# Patient Record
Sex: FEMALE | Race: WHITE | NOT HISPANIC OR LATINO | Employment: FULL TIME | URBAN - METROPOLITAN AREA
[De-identification: names, ages, dates, MRNs, and addresses within clinical notes are randomized per-mention and may not be internally consistent; named-entity substitution may affect disease eponyms.]

---

## 2017-02-09 ENCOUNTER — TELEPHONE (OUTPATIENT)
Dept: INTERNAL MEDICINE | Facility: CLINIC | Age: 62
End: 2017-02-09

## 2017-02-09 DIAGNOSIS — E11.9 TYPE 2 DIABETES MELLITUS WITHOUT COMPLICATION, WITHOUT LONG-TERM CURRENT USE OF INSULIN (HCC): ICD-10-CM

## 2017-02-09 DIAGNOSIS — E03.8 SUBCLINICAL HYPOTHYROIDISM: ICD-10-CM

## 2017-02-09 DIAGNOSIS — Z00.00 HEALTH CARE MAINTENANCE: Primary | ICD-10-CM

## 2017-02-09 PROBLEM — I10 BENIGN ESSENTIAL HYPERTENSION: Status: ACTIVE | Noted: 2017-02-09

## 2017-02-09 PROBLEM — R79.89 ABNORMAL LIVER FUNCTION TESTS: Status: ACTIVE | Noted: 2017-02-09

## 2017-02-09 PROBLEM — E66.9 ADIPOSITY: Status: ACTIVE | Noted: 2017-02-09

## 2017-02-09 PROBLEM — E78.5 HYPERLIPIDEMIA: Status: ACTIVE | Noted: 2017-02-09

## 2017-02-09 NOTE — TELEPHONE ENCOUNTER
----- Message from Niesha Patel sent at 2/9/2017 10:56 AM EST -----  Contact: Patient  Patient is scheduled for CPE on 03/21/2017.  Can we please put orders in chart for her labs on 03/072017?      Thanks.

## 2017-03-07 PROBLEM — E11.3299 MILD NONPROLIFERATIVE DIABETIC RETINOPATHY WITHOUT MACULAR EDEMA ASSOCIATED WITH TYPE 2 DIABETES MELLITUS (HCC): Status: ACTIVE | Noted: 2017-03-07

## 2017-03-08 RX ORDER — SAXAGLIPTIN 5 MG/1
TABLET, FILM COATED ORAL
Qty: 30 TABLET | Refills: 5 | Status: SHIPPED | OUTPATIENT
Start: 2017-03-08 | End: 2017-03-21

## 2017-03-16 ENCOUNTER — LAB (OUTPATIENT)
Dept: INTERNAL MEDICINE | Facility: CLINIC | Age: 62
End: 2017-03-16

## 2017-03-16 DIAGNOSIS — E11.9 TYPE 2 DIABETES MELLITUS WITHOUT COMPLICATION, WITHOUT LONG-TERM CURRENT USE OF INSULIN (HCC): ICD-10-CM

## 2017-03-16 DIAGNOSIS — Z00.00 HEALTH CARE MAINTENANCE: ICD-10-CM

## 2017-03-16 DIAGNOSIS — E03.8 SUBCLINICAL HYPOTHYROIDISM: ICD-10-CM

## 2017-03-16 LAB
ALBUMIN SERPL-MCNC: 4.09 G/DL (ref 3.4–4.6)
ALBUMIN UR-MCNC: 7.3 MG/L (ref 1.3–20)
ALBUMIN/GLOB SERPL: 1.1 G/DL
ALP SERPL-CCNC: 57 U/L (ref 46–116)
ALT SERPL W P-5'-P-CCNC: 41 U/L (ref 14–59)
ANION GAP SERPL CALCULATED.3IONS-SCNC: 8 MMOL/L
AST SERPL-CCNC: 28 U/L (ref 7–37)
BACTERIA UR QL AUTO: ABNORMAL /HPF
BASOPHILS # BLD AUTO: 0.03 10*3/MM3 (ref 0–0.2)
BASOPHILS NFR BLD AUTO: 0.5 % (ref 0–2)
BILIRUB SERPL-MCNC: 0.2 MG/DL (ref 0.2–1)
BILIRUB UR QL STRIP: NEGATIVE
BUN BLD-MCNC: 11 MG/DL (ref 6–22)
BUN/CREAT SERPL: 19.6 (ref 7–25)
CALCIUM SPEC-SCNC: 9.4 MG/DL (ref 8.6–10.5)
CHLORIDE SERPL-SCNC: 102 MMOL/L (ref 95–107)
CHOLEST SERPL-MCNC: 175 MG/DL (ref 0–200)
CLARITY UR: CLEAR
CO2 SERPL-SCNC: 33 MMOL/L (ref 23–32)
COLOR UR: YELLOW
CREAT BLD-MCNC: 0.56 MG/DL (ref 0.55–1.02)
CREAT UR-MCNC: 29.9 MG/DL (ref 20–320)
DEPRECATED RDW RBC AUTO: 40.7 FL (ref 37–54)
EOSINOPHIL # BLD AUTO: 0.43 10*3/MM3 (ref 0–0.7)
EOSINOPHIL NFR BLD AUTO: 6.8 % (ref 0–5)
ERYTHROCYTE [DISTWIDTH] IN BLOOD BY AUTOMATED COUNT: 12.5 % (ref 11.5–15)
GFR SERPL CREATININE-BSD FRML MDRD: 110 ML/MIN/1.73
GLOBULIN UR ELPH-MCNC: 3.6 GM/DL
GLUCOSE BLD-MCNC: 116 MG/DL (ref 70–100)
GLUCOSE UR STRIP-MCNC: NEGATIVE MG/DL
HBA1C MFR BLD: 7 % (ref 4–6)
HCT VFR BLD AUTO: 40.6 % (ref 34.1–44.9)
HDLC SERPL-MCNC: 50 MG/DL (ref 40–81)
HGB BLD-MCNC: 13 G/DL (ref 11.2–15.7)
HGB UR QL STRIP.AUTO: ABNORMAL
HYALINE CASTS UR QL AUTO: ABNORMAL /LPF
KETONES UR QL STRIP: NEGATIVE
LDLC SERPL CALC-MCNC: 87 MG/DL (ref 0–100)
LDLC/HDLC SERPL: 1.74 {RATIO}
LEUKOCYTE ESTERASE UR QL STRIP.AUTO: ABNORMAL
LYMPHOCYTES # BLD AUTO: 1.95 10*3/MM3 (ref 0.8–7)
LYMPHOCYTES NFR BLD AUTO: 31.1 % (ref 10–60)
MCH RBC QN AUTO: 28.6 PG (ref 26–34)
MCHC RBC AUTO-ENTMCNC: 32 G/DL (ref 31–37)
MCV RBC AUTO: 89.2 FL (ref 80–100)
MICROALBUMIN/CREAT UR: 24.4 MG/L (ref 1.3–30)
MONOCYTES # BLD AUTO: 0.35 10*3/MM3 (ref 0–1)
MONOCYTES NFR BLD AUTO: 5.6 % (ref 0–13)
NEUTROPHILS # BLD AUTO: 3.52 10*3/MM3 (ref 1–11)
NEUTROPHILS NFR BLD AUTO: 56 % (ref 30–85)
NITRITE UR QL STRIP: NEGATIVE
PH UR STRIP.AUTO: 7 [PH] (ref 5–8)
PLATELET # BLD AUTO: 245 10*3/MM3 (ref 150–450)
PMV BLD AUTO: 11.3 FL (ref 6–12)
POTASSIUM BLD-SCNC: 4.5 MMOL/L (ref 3.3–5.3)
PROT SERPL-MCNC: 7.7 G/DL (ref 6.3–8.4)
PROT UR QL STRIP: NEGATIVE
RBC # BLD AUTO: 4.55 10*6/MM3 (ref 3.93–5.22)
RBC # UR: ABNORMAL /HPF
REF LAB TEST METHOD: ABNORMAL
SODIUM BLD-SCNC: 143 MMOL/L (ref 136–145)
SP GR UR STRIP: 1.01 (ref 1–1.03)
SQUAMOUS #/AREA URNS HPF: ABNORMAL /HPF
T4 FREE SERPL-MCNC: 0.96 NG/DL (ref 0.93–1.7)
TRIGL SERPL-MCNC: 189 MG/DL (ref 0–150)
TSH SERPL DL<=0.05 MIU/L-ACNC: 4.25 MIU/ML (ref 0.4–4.2)
UROBILINOGEN UR QL STRIP: ABNORMAL
VLDLC SERPL-MCNC: 37.8 MG/DL
WBC NRBC COR # BLD: 6.28 10*3/MM3 (ref 5–10)
WBC UR QL AUTO: ABNORMAL /HPF

## 2017-03-16 PROCEDURE — 80053 COMPREHEN METABOLIC PANEL: CPT | Performed by: INTERNAL MEDICINE

## 2017-03-16 PROCEDURE — 82043 UR ALBUMIN QUANTITATIVE: CPT | Performed by: INTERNAL MEDICINE

## 2017-03-16 PROCEDURE — 85025 COMPLETE CBC W/AUTO DIFF WBC: CPT | Performed by: INTERNAL MEDICINE

## 2017-03-16 PROCEDURE — 82570 ASSAY OF URINE CREATININE: CPT | Performed by: INTERNAL MEDICINE

## 2017-03-16 PROCEDURE — 83036 HEMOGLOBIN GLYCOSYLATED A1C: CPT | Performed by: INTERNAL MEDICINE

## 2017-03-16 PROCEDURE — 80061 LIPID PANEL: CPT | Performed by: INTERNAL MEDICINE

## 2017-03-16 PROCEDURE — 81001 URINALYSIS AUTO W/SCOPE: CPT | Performed by: INTERNAL MEDICINE

## 2017-03-16 PROCEDURE — 84443 ASSAY THYROID STIM HORMONE: CPT | Performed by: INTERNAL MEDICINE

## 2017-03-17 LAB — HCV AB S/CO SERPL IA: <0.1 S/CO RATIO (ref 0–0.9)

## 2017-03-21 ENCOUNTER — OFFICE VISIT (OUTPATIENT)
Dept: INTERNAL MEDICINE | Facility: CLINIC | Age: 62
End: 2017-03-21

## 2017-03-21 VITALS
BODY MASS INDEX: 32.28 KG/M2 | HEIGHT: 61 IN | WEIGHT: 171 LBS | DIASTOLIC BLOOD PRESSURE: 62 MMHG | SYSTOLIC BLOOD PRESSURE: 134 MMHG

## 2017-03-21 DIAGNOSIS — E11.3293 TYPE 2 DIABETES MELLITUS WITH BOTH EYES AFFECTED BY MILD NONPROLIFERATIVE RETINOPATHY WITHOUT MACULAR EDEMA, WITHOUT LONG-TERM CURRENT USE OF INSULIN (HCC): ICD-10-CM

## 2017-03-21 DIAGNOSIS — Z23 NEED FOR PNEUMOCOCCAL VACCINATION: ICD-10-CM

## 2017-03-21 DIAGNOSIS — Z00.00 HEALTH CARE MAINTENANCE: Primary | ICD-10-CM

## 2017-03-21 DIAGNOSIS — Z78.0 POST-MENOPAUSE: ICD-10-CM

## 2017-03-21 DIAGNOSIS — E11.3299 MILD NONPROLIFERATIVE DIABETIC RETINOPATHY WITHOUT MACULAR EDEMA ASSOCIATED WITH TYPE 2 DIABETES MELLITUS (HCC): ICD-10-CM

## 2017-03-21 PROCEDURE — 90732 PPSV23 VACC 2 YRS+ SUBQ/IM: CPT | Performed by: INTERNAL MEDICINE

## 2017-03-21 PROCEDURE — 90471 IMMUNIZATION ADMIN: CPT | Performed by: INTERNAL MEDICINE

## 2017-03-21 PROCEDURE — 99396 PREV VISIT EST AGE 40-64: CPT | Performed by: INTERNAL MEDICINE

## 2017-03-21 NOTE — PROGRESS NOTES
"Checo Tovar is a 61 y.o. female. Here for CPE    History of Present Illness   Visit Vitals   • /62   • Ht 61\" (154.9 cm)   • Wt 171 lb (77.6 kg)   • BMI 32.31 kg/m2     Patient is here for yearly CPE. Last CPE was  2 year ago.  PMH, SH and FH reviewed. Changes in the FH: none .  Patient rates her own health as: good.  Tobacco use: none.  Alcohol use: none.  Recreational drugs use: none  Medication list rewieved.  Diet: healthy heart.  Exercise: used to exercise regularly, but lately stopped exercise due to pain in her knees.  Marital status: .   Employment: full time.  Patient rates her stress level as: moderate.  Dental health: good. Brushes teeth 2 times a day, flosses couple of times a week. . Dental visits: 2 times a year .  Vision correction: reading glasses  Hearing: normal.    Recent vaccinations:   Flu  is up to date and recommended yearly  Tdap  is up to date  Zostavax discussed and recommended to get at [facility].    Patient is postmenopausal. Past pregnancies: . Currently patient is on no HRT .      Current Outpatient Prescriptions:   •  CRESTOR 20 MG tablet, TAKE 1/2 TABLET BY MOUTH DAILY, Disp: 15 tablet, Rfl: 3  •  glimepiride (AMARYL) 2 MG tablet, TAKE ONE TABLET BY MOUTH IN THE MORNING, Disp: 90 tablet, Rfl: 3  •  lisinopril (PRINIVIL,ZESTRIL) 20 MG tablet, TAKE ONE TABLET BY MOUTH ONCE DAILY AS DIRECTED, Disp: 90 tablet, Rfl: 3  •  lisinopril-hydrochlorothiazide (PRINZIDE,ZESTORETIC) 20-25 MG per tablet, TAKE ONE TABLET BY MOUTH ONCE DAILY, Disp: 90 tablet, Rfl: 3  •  metFORMIN (GLUCOPHAGE) 500 MG tablet, TAKE FIVE TABLETS BY MOUTH ONCE DAILY AS DIRECTED, Disp: 150 tablet, Rfl: 11  •  ONGLYZA 5 MG tablet, TAKE ONE TABLET BY MOUTH ONCE DAILY, Disp: 30 tablet, Rfl: 5                           The following portions of the patient's history were reviewed and updated as appropriate: allergies, current medications, past family history, past medical history, " past social history, past surgical history and problem list.    Review of Systems   Constitutional: Negative for chills and fever.   HENT: Negative for postnasal drip, sinus pressure and sore throat.    Eyes: Negative for pain and itching.   Respiratory: Negative for cough and chest tightness.    Cardiovascular: Negative for chest pain and leg swelling.   Gastrointestinal: Negative for abdominal pain and blood in stool.   Endocrine: Negative for cold intolerance and heat intolerance.   Genitourinary: Positive for flank pain. Negative for difficulty urinating.   Musculoskeletal: Negative for back pain and neck pain.   Skin: Negative for color change and rash.   Neurological: Negative for dizziness, weakness and headaches.   Hematological: Negative for adenopathy. Does not bruise/bleed easily.   Psychiatric/Behavioral: Negative for sleep disturbance. The patient is not nervous/anxious.        Objective   Physical Exam   Constitutional: She is oriented to person, place, and time. She appears well-developed. No distress.   obese   HENT:   Head: Normocephalic and atraumatic.   Right Ear: Tympanic membrane, external ear and ear canal normal. No drainage or swelling. No mastoid tenderness. Tympanic membrane is not injected. No middle ear effusion.   Left Ear: Tympanic membrane, external ear and ear canal normal. No drainage or swelling. No mastoid tenderness. Tympanic membrane is not injected.  No middle ear effusion.   Nose: Nose normal. No mucosal edema. Right sinus exhibits no maxillary sinus tenderness and no frontal sinus tenderness. Left sinus exhibits no maxillary sinus tenderness and no frontal sinus tenderness.   Mouth/Throat: Uvula is midline, oropharynx is clear and moist and mucous membranes are normal. No oropharyngeal exudate.   Eyes: Conjunctivae, EOM and lids are normal. Pupils are equal, round, and reactive to light. Right eye exhibits no discharge. Left eye exhibits no discharge. Right conjunctiva is not  injected. Right conjunctiva has no hemorrhage. Left conjunctiva is not injected. Left conjunctiva has no hemorrhage. No scleral icterus. Right eye exhibits normal extraocular motion and no nystagmus. Left eye exhibits normal extraocular motion and no nystagmus.   Neck: Normal range of motion and full passive range of motion without pain. Neck supple. No JVD present. Carotid bruit is not present. No thyromegaly present.   Cardiovascular: Normal rate, regular rhythm, S1 normal, S2 normal, normal heart sounds and intact distal pulses.  Exam reveals no gallop and no friction rub.    No murmur heard.  Pulmonary/Chest: Effort normal and breath sounds normal. No respiratory distress. She has no decreased breath sounds. She has no wheezes. She has no rhonchi. She has no rales. Right breast exhibits no inverted nipple, no mass, no nipple discharge, no skin change and no tenderness. Left breast exhibits no inverted nipple, no mass, no nipple discharge, no skin change and no tenderness. Breasts are symmetrical.   Abdominal: Soft. Bowel sounds are normal. She exhibits no distension. There is no tenderness. There is no rebound and no guarding.   Musculoskeletal: She exhibits no edema.    Pretty had a diabetic foot exam performed (nl) today.   During the foot exam she had a monofilament test performed (no light touch deficit on exam with microfilament).    Neurological Sensory Findings - Unaltered hot/cold right ankle/foot discrimination and unaltered hot/cold left ankle/foot discrimination. Unaltered sharp/dull right ankle/foot discrimination and unaltered sharp/dull left ankle/foot discrimination. No right ankle/foot altered proprioception and no left ankle/foot altered proprioception    Vascular Status -  Her exam exhibits right foot vasculature abnormal and no right foot edema. Her exam exhibits left foot vasculature abnormal and no left foot edema.   Skin Integrity  -  Her right foot skin is not intact.   Pretty's left  foot skin is not intact. .  Lymphadenopathy:        Head (right side): No submental, no submandibular, no preauricular, no posterior auricular and no occipital adenopathy present.        Head (left side): No submental, no submandibular, no preauricular, no posterior auricular and no occipital adenopathy present.     She has no cervical adenopathy.        Right cervical: No superficial cervical and no deep cervical adenopathy present.       Left cervical: No superficial cervical, no deep cervical and no posterior cervical adenopathy present.     She has no axillary adenopathy.        Right: No supraclavicular adenopathy present.        Left: No supraclavicular adenopathy present.   Neurological: She is alert and oriented to person, place, and time. She has normal reflexes. She displays normal reflexes. No cranial nerve deficit. She exhibits normal muscle tone. Coordination normal.   Reflex Scores:       Bicep reflexes are 2+ on the right side and 2+ on the left side.       Patellar reflexes are 2+ on the right side and 2+ on the left side.  Skin: Skin is warm. No rash noted. She is not diaphoretic. No erythema.   Psychiatric: She has a normal mood and affect. Her speech is normal and behavior is normal.   Vitals reviewed.      Assessment/Plan   Pretty was seen today for annual exam.    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  -     metFORMIN (GLUCOPHAGE) 500 MG tablet; Take 2 tablets by mouth Take As Directed. 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 Cardiovascular risk factors: hypertension, hyperlipidemia, diabetes mellitus, obesity, family history of CAD, sedentary life style   2. Diabetes risk factors: patient has diabetes and being treated.  3. Cancer risk factors: diabetes.  4. Risky behavior: none. Use of seat belts: regular. Use of sunscreens: none.   Tattoos: none. H/o blood transfusions/organ transplants before 1992 or clotting factor transfusion before 1987: none.     Prevention:  Cholesterol test  up to date.  Blood sugar test up to date.   Hep C testing (for patients born [birth year]): completed..  Mammogram up to date. Recommended frequency and time of the next screening per GYN.. Breast self exams recommended once a month.  Colonoscopy: is due. Will schedule. Benefits and risks discussed..  PAP smear : up to date. Recommendations per GYN..  DEXA : is due, will schedule. Benefits explained..     HTN - well controlled with current medication regimen. Normal kidney tests and electrolytes. Recommended low salt diet. Continue same medical treatment.  Hyperlipidemia - well controlled with Rosuavastatin. Normal liver function tests. LDL target is below < 70 mg/dl (\"very high\" risk for CHD). Patient's 87, but had improved from 122, that she had 2 years ago.. Continue same treatment. Regular exercise recommended.  DM II - not well contrlled with sulfonurea, metformin and DPP4 inhibitor. Hb A1C is 7.0.  No hypoglycemic episodes. Low carb diet and regular exercise recommended. Weight loss will be very beneficial. Regular feet self examinations and comfortable footwear recommended. Will discontinue Onglyza and start Victoza once a day. Start 0.6 mg a day for 1-2 weeks, then increase to 1.2 mg a day and continue. Some nausea is expected initially, but usually goes away in 2 weeks. If patient notices blood sugars below 80, change Glimepiride to " 1/2 pill a day.

## 2017-03-21 NOTE — PATIENT INSTRUCTIONS
"Risk evaluation:  1. Cardiovascular risk factors: hypertension, hyperlipidemia, diabetes mellitus, obesity, family history of CAD, sedentary life style   2. Diabetes risk factors: patient has diabetes and being treated.  3. Cancer risk factors: diabetes.  4. Risky behavior: none. Use of seat belts: regular. Use of sunscreens: none.   Tattoos: none. H/o blood transfusions/organ transplants before 1992 or clotting factor transfusion before 1987: none.     Prevention:  Cholesterol test  up to date.  Blood sugar test up to date.   Hep C testing (for patients born [birth year]): completed..  Mammogram up to date. Recommended frequency and time of the next screening per GYN.. Breast self exams recommended once a month.  Colonoscopy: is due. Will schedule. Benefits and risks discussed..  PAP smear : up to date. Recommendations per GYN..  DEXA : is due, will schedule. Benefits explained..     HTN - well controlled with current medication regimen. Normal kidney tests and electrolytes. Recommended low salt diet. Continue same medical treatment.  Hyperlipidemia - well controlled with Rosuavastatin. Normal liver function tests. LDL target is below < 70 mg/dl (\"very high\" risk for CHD). Patient's 87, but had improved from 122, that she had 2 years ago.. Continue same treatment. Regular exercise recommended.  DM II - not well contrlled with sulfonurea, metformin and DPP4 inhibitor. Hb A1C is 7.0.  No hypoglycemic episodes. Low carb diet and regular exercise recommended. Weight loss will be very beneficial. Regular feet self examinations and comfortable footwear recommended. Will discontinue Onglyza and start Victoza once a day. Start 0.6 mg a day for 1-2 weeks, then increase to 1.2 mg a day and continue. Some nausea is expected initially, but usually goes away in 2 weeks. If patient notices blood sugars below 80, change Glimepiride to 1/2 pill a day.       "

## 2017-03-30 DIAGNOSIS — Z12.11 ENCOUNTER FOR SCREENING COLONOSCOPY: Primary | ICD-10-CM

## 2017-03-30 RX ORDER — PEG-3350, SODIUM SULFATE, SODIUM CHLORIDE, POTASSIUM CHLORIDE, SODIUM ASCORBATE AND ASCORBIC ACID 7.5-2.691G
KIT ORAL
Qty: 1 EACH | Refills: 0 | Status: SHIPPED | OUTPATIENT
Start: 2017-03-30 | End: 2018-04-12

## 2017-04-17 RX ORDER — ROSUVASTATIN CALCIUM 20 MG/1
TABLET, FILM COATED ORAL
Qty: 15 TABLET | Refills: 3 | Status: SHIPPED | OUTPATIENT
Start: 2017-04-17 | End: 2017-09-18 | Stop reason: SDUPTHER

## 2017-05-15 ENCOUNTER — TELEPHONE (OUTPATIENT)
Dept: INTERNAL MEDICINE | Facility: CLINIC | Age: 62
End: 2017-05-15

## 2017-05-16 RX ORDER — LISINOPRIL 20 MG/1
20 TABLET ORAL DAILY
Qty: 90 TABLET | Refills: 3 | Status: SHIPPED | OUTPATIENT
Start: 2017-05-16 | End: 2018-06-18 | Stop reason: SDUPTHER

## 2017-05-16 RX ORDER — LISINOPRIL AND HYDROCHLOROTHIAZIDE 25; 20 MG/1; MG/1
1 TABLET ORAL DAILY
Qty: 90 TABLET | Refills: 3 | Status: SHIPPED | OUTPATIENT
Start: 2017-05-16 | End: 2018-06-18 | Stop reason: SDUPTHER

## 2017-05-17 DIAGNOSIS — E11.3299 MILD NONPROLIFERATIVE DIABETIC RETINOPATHY WITHOUT MACULAR EDEMA ASSOCIATED WITH TYPE 2 DIABETES MELLITUS (HCC): ICD-10-CM

## 2017-05-17 RX ORDER — LIRAGLUTIDE 6 MG/ML
INJECTION SUBCUTANEOUS
Qty: 6 ML | Refills: 1 | Status: SHIPPED | OUTPATIENT
Start: 2017-05-17 | End: 2017-07-19 | Stop reason: SDUPTHER

## 2017-06-21 ENCOUNTER — TELEPHONE (OUTPATIENT)
Dept: INTERNAL MEDICINE | Facility: CLINIC | Age: 62
End: 2017-06-21

## 2017-06-21 ENCOUNTER — LAB (OUTPATIENT)
Dept: INTERNAL MEDICINE | Facility: CLINIC | Age: 62
End: 2017-06-21

## 2017-06-21 DIAGNOSIS — E11.3293 TYPE 2 DIABETES MELLITUS WITH BOTH EYES AFFECTED BY MILD NONPROLIFERATIVE RETINOPATHY WITHOUT MACULAR EDEMA, WITHOUT LONG-TERM CURRENT USE OF INSULIN (HCC): ICD-10-CM

## 2017-06-21 LAB
ANION GAP SERPL CALCULATED.3IONS-SCNC: 9 MMOL/L
BUN BLD-MCNC: 10 MG/DL (ref 6–22)
BUN/CREAT SERPL: 18.2 (ref 7–25)
CALCIUM SPEC-SCNC: 9.4 MG/DL (ref 8.6–10.5)
CHLORIDE SERPL-SCNC: 100 MMOL/L (ref 95–107)
CO2 SERPL-SCNC: 31 MMOL/L (ref 23–32)
CREAT BLD-MCNC: 0.55 MG/DL (ref 0.55–1.02)
GFR SERPL CREATININE-BSD FRML MDRD: 112 ML/MIN/1.73
GLUCOSE BLD-MCNC: 101 MG/DL (ref 70–100)
HBA1C MFR BLD: 5.9 % (ref 4.8–5.6)
POTASSIUM BLD-SCNC: 5.1 MMOL/L (ref 3.3–5.3)
SODIUM BLD-SCNC: 140 MMOL/L (ref 136–145)

## 2017-06-21 PROCEDURE — 80048 BASIC METABOLIC PNL TOTAL CA: CPT | Performed by: INTERNAL MEDICINE

## 2017-06-21 NOTE — TELEPHONE ENCOUNTER
Refilled patients test strips per patient request. Due to patient proper compliance with lab and office visit

## 2017-06-21 NOTE — TELEPHONE ENCOUNTER
----- Message from Cristy Armas sent at 6/21/2017  8:37 AM EDT -----  Pt is requesting a refill on the Barbara Plus test strips.     [facility] [id number] [address] [employer] - [facility] [address] - [phone]  - [phone] (Phone)  [phone] (Fax)    Next OV 6/28/17    [phone] (H)  [phone] (M)  [phone] (W)

## 2017-07-07 ENCOUNTER — OFFICE VISIT (OUTPATIENT)
Dept: INTERNAL MEDICINE | Facility: CLINIC | Age: 62
End: 2017-07-07

## 2017-07-07 VITALS
HEIGHT: 61 IN | SYSTOLIC BLOOD PRESSURE: 118 MMHG | DIASTOLIC BLOOD PRESSURE: 72 MMHG | RESPIRATION RATE: 14 BRPM | WEIGHT: 168 LBS | BODY MASS INDEX: 31.72 KG/M2

## 2017-07-07 DIAGNOSIS — E11.3293 TYPE 2 DIABETES MELLITUS WITH BOTH EYES AFFECTED BY MILD NONPROLIFERATIVE RETINOPATHY WITHOUT MACULAR EDEMA, WITHOUT LONG-TERM CURRENT USE OF INSULIN (HCC): Primary | ICD-10-CM

## 2017-07-07 DIAGNOSIS — I10 BENIGN ESSENTIAL HYPERTENSION: ICD-10-CM

## 2017-07-07 PROCEDURE — 99213 OFFICE O/P EST LOW 20 MIN: CPT | Performed by: INTERNAL MEDICINE

## 2017-07-07 RX ORDER — GLIMEPIRIDE 1 MG/1
1 TABLET ORAL DAILY
Qty: 90 TABLET | Refills: 3 | Status: SHIPPED | OUTPATIENT
Start: 2017-07-07 | End: 2018-04-12

## 2017-07-07 NOTE — PROGRESS NOTES
"Subjective   Pretty Tovar is a 61 y.o. female. Here for DM    History of Present Illness   Pretty Tovar 61 y.o. female presents today for diabetes mellitus f/u. Last was seen on 03/21/2017 and on that visit medication was changed due to inadequate control.We had discontinued Onglyza and started Victoaza 1.2 mg.  Patient is compliant with treatment and denies  side effects. Patient brought in home accuchecks and blood sugars seem to be in the better range. No hypoglycemia. Had lost 3 lbs as her appetite had decreased.       /72 (BP Location: Right arm, Patient Position: Sitting, Cuff Size: Adult)  Resp 14  Ht 61\" (154.9 cm)  Wt 168 lb (76.2 kg)  BMI 31.74 kg/m2    Current Outpatient Prescriptions:   •  CRESTOR 20 MG tablet, TAKE 1/2 TABLET BY MOUTH DAILY, Disp: 15 tablet, Rfl: 3  •  glimepiride (AMARYL) 2 MG tablet, TAKE ONE TABLET BY MOUTH IN THE MORNING, Disp: 90 tablet, Rfl: 3  •  glucose blood test strip, Use to check blood sugars once daily, Disp: 50 each, Rfl: 11  •  lisinopril (PRINIVIL,ZESTRIL) 20 MG tablet, Take 1 tablet by mouth Daily., Disp: 90 tablet, Rfl: 3  •  lisinopril-hydrochlorothiazide (PRINZIDE,ZESTORETIC) 20-25 MG per tablet, Take 1 tablet by mouth Daily., Disp: 90 tablet, Rfl: 3  •  metFORMIN (GLUCOPHAGE) 500 MG tablet, Take 2 tablets by mouth Take As Directed. Take 3 pills in am and 2 in pm, Disp: 150 tablet, Rfl: 11  •  PEG-KCl-NaCl-NaSulf-Na Asc-C (MOVIPREP) 100 G reconstituted solution, Take as directed., Disp: 1 each, Rfl: 0  •  VICTOZA 18 MG/3ML solution pen-injector, INJECT 1.2 MG SUBCUTANEOUSLY ONCE DAILY, Disp: 6 mL, Rfl: 1    Patient has long-standing benign essential HTN.  BP is usually well controlled with daily use of ACEI and thiazide diuretic. On low salt diet with good compliance. Takes medication regularly. Denies chest pain, dyspnea,lightheadedness,  lower extremity edema. Patient does not check blood pressure    The following portions of the patient's history " were reviewed and updated as appropriate: allergies, current medications, past family history, past medical history, past social history, past surgical history and problem list.    Review of Systems   Constitutional: Negative for chills and fever.   Eyes: Negative for pain and redness.   Respiratory: Negative for cough and shortness of breath.    Cardiovascular: Negative for chest pain and leg swelling.   Neurological: Negative for dizziness and headaches.       Objective   Physical Exam   Constitutional: She is oriented to person, place, and time. She appears well-developed.   obese   HENT:   Head: Normocephalic and atraumatic.   Right Ear: Tympanic membrane, external ear and ear canal normal.   Left Ear: Tympanic membrane, external ear and ear canal normal.   Nose: Nose normal. Right sinus exhibits no maxillary sinus tenderness and no frontal sinus tenderness. Left sinus exhibits no maxillary sinus tenderness and no frontal sinus tenderness.   Mouth/Throat: Uvula is midline, oropharynx is clear and moist and mucous membranes are normal.   Eyes: Conjunctivae and EOM are normal. Pupils are equal, round, and reactive to light. Right eye exhibits no discharge. Left eye exhibits no discharge. No scleral icterus.   Neck: Neck supple. No JVD present.   Cardiovascular: Normal rate, regular rhythm and normal heart sounds.  Exam reveals no gallop and no friction rub.    No murmur heard.  Pulmonary/Chest: Effort normal and breath sounds normal. She has no wheezes. She has no rales.   Musculoskeletal: She exhibits no edema.   Lymphadenopathy:     She has no cervical adenopathy.   Neurological: She is alert and oriented to person, place, and time. No cranial nerve deficit.   Skin: Skin is warm and dry. No rash noted.   erythematous flat patches   Psychiatric: She has a normal mood and affect. Her behavior is normal.   Vitals reviewed.      Assessment/Plan   Pretty was seen today for diabetes.    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 Hb A1C from 7.0 is down to 5.9. Will continue same Victoza and Metformin, but will decrease the dose of Glimepiride to 1 mg a day.  HTN - well controlled  with current medication. Reminded of the importance of low salt diet. Normal kidney tests and electrolytes. Continue same treatment.

## 2017-07-19 DIAGNOSIS — E11.3299 MILD NONPROLIFERATIVE DIABETIC RETINOPATHY WITHOUT MACULAR EDEMA ASSOCIATED WITH TYPE 2 DIABETES MELLITUS (HCC): ICD-10-CM

## 2017-07-19 RX ORDER — LIRAGLUTIDE 6 MG/ML
INJECTION SUBCUTANEOUS
Qty: 9 ML | Refills: 1 | Status: SHIPPED | OUTPATIENT
Start: 2017-07-19 | End: 2017-09-13 | Stop reason: SDUPTHER

## 2017-07-20 ENCOUNTER — TELEPHONE (OUTPATIENT)
Dept: INTERNAL MEDICINE | Facility: CLINIC | Age: 62
End: 2017-07-20

## 2017-07-20 DIAGNOSIS — E11.9 TYPE 2 DIABETES MELLITUS WITHOUT COMPLICATION, UNSPECIFIED LONG TERM INSULIN USE STATUS: Primary | ICD-10-CM

## 2017-07-20 NOTE — TELEPHONE ENCOUNTER
----- Message from Pretty Tovar sent at 7/19/2017  5:27 PM EDT -----  Regarding: Prescription Question  Contact: [phone]  Dr. Schwartz,       Could you please send a prescription for the OneTouch Verio Test Strips to my pharmacy          [facility]      [address]      My insurance would not pay for the previous prescription you sent.    Thanks so much.    Pretty

## 2017-09-05 ENCOUNTER — TELEPHONE (OUTPATIENT)
Dept: INTERNAL MEDICINE | Facility: CLINIC | Age: 62
End: 2017-09-05

## 2017-09-05 DIAGNOSIS — E11.3299 MILD NONPROLIFERATIVE DIABETIC RETINOPATHY WITHOUT MACULAR EDEMA ASSOCIATED WITH TYPE 2 DIABETES MELLITUS (HCC): ICD-10-CM

## 2017-09-05 NOTE — TELEPHONE ENCOUNTER
Regarding: Prescription Question  Contact: [phone]  ----- Message from Victoria Boles sent at 9/5/2017  2:24 PM EDT -----       ----- Message from Pretty Tovar to Lety Schwartz MD sent at 9/5/2017 12:19 PM -----   I need a refill on my prescription for Metformin 500 MG Tab  (5 Tablets Daily)    You may receive a request from [facility], [facility].      Thank you,    Pretty

## 2017-09-05 NOTE — TELEPHONE ENCOUNTER
Metformin 500 mg refill Sent to patients [facility] pharmacy per patient request. Patient notified through [employer]

## 2017-09-13 DIAGNOSIS — E11.3299 MILD NONPROLIFERATIVE DIABETIC RETINOPATHY WITHOUT MACULAR EDEMA ASSOCIATED WITH TYPE 2 DIABETES MELLITUS (HCC): ICD-10-CM

## 2017-09-13 RX ORDER — LIRAGLUTIDE 6 MG/ML
INJECTION SUBCUTANEOUS
Qty: 9 ML | Refills: 1 | Status: SHIPPED | OUTPATIENT
Start: 2017-09-13 | End: 2017-12-11 | Stop reason: SDUPTHER

## 2017-09-18 RX ORDER — ROSUVASTATIN CALCIUM 20 MG/1
10 TABLET, FILM COATED ORAL DAILY
Qty: 15 TABLET | Refills: 6 | Status: SHIPPED | OUTPATIENT
Start: 2017-09-18 | End: 2018-04-10 | Stop reason: SDUPTHER

## 2017-10-11 ENCOUNTER — LAB (OUTPATIENT)
Dept: INTERNAL MEDICINE | Facility: CLINIC | Age: 62
End: 2017-10-11

## 2017-10-11 DIAGNOSIS — E11.3293 TYPE 2 DIABETES MELLITUS WITH BOTH EYES AFFECTED BY MILD NONPROLIFERATIVE RETINOPATHY WITHOUT MACULAR EDEMA, WITHOUT LONG-TERM CURRENT USE OF INSULIN (HCC): ICD-10-CM

## 2017-10-11 LAB
ALBUMIN SERPL-MCNC: 4.7 G/DL (ref 3.5–5.2)
ALBUMIN/GLOB SERPL: 1.3 G/DL
ALP SERPL-CCNC: 46 U/L (ref 39–117)
ALT SERPL W P-5'-P-CCNC: 19 U/L (ref 1–33)
ANION GAP SERPL CALCULATED.3IONS-SCNC: 14.1 MMOL/L
AST SERPL-CCNC: 24 U/L (ref 1–32)
BILIRUB SERPL-MCNC: 0.3 MG/DL (ref 0.1–1.2)
BUN BLD-MCNC: 10 MG/DL (ref 8–23)
BUN/CREAT SERPL: 16.9 (ref 7–25)
CALCIUM SPEC-SCNC: 10.2 MG/DL (ref 8.6–10.5)
CHLORIDE SERPL-SCNC: 97 MMOL/L (ref 98–107)
CHOLEST SERPL-MCNC: 182 MG/DL (ref 0–200)
CO2 SERPL-SCNC: 29.9 MMOL/L (ref 22–29)
CREAT BLD-MCNC: 0.59 MG/DL (ref 0.57–1)
GFR SERPL CREATININE-BSD FRML MDRD: 104 ML/MIN/1.73
GLOBULIN UR ELPH-MCNC: 3.6 GM/DL
GLUCOSE BLD-MCNC: 76 MG/DL (ref 65–99)
HBA1C MFR BLD: 5.9 % (ref 4.8–5.6)
HDLC SERPL-MCNC: 53 MG/DL (ref 40–60)
LDLC SERPL CALC-MCNC: 85 MG/DL (ref 0–100)
LDLC/HDLC SERPL: 1.61 {RATIO}
POTASSIUM BLD-SCNC: 3.8 MMOL/L (ref 3.5–5.2)
PROT SERPL-MCNC: 8.3 G/DL (ref 6–8.5)
SODIUM BLD-SCNC: 141 MMOL/L (ref 136–145)
TRIGL SERPL-MCNC: 218 MG/DL (ref 0–150)
VLDLC SERPL-MCNC: 43.6 MG/DL (ref 5–40)

## 2017-10-11 PROCEDURE — 80053 COMPREHEN METABOLIC PANEL: CPT | Performed by: INTERNAL MEDICINE

## 2017-10-11 PROCEDURE — 36415 COLL VENOUS BLD VENIPUNCTURE: CPT | Performed by: INTERNAL MEDICINE

## 2017-10-11 PROCEDURE — 83036 HEMOGLOBIN GLYCOSYLATED A1C: CPT | Performed by: INTERNAL MEDICINE

## 2017-10-11 PROCEDURE — 80061 LIPID PANEL: CPT | Performed by: INTERNAL MEDICINE

## 2017-10-18 ENCOUNTER — OFFICE VISIT (OUTPATIENT)
Dept: INTERNAL MEDICINE | Facility: CLINIC | Age: 62
End: 2017-10-18

## 2017-10-18 VITALS
RESPIRATION RATE: 14 BRPM | BODY MASS INDEX: 30.02 KG/M2 | HEIGHT: 61 IN | WEIGHT: 159 LBS | SYSTOLIC BLOOD PRESSURE: 120 MMHG | DIASTOLIC BLOOD PRESSURE: 62 MMHG

## 2017-10-18 DIAGNOSIS — Z00.00 HEALTH CARE MAINTENANCE: ICD-10-CM

## 2017-10-18 DIAGNOSIS — E11.3293 TYPE 2 DIABETES MELLITUS WITH BOTH EYES AFFECTED BY MILD NONPROLIFERATIVE RETINOPATHY WITHOUT MACULAR EDEMA, WITHOUT LONG-TERM CURRENT USE OF INSULIN (HCC): ICD-10-CM

## 2017-10-18 DIAGNOSIS — E78.2 MIXED HYPERLIPIDEMIA: Primary | ICD-10-CM

## 2017-10-18 DIAGNOSIS — Z23 NEED FOR VACCINATION: ICD-10-CM

## 2017-10-18 DIAGNOSIS — R79.89 ABNORMAL LIVER FUNCTION TESTS: ICD-10-CM

## 2017-10-18 PROCEDURE — 90686 IIV4 VACC NO PRSV 0.5 ML IM: CPT | Performed by: INTERNAL MEDICINE

## 2017-10-18 PROCEDURE — 90471 IMMUNIZATION ADMIN: CPT | Performed by: INTERNAL MEDICINE

## 2017-10-18 PROCEDURE — 99214 OFFICE O/P EST MOD 30 MIN: CPT | Performed by: INTERNAL MEDICINE

## 2017-10-18 RX ORDER — CALCIPOTRIENE AND BETAMETHASONE DIPROPIONATE 50; .5 UG/G; MG/G
1 SUSPENSION TOPICAL AS NEEDED
Refills: 5 | COMMUNITY
Start: 2017-08-18

## 2017-10-18 NOTE — PATIENT INSTRUCTIONS
"Hyperlipidemia - well controlled with statin. Normal liver function tests. LDL target is below < 70 mg/dl (\"very high\" risk for CHD). Patient's 84 while on high dose of Crestor. Still evelated triglycerides - recommended to eat more fish, less starches. Continue same treatment. Regular exercise recommended.  DM II - well controlled with sulfonurea, metformin and GLP1 agonist injections. Hb A1C is   Lab Results   Component Value Date    HGBA1C 5.90 (H) 10/11/2017    Low carb diet and regular exercise recommended. Weight loss will be very beneficial. Regular feet self examinations and comfortable footwear recommended.Advised to stop Glimepiride after Valentynes Day.  Liver function tests abnormalities - resolved with weight loss, will monitor.    "

## 2017-10-18 NOTE — PROGRESS NOTES
"Subjective   Pretty Tovar is a 61 y.o. female.     History of Present Illness     /62 (BP Location: Left arm, Patient Position: Sitting, Cuff Size: Adult)  Resp 14  Ht 61\" (154.9 cm)  Wt 159 lb (72.1 kg)  BMI 30.04 kg/m2    Current Outpatient Prescriptions:   •  CRESTOR 20 MG tablet, Take 0.5 tablets by mouth Daily., Disp: 15 tablet, Rfl: 6  •  glimepiride (AMARYL) 1 MG tablet, Take 1 tablet by mouth Daily., Disp: 90 tablet, Rfl: 3  •  glucose blood test strip, Use to check blood sugars once daily, Disp: 50 each, Rfl: 11  •  glucose blood test strip, Use to check blood sugars once daily, Disp: 50 each, Rfl: 12  •  Insulin Pen Needle (NOVOFINE PLUS) 32G X 4 MM misc, Use 1 pen needle with each victoza injection, Disp: 100 each, Rfl: 3  •  lisinopril (PRINIVIL,ZESTRIL) 20 MG tablet, Take 1 tablet by mouth Daily., Disp: 90 tablet, Rfl: 3  •  lisinopril-hydrochlorothiazide (PRINZIDE,ZESTORETIC) 20-25 MG per tablet, Take 1 tablet by mouth Daily., Disp: 90 tablet, Rfl: 3  •  metFORMIN (GLUCOPHAGE) 500 MG tablet, Take 2 tablets by mouth Take As Directed. Take 3 pills in am and 2 in pm, Disp: 150 tablet, Rfl: 11  •  PEG-KCl-NaCl-NaSulf-Na Asc-C (MOVIPREP) 100 G reconstituted solution, Take as directed., Disp: 1 each, Rfl: 0  •  TACLONEX 0.005-0.064 % external suspension, APPLY TO PSORIASIS ON SCALP/TRUNK/EXTREMITIES EVERY OTHER DAY AS NEEDED, Disp: , Rfl: 5  •  VICTOZA 18 MG/3ML solution pen-injector, INJECT 1.2 MG SUBCUTANEOUSLY ONCE DAILY, Disp: 9 mL, Rfl: 1    Patient has been diagnosed with hyperlipidemia. Target LDL is < 70 mg/dl (\"very high\" risk for CHD). Patient is on low fat diet with good compliance. Patient is compliant with treatment: daily use of Crestor (rozuvastatin). Side effects of medication: none. Exercise none.    Patient has DM type II. Pretty Tovar uses sulfonurea, metformin and GLP1 agonist injections for blood sugars control. Patient checks fasting blood sugars at home daily., Denies " hypoglycemic episodes. and Fasting blood sugars are in 120s, had few in 80s. Compliance with low carb diabetic diet is good. Compliance with medication is good.  In a past control had been good. Last Hb A1C was 5.9.    Patient had been diagnosed with abnormal LFTs. No use of ETOH or Tylenol OTC. No abdominal pain or nausea. Lost 12 lbs in  The last 6 months with use of Victoza.    hThe following portions of the patient's history were reviewed and updated as appropriate: allergies, current medications, past family history, past medical history, past social history, past surgical history and problem list.    Review of Systems   Constitutional: Negative for chills and fever.   Eyes: Negative for pain and redness.   Respiratory: Negative for cough and shortness of breath.    Cardiovascular: Negative for chest pain and leg swelling.   Neurological: Negative for dizziness and headaches.       Objective   Physical Exam   Constitutional: She is oriented to person, place, and time. She appears well-developed and well-nourished.   HENT:   Head: Normocephalic and atraumatic.   Right Ear: Tympanic membrane, external ear and ear canal normal.   Left Ear: Tympanic membrane, external ear and ear canal normal.   Nose: Nose normal. Right sinus exhibits no maxillary sinus tenderness and no frontal sinus tenderness. Left sinus exhibits no maxillary sinus tenderness and no frontal sinus tenderness.   Mouth/Throat: Uvula is midline, oropharynx is clear and moist and mucous membranes are normal.   Eyes: Conjunctivae and EOM are normal. Pupils are equal, round, and reactive to light. Right eye exhibits no discharge. Left eye exhibits no discharge. No scleral icterus.   Neck: Neck supple. No JVD present.   Cardiovascular: Normal rate, regular rhythm and normal heart sounds.  Exam reveals no gallop and no friction rub.    No murmur heard.  Pulmonary/Chest: Effort normal and breath sounds normal. She has no wheezes. She has no rales.  "  Musculoskeletal: She exhibits no edema.   Lymphadenopathy:     She has no cervical adenopathy.   Neurological: She is alert and oriented to person, place, and time. No cranial nerve deficit.   Skin: Skin is warm and dry. No rash noted.   Psychiatric: She has a normal mood and affect. Her behavior is normal.   Vitals reviewed.      Assessment/Plan   Pretty was seen today for diabetes and hyperlipidemia.    Diagnoses and all orders for this visit:    Mixed hyperlipidemia    Type 2 diabetes mellitus with both eyes affected by mild nonproliferative retinopathy without macular edema, without long-term current use of insulin    Abnormal liver function tests      Hyperlipidemia - well controlled with statin. Normal liver function tests. LDL target is below < 70 mg/dl (\"very high\" risk for CHD). Patient's 84 while on high dose of Crestor. Still evelated triglycerides - recommended to eat more fish, less starches. Continue same treatment. Regular exercise recommended.  DM II - well controlled with sulfonurea, metformin and GLP1 agonist injections. Hb A1C is   Lab Results   Component Value Date    HGBA1C 5.90 (H) 10/11/2017    Low carb diet and regular exercise recommended. Weight loss will be very beneficial. Regular feet self examinations and comfortable footwear recommended.Advised to stop Glimepiride after Valentynes Day and continue home BS monitoring.  Liver function tests abnormalities - resolved with weight loss, will monitor.     "

## 2017-10-26 DIAGNOSIS — E11.3299 MILD NONPROLIFERATIVE DIABETIC RETINOPATHY WITHOUT MACULAR EDEMA ASSOCIATED WITH TYPE 2 DIABETES MELLITUS (HCC): ICD-10-CM

## 2017-10-26 RX ORDER — LIRAGLUTIDE 6 MG/ML
INJECTION SUBCUTANEOUS
Qty: 9 ML | Refills: 3 | OUTPATIENT
Start: 2017-10-26

## 2017-12-11 DIAGNOSIS — E11.3299 MILD NONPROLIFERATIVE DIABETIC RETINOPATHY WITHOUT MACULAR EDEMA ASSOCIATED WITH TYPE 2 DIABETES MELLITUS (HCC): ICD-10-CM

## 2017-12-11 RX ORDER — LIRAGLUTIDE 6 MG/ML
INJECTION SUBCUTANEOUS
Qty: 9 ML | Refills: 2 | Status: SHIPPED | OUTPATIENT
Start: 2017-12-11 | End: 2018-04-10 | Stop reason: SDUPTHER

## 2018-02-08 ENCOUNTER — OFFICE VISIT (OUTPATIENT)
Dept: OBSTETRICS AND GYNECOLOGY | Age: 63
End: 2018-02-08

## 2018-02-08 VITALS
BODY MASS INDEX: 30.17 KG/M2 | SYSTOLIC BLOOD PRESSURE: 138 MMHG | DIASTOLIC BLOOD PRESSURE: 64 MMHG | HEIGHT: 61 IN | WEIGHT: 159.8 LBS

## 2018-02-08 DIAGNOSIS — Z12.4 ROUTINE CERVICAL SMEAR: Primary | ICD-10-CM

## 2018-02-08 DIAGNOSIS — Z00.00 ANNUAL PHYSICAL EXAM: ICD-10-CM

## 2018-02-08 DIAGNOSIS — N81.10 PELVIC RELAXATION DUE TO VAGINAL PROLAPSE: ICD-10-CM

## 2018-02-08 DIAGNOSIS — Z11.51 SPECIAL SCREENING EXAMINATION FOR HUMAN PAPILLOMAVIRUS (HPV): ICD-10-CM

## 2018-02-08 PROCEDURE — 99396 PREV VISIT EST AGE 40-64: CPT | Performed by: OBSTETRICS & GYNECOLOGY

## 2018-02-08 NOTE — PROGRESS NOTES
Subjective   Pretty Tovar is a 62 y.o. female is being seen today for an annual exam.  Chief Complaint   Patient presents with   • Gynecologic Exam   .    History of Present Illness  Patient is here for annual check and a little bit of a problem check.  Gini Maradiaga she is doing well no problems during the past year.  Her family is doing well brother-in-law had a problem but seems to be okay but is nothing else in the family.  Bowels and bladder work fairly well minimal incontinence.  Her only complaint is that something is bridging she's be sure the bladder and is just getting a little bit worse.  No other complaints  The following portions of the patient's history were reviewed and updated as appropriate: allergies, current medications, past family history, past medical history, past social history, past surgical history and problem list.    Vitals:    18 1439   BP: 138/64       PAST MEDICAL HISTORY  Past Medical History:   Diagnosis Date   • H/O bone density study 10/2010    normal   • H/O cardiovascular stress test    • H/O echocardiogram 2001    2-D   • Osteoarthritis of knee     left   • Plantar warts     foot   • Pregnancy      twins x1   • Psoriasis      OB History      Para Term  AB Living    2 2 2       SAB TAB Ectopic Multiple Live Births                Past Surgical History:   Procedure Laterality Date   • BREAST BIOPSY Right 2006    benign   • COLONOSCOPY  2006    Dr Ramirez     Family History   Problem Relation Age of Onset   • Heart disease Father    • Hypertension Father    • Diabetes Brother    • Heart disease Maternal Grandmother    • No Known Problems Mother    • No Known Problems Sister    • No Known Problems Daughter    • No Known Problems Son    • No Known Problems Paternal Grandmother    • No Known Problems Maternal Aunt    • No Known Problems Paternal Aunt    • BRCA 1/2 Neg Hx    • Breast cancer Neg Hx    • Colon cancer Neg Hx    • Endometrial cancer Neg Hx     • Ovarian cancer Neg Hx      History   Smoking Status   • Never Smoker   Smokeless Tobacco   • Never Used       Current Outpatient Prescriptions:   •  CRESTOR 20 MG tablet, Take 0.5 tablets by mouth Daily., Disp: 15 tablet, Rfl: 6  •  glimepiride (AMARYL) 1 MG tablet, Take 1 tablet by mouth Daily., Disp: 90 tablet, Rfl: 3  •  glucose blood test strip, Use to check blood sugars once daily, Disp: 100 each, Rfl: 3  •  Insulin Pen Needle (NOVOFINE PLUS) 32G X 4 MM misc, Use 1 pen needle with each victoza injection, Disp: 100 each, Rfl: 3  •  lisinopril (PRINIVIL,ZESTRIL) 20 MG tablet, Take 1 tablet by mouth Daily., Disp: 90 tablet, Rfl: 3  •  lisinopril-hydrochlorothiazide (PRINZIDE,ZESTORETIC) 20-25 MG per tablet, Take 1 tablet by mouth Daily., Disp: 90 tablet, Rfl: 3  •  metFORMIN (GLUCOPHAGE) 500 MG tablet, Take 2 tablets by mouth Take As Directed. Take 3 pills in am and 2 in pm, Disp: 150 tablet, Rfl: 11  •  PEG-KCl-NaCl-NaSulf-Na Asc-C (MOVIPREP) 100 G reconstituted solution, Take as directed., Disp: 1 each, Rfl: 0  •  TACLONEX 0.005-0.064 % external suspension, APPLY TO PSORIASIS ON SCALP/TRUNK/EXTREMITIES EVERY OTHER DAY AS NEEDED, Disp: , Rfl: 5  •  VICTOZA 18 MG/3ML solution pen-injector injection, INJECT 1.2 MG SUBCUTANEOUSLY ONCE DAILY, Disp: 9 mL, Rfl: 2  Immunization History   Administered Date(s) Administered   • Flu Vaccine Quad PF >36MO 10/18/2017   • Pneumococcal Polysaccharide 03/21/2017       Review of Systems   Constitutional: Negative for chills, fatigue, fever and unexpected weight change.   Respiratory: Negative for shortness of breath and wheezing.    Cardiovascular: Negative for chest pain.   Gastrointestinal: Negative for abdominal distention, abdominal pain, blood in stool, constipation, diarrhea and nausea.   Genitourinary: Positive for urgency. Negative for difficulty urinating, dyspareunia, dysuria, frequency, hematuria, menstrual problem, pelvic pain and vaginal discharge.   Skin:  Negative for rash.       Objective   Physical Exam   Constitutional: She is oriented to person, place, and time. Vital signs are normal. She appears well-developed and well-nourished.   Neck: No thyromegaly present.   Cardiovascular: Normal rate, regular rhythm and normal heart sounds.    Pulmonary/Chest: Effort normal. Right breast exhibits no inverted nipple, no mass, no nipple discharge, no skin change and no tenderness. Left breast exhibits no inverted nipple, no mass, no nipple discharge, no skin change and no tenderness. Breasts are symmetrical. There is no breast swelling.   Abdominal: Soft.   Genitourinary: Vagina normal and uterus normal. No breast tenderness, discharge or bleeding. Pelvic exam was performed with patient supine. No labial fusion. There is no rash, tenderness, lesion or injury on the right labia. There is no rash, tenderness, lesion or injury on the left labia. Cervix exhibits no motion tenderness, no discharge and no friability. Right adnexum displays no mass, no tenderness and no fullness. Left adnexum displays no mass, no tenderness and no fullness.   Neurological: She is alert and oriented to person, place, and time.   Psychiatric: She has a normal mood and affect.   Vitals reviewed.        Assessment/Plan   Pretty was seen today for gynecologic exam.    Diagnoses and all orders for this visit:    Routine cervical smear  -     IGP, Apt HPV,rfx 16 / 18,45 - ThinPrep Vial, Cervix    Special screening examination for human papillomavirus (HPV)  -     IGP, Apt HPV,rfx 16 / 18,45 - ThinPrep Vial, Cervix    Annual physical exam    Pelvic relaxation due to vaginal prolapse  On exam today she has a 3+ uterine prolapse and a 3+ cystocele and 1-2+ rectocele.  I discussed all this with her I discussed about whether or not to take the ovaries and whether or not to take it the tubes.  She will go home talk to family and I offered pessary to her but she is sexually active at don't think that would  help a whole lot maybe just buy her some time.  So come back in year call when necessary.  Exercise was discussed Pap smear was done today.  Mammogram done today.  Due for colonoscopy again and bone densities done per PCP.  Come back in year

## 2018-02-12 LAB
CYTOLOGIST CVX/VAG CYTO: NORMAL
CYTOLOGY CVX/VAG DOC THIN PREP: NORMAL
DX ICD CODE: NORMAL
HIV 1 & 2 AB SER-IMP: NORMAL
HPV I/H RISK 4 DNA CVX QL PROBE+SIG AMP: NEGATIVE
OTHER STN SPEC: NORMAL
PATH REPORT.FINAL DX SPEC: NORMAL
STAT OF ADQ CVX/VAG CYTO-IMP: NORMAL

## 2018-02-21 ENCOUNTER — LAB REQUISITION (OUTPATIENT)
Dept: LAB | Facility: HOSPITAL | Age: 63
End: 2018-02-21

## 2018-02-21 ENCOUNTER — DOCUMENTATION (OUTPATIENT)
Dept: SURGERY | Facility: CLINIC | Age: 63
End: 2018-02-21

## 2018-02-21 ENCOUNTER — OUTSIDE FACILITY SERVICE (OUTPATIENT)
Dept: SURGERY | Facility: CLINIC | Age: 63
End: 2018-02-21

## 2018-02-21 DIAGNOSIS — Z00.00 ENCOUNTER FOR GENERAL ADULT MEDICAL EXAMINATION WITHOUT ABNORMAL FINDINGS: ICD-10-CM

## 2018-02-21 PROBLEM — K62.1 RECTAL POLYP: Status: ACTIVE | Noted: 2018-02-21

## 2018-02-21 PROCEDURE — 88305 TISSUE EXAM BY PATHOLOGIST: CPT | Performed by: SURGERY

## 2018-02-21 PROCEDURE — 45380 COLONOSCOPY AND BIOPSY: CPT | Performed by: SURGERY

## 2018-02-21 NOTE — PROGRESS NOTES
Date of procedure: 2/21/2018    Surgeon: Mynor Resendiz Jr., M.D.    Preoperative Diagnosis: Need for screening colonoscopy    Postoperative Diagnosis: Small rectal polyp    Procedure Performed: Colonoscopy with biopsy of rectal polyp    Indications:     The patient is a very pleasant 62-year-old white female who presents for screening colonoscopy.  The patient understands the indications, alternatives, risks, and benefits of the procedure and wishes to proceed.    Procedure:     The patient was identified, taken to the endoscopy suite, and place in the left side down decubitus procedure.  The patient underwent a MAC anesthesia and was appropriately monitored through the case by the anesthesia personnel.  A rectal exam was performed that was normal.  The colonoscope was introduced into the rectum and advanced very carefully to the cecum that was identified by the cecal strap, ileocecal valve, and the appendiceal orifice.  The scope was then slowly withdrawn with careful circumferential examination of the mucosa performed.  The bowel prep was good allowing adequate visualization of mucosal surfaces.  The scope was withdrawn.  The patient tolerated the procedure well and was transferred the recovery area in stable condition.    Findings:    There was a small, 6 mm rectal polyp that was completely removed by cold biopsy forceps and retrieved.  It had a hyperplastic appearance.    Recommendations:     1.  Await pathology results from the biopsy of the rectal polyp.  2.  Repeat colonoscopy in 5 years.

## 2018-02-23 LAB
LAB AP CASE REPORT: NORMAL
LAB AP CLINICAL INFORMATION: NORMAL
Lab: NORMAL
PATH REPORT.FINAL DX SPEC: NORMAL
PATH REPORT.GROSS SPEC: NORMAL

## 2018-04-03 DIAGNOSIS — E11.3299 MILD NONPROLIFERATIVE DIABETIC RETINOPATHY WITHOUT MACULAR EDEMA ASSOCIATED WITH TYPE 2 DIABETES MELLITUS (HCC): ICD-10-CM

## 2018-04-04 ENCOUNTER — LAB (OUTPATIENT)
Dept: INTERNAL MEDICINE | Facility: CLINIC | Age: 63
End: 2018-04-04

## 2018-04-04 DIAGNOSIS — Z00.00 HEALTH CARE MAINTENANCE: ICD-10-CM

## 2018-04-04 DIAGNOSIS — E11.3293 TYPE 2 DIABETES MELLITUS WITH BOTH EYES AFFECTED BY MILD NONPROLIFERATIVE RETINOPATHY WITHOUT MACULAR EDEMA, WITHOUT LONG-TERM CURRENT USE OF INSULIN (HCC): ICD-10-CM

## 2018-04-04 LAB
ALBUMIN SERPL-MCNC: 4.7 G/DL (ref 3.5–5.2)
ALBUMIN/GLOB SERPL: 1.6 G/DL
ALP SERPL-CCNC: 50 U/L (ref 39–117)
ALT SERPL-CCNC: 21 U/L (ref 1–33)
AST SERPL-CCNC: 22 U/L (ref 1–32)
BACTERIA UR QL AUTO: ABNORMAL /HPF
BASOPHILS # BLD AUTO: 0.02 10*3/MM3 (ref 0–0.2)
BASOPHILS NFR BLD AUTO: 0.3 % (ref 0–1.5)
BILIRUB SERPL-MCNC: 0.3 MG/DL (ref 0.1–1.2)
BILIRUB UR QL STRIP: NEGATIVE
BUN SERPL-MCNC: 10 MG/DL (ref 8–23)
BUN/CREAT SERPL: 16.7 (ref 7–25)
CALCIUM SERPL-MCNC: 11.3 MG/DL (ref 8.6–10.5)
CHLORIDE SERPL-SCNC: 100 MMOL/L (ref 98–107)
CHOLEST SERPL-MCNC: 203 MG/DL (ref 0–200)
CLARITY UR: CLEAR
CO2 SERPL-SCNC: 29.9 MMOL/L (ref 22–29)
COLOR UR: YELLOW
CREAT SERPL-MCNC: 0.6 MG/DL (ref 0.57–1)
EOSINOPHIL # BLD AUTO: 0.22 10*3/MM3 (ref 0–0.7)
EOSINOPHIL # BLD AUTO: 3.6 % (ref 0.3–6.2)
ERYTHROCYTE [DISTWIDTH] IN BLOOD BY AUTOMATED COUNT: 12.8 % (ref 11.7–13)
GLOBULIN SER CALC-MCNC: 3 GM/DL
GLUCOSE SERPL-MCNC: 113 MG/DL (ref 65–99)
GLUCOSE UR STRIP-MCNC: NEGATIVE MG/DL
HBA1C MFR BLD: 6.3 % (ref 4.8–5.6)
HCT VFR BLD AUTO: 40.6 % (ref 35.6–45.5)
HDLC SERPL-MCNC: 57 MG/DL (ref 40–60)
HGB BLD-MCNC: 12.8 G/DL (ref 11.9–15.5)
HGB UR QL STRIP.AUTO: NEGATIVE
HYALINE CASTS UR QL AUTO: ABNORMAL /LPF
IMM GRANULOCYTES # BLD: 0 10*3/MM3 (ref 0–0.03)
IMM GRANULOCYTES NFR BLD: 0 % (ref 0–0.5)
KETONES UR QL STRIP: NEGATIVE
LDLC SERPL CALC-MCNC: 118 MG/DL (ref 0–100)
LEUKOCYTE ESTERASE UR QL STRIP.AUTO: ABNORMAL
LYMPHOCYTES # BLD AUTO: 1.69 10*3/MM3 (ref 0.9–4.8)
LYMPHOCYTES NFR BLD AUTO: 27.9 % (ref 19.6–45.3)
MCH RBC QN AUTO: 29.6 PG (ref 26.9–32)
MCHC RBC AUTO-ENTMCNC: 31.5 G/DL (ref 32.4–36.3)
MCV RBC AUTO: 94 FL (ref 80.5–98.2)
MONOCYTES # BLD AUTO: 0.39 10*3/MM3 (ref 0.2–1.2)
MONOCYTES NFR BLD AUTO: 6.4 % (ref 5–12)
NEUTROPHILS # BLD AUTO: 3.73 10*3/MM3 (ref 1.9–8.1)
NEUTROPHILS NFR BLD AUTO: 61.8 % (ref 42.7–76)
NITRITE UR QL STRIP: NEGATIVE
PH UR STRIP.AUTO: 7 [PH] (ref 5–8)
PLATELET # BLD AUTO: 262 10*3/MM3 (ref 140–500)
POTASSIUM SERPL-SCNC: 5.4 MMOL/L (ref 3.5–5.2)
PROT SERPL-MCNC: 7.7 G/DL (ref 6–8.5)
PROT UR QL STRIP: NEGATIVE
RBC # BLD AUTO: 4.32 10*6/MM3 (ref 3.9–5.2)
RBC # UR: ABNORMAL /HPF
REF LAB TEST METHOD: ABNORMAL
SODIUM SERPL-SCNC: 144 MMOL/L (ref 136–145)
SP GR UR STRIP: 1.01 (ref 1–1.03)
SQUAMOUS #/AREA URNS HPF: ABNORMAL /HPF
TRANS CELLS #/AREA URNS HPF: ABNORMAL /HPF
TRIGL SERPL-MCNC: 141 MG/DL (ref 0–150)
TSH SERPL-ACNC: 7.77 MIU/ML (ref 0.27–4.2)
UROBILINOGEN UR QL STRIP: ABNORMAL
VLDLC SERPL-MCNC: 28.2 MG/DL (ref 5–40)
WBC # BLD AUTO: 6.05 10*3/MM3 (ref 4.5–10.7)
WBC UR QL AUTO: ABNORMAL /HPF

## 2018-04-04 PROCEDURE — 81001 URINALYSIS AUTO W/SCOPE: CPT | Performed by: INTERNAL MEDICINE

## 2018-04-05 LAB
CREAT 24H UR-MCNC: 16.9 MG/DL
MICROALBUMIN UR-MCNC: <3 UG/ML
MICROALBUMIN/CREAT UR: <17.8 MG/G CREAT (ref 0–30)

## 2018-04-10 DIAGNOSIS — E11.3299 MILD NONPROLIFERATIVE DIABETIC RETINOPATHY WITHOUT MACULAR EDEMA ASSOCIATED WITH TYPE 2 DIABETES MELLITUS (HCC): ICD-10-CM

## 2018-04-10 RX ORDER — LIRAGLUTIDE 6 MG/ML
INJECTION SUBCUTANEOUS
Qty: 9 PEN | Refills: 1 | Status: SHIPPED | OUTPATIENT
Start: 2018-04-10 | End: 2018-07-14 | Stop reason: SDUPTHER

## 2018-04-10 RX ORDER — ROSUVASTATIN CALCIUM 20 MG/1
TABLET, FILM COATED ORAL
Qty: 15 TABLET | Refills: 6 | Status: SHIPPED | OUTPATIENT
Start: 2018-04-10 | End: 2019-12-17 | Stop reason: SDUPTHER

## 2018-04-12 ENCOUNTER — OFFICE VISIT (OUTPATIENT)
Dept: INTERNAL MEDICINE | Facility: CLINIC | Age: 63
End: 2018-04-12

## 2018-04-12 VITALS
HEIGHT: 61 IN | WEIGHT: 156 LBS | DIASTOLIC BLOOD PRESSURE: 74 MMHG | OXYGEN SATURATION: 99 % | BODY MASS INDEX: 29.45 KG/M2 | HEART RATE: 83 BPM | SYSTOLIC BLOOD PRESSURE: 116 MMHG

## 2018-04-12 DIAGNOSIS — Z78.0 POST-MENOPAUSE: ICD-10-CM

## 2018-04-12 DIAGNOSIS — E03.9 PRIMARY HYPOTHYROIDISM: ICD-10-CM

## 2018-04-12 DIAGNOSIS — Z23 NEED FOR TETANUS BOOSTER: ICD-10-CM

## 2018-04-12 DIAGNOSIS — E78.00 PURE HYPERCHOLESTEROLEMIA: ICD-10-CM

## 2018-04-12 DIAGNOSIS — E11.3293 TYPE 2 DIABETES MELLITUS WITH BOTH EYES AFFECTED BY MILD NONPROLIFERATIVE RETINOPATHY WITHOUT MACULAR EDEMA, WITHOUT LONG-TERM CURRENT USE OF INSULIN (HCC): ICD-10-CM

## 2018-04-12 DIAGNOSIS — Z00.00 HEALTH CARE MAINTENANCE: Primary | ICD-10-CM

## 2018-04-12 PROCEDURE — 99396 PREV VISIT EST AGE 40-64: CPT | Performed by: INTERNAL MEDICINE

## 2018-04-12 PROCEDURE — 90715 TDAP VACCINE 7 YRS/> IM: CPT | Performed by: INTERNAL MEDICINE

## 2018-04-12 PROCEDURE — 90471 IMMUNIZATION ADMIN: CPT | Performed by: INTERNAL MEDICINE

## 2018-04-12 RX ORDER — LEVOTHYROXINE SODIUM 0.07 MG/1
75 TABLET ORAL DAILY
Qty: 30 TABLET | Refills: 11 | Status: SHIPPED | OUTPATIENT
Start: 2018-04-12 | End: 2018-08-28 | Stop reason: SDUPTHER

## 2018-04-12 NOTE — PATIENT INSTRUCTIONS
"Risk evaluation:  1. Cardiovascular risk factors: hypertension, hyperlipidemia, diabetes mellitus, family history of CAD, sedentary life style   2. Diabetes risk factors: patient has diabetes and being treated.  3. Cancer risk factors: diabetes.  4. Risky behavior: none. Use of seat belts: regular. Use of sunscreens: regular. SPF 15.   Tattoos: none.  H/o blood transfusions/organ transplants before 1992 or clotting factor transfusion before 1987: none.     Prevention:  Cholesterol test  up to date. Cholesterol is elevated..  Blood sugar test up to date. Fasting blood sugar is mildly elevated. Patient has diabetes and is being treated..  Hep C testing (for patients born [id number]): completed..  Mammogram up to date. Recommended frequency and time of the next screening per GYN.. Breast self exams recommended once a month.  Colonoscopy: up to date. Recommended every 10 years. Next screening is due in 2028..  PAP smear : up to date. Recommendations per GYN..  DEXA : is due, will schedule. Benefits explained..   Shingles prevention: Recommended to get Shindrix preferably, or Zostavax at her pharmacy.      HTN - well controlled with current medication regimen. Normal kidney tests and electrolytes. Recommended low salt diet. Continue same medical treatment.  Hyperlipidemia - not well contrlled with statin. Most likely recent increase in LDL is due to hypothyroidism, will treat thyroid deficiency and reevaluate cholesterol in 3 months. Normal liver function tests. LDL target is below < 70 mg/dl (\"very high\" risk for CHD). Patient's 89. Continue same treatment. Regular exercise recommended.  DM II - well controlled with metformin and GLP1 agonist injections. Hb A1C is   Lab Results   Component Value Date    HGBA1C 6.30 (H) 04/04/2018    .  No hypoglycemic episodes. Low carb diet and regular exercise recommended. Weight loss will be very beneficial. Regular feet self examinations and comfortable footwear recommended.Patient " needs yearly dilated eye exams.Recoemmended to call her ophthalmologist or appointment.  No microalbumin in the urine. No peripheral neuropathy on feet exam with microfilament. Continue ASA, statin and ACEI.   Hypothyroidism - there had been further decline in TSH an d worsening of LDL. Will start los dose of thyroid supplement. Patient is euthyroid clinically. Reminder: thyroid supplement has to be taken at least 2 hours apart from Ca and Iron supplements.

## 2018-04-12 NOTE — PROGRESS NOTES
"Checo Tovar is a 62 y.o. female.     History of Present Illness   /74 (BP Location: Left arm, Patient Position: Sitting, Cuff Size: Adult)   Pulse 83   Ht 154.9 cm (61\")   Wt 70.8 kg (156 lb)   SpO2 99%   BMI 29.48 kg/m²   Patient is here for yearly CPE. Last CPE was  1 year ago.  PMH, SH and FH reviewed. Changes in the FH: none .  Patient rates her own health as: good.  Tobacco use: none.  Alcohol use: none.  Recreational drugs use: none  Medication list rewieved.  Diet: low carb.  Exercise: none.  Marital status: .   Employment: full time.  Patient rates her stress level as: low.  Dental health: good. Brushes teeth 2 times a day, flosses 1 time a week . Dental visits: 2 times a year .  Vision correction: glasses  Hearing: normal.    Recent vaccinations:   Flu  is up to date and recommended yearly  Tdap is due and will be administered today  Zostavax and Shindrix  discussed and recommended to get at [facility] or [facility].    Patient is postmenopausal. Past pregnancies: . Currently patient is on no contraception.      Current Outpatient Prescriptions:   •  CRESTOR 20 MG tablet, TAKE ONE-HALF TABLET BY MOUTH ONE TIME DAILY., Disp: 15 tablet, Rfl: 6  •  glucose blood test strip, Use to check blood sugars once daily, Disp: 100 each, Rfl: 3  •  Insulin Pen Needle (NOVOFINE PLUS) 32G X 4 MM misc, Use 1 pen needle with each victoza injection, Disp: 100 each, Rfl: 3  •  lisinopril (PRINIVIL,ZESTRIL) 20 MG tablet, Take 1 tablet by mouth Daily., Disp: 90 tablet, Rfl: 3  •  lisinopril-hydrochlorothiazide (PRINZIDE,ZESTORETIC) 20-25 MG per tablet, Take 1 tablet by mouth Daily., Disp: 90 tablet, Rfl: 3  •  metFORMIN (GLUCOPHAGE) 500 MG tablet, Take 2 tablets by mouth Take As Directed. Take 3 pills in am and 2 in pm, Disp: 150 tablet, Rfl: 11  •  metFORMIN (GLUCOPHAGE) 500 MG tablet, TAKE THREE TABLETS BY MOUTH IN THE MORNING AND TWO TABLETS IN THE EVENING, Disp: 150 tablet, Rfl: 11  •  " TACLONEX 0.005-0.064 % external suspension, APPLY TO PSORIASIS ON SCALP/TRUNK/EXTREMITIES EVERY OTHER DAY AS NEEDED, Disp: , Rfl: 5  •  VICTOZA 18 MG/3ML solution pen-injector injection, INJECT 1.2 MG SUBCUTANEOUSLY ONCE DAILY, Disp: 9 pen, Rfl: 1    The following portions of the patient's history were reviewed and updated as appropriate: allergies, current medications, past family history, past medical history, past social history, past surgical history and problem list.    Review of Systems   Constitutional: Negative for chills and fever.   HENT: Negative for congestion, postnasal drip, sinus pressure and sore throat.    Eyes: Negative for pain and itching.   Respiratory: Negative for cough and chest tightness.    Cardiovascular: Negative for chest pain and leg swelling.   Gastrointestinal: Negative for abdominal pain and blood in stool.   Endocrine: Negative for cold intolerance and heat intolerance.   Genitourinary: Negative for difficulty urinating and flank pain.   Musculoskeletal: Negative for back pain and neck pain.   Skin: Negative for color change, rash and wound.   Neurological: Negative for dizziness, weakness and headaches.   Hematological: Negative for adenopathy. Does not bruise/bleed easily.   Psychiatric/Behavioral: Negative for sleep disturbance. The patient is not nervous/anxious.        Objective   Physical Exam   Constitutional: She is oriented to person, place, and time. She appears well-developed. No distress.   overweight   HENT:   Head: Normocephalic and atraumatic.   Right Ear: Tympanic membrane, external ear and ear canal normal. No drainage or swelling. No mastoid tenderness. Tympanic membrane is not injected. No middle ear effusion.   Left Ear: Tympanic membrane, external ear and ear canal normal. No drainage or swelling. No mastoid tenderness. Tympanic membrane is not injected.  No middle ear effusion.   Nose: Nose normal. No mucosal edema. Right sinus exhibits no maxillary sinus  tenderness and no frontal sinus tenderness. Left sinus exhibits no maxillary sinus tenderness and no frontal sinus tenderness.   Mouth/Throat: Uvula is midline, oropharynx is clear and moist and mucous membranes are normal. No oropharyngeal exudate.   Eyes: Conjunctivae, EOM and lids are normal. Pupils are equal, round, and reactive to light. Right eye exhibits no discharge. Left eye exhibits no discharge. Right conjunctiva is not injected. Right conjunctiva has no hemorrhage. Left conjunctiva is not injected. Left conjunctiva has no hemorrhage. No scleral icterus. Right eye exhibits normal extraocular motion and no nystagmus. Left eye exhibits normal extraocular motion and no nystagmus.   Neck: Normal range of motion and full passive range of motion without pain. Neck supple. No JVD present. Carotid bruit is not present. No thyromegaly present.   Cardiovascular: Normal rate, regular rhythm, S1 normal, S2 normal, normal heart sounds and intact distal pulses.  Exam reveals no gallop and no friction rub.    No murmur heard.  Pulmonary/Chest: Effort normal and breath sounds normal. No respiratory distress. She has no decreased breath sounds. She has no wheezes. She has no rhonchi. She has no rales. Right breast exhibits no inverted nipple, no mass, no nipple discharge, no skin change and no tenderness. Left breast exhibits no inverted nipple, no mass, no nipple discharge, no skin change and no tenderness. Breasts are symmetrical.   Abdominal: Soft. Bowel sounds are normal. She exhibits no distension. There is no tenderness. There is no rebound and no guarding.   Musculoskeletal: She exhibits no edema.    Pretty had a diabetic foot exam performed today.   During the foot exam she had a monofilament test performed (lingt touch intact on feet exam with microfilament).    Neurological Sensory Findings - Unaltered hot/cold right ankle/foot discrimination and unaltered hot/cold left ankle/foot discrimination. Unaltered  sharp/dull right ankle/foot discrimination and unaltered sharp/dull left ankle/foot discrimination. No right ankle/foot altered proprioception and no left ankle/foot altered proprioception  Vascular Status -  Her right foot exhibits normal foot vasculature  and no edema. Her left foot exhibits normal foot vasculature  and no edema.  Skin Integrity  -  Her right foot skin is intact.Her left foot skin is intact..   Foot Structure and Biomechanics -  Her right foot exhibits hallux valgus.  Her left foot exhibits hallux valgus.  Lymphadenopathy:        Head (right side): No submental, no submandibular, no preauricular, no posterior auricular and no occipital adenopathy present.        Head (left side): No submental, no submandibular, no preauricular, no posterior auricular and no occipital adenopathy present.     She has no cervical adenopathy.        Right cervical: No superficial cervical and no deep cervical adenopathy present.       Left cervical: No superficial cervical, no deep cervical and no posterior cervical adenopathy present.     She has no axillary adenopathy.        Right: No supraclavicular adenopathy present.        Left: No supraclavicular adenopathy present.   Neurological: She is alert and oriented to person, place, and time. She has normal reflexes. She displays normal reflexes. No cranial nerve deficit. She exhibits normal muscle tone. Coordination normal.   Reflex Scores:       Bicep reflexes are 2+ on the right side and 2+ on the left side.       Patellar reflexes are 2+ on the right side and 2+ on the left side.  Skin: Skin is warm and dry. No rash noted. She is not diaphoretic. No erythema.   Few erythematous patches with scaly silver dry skin    Psychiatric: She has a normal mood and affect. Her speech is normal and behavior is normal.   Vitals reviewed.      Assessment/Plan   Pretty was seen today for annual exam.    Diagnoses and all orders for this visit:    Health care maintenance    Type  2 diabetes mellitus with both eyes affected by mild nonproliferative retinopathy without macular edema, without long-term current use of insulin  -     Hemoglobin A1c; Future    Primary hypothyroidism  -     levothyroxine (SYNTHROID, LEVOTHROID) 75 MCG tablet; Take 1 tablet by mouth Daily.  -     TSH; Future  -     T4, Free; Future  -     Thyroid Peroxidase Antibody; Future    Pure hypercholesterolemia  -     Comprehensive Metabolic Panel; Future  -     Lipid Panel; Future          Risk evaluation:  1. Cardiovascular risk factors: hypertension, hyperlipidemia, diabetes mellitus, family history of CAD, sedentary life style   2. Diabetes risk factors: patient has diabetes and being treated.  3. Cancer risk factors: diabetes.  4. Risky behavior: none. Use of seat belts: regular. Use of sunscreens: regular. SPF 15.   Tattoos: none.  H/o blood transfusions/organ transplants before 1992 or clotting factor transfusion before 1987: none.     Prevention:  Cholesterol test  up to date. Cholesterol is elevated..  Blood sugar test up to date. Fasting blood sugar is mildly elevated. Patient has diabetes and is being treated..  Hep C testing (for patients born [id number]): completed..  Mammogram up to date. Recommended frequency and time of the next screening per GYN.. Breast self exams recommended once a month.  Colonoscopy: up to date. Recommended every 10 years. Next screening is due in 2028..  PAP smear : up to date. Recommendations per GYN..  DEXA : is due, will schedule. Benefits explained..   Shingles prevention: Recommended to get Shindrix preferably, or Zostavax at her pharmacy.      HTN - well controlled with current medication regimen. Normal kidney tests and electrolytes. Recommended low salt diet. Continue same medical treatment.  Hyperlipidemia - not well contrlled with statin. Most likely recent increase in LDL is due to hypothyroidism, will treat thyroid deficiency and reevaluate cholesterol in 3 months. Normal  "liver function tests. LDL target is below < 70 mg/dl (\"very high\" risk for CHD). Patient's 118. Continue same treatment. Regular exercise recommended.  DM II - well controlled with metformin and GLP1 agonist injections. Hb A1C is   Lab Results   Component Value Date    HGBA1C 6.30 (H) 04/04/2018    .  No hypoglycemic episodes. Low carb diet and regular exercise recommended. Weight loss will be very beneficial. Regular feet self examinations and comfortable footwear recommended.Patient needs yearly dilated eye exams.Recoemmended to call her ophthalmologist or appointment.  No microalbumin in the urine. No peripheral neuropathy on feet exam with microfilament. Continue ASA, statin and ACEI.   Hypothyroidism - there had been further decline in TSH an d worsening of LDL. Will start los dose of thyroid supplement. Patient is euthyroid clinically. Reminder: thyroid supplement has to be taken at least 2 hours apart from Ca and Iron supplements.     "

## 2018-06-18 RX ORDER — LISINOPRIL AND HYDROCHLOROTHIAZIDE 25; 20 MG/1; MG/1
TABLET ORAL
Qty: 30 TABLET | Refills: 11 | Status: SHIPPED | OUTPATIENT
Start: 2018-06-18 | End: 2019-09-23 | Stop reason: SDUPTHER

## 2018-06-18 RX ORDER — LISINOPRIL 20 MG/1
TABLET ORAL
Qty: 30 TABLET | Refills: 11 | Status: SHIPPED | OUTPATIENT
Start: 2018-06-18 | End: 2019-09-23 | Stop reason: SDUPTHER

## 2018-07-14 DIAGNOSIS — E11.3299 MILD NONPROLIFERATIVE DIABETIC RETINOPATHY WITHOUT MACULAR EDEMA ASSOCIATED WITH TYPE 2 DIABETES MELLITUS (HCC): ICD-10-CM

## 2018-07-16 RX ORDER — LIRAGLUTIDE 6 MG/ML
INJECTION SUBCUTANEOUS
Qty: 9 ML | Refills: 1 | Status: SHIPPED | OUTPATIENT
Start: 2018-07-16 | End: 2018-12-22 | Stop reason: SDUPTHER

## 2018-08-03 DIAGNOSIS — E11.9 TYPE 2 DIABETES MELLITUS WITHOUT COMPLICATION, UNSPECIFIED LONG TERM INSULIN USE STATUS: ICD-10-CM

## 2018-08-03 RX ORDER — BLOOD SUGAR DIAGNOSTIC
STRIP MISCELLANEOUS
Qty: 100 EACH | Refills: 4 | Status: SHIPPED | OUTPATIENT
Start: 2018-08-03 | End: 2019-08-09 | Stop reason: SDUPTHER

## 2018-08-23 ENCOUNTER — LAB (OUTPATIENT)
Dept: INTERNAL MEDICINE | Facility: CLINIC | Age: 63
End: 2018-08-23

## 2018-08-23 DIAGNOSIS — E78.00 PURE HYPERCHOLESTEROLEMIA: ICD-10-CM

## 2018-08-23 DIAGNOSIS — E03.9 PRIMARY HYPOTHYROIDISM: ICD-10-CM

## 2018-08-23 DIAGNOSIS — E11.3293 TYPE 2 DIABETES MELLITUS WITH BOTH EYES AFFECTED BY MILD NONPROLIFERATIVE RETINOPATHY WITHOUT MACULAR EDEMA, WITHOUT LONG-TERM CURRENT USE OF INSULIN (HCC): ICD-10-CM

## 2018-08-23 LAB
ALBUMIN SERPL-MCNC: 4.6 G/DL (ref 3.5–5.2)
ALBUMIN/GLOB SERPL: 1.4 G/DL
ALP SERPL-CCNC: 52 U/L (ref 39–117)
ALT SERPL-CCNC: 18 U/L (ref 1–33)
AST SERPL-CCNC: 20 U/L (ref 1–32)
BILIRUB SERPL-MCNC: 0.3 MG/DL (ref 0.1–1.2)
BUN SERPL-MCNC: 10 MG/DL (ref 8–23)
BUN/CREAT SERPL: 21.3 (ref 7–25)
CALCIUM SERPL-MCNC: 10.3 MG/DL (ref 8.6–10.5)
CHLORIDE SERPL-SCNC: 98 MMOL/L (ref 98–107)
CHOLEST SERPL-MCNC: 162 MG/DL (ref 0–200)
CO2 SERPL-SCNC: 30.1 MMOL/L (ref 22–29)
CREAT SERPL-MCNC: 0.47 MG/DL (ref 0.57–1)
GLOBULIN SER CALC-MCNC: 3.4 GM/DL
GLUCOSE SERPL-MCNC: 106 MG/DL (ref 65–99)
HBA1C MFR BLD: 5.86 % (ref 4.8–5.6)
HDLC SERPL-MCNC: 49 MG/DL (ref 40–60)
LDLC SERPL CALC-MCNC: 82 MG/DL (ref 0–100)
POTASSIUM SERPL-SCNC: 4.5 MMOL/L (ref 3.5–5.2)
PROT SERPL-MCNC: 8 G/DL (ref 6–8.5)
SODIUM SERPL-SCNC: 143 MMOL/L (ref 136–145)
T4 FREE SERPL-MCNC: 1.87 NG/DL (ref 0.93–1.7)
TRIGL SERPL-MCNC: 157 MG/DL (ref 0–150)
TSH SERPL-ACNC: 0.08 MIU/ML (ref 0.27–4.2)
VLDLC SERPL-MCNC: 31.4 MG/DL (ref 5–40)

## 2018-08-24 LAB — THYROPEROXIDASE AB SERPL-ACNC: 103 IU/ML (ref 0–34)

## 2018-08-28 ENCOUNTER — OFFICE VISIT (OUTPATIENT)
Dept: INTERNAL MEDICINE | Facility: CLINIC | Age: 63
End: 2018-08-28

## 2018-08-28 ENCOUNTER — CLINICAL SUPPORT (OUTPATIENT)
Dept: INTERNAL MEDICINE | Facility: CLINIC | Age: 63
End: 2018-08-28

## 2018-08-28 VITALS
RESPIRATION RATE: 14 BRPM | HEIGHT: 61 IN | SYSTOLIC BLOOD PRESSURE: 124 MMHG | DIASTOLIC BLOOD PRESSURE: 62 MMHG | BODY MASS INDEX: 28.32 KG/M2 | WEIGHT: 150 LBS

## 2018-08-28 DIAGNOSIS — I10 BENIGN ESSENTIAL HYPERTENSION: ICD-10-CM

## 2018-08-28 DIAGNOSIS — E78.00 PURE HYPERCHOLESTEROLEMIA: Primary | ICD-10-CM

## 2018-08-28 DIAGNOSIS — E03.9 PRIMARY HYPOTHYROIDISM: ICD-10-CM

## 2018-08-28 DIAGNOSIS — E11.3293 TYPE 2 DIABETES MELLITUS WITH BOTH EYES AFFECTED BY MILD NONPROLIFERATIVE RETINOPATHY WITHOUT MACULAR EDEMA, WITHOUT LONG-TERM CURRENT USE OF INSULIN (HCC): ICD-10-CM

## 2018-08-28 DIAGNOSIS — Z78.0 POST-MENOPAUSE: ICD-10-CM

## 2018-08-28 PROCEDURE — 99214 OFFICE O/P EST MOD 30 MIN: CPT | Performed by: INTERNAL MEDICINE

## 2018-08-28 PROCEDURE — 77080 DXA BONE DENSITY AXIAL: CPT | Performed by: INTERNAL MEDICINE

## 2018-08-28 RX ORDER — LEVOTHYROXINE SODIUM 0.05 MG/1
50 TABLET ORAL DAILY
Qty: 30 TABLET | Refills: 5 | Status: SHIPPED | OUTPATIENT
Start: 2018-08-28 | End: 2019-02-13

## 2018-08-28 NOTE — PATIENT INSTRUCTIONS
"HTN - well controlled with current medication regimen. Normal kidney tests and electrolytes. Recommended low salt diet. Continue same medical treatment.  Hyperlipidemia - well controlled with statin. Normal liver function tests. LDL target is below < 70 mg/dl (\"very high\" risk for CHD). Patient's 82, which is much improved after addition of the thyroid supplement. Continue same treatment. Regular exercise recommended.  DM II - well controlled with metformin and GLP1 agonist injections. Hb A1C is   Lab Results   Component Value Date    HGBA1C 5.86 (H) 08/23/2018    .  No hypoglycemic episodes. Low carb diet and regular exercise recommended.  Newly diagnosed hypothyroidism - overcompensated with current dose of thyroid supplement. Patient is euthyroid clinically but her TSH is low. Will change the dose of supplement to 50 mcg a day and recheck in 3 months. Reminder: thyroid supplement has to be taken at least 2 hours apart from Ca and Iron supplements.          "

## 2018-08-28 NOTE — PROGRESS NOTES
"Checo Tovar is a 62 y.o. female.     History of Present Illness     /62 (BP Location: Left arm, Patient Position: Sitting, Cuff Size: Adult)   Resp 14   Ht 154.9 cm (61\")   Wt 68 kg (150 lb)   BMI 28.34 kg/m²     Current Outpatient Prescriptions:   •  CRESTOR 20 MG tablet, TAKE ONE-HALF TABLET BY MOUTH ONE TIME DAILY., Disp: 15 tablet, Rfl: 6  •  glucose blood test strip, Use to check blood sugars once daily, Disp: 100 each, Rfl: 3  •  Insulin Pen Needle (NOVOFINE PLUS) 32G X 4 MM misc, Use 1 pen needle with each victoza injection, Disp: 100 each, Rfl: 3  •  levothyroxine (SYNTHROID, LEVOTHROID) 75 MCG tablet, Take 1 tablet by mouth Daily., Disp: 30 tablet, Rfl: 11  •  lisinopril (PRINIVIL,ZESTRIL) 20 MG tablet, TAKE ONE TABLET BY MOUTH ONCE DAILY, Disp: 30 tablet, Rfl: 11  •  lisinopril-hydrochlorothiazide (PRINZIDE,ZESTORETIC) 20-25 MG per tablet, TAKE ONE TABLET BY MOUTH ONCE DAILY, Disp: 30 tablet, Rfl: 11  •  metFORMIN (GLUCOPHAGE) 500 MG tablet, Take 2 tablets by mouth Take As Directed. Take 3 pills in am and 2 in pm, Disp: 150 tablet, Rfl: 11  •  metFORMIN (GLUCOPHAGE) 500 MG tablet, TAKE THREE TABLETS BY MOUTH IN THE MORNING AND TWO TABLETS IN THE EVENING, Disp: 150 tablet, Rfl: 11  •  ONETOUCH VERIO test strip, USE TO CHECK BLOOD SUGARS ONCE DAILY, Disp: 100 each, Rfl: 4  •  TACLONEX 0.005-0.064 % external suspension, APPLY TO PSORIASIS ON SCALP/TRUNK/EXTREMITIES EVERY OTHER DAY AS NEEDED, Disp: , Rfl: 5  •  VICTOZA 18 MG/3ML solution pen-injector injection, INJECT 1.2 MG SUBCUTANEOUSLY ONCE DAILY, Disp: 9 mL, Rfl: 1    Patient has long-standing benign essential HTN.  BP is usually well controlled with daily use of ACEI and thiazide diuretic. On low salt diet with good compliance. Takes medication regularly. Denies chest pain, dyspnea,lightheadedness,  lower extremity edema. Patient does not check blood pressure    Patient has been diagnosed with hyperlipidemia. Target LDL is < " "70 mg/dl (\"very high\" risk for CHD). Patient is on low fat diet with good compliance. Patient is compliant with treatment: daily use of Crestor (rozuvastatin). Side effects of medication: none. Exercise occasionally.    Patient has DM type II. Pretty Tovar uses metformin and GLP1 agonist injections for blood sugars control. Patient checks fasting blood sugars at home daily.. Compliance with low carb diabetic diet is good. Compliance with medication is good.  In a past control had been good. Last Hb A1C was 6.3.    Patient also is here for chronic hypothyroidism f/u. Takes Levothyroxine daily. Patient is compliant with treatment. Denies cold/heat intolerance. Weight is decreasing. Patient denies constipation. No changes in the skin, hair or nails.  The following portions of the patient's history were reviewed and updated as appropriate: allergies, current medications, past family history, past medical history, past social history, past surgical history and problem list.    Review of Systems   Constitutional: Positive for unexpected weight change (llost 6 lbs in the last 4 months with betetr diet). Negative for chills and fever.   Eyes: Negative for pain and redness.   Respiratory: Negative for cough and shortness of breath.    Cardiovascular: Negative for chest pain and leg swelling.   Neurological: Negative for dizziness and headaches.       Objective   Physical Exam   Constitutional: She is oriented to person, place, and time. She appears well-developed and well-nourished.   HENT:   Head: Normocephalic and atraumatic.   Right Ear: Tympanic membrane, external ear and ear canal normal.   Left Ear: Tympanic membrane, external ear and ear canal normal.   Nose: Nose normal. Right sinus exhibits no maxillary sinus tenderness and no frontal sinus tenderness. Left sinus exhibits no maxillary sinus tenderness and no frontal sinus tenderness.   Mouth/Throat: Uvula is midline, oropharynx is clear and moist and mucous " "membranes are normal.   Eyes: Pupils are equal, round, and reactive to light. Conjunctivae and EOM are normal. Right eye exhibits no discharge. Left eye exhibits no discharge. No scleral icterus.   Neck: Neck supple. No JVD present.   Cardiovascular: Normal rate, regular rhythm and normal heart sounds.  Exam reveals no gallop and no friction rub.    No murmur heard.  Pulmonary/Chest: Effort normal and breath sounds normal. She has no wheezes. She has no rales.   Musculoskeletal: She exhibits no edema.   Lymphadenopathy:     She has no cervical adenopathy.   Neurological: She is alert and oriented to person, place, and time. No cranial nerve deficit.   Skin: Skin is warm and dry. No rash noted.   Psychiatric: She has a normal mood and affect. Her behavior is normal.   Vitals reviewed.      Assessment/Plan   Pretty was seen today for hyperlipidemia, diabetes, hypothyroidism and hypertension.    Diagnoses and all orders for this visit:    Pure hypercholesterolemia    Type 2 diabetes mellitus with both eyes affected by mild nonproliferative retinopathy without macular edema, without long-term current use of insulin (CMS/[facility])  -     Hemoglobin A1c; Future    Primary hypothyroidism  -     levothyroxine (SYNTHROID, LEVOTHROID) 50 MCG tablet; Take 1 tablet by mouth Daily.  -     TSH; Future  -     T4, Free; Future    Benign essential hypertension      HTN - well controlled with current medication regimen. Normal kidney tests and electrolytes. Recommended low salt diet. Continue same medical treatment.  Hyperlipidemia - well controlled with statin. Normal liver function tests. LDL target is below < 70 mg/dl (\"very high\" risk for CHD). Patient's 82, which is much improved after addition of the thyroid supplement. Continue same treatment. Regular exercise recommended.  DM II - well controlled with metformin and GLP1 agonist injections. Hb A1C is   Lab Results   Component Value Date    HGBA1C 5.86 (H) 08/23/2018    .  No " hypoglycemic episodes. Low carb diet and regular exercise recommended.  Newly diagnosed hypothyroidism - overcompensated with current dose of thyroid supplement. Patient is euthyroid clinically but her TSH is low. Will change the dose of supplement to 50 mcg a day and recheck in 3 months. Reminder: thyroid supplement has to be taken at least 2 hours apart from Ca and Iron supplements.  Osteoporosis screening and prevention: DEXA results discussed with patient: nl DEXA, no interventions needed at that time.

## 2018-09-06 ENCOUNTER — CONSULT (OUTPATIENT)
Dept: OBSTETRICS AND GYNECOLOGY | Age: 63
End: 2018-09-06

## 2018-09-06 VITALS
BODY MASS INDEX: 28.32 KG/M2 | WEIGHT: 150 LBS | HEIGHT: 61 IN | SYSTOLIC BLOOD PRESSURE: 124 MMHG | DIASTOLIC BLOOD PRESSURE: 84 MMHG

## 2018-09-06 DIAGNOSIS — N81.10 PELVIC RELAXATION DUE TO VAGINAL PROLAPSE: Primary | ICD-10-CM

## 2018-09-06 PROCEDURE — 99212 OFFICE O/P EST SF 10 MIN: CPT | Performed by: OBSTETRICS & GYNECOLOGY

## 2018-09-06 NOTE — PROGRESS NOTES
Subjective   Pretty Tovar is a 62 y.o. female is being seen today for   Chief Complaint   Patient presents with   • Consult     Surgery Consult, prolapse, 3 + cystocele/rectocele. DX three months ago with hypothyroidism, started taking generic synthroid,    .    History of Present Illness  Patient is here for consult for her continual pelvic relaxation.  We had a lengthy discussion 6 months ago and exam at the time which showed her to have prolapsed cystocele and a little bit of a rectocele.  She will have any particular symptoms from a just little bit of vague pressure.  Since that time things are really about the same.  There is no adequate pressure this no more incontinence no change anything changes is wanted to touch base and see where she was in terms of potential surgery etc.  So talked about everything when the surgery is needed if waiting may thinks it worse or more dangerous for surgery.  So after all this discussed patient decided she is not quite need full of surgery at this point so we will just wait until things either internal bit worse oral see her in 6 months have her next annual and we'll go from there.  No other complaints today she does have a little urgency frequency minimal incontinence.  Her sugars doing well with hemoglobin A1c of 5.4.  The following portions of the patient's history were reviewed and updated as appropriate: allergies, current medications, past family history, past medical history, past social history, past surgical history and problem list.    Vitals:    18 1426   BP: 124/84         PAST MEDICAL HISTORY  Past Medical History:   Diagnosis Date   • H/O bone density study 10/2010    normal   • H/O cardiovascular stress test    • H/O echocardiogram 2001    2-D   • Hypothyroidism 2018   • Osteoarthritis of knee     left   • Plantar warts     foot   • Pregnancy      twins x1   • Psoriasis      OB History      Para Term  AB Living    2 2 2      3    SAB TAB Ectopic Molar Multiple Live Births            1 3        Past Surgical History:   Procedure Laterality Date   • BREAST BIOPSY Right 2006    benign   • COLONOSCOPY W/ POLYPECTOMY  02/21/2018    Dr Resendiz, 6 mm rectal polyp - hyperplastic     Family History   Problem Relation Age of Onset   • Heart disease Father    • Hypertension Father    • Diabetes Brother    • Heart disease Maternal Grandmother    • Other Son         TWIN   • Other Daughter         TWIN    • BRCA 1/2 Neg Hx    • Breast cancer Neg Hx    • Colon cancer Neg Hx    • Endometrial cancer Neg Hx    • Ovarian cancer Neg Hx      History   Smoking Status   • Never Smoker   Smokeless Tobacco   • Never Used       Current Outpatient Prescriptions:   •  CRESTOR 20 MG tablet, TAKE ONE-HALF TABLET BY MOUTH ONE TIME DAILY., Disp: 15 tablet, Rfl: 6  •  levothyroxine (SYNTHROID, LEVOTHROID) 50 MCG tablet, Take 1 tablet by mouth Daily., Disp: 30 tablet, Rfl: 5  •  lisinopril (PRINIVIL,ZESTRIL) 20 MG tablet, TAKE ONE TABLET BY MOUTH ONCE DAILY, Disp: 30 tablet, Rfl: 11  •  lisinopril-hydrochlorothiazide (PRINZIDE,ZESTORETIC) 20-25 MG per tablet, TAKE ONE TABLET BY MOUTH ONCE DAILY, Disp: 30 tablet, Rfl: 11  •  metFORMIN (GLUCOPHAGE) 500 MG tablet, Take 2 tablets by mouth Take As Directed. Take 3 pills in am and 2 in pm, Disp: 150 tablet, Rfl: 11  •  TACLONEX 0.005-0.064 % external suspension, APPLY TO PSORIASIS ON SCALP/TRUNK/EXTREMITIES EVERY OTHER DAY AS NEEDED, Disp: , Rfl: 5  •  VICTOZA 18 MG/3ML solution pen-injector injection, INJECT 1.2 MG SUBCUTANEOUSLY ONCE DAILY, Disp: 9 mL, Rfl: 1  •  glucose blood test strip, Use to check blood sugars once daily, Disp: 100 each, Rfl: 3  •  Insulin Pen Needle (NOVOFINE PLUS) 32G X 4 MM misc, Use 1 pen needle with each victoza injection, Disp: 100 each, Rfl: 3  •  ONETOUCH VERIO test strip, USE TO CHECK BLOOD SUGARS ONCE DAILY, Disp: 100 each, Rfl: 4  Immunization History   Administered Date(s) Administered   •  Flu Vaccine Quad PF >36MO 10/18/2017   • Pneumococcal Polysaccharide (PPSV23) 03/21/2017   • Shingrix 04/30/2018   • Tdap 04/12/2018       Review of Systems  As above  Objective   Physical Exam  Well-developed well-nourished female no acute distress    Assessment/Plan   Pretty was seen today for consult.    Diagnoses and all orders for this visit:    Pelvic relaxation due to vaginal prolapse      Plan is come back for an annual in 6 months or call if she notices any change in the meantime in terms of her pelvic relaxation.

## 2018-09-20 RX ORDER — PEN NEEDLE, DIABETIC 32GX 5/32"
NEEDLE, DISPOSABLE MISCELLANEOUS
Qty: 100 EACH | Refills: 1 | Status: SHIPPED | OUTPATIENT
Start: 2018-09-20

## 2018-12-22 DIAGNOSIS — E11.3299 MILD NONPROLIFERATIVE DIABETIC RETINOPATHY WITHOUT MACULAR EDEMA ASSOCIATED WITH TYPE 2 DIABETES MELLITUS (HCC): ICD-10-CM

## 2018-12-24 RX ORDER — LIRAGLUTIDE 6 MG/ML
INJECTION SUBCUTANEOUS
Qty: 3 ML | Refills: 0 | Status: SHIPPED | OUTPATIENT
Start: 2018-12-24 | End: 2019-01-23 | Stop reason: SDUPTHER

## 2019-01-23 DIAGNOSIS — E11.3299 MILD NONPROLIFERATIVE DIABETIC RETINOPATHY WITHOUT MACULAR EDEMA ASSOCIATED WITH TYPE 2 DIABETES MELLITUS (HCC): ICD-10-CM

## 2019-01-23 RX ORDER — LIRAGLUTIDE 6 MG/ML
INJECTION SUBCUTANEOUS
Qty: 6 ML | Refills: 0 | Status: SHIPPED | OUTPATIENT
Start: 2019-01-23 | End: 2019-02-24 | Stop reason: SDUPTHER

## 2019-02-06 ENCOUNTER — LAB (OUTPATIENT)
Dept: INTERNAL MEDICINE | Facility: CLINIC | Age: 64
End: 2019-02-06

## 2019-02-06 DIAGNOSIS — E03.9 PRIMARY HYPOTHYROIDISM: ICD-10-CM

## 2019-02-06 DIAGNOSIS — E11.3293 TYPE 2 DIABETES MELLITUS WITH BOTH EYES AFFECTED BY MILD NONPROLIFERATIVE RETINOPATHY WITHOUT MACULAR EDEMA, WITHOUT LONG-TERM CURRENT USE OF INSULIN (HCC): ICD-10-CM

## 2019-02-06 LAB
HBA1C MFR BLD: 6.03 % (ref 4.8–5.6)
T4 FREE SERPL-MCNC: 1.22 NG/DL (ref 0.93–1.7)
TSH SERPL-ACNC: 6.71 MIU/ML (ref 0.27–4.2)

## 2019-02-06 PROCEDURE — 36415 COLL VENOUS BLD VENIPUNCTURE: CPT | Performed by: INTERNAL MEDICINE

## 2019-02-13 ENCOUNTER — OFFICE VISIT (OUTPATIENT)
Dept: INTERNAL MEDICINE | Facility: CLINIC | Age: 64
End: 2019-02-13

## 2019-02-13 VITALS
WEIGHT: 155 LBS | BODY MASS INDEX: 29.27 KG/M2 | DIASTOLIC BLOOD PRESSURE: 60 MMHG | HEIGHT: 61 IN | SYSTOLIC BLOOD PRESSURE: 102 MMHG | RESPIRATION RATE: 14 BRPM

## 2019-02-13 DIAGNOSIS — E11.3293 TYPE 2 DIABETES MELLITUS WITH BOTH EYES AFFECTED BY MILD NONPROLIFERATIVE RETINOPATHY WITHOUT MACULAR EDEMA, WITHOUT LONG-TERM CURRENT USE OF INSULIN (HCC): ICD-10-CM

## 2019-02-13 DIAGNOSIS — E06.3 HASHIMOTO'S THYROIDITIS: Primary | ICD-10-CM

## 2019-02-13 DIAGNOSIS — Z00.00 HEALTH CARE MAINTENANCE: ICD-10-CM

## 2019-02-13 PROCEDURE — 99214 OFFICE O/P EST MOD 30 MIN: CPT | Performed by: INTERNAL MEDICINE

## 2019-02-13 RX ORDER — LEVOTHYROXINE SODIUM 0.07 MG/1
75 TABLET ORAL DAILY
Qty: 90 TABLET | Refills: 3 | Status: SHIPPED | OUTPATIENT
Start: 2019-02-13 | End: 2019-05-16

## 2019-02-13 NOTE — PROGRESS NOTES
"Subjective   Pretty Tovar is a 63 y.o. female.     History of Present Illness     /60 (BP Location: Left arm, Patient Position: Sitting, Cuff Size: Adult)   Resp 14   Ht 154.9 cm (61\")   Wt 70.3 kg (155 lb)   BMI 29.29 kg/m²     Current Outpatient Medications:   •  BD PEN NEEDLE SEUN U/F 32G X 4 MM misc, USE 1 PEN NEEDLE WITH EACH  VICTOZA INJECTION, Disp: 100 each, Rfl: 1  •  CRESTOR 20 MG tablet, TAKE ONE-HALF TABLET BY MOUTH ONE TIME DAILY., Disp: 15 tablet, Rfl: 6  •  glucose blood test strip, Use to check blood sugars once daily, Disp: 100 each, Rfl: 3  •  levothyroxine (SYNTHROID, LEVOTHROID) 50 MCG tablet, Take 1 tablet by mouth Daily., Disp: 30 tablet, Rfl: 5  •  lisinopril (PRINIVIL,ZESTRIL) 20 MG tablet, TAKE ONE TABLET BY MOUTH ONCE DAILY, Disp: 30 tablet, Rfl: 11  •  lisinopril-hydrochlorothiazide (PRINZIDE,ZESTORETIC) 20-25 MG per tablet, TAKE ONE TABLET BY MOUTH ONCE DAILY, Disp: 30 tablet, Rfl: 11  •  metFORMIN (GLUCOPHAGE) 500 MG tablet, Take 2 tablets by mouth Take As Directed. Take 3 pills in am and 2 in pm, Disp: 150 tablet, Rfl: 11  •  ONETOUCH VERIO test strip, USE TO CHECK BLOOD SUGARS ONCE DAILY, Disp: 100 each, Rfl: 4  •  TACLONEX 0.005-0.064 % external suspension, APPLY TO PSORIASIS ON SCALP/TRUNK/EXTREMITIES EVERY OTHER DAY AS NEEDED, Disp: , Rfl: 5  •  VICTOZA 18 MG/3ML solution pen-injector injection, INJECT 1.2MG SUBCUTANEOUSLY EVERY DAY, Disp: 6 mL, Rfl: 0    Patient has DM type II. Pretty Tovar uses metformin and GLP1 agonist injections for blood sugars control. Patient checks fasting blood sugars at home daily. and Denies hypoglycemic episodes.. Compliance with low carb diabetic diet is good. Compliance with medication is good.  In a past control had been good. Last Hb A1C was 5.9.    Patient also is here for Hashimoto hypothyroidism f/u. Takes Levothyroxine daily. Patient is compliant with treatment. Denies cold/heat intolerance. Weight is increasing. Patient denies " constipation. No changes in the skin or nails, but she complains of loosing her hair.    The following portions of the patient's history were reviewed and updated as appropriate: allergies, current medications, past family history, past medical history, past social history, past surgical history and problem list.    Review of Systems   Constitutional: Negative for chills and fever.   Eyes: Negative for pain and redness.   Respiratory: Negative for cough and shortness of breath.    Cardiovascular: Negative for chest pain and leg swelling.   Neurological: Negative for dizziness and headaches.       Objective   Physical Exam   Constitutional: She is oriented to person, place, and time. She appears well-developed.   overweight   HENT:   Head: Normocephalic and atraumatic.   Right Ear: Tympanic membrane, external ear and ear canal normal.   Left Ear: Tympanic membrane, external ear and ear canal normal.   Nose: Nose normal. Right sinus exhibits no maxillary sinus tenderness and no frontal sinus tenderness. Left sinus exhibits no maxillary sinus tenderness and no frontal sinus tenderness.   Mouth/Throat: Uvula is midline, oropharynx is clear and moist and mucous membranes are normal.   Eyes: Conjunctivae and EOM are normal. Pupils are equal, round, and reactive to light. Right eye exhibits no discharge. Left eye exhibits no discharge. No scleral icterus.   Neck: Neck supple. No JVD present.   Cardiovascular: Normal rate, regular rhythm and normal heart sounds. Exam reveals no gallop and no friction rub.   No murmur heard.  Pulmonary/Chest: Effort normal and breath sounds normal. She has no wheezes. She has no rales.   Musculoskeletal: She exhibits no edema.   Lymphadenopathy:     She has no cervical adenopathy.   Neurological: She is alert and oriented to person, place, and time. No cranial nerve deficit.   Skin: Skin is warm and dry. No rash noted.   Psychiatric: She has a normal mood and affect. Her behavior is normal.    Vitals reviewed.      Assessment/Plan   Pretty was seen today for diabetes and hypothyroidism.    Diagnoses and all orders for this visit:    Hashimoto's thyroiditis  -     T4, Free; Future  -     levothyroxine (SYNTHROID, LEVOTHROID) 75 MCG tablet; Take 1 tablet by mouth Daily.    Type 2 diabetes mellitus with both eyes affected by mild nonproliferative retinopathy without macular edema, without long-term current use of insulin (CMS/[facility])  -     Hemoglobin A1c; Future  -     Microalbumin / Creatinine Urine Ratio - Urine, Clean Catch; Future    Health care maintenance  -     CBC & Differential; Future  -     Comprehensive Metabolic Panel; Future  -     Lipid Panel; Future  -     TSH; Future  -     Urinalysis With Microscopic If Indicated (No Culture) - Urine, Clean Catch; Future        DM II - well controlled with metformin and GLP1 agonist injections. Hb A1C is   Lab Results   Component Value Date    HGBA1C 6.03 (H) 02/06/2019    .  No hypoglycemic episodes. Low carb diet and regular exercise recommended. Weight loss will be very beneficial. Regular feet self examinations and comfortable footwear recommended.  Hypothyroidism - not well compensated with current dose of thyroid supplement. Patient is euthyroid clinically, but she had some hair loss and TSH is elevated. Has high anti-peroxidase antibodies, and I had explained that it will take some time for her levels to stabilize. Will incvrease the dose of Levothyroxine to 75 mcg a day. Reminder: thyroid supplement has to be taken at least 2 hours apart from Ca and Iron supplements.

## 2019-02-24 DIAGNOSIS — E11.3299 MILD NONPROLIFERATIVE DIABETIC RETINOPATHY WITHOUT MACULAR EDEMA ASSOCIATED WITH TYPE 2 DIABETES MELLITUS (HCC): ICD-10-CM

## 2019-02-25 RX ORDER — LIRAGLUTIDE 6 MG/ML
INJECTION SUBCUTANEOUS
Qty: 6 PEN | Refills: 0 | Status: SHIPPED | OUTPATIENT
Start: 2019-02-25 | End: 2019-06-03 | Stop reason: SDUPTHER

## 2019-03-18 ENCOUNTER — APPOINTMENT (OUTPATIENT)
Dept: WOMENS IMAGING | Facility: HOSPITAL | Age: 64
End: 2019-03-18

## 2019-03-18 ENCOUNTER — PROCEDURE VISIT (OUTPATIENT)
Dept: OBSTETRICS AND GYNECOLOGY | Age: 64
End: 2019-03-18

## 2019-03-18 ENCOUNTER — OFFICE VISIT (OUTPATIENT)
Dept: OBSTETRICS AND GYNECOLOGY | Age: 64
End: 2019-03-18

## 2019-03-18 VITALS
HEIGHT: 61 IN | WEIGHT: 154 LBS | DIASTOLIC BLOOD PRESSURE: 78 MMHG | SYSTOLIC BLOOD PRESSURE: 132 MMHG | BODY MASS INDEX: 29.07 KG/M2

## 2019-03-18 DIAGNOSIS — N95.0 POSTMENOPAUSAL BLEEDING: Primary | ICD-10-CM

## 2019-03-18 DIAGNOSIS — Z12.31 VISIT FOR SCREENING MAMMOGRAM: Primary | ICD-10-CM

## 2019-03-18 DIAGNOSIS — Z00.00 ANNUAL PHYSICAL EXAM: ICD-10-CM

## 2019-03-18 PROCEDURE — 99396 PREV VISIT EST AGE 40-64: CPT | Performed by: OBSTETRICS & GYNECOLOGY

## 2019-03-18 PROCEDURE — 77067 SCR MAMMO BI INCL CAD: CPT | Performed by: RADIOLOGY

## 2019-03-18 PROCEDURE — 77067 SCR MAMMO BI INCL CAD: CPT | Performed by: OBSTETRICS & GYNECOLOGY

## 2019-03-18 NOTE — PROGRESS NOTES
Subjective   Pretty Tovar is a 63 y.o. female is being seen today for   Chief Complaint   Patient presents with   • Gynecologic Exam     AE and Mg today.  2018 pap = negative.  Dexa 2018.  C-scope 2018 = polyp.   .    History of Present Illness  Patient is here for her annual check.  She still has symptoms of pelvic relaxation but not anymore the last visit.  She has some urgency.  All her bowels work well.  There is no new family history.  But she has had some spotting multiple times just when she wipes from the vagina.  Children 5 grandchildren all doing well.  No other complaints  The following portions of the patient's history were reviewed and updated as appropriate: allergies, current medications, past family history, past medical history, past social history, past surgical history and problem list.    Vitals:    19 1321   BP: 132/78       PAST MEDICAL HISTORY  Past Medical History:   Diagnosis Date   • Colon polyps    • H/O bone density study 10/2010    normal   • H/O cardiovascular stress test    • H/O echocardiogram 2001    2-D   • Hypothyroidism 2018   • Osteoarthritis of knee     left   • Plantar warts     foot   • Pregnancy      twins x1   • Psoriasis      OB History      Para Term  AB Living    2 2 2     3    SAB TAB Ectopic Molar Multiple Live Births            1 3        Past Surgical History:   Procedure Laterality Date   • BREAST BIOPSY Right 2006    benign   • COLONOSCOPY W/ POLYPECTOMY  2018    Dr Resendiz, 6 mm rectal polyp - hyperplastic     Family History   Problem Relation Age of Onset   • Heart disease Father    • Hypertension Father    • Diabetes Brother    • Heart disease Maternal Grandmother    • Other Son         TWIN   • Other Daughter         TWIN    • BRCA 1/2 Neg Hx    • Breast cancer Neg Hx    • Colon cancer Neg Hx    • Endometrial cancer Neg Hx    • Ovarian cancer Neg Hx      Social History     Tobacco Use   Smoking Status Never Smoker    Smokeless Tobacco Never Used       Current Outpatient Medications:   •  CRESTOR 20 MG tablet, TAKE ONE-HALF TABLET BY MOUTH ONE TIME DAILY., Disp: 15 tablet, Rfl: 6  •  levothyroxine (SYNTHROID, LEVOTHROID) 75 MCG tablet, Take 1 tablet by mouth Daily., Disp: 90 tablet, Rfl: 3  •  lisinopril (PRINIVIL,ZESTRIL) 20 MG tablet, TAKE ONE TABLET BY MOUTH ONCE DAILY, Disp: 30 tablet, Rfl: 11  •  lisinopril-hydrochlorothiazide (PRINZIDE,ZESTORETIC) 20-25 MG per tablet, TAKE ONE TABLET BY MOUTH ONCE DAILY, Disp: 30 tablet, Rfl: 11  •  metFORMIN (GLUCOPHAGE) 500 MG tablet, Take 2 tablets by mouth Take As Directed. Take 3 pills in am and 2 in pm, Disp: 150 tablet, Rfl: 11  •  TACLONEX 0.005-0.064 % external suspension, APPLY TO PSORIASIS ON SCALP/TRUNK/EXTREMITIES EVERY OTHER DAY AS NEEDED, Disp: , Rfl: 5  •  VICTOZA 18 MG/3ML solution pen-injector injection, INJECT 1.2MG SUBCUTANEOUSLY EVERY DAY, Disp: 6 pen, Rfl: 0  •  BD PEN NEEDLE SEUN U/F 32G X 4 MM misc, USE 1 PEN NEEDLE WITH EACH  VICTOZA INJECTION, Disp: 100 each, Rfl: 1  •  glucose blood test strip, Use to check blood sugars once daily, Disp: 100 each, Rfl: 3  •  ONETOUCH VERIO test strip, USE TO CHECK BLOOD SUGARS ONCE DAILY, Disp: 100 each, Rfl: 4  Immunization History   Administered Date(s) Administered   • Flu Vaccine Quad PF >36MO 10/18/2017   • Influenza, Unspecified 10/30/2018   • Pneumococcal Polysaccharide (PPSV23) 03/21/2017   • Shingrix 04/30/2018, 11/27/2018   • Tdap 04/12/2018       Review of Systems   Constitutional: Negative for chills, fatigue, fever and unexpected weight change.   Respiratory: Negative for shortness of breath and wheezing.    Cardiovascular: Negative for chest pain.   Gastrointestinal: Negative for abdominal distention, abdominal pain, blood in stool, constipation, diarrhea and nausea.   Genitourinary: Positive for vaginal bleeding. Negative for difficulty urinating, dyspareunia, dysuria, frequency, hematuria, menstrual problem,  pelvic pain, urgency and vaginal discharge.   Skin: Negative for rash.       Objective   Physical Exam   Constitutional: She is oriented to person, place, and time. Vital signs are normal. She appears well-developed and well-nourished.   Neck: No thyromegaly present.   Cardiovascular: Normal rate, regular rhythm and normal heart sounds.   Pulmonary/Chest: Effort normal. Right breast exhibits no inverted nipple, no mass, no nipple discharge, no skin change and no tenderness. Left breast exhibits no inverted nipple, no mass, no nipple discharge, no skin change and no tenderness. Breasts are symmetrical. There is no breast swelling.   Abdominal: Soft.   Genitourinary: Vagina normal and uterus normal. No breast tenderness, discharge or bleeding. Pelvic exam was performed with patient supine. No labial fusion. There is no rash, tenderness, lesion or injury on the right labia. There is no rash, tenderness, lesion or injury on the left labia. Cervix exhibits no motion tenderness, no discharge and no friability. Right adnexum displays no mass, no tenderness and no fullness. Left adnexum displays no mass, no tenderness and no fullness.   Neurological: She is alert and oriented to person, place, and time.   Psychiatric: She has a normal mood and affect.   Vitals reviewed.        Assessment/Plan   Pretty was seen today for gynecologic exam.    Diagnoses and all orders for this visit:    Postmenopausal bleeding    Annual physical exam        On exam today there is definitely an increase in her relaxation.  A good 3+ cystocele and uterine descensus.  I did mention little bit worse so she will be having surgery at some point.  If not having sex afraid that he might hurt her but that was reassured her on that today.  No Pap smear done today.  Mammogram was done today.  Bone density per PCP and colonoscopy was February 2018 with one rectal polyp she is good for 5 years.  I plan to order an ultrasound because of the vaginal  bleeding talked about exercise but with a bad knee that is not as good as it could be.  Back for the ultrasound

## 2019-05-01 ENCOUNTER — OFFICE VISIT (OUTPATIENT)
Dept: OBSTETRICS AND GYNECOLOGY | Age: 64
End: 2019-05-01

## 2019-05-01 ENCOUNTER — PROCEDURE VISIT (OUTPATIENT)
Dept: OBSTETRICS AND GYNECOLOGY | Age: 64
End: 2019-05-01

## 2019-05-01 VITALS
HEIGHT: 61 IN | WEIGHT: 161 LBS | SYSTOLIC BLOOD PRESSURE: 150 MMHG | BODY MASS INDEX: 30.4 KG/M2 | DIASTOLIC BLOOD PRESSURE: 80 MMHG

## 2019-05-01 DIAGNOSIS — N95.0 POST-MENOPAUSAL BLEEDING: Primary | ICD-10-CM

## 2019-05-01 DIAGNOSIS — N95.0 PMB (POSTMENOPAUSAL BLEEDING): Primary | ICD-10-CM

## 2019-05-01 PROCEDURE — 99213 OFFICE O/P EST LOW 20 MIN: CPT | Performed by: OBSTETRICS & GYNECOLOGY

## 2019-05-01 PROCEDURE — 76830 TRANSVAGINAL US NON-OB: CPT | Performed by: OBSTETRICS & GYNECOLOGY

## 2019-05-01 NOTE — PROGRESS NOTES
Subjective   Pretty Tovar is a 63 y.o. female is being seen today for   Chief Complaint   Patient presents with   • Follow-up     PM Bleeding, U/S   .    History of Present Illness  Patient is here for ultrasound evaluation for postmenopausal bleeding.  She has had a few spots at the last exam we talked about [facility] here for an ultrasound.  The following portions of the patient's history were reviewed and updated as appropriate: allergies, current medications, past family history, past medical history, past social history, past surgical history and problem list.    Vitals:    19 0936   BP: 150/80         PAST MEDICAL HISTORY  Past Medical History:   Diagnosis Date   • Colon polyps    • H/O bone density study 10/2010    normal   • H/O cardiovascular stress test    • H/O echocardiogram 2001    2-D   • Hypothyroidism 2018   • Osteoarthritis of knee     left   • Plantar warts     foot   • Pregnancy      twins x1   • Psoriasis      OB History      Para Term  AB Living    2 2 2     3    SAB TAB Ectopic Molar Multiple Live Births            1 3        Past Surgical History:   Procedure Laterality Date   • BREAST BIOPSY Right 2006    benign   • COLONOSCOPY W/ POLYPECTOMY  2018    Dr Resendiz, 6 mm rectal polyp - hyperplastic     Family History   Problem Relation Age of Onset   • Heart disease Father    • Hypertension Father    • Diabetes Brother    • Heart disease Maternal Grandmother    • Other Son         TWIN   • Other Daughter         TWIN    • BRCA 1/2 Neg Hx    • Breast cancer Neg Hx    • Colon cancer Neg Hx    • Endometrial cancer Neg Hx    • Ovarian cancer Neg Hx      Social History     Tobacco Use   Smoking Status Never Smoker   Smokeless Tobacco Never Used       Current Outpatient Medications:   •  BD PEN NEEDLE SEUN U/F 32G X 4 MM misc, USE 1 PEN NEEDLE WITH EACH  VICTOZA INJECTION, Disp: 100 each, Rfl: 1  •  CRESTOR 20 MG tablet, TAKE ONE-HALF TABLET BY MOUTH ONE  TIME DAILY., Disp: 15 tablet, Rfl: 6  •  glucose blood test strip, Use to check blood sugars once daily, Disp: 100 each, Rfl: 3  •  levothyroxine (SYNTHROID, LEVOTHROID) 75 MCG tablet, Take 1 tablet by mouth Daily., Disp: 90 tablet, Rfl: 3  •  lisinopril (PRINIVIL,ZESTRIL) 20 MG tablet, TAKE ONE TABLET BY MOUTH ONCE DAILY, Disp: 30 tablet, Rfl: 11  •  lisinopril-hydrochlorothiazide (PRINZIDE,ZESTORETIC) 20-25 MG per tablet, TAKE ONE TABLET BY MOUTH ONCE DAILY, Disp: 30 tablet, Rfl: 11  •  metFORMIN (GLUCOPHAGE) 500 MG tablet, Take 2 tablets by mouth Take As Directed. Take 3 pills in am and 2 in pm, Disp: 150 tablet, Rfl: 11  •  ONETOUCH VERIO test strip, USE TO CHECK BLOOD SUGARS ONCE DAILY, Disp: 100 each, Rfl: 4  •  TACLONEX 0.005-0.064 % external suspension, APPLY TO PSORIASIS ON SCALP/TRUNK/EXTREMITIES EVERY OTHER DAY AS NEEDED, Disp: , Rfl: 5  •  VICTOZA 18 MG/3ML solution pen-injector injection, INJECT 1.2MG SUBCUTANEOUSLY EVERY DAY, Disp: 6 pen, Rfl: 0  Immunization History   Administered Date(s) Administered   • Flu Vaccine Quad PF >36MO 10/18/2017   • Influenza, Unspecified 10/30/2018   • Pneumococcal Polysaccharide (PPSV23) 03/21/2017   • Shingrix 04/30/2018, 11/27/2018   • Tdap 04/12/2018       Review of Systems  Difficulty emptying her bladder, postmenopausal bleeding, and vaginal relaxation  Objective   Physical Exam    Well-developed well-nourished female no acute distress  Assessment/Plan   Pretty was seen today for follow-up.    Diagnoses and all orders for this visit:    Post-menopausal bleeding      Transvaginal ultrasound was performed and I was in the room the whole time we discussed the results with the patient and her .  Everything was absolutely normal including the lining and the uterus and the ovaries.  The patient was given reassurance today she is found spotting should let me know otherwise come back for annual check.  We also talked about the inability to empty her bladder  infection had to go the bathroom again during the ultrasound today because it was too full and she still did not empty that well.  And her prolapse again was discussed in which she is ready she will call

## 2019-05-08 ENCOUNTER — LAB (OUTPATIENT)
Dept: INTERNAL MEDICINE | Facility: CLINIC | Age: 64
End: 2019-05-08

## 2019-05-08 DIAGNOSIS — E06.3 HASHIMOTO'S THYROIDITIS: ICD-10-CM

## 2019-05-08 DIAGNOSIS — E11.3293 TYPE 2 DIABETES MELLITUS WITH BOTH EYES AFFECTED BY MILD NONPROLIFERATIVE RETINOPATHY WITHOUT MACULAR EDEMA, WITHOUT LONG-TERM CURRENT USE OF INSULIN (HCC): ICD-10-CM

## 2019-05-08 DIAGNOSIS — Z00.00 HEALTH CARE MAINTENANCE: ICD-10-CM

## 2019-05-08 LAB
ALBUMIN SERPL-MCNC: 4.9 G/DL (ref 3.5–5.2)
ALBUMIN/GLOB SERPL: 1.5 G/DL
ALP SERPL-CCNC: 59 U/L (ref 39–117)
ALT SERPL-CCNC: 20 U/L (ref 1–33)
AST SERPL-CCNC: 20 U/L (ref 1–32)
BACTERIA UR QL AUTO: ABNORMAL /HPF
BASOPHILS # BLD AUTO: 0.04 10*3/MM3 (ref 0–0.2)
BASOPHILS NFR BLD AUTO: 0.6 % (ref 0–1.5)
BILIRUB SERPL-MCNC: 0.3 MG/DL (ref 0.2–1.2)
BILIRUB UR QL STRIP: NEGATIVE
BUN SERPL-MCNC: 12 MG/DL (ref 8–23)
BUN/CREAT SERPL: 20.7 (ref 7–25)
CALCIUM SERPL-MCNC: 10.6 MG/DL (ref 8.6–10.5)
CHLORIDE SERPL-SCNC: 95 MMOL/L (ref 98–107)
CHOLEST SERPL-MCNC: 178 MG/DL (ref 0–200)
CLARITY UR: CLEAR
CO2 SERPL-SCNC: 28.4 MMOL/L (ref 22–29)
COLOR UR: YELLOW
CREAT SERPL-MCNC: 0.58 MG/DL (ref 0.57–1)
DEPRECATED RDW RBC AUTO: 40.6 FL (ref 37–54)
EOSINOPHIL # BLD AUTO: 0.27 10*3/MM3 (ref 0–0.4)
EOSINOPHIL NFR BLD AUTO: 4.2 % (ref 0.3–6.2)
ERYTHROCYTE [DISTWIDTH] IN BLOOD BY AUTOMATED COUNT: 12.4 % (ref 12.3–15.4)
GLOBULIN SER CALC-MCNC: 3.3 GM/DL
GLUCOSE SERPL-MCNC: 114 MG/DL (ref 65–99)
GLUCOSE UR STRIP-MCNC: NEGATIVE MG/DL
HBA1C MFR BLD: 6.2 % (ref 4.8–5.6)
HCT VFR BLD AUTO: 41.3 % (ref 34–46.6)
HDLC SERPL-MCNC: 50 MG/DL (ref 40–60)
HGB BLD-MCNC: 13.3 G/DL (ref 12–15.9)
HGB UR QL STRIP.AUTO: NEGATIVE
HYALINE CASTS UR QL AUTO: ABNORMAL /LPF
KETONES UR QL STRIP: NEGATIVE
LDLC SERPL CALC-MCNC: 87 MG/DL (ref 0–100)
LEUKOCYTE ESTERASE UR QL STRIP.AUTO: ABNORMAL
LYMPHOCYTES # BLD AUTO: 2.08 10*3/MM3 (ref 0.7–3.1)
LYMPHOCYTES NFR BLD AUTO: 32.2 % (ref 19.6–45.3)
MCH RBC QN AUTO: 29.2 PG (ref 26.6–33)
MCHC RBC AUTO-ENTMCNC: 32.2 G/DL (ref 31.5–35.7)
MCV RBC AUTO: 90.6 FL (ref 79–97)
MONOCYTES # BLD AUTO: 0.45 10*3/MM3 (ref 0.1–0.9)
MONOCYTES NFR BLD AUTO: 7 % (ref 5–12)
MUCOUS THREADS URNS QL MICRO: ABNORMAL /HPF
NEUTROPHILS # BLD AUTO: 3.62 10*3/MM3 (ref 1.7–7)
NEUTROPHILS NFR BLD AUTO: 56 % (ref 42.7–76)
NITRITE UR QL STRIP: NEGATIVE
PH UR STRIP.AUTO: 6.5 [PH] (ref 5–8)
PLATELET # BLD AUTO: 276 10*3/MM3 (ref 140–450)
PMV BLD AUTO: 10.9 FL (ref 6–12)
POTASSIUM SERPL-SCNC: 5.6 MMOL/L (ref 3.5–5.2)
PROT SERPL-MCNC: 8.2 G/DL (ref 6–8.5)
PROT UR QL STRIP: NEGATIVE
RBC # BLD AUTO: 4.56 10*6/MM3 (ref 3.77–5.28)
RBC # UR: ABNORMAL /HPF
REF LAB TEST METHOD: ABNORMAL
SODIUM SERPL-SCNC: 137 MMOL/L (ref 136–145)
SP GR UR STRIP: 1.01 (ref 1–1.03)
SQUAMOUS #/AREA URNS HPF: ABNORMAL /HPF
TRIGL SERPL-MCNC: 206 MG/DL (ref 0–150)
UROBILINOGEN UR QL STRIP: ABNORMAL
VLDLC SERPL-MCNC: 41.2 MG/DL
WBC NRBC COR # BLD: 6.46 10*3/MM3 (ref 3.4–10.8)
WBC UR QL AUTO: ABNORMAL /HPF

## 2019-05-08 PROCEDURE — 81001 URINALYSIS AUTO W/SCOPE: CPT | Performed by: INTERNAL MEDICINE

## 2019-05-08 PROCEDURE — 36415 COLL VENOUS BLD VENIPUNCTURE: CPT | Performed by: INTERNAL MEDICINE

## 2019-05-08 PROCEDURE — 85025 COMPLETE CBC W/AUTO DIFF WBC: CPT | Performed by: INTERNAL MEDICINE

## 2019-05-09 LAB
CREAT 24H UR-MCNC: 43.6 MG/DL
MICROALBUMIN UR-MCNC: 5.8 UG/ML
MICROALBUMIN/CREAT UR: 13.3 MG/G CREAT (ref 0–30)
T4 FREE SERPL-MCNC: 1.39 NG/DL (ref 0.93–1.7)
TSH SERPL-ACNC: 4.11 MIU/ML (ref 0.27–4.2)

## 2019-05-16 ENCOUNTER — OFFICE VISIT (OUTPATIENT)
Dept: INTERNAL MEDICINE | Facility: CLINIC | Age: 64
End: 2019-05-16

## 2019-05-16 VITALS
BODY MASS INDEX: 30.4 KG/M2 | DIASTOLIC BLOOD PRESSURE: 72 MMHG | WEIGHT: 161 LBS | HEIGHT: 61 IN | RESPIRATION RATE: 14 BRPM | SYSTOLIC BLOOD PRESSURE: 128 MMHG

## 2019-05-16 DIAGNOSIS — E66.09 CLASS 1 OBESITY DUE TO EXCESS CALORIES WITHOUT SERIOUS COMORBIDITY WITH BODY MASS INDEX (BMI) OF 30.0 TO 30.9 IN ADULT: ICD-10-CM

## 2019-05-16 DIAGNOSIS — E78.00 PURE HYPERCHOLESTEROLEMIA: ICD-10-CM

## 2019-05-16 DIAGNOSIS — Z00.00 HEALTH CARE MAINTENANCE: Primary | ICD-10-CM

## 2019-05-16 DIAGNOSIS — E06.3 HASHIMOTO'S THYROIDITIS: ICD-10-CM

## 2019-05-16 DIAGNOSIS — E11.3293 TYPE 2 DIABETES MELLITUS WITH BOTH EYES AFFECTED BY MILD NONPROLIFERATIVE RETINOPATHY WITHOUT MACULAR EDEMA, WITHOUT LONG-TERM CURRENT USE OF INSULIN (HCC): ICD-10-CM

## 2019-05-16 PROBLEM — R79.89 ABNORMAL LIVER FUNCTION TESTS: Status: RESOLVED | Noted: 2017-02-09 | Resolved: 2019-05-16

## 2019-05-16 PROBLEM — E87.5 HYPERKALEMIA: Status: ACTIVE | Noted: 2019-05-16

## 2019-05-16 PROBLEM — E66.811 CLASS 1 OBESITY DUE TO EXCESS CALORIES WITHOUT SERIOUS COMORBIDITY WITH BODY MASS INDEX (BMI) OF 30.0 TO 30.9 IN ADULT: Status: ACTIVE | Noted: 2017-02-09

## 2019-05-16 PROCEDURE — 99396 PREV VISIT EST AGE 40-64: CPT | Performed by: INTERNAL MEDICINE

## 2019-05-16 RX ORDER — LEVOTHYROXINE SODIUM 88 UG/1
88 TABLET ORAL DAILY
Qty: 90 TABLET | Refills: 3 | Status: SHIPPED | OUTPATIENT
Start: 2019-05-16 | End: 2020-06-09 | Stop reason: SDUPTHER

## 2019-05-16 NOTE — PROGRESS NOTES
"Checo Tovar is a 63 y.o. female.     History of Present Illness   /72 (BP Location: Left arm, Patient Position: Sitting, Cuff Size: Adult)   Resp 14   Ht 154.9 cm (61\")   Wt 73 kg (161 lb)   LMP  (LMP Unknown)   BMI 30.42 kg/m²   Patient is here for yearly CPE. Last CPE was  1 year ago.  PMH, SH and FH reviewed. Changes in the FH: none .  Patient rates her own health as: good.  Tobacco use: none.  Alcohol use:none.  Recreational drugs use: none  Medication list rewieved.  Diet: low carb.  Exercise: occasionally.  Marital status: .   Employment: full time.  Patient rates her stress level as: moderate.  Dental health: good. Brushes teeth 1-2 times a day, flosses 1 time a day . Dental visits: 2 times a year .  Vision correction: glasses  Hearing: normal.    Recent vaccinations:   Flu  is up to date and recommended yearly  Tdap  is up to date  Shingles prevention completed    Patient is postmenopausal. Past pregnancies: . Currently patient is on no HRT .      Current Outpatient Medications:   •  BD PEN NEEDLE SEUN U/F 32G X 4 MM misc, USE 1 PEN NEEDLE WITH EACH  VICTOZA INJECTION, Disp: 100 each, Rfl: 1  •  CRESTOR 20 MG tablet, TAKE ONE-HALF TABLET BY MOUTH ONE TIME DAILY., Disp: 15 tablet, Rfl: 6  •  glucose blood test strip, Use to check blood sugars once daily, Disp: 100 each, Rfl: 3  •  levothyroxine (SYNTHROID, LEVOTHROID) 75 MCG tablet, Take 1 tablet by mouth Daily., Disp: 90 tablet, Rfl: 3  •  lisinopril (PRINIVIL,ZESTRIL) 20 MG tablet, TAKE ONE TABLET BY MOUTH ONCE DAILY, Disp: 30 tablet, Rfl: 11  •  lisinopril-hydrochlorothiazide (PRINZIDE,ZESTORETIC) 20-25 MG per tablet, TAKE ONE TABLET BY MOUTH ONCE DAILY, Disp: 30 tablet, Rfl: 11  •  metFORMIN (GLUCOPHAGE) 500 MG tablet, Take 2 tablets by mouth Take As Directed. Take 3 pills in am and 2 in pm, Disp: 150 tablet, Rfl: 11  •  ONETOUCH VERIO test strip, USE TO CHECK BLOOD SUGARS ONCE DAILY, Disp: 100 each, Rfl: 4  •  " TACLONEX 0.005-0.064 % external suspension, APPLY TO PSORIASIS ON SCALP/TRUNK/EXTREMITIES EVERY OTHER DAY AS NEEDED, Disp: , Rfl: 5  •  VICTOZA 18 MG/3ML solution pen-injector injection, INJECT 1.2MG SUBCUTANEOUSLY EVERY DAY, Disp: 6 pen, Rfl: 0          The following portions of the patient's history were reviewed and updated as appropriate: allergies, current medications, past family history, past medical history, past social history, past surgical history and problem list.    Review of Systems   Constitutional: Negative for chills and fever.   HENT: Negative for postnasal drip, sinus pressure and sore throat.    Eyes: Negative for pain and itching.   Respiratory: Negative for cough and chest tightness.    Cardiovascular: Negative for chest pain and leg swelling.   Gastrointestinal: Negative for abdominal pain and blood in stool.   Endocrine: Negative for cold intolerance and heat intolerance.   Genitourinary: Negative for difficulty urinating and flank pain.   Musculoskeletal: Negative for back pain and neck pain.   Skin: Negative for color change and rash.   Neurological: Negative for dizziness, weakness and headaches.   Hematological: Negative for adenopathy. Does not bruise/bleed easily.   Psychiatric/Behavioral: Negative for sleep disturbance. The patient is not nervous/anxious.        Objective   Physical Exam   Constitutional: She is oriented to person, place, and time. She appears well-developed and well-nourished. No distress.   HENT:   Head: Normocephalic and atraumatic.   Right Ear: Tympanic membrane, external ear and ear canal normal. No drainage or swelling. No mastoid tenderness. Tympanic membrane is not injected. No middle ear effusion.   Left Ear: Tympanic membrane, external ear and ear canal normal. No drainage or swelling. No mastoid tenderness. Tympanic membrane is not injected.  No middle ear effusion.   Nose: Nose normal. No mucosal edema. Right sinus exhibits no maxillary sinus tenderness and  no frontal sinus tenderness. Left sinus exhibits no maxillary sinus tenderness and no frontal sinus tenderness.   Mouth/Throat: Uvula is midline, oropharynx is clear and moist and mucous membranes are normal. No oropharyngeal exudate.   Eyes: Conjunctivae, EOM and lids are normal. Pupils are equal, round, and reactive to light. Right eye exhibits no discharge. Left eye exhibits no discharge. Right conjunctiva is not injected. Right conjunctiva has no hemorrhage. Left conjunctiva is not injected. Left conjunctiva has no hemorrhage. No scleral icterus. Right eye exhibits normal extraocular motion and no nystagmus. Left eye exhibits normal extraocular motion and no nystagmus.   Neck: Normal range of motion and full passive range of motion without pain. Neck supple. No JVD present. Carotid bruit is not present. No thyromegaly present.   Cardiovascular: Normal rate, regular rhythm, S1 normal, S2 normal, normal heart sounds and intact distal pulses. Exam reveals no gallop and no friction rub.   No murmur heard.  Pulmonary/Chest: Effort normal and breath sounds normal. No respiratory distress. She has no decreased breath sounds. She has no wheezes. She has no rhonchi. She has no rales. Right breast exhibits no inverted nipple, no mass, no nipple discharge, no skin change and no tenderness. Left breast exhibits no inverted nipple, no mass, no nipple discharge, no skin change and no tenderness. Breasts are symmetrical.   Abdominal: Soft. Bowel sounds are normal. She exhibits no distension. There is no tenderness. There is no rebound and no guarding.   Musculoskeletal: She exhibits no edema.    Pretty had a diabetic foot exam performed today.   During the foot exam she had a monofilament test performed (lingt touch intact on feet exam with microfilament).    Neurological Sensory Findings - Unaltered hot/cold right ankle/foot discrimination and unaltered hot/cold left ankle/foot discrimination. Unaltered sharp/dull right  ankle/foot discrimination and unaltered sharp/dull left ankle/foot discrimination. No right ankle/foot altered proprioception and no left ankle/foot altered proprioception  Vascular Status -  Her right foot exhibits normal foot vasculature  and no edema. Her left foot exhibits normal foot vasculature  and no edema.  Skin Integrity  -  Her right foot skin is intact.Her left foot skin is intact..  Lymphadenopathy:        Head (right side): No submental, no submandibular, no preauricular, no posterior auricular and no occipital adenopathy present.        Head (left side): No submental, no submandibular, no preauricular, no posterior auricular and no occipital adenopathy present.     She has no cervical adenopathy.        Right cervical: No superficial cervical and no deep cervical adenopathy present.       Left cervical: No superficial cervical, no deep cervical and no posterior cervical adenopathy present.     She has no axillary adenopathy.        Right: No supraclavicular adenopathy present.        Left: No supraclavicular adenopathy present.   Neurological: She is alert and oriented to person, place, and time. She has normal reflexes. She displays normal reflexes. No cranial nerve deficit. She exhibits normal muscle tone. Coordination normal.   Reflex Scores:       Bicep reflexes are 2+ on the right side and 2+ on the left side.       Patellar reflexes are 2+ on the right side and 2+ on the left side.  Skin: Skin is warm. No rash noted. She is not diaphoretic. No erythema.   Psychiatric: She has a normal mood and affect. Her speech is normal and behavior is normal.   Vitals reviewed.      Assessment/Plan       Risk evaluation:  1. Cardiovascular risk factors: hypertension, hyperlipidemia, diabetes mellitus, family history of CAD   2. Diabetes risk factors: patient has diabetes and being treated.  3. Cancer risk factors: diabetes.  4. Risky behavior: none. Use of seat belts: regular. Use of sunscreens: regular. SPF  "30.   Tattoos: none.  H/o blood transfusions/organ transplants before 1992 or clotting factor transfusion before 1987: none.     Prevention:  Cholesterol test  up to date. Cholesterol is well controlled by medication..  Blood sugar test up to date. Fasting blood sugar Recommended to avoid sweets and starches: pasta, pizza, bread, potato, corn. .  Hep C testing (for patients born [birth year]): completed..  Mammogram up to date. Recommended frequency and time of the next screening per GYN.. Breast self exams recommended once a month.  Colonoscopy: up to date. Recommended every 10 years. Next screening is due in 2028..  PAP smear : up to date. Recommendations per GYN..  DEXA : up to date. Recommended every 5 years. Next screening is due in 2023..                      HTN - well controlled with current medication regimen. Normal kidney tests and electrolytes. Recommended low salt diet. Continue same medical treatment.  Hyperlipidemia - well controlled with statin. Normal liver function tests. LDL target is below < 70 mg/dl (\"very high\" risk for CHD). Patient's 87. Mildly elevated triglycerides - needs to stay on low carb diet. Continue same treatment. Regular exercise recommended.  DM II - well controlled with metformin and GLP1 agonist injections. Hb A1C is   Lab Results   Component Value Date    HGBA1C 6.20 (H) 05/08/2019    .  No hypoglycemic episodes. Low carb diet and regular exercise recommended. Weight loss will be very beneficial. Regular feet self examinations and comfortable footwear recommended.  Hypothyroidism - well compensated with current dose of thyroid supplement, but her TSH is borderline on a high side. Patient is euthyroid clinically. Will let her complete next 3 months of 75 mcg dose, and then will increase the dose to 88 mcg a day - it should help her to loose some weight. Reminder: thyroid supplement has to be taken at least 2 hours apart from Ca and Iron supplements.  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 Will monitor.

## 2019-05-16 NOTE — PATIENT INSTRUCTIONS
"Risk evaluation:  1. Cardiovascular risk factors: hypertension, hyperlipidemia, diabetes mellitus, family history of CAD   2. Diabetes risk factors: patient has diabetes and being treated.  3. Cancer risk factors: diabetes.  4. Risky behavior: none. Use of seat belts: regular. Use of sunscreens: regular. SPF 30.   Tattoos: none.  H/o blood transfusions/organ transplants before 1992 or clotting factor transfusion before 1987: none.     Prevention:  Cholesterol test  up to date. Cholesterol is well controlled by medication..  Blood sugar test up to date. Fasting blood sugar Recommended to avoid sweets and starches: pasta, pizza, bread, potato, corn. .  Hep C testing (for patients born [birth year]): completed..  Mammogram up to date. Recommended frequency and time of the next screening per GYN.. Breast self exams recommended once a month.  Colonoscopy: up to date. Recommended every 10 years. Next screening is due in 2028..  PAP smear : up to date. Recommendations per GYN..  DEXA : up to date. Recommended every 5 years. Next screening is due in 2023..                      HTN - well controlled with current medication regimen. Normal kidney tests and electrolytes. Recommended low salt diet. Continue same medical treatment.  Hyperlipidemia - well controlled with statin. Normal liver function tests. LDL target is below < 70 mg/dl (\"very high\" risk for CHD). Patient's 87. Mildly elevated triglycerides - needs to stay on low carb diet. Continue same treatment. Regular exercise recommended.  DM II - well controlled with metformin and GLP1 agonist injections. Hb A1C is   Lab Results   Component Value Date    HGBA1C 6.20 (H) 05/08/2019    .  No hypoglycemic episodes. Low carb diet and regular exercise recommended. Weight loss will be very beneficial. Regular feet self examinations and comfortable footwear recommended.  Hypothyroidism - well compensated with current dose of thyroid supplement, but her TSH is borderline on a high " side. Patient is euthyroid clinically. Will let her complete next 3 months of 75 mcg dose, and then will increase the dose to 88 mcg a day - it should help her to loose some weight. Reminder: thyroid supplement has to be taken at least 2 hours apart from Ca and Iron supplements.  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 Will monitor.         Serving Sizes  A serving size is a measured amount of food or drink, such as one slice of bread, that has an associated nutrient content. Knowing the serving size of a food or drink can help you determine how much of that food you should consume.  What is the size of one serving?  The size of one healthy serving depends on the food or drink. To determine a serving size, read the food label. If the food or drink does not have a food label, try to find serving size information online. Or, use the following to estimate the size of one adult serving:  Grain  1 slice bread. ½ bagel. ½ cup pasta.  Vegetable  ½ cup cooked or canned vegetables. 1 cup raw, leafy greens.  Fruit  ½ cup canned fruit. 1 medium fruit. ¼ cup dried fruit.  Meat and Other Protein Sources  1 oz meat, poultry, or fish. ¼ cup cooked beans. 1 egg. ¼ cup nuts or seeds. 1 Tbsp nut butter. ¼ cup tofu or tempeh. 2 Tbsp hummus.  Dairy  An individual container of yogurt (6-8 oz). 1 piece of cheese the size of your thumb (1 oz). 1 cup (8 oz) milk or milk alternative.  Fat  A piece the size of one dice. 1 tsp soft margarine. 1 Tbsp mayonnaise. 1 tsp vegetable oil. 1 Tbsp regular salad dressing. 2 Tbsp low-fat salad dressing.  How many servings should I eat from each food group each day?  The following are the suggested number of servings to try and have every day from each food group. You can also look at your eating throughout the week and aim for meeting these requirements on most days for overall healthy eating.  Grain  6-8 servings. Try to have  half of your grains from whole grains, such as whole wheat bread, corn tortillas, oatmeal, brown rice, whole wheat pasta, and bulgur.  Vegetable  At least 2½-3 servings.  Fruit  2 servings.  Meat and Other Protein Foods  5-6 servings. Aim to have lean proteins, such as chicken, turkey, fish, beans, or tofu.  Dairy  3 servings. Choose low-fat or nonfat if you are trying to control your weight.  Fat  2-3 servings.  Is a serving the same thing as a portion?  No. A portion is the actual amount you eat, which may be more than one serving. Knowing the specific serving size of a food and the nutritional information that goes with it can help you make a healthy decision on what size portion to eat.  What are some tips to help me learn healthy serving sizes?  · Check food labels for serving sizes. Many foods that come as a single portion actually contain multiple servings.  · Determine the serving size of foods you commonly eat and figure out how large a portion you usually eat.  · Measure the number of servings that can be held by the bowls, glasses, cups, and plates you typically use. For example, pour your breakfast cereal into your regular bowl and then pour it into a measuring cup.  · For 1-2 days, measure the serving sizes of all the foods you eat.  · Practice estimating serving sizes and determining how big your portions should be.  This information is not intended to replace advice given to you by your health care provider. Make sure you discuss any questions you have with your health care provider.  Document Released: 09/15/2004 Document Revised: 08/12/2017 Document Reviewed: 03/17/2015  Elsevier Interactive Patient Education © 2018 [employer]

## 2019-06-03 DIAGNOSIS — E11.3299 MILD NONPROLIFERATIVE DIABETIC RETINOPATHY WITHOUT MACULAR EDEMA ASSOCIATED WITH TYPE 2 DIABETES MELLITUS (HCC): ICD-10-CM

## 2019-06-03 RX ORDER — LIRAGLUTIDE 6 MG/ML
INJECTION SUBCUTANEOUS
Qty: 18 PEN | Refills: 0 | Status: SHIPPED | OUTPATIENT
Start: 2019-06-03 | End: 2020-06-09 | Stop reason: SDUPTHER

## 2019-08-09 DIAGNOSIS — E11.9 TYPE 2 DIABETES MELLITUS WITHOUT COMPLICATION (HCC): ICD-10-CM

## 2019-08-09 RX ORDER — BLOOD SUGAR DIAGNOSTIC
STRIP MISCELLANEOUS
Qty: 100 EACH | Refills: 4 | Status: SHIPPED | OUTPATIENT
Start: 2019-08-09 | End: 2022-09-23 | Stop reason: SDUPTHER

## 2019-09-05 ENCOUNTER — OFFICE VISIT (OUTPATIENT)
Dept: INTERNAL MEDICINE | Facility: CLINIC | Age: 64
End: 2019-09-05

## 2019-09-05 VITALS
HEART RATE: 75 BPM | OXYGEN SATURATION: 100 % | DIASTOLIC BLOOD PRESSURE: 70 MMHG | BODY MASS INDEX: 29.57 KG/M2 | TEMPERATURE: 98 F | SYSTOLIC BLOOD PRESSURE: 118 MMHG | HEIGHT: 61 IN | RESPIRATION RATE: 18 BRPM | WEIGHT: 156.6 LBS
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
## 2019-09-05 PROCEDURE — 99213 OFFICE O/P EST LOW 20 MIN: CPT | Performed by: NURSE PRACTITIONER

## 2019-09-05 NOTE — PROGRESS NOTES
Subjective   Pretty Tovar is a 63 y.o. female.     She reports having a recent biopsy of her back for an itchy area that had been present for several month by her dermatologist. The report revealed tick material. This was performed by Dr Elaine on 8/23/2019. She is unsure of how this got there.       Tick Removal   This is a new problem. The current episode started 1 to 4 weeks ago. The problem has been resolved. Pertinent negatives include no abdominal pain, arthralgias, change in bowel habit, chest pain, coughing, fatigue, fever, headaches, myalgias, nausea or vomiting.        The following portions of the patient's history were reviewed and updated as appropriate: allergies, current medications, past social history and problem list.    Review of Systems   Constitutional: Negative for activity change, appetite change, fatigue and fever.   Respiratory: Negative for cough, shortness of breath and wheezing.    Cardiovascular: Negative for chest pain, palpitations and leg swelling.   Gastrointestinal: Negative for abdominal pain, change in bowel habit, constipation, diarrhea, nausea and vomiting.   Musculoskeletal: Negative for arthralgias and myalgias.   Neurological: Negative for dizziness and headaches.       Objective   Physical Exam   Constitutional: She is oriented to person, place, and time. She appears well-developed and well-nourished.   HENT:   Head: Normocephalic.   Nose: Nose normal.   Cardiovascular: Regular rhythm and normal heart sounds. Exam reveals no S3 and no S4.   No murmur heard.  Pulmonary/Chest: Effort normal and breath sounds normal. She has no decreased breath sounds. She has no wheezes. She has no rhonchi. She has no rales.   Abdominal: Normal appearance and bowel sounds are normal. There is no hepatosplenomegaly. There is no tenderness. There is no CVA tenderness.   Musculoskeletal: She exhibits no edema.   Neurological: She is alert and oriented to person, place, and time. Gait normal.    Skin: Skin is warm and dry. No rash noted.   Healing biopsy site to right upper back   Psychiatric: She has a normal mood and affect.       Assessment/Plan   Pretty was seen today for tick removal.    Diagnoses and all orders for this visit:    Tick bite, initial encounter  Comments:  no treatment warranted       No symptoms of lyme disease present. She will monitor. I obtained medical records from Dr Elaine and reviewed.

## 2019-09-23 ENCOUNTER — TELEPHONE (OUTPATIENT)
Dept: INTERNAL MEDICINE | Facility: CLINIC | Age: 64
End: 2019-09-23

## 2019-09-23 DIAGNOSIS — E11.3299 MILD NONPROLIFERATIVE DIABETIC RETINOPATHY WITHOUT MACULAR EDEMA ASSOCIATED WITH TYPE 2 DIABETES MELLITUS (HCC): ICD-10-CM

## 2019-09-23 RX ORDER — LISINOPRIL AND HYDROCHLOROTHIAZIDE 25; 20 MG/1; MG/1
1 TABLET ORAL DAILY
Qty: 90 TABLET | Refills: 1 | Status: SHIPPED | OUTPATIENT
Start: 2019-09-23 | End: 2020-06-09 | Stop reason: SDUPTHER

## 2019-09-23 RX ORDER — LISINOPRIL 20 MG/1
20 TABLET ORAL DAILY
Qty: 90 TABLET | Refills: 1 | Status: SHIPPED | OUTPATIENT
Start: 2019-09-23 | End: 2020-06-09 | Stop reason: SDUPTHER

## 2019-09-23 NOTE — TELEPHONE ENCOUNTER
Regarding: Prescription Question  Contact: [phone]  ----- Message from [employer], Generic sent at 2019 10:54 AM EDT -----    I have three medications that I need new prescriptions written as the current ones have .    Please note that I would like them sent to a different pharmacy than the ones I previously used.  Refills are:  METFORMIN  500MG TAB                           LISINOPRIL  20MB TAB                          LISINOPRIL/HCTZ  20-25MG  I'm not sure if insurance will cover these prescriptions being written for 90 days but if possisble that's  what I prefer.    Please send to:  [facility]   [place]    [phone]    Thank you for your help with these prescriptions.    Pretty Tovar  I

## 2019-11-11 ENCOUNTER — LAB (OUTPATIENT)
Dept: INTERNAL MEDICINE | Facility: CLINIC | Age: 64
End: 2019-11-11

## 2019-11-11 DIAGNOSIS — E78.00 PURE HYPERCHOLESTEROLEMIA: ICD-10-CM

## 2019-11-11 DIAGNOSIS — E11.3293 TYPE 2 DIABETES MELLITUS WITH BOTH EYES AFFECTED BY MILD NONPROLIFERATIVE RETINOPATHY WITHOUT MACULAR EDEMA, WITHOUT LONG-TERM CURRENT USE OF INSULIN (HCC): ICD-10-CM

## 2019-11-11 DIAGNOSIS — E06.3 HASHIMOTO'S THYROIDITIS: ICD-10-CM

## 2019-11-11 LAB
ALBUMIN SERPL-MCNC: 4.6 G/DL (ref 3.5–5.2)
ALBUMIN/GLOB SERPL: 1.7 G/DL
ALP SERPL-CCNC: 53 U/L (ref 39–117)
ALT SERPL-CCNC: 15 U/L (ref 1–33)
AST SERPL-CCNC: 18 U/L (ref 1–32)
BILIRUB SERPL-MCNC: 0.3 MG/DL (ref 0.2–1.2)
BUN SERPL-MCNC: 11 MG/DL (ref 8–23)
BUN/CREAT SERPL: 23.9 (ref 7–25)
CALCIUM SERPL-MCNC: 9.5 MG/DL (ref 8.6–10.5)
CHLORIDE SERPL-SCNC: 103 MMOL/L (ref 98–107)
CHOLEST SERPL-MCNC: 168 MG/DL (ref 0–200)
CO2 SERPL-SCNC: 29.8 MMOL/L (ref 22–29)
CREAT SERPL-MCNC: 0.46 MG/DL (ref 0.57–1)
GLOBULIN SER CALC-MCNC: 2.7 GM/DL
GLUCOSE SERPL-MCNC: 104 MG/DL (ref 65–99)
HBA1C MFR BLD: 6.3 % (ref 4.8–5.6)
HDLC SERPL-MCNC: 50 MG/DL (ref 40–60)
LDLC SERPL CALC-MCNC: 84 MG/DL (ref 0–100)
POTASSIUM SERPL-SCNC: 5.2 MMOL/L (ref 3.5–5.2)
PROT SERPL-MCNC: 7.3 G/DL (ref 6–8.5)
SODIUM SERPL-SCNC: 144 MMOL/L (ref 136–145)
T4 FREE SERPL-MCNC: 1.3 NG/DL (ref 0.93–1.7)
TRIGL SERPL-MCNC: 169 MG/DL (ref 0–150)
TSH SERPL-ACNC: 1.99 UIU/ML (ref 0.27–4.2)
VLDLC SERPL-MCNC: 33.8 MG/DL

## 2019-11-11 PROCEDURE — 36415 COLL VENOUS BLD VENIPUNCTURE: CPT | Performed by: INTERNAL MEDICINE

## 2019-11-18 ENCOUNTER — OFFICE VISIT (OUTPATIENT)
Dept: INTERNAL MEDICINE | Facility: CLINIC | Age: 64
End: 2019-11-18

## 2019-11-18 VITALS
BODY MASS INDEX: 29.45 KG/M2 | HEIGHT: 61 IN | WEIGHT: 156 LBS | SYSTOLIC BLOOD PRESSURE: 128 MMHG | DIASTOLIC BLOOD PRESSURE: 72 MMHG | RESPIRATION RATE: 14 BRPM

## 2019-11-18 DIAGNOSIS — E78.00 PURE HYPERCHOLESTEROLEMIA: ICD-10-CM

## 2019-11-18 DIAGNOSIS — Z00.00 HEALTH CARE MAINTENANCE: ICD-10-CM

## 2019-11-18 DIAGNOSIS — I10 BENIGN ESSENTIAL HYPERTENSION: Primary | ICD-10-CM

## 2019-11-18 DIAGNOSIS — E11.3293 TYPE 2 DIABETES MELLITUS WITH BOTH EYES AFFECTED BY MILD NONPROLIFERATIVE RETINOPATHY WITHOUT MACULAR EDEMA, WITHOUT LONG-TERM CURRENT USE OF INSULIN (HCC): ICD-10-CM

## 2019-11-18 DIAGNOSIS — E06.3 HASHIMOTO'S THYROIDITIS: ICD-10-CM

## 2019-11-18 PROBLEM — E66.3 OVERWEIGHT (BMI 25.0-29.9): Status: ACTIVE | Noted: 2017-02-09

## 2019-11-18 PROBLEM — E87.5 HYPERKALEMIA: Status: RESOLVED | Noted: 2019-05-16 | Resolved: 2019-11-18

## 2019-11-18 PROCEDURE — 99214 OFFICE O/P EST MOD 30 MIN: CPT | Performed by: INTERNAL MEDICINE

## 2019-11-18 RX ORDER — CALCIPOTRIENE AND BETAMETHASONE DIPROPIONATE 50; .5 UG/G; MG/G
AEROSOL, FOAM TOPICAL AS NEEDED
Refills: 4 | COMMUNITY
Start: 2019-09-23

## 2019-11-18 NOTE — PATIENT INSTRUCTIONS
"HTN - well controlled with current medication regimen. Normal kidney tests and electrolytes. Recommended low salt diet. Continue same medical treatment.  Hyperlipidemia - well controlled with statin. Normal liver function tests. LDL target is below < 70 mg/dl (\"very high\" risk for CHD). Patient's 82, but this is already 50+% reduction from the baseline.. Continue same treatment. Regular exercise recommended.  DM II - well controlled with metformin and GLP1 agonist injections. Hb A1C is   Lab Results   Component Value Date    HGBA1C 6.30 (H) 11/11/2019    .  No hypoglycemic episodes. Low carb diet and regular exercise recommended. Weight loss will be very beneficial. Regular feet self examinations and comfortable footwear recommended.  Hypothyroidism - well compensated with current dose of thyroid supplement. Patient is euthyroid clinically and TSH is normal. Continue same. Reminder: thyroid supplement has to be taken at least 2 hours apart from Ca and Iron supplements.    "

## 2019-12-17 RX ORDER — ROSUVASTATIN CALCIUM 20 MG/1
10 TABLET, FILM COATED ORAL DAILY
Qty: 15 TABLET | Refills: 6 | Status: SHIPPED | OUTPATIENT
Start: 2019-12-17 | End: 2020-01-14 | Stop reason: SDUPTHER

## 2020-01-14 RX ORDER — ROSUVASTATIN CALCIUM 20 MG/1
10 TABLET, FILM COATED ORAL DAILY
Qty: 15 TABLET | Refills: 6 | Status: SHIPPED | OUTPATIENT
Start: 2020-01-14 | End: 2020-06-09 | Stop reason: SDUPTHER

## 2020-02-10 ENCOUNTER — OFFICE VISIT (OUTPATIENT)
Dept: OBSTETRICS AND GYNECOLOGY | Age: 65
End: 2020-02-10

## 2020-02-10 VITALS
DIASTOLIC BLOOD PRESSURE: 70 MMHG | HEIGHT: 61 IN | WEIGHT: 158.2 LBS | SYSTOLIC BLOOD PRESSURE: 122 MMHG | BODY MASS INDEX: 29.87 KG/M2

## 2020-02-10 DIAGNOSIS — N81.10 PELVIC RELAXATION DUE TO VAGINAL PROLAPSE: Primary | ICD-10-CM

## 2020-02-10 PROCEDURE — A4561 PESSARY RUBBER, ANY TYPE: HCPCS | Performed by: OBSTETRICS & GYNECOLOGY

## 2020-02-10 PROCEDURE — 57160 INSERT PESSARY/OTHER DEVICE: CPT | Performed by: OBSTETRICS & GYNECOLOGY

## 2020-02-10 NOTE — PROGRESS NOTES
"Subjective   Pretty Tovar is a 64 y.o. female is being seen today for   Chief Complaint   Patient presents with   • Follow-up     bladder prolapse    .    History of Present Illness  Patient is for a problem today for continual pelvic relaxation.  We have talked about this many times and continues to get steadily worse.  Comes today desiring more for an operative action in terms of what can she continue.  So she notes only 2 answers this is either pessary or surgery.  Discussed a bit of both.  Definitely uterus and bladder falling she cannot pee sometimes sometimes she can she has to sit there pressure back up etc.'s exam today the uterus is on the outside she has 1/4degree.  So for immediate concern I will talk to her and a pessary will try that today so because there is really lack of support I will place a cube pessary today.  I do believe she will come to surgery at some point and I will refer her out due to the extreme nature of her problem  The following portions of the patient's history were reviewed and updated as appropriate: allergies, current medications, past family history, past medical history, past social history, past surgical history and problem list.    Vitals:    02/10/20 1116   BP: 122/70         PAST MEDICAL HISTORY  Past Medical History:   Diagnosis Date   • Abnormal liver function tests 2017    Description: , \"-\"hepatitis profile   • Colon polyps    • H/O bone density study 10/2010    normal   • H/O cardiovascular stress test    • H/O echocardiogram 2001    2-D   • Hyperkalemia 2019   • Hypothyroidism 2018   • Osteoarthritis of knee     left   • Plantar warts     foot   • Pregnancy      twins x1   • Psoriasis      OB History        2    Para   2    Term   2            AB        Living   3       SAB        TAB        Ectopic        Molar        Multiple   1    Live Births   3              Past Surgical History:   Procedure Laterality Date   • BREAST " BIOPSY Right 2006    benign   • COLONOSCOPY W/ POLYPECTOMY  02/21/2018    Dr Resendiz, 6 mm rectal polyp - hyperplastic     Family History   Problem Relation Age of Onset   • Heart disease Father    • Hypertension Father    • Diabetes Brother    • Heart disease Maternal Grandmother    • Other Son         TWIN   • Other Daughter         TWIN    • BRCA 1/2 Neg Hx    • Breast cancer Neg Hx    • Colon cancer Neg Hx    • Endometrial cancer Neg Hx    • Ovarian cancer Neg Hx      Social History     Tobacco Use   Smoking Status Never Smoker   Smokeless Tobacco Never Used       Current Outpatient Medications:   •  BD PEN NEEDLE SEUN U/F 32G X 4 MM misc, USE 1 PEN NEEDLE WITH EACH  VICTOZA INJECTION, Disp: 100 each, Rfl: 1  •  CRESTOR 20 MG tablet, Take 0.5 tablets by mouth Daily., Disp: 15 tablet, Rfl: 6  •  ENSTILAR 0.005-0.064 % foam, APPLY TO PSORIASIS ON EXTREMITIES DAILY FOR 3 TO 4 DAYS THEN STOP REST OF WEEK, THEN REPEAT, Disp: , Rfl: 4  •  glucose blood test strip, Use to check blood sugars once daily, Disp: 100 each, Rfl: 3  •  levothyroxine (SYNTHROID, LEVOTHROID) 88 MCG tablet, Take 1 tablet by mouth Daily., Disp: 90 tablet, Rfl: 3  •  lisinopril (PRINIVIL,ZESTRIL) 20 MG tablet, Take 1 tablet by mouth Daily., Disp: 90 tablet, Rfl: 1  •  lisinopril-hydrochlorothiazide (PRINZIDE,ZESTORETIC) 20-25 MG per tablet, Take 1 tablet by mouth Daily., Disp: 90 tablet, Rfl: 1  •  metFORMIN (GLUCOPHAGE) 500 MG tablet, Take 3 pills in am and 2 in pm, Disp: 450 tablet, Rfl: 1  •  ONETOUCH VERIO test strip, USE TO CHECK BLOOD SUGARS ONCE DAILY, Disp: 100 each, Rfl: 4  •  TACLONEX 0.005-0.064 % external suspension, APPLY TO PSORIASIS ON SCALP/TRUNK/EXTREMITIES EVERY OTHER DAY AS NEEDED, Disp: , Rfl: 5  •  VICTOZA 18 MG/3ML solution pen-injector injection, INJECT 1.2MG SUBCUTANEOUSLY EVERY DAY, Disp: 18 pen, Rfl: 0  Immunization History   Administered Date(s) Administered   • Flu Vaccine Quad PF >36MO 10/18/2017   • Influenza,  Unspecified 10/30/2018, 09/27/2019   • Pneumococcal Polysaccharide (PPSV23) 03/21/2017   • Shingrix 04/30/2018, 11/27/2018   • Tdap 04/12/2018       Review of Systems  Difficulty urinating pain and pressure  Objective   Physical Exam  On exam today again she has 1/4 degree.  So I fitted her with a cube pessary.    Assessment/Plan   Pretty was seen today for follow-up.    Diagnoses and all orders for this visit:    Pelvic relaxation due to vaginal prolapse      So far after walking in the office she is done well so she go home try this for a day or 2.  I will be curious to see how she does with the bladder and voiding.  She has no defecating problems.  Come back as needed but will be in touch and it was a #3 cube pessary

## 2020-02-17 ENCOUNTER — OFFICE VISIT (OUTPATIENT)
Dept: OBSTETRICS AND GYNECOLOGY | Age: 65
End: 2020-02-17

## 2020-02-17 VITALS
BODY MASS INDEX: 30.32 KG/M2 | SYSTOLIC BLOOD PRESSURE: 130 MMHG | DIASTOLIC BLOOD PRESSURE: 70 MMHG | WEIGHT: 160.6 LBS | HEIGHT: 61 IN

## 2020-02-17 DIAGNOSIS — N81.10 PELVIC RELAXATION DUE TO VAGINAL PROLAPSE: Primary | ICD-10-CM

## 2020-02-17 PROCEDURE — A4561 PESSARY RUBBER, ANY TYPE: HCPCS | Performed by: OBSTETRICS & GYNECOLOGY

## 2020-02-17 PROCEDURE — 99212 OFFICE O/P EST SF 10 MIN: CPT | Performed by: OBSTETRICS & GYNECOLOGY

## 2020-02-17 NOTE — PROGRESS NOTES
"Subjective   Pretty Tovar is a 64 y.o. female is being seen today for   Chief Complaint   Patient presents with   • Follow-up     Pessary inserted last week fell out, replace today with different size   .    History of Present Illness  Patient returns for pelvic relaxation.  Last week placed a pessary fell out the next day while on the toilet.  So she called in couple days later got her in today and will she is fine with it voided well had no other problems with it today.  So again she may come to surgery at some point but she wants to try another pessary so go 1 size bigger with a cube  The following portions of the patient's history were reviewed and updated as appropriate: allergies, current medications, past family history, past medical history, past social history, past surgical history and problem list.    Vitals:    20 1341   BP: 130/70         PAST MEDICAL HISTORY  Past Medical History:   Diagnosis Date   • Abnormal liver function tests 2017    Description: , \"-\"hepatitis profile   • Colon polyps    • H/O bone density study 10/2010    normal   • H/O cardiovascular stress test    • H/O echocardiogram 2001    2-D   • Hyperkalemia 2019   • Hypothyroidism 2018   • Osteoarthritis of knee 2005    left   • Plantar warts     foot   • Pregnancy      twins x1   • Psoriasis      OB History        2    Para   2    Term   2            AB        Living   3       SAB        TAB        Ectopic        Molar        Multiple   1    Live Births   3              Past Surgical History:   Procedure Laterality Date   • BREAST BIOPSY Right     benign   • COLONOSCOPY W/ POLYPECTOMY  2018    Dr Resendiz, 6 mm rectal polyp - hyperplastic     Family History   Problem Relation Age of Onset   • Heart disease Father    • Hypertension Father    • Diabetes Brother    • Heart disease Maternal Grandmother    • Other Son         TWIN   • Other Daughter         TWIN    • BRCA 1/2 Neg Hx  "   • Breast cancer Neg Hx    • Colon cancer Neg Hx    • Endometrial cancer Neg Hx    • Ovarian cancer Neg Hx      Social History     Tobacco Use   Smoking Status Never Smoker   Smokeless Tobacco Never Used       Current Outpatient Medications:   •  BD PEN NEEDLE SEUN U/F 32G X 4 MM misc, USE 1 PEN NEEDLE WITH EACH  VICTOZA INJECTION, Disp: 100 each, Rfl: 1  •  CRESTOR 20 MG tablet, Take 0.5 tablets by mouth Daily., Disp: 15 tablet, Rfl: 6  •  ENSTILAR 0.005-0.064 % foam, APPLY TO PSORIASIS ON EXTREMITIES DAILY FOR 3 TO 4 DAYS THEN STOP REST OF WEEK, THEN REPEAT, Disp: , Rfl: 4  •  glucose blood test strip, Use to check blood sugars once daily, Disp: 100 each, Rfl: 3  •  levothyroxine (SYNTHROID, LEVOTHROID) 88 MCG tablet, Take 1 tablet by mouth Daily., Disp: 90 tablet, Rfl: 3  •  lisinopril (PRINIVIL,ZESTRIL) 20 MG tablet, Take 1 tablet by mouth Daily., Disp: 90 tablet, Rfl: 1  •  lisinopril-hydrochlorothiazide (PRINZIDE,ZESTORETIC) 20-25 MG per tablet, Take 1 tablet by mouth Daily., Disp: 90 tablet, Rfl: 1  •  metFORMIN (GLUCOPHAGE) 500 MG tablet, Take 3 pills in am and 2 in pm, Disp: 450 tablet, Rfl: 1  •  ONETOUCH VERIO test strip, USE TO CHECK BLOOD SUGARS ONCE DAILY, Disp: 100 each, Rfl: 4  •  TACLONEX 0.005-0.064 % external suspension, APPLY TO PSORIASIS ON SCALP/TRUNK/EXTREMITIES EVERY OTHER DAY AS NEEDED, Disp: , Rfl: 5  •  VICTOZA 18 MG/3ML solution pen-injector injection, INJECT 1.2MG SUBCUTANEOUSLY EVERY DAY, Disp: 18 pen, Rfl: 0  Immunization History   Administered Date(s) Administered   • Flu Vaccine Quad PF >36MO 10/18/2017   • Influenza, Unspecified 10/30/2018, 09/27/2019   • Pneumococcal Polysaccharide (PPSV23) 03/21/2017   • Shingrix 04/30/2018, 11/27/2018   • Tdap 04/12/2018       Review of Systems  Pelvic relaxation  Objective   Physical Exam  On exam today there was a total prolapse so I placed a #4 cube without any difficulty.    Assessment/Plan   Pretty was seen today for  follow-up.    Diagnoses and all orders for this visit:    Pelvic relaxation due to vaginal prolapse        Patient to resume activity liminal how she is doing and again she knows she may come to surgery at some point

## 2020-02-24 ENCOUNTER — OFFICE VISIT (OUTPATIENT)
Dept: OBSTETRICS AND GYNECOLOGY | Age: 65
End: 2020-02-24

## 2020-02-24 VITALS
BODY MASS INDEX: 29.83 KG/M2 | SYSTOLIC BLOOD PRESSURE: 130 MMHG | WEIGHT: 158 LBS | HEIGHT: 61 IN | DIASTOLIC BLOOD PRESSURE: 58 MMHG

## 2020-02-24 DIAGNOSIS — N81.10 PELVIC RELAXATION DUE TO VAGINAL PROLAPSE: Primary | ICD-10-CM

## 2020-02-24 PROCEDURE — 57160 INSERT PESSARY/OTHER DEVICE: CPT | Performed by: OBSTETRICS & GYNECOLOGY

## 2020-02-24 PROCEDURE — A4561 PESSARY RUBBER, ANY TYPE: HCPCS | Performed by: OBSTETRICS & GYNECOLOGY

## 2020-02-24 NOTE — PROGRESS NOTES
"Subjective   Pretty Tovar is a 64 y.o. female is being seen today for   Chief Complaint   Patient presents with   • Follow-up     Pessary reinsertion, #5 cube    .    History of Present Illness  Patient came in for another visit for pelvic relation relaxation.  She has total procidentia we will try another cube pessary this was #5 placed in very easily feels like there is more room this may follow-up we will see  The following portions of the patient's history were reviewed and updated as appropriate: allergies, current medications, past family history, past medical history, past social history, past surgical history and problem list.    Vitals:    20 1524   BP: 130/58         PAST MEDICAL HISTORY  Past Medical History:   Diagnosis Date   • Abnormal liver function tests 2017    Description: , \"-\"hepatitis profile   • Colon polyps    • H/O bone density study 10/2010    normal   • H/O cardiovascular stress test    • H/O echocardiogram 2001    2-D   • Hyperkalemia 2019   • Hypothyroidism 2018   • Osteoarthritis of knee 2005    left   • Plantar warts     foot   • Pregnancy      twins x1   • Psoriasis      OB History        2    Para   2    Term   2            AB        Living   3       SAB        TAB        Ectopic        Molar        Multiple   1    Live Births   3              Past Surgical History:   Procedure Laterality Date   • BREAST BIOPSY Right     benign   • COLONOSCOPY W/ POLYPECTOMY  2018    Dr Resendiz, 6 mm rectal polyp - hyperplastic     Family History   Problem Relation Age of Onset   • Heart disease Father    • Hypertension Father    • Diabetes Brother    • Heart disease Maternal Grandmother    • Other Son         TWIN   • Other Daughter         TWIN    • BRCA 1/2 Neg Hx    • Breast cancer Neg Hx    • Colon cancer Neg Hx    • Endometrial cancer Neg Hx    • Ovarian cancer Neg Hx      Social History     Tobacco Use   Smoking Status Never Smoker "   Smokeless Tobacco Never Used       Current Outpatient Medications:   •  BD PEN NEEDLE SEUN U/F 32G X 4 MM misc, USE 1 PEN NEEDLE WITH EACH  VICTOZA INJECTION, Disp: 100 each, Rfl: 1  •  CRESTOR 20 MG tablet, Take 0.5 tablets by mouth Daily., Disp: 15 tablet, Rfl: 6  •  ENSTILAR 0.005-0.064 % foam, APPLY TO PSORIASIS ON EXTREMITIES DAILY FOR 3 TO 4 DAYS THEN STOP REST OF WEEK, THEN REPEAT, Disp: , Rfl: 4  •  glucose blood test strip, Use to check blood sugars once daily, Disp: 100 each, Rfl: 3  •  levothyroxine (SYNTHROID, LEVOTHROID) 88 MCG tablet, Take 1 tablet by mouth Daily., Disp: 90 tablet, Rfl: 3  •  lisinopril (PRINIVIL,ZESTRIL) 20 MG tablet, Take 1 tablet by mouth Daily., Disp: 90 tablet, Rfl: 1  •  lisinopril-hydrochlorothiazide (PRINZIDE,ZESTORETIC) 20-25 MG per tablet, Take 1 tablet by mouth Daily., Disp: 90 tablet, Rfl: 1  •  metFORMIN (GLUCOPHAGE) 500 MG tablet, Take 3 pills in am and 2 in pm, Disp: 450 tablet, Rfl: 1  •  ONETOUCH VERIO test strip, USE TO CHECK BLOOD SUGARS ONCE DAILY, Disp: 100 each, Rfl: 4  •  TACLONEX 0.005-0.064 % external suspension, APPLY TO PSORIASIS ON SCALP/TRUNK/EXTREMITIES EVERY OTHER DAY AS NEEDED, Disp: , Rfl: 5  •  VICTOZA 18 MG/3ML solution pen-injector injection, INJECT 1.2MG SUBCUTANEOUSLY EVERY DAY, Disp: 18 pen, Rfl: 0  Immunization History   Administered Date(s) Administered   • Flu Vaccine Quad PF >36MO 10/18/2017   • Influenza, Unspecified 10/30/2018, 09/27/2019   • Pneumococcal Polysaccharide (PPSV23) 03/21/2017   • Shingrix 04/30/2018, 11/27/2018   • Tdap 04/12/2018       Review of Systems  Negative  Objective   Physical Exam  Again uterus was on the outside the vagina today the last    Assessment/Plan   Pretty was seen today for follow-up.    Diagnoses and all orders for this visit:    Pelvic relaxation due to vaginal prolapse      Last pessary only lasted today.  Cube #5 inserted today call back if problems

## 2020-05-13 ENCOUNTER — OFFICE VISIT (OUTPATIENT)
Dept: OBSTETRICS AND GYNECOLOGY | Age: 65
End: 2020-05-13

## 2020-05-13 VITALS
HEIGHT: 61 IN | SYSTOLIC BLOOD PRESSURE: 124 MMHG | WEIGHT: 158 LBS | DIASTOLIC BLOOD PRESSURE: 78 MMHG | BODY MASS INDEX: 29.83 KG/M2

## 2020-05-13 DIAGNOSIS — N81.10 PELVIC RELAXATION DUE TO VAGINAL PROLAPSE: Primary | ICD-10-CM

## 2020-05-13 PROBLEM — Z00.00 HEALTH CARE MAINTENANCE: Status: RESOLVED | Noted: 2017-02-09 | Resolved: 2020-05-13

## 2020-05-13 PROCEDURE — 99213 OFFICE O/P EST LOW 20 MIN: CPT | Performed by: OBSTETRICS & GYNECOLOGY

## 2020-05-13 NOTE — PROGRESS NOTES
"Subjective   Pretty Tovar is a 64 y.o. female is being seen today for   Chief Complaint   Patient presents with   • Follow-up     Pessary check    .    History of Present Illness  Patient is here for pessary check.  3 months and #1 has holding things a very well.  She has less frequency seems to empty her bladder better.  But the problem is a chronic vaginal discharge with an odor and she has to wear a pad every day.  The following portions of the patient's history were reviewed and updated as appropriate: allergies, current medications, past family history, past medical history, past social history, past surgical history and problem list.    Vitals:    20 1359   BP: 124/78         PAST MEDICAL HISTORY  Past Medical History:   Diagnosis Date   • Abnormal liver function tests 2017    Description: , \"-\"hepatitis profile   • Colon polyps    • H/O bone density study 10/2010    normal   • H/O cardiovascular stress test    • H/O echocardiogram 2001    2-D   • Hyperkalemia 2019   • Hypothyroidism 2018   • Osteoarthritis of knee 2005    left   • Plantar warts     foot   • Pregnancy      twins x1   • Psoriasis      OB History        2    Para   2    Term   2            AB        Living   3       SAB        TAB        Ectopic        Molar        Multiple   1    Live Births   3              Past Surgical History:   Procedure Laterality Date   • BREAST BIOPSY Right 2006    benign   • COLONOSCOPY W/ POLYPECTOMY  2018    Dr Resendiz, 6 mm rectal polyp - hyperplastic     Family History   Problem Relation Age of Onset   • Heart disease Father    • Hypertension Father    • Diabetes Brother    • Heart disease Maternal Grandmother    • Other Son         TWIN   • Other Daughter         TWIN    • BRCA 1/2 Neg Hx    • Breast cancer Neg Hx    • Colon cancer Neg Hx    • Endometrial cancer Neg Hx    • Ovarian cancer Neg Hx      Social History     Tobacco Use   Smoking Status Never Smoker "   Smokeless Tobacco Never Used       Current Outpatient Medications:   •  BD PEN NEEDLE SEUN U/F 32G X 4 MM misc, USE 1 PEN NEEDLE WITH EACH  VICTOZA INJECTION, Disp: 100 each, Rfl: 1  •  CRESTOR 20 MG tablet, Take 0.5 tablets by mouth Daily., Disp: 15 tablet, Rfl: 6  •  ENSTILAR 0.005-0.064 % foam, APPLY TO PSORIASIS ON EXTREMITIES DAILY FOR 3 TO 4 DAYS THEN STOP REST OF WEEK, THEN REPEAT, Disp: , Rfl: 4  •  glucose blood test strip, Use to check blood sugars once daily, Disp: 100 each, Rfl: 3  •  levothyroxine (SYNTHROID, LEVOTHROID) 88 MCG tablet, Take 1 tablet by mouth Daily., Disp: 90 tablet, Rfl: 3  •  lisinopril (PRINIVIL,ZESTRIL) 20 MG tablet, Take 1 tablet by mouth Daily., Disp: 90 tablet, Rfl: 1  •  lisinopril-hydrochlorothiazide (PRINZIDE,ZESTORETIC) 20-25 MG per tablet, Take 1 tablet by mouth Daily., Disp: 90 tablet, Rfl: 1  •  metFORMIN (GLUCOPHAGE) 500 MG tablet, Take 3 pills in am and 2 in pm, Disp: 450 tablet, Rfl: 1  •  ONETOUCH VERIO test strip, USE TO CHECK BLOOD SUGARS ONCE DAILY, Disp: 100 each, Rfl: 4  •  TACLONEX 0.005-0.064 % external suspension, APPLY TO PSORIASIS ON SCALP/TRUNK/EXTREMITIES EVERY OTHER DAY AS NEEDED, Disp: , Rfl: 5  •  VICTOZA 18 MG/3ML solution pen-injector injection, INJECT 1.2MG SUBCUTANEOUSLY EVERY DAY, Disp: 18 pen, Rfl: 0  Immunization History   Administered Date(s) Administered   • Flu Vaccine Quad PF >36MO 10/18/2017   • Influenza, Unspecified 10/30/2018, 09/27/2019   • Pneumococcal Polysaccharide (PPSV23) 03/21/2017   • Shingrix 04/30/2018, 11/27/2018   • Tdap 04/12/2018       Review of Systems  Vaginal discharge  Objective   Physical Exam  On exam today remove the pessary and there was a typical BV discharge from the pessary.    Assessment/Plan   Pretty was seen today for follow-up.    Diagnoses and all orders for this visit:    Pelvic relaxation due to vaginal prolapse      After discussion about all the alternatives the plan is to leave after 2 weeks placed the  back and at the time.  We talked more about other options including try another type but she does not have much support and that would be a problem.  So this discharge continues I think eventually she does need to have the surgery.  But also back 2 weeks we will put her back and will go from there

## 2020-05-26 ENCOUNTER — OFFICE VISIT (OUTPATIENT)
Dept: OBSTETRICS AND GYNECOLOGY | Age: 65
End: 2020-05-26

## 2020-05-26 VITALS
SYSTOLIC BLOOD PRESSURE: 140 MMHG | DIASTOLIC BLOOD PRESSURE: 68 MMHG | BODY MASS INDEX: 29.07 KG/M2 | HEIGHT: 61 IN | WEIGHT: 154 LBS

## 2020-05-26 DIAGNOSIS — N95.0 POSTMENOPAUSAL BLEEDING: Primary | ICD-10-CM

## 2020-05-26 DIAGNOSIS — N81.10 PELVIC RELAXATION DUE TO VAGINAL PROLAPSE: ICD-10-CM

## 2020-05-26 PROCEDURE — 58100 BIOPSY OF UTERUS LINING: CPT | Performed by: OBSTETRICS & GYNECOLOGY

## 2020-05-26 NOTE — PROGRESS NOTES
"Subjective   Pretty Tovar is a 64 y.o. female is being seen today for   Chief Complaint   Patient presents with   • Follow-up     Pessary reinsertion   • Vaginal Bleeding     Pt reports some heavier bleeding since we took out the pessary, some is normal after removal but pt is concerned as it hasn't stopped    .    History of Present Illness  Patient is here 2 weeks after I removed the pessary because of chronic irritation.  She has had some vaginal spotting off-and-on since then discharge is better.  [place] relaxation is back.  No other symptoms except for the bleeding  The following portions of the patient's history were reviewed and updated as appropriate: allergies, current medications, past family history, past medical history, past social history, past surgical history and problem list.    Vitals:    20 0850   BP: 140/68         PAST MEDICAL HISTORY  Past Medical History:   Diagnosis Date   • Abnormal liver function tests 2017    Description: , \"-\"hepatitis profile   • Colon polyps    • H/O bone density study 10/2010    normal   • H/O cardiovascular stress test    • H/O echocardiogram 2001    2-D   • Hyperkalemia 2019   • Hypothyroidism 2018   • Osteoarthritis of knee     left   • Plantar warts     foot   • Pregnancy      twins x1   • Psoriasis      OB History        2    Para   2    Term   2            AB        Living   3       SAB        TAB        Ectopic        Molar        Multiple   1    Live Births   3              Past Surgical History:   Procedure Laterality Date   • BREAST BIOPSY Right     benign   • COLONOSCOPY W/ POLYPECTOMY  2018    Dr Resendiz, 6 mm rectal polyp - hyperplastic     Family History   Problem Relation Age of Onset   • Heart disease Father    • Hypertension Father    • Diabetes Brother    • Heart disease Maternal Grandmother    • Other Son         TWIN   • Other Daughter         TWIN    • BRCA 1/2 Neg Hx    • Breast " cancer Neg Hx    • Colon cancer Neg Hx    • Endometrial cancer Neg Hx    • Ovarian cancer Neg Hx      Social History     Tobacco Use   Smoking Status Never Smoker   Smokeless Tobacco Never Used       Current Outpatient Medications:   •  BD PEN NEEDLE SEUN U/F 32G X 4 MM misc, USE 1 PEN NEEDLE WITH EACH  VICTOZA INJECTION, Disp: 100 each, Rfl: 1  •  CRESTOR 20 MG tablet, Take 0.5 tablets by mouth Daily., Disp: 15 tablet, Rfl: 6  •  ENSTILAR 0.005-0.064 % foam, APPLY TO PSORIASIS ON EXTREMITIES DAILY FOR 3 TO 4 DAYS THEN STOP REST OF WEEK, THEN REPEAT, Disp: , Rfl: 4  •  glucose blood test strip, Use to check blood sugars once daily, Disp: 100 each, Rfl: 3  •  levothyroxine (SYNTHROID, LEVOTHROID) 88 MCG tablet, Take 1 tablet by mouth Daily., Disp: 90 tablet, Rfl: 3  •  lisinopril (PRINIVIL,ZESTRIL) 20 MG tablet, Take 1 tablet by mouth Daily., Disp: 90 tablet, Rfl: 1  •  lisinopril-hydrochlorothiazide (PRINZIDE,ZESTORETIC) 20-25 MG per tablet, Take 1 tablet by mouth Daily., Disp: 90 tablet, Rfl: 1  •  metFORMIN (GLUCOPHAGE) 500 MG tablet, Take 3 pills in am and 2 in pm, Disp: 450 tablet, Rfl: 1  •  ONETOUCH VERIO test strip, USE TO CHECK BLOOD SUGARS ONCE DAILY, Disp: 100 each, Rfl: 4  •  TACLONEX 0.005-0.064 % external suspension, APPLY TO PSORIASIS ON SCALP/TRUNK/EXTREMITIES EVERY OTHER DAY AS NEEDED, Disp: , Rfl: 5  •  VICTOZA 18 MG/3ML solution pen-injector injection, INJECT 1.2MG SUBCUTANEOUSLY EVERY DAY, Disp: 18 pen, Rfl: 0  Immunization History   Administered Date(s) Administered   • Flu Vaccine Quad PF >36MO 10/18/2017   • Influenza, Unspecified 10/30/2018, 09/27/2019   • Pneumococcal Polysaccharide (PPSV23) 03/21/2017   • Shingrix 04/30/2018, 11/27/2018   • Tdap 04/12/2018       Review of Systems  Vaginal bleeding  Objective   Physical Exam  On exam today there still is was some irritation in the vagina she has 1/4 degree uterine prolapse so was somewhat exposed to air.  I performed an endometrial biopsy  minimal amount of tissue depth of up 8-1/2 cm.  She tolerated the procedure well    Assessment/Plan   Pretty was seen today for follow-up and vaginal bleeding.    Diagnoses and all orders for this visit:    Postmenopausal bleeding    Pelvic relaxation due to vaginal prolapse      Will have her come back in 1 week put in the pessary and have results of the biopsy

## 2020-05-28 LAB
DX ICD CODE: NORMAL
PATH REPORT.FINAL DX SPEC: NORMAL
PATH REPORT.GROSS SPEC: NORMAL
PATH REPORT.SITE OF ORIGIN SPEC: NORMAL
PATHOLOGIST NAME: NORMAL
PAYMENT PROCEDURE: NORMAL

## 2020-06-02 ENCOUNTER — OFFICE VISIT (OUTPATIENT)
Dept: OBSTETRICS AND GYNECOLOGY | Age: 65
End: 2020-06-02

## 2020-06-02 ENCOUNTER — LAB (OUTPATIENT)
Dept: INTERNAL MEDICINE | Facility: CLINIC | Age: 65
End: 2020-06-02

## 2020-06-02 VITALS
WEIGHT: 154 LBS | BODY MASS INDEX: 29.07 KG/M2 | HEIGHT: 61 IN | DIASTOLIC BLOOD PRESSURE: 84 MMHG | SYSTOLIC BLOOD PRESSURE: 140 MMHG

## 2020-06-02 DIAGNOSIS — N81.10 PELVIC RELAXATION DUE TO VAGINAL PROLAPSE: Primary | ICD-10-CM

## 2020-06-02 DIAGNOSIS — E06.3 HASHIMOTO'S THYROIDITIS: ICD-10-CM

## 2020-06-02 DIAGNOSIS — Z00.00 HEALTH CARE MAINTENANCE: ICD-10-CM

## 2020-06-02 LAB
ALBUMIN SERPL-MCNC: 4.6 G/DL (ref 3.5–5.2)
ALBUMIN/GLOB SERPL: 1.3 G/DL
ALP SERPL-CCNC: 57 U/L (ref 39–117)
ALT SERPL W P-5'-P-CCNC: 11 U/L (ref 1–33)
ANION GAP SERPL CALCULATED.3IONS-SCNC: 9.4 MMOL/L (ref 5–15)
AST SERPL-CCNC: 16 U/L (ref 1–32)
BACTERIA UR QL AUTO: ABNORMAL /HPF
BASOPHILS # BLD AUTO: 0.02 10*3/MM3 (ref 0–0.2)
BASOPHILS NFR BLD AUTO: 0.4 % (ref 0–1.5)
BILIRUB SERPL-MCNC: 0.4 MG/DL (ref 0.2–1.2)
BILIRUB UR QL STRIP: NEGATIVE
BUN BLD-MCNC: 9 MG/DL (ref 8–23)
BUN/CREAT SERPL: 15.8 (ref 7–25)
CALCIUM SPEC-SCNC: 9.7 MG/DL (ref 8.6–10.5)
CHLORIDE SERPL-SCNC: 94 MMOL/L (ref 98–107)
CHOLEST SERPL-MCNC: 131 MG/DL (ref 0–200)
CLARITY UR: CLEAR
CO2 SERPL-SCNC: 29.6 MMOL/L (ref 22–29)
COLOR UR: YELLOW
CREAT BLD-MCNC: 0.57 MG/DL (ref 0.57–1)
DEPRECATED RDW RBC AUTO: 41.2 FL (ref 37–54)
EOSINOPHIL # BLD AUTO: 0.23 10*3/MM3 (ref 0–0.4)
EOSINOPHIL NFR BLD AUTO: 4.3 % (ref 0.3–6.2)
ERYTHROCYTE [DISTWIDTH] IN BLOOD BY AUTOMATED COUNT: 12.7 % (ref 12.3–15.4)
GFR SERPL CREATININE-BSD FRML MDRD: 107 ML/MIN/1.73
GLOBULIN UR ELPH-MCNC: 3.5 GM/DL
GLUCOSE BLD-MCNC: 129 MG/DL (ref 65–99)
GLUCOSE UR STRIP-MCNC: NEGATIVE MG/DL
HCT VFR BLD AUTO: 40.1 % (ref 34–46.6)
HDLC SERPL-MCNC: 48 MG/DL (ref 40–60)
HGB BLD-MCNC: 12.9 G/DL (ref 12–15.9)
HGB UR QL STRIP.AUTO: ABNORMAL
HYALINE CASTS UR QL AUTO: ABNORMAL /LPF
KETONES UR QL STRIP: NEGATIVE
LDLC SERPL CALC-MCNC: 63 MG/DL (ref 0–100)
LDLC/HDLC SERPL: 1.32 {RATIO}
LEUKOCYTE ESTERASE UR QL STRIP.AUTO: ABNORMAL
LYMPHOCYTES # BLD AUTO: 1.32 10*3/MM3 (ref 0.7–3.1)
LYMPHOCYTES NFR BLD AUTO: 25 % (ref 19.6–45.3)
MCH RBC QN AUTO: 28.8 PG (ref 26.6–33)
MCHC RBC AUTO-ENTMCNC: 32.2 G/DL (ref 31.5–35.7)
MCV RBC AUTO: 89.5 FL (ref 79–97)
MONOCYTES # BLD AUTO: 0.36 10*3/MM3 (ref 0.1–0.9)
MONOCYTES NFR BLD AUTO: 6.8 % (ref 5–12)
MUCOUS THREADS URNS QL MICRO: ABNORMAL /HPF
NEUTROPHILS # BLD AUTO: 3.36 10*3/MM3 (ref 1.7–7)
NEUTROPHILS NFR BLD AUTO: 63.5 % (ref 42.7–76)
NITRITE UR QL STRIP: NEGATIVE
PH UR STRIP.AUTO: 7 [PH] (ref 5–8)
PLATELET # BLD AUTO: 283 10*3/MM3 (ref 140–450)
PMV BLD AUTO: 11.6 FL (ref 6–12)
POTASSIUM BLD-SCNC: 3.5 MMOL/L (ref 3.5–5.2)
PROT SERPL-MCNC: 8.1 G/DL (ref 6–8.5)
PROT UR QL STRIP: NEGATIVE
RBC # BLD AUTO: 4.48 10*6/MM3 (ref 3.77–5.28)
RBC # UR: ABNORMAL /HPF
REF LAB TEST METHOD: ABNORMAL
SODIUM BLD-SCNC: 133 MMOL/L (ref 136–145)
SP GR UR STRIP: 1.01 (ref 1–1.03)
SQUAMOUS #/AREA URNS HPF: ABNORMAL /HPF
T4 FREE SERPL-MCNC: 1.52 NG/DL (ref 0.93–1.7)
TRIGL SERPL-MCNC: 99 MG/DL (ref 0–150)
TSH SERPL DL<=0.05 MIU/L-ACNC: 1.04 UIU/ML (ref 0.27–4.2)
UROBILINOGEN UR QL STRIP: ABNORMAL
VLDLC SERPL-MCNC: 19.8 MG/DL (ref 5–40)
WBC NRBC COR # BLD: 5.29 10*3/MM3 (ref 3.4–10.8)
WBC UR QL AUTO: ABNORMAL /HPF

## 2020-06-02 PROCEDURE — 99212 OFFICE O/P EST SF 10 MIN: CPT | Performed by: OBSTETRICS & GYNECOLOGY

## 2020-06-02 PROCEDURE — 84443 ASSAY THYROID STIM HORMONE: CPT | Performed by: INTERNAL MEDICINE

## 2020-06-02 PROCEDURE — 81001 URINALYSIS AUTO W/SCOPE: CPT | Performed by: INTERNAL MEDICINE

## 2020-06-02 PROCEDURE — 85025 COMPLETE CBC W/AUTO DIFF WBC: CPT | Performed by: INTERNAL MEDICINE

## 2020-06-02 PROCEDURE — 80053 COMPREHEN METABOLIC PANEL: CPT | Performed by: INTERNAL MEDICINE

## 2020-06-02 PROCEDURE — 36415 COLL VENOUS BLD VENIPUNCTURE: CPT | Performed by: INTERNAL MEDICINE

## 2020-06-02 PROCEDURE — 84439 ASSAY OF FREE THYROXINE: CPT | Performed by: INTERNAL MEDICINE

## 2020-06-02 PROCEDURE — 80061 LIPID PANEL: CPT | Performed by: INTERNAL MEDICINE

## 2020-06-02 NOTE — PROGRESS NOTES
"Checo Tovar is a 64 y.o. female is being seen today for   Chief Complaint   Patient presents with   • Follow-up     Pessary reinsertion    .    History of Present Illness  Patient is here for potential reinsertion of the pessary.  Consider history for past few visits.  She is doing okay although as he does not want to go as she is long-term.  We discussed again I think at this point she needs surgery and will refer to Dr. Karen Allen for that.  After discussion he does not want the pessary put back in the day but she will call if it happens worse over the next week or 2 before she sees Dr. Allen.  The following portions of the patient's history were reviewed and updated as appropriate: allergies, current medications, past family history, past medical history, past social history, past surgical history and problem list.    Vitals:    20 0944   BP: 140/84         PAST MEDICAL HISTORY  Past Medical History:   Diagnosis Date   • Abnormal liver function tests 2017    Description: , \"-\"hepatitis profile   • Colon polyps    • H/O bone density study 10/2010    normal   • H/O cardiovascular stress test    • H/O echocardiogram 2001    2-D   • Hyperkalemia 2019   • Hypothyroidism 2018   • Osteoarthritis of knee     left   • Plantar warts     foot   • Pregnancy      twins x1   • Psoriasis      OB History        2    Para   2    Term   2            AB        Living   3       SAB        TAB        Ectopic        Molar        Multiple   1    Live Births   3              Past Surgical History:   Procedure Laterality Date   • BREAST BIOPSY Right 2006    benign   • COLONOSCOPY W/ POLYPECTOMY  2018    Dr Resendiz, 6 mm rectal polyp - hyperplastic     Family History   Problem Relation Age of Onset   • Heart disease Father    • Hypertension Father    • Diabetes Brother    • Heart disease Maternal Grandmother    • Other Son         TWIN   • Other Daughter         TWIN  "   • BRCA 1/2 Neg Hx    • Breast cancer Neg Hx    • Colon cancer Neg Hx    • Endometrial cancer Neg Hx    • Ovarian cancer Neg Hx      Social History     Tobacco Use   Smoking Status Never Smoker   Smokeless Tobacco Never Used       Current Outpatient Medications:   •  BD PEN NEEDLE SEUN U/F 32G X 4 MM misc, USE 1 PEN NEEDLE WITH EACH  VICTOZA INJECTION, Disp: 100 each, Rfl: 1  •  CRESTOR 20 MG tablet, Take 0.5 tablets by mouth Daily., Disp: 15 tablet, Rfl: 6  •  ENSTILAR 0.005-0.064 % foam, APPLY TO PSORIASIS ON EXTREMITIES DAILY FOR 3 TO 4 DAYS THEN STOP REST OF WEEK, THEN REPEAT, Disp: , Rfl: 4  •  glucose blood test strip, Use to check blood sugars once daily, Disp: 100 each, Rfl: 3  •  levothyroxine (SYNTHROID, LEVOTHROID) 88 MCG tablet, Take 1 tablet by mouth Daily., Disp: 90 tablet, Rfl: 3  •  lisinopril (PRINIVIL,ZESTRIL) 20 MG tablet, Take 1 tablet by mouth Daily., Disp: 90 tablet, Rfl: 1  •  lisinopril-hydrochlorothiazide (PRINZIDE,ZESTORETIC) 20-25 MG per tablet, Take 1 tablet by mouth Daily., Disp: 90 tablet, Rfl: 1  •  metFORMIN (GLUCOPHAGE) 500 MG tablet, Take 3 pills in am and 2 in pm, Disp: 450 tablet, Rfl: 1  •  ONETOUCH VERIO test strip, USE TO CHECK BLOOD SUGARS ONCE DAILY, Disp: 100 each, Rfl: 4  •  TACLONEX 0.005-0.064 % external suspension, APPLY TO PSORIASIS ON SCALP/TRUNK/EXTREMITIES EVERY OTHER DAY AS NEEDED, Disp: , Rfl: 5  •  VICTOZA 18 MG/3ML solution pen-injector injection, INJECT 1.2MG SUBCUTANEOUSLY EVERY DAY, Disp: 18 pen, Rfl: 0  Immunization History   Administered Date(s) Administered   • Flu Vaccine Quad PF >36MO 10/18/2017   • Influenza, Unspecified 10/30/2018, 09/27/2019   • Pneumococcal Polysaccharide (PPSV23) 03/21/2017   • Shingrix 04/30/2018, 11/27/2018   • Tdap 04/12/2018       Review of Systems  Pelvic relaxation  Objective   Physical Exam  Well-developed and nourished female no acute distress    Assessment/Plan   Pretty was seen today for follow-up.    Diagnoses and all  orders for this visit:    Pelvic relaxation due to vaginal prolapse  -     Ambulatory Referral to Gynecologic Urology      Again referral to urology/gynecology.  We will also order mammogram otherwise 6 months back here for an annual check

## 2020-06-08 ENCOUNTER — PROCEDURE VISIT (OUTPATIENT)
Dept: OBSTETRICS AND GYNECOLOGY | Age: 65
End: 2020-06-08

## 2020-06-08 ENCOUNTER — TELEPHONE (OUTPATIENT)
Dept: OBSTETRICS AND GYNECOLOGY | Age: 65
End: 2020-06-08

## 2020-06-08 ENCOUNTER — APPOINTMENT (OUTPATIENT)
Dept: WOMENS IMAGING | Facility: HOSPITAL | Age: 65
End: 2020-06-08

## 2020-06-08 DIAGNOSIS — Z12.31 VISIT FOR SCREENING MAMMOGRAM: Primary | ICD-10-CM

## 2020-06-08 PROCEDURE — 77067 SCR MAMMO BI INCL CAD: CPT | Performed by: OBSTETRICS & GYNECOLOGY

## 2020-06-08 PROCEDURE — 77067 SCR MAMMO BI INCL CAD: CPT | Performed by: RADIOLOGY

## 2020-06-08 NOTE — TELEPHONE ENCOUNTER
I do think it would be better to have it in.  She can make an appointment next available me, NP or PA

## 2020-06-08 NOTE — TELEPHONE ENCOUNTER
----- Message from Pretty Tovar sent at 6/5/2020  9:02 AM EDT -----  Regarding: Visit Follow-Up Question  Contact: [phone]  Good Morning Dr. August,          I received paperwork for Dr. Allen and have a scheduled appointment for Aug. 5.   Because of the 2 month time frame, do you think it would be beneficial to have the pessary put back in?  I know there are issues either way, I don't know which would be the better way to go.           Sorry to bother you with this, but I feel you will advise me what would be best.          Thank you for your help,  Pretty

## 2020-06-09 ENCOUNTER — OFFICE VISIT (OUTPATIENT)
Dept: INTERNAL MEDICINE | Facility: CLINIC | Age: 65
End: 2020-06-09

## 2020-06-09 VITALS
OXYGEN SATURATION: 99 % | SYSTOLIC BLOOD PRESSURE: 126 MMHG | BODY MASS INDEX: 29.45 KG/M2 | HEIGHT: 61 IN | HEART RATE: 76 BPM | WEIGHT: 156 LBS | DIASTOLIC BLOOD PRESSURE: 78 MMHG

## 2020-06-09 DIAGNOSIS — E11.3299 MILD NONPROLIFERATIVE DIABETIC RETINOPATHY WITHOUT MACULAR EDEMA ASSOCIATED WITH TYPE 2 DIABETES MELLITUS (HCC): ICD-10-CM

## 2020-06-09 DIAGNOSIS — I10 BENIGN ESSENTIAL HYPERTENSION: ICD-10-CM

## 2020-06-09 DIAGNOSIS — E06.3 HASHIMOTO'S THYROIDITIS: ICD-10-CM

## 2020-06-09 DIAGNOSIS — Z00.00 HEALTH CARE MAINTENANCE: Primary | ICD-10-CM

## 2020-06-09 DIAGNOSIS — E11.3293 TYPE 2 DIABETES MELLITUS WITH BOTH EYES AFFECTED BY MILD NONPROLIFERATIVE RETINOPATHY WITHOUT MACULAR EDEMA, WITHOUT LONG-TERM CURRENT USE OF INSULIN (HCC): ICD-10-CM

## 2020-06-09 DIAGNOSIS — E78.00 PURE HYPERCHOLESTEROLEMIA: ICD-10-CM

## 2020-06-09 PROCEDURE — 99396 PREV VISIT EST AGE 40-64: CPT | Performed by: INTERNAL MEDICINE

## 2020-06-09 RX ORDER — LISINOPRIL 20 MG/1
20 TABLET ORAL DAILY
Qty: 90 TABLET | Refills: 3 | Status: SHIPPED | OUTPATIENT
Start: 2020-06-09 | End: 2020-06-09 | Stop reason: SDUPTHER

## 2020-06-09 RX ORDER — LISINOPRIL AND HYDROCHLOROTHIAZIDE 25; 20 MG/1; MG/1
1 TABLET ORAL DAILY
Qty: 90 TABLET | Refills: 3 | Status: SHIPPED | OUTPATIENT
Start: 2020-06-09 | End: 2021-06-17 | Stop reason: SDUPTHER

## 2020-06-09 RX ORDER — LISINOPRIL AND HYDROCHLOROTHIAZIDE 25; 20 MG/1; MG/1
1 TABLET ORAL DAILY
Qty: 90 TABLET | Refills: 3 | Status: SHIPPED | OUTPATIENT
Start: 2020-06-09 | End: 2020-06-09 | Stop reason: SDUPTHER

## 2020-06-09 RX ORDER — LEVOTHYROXINE SODIUM 88 UG/1
88 TABLET ORAL DAILY
Qty: 90 TABLET | Refills: 3 | Status: SHIPPED | OUTPATIENT
Start: 2020-06-09 | End: 2020-06-11

## 2020-06-09 RX ORDER — ROSUVASTATIN CALCIUM 20 MG/1
10 TABLET, COATED ORAL DAILY
Qty: 45 TABLET | Refills: 3 | Status: SHIPPED | OUTPATIENT
Start: 2020-06-09 | End: 2020-07-10

## 2020-06-09 RX ORDER — LISINOPRIL 20 MG/1
20 TABLET ORAL DAILY
Qty: 90 TABLET | Refills: 3 | Status: SHIPPED | OUTPATIENT
Start: 2020-06-09 | End: 2021-06-17 | Stop reason: SDUPTHER

## 2020-06-09 NOTE — PATIENT INSTRUCTIONS
"  Risk evaluation:  1. Cardiovascular risk factors: hypertension, hyperlipidemia, diabetes mellitus, sedentary life style   2. Diabetes risk factors: patient has diabetes and being treated.  3. Cancer risk factors: diabetes.  4. Risky behavior: none. Use of seat belts: regular. Use of sunscreens: regular. SPF 30.   Tattoos: none.  H/o blood transfusions/organ transplants before 1992 or clotting factor transfusion before 1987: none.     Prevention:  Cholesterol test  up to date. Cholesterol is excellent. well controlled by medication..  Blood sugar test recommended. Fasting blood sugar Patient has diabetes and is being treated..We will check Hb A1C and urine for microalbumin.  Hep C testing (for patients born [birth year]96): completed..  Mammogram up to date. Recommended frequency and time of the next screening per GYN.. Breast self exams recommended once a month.  Colonoscopy: up to date. Recommended every 10 years. Next screening is due in 2028..  PAP smear : up to date. Recommendations per GYN..  DEXA : up to date. Recommended every 5 years. Next screening is due in 2023..                      HTN - well controlled with current medication regimen. Normal kidney tests and electrolytes. Recommended low salt diet. Continue same medical treatment.  Hyperlipidemia - well controlled with statin. Normal liver function tests. LDL target is below < 70 mg/dl (\"very high\" risk for CHD). Patient's 63. Continue same treatment. Regular exercise recommended.  DM II - we will check Hb A1C and urine microalbumin today, decent home accuchecks per patient's report.. Hb A1C was last checked in 11/2019:   Lab Results   Component Value Date    HGBA1C 6.30 (H) 11/11/2019    .  No hypoglycemic episodes. Low carb diet and regular exercise recommended. Weight loss will be very beneficial. Regular feet self examinations and comfortable footwear recommended. Patient has yearly dilated eye exams, known retinopathy - next eye exam is scheduled in " 08/2020.. No microalbumin in the urine. No peripheral neuropathy on feet exam with microfilament. Continue statin and ACEI.   Hypothyroidism - well compensated with current dose of thyroid supplement. Patient is euthyroid clinically and TSH is normal. Continue same. Reminder: thyroid supplement has to be taken at least 2 hours apart from Ca and Iron supplements.    Return in about 6 months (around 12/9/2020) for DM, thyroid, cholest, HTN.

## 2020-06-09 NOTE — PROGRESS NOTES
"Checo Tovar is a 64 y.o. female.     History of Present Illness   /78 (BP Location: Left arm, Patient Position: Sitting, Cuff Size: Adult)   Pulse 76   Ht 154.9 cm (61\")   Wt 70.8 kg (156 lb)   LMP  (LMP Unknown)   SpO2 99%   BMI 29.48 kg/m²   Patient is here for yearly CPE. Last CPE was  1 year ago.  PMH, SH and FH reviewed. Changes in the FH: none .  Patient rates her own health as: good.  Tobacco use: none.  Alcohol use:none.  Recreational drugs use: none  Medication list rewieved.  Diet: low carb.  Exercise: none.  Marital status: .   Employment: full time.  Patient rates her stress level as: low.  Dental health: good. Brushes teeth 2 times a day, flosses few times a week . Dental visits: 2 times a year .  Vision correction: glasses  Hearing: normal.    Recent vaccinations:   Flu  is up to date and recommended yearly  Tdap  is up to date  Shingles prevention completed    Patient is postmenopausal. Past pregnancies: . Currently patient is on no HRT .      Current Outpatient Medications:   •  BD PEN NEEDLE SEUN U/F 32G X 4 MM misc, USE 1 PEN NEEDLE WITH EACH  VICTOZA INJECTION, Disp: 100 each, Rfl: 1  •  CRESTOR 20 MG tablet, Take 0.5 tablets by mouth Daily., Disp: 15 tablet, Rfl: 6  •  ENSTILAR 0.005-0.064 % foam, APPLY TO PSORIASIS ON EXTREMITIES DAILY FOR 3 TO 4 DAYS THEN STOP REST OF WEEK, THEN REPEAT, Disp: , Rfl: 4  •  glucose blood test strip, Use to check blood sugars once daily, Disp: 100 each, Rfl: 3  •  levothyroxine (SYNTHROID, LEVOTHROID) 88 MCG tablet, Take 1 tablet by mouth Daily., Disp: 90 tablet, Rfl: 3  •  lisinopril (PRINIVIL,ZESTRIL) 20 MG tablet, Take 1 tablet by mouth Daily., Disp: 90 tablet, Rfl: 1  •  lisinopril-hydrochlorothiazide (PRINZIDE,ZESTORETIC) 20-25 MG per tablet, Take 1 tablet by mouth Daily., Disp: 90 tablet, Rfl: 1  •  metFORMIN (GLUCOPHAGE) 500 MG tablet, Take 3 pills in am and 2 in pm, Disp: 450 tablet, Rfl: 1  •  ONETOUCH VERIO test " strip, USE TO CHECK BLOOD SUGARS ONCE DAILY, Disp: 100 each, Rfl: 4  •  TACLONEX 0.005-0.064 % external suspension, APPLY TO PSORIASIS ON SCALP/TRUNK/EXTREMITIES EVERY OTHER DAY AS NEEDED, Disp: , Rfl: 5  •  VICTOZA 18 MG/3ML solution pen-injector injection, INJECT 1.2MG SUBCUTANEOUSLY EVERY DAY, Disp: 18 pen, Rfl: 0          The following portions of the patient's history were reviewed and updated as appropriate: allergies, current medications, past family history, past medical history, past social history, past surgical history and problem list.    Review of Systems   Constitutional: Negative for chills and fever.   HENT: Negative for congestion.    Respiratory: Negative for cough and shortness of breath.    Cardiovascular: Negative for chest pain and leg swelling.   Gastrointestinal: Negative for abdominal pain and blood in stool.   Genitourinary: Positive for difficulty urinating. Negative for flank pain.   Musculoskeletal: Negative for back pain and neck pain.   Skin: Negative for color change, rash and wound.   Neurological: Negative for dizziness and headaches.   Psychiatric/Behavioral: Negative for sleep disturbance. The patient is not nervous/anxious.        Objective   Physical Exam   Constitutional: She is oriented to person, place, and time. She appears well-developed. No distress.   overweight   HENT:   Head: Normocephalic and atraumatic.   Right Ear: Tympanic membrane, external ear and ear canal normal. No drainage or swelling. No mastoid tenderness. Tympanic membrane is not injected. No middle ear effusion.   Left Ear: Tympanic membrane, external ear and ear canal normal. No drainage or swelling. No mastoid tenderness. Tympanic membrane is not injected.  No middle ear effusion.   Nose: Nose normal. No mucosal edema. Right sinus exhibits no maxillary sinus tenderness and no frontal sinus tenderness. Left sinus exhibits no maxillary sinus tenderness and no frontal sinus tenderness.   Mouth/Throat: Uvula  is midline, oropharynx is clear and moist and mucous membranes are normal. No oropharyngeal exudate.   Eyes: Pupils are equal, round, and reactive to light. Conjunctivae, EOM and lids are normal. Right eye exhibits no discharge. Left eye exhibits no discharge. Right conjunctiva is not injected. Right conjunctiva has no hemorrhage. Left conjunctiva is not injected. Left conjunctiva has no hemorrhage. No scleral icterus. Right eye exhibits normal extraocular motion and no nystagmus. Left eye exhibits normal extraocular motion and no nystagmus.   Neck: Normal range of motion and full passive range of motion without pain. Neck supple. No JVD present. Carotid bruit is not present. No thyromegaly present.   Cardiovascular: Normal rate, regular rhythm, S1 normal, S2 normal, normal heart sounds and intact distal pulses. Exam reveals no gallop and no friction rub.   No murmur heard.  Pulmonary/Chest: Effort normal and breath sounds normal. No respiratory distress. She has no decreased breath sounds. She has no wheezes. She has no rhonchi. She has no rales. Right breast exhibits no inverted nipple, no mass, no nipple discharge, no skin change and no tenderness. Left breast exhibits no inverted nipple, no mass, no nipple discharge, no skin change and no tenderness. Breasts are symmetrical.   Abdominal: Soft. Bowel sounds are normal. She exhibits no distension and no mass. There is no tenderness. There is no rebound and no guarding.   Musculoskeletal: She exhibits deformity ( Genu varus bilaterally). She exhibits no edema.    Pretty had a diabetic foot exam performed today.   During the foot exam she had a monofilament test performed (lingt touch intact on feet exam with microfilament).    Neurological Sensory Findings - Unaltered hot/cold right ankle/foot discrimination and unaltered hot/cold left ankle/foot discrimination. Unaltered sharp/dull right ankle/foot discrimination and unaltered sharp/dull left ankle/foot  discrimination. No right ankle/foot altered proprioception and no left ankle/foot altered proprioception  Vascular Status -  Her right foot exhibits normal foot vasculature  and no edema. Her left foot exhibits normal foot vasculature  and no edema.  Skin Integrity  -  Her right foot skin is intact.Her left foot skin is intact..  Lymphadenopathy:        Head (right side): No submental, no submandibular, no preauricular, no posterior auricular and no occipital adenopathy present.        Head (left side): No submental, no submandibular, no preauricular, no posterior auricular and no occipital adenopathy present.     She has no cervical adenopathy.        Right cervical: No superficial cervical, no deep cervical and no posterior cervical adenopathy present.       Left cervical: No superficial cervical, no deep cervical and no posterior cervical adenopathy present.     She has no axillary adenopathy.        Right: No supraclavicular adenopathy present.        Left: No supraclavicular adenopathy present.   Neurological: She is alert and oriented to person, place, and time. She has normal reflexes. She displays normal reflexes. No cranial nerve deficit. She exhibits normal muscle tone. Coordination normal.   Reflex Scores:       Bicep reflexes are 2+ on the right side and 2+ on the left side.       Patellar reflexes are 2+ on the right side and 2+ on the left side.  Skin: Skin is warm. No rash noted. She is not diaphoretic. No erythema.   Polygonal erythematous patches over extremities, some with silvery scales   Psychiatric: She has a normal mood and affect. Her speech is normal and behavior is normal. Thought content normal.   Vitals reviewed.      Assessment/Plan   Pretty was seen today for annual exam.    Diagnoses and all orders for this visit:    Health care maintenance    Type 2 diabetes mellitus with both eyes affected by mild nonproliferative retinopathy without macular edema, without long-term current use of  insulin (CMS/HCC)  -     Hemoglobin A1c  -     Microalbumin / Creatinine Urine Ratio - Urine, Clean Catch  -     metFORMIN (GLUCOPHAGE) 500 MG tablet; Take 3 pills in am and 2 in pm    Hashimoto's thyroiditis  -     levothyroxine (SYNTHROID, LEVOTHROID) 88 MCG tablet; Take 1 tablet by mouth Daily.    Mild nonproliferative diabetic retinopathy without macular edema associated with type 2 diabetes mellitus (CMS/HCC)  -     Liraglutide (Victoza) 18 MG/3ML solution pen-injector injection; Inject 1.2 mg under the skin into the appropriate area as directed Daily.  -     metFORMIN (GLUCOPHAGE) 500 MG tablet; Take 3 pills in am and 2 in pm    Benign essential hypertension  -     Discontinue: lisinopril (PRINIVIL,ZESTRIL) 20 MG tablet; Take 1 tablet by mouth Daily.  -     Discontinue: lisinopril-hydrochlorothiazide (PRINZIDE,ZESTORETIC) 20-25 MG per tablet; Take 1 tablet by mouth Daily.  -     Discontinue: lisinopril (PRINIVIL,ZESTRIL) 20 MG tablet; Take 1 tablet by mouth Daily.  -     Discontinue: lisinopril-hydrochlorothiazide (PRINZIDE,ZESTORETIC) 20-25 MG per tablet; Take 1 tablet by mouth Daily.  -     lisinopril (PRINIVIL,ZESTRIL) 20 MG tablet; Take 1 tablet by mouth Daily.  -     lisinopril-hydrochlorothiazide (PRINZIDE,ZESTORETIC) 20-25 MG per tablet; Take 1 tablet by mouth Daily.    Pure hypercholesterolemia  -     rosuvastatin (Crestor) 20 MG tablet; Take 0.5 tablets by mouth Daily.        Risk evaluation:  1. Cardiovascular risk factors: hypertension, hyperlipidemia, diabetes mellitus, sedentary life style   2. Diabetes risk factors: patient has diabetes and being treated.  3. Cancer risk factors: diabetes.  4. Risky behavior: none. Use of seat belts: regular. Use of sunscreens: regular. SPF 30.   Tattoos: none.  H/o blood transfusions/organ transplants before 1992 or clotting factor transfusion before 1987: none.     Prevention:  Cholesterol test  up to date. Cholesterol is excellent. well controlled by  "medication..  Blood sugar test recommended. Fasting blood sugar Patient has diabetes and is being treated..We will check Hb A1C and urine for microalbumin.  Hep C testing (for patients born [birth year]): completed..  Mammogram up to date. Recommended frequency and time of the next screening per GYN.. Breast self exams recommended once a month.  Colonoscopy: up to date. Recommended every 10 years. Next screening is due in 2028..  PAP smear : up to date. Recommendations per GYN..  DEXA : up to date. Recommended every 5 years. Next screening is due in 2023..                      HTN - well controlled with current medication regimen. Normal kidney tests and electrolytes. Recommended low salt diet. Continue same medical treatment.  Hyperlipidemia - well controlled with statin. Normal liver function tests. LDL target is below < 70 mg/dl (\"very high\" risk for CHD). Patient's 63. Continue same treatment. Regular exercise recommended.  DM II - we will check Hb A1C and urine microalbumin today, decent home accuchecks per patient's report.. Hb A1C was last checked in 11/2019:   Lab Results   Component Value Date    HGBA1C 6.30 (H) 11/11/2019    .  No hypoglycemic episodes. Low carb diet and regular exercise recommended. Weight loss will be very beneficial. Regular feet self examinations and comfortable footwear recommended. Patient has yearly dilated eye exams, known retinopathy - next eye exam is scheduled in 08/2020.. No microalbumin in the urine. No peripheral neuropathy on feet exam with microfilament. Continue statin and ACEI.   Hypothyroidism - well compensated with current dose of thyroid supplement. Patient is euthyroid clinically and TSH is normal. Continue same. Reminder: thyroid supplement has to be taken at least 2 hours apart from Ca and Iron supplements.         "

## 2020-06-10 LAB
ALBUMIN/CREAT UR: 23 MG/G CREAT (ref 0–29)
CREAT UR-MCNC: 27.9 MG/DL
HBA1C MFR BLD: 6.3 % (ref 4.8–5.6)
MICROALBUMIN UR-MCNC: 6.3 UG/ML

## 2020-06-11 ENCOUNTER — OFFICE VISIT (OUTPATIENT)
Dept: OBSTETRICS AND GYNECOLOGY | Age: 65
End: 2020-06-11

## 2020-06-11 VITALS
HEIGHT: 61 IN | WEIGHT: 154 LBS | SYSTOLIC BLOOD PRESSURE: 130 MMHG | DIASTOLIC BLOOD PRESSURE: 84 MMHG | BODY MASS INDEX: 29.07 KG/M2

## 2020-06-11 DIAGNOSIS — E06.3 HASHIMOTO'S THYROIDITIS: ICD-10-CM

## 2020-06-11 DIAGNOSIS — Z13.89 SCREENING FOR BLOOD OR PROTEIN IN URINE: Primary | ICD-10-CM

## 2020-06-11 DIAGNOSIS — N81.10 PELVIC RELAXATION DUE TO VAGINAL PROLAPSE: ICD-10-CM

## 2020-06-11 LAB
BILIRUB BLD-MCNC: NEGATIVE MG/DL
CLARITY, POC: CLEAR
COLOR UR: YELLOW
GLUCOSE UR STRIP-MCNC: NEGATIVE MG/DL
KETONES UR QL: NEGATIVE
LEUKOCYTE EST, POC: ABNORMAL
NITRITE UR-MCNC: NEGATIVE MG/ML
PH UR: 6.5 [PH] (ref 5–8)
PROT UR STRIP-MCNC: NEGATIVE MG/DL
RBC # UR STRIP: ABNORMAL /UL
SP GR UR: 1.02 (ref 1–1.03)
UROBILINOGEN UR QL: NORMAL

## 2020-06-11 PROCEDURE — 81003 URINALYSIS AUTO W/O SCOPE: CPT | Performed by: PHYSICIAN ASSISTANT

## 2020-06-11 PROCEDURE — 99213 OFFICE O/P EST LOW 20 MIN: CPT | Performed by: PHYSICIAN ASSISTANT

## 2020-06-11 RX ORDER — LEVOTHYROXINE SODIUM 88 UG/1
88 TABLET ORAL DAILY
Qty: 90 TABLET | Refills: 3 | Status: SHIPPED | OUTPATIENT
Start: 2020-06-11 | End: 2020-09-09

## 2020-06-11 NOTE — PROGRESS NOTES
"Subjective     Chief Complaint   Patient presents with   • Gynecologic Exam     pessary insertion       Pretty Tovar is a 64 y.o.  whose LMP is No LMP recorded (lmp unknown). Patient is postmenopausal. presents for pessary re insertion  She had it taken out approx 4 wks ago  She has a f/u scheduled with Dr Allen in august but would like the pessary re inserted for now  She is tolerating it ok but has some difficulty urinating  No new med issues  Pt of Dr August  New to me today  We are both masked d/t the pandemic      No Additional Complaints Reported    The following portions of the patient's history were reviewed and updated as appropriate:vital signs, allergies, current medications, past family history, past medical history, past social history, past surgical history and problem list      Review of Systems   Genitourinary:pelvic prolapse     Objective      /84   Ht 154.9 cm (61\")   Wt 69.9 kg (154 lb)   LMP  (LMP Unknown)   Breastfeeding No   BMI 29.10 kg/m²     Physical Exam    General:   alert, comfortable and no distress   Heart: Not performed today   Lungs: Not performed today.   Breast: Not performed today   Neck: na   Abdomen: {Not performed today   CVA: Not performed today   Pelvis: Pelvic prolapse noted, cervix protruding from introitus. Gently pushed back into canal and further exploration revealed no erosions or bleeding. Cube inserted without incident. Pt tolerated procedure well   Extremities: Not performed today   Neurologic: negative   Psychiatric: Normal affect, judgement, and mood       Lab Review   Labs: No data reviewed     Imaging   No data reviewed    Assessment/Plan     ASSESSMENT  1. Screening for blood or protein in urine    2. Pelvic relaxation due to vaginal prolapse        PLAN  1.   Orders Placed This Encounter   Procedures   • POC Urinalysis Dipstick, Multipro       2. Plan urine culture as pt recently treated for UTI. Pessary placed. Call for bleeding or unusual " d/c. Keep f/u appt with dr Allen    Follow up: BRANDON Olivares  6/11/2020

## 2020-06-13 LAB
BACTERIA UR CULT: NORMAL
BACTERIA UR CULT: NORMAL

## 2020-06-15 ENCOUNTER — TELEPHONE (OUTPATIENT)
Dept: OBSTETRICS AND GYNECOLOGY | Age: 65
End: 2020-06-15

## 2020-06-15 NOTE — TELEPHONE ENCOUNTER
----- Message from BRANDON Jin sent at 6/15/2020  9:24 AM EDT -----  Let pt know her urine culture is wnl, no evidence of infection

## 2020-06-18 LAB — REF LAB TEST METHOD: NORMAL

## 2020-07-10 DIAGNOSIS — E78.00 PURE HYPERCHOLESTEROLEMIA: ICD-10-CM

## 2020-07-10 RX ORDER — ROSUVASTATIN CALCIUM 20 MG/1
TABLET, FILM COATED ORAL
Qty: 45 TABLET | Refills: 0 | Status: SHIPPED | OUTPATIENT
Start: 2020-07-10 | End: 2020-10-12

## 2020-09-09 ENCOUNTER — OFFICE VISIT (OUTPATIENT)
Dept: INTERNAL MEDICINE | Facility: CLINIC | Age: 65
End: 2020-09-09

## 2020-09-09 VITALS
OXYGEN SATURATION: 100 % | HEART RATE: 76 BPM | DIASTOLIC BLOOD PRESSURE: 70 MMHG | SYSTOLIC BLOOD PRESSURE: 124 MMHG | HEIGHT: 61 IN | BODY MASS INDEX: 29.19 KG/M2 | WEIGHT: 154.6 LBS

## 2020-09-09 DIAGNOSIS — E11.3293 TYPE 2 DIABETES MELLITUS WITH BOTH EYES AFFECTED BY MILD NONPROLIFERATIVE RETINOPATHY WITHOUT MACULAR EDEMA, WITHOUT LONG-TERM CURRENT USE OF INSULIN (HCC): Primary | ICD-10-CM

## 2020-09-09 DIAGNOSIS — E06.3 HASHIMOTO'S THYROIDITIS: ICD-10-CM

## 2020-09-09 DIAGNOSIS — I10 BENIGN ESSENTIAL HYPERTENSION: ICD-10-CM

## 2020-09-09 DIAGNOSIS — E78.00 PURE HYPERCHOLESTEROLEMIA: ICD-10-CM

## 2020-09-09 LAB
BUN SERPL-MCNC: 10 MG/DL (ref 8–23)
BUN/CREAT SERPL: 21.7 (ref 7–25)
CALCIUM SERPL-MCNC: 9.7 MG/DL (ref 8.6–10.5)
CHLORIDE SERPL-SCNC: 97 MMOL/L (ref 98–107)
CO2 SERPL-SCNC: 31.8 MMOL/L (ref 22–29)
CREAT SERPL-MCNC: 0.46 MG/DL (ref 0.57–1)
GLUCOSE SERPL-MCNC: 90 MG/DL (ref 65–99)
POTASSIUM SERPL-SCNC: 4.2 MMOL/L (ref 3.5–5.2)
SODIUM SERPL-SCNC: 138 MMOL/L (ref 136–145)

## 2020-09-09 PROCEDURE — 99214 OFFICE O/P EST MOD 30 MIN: CPT | Performed by: NURSE PRACTITIONER

## 2020-09-09 RX ORDER — LEVOTHYROXINE SODIUM 88 UG/1
88 TABLET ORAL DAILY
Qty: 90 TABLET | Refills: 1 | Status: SHIPPED | OUTPATIENT
Start: 2020-09-09 | End: 2021-03-17

## 2020-09-09 NOTE — PROGRESS NOTES
Subjective   Pretty Tovar is a 64 y.o. female.     She is here to establish care. She was previous patient of Dr Schwartz. She monitors her blood sugar once a or a twice week and readings  120-125. She exercises very little. Her diet is fair and she does follow low carb/sugar diet. She is due for both dental and vision exam. She does not monitor her blood pressure. She has been seeing urogynecologist Dr Allen for prolapse bladder and will be following up with her this upcoming MOnday.     Diabetes   She presents for her follow-up diabetic visit. She has type 2 diabetes mellitus. Her disease course has been stable. There are no hypoglycemic associated symptoms. Pertinent negatives for hypoglycemia include no dizziness or headaches. Pertinent negatives for diabetes include no blurred vision, no chest pain, no fatigue, no foot paresthesias, no polydipsia, no polyphagia, no polyuria and no visual change. Symptoms are stable. Current diabetic treatment includes oral agent (dual therapy). She is compliant with treatment all of the time. Her weight is stable. She is following a diabetic diet. She rarely participates in exercise. There is no change in her home blood glucose trend. An ACE inhibitor/angiotensin II receptor blocker is being taken. Eye exam is not current.   Hypertension   This is a chronic problem. The current episode started more than 1 year ago. The problem is unchanged. The problem is controlled. Pertinent negatives include no anxiety, blurred vision, chest pain, headaches, orthopnea, palpitations, peripheral edema, PND or shortness of breath. Current antihypertension treatment includes diuretics.   Hyperlipidemia   This is a chronic problem. The current episode started more than 1 year ago. The problem is controlled. Recent lipid tests were reviewed and are normal. Pertinent negatives include no chest pain, myalgias or shortness of breath. Current antihyperlipidemic treatment includes statins. The current  treatment provides significant improvement of lipids.        The following portions of the patient's history were reviewed and updated as appropriate: allergies, current medications, past family history, past medical history, past social history, past surgical history and problem list.    Review of Systems   Constitutional: Negative for activity change, appetite change, fatigue and fever.        Overall doing ok    Eyes: Positive for visual disturbance (wears glasses ). Negative for blurred vision.   Respiratory: Negative for cough, shortness of breath and wheezing.    Cardiovascular: Positive for leg swelling (intermittent, chronic ). Negative for chest pain, palpitations, orthopnea and PND.   Endocrine: Negative for polydipsia, polyphagia and polyuria.   Genitourinary: Positive for difficulty urinating (due to see urogynecologist, this upcoming Monday ).   Musculoskeletal: Negative for myalgias.   Neurological: Negative for dizziness and headaches.       Objective   Physical Exam   Constitutional: She is oriented to person, place, and time. She appears well-developed and well-nourished.   HENT:   Head: Normocephalic.   Nose: Nose normal.   Neck: Carotid bruit is not present. No thyroid mass and no thyromegaly present.   Cardiovascular: Regular rhythm and normal heart sounds. Exam reveals no S3 and no S4.   No murmur heard.  Repeat bp left arm 118/68  No pedal edema    Pulmonary/Chest: Effort normal and breath sounds normal. She has no decreased breath sounds. She has no wheezes. She has no rhonchi. She has no rales.   Musculoskeletal: She exhibits no edema.   Neurological: She is alert and oriented to person, place, and time. Gait normal.   Skin: Skin is warm and dry.   Erythema plaques noted to right lower leg    Psychiatric: She has a normal mood and affect.       Assessment/Plan   Pretty was seen today for establish care.    Diagnoses and all orders for this visit:    Type 2 diabetes mellitus with both eyes  affected by mild nonproliferative retinopathy without macular edema, without long-term current use of insulin (CMS/[facility])  Comments:  well controlled     Pure hypercholesterolemia  Comments:  tolerating statin therapy     Benign essential hypertension  Comments:  stable   Orders:  -     Basic Metabolic Panel; Future    Hashimoto's thyroiditis  Comments:  stable   Orders:  -     levothyroxine (SYNTHROID, LEVOTHROID) 88 MCG tablet; Take 1 tablet by mouth Daily.      She is new to me.   I reviewed most recent labs with patient. Will recheck sodium (133-6/2020)  She will get flu shot at local pharmacy per her request  She will get diabetic eye exam and have faxed to office.    Discussed need for cardio.

## 2020-09-14 ENCOUNTER — PREP FOR SURGERY (OUTPATIENT)
Dept: OTHER | Facility: HOSPITAL | Age: 65
End: 2020-09-14

## 2020-09-14 DIAGNOSIS — N81.2 UTEROVAGINAL PROLAPSE, INCOMPLETE: Primary | ICD-10-CM

## 2020-09-14 RX ORDER — SODIUM CHLORIDE 0.9 % (FLUSH) 0.9 %
10 SYRINGE (ML) INJECTION AS NEEDED
Status: CANCELLED | OUTPATIENT
Start: 2020-10-16

## 2020-09-14 RX ORDER — PHENAZOPYRIDINE HYDROCHLORIDE 200 MG/1
200 TABLET, FILM COATED ORAL ONCE
Status: CANCELLED | OUTPATIENT
Start: 2020-10-16 | End: 2020-09-14

## 2020-09-14 RX ORDER — SODIUM CHLORIDE 0.9 % (FLUSH) 0.9 %
3 SYRINGE (ML) INJECTION EVERY 12 HOURS SCHEDULED
Status: CANCELLED | OUTPATIENT
Start: 2020-10-16

## 2020-09-14 RX ORDER — CEFAZOLIN SODIUM 2 G/100ML
2 INJECTION, SOLUTION INTRAVENOUS ONCE
Status: CANCELLED | OUTPATIENT
Start: 2020-10-16 | End: 2020-09-14

## 2020-09-14 NOTE — H&P
Laparoscopic uterosacral ligament colpopexy sacral colpopexy   Laparoscopic paravaginal repair  Laparoscopic hysterectomy with bilateral salpingectomy  Possible laparoscopic unilateral or bilateral oophorectomy  Laparoscopic abdominal enterocele repair  Pubovaginal sling  Posterior colporrhaphy  Anterior colporrhaphy  Extraperitoneal colpopexy sacrospinous ligament fixation  Insertion of vaginal grafts  Cystourethroscopy

## 2020-09-23 ENCOUNTER — APPOINTMENT (OUTPATIENT)
Dept: WOMENS IMAGING | Facility: HOSPITAL | Age: 65
End: 2020-09-23

## 2020-09-23 ENCOUNTER — OFFICE VISIT (OUTPATIENT)
Dept: INTERNAL MEDICINE | Facility: CLINIC | Age: 65
End: 2020-09-23

## 2020-09-23 VITALS
SYSTOLIC BLOOD PRESSURE: 124 MMHG | WEIGHT: 152 LBS | TEMPERATURE: 97.5 F | BODY MASS INDEX: 28.7 KG/M2 | DIASTOLIC BLOOD PRESSURE: 78 MMHG | HEIGHT: 61 IN | HEART RATE: 105 BPM | OXYGEN SATURATION: 99 %

## 2020-09-23 DIAGNOSIS — R94.31 ABNORMAL ECG: ICD-10-CM

## 2020-09-23 DIAGNOSIS — E03.9 PRIMARY HYPOTHYROIDISM: ICD-10-CM

## 2020-09-23 DIAGNOSIS — Z01.818 VISIT FOR PRE-OPERATIVE EXAMINATION: Primary | ICD-10-CM

## 2020-09-23 DIAGNOSIS — I10 BENIGN ESSENTIAL HYPERTENSION: ICD-10-CM

## 2020-09-23 LAB
BASOPHILS # BLD AUTO: 0.03 10*3/MM3 (ref 0–0.2)
BASOPHILS NFR BLD AUTO: 0.4 % (ref 0–1.5)
EOSINOPHIL # BLD AUTO: 0.25 10*3/MM3 (ref 0–0.4)
EOSINOPHIL NFR BLD AUTO: 3.4 % (ref 0.3–6.2)
ERYTHROCYTE [DISTWIDTH] IN BLOOD BY AUTOMATED COUNT: 13.3 % (ref 12.3–15.4)
HBA1C MFR BLD: 6 % (ref 4.8–5.6)
HCT VFR BLD AUTO: 37.4 % (ref 34–46.6)
HGB BLD-MCNC: 12.1 G/DL (ref 12–15.9)
IMM GRANULOCYTES # BLD AUTO: 0.02 10*3/MM3 (ref 0–0.05)
IMM GRANULOCYTES NFR BLD AUTO: 0.3 % (ref 0–0.5)
LYMPHOCYTES # BLD AUTO: 1 10*3/MM3 (ref 0.7–3.1)
LYMPHOCYTES NFR BLD AUTO: 13.4 % (ref 19.6–45.3)
MCH RBC QN AUTO: 28.3 PG (ref 26.6–33)
MCHC RBC AUTO-ENTMCNC: 32.4 G/DL (ref 31.5–35.7)
MCV RBC AUTO: 87.4 FL (ref 79–97)
MONOCYTES # BLD AUTO: 0.67 10*3/MM3 (ref 0.1–0.9)
MONOCYTES NFR BLD AUTO: 9 % (ref 5–12)
NEUTROPHILS # BLD AUTO: 5.48 10*3/MM3 (ref 1.7–7)
NEUTROPHILS NFR BLD AUTO: 73.5 % (ref 42.7–76)
NRBC BLD AUTO-RTO: 0 /100 WBC (ref 0–0.2)
PLATELET # BLD AUTO: 292 10*3/MM3 (ref 140–450)
RBC # BLD AUTO: 4.28 10*6/MM3 (ref 3.77–5.28)
TSH SERPL DL<=0.005 MIU/L-ACNC: 1.6 UIU/ML (ref 0.27–4.2)
WBC # BLD AUTO: 7.45 10*3/MM3 (ref 3.4–10.8)

## 2020-09-23 PROCEDURE — 71046 X-RAY EXAM CHEST 2 VIEWS: CPT | Performed by: NURSE PRACTITIONER

## 2020-09-23 PROCEDURE — 99214 OFFICE O/P EST MOD 30 MIN: CPT | Performed by: NURSE PRACTITIONER

## 2020-09-23 PROCEDURE — 93000 ELECTROCARDIOGRAM COMPLETE: CPT | Performed by: NURSE PRACTITIONER

## 2020-09-23 PROCEDURE — 71046 X-RAY EXAM CHEST 2 VIEWS: CPT | Performed by: RADIOLOGY

## 2020-09-23 NOTE — PROGRESS NOTES
Subjective   Pretty Tovar is a 64 y.o. female.     She is due to have surgery next month with URO/GYN and needs pre op exam. She scheduled to have laparoscopic prolapse repair with Dr Karen Allen.     Urinary Frequency   This is a chronic problem. The current episode started more than 1 month ago. The problem has been gradually worsening. Associated symptoms include frequency and urgency. Pertinent negatives include no chills, discharge, flank pain, hematuria, hesitancy, nausea, sweats or vomiting.        The following portions of the patient's history were reviewed and updated as appropriate: allergies, current medications, past family history, past medical history, past social history, past surgical history and problem list.    Review of Systems   Constitutional: Negative for activity change, appetite change, chills, fatigue and fever.   Respiratory: Negative for cough, chest tightness, shortness of breath and wheezing.    Cardiovascular: Negative for chest pain, palpitations and leg swelling.   Gastrointestinal: Negative for nausea and vomiting.   Genitourinary: Positive for frequency and urgency. Negative for decreased urine volume, difficulty urinating, dysuria, enuresis, flank pain, hematuria and hesitancy.   Neurological: Negative for dizziness and headaches.       Objective   Physical Exam  Constitutional:       Appearance: She is well-developed.   HENT:      Head: Normocephalic.      Nose: Nose normal.   Cardiovascular:      Rate and Rhythm: Regular rhythm.      Heart sounds: Normal heart sounds. No murmur. No S3 or S4 sounds.       Comments: Repeat bp left arm 122/78  No pedal edema   Varicose veins  Pulmonary:      Effort: Pulmonary effort is normal.      Breath sounds: Normal breath sounds. No decreased breath sounds, wheezing, rhonchi or rales.   Skin:     General: Skin is warm and dry.   Neurological:      Mental Status: She is alert and oriented to person, place, and time.      Gait: Gait normal.    Psychiatric:         Mood and Affect: Mood and affect normal.         Speech: Speech normal.         Cognition and Memory: Cognition and memory normal.         Assessment/Plan   Pretty was seen today for pre-op exam.    Diagnoses and all orders for this visit:    Visit for pre-operative examination  -     ECG 12 Lead  -     XR Chest 2 View    Benign essential hypertension  -     ECG 12 Lead  -     CBC & Differential  -     Hemoglobin A1c    Primary hypothyroidism  -     TSH Rfx On Abnormal To Free T4    Abnormal ECG  -     Ambulatory Referral to Cardiology    CXR with no acute findings. Sent for review. No comparison film available. Will check labs. Abnormal ECG tracing. Will send for cardiac clearance. If cleared with provide surgical clearance for prolapse repair.       ECG 12 Lead    Date/Time: 9/23/2020 9:39 AM  Performed by: Violet Tang APRN  Authorized by: Violet Tang APRN   Comparison: not compared with previous ECG   Previous ECG: no previous ECG available  Rhythm: sinus rhythm  Rate: normal  QRS axis: normal    Clinical impression: abnormal EKG  Comments: Sent for cardiologist for clearance.              Answers for HPI/ROS submitted by the patient on 9/17/2020   What is the primary reason for your visit?: Other  Please describe your symptoms.: Scheduled for an EKG for possible surgery.  Have you had these symptoms before?: No  How long have you been having these symptoms?: 1-4 days

## 2020-09-28 ENCOUNTER — TRANSCRIBE ORDERS (OUTPATIENT)
Dept: PREADMISSION TESTING | Facility: HOSPITAL | Age: 65
End: 2020-09-28

## 2020-09-28 DIAGNOSIS — Z01.818 OTHER SPECIFIED PRE-OPERATIVE EXAMINATION: Primary | ICD-10-CM

## 2020-09-30 ENCOUNTER — APPOINTMENT (OUTPATIENT)
Dept: PREADMISSION TESTING | Facility: HOSPITAL | Age: 65
End: 2020-09-30

## 2020-09-30 VITALS
DIASTOLIC BLOOD PRESSURE: 77 MMHG | RESPIRATION RATE: 18 BRPM | HEIGHT: 60 IN | BODY MASS INDEX: 29.59 KG/M2 | OXYGEN SATURATION: 100 % | TEMPERATURE: 98.5 F | SYSTOLIC BLOOD PRESSURE: 138 MMHG | WEIGHT: 150.7 LBS | HEART RATE: 77 BPM

## 2020-09-30 LAB
ANION GAP SERPL CALCULATED.3IONS-SCNC: 13.5 MMOL/L (ref 5–15)
BUN SERPL-MCNC: 9 MG/DL (ref 8–23)
BUN/CREAT SERPL: 20 (ref 7–25)
CALCIUM SPEC-SCNC: 9.9 MG/DL (ref 8.6–10.5)
CHLORIDE SERPL-SCNC: 96 MMOL/L (ref 98–107)
CO2 SERPL-SCNC: 29.5 MMOL/L (ref 22–29)
CREAT SERPL-MCNC: 0.45 MG/DL (ref 0.57–1)
GFR SERPL CREATININE-BSD FRML MDRD: 140 ML/MIN/1.73
GLUCOSE SERPL-MCNC: 109 MG/DL (ref 65–99)
POTASSIUM SERPL-SCNC: 3.9 MMOL/L (ref 3.5–5.2)
SODIUM SERPL-SCNC: 139 MMOL/L (ref 136–145)

## 2020-09-30 PROCEDURE — 80048 BASIC METABOLIC PNL TOTAL CA: CPT | Performed by: OBSTETRICS & GYNECOLOGY

## 2020-09-30 PROCEDURE — 36415 COLL VENOUS BLD VENIPUNCTURE: CPT

## 2020-09-30 RX ORDER — ASPIRIN 81 MG/1
81 TABLET ORAL DAILY
COMMUNITY
End: 2021-07-27

## 2020-10-01 ENCOUNTER — OFFICE VISIT (OUTPATIENT)
Dept: CARDIOLOGY | Facility: CLINIC | Age: 65
End: 2020-10-01

## 2020-10-01 VITALS
SYSTOLIC BLOOD PRESSURE: 150 MMHG | HEIGHT: 60 IN | BODY MASS INDEX: 29.64 KG/M2 | DIASTOLIC BLOOD PRESSURE: 80 MMHG | HEART RATE: 88 BPM | OXYGEN SATURATION: 98 % | WEIGHT: 151 LBS | TEMPERATURE: 97.2 F

## 2020-10-01 DIAGNOSIS — Z01.810 PREOPERATIVE CARDIOVASCULAR EXAMINATION: Primary | ICD-10-CM

## 2020-10-01 DIAGNOSIS — I10 BENIGN ESSENTIAL HYPERTENSION: ICD-10-CM

## 2020-10-01 DIAGNOSIS — I45.2 RBBB (RIGHT BUNDLE BRANCH BLOCK WITH LEFT POSTERIOR FASCICULAR BLOCK): ICD-10-CM

## 2020-10-01 DIAGNOSIS — E78.00 PURE HYPERCHOLESTEROLEMIA: ICD-10-CM

## 2020-10-01 DIAGNOSIS — R94.31 ABNORMAL EKG: ICD-10-CM

## 2020-10-01 PROCEDURE — 93000 ELECTROCARDIOGRAM COMPLETE: CPT | Performed by: INTERNAL MEDICINE

## 2020-10-01 PROCEDURE — 99204 OFFICE O/P NEW MOD 45 MIN: CPT | Performed by: INTERNAL MEDICINE

## 2020-10-01 NOTE — PROGRESS NOTES
PATIENTINFORMATION    Date of Office Visit: 10/01/20  Encounter Provider: Alba Anne MD  Place of Service: [facility] CARDIOLOG[facility]  Patient Name: Pretty Tovar  : 1955    Subjective:         Patient ID: Pretty Tovar is a 64 y.o. female.      History of Present Illness    This is a lady with history of diabetes, hypertension, hyperlipidemia. She is due to have a hysterectomy with Dr. Allen later this month. Her preoperative EKG was abnormal, and she was referred to see me for an abnormal EKG and preoperative evaluation. She has no chest pain, shortness of breath, palpitations, presyncope or syncope. The EKG she had for perioperative evaluation is the first that she had, so there is not one for comparison.       Review of Systems   Constitution: Negative for fever, malaise/fatigue, weight gain and weight loss.   HENT: Negative for ear pain, hearing loss, nosebleeds and sore throat.    Eyes: Negative for double vision, pain, vision loss in left eye and vision loss in right eye.   Cardiovascular:        See history of present illness.   Respiratory: Negative for cough, shortness of breath, sleep disturbances due to breathing, snoring and wheezing.    Endocrine: Negative for cold intolerance, heat intolerance and polyuria.   Skin: Positive for rash. Negative for itching and poor wound healing.   Musculoskeletal: Negative for joint pain, joint swelling and myalgias.   Gastrointestinal: Negative for abdominal pain, diarrhea, hematochezia, nausea and vomiting.   Genitourinary: Negative for hematuria and hesitancy.   Neurological: Negative for numbness, paresthesias and seizures.   Psychiatric/Behavioral: Negative for depression. The patient is not nervous/anxious.            ECG 12 Lead    Date/Time: 10/1/2020 1:52 PM  Performed by: Alba Anne MD  Authorized by: Alba Anne MD   Comparison: compared with previous ECG from 2020  Similar to previous  "ECG  Rhythm: sinus rhythm  BPM: 75  Conduction: right bundle branch block and left posterior fascicular block    Clinical impression: abnormal EKG               Objective:     /80 (BP Location: Left arm)   Pulse 88   Temp 97.2 °F (36.2 °C) (Infrared)   Ht 152.4 cm (60\")   Wt 68.5 kg (151 lb)   LMP  (LMP Unknown)   SpO2 98%   BMI 29.49 kg/m²  Body mass index is 29.49 kg/m².     Vitals signs reviewed.   Constitutional:       Appearance: Normal appearance. Well-developed.   Eyes:      General: Lids are normal. Lids are everted, no foreign bodies appreciated.      Conjunctiva/sclera: Conjunctivae normal.      Pupils: Pupils are equal, round, and reactive to light.   HENT:      Head: Normocephalic and atraumatic.   Neck:      Musculoskeletal: Normal range of motion.      Thyroid: No thyroid mass.      Vascular: No carotid bruit or JVD.      Trachea: No tracheal deviation.   Pulmonary:      Effort: Pulmonary effort is normal.      Breath sounds: Normal breath sounds.   Cardiovascular:      Normal rate. Regular rhythm.   Pulses:     Dorsalis pedis: 2+ bilaterally.  Abdominal:      General: Bowel sounds are normal.   Musculoskeletal: Normal range of motion.   Skin:     General: Skin is warm and dry.   Neurological:      Mental Status: Alert.   Psychiatric:         Behavior: Behavior normal.         Lab Review: Reviewed labs from 9/30/2020 and her creatinine is 0.45      Assessment/Plan:       1.  Preop evaluation.  She is low risk for cardiovascular event with this surgery.  I do not recommend any further testing prior to surgery.  Please contact my office if there are any cardiac complications.  2.  Abnormal EKG with a right bundle branch block and a left posterior fascicular block.  There is no indication for a pacemaker at this time given her lack of symptoms.  We discussed symptoms including presyncope and syncope and she knows to call me if she has any of these type of issues.  3.  Hypertension  4.  " Diabetes  5.  Hyperlipidemia    Encouraged regular exercise.  Come back and see me if her symptoms change.    Orders Placed This Encounter   Procedures   • ECG 12 Lead     This order was created via procedure documentation        Discharge Medications          Accurate as of October 1, 2020  1:55 PM. If you have any questions, ask your nurse or doctor.            Continue These Medications      Instructions Start Date   aspirin 81 MG EC tablet   81 mg, Oral, Daily, INSTRUCTED PT TO FOLLOW MD INSTRUCTIONS REGARDING HOLDING FOR SURGERY      BD Pen Needle Nikkie U/F 32G X 4 MM misc  Generic drug: Insulin Pen Needle   USE 1 PEN NEEDLE WITH EACH  VICTOZA INJECTION      CHLORHEXIDINE GLUCONATE CLOTH EX   Apply externally, AS DIRECTED PREOP       Crestor 20 MG tablet  Generic drug: rosuvastatin   TAKE 1/2 TABLET BY MOUTH EVERY DAY      glucose blood test strip   Use to check blood sugars once daily      OneTouch Verio test strip  Generic drug: glucose blood   USE TO CHECK BLOOD SUGARS ONCE DAILY      levothyroxine 88 MCG tablet  Commonly known as: SYNTHROID, LEVOTHROID   88 mcg, Oral, Daily      Liraglutide 18 MG/3ML solution pen-injector injection  Commonly known as: Victoza   1.2 mg, Subcutaneous, Daily      lisinopril 20 MG tablet  Commonly known as: PRINIVIL,ZESTRIL   20 mg, Oral, Daily      lisinopril-hydrochlorothiazide 20-25 MG per tablet  Commonly known as: PRINZIDE,ZESTORETIC   1 tablet, Oral, Daily      metFORMIN 500 MG tablet  Commonly known as: GLUCOPHAGE   Take 3 pills in am and 2 in pm      Taclonex 0.005-0.064 % external suspension  Generic drug: calcipotriene-betamethasone   APPLY TO PSORIASIS ON SCALP/TRUNK/EXTREMITIES EVERY OTHER DAY AS NEEDED      Enstilar 0.005-0.064 % foam  Generic drug: Calcipotriene-Betameth Diprop   APPLY TO PSORIASIS ON EXTREMITIES DAILY FOR 3 TO 4 DAYS THEN STOP REST OF WEEK, THEN REPEAT      VITAMIN E PO   1 capsule, Oral, Daily, HOLD PRIOR TO SURG                     Alba BUSTAMANTE  MD [place]  10/01/20  13:55 EDT

## 2020-10-11 DIAGNOSIS — E78.00 PURE HYPERCHOLESTEROLEMIA: ICD-10-CM

## 2020-10-12 RX ORDER — ROSUVASTATIN CALCIUM 20 MG/1
TABLET, FILM COATED ORAL
Qty: 45 TABLET | Refills: 5 | Status: SHIPPED | OUTPATIENT
Start: 2020-10-12 | End: 2021-02-16 | Stop reason: SDUPTHER

## 2020-10-14 ENCOUNTER — LAB (OUTPATIENT)
Dept: LAB | Facility: HOSPITAL | Age: 65
End: 2020-10-14

## 2020-10-14 DIAGNOSIS — Z01.818 OTHER SPECIFIED PRE-OPERATIVE EXAMINATION: ICD-10-CM

## 2020-10-14 PROCEDURE — U0004 COV-19 TEST NON-CDC HGH THRU: HCPCS

## 2020-10-14 PROCEDURE — C9803 HOPD COVID-19 SPEC COLLECT: HCPCS

## 2020-10-15 PROBLEM — R39.14 FEELING OF INCOMPLETE BLADDER EMPTYING: Status: ACTIVE | Noted: 2020-10-15

## 2020-10-15 PROBLEM — N81.12 CYSTOCELE, LATERAL: Status: ACTIVE | Noted: 2020-10-15

## 2020-10-15 PROBLEM — N81.5 VAGINAL ENTEROCELE: Status: ACTIVE | Noted: 2020-10-15

## 2020-10-15 PROBLEM — N39.3 FEMALE STRESS INCONTINENCE: Status: ACTIVE | Noted: 2020-10-15

## 2020-10-15 PROBLEM — N81.82 INCOMPETENCE OF PUBOCERVICAL TISSUE: Status: ACTIVE | Noted: 2020-10-15

## 2020-10-15 PROBLEM — N81.83 INCOMPETENCE OF RECTOVAGINAL TISSUE: Status: ACTIVE | Noted: 2020-10-15

## 2020-10-15 PROBLEM — N81.11 CYSTOCELE, MIDLINE: Status: ACTIVE | Noted: 2020-10-15

## 2020-10-15 PROBLEM — N81.89 LOSS OF PERINEAL BODY, FEMALE: Status: ACTIVE | Noted: 2020-10-15

## 2020-10-15 PROBLEM — N81.6 RECTOCELE: Status: ACTIVE | Noted: 2020-10-15

## 2020-10-15 LAB — SARS-COV-2 RNA RESP QL NAA+PROBE: NOT DETECTED

## 2020-10-15 NOTE — H&P
"[facility]   History & Physical    Patient Identification:  Name: Pretty Tovar Age: 64 y.o. Sex: female :  1955 MRN: Female [facility]   History & Physical    Patient Identification:  Name: Pretty Tovar Age: 64 y.o. Sex: female :  1955 MRN: [id number]                          Problem List:    Uterovaginal prolapse, incomplete    Cystocele, midline    Cystocele, lateral    Cystocele, lateral    Vaginal enterocele    Rectocele    Loss of perineal body, female    Female stress incontinence    Feeling of incomplete bladder emptying    Incompetence of pubocervical tissue    Incompetence of rectovaginal tissue    Past Medical History:  Past Medical History:   Diagnosis Date   • Abnormal liver function tests 2017    Description: , \"-\"hepatitis profile   • Anesthesia complication     SLOW TO WAKE UP   • Colon polyps    • Diabetes mellitus (CMS/HCC)    • H/O bone density study 10/2010    normal   • H/O cardiovascular stress test    • H/O echocardiogram 2001    2-D   • Hyperkalemia 2019   • Hyperlipidemia    • Hypertension    • Hypothyroidism    • Osteoarthritis of knee 2005    left   • Plantar warts     foot   • Pregnancy      twins x1   • Psoriasis    • Urine incontinence    • Uterovaginal prolapse      Past Surgical History:  Past Surgical History:   Procedure Laterality Date   • BREAST BIOPSY Right     benign   • COLONOSCOPY W/ POLYPECTOMY  2018    Dr Resendiz, 6 mm rectal polyp - hyperplastic   • TUBAL ABDOMINAL LIGATION        Home Meds:  No medications prior to admission.     Current Meds:   No current facility-administered medications for this encounter.     Current Outpatient Medications:   •  aspirin 81 MG EC tablet, Take 81 mg by mouth Daily. INSTRUCTED PT TO FOLLOW MD INSTRUCTIONS REGARDING HOLDING FOR SURGERY, Disp: , Rfl:   •  BD PEN NEEDLE SEUN U/F 32G X 4 MM misc, USE 1 PEN " NEEDLE WITH EACH  VICTOZA INJECTION, Disp: 100 each, Rfl: 1  •  CHLORHEXIDINE GLUCONATE CLOTH EX, Apply  topically. AS DIRECTED PREOP, Disp: , Rfl:   •  Crestor 20 MG tablet, TAKE 1/2 TABLET BY MOUTH EVERY DAY, Disp: 45 tablet, Rfl: 5  •  ENSTILAR 0.005-0.064 % foam, APPLY TO PSORIASIS ON EXTREMITIES DAILY FOR 3 TO 4 DAYS THEN STOP REST OF WEEK, THEN REPEAT, Disp: , Rfl: 4  •  glucose blood test strip, Use to check blood sugars once daily, Disp: 100 each, Rfl: 3  •  levothyroxine (SYNTHROID, LEVOTHROID) 88 MCG tablet, Take 1 tablet by mouth Daily., Disp: 90 tablet, Rfl: 1  •  Liraglutide (Victoza) 18 MG/3ML solution pen-injector injection, Inject 1.2 mg under the skin into the appropriate area as directed Daily., Disp: 6 pen, Rfl: 3  •  lisinopril (PRINIVIL,ZESTRIL) 20 MG tablet, Take 1 tablet by mouth Daily., Disp: 90 tablet, Rfl: 3  •  lisinopril-hydrochlorothiazide (PRINZIDE,ZESTORETIC) 20-25 MG per tablet, Take 1 tablet by mouth Daily., Disp: 90 tablet, Rfl: 3  •  metFORMIN (GLUCOPHAGE) 500 MG tablet, Take 3 pills in am and 2 in pm, Disp: 450 tablet, Rfl: 3  •  ONETOUCH VERIO test strip, USE TO CHECK BLOOD SUGARS ONCE DAILY, Disp: 100 each, Rfl: 4  •  TACLONEX 0.005-0.064 % external suspension, APPLY TO PSORIASIS ON SCALP/TRUNK/EXTREMITIES EVERY OTHER DAY AS NEEDED, Disp: , Rfl: 5  •  VITAMIN E PO, Take 1 capsule by mouth Daily. HOLD PRIOR TO SURG, Disp: , Rfl:   Allergies:  No Known Allergies  Immunizations:  Immunization History   Administered Date(s) Administered   • Flu Vaccine Quad PF >18YRS 09/27/2019   • Flu Vaccine Quad PF >36MO 10/18/2017   • Influenza, Unspecified 10/30/2018, 09/27/2019   • Pneumococcal Polysaccharide (PPSV23) 03/21/2017   • Shingrix 04/30/2018, 08/29/2018, 11/27/2018   • Tdap 04/12/2018     Social History:   Social History     Tobacco Use   • Smoking status: Never Smoker   • Smokeless tobacco: Never Used   • Tobacco comment: daily caffine   Substance Use Topics   • Alcohol use: No       Family History:  Family History   Problem Relation Age of Onset   • Heart disease Father    • Hypertension Father    • Diabetes Brother    • Heart disease Maternal Grandmother    • Other Son         TWIN   • Other Daughter         TWIN    • BRCA 1/2 Neg Hx    • Breast cancer Neg Hx    • Colon cancer Neg Hx    • Endometrial cancer Neg Hx    • Ovarian cancer Neg Hx    • Malig Hyperthermia Neg Hx         Review of Systems  Constitutional:  Denies fever or chills   Eyes:  Denies change in visual acuity   HEENT:  Denies nasal congestion or sore throat   Respiratory:  Denies cough or shortness of breath   Cardiovascular:  Denies chest pain or edema   GI:  Denies abdominal pain, nausea, vomiting, bloody stools or diarrhea   :  Denies dysuria   Musculoskeletal:  Denies back pain or joint pain   Integument:  Denies rash   Neurologic:  Denies headache, focal weakness or sensory changes   Endocrine:  Denies polyuria or polydipsia   Lymphatic:  Denies swollen glands   Psychiatric:  Denies depression or anxiety       Objective:  tMax 24 hrs: No data recorded.    Vitals Ranges:      Exam:  Vital Signs: LMP  (LMP Unknown)   Constitutional:  Well developed, well nourished, no acute distress, non-toxic appearance    GI:  Soft, nondistended, normal bowel sounds, nontender, no organomegaly, no mass, no rebound, no guarding   :  No costovertebral angle tenderness   Musculoskeletal:  No edema, no tenderness, no deformities. Back- no tenderness  Integument:  Well hydrated, no rash   Lymphatic:  No lymphadenopathy noted   Neurologic:  Alert & oriented x 3,  Pelvic:     POPQ  +2  +2  -7   5     2     10  +1  +1   -7    Pap and emb negative  9/14/2020    LPP 53 cm H2O  32 ml/ss    Assessment:    Uterovaginal prolapse, incomplete    Cystocele, midline    Cystocele, lateral    Cystocele, lateral    Vaginal enterocele    Rectocele    Loss of perineal body, female    Female stress incontinence    Feeling of incomplete bladder emptying     Incompetence of pubocervical tissue    Incompetence of rectovaginal tissue      Plan:  Laparoscopic uterosacral ligament colpopexy sacral colpopexy    Laparoscopic paravaginal repair   Laparoscopic hysterectomy with bilateral salpingectomy   Possible laparoscopic unilateral or bilateral oophorectomy   Laparoscopic abdominal enterocele repair   Pubovaginal sling   Posterior colporrhaphy   Anterior colporrhaphy   Extraperitoneal colpopexy sacrospinous ligament fixation   Insertion of vaginal grafts   Cystourethroscopy    10/1/2020    Karen Allen MD  10/15/2020

## 2020-10-16 ENCOUNTER — ANESTHESIA (OUTPATIENT)
Dept: PERIOP | Facility: HOSPITAL | Age: 65
End: 2020-10-16

## 2020-10-16 ENCOUNTER — ANESTHESIA EVENT (OUTPATIENT)
Dept: PERIOP | Facility: HOSPITAL | Age: 65
End: 2020-10-16

## 2020-10-16 ENCOUNTER — HOSPITAL ENCOUNTER (OUTPATIENT)
Facility: HOSPITAL | Age: 65
Discharge: HOME | End: 2020-10-17
Attending: OBSTETRICS & GYNECOLOGY | Admitting: OBSTETRICS & GYNECOLOGY

## 2020-10-16 DIAGNOSIS — N81.2 UTEROVAGINAL PROLAPSE, INCOMPLETE: ICD-10-CM

## 2020-10-16 LAB
ABO GROUP BLD: NORMAL
BLD GP AB SCN SERPL QL: NEGATIVE
GLUCOSE BLDC GLUCOMTR-MCNC: 131 MG/DL (ref 70–130)
GLUCOSE BLDC GLUCOMTR-MCNC: 196 MG/DL (ref 70–130)
RH BLD: POSITIVE
T&S EXPIRATION DATE: NORMAL

## 2020-10-16 PROCEDURE — 25010000002 PHENYLEPHRINE PER 1 ML: Performed by: NURSE ANESTHETIST, CERTIFIED REGISTERED

## 2020-10-16 PROCEDURE — 86901 BLOOD TYPING SEROLOGIC RH(D): CPT | Performed by: OBSTETRICS & GYNECOLOGY

## 2020-10-16 PROCEDURE — 86850 RBC ANTIBODY SCREEN: CPT | Performed by: OBSTETRICS & GYNECOLOGY

## 2020-10-16 PROCEDURE — G0378 HOSPITAL OBSERVATION PER HR: HCPCS

## 2020-10-16 PROCEDURE — 25010000003 CEFAZOLIN IN DEXTROSE 2-4 GM/100ML-% SOLUTION: Performed by: OBSTETRICS & GYNECOLOGY

## 2020-10-16 PROCEDURE — P9041 ALBUMIN (HUMAN),5%, 50ML: HCPCS | Performed by: NURSE ANESTHETIST, CERTIFIED REGISTERED

## 2020-10-16 PROCEDURE — 25010000002 HYDROMORPHONE PER 4 MG: Performed by: NURSE ANESTHETIST, CERTIFIED REGISTERED

## 2020-10-16 PROCEDURE — 25010000003 CEFAZOLIN IN DEXTROSE 2-4 GM/100ML-% SOLUTION: Performed by: NURSE ANESTHETIST, CERTIFIED REGISTERED

## 2020-10-16 PROCEDURE — 25010000002 ALBUMIN HUMAN 5% PER 50 ML: Performed by: NURSE ANESTHETIST, CERTIFIED REGISTERED

## 2020-10-16 PROCEDURE — 88305 TISSUE EXAM BY PATHOLOGIST: CPT | Performed by: OBSTETRICS & GYNECOLOGY

## 2020-10-16 PROCEDURE — 82962 GLUCOSE BLOOD TEST: CPT

## 2020-10-16 PROCEDURE — 88307 TISSUE EXAM BY PATHOLOGIST: CPT | Performed by: OBSTETRICS & GYNECOLOGY

## 2020-10-16 PROCEDURE — 25010000002 NEOSTIGMINE PER 0.5 MG: Performed by: NURSE ANESTHETIST, CERTIFIED REGISTERED

## 2020-10-16 PROCEDURE — 25010000002 ONDANSETRON PER 1 MG: Performed by: NURSE ANESTHETIST, CERTIFIED REGISTERED

## 2020-10-16 PROCEDURE — 25010000002 PROPOFOL 10 MG/ML EMULSION: Performed by: NURSE ANESTHETIST, CERTIFIED REGISTERED

## 2020-10-16 PROCEDURE — 25010000002 DEXAMETHASONE PER 1 MG: Performed by: NURSE ANESTHETIST, CERTIFIED REGISTERED

## 2020-10-16 PROCEDURE — 25010000002 FENTANYL CITRATE (PF) 100 MCG/2ML SOLUTION: Performed by: NURSE ANESTHETIST, CERTIFIED REGISTERED

## 2020-10-16 PROCEDURE — 86900 BLOOD TYPING SEROLOGIC ABO: CPT | Performed by: OBSTETRICS & GYNECOLOGY

## 2020-10-16 DEVICE — GRFT TISS STRATTICE FIRM 6X10CM: Type: IMPLANTABLE DEVICE | Status: FUNCTIONAL

## 2020-10-16 RX ORDER — CEFAZOLIN SODIUM 2 G/100ML
INJECTION, SOLUTION INTRAVENOUS AS NEEDED
Status: DISCONTINUED | OUTPATIENT
Start: 2020-10-16 | End: 2020-10-16 | Stop reason: SURG

## 2020-10-16 RX ORDER — ONDANSETRON 2 MG/ML
INJECTION INTRAMUSCULAR; INTRAVENOUS AS NEEDED
Status: DISCONTINUED | OUTPATIENT
Start: 2020-10-16 | End: 2020-10-16 | Stop reason: SURG

## 2020-10-16 RX ORDER — DEXMEDETOMIDINE HYDROCHLORIDE 4 UG/ML
INJECTION INTRAVENOUS CONTINUOUS PRN
Status: DISCONTINUED | OUTPATIENT
Start: 2020-10-16 | End: 2020-10-16 | Stop reason: SURG

## 2020-10-16 RX ORDER — DEXTROSE AND SODIUM CHLORIDE 5; .45 G/100ML; G/100ML
100 INJECTION, SOLUTION INTRAVENOUS CONTINUOUS
Status: DISCONTINUED | OUTPATIENT
Start: 2020-10-16 | End: 2020-10-17 | Stop reason: HOSPADM

## 2020-10-16 RX ORDER — SODIUM CHLORIDE 0.9 % (FLUSH) 0.9 %
3-10 SYRINGE (ML) INJECTION AS NEEDED
Status: DISCONTINUED | OUTPATIENT
Start: 2020-10-16 | End: 2020-10-16 | Stop reason: HOSPADM

## 2020-10-16 RX ORDER — MORPHINE SULFATE 2 MG/ML
2 INJECTION, SOLUTION INTRAMUSCULAR; INTRAVENOUS
Status: DISCONTINUED | OUTPATIENT
Start: 2020-10-16 | End: 2020-10-17 | Stop reason: HOSPADM

## 2020-10-16 RX ORDER — ONDANSETRON 2 MG/ML
4 INJECTION INTRAMUSCULAR; INTRAVENOUS EVERY 6 HOURS PRN
Status: DISCONTINUED | OUTPATIENT
Start: 2020-10-16 | End: 2020-10-17 | Stop reason: HOSPADM

## 2020-10-16 RX ORDER — DEXMEDETOMIDINE HYDROCHLORIDE 100 UG/ML
INJECTION, SOLUTION INTRAVENOUS AS NEEDED
Status: DISCONTINUED | OUTPATIENT
Start: 2020-10-16 | End: 2020-10-16 | Stop reason: SURG

## 2020-10-16 RX ORDER — PHENAZOPYRIDINE HYDROCHLORIDE 200 MG/1
200 TABLET, FILM COATED ORAL ONCE
Status: COMPLETED | OUTPATIENT
Start: 2020-10-16 | End: 2020-10-16

## 2020-10-16 RX ORDER — BUPIVACAINE HYDROCHLORIDE AND EPINEPHRINE 2.5; 5 MG/ML; UG/ML
INJECTION, SOLUTION INFILTRATION; PERINEURAL AS NEEDED
Status: DISCONTINUED | OUTPATIENT
Start: 2020-10-16 | End: 2020-10-16 | Stop reason: HOSPADM

## 2020-10-16 RX ORDER — LISINOPRIL 20 MG/1
20 TABLET ORAL NIGHTLY
Status: DISCONTINUED | OUTPATIENT
Start: 2020-10-16 | End: 2020-10-17

## 2020-10-16 RX ORDER — DIPHENHYDRAMINE HYDROCHLORIDE 50 MG/ML
12.5 INJECTION INTRAMUSCULAR; INTRAVENOUS
Status: DISCONTINUED | OUTPATIENT
Start: 2020-10-16 | End: 2020-10-16 | Stop reason: HOSPADM

## 2020-10-16 RX ORDER — ESTRADIOL 0.1 MG/G
CREAM VAGINAL AS NEEDED
Status: DISCONTINUED | OUTPATIENT
Start: 2020-10-16 | End: 2020-10-16 | Stop reason: HOSPADM

## 2020-10-16 RX ORDER — ONDANSETRON 2 MG/ML
4 INJECTION INTRAMUSCULAR; INTRAVENOUS ONCE AS NEEDED
Status: DISCONTINUED | OUTPATIENT
Start: 2020-10-16 | End: 2020-10-16 | Stop reason: HOSPADM

## 2020-10-16 RX ORDER — FENTANYL CITRATE 50 UG/ML
50 INJECTION, SOLUTION INTRAMUSCULAR; INTRAVENOUS
Status: DISCONTINUED | OUTPATIENT
Start: 2020-10-16 | End: 2020-10-16 | Stop reason: HOSPADM

## 2020-10-16 RX ORDER — MIDAZOLAM HYDROCHLORIDE 1 MG/ML
1 INJECTION INTRAMUSCULAR; INTRAVENOUS
Status: DISCONTINUED | OUTPATIENT
Start: 2020-10-16 | End: 2020-10-16 | Stop reason: HOSPADM

## 2020-10-16 RX ORDER — SODIUM CHLORIDE 9 MG/ML
INJECTION, SOLUTION INTRAVENOUS AS NEEDED
Status: DISCONTINUED | OUTPATIENT
Start: 2020-10-16 | End: 2020-10-16 | Stop reason: HOSPADM

## 2020-10-16 RX ORDER — LEVOTHYROXINE SODIUM 88 UG/1
88 TABLET ORAL DAILY
Status: DISCONTINUED | OUTPATIENT
Start: 2020-10-17 | End: 2020-10-17 | Stop reason: HOSPADM

## 2020-10-16 RX ORDER — FENTANYL CITRATE 50 UG/ML
INJECTION, SOLUTION INTRAMUSCULAR; INTRAVENOUS AS NEEDED
Status: DISCONTINUED | OUTPATIENT
Start: 2020-10-16 | End: 2020-10-16 | Stop reason: SURG

## 2020-10-16 RX ORDER — NALOXONE HCL 0.4 MG/ML
0.4 VIAL (ML) INJECTION
Status: DISCONTINUED | OUTPATIENT
Start: 2020-10-16 | End: 2020-10-17 | Stop reason: HOSPADM

## 2020-10-16 RX ORDER — IBUPROFEN 400 MG/1
400 TABLET ORAL
Status: DISCONTINUED | OUTPATIENT
Start: 2020-10-16 | End: 2020-10-17 | Stop reason: HOSPADM

## 2020-10-16 RX ORDER — HYDROMORPHONE HCL 110MG/55ML
PATIENT CONTROLLED ANALGESIA SYRINGE INTRAVENOUS AS NEEDED
Status: DISCONTINUED | OUTPATIENT
Start: 2020-10-16 | End: 2020-10-16 | Stop reason: SURG

## 2020-10-16 RX ORDER — ASPIRIN 81 MG/1
81 TABLET ORAL DAILY
Status: DISCONTINUED | OUTPATIENT
Start: 2020-10-16 | End: 2020-10-17 | Stop reason: HOSPADM

## 2020-10-16 RX ORDER — ONDANSETRON 4 MG/1
4 TABLET, FILM COATED ORAL EVERY 6 HOURS PRN
Status: DISCONTINUED | OUTPATIENT
Start: 2020-10-16 | End: 2020-10-17 | Stop reason: HOSPADM

## 2020-10-16 RX ORDER — EPHEDRINE SULFATE 50 MG/ML
5 INJECTION, SOLUTION INTRAVENOUS ONCE AS NEEDED
Status: DISCONTINUED | OUTPATIENT
Start: 2020-10-16 | End: 2020-10-16 | Stop reason: HOSPADM

## 2020-10-16 RX ORDER — PROMETHAZINE HYDROCHLORIDE 25 MG/1
25 TABLET ORAL ONCE AS NEEDED
Status: DISCONTINUED | OUTPATIENT
Start: 2020-10-16 | End: 2020-10-16 | Stop reason: HOSPADM

## 2020-10-16 RX ORDER — LABETALOL HYDROCHLORIDE 5 MG/ML
5 INJECTION, SOLUTION INTRAVENOUS
Status: DISCONTINUED | OUTPATIENT
Start: 2020-10-16 | End: 2020-10-16 | Stop reason: HOSPADM

## 2020-10-16 RX ORDER — FLUMAZENIL 0.1 MG/ML
0.2 INJECTION INTRAVENOUS AS NEEDED
Status: DISCONTINUED | OUTPATIENT
Start: 2020-10-16 | End: 2020-10-16 | Stop reason: HOSPADM

## 2020-10-16 RX ORDER — PROPOFOL 10 MG/ML
VIAL (ML) INTRAVENOUS AS NEEDED
Status: DISCONTINUED | OUTPATIENT
Start: 2020-10-16 | End: 2020-10-16 | Stop reason: SURG

## 2020-10-16 RX ORDER — DEXAMETHASONE SODIUM PHOSPHATE 10 MG/ML
INJECTION INTRAMUSCULAR; INTRAVENOUS AS NEEDED
Status: DISCONTINUED | OUTPATIENT
Start: 2020-10-16 | End: 2020-10-16 | Stop reason: SURG

## 2020-10-16 RX ORDER — NALOXONE HCL 0.4 MG/ML
0.2 VIAL (ML) INJECTION AS NEEDED
Status: DISCONTINUED | OUTPATIENT
Start: 2020-10-16 | End: 2020-10-16 | Stop reason: HOSPADM

## 2020-10-16 RX ORDER — MORPHINE SULFATE 2 MG/ML
1 INJECTION, SOLUTION INTRAMUSCULAR; INTRAVENOUS
Status: DISCONTINUED | OUTPATIENT
Start: 2020-10-16 | End: 2020-10-17 | Stop reason: HOSPADM

## 2020-10-16 RX ORDER — SODIUM CHLORIDE, SODIUM LACTATE, POTASSIUM CHLORIDE, CALCIUM CHLORIDE 600; 310; 30; 20 MG/100ML; MG/100ML; MG/100ML; MG/100ML
9 INJECTION, SOLUTION INTRAVENOUS CONTINUOUS
Status: DISCONTINUED | OUTPATIENT
Start: 2020-10-16 | End: 2020-10-17 | Stop reason: HOSPADM

## 2020-10-16 RX ORDER — ALBUMIN, HUMAN INJ 5% 5 %
SOLUTION INTRAVENOUS CONTINUOUS PRN
Status: DISCONTINUED | OUTPATIENT
Start: 2020-10-16 | End: 2020-10-16 | Stop reason: SURG

## 2020-10-16 RX ORDER — SODIUM CHLORIDE 0.9 % (FLUSH) 0.9 %
3 SYRINGE (ML) INJECTION EVERY 12 HOURS SCHEDULED
Status: DISCONTINUED | OUTPATIENT
Start: 2020-10-16 | End: 2020-10-16 | Stop reason: HOSPADM

## 2020-10-16 RX ORDER — GLYCOPYRROLATE 0.2 MG/ML
INJECTION INTRAMUSCULAR; INTRAVENOUS AS NEEDED
Status: DISCONTINUED | OUTPATIENT
Start: 2020-10-16 | End: 2020-10-16 | Stop reason: SURG

## 2020-10-16 RX ORDER — ROCURONIUM BROMIDE 10 MG/ML
INJECTION, SOLUTION INTRAVENOUS AS NEEDED
Status: DISCONTINUED | OUTPATIENT
Start: 2020-10-16 | End: 2020-10-16 | Stop reason: SURG

## 2020-10-16 RX ORDER — OXYCODONE HYDROCHLORIDE AND ACETAMINOPHEN 5; 325 MG/1; MG/1
1 TABLET ORAL EVERY 4 HOURS PRN
Status: DISCONTINUED | OUTPATIENT
Start: 2020-10-16 | End: 2020-10-17 | Stop reason: HOSPADM

## 2020-10-16 RX ORDER — MEPERIDINE HYDROCHLORIDE 25 MG/ML
12.5 INJECTION INTRAMUSCULAR; INTRAVENOUS; SUBCUTANEOUS
Status: DISCONTINUED | OUTPATIENT
Start: 2020-10-16 | End: 2020-10-16 | Stop reason: HOSPADM

## 2020-10-16 RX ORDER — MAGNESIUM HYDROXIDE 1200 MG/15ML
LIQUID ORAL AS NEEDED
Status: DISCONTINUED | OUTPATIENT
Start: 2020-10-16 | End: 2020-10-16 | Stop reason: HOSPADM

## 2020-10-16 RX ORDER — OXYCODONE AND ACETAMINOPHEN 10; 325 MG/1; MG/1
1 TABLET ORAL EVERY 4 HOURS PRN
Status: DISCONTINUED | OUTPATIENT
Start: 2020-10-16 | End: 2020-10-17 | Stop reason: HOSPADM

## 2020-10-16 RX ORDER — DIPHENHYDRAMINE HCL 25 MG
25 CAPSULE ORAL
Status: DISCONTINUED | OUTPATIENT
Start: 2020-10-16 | End: 2020-10-16 | Stop reason: HOSPADM

## 2020-10-16 RX ORDER — CEFAZOLIN SODIUM 2 G/100ML
2 INJECTION, SOLUTION INTRAVENOUS ONCE
Status: COMPLETED | OUTPATIENT
Start: 2020-10-16 | End: 2020-10-16

## 2020-10-16 RX ORDER — LIDOCAINE HYDROCHLORIDE 20 MG/ML
INJECTION, SOLUTION INFILTRATION; PERINEURAL AS NEEDED
Status: DISCONTINUED | OUTPATIENT
Start: 2020-10-16 | End: 2020-10-16 | Stop reason: SURG

## 2020-10-16 RX ORDER — PROMETHAZINE HYDROCHLORIDE 25 MG/1
25 SUPPOSITORY RECTAL ONCE AS NEEDED
Status: DISCONTINUED | OUTPATIENT
Start: 2020-10-16 | End: 2020-10-16 | Stop reason: HOSPADM

## 2020-10-16 RX ORDER — LIDOCAINE HYDROCHLORIDE 10 MG/ML
0.5 INJECTION, SOLUTION EPIDURAL; INFILTRATION; INTRACAUDAL; PERINEURAL ONCE AS NEEDED
Status: DISCONTINUED | OUTPATIENT
Start: 2020-10-16 | End: 2020-10-16 | Stop reason: HOSPADM

## 2020-10-16 RX ORDER — ROSUVASTATIN CALCIUM 10 MG/1
10 TABLET, COATED ORAL NIGHTLY
Status: DISCONTINUED | OUTPATIENT
Start: 2020-10-16 | End: 2020-10-17 | Stop reason: HOSPADM

## 2020-10-16 RX ORDER — HYDRALAZINE HYDROCHLORIDE 20 MG/ML
5 INJECTION INTRAMUSCULAR; INTRAVENOUS
Status: DISCONTINUED | OUTPATIENT
Start: 2020-10-16 | End: 2020-10-16 | Stop reason: HOSPADM

## 2020-10-16 RX ORDER — OXYCODONE AND ACETAMINOPHEN 7.5; 325 MG/1; MG/1
1 TABLET ORAL ONCE AS NEEDED
Status: DISCONTINUED | OUTPATIENT
Start: 2020-10-16 | End: 2020-10-16 | Stop reason: HOSPADM

## 2020-10-16 RX ORDER — HYDROMORPHONE HYDROCHLORIDE 1 MG/ML
0.5 INJECTION, SOLUTION INTRAMUSCULAR; INTRAVENOUS; SUBCUTANEOUS
Status: DISCONTINUED | OUTPATIENT
Start: 2020-10-16 | End: 2020-10-16 | Stop reason: HOSPADM

## 2020-10-16 RX ORDER — HYDROCODONE BITARTRATE AND ACETAMINOPHEN 7.5; 325 MG/1; MG/1
1 TABLET ORAL ONCE AS NEEDED
Status: DISCONTINUED | OUTPATIENT
Start: 2020-10-16 | End: 2020-10-16 | Stop reason: HOSPADM

## 2020-10-16 RX ORDER — CEFAZOLIN SODIUM 2 G/100ML
2 INJECTION, SOLUTION INTRAVENOUS EVERY 8 HOURS
Status: COMPLETED | OUTPATIENT
Start: 2020-10-16 | End: 2020-10-17

## 2020-10-16 RX ORDER — SODIUM CHLORIDE 0.9 % (FLUSH) 0.9 %
10 SYRINGE (ML) INJECTION AS NEEDED
Status: DISCONTINUED | OUTPATIENT
Start: 2020-10-16 | End: 2020-10-16 | Stop reason: HOSPADM

## 2020-10-16 RX ADMIN — IBUPROFEN 400 MG: 400 TABLET, FILM COATED ORAL at 20:53

## 2020-10-16 RX ADMIN — DEXAMETHASONE SODIUM PHOSPHATE 8 MG: 10 INJECTION INTRAMUSCULAR; INTRAVENOUS at 07:51

## 2020-10-16 RX ADMIN — CEFAZOLIN SODIUM 2 G: 2 INJECTION, SOLUTION INTRAVENOUS at 11:46

## 2020-10-16 RX ADMIN — ROCURONIUM BROMIDE 10 MG: 10 INJECTION INTRAVENOUS at 10:28

## 2020-10-16 RX ADMIN — ROCURONIUM BROMIDE 10 MG: 10 INJECTION INTRAVENOUS at 10:06

## 2020-10-16 RX ADMIN — PHENYLEPHRINE HYDROCHLORIDE 200 MCG: 10 INJECTION INTRAVENOUS at 08:08

## 2020-10-16 RX ADMIN — IBUPROFEN 400 MG: 400 TABLET, FILM COATED ORAL at 23:34

## 2020-10-16 RX ADMIN — ROCURONIUM BROMIDE 10 MG: 10 INJECTION INTRAVENOUS at 11:37

## 2020-10-16 RX ADMIN — DEXMEDETOMIDINE HYDROCHLORIDE 0.5 MCG/KG/HR: 4 INJECTION INTRAVENOUS at 08:37

## 2020-10-16 RX ADMIN — ROCURONIUM BROMIDE 10 MG: 10 INJECTION INTRAVENOUS at 10:56

## 2020-10-16 RX ADMIN — SODIUM CHLORIDE, POTASSIUM CHLORIDE, SODIUM LACTATE AND CALCIUM CHLORIDE: 600; 310; 30; 20 INJECTION, SOLUTION INTRAVENOUS at 07:34

## 2020-10-16 RX ADMIN — PHENYLEPHRINE HYDROCHLORIDE 100 MCG: 10 INJECTION INTRAVENOUS at 13:14

## 2020-10-16 RX ADMIN — ROCURONIUM BROMIDE 10 MG: 10 INJECTION INTRAVENOUS at 09:16

## 2020-10-16 RX ADMIN — ROCURONIUM BROMIDE 50 MG: 10 INJECTION INTRAVENOUS at 07:42

## 2020-10-16 RX ADMIN — NEOSTIGMINE METHYLSULFATE 3 MG: 1 INJECTION INTRAMUSCULAR; INTRAVENOUS; SUBCUTANEOUS at 13:49

## 2020-10-16 RX ADMIN — FENTANYL CITRATE 50 MCG: 50 INJECTION INTRAMUSCULAR; INTRAVENOUS at 08:58

## 2020-10-16 RX ADMIN — FENTANYL CITRATE 100 MCG: 50 INJECTION INTRAMUSCULAR; INTRAVENOUS at 08:28

## 2020-10-16 RX ADMIN — IBUPROFEN 400 MG: 400 TABLET, FILM COATED ORAL at 16:24

## 2020-10-16 RX ADMIN — PHENYLEPHRINE HYDROCHLORIDE 200 MCG: 10 INJECTION INTRAVENOUS at 13:19

## 2020-10-16 RX ADMIN — DEXTROSE AND SODIUM CHLORIDE 100 ML/HR: 5; 450 INJECTION, SOLUTION INTRAVENOUS at 16:25

## 2020-10-16 RX ADMIN — PHENYLEPHRINE HYDROCHLORIDE 100 MCG: 10 INJECTION INTRAVENOUS at 08:14

## 2020-10-16 RX ADMIN — ROCURONIUM BROMIDE 10 MG: 10 INJECTION INTRAVENOUS at 12:08

## 2020-10-16 RX ADMIN — CEFAZOLIN SODIUM 2 G: 2 INJECTION, SOLUTION INTRAVENOUS at 07:28

## 2020-10-16 RX ADMIN — PHENYLEPHRINE HYDROCHLORIDE 0.3 MCG/KG/MIN: 10 INJECTION, SOLUTION INTRAMUSCULAR; INTRAVENOUS; SUBCUTANEOUS at 13:22

## 2020-10-16 RX ADMIN — DEXMEDETOMIDINE HYDROCHLORIDE 20 MCG: 100 INJECTION, SOLUTION, CONCENTRATE INTRAVENOUS at 08:37

## 2020-10-16 RX ADMIN — PHENAZOPYRIDINE 200 MG: 200 TABLET ORAL at 06:46

## 2020-10-16 RX ADMIN — LISINOPRIL 20 MG: 20 TABLET ORAL at 20:53

## 2020-10-16 RX ADMIN — LIDOCAINE HYDROCHLORIDE 60 MG: 20 INJECTION, SOLUTION INFILTRATION; PERINEURAL at 07:42

## 2020-10-16 RX ADMIN — HYDROMORPHONE HYDROCHLORIDE 0.2 MG: 2 INJECTION, SOLUTION INTRAMUSCULAR; INTRAVENOUS; SUBCUTANEOUS at 10:05

## 2020-10-16 RX ADMIN — ALBUMIN HUMAN: 0.05 INJECTION, SOLUTION INTRAVENOUS at 13:35

## 2020-10-16 RX ADMIN — FENTANYL CITRATE 50 MCG: 50 INJECTION INTRAMUSCULAR; INTRAVENOUS at 07:47

## 2020-10-16 RX ADMIN — ONDANSETRON HYDROCHLORIDE 4 MG: 2 SOLUTION INTRAMUSCULAR; INTRAVENOUS at 13:49

## 2020-10-16 RX ADMIN — GLYCOPYRROLATE 0.6 MG: 0.2 INJECTION INTRAMUSCULAR; INTRAVENOUS at 13:49

## 2020-10-16 RX ADMIN — CEFAZOLIN SODIUM 2 G: 2 INJECTION, SOLUTION INTRAVENOUS at 07:47

## 2020-10-16 RX ADMIN — ROCURONIUM BROMIDE 20 MG: 10 INJECTION INTRAVENOUS at 08:42

## 2020-10-16 RX ADMIN — FENTANYL CITRATE 50 MCG: 50 INJECTION INTRAMUSCULAR; INTRAVENOUS at 07:42

## 2020-10-16 RX ADMIN — ROSUVASTATIN CALCIUM 10 MG: 10 TABLET, FILM COATED ORAL at 20:53

## 2020-10-16 RX ADMIN — PHENYLEPHRINE HYDROCHLORIDE 100 MCG: 10 INJECTION INTRAVENOUS at 13:05

## 2020-10-16 RX ADMIN — CEFAZOLIN SODIUM 2 G: 2 INJECTION, SOLUTION INTRAVENOUS at 18:43

## 2020-10-16 RX ADMIN — PROPOFOL 150 MG: 10 INJECTION, EMULSION INTRAVENOUS at 07:42

## 2020-10-16 RX ADMIN — SODIUM CHLORIDE, POTASSIUM CHLORIDE, SODIUM LACTATE AND CALCIUM CHLORIDE: 600; 310; 30; 20 INJECTION, SOLUTION INTRAVENOUS at 10:20

## 2020-10-16 NOTE — OP NOTE
Operative Note    [facility]      Patient:      Pretty Tovar        :     1955     Date of Operations:  10/16/2020    PREOPERATIVE DIAGNOSES:   1.  Uterovaginal prolapse, N81.2  2.  Cystocele, N81.11, N81.12   3.  Rectocele, N81.6  4.  Enterocele, N81.5   5.  Stress urinary incontinence, N39.3  6.  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 Laparoscopic uterosacral ligament colpopexy, 78485   2. Laparoscopic paravaginal repair, 03448  3. Laparoscopic hysterectomy with bilateral salpingectomy, 73629  4.  Laparoscopic abdominal enterocele repair, 39916  5.  Pubovaginal sling, retropubic, porcine, 79829  6.  Posterior colporrhaphy, 18885   7.  Cystourethroscopy     FINDING(S): On cystourethroscopy following all the procedures, there was bilateral efflux of Pyridium stained urine from both the right and the left ureteral orifices. There were no lesions or foreign material of the bladder or the urethra. The right ovary and the left ovary both appeared normal and remained in situ.       SPECIMEN(S):   1. Left fallopian tube    2. Right fallopian tube  3. Uterus and cervix        DESCRIPTION OF PROCEDURE(S): The patient was taken to the Operating Room with a peripheral IV in place. She underwent the induction of general endotracheal anesthesia, was prepped and draped in the dorsal lithotomy position in [facility]. Cystourethroscopy showed no lesions or foreign material of the bladder or the urethra and there was bilateral efflux of Pyridium stained urine from both the right and the left   ureteral orifices. The cystoscope was removed and a De Los Santos catheter was placed and set to straight drainage. A uterine manipulator was sewn into the uterine cervix for manipulation.   A left upper quadrant skin incision was made, a Veress needle inserted, and a  pneumoperitoneum introduced. The Veress needle was removed and a 5 millimeter Optiview was placed in the left upper quadrant. Under direct visualization, an 11 millimeter left lateral and 5 milliliter suprapubic, subumbilical, and right lateral ports were placed. The patient was placed in Trendelenburg and the bowel lifted superiorly. The left fallopian and right fallopian tubes were removed with the Harmonic scalpel.   The Harmonic scalpel was used to create a bladder flap anteriorly and to skeletonize and take the uterine arteries bilaterally. The monopolar J-hook cautery and the Harmonic scalpel was used to completely remove and detach the cervix from the vaginal apex. The uterus with cervix and the left and right fallopian tubes were passed off the field and sent to Pathology.  Hemostasis was excellent. The apex of the vagina was closed laparoscopically with interrupted 0 Vicryl sutures. With a Breisky retractor in the vagina, the posterior rectovaginal fascia was dissected from the rectovaginal serosa and with a Lucite dilator in the vagina, the pubocervical fascia was dissected from the pubocervical serosa. With a Briesky retractor in the vagina, the paravaginal tissue on the left and on the right was reattached with #1 Prolene and the uterosacral ligaments were attached to the paravaginal tissue ipsilaterally bilaterally to accomplish the laparoscopic bilateral paravaginal repair. With a Breisky retractor in the vagina, the uterosacral ligaments were placed on stretch and #1 Prolene was used to go through the right uterosacral ligament, the right rectovaginal fascia, the right pubocervical fascia and held. #1 Prolene was used to go through the left uterosacral ligament, the left rectovaginal fascia, the left pubocervical fascia and both these sutures were tied accomplishing the laparoscopic uterosacral ligament colpopexy.  Additional #1 Prolene was used to repeat the laparoscopic uterosacral ligament  colpopexy superior to the first repair. #1 Prolene was used through the abdominal approach of a laparoscopic enterocele repair attaching the superior most portion of the detached rectovaginal fascia and the pubocervical fascia at the apex of the vagina in the midline to each ipsilateral uterosacral ligament and these sutures were tied laparoscopically to accomplish the laparoscopic abdominal enterocele repair. Cystourethroscopy showed no lesions or foreign material of the bladder or the urethra and there was bilateral efflux of Pyridium stained urine from both the right and left ureteral orifices. The cystoscope was removed and a De Los Santos catheter was placed and set to straight drainage. The CO2 gas was allowed to escape and the left 11 millimeter port was closed with Sherman Hebert Sure Close with 0 Vicryl. The remainder of the ports were removed under direct visualization and all were hemostatic and these were closed with 4-0 monocryl. A suburethral incision was made and dissected bilaterally.  A sheet of porcine graft was made to form a sling. The retropubic 1 centimeter porcine sling was placed with transvaginal retropubic introducers. The sling made of porcine biologic material is superior and safer than synthetic mesh.  Cystourethroscopy with each transvaginal introducer in place showed no lesions or foreign material of the bladder or the urethra. The pubovaginal sling was adjusted without tension so that a curved Trinidad easily fit between the suburethral tissue and the tape. The excess suprapubic graft ends were trimmed and the skin lifted away. These incisions were closed with 4- 0 Vicryl suture. With a De Los Santos catheter in place the suburethral incision was closed with 2-0 Vicryl and was hemostatic.  A posterior vaginal incision was made and the rectum dissected from the vagina.  Zero Vicryl interrupted sutures were used plicate the rectovaginal fascia in the midline to accomplish the posterior colporrhaphy and to  reconstruct the perineal body.  The vaginal epithelium was closed with 2-0 Vicryl suture.  Hemostatis was achieved with cautery.  Cystourethroscopy showed bilateral efflux of Pyridium stained urine from both the right and left ureteral orifices. There were no lesions or foreign material of the bladder or the urethra.  De Los Santos catheter was placed and set to straight drainage.   An estrogen permeated vaginal pack was placed into the vagina.  The patient was taken out of the dorsal lithotomy position, awakened from general anesthesia and taken to the Recovery Room in good condition.  All final needle, sponge, and instrument counts were reported as correct.  There were no complications to the procedure.            ESTIMATED BLOOD LOSS:  200 milliliters.      FLUIDS:  1800 milliliters crystalloid and 250 milliliters of albumin     URINE OUTPUT: 200 milliliters.                        EMILIANO BRENNER MD, MSc, FACOG, FACS

## 2020-10-16 NOTE — PLAN OF CARE
Goal Outcome Evaluation:  Plan of Care Reviewed With: patient     Outcome Summary: lap x5 w island drsgs CDI, punct x2 CDI, vag packing w/o drainage, becerra w good output, c/o min pain, tolerating sips, VSS, voiding trial scheduled for the morning

## 2020-10-16 NOTE — ANESTHESIA POSTPROCEDURE EVALUATION
"Patient: Pretty Tovar    Procedure Summary     Date: 10/16/20 Room / Location: [facility] OR [address] [place] [address] MAIN OR    Anesthesia Start: 0734 Anesthesia Stop: 1421    Procedures:       Laparoscopic uterosacral ligament colpopexy sacral colpopexy  Laparoscopic paravaginal repair Laparoscopic hysterectomy with bilateral salpingectomy  Laparoscopic abdominal enterocele repair (N/A Abdomen)      Pubovaginal sling Posterior colporrhaphy fixation Insertion of vaginal grafts Cystourethroscopy (N/A Vagina) Diagnosis:       Uterovaginal prolapse, incomplete      (Uterovaginal prolapse, incomplete [N81.2])    Surgeon: Karen Allen MD Provider: Timoteo Nino MD    Anesthesia Type: general ASA Status: 3          Anesthesia Type: general    Vitals  Vitals Value Taken Time   /63 10/16/20 1445   Temp 36.8 °C (98.2 °F) 10/16/20 1415   Pulse 92 10/16/20 1457   Resp 17 10/16/20 1445   SpO2 96 % 10/16/20 1457   Vitals shown include unvalidated device data.        Post Anesthesia Care and Evaluation    Patient location during evaluation: bedside  Patient participation: complete - patient participated  Level of consciousness: awake and alert  Pain management: adequate  Airway patency: patent  Anesthetic complications: No anesthetic complications    Cardiovascular status: acceptable  Respiratory status: acceptable  Hydration status: acceptable    Comments: /63   Pulse 88   Temp 36.8 °C (98.2 °F) (Oral)   Resp 17   Ht 152.4 cm (60\")   Wt 67.8 kg (149 lb 7.6 oz)   LMP  (LMP Unknown)   SpO2 100%   BMI 29.19 kg/m²       "

## 2020-10-16 NOTE — H&P
"Female [facility]   History & Physical    Patient Identification:  Name: Pretty Tovar Age: 64 y.o. Sex: female :  1955 MRN: Female Pelvic Medicine and Reconstructive [facility]   History & Physical    Patient Identification:  Name: Pretty Tovar Age: 64 y.o. Sex: female :  1955 MRN: [id number]                            Problem List:    Uterovaginal prolapse, incomplete    Cystocele, midline    Cystocele, lateral    Cystocele, lateral    Vaginal enterocele    Rectocele    Loss of perineal body, female    Female stress incontinence    Feeling of incomplete bladder emptying    Incompetence of pubocervical tissue    Incompetence of rectovaginal tissue    Past Medical History:  Past Medical History:   Diagnosis Date   • Abnormal liver function tests 2017    Description: , \"-\"hepatitis profile   • Anesthesia complication     SLOW TO WAKE UP   • Colon polyps    • Diabetes mellitus (CMS/HCC)    • H/O bone density study 10/2010    normal   • H/O cardiovascular stress test    • H/O echocardiogram 2001    2-D   • Hyperkalemia 2019   • Hyperlipidemia    • Hypertension    • Hypothyroidism    • Osteoarthritis of knee 2005    left   • Plantar warts     foot   • Pregnancy      twins x1   • Psoriasis    • Urine incontinence    • Uterovaginal prolapse      Past Surgical History:  Past Surgical History:   Procedure Laterality Date   • BREAST BIOPSY Right     benign   • COLONOSCOPY W/ POLYPECTOMY  2018    Dr Resendiz, 6 mm rectal polyp - hyperplastic   • TUBAL ABDOMINAL LIGATION        Home Meds:  Medications Prior to Admission   Medication Sig Dispense Refill Last Dose   • aspirin 81 MG EC tablet Take 81 mg by mouth Daily. INSTRUCTED PT TO FOLLOW MD INSTRUCTIONS REGARDING HOLDING FOR SURGERY   Past Week at Unknown time   • BD PEN NEEDLE SEUN U/F 32G X 4 MM misc USE 1 PEN NEEDLE WITH EACH  VICTOZA INJECTION 100 each 1 10/15/2020 at Unknown time "   • CHLORHEXIDINE GLUCONATE CLOTH EX Apply  topically. AS DIRECTED PREOP   10/16/2020 at 0415   • Cinnamon 500 MG tablet Take 1,000 mg by mouth 2 (two) times a day.   10/15/2020 at Unknown time   • Crestor 20 MG tablet TAKE 1/2 TABLET BY MOUTH EVERY DAY 45 tablet 5 10/15/2020 at 2200   • ENSTILAR 0.005-0.064 % foam APPLY TO PSORIASIS ON EXTREMITIES DAILY FOR 3 TO 4 DAYS THEN STOP REST OF WEEK, THEN REPEAT  4 10/15/2020 at Unknown time   • glucose blood test strip Use to check blood sugars once daily 100 each 3 10/15/2020 at Unknown time   • levothyroxine (SYNTHROID, LEVOTHROID) 88 MCG tablet Take 1 tablet by mouth Daily. 90 tablet 1 10/16/2020 at 0400   • lisinopril (PRINIVIL,ZESTRIL) 20 MG tablet Take 1 tablet by mouth Daily. 90 tablet 3 10/15/2020 at 0800   • lisinopril-hydrochlorothiazide (PRINZIDE,ZESTORETIC) 20-25 MG per tablet Take 1 tablet by mouth Daily. 90 tablet 3 10/15/2020 at 0800   • metFORMIN (GLUCOPHAGE) 500 MG tablet Take 3 pills in am and 2 in pm 450 tablet 3 10/15/2020 at 2200   • ONETOUCH VERIO test strip USE TO CHECK BLOOD SUGARS ONCE DAILY 100 each 4 10/15/2020 at Unknown time   • VITAMIN E PO Take 1 capsule by mouth Daily. HOLD PRIOR TO SURG   Past Week at Unknown time   • Liraglutide (Victoza) 18 MG/3ML solution pen-injector injection Inject 1.2 mg under the skin into the appropriate area as directed Daily. 6 pen 3 10/14/2020   • TACLONEX 0.005-0.064 % external suspension APPLY TO PSORIASIS ON SCALP/TRUNK/EXTREMITIES EVERY OTHER DAY AS NEEDED  5 Unknown at Unknown time     Current Meds:     Current Facility-Administered Medications:   •  ceFAZolin in dextrose (ANCEF) IVPB solution 2 g, 2 g, Intravenous, Once, Karen Allen MD, 2 g at 10/16/20 0728  •  lactated ringers infusion, 9 mL/hr, Intravenous, Continuous, Timoteo Nino MD  •  lidocaine PF 1% (XYLOCAINE) injection 0.5 mL, 0.5 mL, Injection, Once PRN, Timoteo Nino MD  •  midazolam (VERSED) injection 1 mg, 1 mg,  Intravenous, Q10 Min MICHAEL, Timoteo Nino MD  •  sodium chloride 0.9 % flush 10 mL, 10 mL, Intravenous, PRN, Karen Allen MD  •  sodium chloride 0.9 % flush 3 mL, 3 mL, Intravenous, Q12H, Karen Allen MD  •  sodium chloride 0.9 % flush 3 mL, 3 mL, Intravenous, Q12H, Timoteo Nino MD  •  sodium chloride 0.9 % flush 3-10 mL, 3-10 mL, Intravenous, PRN, Timoteo Nino MD  Allergies:  No Known Allergies  Immunizations:  Immunization History   Administered Date(s) Administered   • Flu Vaccine Quad PF >18YRS 09/27/2019   • Flu Vaccine Quad PF >36MO 10/18/2017   • Influenza, Unspecified 10/30/2018, 09/27/2019   • Pneumococcal Polysaccharide (PPSV23) 03/21/2017   • Shingrix 04/30/2018, 08/29/2018, 11/27/2018   • Tdap 04/12/2018     Social History:   Social History     Tobacco Use   • Smoking status: Never Smoker   • Smokeless tobacco: Never Used   • Tobacco comment: daily caffine   Substance Use Topics   • Alcohol use: No      Family History:  Family History   Problem Relation Age of Onset   • Heart disease Father    • Hypertension Father    • Diabetes Brother    • Heart disease Maternal Grandmother    • Other Son         TWIN   • Other Daughter         TWIN    • BRCA 1/2 Neg Hx    • Breast cancer Neg Hx    • Colon cancer Neg Hx    • Endometrial cancer Neg Hx    • Ovarian cancer Neg Hx    • Malig Hyperthermia Neg Hx         Review of Systems  Constitutional:  Denies fever or chills   Eyes:  Denies change in visual acuity   HEENT:  Denies nasal congestion or sore throat   Respiratory:  Denies cough or shortness of breath   Cardiovascular:  Denies chest pain or edema   GI:  Denies abdominal pain, nausea, vomiting, bloody stools or diarrhea   :  Denies dysuria   Musculoskeletal:  Denies back pain or joint pain   Integument:  Denies rash   Neurologic:  Denies headache, focal weakness or sensory changes   Endocrine:  Denies polyuria or polydipsia   Lymphatic:  Denies swollen glands   Psychiatric:   "Denies depression or anxiety       Objective:  tMax 24 hrs: Temp (24hrs), Av °F (36.7 °C), Min:98 °F (36.7 °C), Max:98 °F (36.7 °C)    Vitals Ranges:   Temp:  [98 °F (36.7 °C)] 98 °F (36.7 °C)  Heart Rate:  [96] 96  Resp:  [18] 18  BP: (156)/(63) 156/63    Exam:  Vital Signs: /63 (BP Location: Right arm, Patient Position: Lying)   Pulse 96   Temp 98 °F (36.7 °C) (Oral)   Resp 18   Ht 152.4 cm (60\")   Wt 67.8 kg (149 lb 7.6 oz)   LMP  (LMP Unknown)   SpO2 100%   BMI 29.19 kg/m²   Constitutional:  Well developed, well nourished, no acute distress, non-toxic appearance    GI:  Soft, nondistended, normal bowel sounds, nontender, no organomegaly, no mass, no rebound, no guarding   :  No costovertebral angle tenderness   Musculoskeletal:  No edema, no tenderness, no deformities. Back- no tenderness  Integument:  Well hydrated, no rash   Lymphatic:  No lymphadenopathy noted   Neurologic:  Alert & oriented x 3,  Pelvic:     POPQ  +2  +2  -7   5     2     10  +1  +1   -7     Pap and emb negative  2020     LPP 53 cm H2O  32 ml/ss     Assessment:    Uterovaginal prolapse, incomplete    Cystocele, midline    Cystocele, lateral    Cystocele, lateral    Vaginal enterocele    Rectocele    Loss of perineal body, female    Female stress incontinence    Feeling of incomplete bladder emptying    Incompetence of pubocervical tissue    Incompetence of rectovaginal tissue        Plan:  Laparoscopic uterosacral ligament colpopexy sacral colpopexy    Laparoscopic paravaginal repair   Laparoscopic hysterectomy with bilateral salpingectomy   Possible laparoscopic unilateral or bilateral oophorectomy   Laparoscopic abdominal enterocele repair   Pubovaginal sling   Posterior colporrhaphy   Anterior colporrhaphy   Extraperitoneal colpopexy sacrospinous ligament fixation   Insertion of vaginal grafts   Cystourethroscopy    Karen Allen MD  10/16/2020    "

## 2020-10-16 NOTE — ANESTHESIA PREPROCEDURE EVALUATION
Anesthesia Evaluation     NPO Solid Status: > 8 hours  NPO Liquid Status: > 4 hours           Airway   Mallampati: II  TM distance: >3 FB  Neck ROM: full  no difficulty expected  Dental - normal exam     Pulmonary - normal exam   Cardiovascular - normal exam    (+) hypertension, hyperlipidemia,       Neuro/Psych  GI/Hepatic/Renal/Endo    (+)   diabetes mellitus,     Musculoskeletal     Abdominal  - normal exam   Substance History      OB/GYN          Other   arthritis,                      Anesthesia Plan    ASA 3     general     intravenous induction     Anesthetic plan, all risks, benefits, and alternatives have been provided, discussed and informed consent has been obtained with: patient.

## 2020-10-16 NOTE — ANESTHESIA PROCEDURE NOTES
Airway  Urgency: elective    Date/Time: 10/16/2020 7:46 AM  Airway not difficult    General Information and Staff    Patient location during procedure: OR  Anesthesiologist: Timoteo Nino MD  CRNA: Jeanne Posey CRNA    Indications and Patient Condition  Indications for airway management: airway protection    Preoxygenated: yes  Mask difficulty assessment: 2 - vent by mask + OA or adjuvant +/- NMBA    Final Airway Details  Final airway type: endotracheal airway      Successful airway: ETT  Cuffed: yes   Successful intubation technique: direct laryngoscopy  Facilitating devices/methods: intubating stylet  Endotracheal tube insertion site: oral  Blade: Raymond  Blade size: 3  ETT size (mm): 7.0  Cormack-Lehane Classification: grade I - full view of glottis  Placement verified by: chest auscultation and capnometry   Cuff volume (mL): 7  Measured from: lips  ETT/EBT  to lips (cm): 21  Number of attempts at approach: 1  Assessment: lips, teeth, and gum same as pre-op and atraumatic intubation

## 2020-10-16 NOTE — CONSULTS
"    Patient Name:  Pretty Tovar  YOB: 1955  MRN:  [id number]  Date of Admission:  10/16/2020  Date of Consult:  10/16/2020  Patient Care Team:  Violet Tang APRN as PCP - General (Family Medicine)  Alexandru Elaine MD as Referring Physician (Dermatology)  Vinay Ansari OD (Optometry)  Zachary Sibley MD as Consulting Physician (Obstetrics and Gynecology)  Karen Allen MD as Consulting Physician (Urogynecology)    Inpatient Hospitalist Consult  Consult performed by: Sagar Fowler MD  Consult ordered by: Karen Allen MD  Reason for consult: Evaluate status and make recommendations regarding treatment for diabetes and HTN        Subjective   History of Present Illness  Ms. Tovar is a 64 y.o. female that has been admitted to [facility] following Laparoscopic uterosacral ligament colpopexy sacral colpopexy  Laparoscopic paravaginal repair Laparoscopic hysterectomy with bilateral salpingectomy  Laparoscopic abdominal enterocele repair, Pubovaginal sling Posterior colporrhaphy fixation Insertion of vaginal grafts Cystourethroscopy.  She has been admitted by the GYN service and and we were asked to see and assist with her medical problems, specifically relating to her blood pressure and diabetes.  During my visit she denies any complaints.  She denies any chest pain, SOA, nausea, vomiting or diarrhea.       Past Medical History:   Diagnosis Date   • Abnormal liver function tests 2017    Description: , \"-\"hepatitis profile   • Anesthesia complication     SLOW TO WAKE UP   • Colon polyps    • Diabetes mellitus (CMS/HCC)    • H/O bone density study 10/2010    normal   • H/O cardiovascular stress test    • H/O echocardiogram 2001    2-D   • Hyperkalemia 2019   • Hyperlipidemia    • Hypertension    • Hypothyroidism    • Osteoarthritis of knee 2005    left   • Plantar warts     foot   • Pregnancy      twins x1   • Psoriasis    • Urine incontinence    "   • Uterovaginal prolapse      Past Surgical History:   Procedure Laterality Date   • BREAST BIOPSY Right 2006    benign   • COLONOSCOPY W/ POLYPECTOMY  02/21/2018    Dr Resendiz, 6 mm rectal polyp - hyperplastic   • TUBAL ABDOMINAL LIGATION       Family History   Problem Relation Age of Onset   • Heart disease Father    • Hypertension Father    • Diabetes Brother    • Heart disease Maternal Grandmother    • Other Son         TWIN   • Other Daughter         TWIN    • BRCA 1/2 Neg Hx    • Breast cancer Neg Hx    • Colon cancer Neg Hx    • Endometrial cancer Neg Hx    • Ovarian cancer Neg Hx    • Malig Hyperthermia Neg Hx      Social History     Tobacco Use   • Smoking status: Never Smoker   • Smokeless tobacco: Never Used   • Tobacco comment: daily caffine   Substance Use Topics   • Alcohol use: No   • Drug use: No     Medications Prior to Admission   Medication Sig Dispense Refill Last Dose   • aspirin 81 MG EC tablet Take 81 mg by mouth Daily. INSTRUCTED PT TO FOLLOW MD INSTRUCTIONS REGARDING HOLDING FOR SURGERY   Past Week at Unknown time   • BD PEN NEEDLE SEUN U/F 32G X 4 MM misc USE 1 PEN NEEDLE WITH EACH  VICTOZA INJECTION 100 each 1 10/15/2020 at Unknown time   • Cinnamon 500 MG tablet Take 1,000 mg by mouth 2 (two) times a day.   10/15/2020 at Unknown time   • Crestor 20 MG tablet TAKE 1/2 TABLET BY MOUTH EVERY DAY 45 tablet 5 10/15/2020 at 2200   • ENSTILAR 0.005-0.064 % foam APPLY TO PSORIASIS ON EXTREMITIES DAILY FOR 3 TO 4 DAYS THEN STOP REST OF WEEK, THEN REPEAT  4 10/15/2020 at Unknown time   • glucose blood test strip Use to check blood sugars once daily 100 each 3 10/15/2020 at Unknown time   • levothyroxine (SYNTHROID, LEVOTHROID) 88 MCG tablet Take 1 tablet by mouth Daily. 90 tablet 1 10/16/2020 at 0400   • lisinopril (PRINIVIL,ZESTRIL) 20 MG tablet Take 1 tablet by mouth Daily. 90 tablet 3 10/15/2020 at 0800   • lisinopril-hydrochlorothiazide (PRINZIDE,ZESTORETIC) 20-25 MG per tablet Take 1 tablet by  mouth Daily. 90 tablet 3 10/15/2020 at 0800   • metFORMIN (GLUCOPHAGE) 500 MG tablet Take 3 pills in am and 2 in pm 450 tablet 3 10/15/2020 at 2200   • ONETOUCH VERIO test strip USE TO CHECK BLOOD SUGARS ONCE DAILY 100 each 4 10/15/2020 at Unknown time   • VITAMIN E PO Take 1 capsule by mouth Daily. HOLD PRIOR TO SURG   Past Week at Unknown time   • Liraglutide (Victoza) 18 MG/3ML solution pen-injector injection Inject 1.2 mg under the skin into the appropriate area as directed Daily. 6 pen 3 10/14/2020   • TACLONEX 0.005-0.064 % external suspension APPLY TO PSORIASIS ON SCALP/TRUNK/EXTREMITIES EVERY OTHER DAY AS NEEDED  5 Unknown at Unknown time     Allergies:  Patient has no known allergies.    Review of Systems   Constitutional: Negative.    HENT: Negative.    Respiratory: Negative.    Cardiovascular: Negative.    Gastrointestinal: Positive for abdominal pain.   Endocrine: Negative.    Genitourinary: Negative.    Musculoskeletal: Negative.    Skin: Negative.    Neurological: Negative.    Hematological: Negative.    Psychiatric/Behavioral: Negative.        Objective      Vital Signs  Temp:  [97.2 °F (36.2 °C)-98.2 °F (36.8 °C)] 97.2 °F (36.2 °C)  Heart Rate:  [81-97] 94  Resp:  [14-18] 16  BP: ()/(49-65) 129/65  Body mass index is 29.19 kg/m².    Physical Exam  Constitutional:       General: She is not in acute distress.     Appearance: Normal appearance. She is not toxic-appearing.   HENT:      Head: Normocephalic and atraumatic.      Nose: Nose normal.   Eyes:      General: No scleral icterus.     Extraocular Movements: Extraocular movements intact.   Neck:      Musculoskeletal: Normal range of motion and neck supple.   Cardiovascular:      Rate and Rhythm: Normal rate and regular rhythm.      Heart sounds: No murmur.   Pulmonary:      Effort: Pulmonary effort is normal.      Breath sounds: Normal breath sounds.   Abdominal:      General: Bowel sounds are normal. There is no distension.      Palpations:  Abdomen is soft. There is no mass.      Tenderness: There is no abdominal tenderness.   Musculoskeletal: Normal range of motion.         General: No deformity.   Skin:     General: Skin is warm and dry.      Findings: No rash.   Neurological:      Mental Status: She is alert and oriented to person, place, and time. Mental status is at baseline.   Psychiatric:         Mood and Affect: Mood normal.         Thought Content: Thought content normal.         Results Review:   I reviewed the patient's new clinical results.  I reviewed the patient's new imaging results and agree with the interpretation.  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  · Will hold thiazide diuretic in the postoperative setting.  · Monitor closely.   · Holding parameters have been written for lisinopril.     Type 2 diabetes mellitus, without long-term current use of insulin   Mild nonproliferative diabetic retinopathy without macular edema associated with type 2 diabetes mellitus (CMS/[facility])  · Known complications include retinopathy  · Hold home oral diabetic medications.  · HUMALOG moderate dose SSI as needed.  · Monitor glucose QAC and QHS. For any BG less than 70 mg/dL, treat per hospital protocol.  Will aim for premeal blood glucose less than 140  mg/dL.  · HgbA1C was 5.9% per patient last time checked.   · NCS diet.    Hyperlipidemia   · Continue home dose Crestor.   · Continue outpatient monitoring with her PCP.     Hypothyroidism  · Continue home dose Synthroid.    Thank you very much for asking A to be involved in this patient's care. We will follow along with you.      Sagar Fowler MD  [facility]  10/16/20  17:23 EDT

## 2020-10-17 ENCOUNTER — READMISSION MANAGEMENT (OUTPATIENT)
Dept: CALL CENTER | Facility: HOSPITAL | Age: 65
End: 2020-10-17

## 2020-10-17 VITALS
WEIGHT: 149.47 LBS | DIASTOLIC BLOOD PRESSURE: 56 MMHG | OXYGEN SATURATION: 99 % | HEIGHT: 60 IN | TEMPERATURE: 97.1 F | HEART RATE: 78 BPM | RESPIRATION RATE: 16 BRPM | SYSTOLIC BLOOD PRESSURE: 99 MMHG | BODY MASS INDEX: 29.35 KG/M2

## 2020-10-17 LAB
ANION GAP SERPL CALCULATED.3IONS-SCNC: 5.7 MMOL/L (ref 5–15)
BUN SERPL-MCNC: 5 MG/DL (ref 8–23)
BUN/CREAT SERPL: 10.6 (ref 7–25)
CALCIUM SPEC-SCNC: 8 MG/DL (ref 8.6–10.5)
CHLORIDE SERPL-SCNC: 103 MMOL/L (ref 98–107)
CO2 SERPL-SCNC: 29.3 MMOL/L (ref 22–29)
CREAT SERPL-MCNC: 0.47 MG/DL (ref 0.57–1)
DEPRECATED RDW RBC AUTO: 41.3 FL (ref 37–54)
ERYTHROCYTE [DISTWIDTH] IN BLOOD BY AUTOMATED COUNT: 13.4 % (ref 12.3–15.4)
GFR SERPL CREATININE-BSD FRML MDRD: 133 ML/MIN/1.73
GLUCOSE SERPL-MCNC: 148 MG/DL (ref 65–99)
HCT VFR BLD AUTO: 23.8 % (ref 34–46.6)
HGB BLD-MCNC: 7.9 G/DL (ref 12–15.9)
LAB AP CASE REPORT: NORMAL
MCH RBC QN AUTO: 28.3 PG (ref 26.6–33)
MCHC RBC AUTO-ENTMCNC: 33.2 G/DL (ref 31.5–35.7)
MCV RBC AUTO: 85.3 FL (ref 79–97)
PATH REPORT.FINAL DX SPEC: NORMAL
PATH REPORT.GROSS SPEC: NORMAL
PLATELET # BLD AUTO: 253 10*3/MM3 (ref 140–450)
PMV BLD AUTO: 10.4 FL (ref 6–12)
POTASSIUM SERPL-SCNC: 3.5 MMOL/L (ref 3.5–5.2)
RBC # BLD AUTO: 2.79 10*6/MM3 (ref 3.77–5.28)
SODIUM SERPL-SCNC: 138 MMOL/L (ref 136–145)
WBC # BLD AUTO: 9.18 10*3/MM3 (ref 3.4–10.8)

## 2020-10-17 PROCEDURE — 80048 BASIC METABOLIC PNL TOTAL CA: CPT | Performed by: OBSTETRICS & GYNECOLOGY

## 2020-10-17 PROCEDURE — G0378 HOSPITAL OBSERVATION PER HR: HCPCS

## 2020-10-17 PROCEDURE — 25010000003 CEFAZOLIN IN DEXTROSE 2-4 GM/100ML-% SOLUTION: Performed by: OBSTETRICS & GYNECOLOGY

## 2020-10-17 PROCEDURE — 25010000002 ENOXAPARIN PER 10 MG: Performed by: OBSTETRICS & GYNECOLOGY

## 2020-10-17 PROCEDURE — 85027 COMPLETE CBC AUTOMATED: CPT | Performed by: OBSTETRICS & GYNECOLOGY

## 2020-10-17 RX ADMIN — IBUPROFEN 400 MG: 400 TABLET, FILM COATED ORAL at 12:11

## 2020-10-17 RX ADMIN — CEFAZOLIN SODIUM 2 G: 2 INJECTION, SOLUTION INTRAVENOUS at 04:06

## 2020-10-17 RX ADMIN — ASPIRIN 81 MG: 81 TABLET, COATED ORAL at 08:22

## 2020-10-17 RX ADMIN — LEVOTHYROXINE SODIUM 88 MCG: 88 TABLET ORAL at 08:22

## 2020-10-17 RX ADMIN — IBUPROFEN 400 MG: 400 TABLET, FILM COATED ORAL at 08:22

## 2020-10-17 RX ADMIN — ENOXAPARIN SODIUM 40 MG: 40 INJECTION SUBCUTANEOUS at 08:22

## 2020-10-17 RX ADMIN — IBUPROFEN 400 MG: 400 TABLET, FILM COATED ORAL at 04:04

## 2020-10-17 NOTE — DISCHARGE SUMMARY
Physician Discharge Summary  Patient Identification:  Name: Tae Tovar  Age: 64 y.o.  Sex: female  :  1955  MRN: [id number]    Admit date: 10/16/2020    Discharge date and time: No discharge date for patient encounter.    Admitting Physician: aKren Allen MD    Discharge Physician: Karen Allen MD    Admission Diagnoses: Uterovaginal prolapse, incomplete [N81.2]  Uterovaginal prolapse, incomplete [N81.2]  Uterovaginal prolapse, incomplete [N81.2]    Hospital Problems:   Principal Problem:    Uterovaginal prolapse, incomplete  Active Problems:    Cystocele, midline    Cystocele, lateral    Cystocele, lateral    Vaginal enterocele    Rectocele    Loss of perineal body, female    Female stress incontinence    Feeling of incomplete bladder emptying    Incompetence of pubocervical tissue    Incompetence of rectovaginal tissue        Discharge Diagnoses: same    Discharged Condition: good    Hospital Course: surgery and post operative care    Consults:   A   Dr. Fowler    Discharge Exam:  Results for TAE TOVAR (MRN [id number]) as of 10/17/2020 15:27   Ref. Range 10/17/2020 06:04   Hemoglobin Latest Ref Range: 12.0 - 15.9 g/dL 7.9 (L)   Hematocrit Latest Ref Range: 34.0 - 46.6 % 23.8 (L)     Ambulating  Tolerating diet  Pain controlled on oral medications  VSS WNL afebrile    Disposition:  Home    Patient Instructions:     Patient already has all post operative medications at home.  Copies scanned to media of medications and SINA report.  Patient already has follow up arrangements made.      Signed:  Karen Allen MD  10/17/2020

## 2020-10-17 NOTE — OUTREACH NOTE
Prep Survey      Responses   [facility] patient discharged from?  [place]   Is LACE score < 7 ?  Yes   [facility]   Date of Admission  10/16/20   Date of Discharge  10/17/20   Discharge Disposition  Home or Self Care   Discharge diagnosis  Uterovaginal prolapse,    Laparoscopic uterosacral ligament colpopexy sacral colpopexy  Laparoscopic paravaginal repair Laparoscopic hysterectomy with bilateral salpingectomy  Laparoscopic abdominal enterocele repair   Does the patient have one of the following disease processes/diagnoses(primary or secondary)?  General Surgery   Does the patient have Home health ordered?  No   Is there a DME ordered?  No   Prep survey completed?  Yes          Paula Iglesias RN

## 2020-10-17 NOTE — PROGRESS NOTES
Case Management Discharge Note      Final Note: Discharge home. Karen Whaley RN         Selected Continued Care - Discharged on 10/17/2020 Admission date: 10/16/2020 - Discharge disposition: Home or Self Care   Transportation Services  Private: Car    Final Discharge Disposition Code: 01 - home or self-care

## 2020-10-17 NOTE — PROGRESS NOTES
Name: Pretty Tovar ADMIT: 10/16/2020   : 1955  PCP: Violet Tang APRN    MRN: [id number] LOS: 0 days   AGE/SEX: 64 y.o. female  ROOM: [facility]     Subjective   Subjective   She denies any chest pain, SOA, nausea, vomiting or diarrhea.     Review of Systems     Objective   Objective   Vital Signs  Temp:  [96.5 °F (35.8 °C)-98.2 °F (36.8 °C)] 98.1 °F (36.7 °C)  Heart Rate:  [] 88  Resp:  [14-17] 16  BP: ()/(49-79) 97/53  SpO2:  [93 %-100 %] 98 %  on  Flow (L/min):  [2-4] 2;   Device (Oxygen Therapy): room air  Body mass index is 29.19 kg/m².  Physical Exam  Constitutional:       General: She is not in acute distress.     Appearance: She is not diaphoretic.   Cardiovascular:      Rate and Rhythm: Normal rate and regular rhythm.      Heart sounds: No murmur.   Pulmonary:      Effort: Pulmonary effort is normal.      Breath sounds: Normal breath sounds.   Abdominal:      General: Bowel sounds are normal. There is no distension.      Palpations: Abdomen is soft.      Tenderness: There is no abdominal tenderness.   Musculoskeletal: Normal range of motion.   Skin:     General: Skin is warm and dry.   Neurological:      Mental Status: She is alert and oriented to person, place, and time.         Results Review     I reviewed the patient's new clinical results.  Results from last 7 days   Lab Units 10/17/20  0604   WBC 10*3/mm3 9.18   HEMOGLOBIN g/dL 7.9*   PLATELETS 10*3/mm3 253     Results from last 7 days   Lab Units 10/17/20  0604   SODIUM mmol/L 138   POTASSIUM mmol/L 3.5   CHLORIDE mmol/L 103   CO2 mmol/L 29.3*   BUN mg/dL 5*   CREATININE mg/dL 0.47*   GLUCOSE mg/dL 148*   Estimated Creatinine Clearance: 103.8 mL/min (A) (by C-G formula based on SCr of 0.47 mg/dL (L)).    Results from last 7 days   Lab Units 10/17/20  0604   CALCIUM mg/dL 8.0*       SARS-CoV-2 PCR   Date Value Ref Range Status   10/14/2020 Not Detected Not Detected Final     Comment:     Nucleic acid specific to  SARS-CoV-2 (COVID-19) virus was not detected inthis sample by the TaqPath (TM) COVID-19 Combo Kit.SARS-CoV-2 (COVID-19) nucleic acid testing performed using [employer] Aptima (R) SARS-CoV-2 Assay or [employer] TaqPath   (TM)COVID-19 Combo Kit as indicated above under Emergency Use Authorization (EUA) from the FDA. Aptima (R) and TaqPath (TM) test performancecharacteristics verified by [facility] in accordance with the FDAs Guidance Document (Policy for Diagnostic   Tests for Coronavirus Disease-2019during the Public Health Emergency) issued on March 16, 2020. The laboratory is regulated under CLIA as qualified to perform high-complexity testingUnless otherwise noted, all testing was performed at [facility],   CLIA No. [id number], [place] State Licensee No. [id number]     Glucose   Date/Time Value Ref Range Status   10/16/2020 1418 196 (H) 70 - 130 mg/dL Final   10/16/2020 0614 131 (H) 70 - 130 mg/dL Final       XR Chest 2 View  .Xray results for review.    Scheduled Medications  aspirin, 81 mg, Oral, Daily  enoxaparin, 40 mg, Subcutaneous, Daily  ibuprofen, 400 mg, Oral, Q4H  levothyroxine, 88 mcg, Oral, Daily  lisinopril, 20 mg, Oral, Nightly  rosuvastatin, 10 mg, Oral, Nightly    Infusions  dextrose 5 % and sodium chloride 0.45 %, 100 mL/hr, Last Rate: 100 mL/hr (10/16/20 1625)  lactated ringers, 9 mL/hr, Last Rate: Stopped (10/16/20 1625)    Diet  Diet Regular       Assessment/Plan     Active Hospital Problems    Diagnosis  POA   • **Uterovaginal prolapse, incomplete [N81.2]  Yes   • Cystocele, midline [N81.11]  Yes   • Cystocele, lateral [N81.12]  Yes   • Cystocele, lateral [N81.12]  Yes   • Vaginal enterocele [N81.5]  Yes   • Rectocele [N81.6]  Yes   • Loss of perineal body, female [N81.89]  Yes   • Female stress incontinence [N39.3]  Yes   • Feeling of incomplete bladder emptying [R39.14]  Yes   • Incompetence of pubocervical tissue [N81.82]  Yes   • Incompetence of rectovaginal tissue [N81.83]  Yes         Resolved Hospital Problems   No resolved problems to display.       Hypertension  · BP lower this morning with reading of 97/53.  · Continue to hold thiazide diuretic in the postoperative setting.  · Will hold lisinopril as well.      Type 2 diabetes mellitus, without long-term current use of insulin   Mild nonproliferative diabetic retinopathy without macular edema associated with type 2 diabetes mellitus (CMS/[facility])  · Known complications include retinopathy  · Hold home oral diabetic medications.  · HUMALOG moderate dose SSI as needed.  · HgbA1C was 5.9% per patient last time checked.   · NCS diet.     Hyperlipidemia   · Continue home dose Crestor.   · Continue outpatient monitoring with her PCP.      Hypothyroidism  · Continue home dose Synthroid.      ** Patient medically stable.  Okay with me to discharge.  She has experience with checking her blood pressure at home.  We discussed checking her blood pressure twice daily and resuming her antihypertensive medications and back one at a time if SBP consistently over 120.  She verbalized understanding.  No other pressing issues.  Okay with me to discharge assuming okay with surgical team.  Note she is anemic but seems asymptomatic.      Sagar Fowler MD  [facility]  10/17/20  12:09 EDT

## 2020-10-17 NOTE — PLAN OF CARE
Goal Outcome Evaluation:  Plan of Care Reviewed With: patient     Outcome Summary: ivf, pain controlled with scheduled motrin, walked, f/c and vag pkg d/c 0615-dtv, aware of voiding trial, prob d/c today

## 2020-10-19 ENCOUNTER — TRANSITIONAL CARE MANAGEMENT TELEPHONE ENCOUNTER (OUTPATIENT)
Dept: CALL CENTER | Facility: HOSPITAL | Age: 65
End: 2020-10-19

## 2020-10-19 NOTE — OUTREACH NOTE
Call Center TCM Note      Responses   [facility] patient discharged from?  [place]   Does the patient have one of the following disease processes/diagnoses(primary or secondary)?  General Surgery   TCM attempt successful?  Yes   Call start time  1641   Call end time  1643   Discharge diagnosis  Uterovaginal prolapse,  Laparoscopic uterosacral ligament colpopexy sacral colpopexy  Laparoscopic paravaginal repair Laparoscopic hysterectomy with bilateral salpingectomy  Laparoscopic abdominal enterocele repair   Meds reviewed with patient/caregiver?  N/A   Does the patient have all medications related to this admission filled (includes all antibiotics, pain medications, etc.)  N/A   Is the patient taking all medications as directed (includes completed medication regime)?  N/A   Does the patient have a follow up appointment scheduled with their surgeon?  Yes   Has the patient kept scheduled appointments due by today?  N/A   Comments  Patient will f/u with her surgeon.   Psychosocial issues?  No   Did the patient receive a copy of their discharge instructions?  Yes   Nursing interventions  Reviewed instructions with patient   What is the patient's perception of their health status since discharge?  Improving   Nursing interventions  Nurse provided patient education   Is the patient /caregiver able to teach back basic post-op care?  Continue use of incentive spirometry at least 1 week post discharge, Practice 'cough and deep breath', Drive as instructed by MD in discharge instructions, Take showers only when approved by MD-sponge bathe until then, No tub bath, swimming, or hot tub until instructed by MD, Keep incision areas clean,dry and protected, Do not remove steri-strips, Lifting as instructed by MD in discharge instructions   Is the patient/caregiver able to teach back signs and symptoms of incisional infection?  Fever   Is the patient/caregiver able to teach back the hierarchy of who to call/visit for  symptoms/problems? PCP, Specialist, Home health nurse, Urgent Care, ED, 911  Yes   TCM call completed?  Yes   Wrap up additional comments  Patient states she is doing well, no questions or concerns at this time.          Barbie Armas RN    10/19/2020, 16:43 EDT

## 2020-10-27 ENCOUNTER — OFFICE VISIT (OUTPATIENT)
Dept: INTERNAL MEDICINE | Facility: CLINIC | Age: 65
End: 2020-10-27

## 2020-10-27 VITALS
WEIGHT: 147 LBS | TEMPERATURE: 98.3 F | OXYGEN SATURATION: 98 % | HEART RATE: 83 BPM | BODY MASS INDEX: 28.86 KG/M2 | SYSTOLIC BLOOD PRESSURE: 104 MMHG | HEIGHT: 60 IN | DIASTOLIC BLOOD PRESSURE: 56 MMHG

## 2020-10-27 DIAGNOSIS — D64.9 ANEMIA, UNSPECIFIED TYPE: ICD-10-CM

## 2020-10-27 DIAGNOSIS — N81.5 VAGINAL ENTEROCELE: ICD-10-CM

## 2020-10-27 DIAGNOSIS — Z09 HOSPITAL DISCHARGE FOLLOW-UP: Primary | ICD-10-CM

## 2020-10-27 DIAGNOSIS — Z90.710 STATUS POST HYSTERECTOMY: ICD-10-CM

## 2020-10-27 DIAGNOSIS — R25.2 LEG CRAMPS: ICD-10-CM

## 2020-10-27 LAB
ALBUMIN SERPL-MCNC: 4.2 G/DL (ref 3.5–5.2)
ALBUMIN/GLOB SERPL: 1.6 G/DL
ALP SERPL-CCNC: 58 U/L (ref 39–117)
ALT SERPL-CCNC: 10 U/L (ref 1–33)
AST SERPL-CCNC: 17 U/L (ref 1–32)
BASOPHILS # BLD AUTO: 0.04 10*3/MM3 (ref 0–0.2)
BASOPHILS NFR BLD AUTO: 0.5 % (ref 0–1.5)
BILIRUB SERPL-MCNC: 0.3 MG/DL (ref 0–1.2)
BUN SERPL-MCNC: 8 MG/DL (ref 8–23)
BUN/CREAT SERPL: 17.4 (ref 7–25)
CALCIUM SERPL-MCNC: 9.3 MG/DL (ref 8.6–10.5)
CHLORIDE SERPL-SCNC: 98 MMOL/L (ref 98–107)
CO2 SERPL-SCNC: 31.3 MMOL/L (ref 22–29)
CREAT SERPL-MCNC: 0.46 MG/DL (ref 0.57–1)
EOSINOPHIL # BLD AUTO: 0.25 10*3/MM3 (ref 0–0.4)
EOSINOPHIL NFR BLD AUTO: 3.2 % (ref 0.3–6.2)
ERYTHROCYTE [DISTWIDTH] IN BLOOD BY AUTOMATED COUNT: 13.7 % (ref 12.3–15.4)
GLOBULIN SER CALC-MCNC: 2.6 GM/DL
GLUCOSE SERPL-MCNC: 95 MG/DL (ref 65–99)
HCT VFR BLD AUTO: 26.9 % (ref 34–46.6)
HGB BLD-MCNC: 8.3 G/DL (ref 12–15.9)
IMM GRANULOCYTES # BLD AUTO: 0.02 10*3/MM3 (ref 0–0.05)
IMM GRANULOCYTES NFR BLD AUTO: 0.3 % (ref 0–0.5)
LYMPHOCYTES # BLD AUTO: 1.55 10*3/MM3 (ref 0.7–3.1)
LYMPHOCYTES NFR BLD AUTO: 19.8 % (ref 19.6–45.3)
MCH RBC QN AUTO: 27 PG (ref 26.6–33)
MCHC RBC AUTO-ENTMCNC: 30.9 G/DL (ref 31.5–35.7)
MCV RBC AUTO: 87.6 FL (ref 79–97)
MONOCYTES # BLD AUTO: 0.52 10*3/MM3 (ref 0.1–0.9)
MONOCYTES NFR BLD AUTO: 6.6 % (ref 5–12)
NEUTROPHILS # BLD AUTO: 5.45 10*3/MM3 (ref 1.7–7)
NEUTROPHILS NFR BLD AUTO: 69.6 % (ref 42.7–76)
NRBC BLD AUTO-RTO: 0 /100 WBC (ref 0–0.2)
PLATELET # BLD AUTO: 426 10*3/MM3 (ref 140–450)
POTASSIUM SERPL-SCNC: 4 MMOL/L (ref 3.5–5.2)
PROT SERPL-MCNC: 6.8 G/DL (ref 6–8.5)
RBC # BLD AUTO: 3.07 10*6/MM3 (ref 3.77–5.28)
SODIUM SERPL-SCNC: 139 MMOL/L (ref 136–145)
WBC # BLD AUTO: 7.83 10*3/MM3 (ref 3.4–10.8)

## 2020-10-27 PROCEDURE — 99214 OFFICE O/P EST MOD 30 MIN: CPT | Performed by: NURSE PRACTITIONER

## 2020-10-27 NOTE — PROGRESS NOTES
"Checo Tovar is a 64 y.o. female.         She presents for hospital and surgical f/u for laparoscopic hysterectomy (ovaries were not removed) and repair of enterocele. She was admitted to the hospital for 1 night. Surgery was performed by Karen Allen. She is due to f/u with her in December. She reports she is doing very well. Surgery was on 10/16. Her pain is doing really well and she only had to take 2 pain pills, she was given Norco. She has been taking ibuprofen here and there for pain. Her hbg in the hospital was 7.9. She also reports leg cramps for the last 5 nights in a row. She has had this before but not this consistent. She reports she does drink plenty of water.  Denies fever, chills, current pain, urinary symptoms, abdominal pain, nausea, and vomiting, diarrhea, constipation, vaginal discharge, vaginal bleeding.  She does have multiple laparoscopic incision sites to her abdomen that she reports are healing well, no pain, no redness or drainage.    Patient is new to me, normally followed by Violet Tang.    Leg Pain   The incident occurred 5 to 7 days ago. There was no injury mechanism. The pain is present in the right leg and left leg. The quality of the pain is described as cramping. The pain has been intermittent since onset. Pertinent negatives include no inability to bear weight, loss of motion, loss of sensation, muscle weakness, numbness or tingling. She has tried nothing for the symptoms.        The following portions of the patient's history were reviewed and updated as appropriate: allergies, current medications, past social history and problem list.    Past Medical History:   Diagnosis Date   • Abnormal liver function tests 2/9/2017    Description: 2000, \"-\"hepatitis profile   • Anesthesia complication     SLOW TO WAKE UP   • Colon polyps    • Diabetes mellitus (CMS/HCC)    • H/O bone density study 10/2010    normal   • H/O cardiovascular stress test 2001   • H/O " echocardiogram 2001    2-D   • Hyperkalemia 2019   • Hyperlipidemia    • Hypertension    • Hypothyroidism 2018   • Osteoarthritis of knee 2005    left   • Plantar warts     foot   • Pregnancy      twins x1   • Psoriasis    • Urine incontinence    • Uterovaginal prolapse          Current Outpatient Medications:   •  aspirin 81 MG EC tablet, Take 81 mg by mouth Daily. INSTRUCTED PT TO FOLLOW MD INSTRUCTIONS REGARDING HOLDING FOR SURGERY, Disp: , Rfl:   •  BD PEN NEEDLE SEUN U/F 32G X 4 MM misc, USE 1 PEN NEEDLE WITH EACH  VICTOZA INJECTION, Disp: 100 each, Rfl: 1  •  Cinnamon 500 MG tablet, Take 1,000 mg by mouth 2 (two) times a day., Disp: , Rfl:   •  Crestor 20 MG tablet, TAKE 1/2 TABLET BY MOUTH EVERY DAY, Disp: 45 tablet, Rfl: 5  •  ENSTILAR 0.005-0.064 % foam, APPLY TO PSORIASIS ON EXTREMITIES DAILY FOR 3 TO 4 DAYS THEN STOP REST OF WEEK, THEN REPEAT, Disp: , Rfl: 4  •  glucose blood test strip, Use to check blood sugars once daily, Disp: 100 each, Rfl: 3  •  levothyroxine (SYNTHROID, LEVOTHROID) 88 MCG tablet, Take 1 tablet by mouth Daily., Disp: 90 tablet, Rfl: 1  •  Liraglutide (Victoza) 18 MG/3ML solution pen-injector injection, Inject 1.2 mg under the skin into the appropriate area as directed Daily., Disp: 6 pen, Rfl: 3  •  lisinopril (PRINIVIL,ZESTRIL) 20 MG tablet, Take 1 tablet by mouth Daily., Disp: 90 tablet, Rfl: 3  •  lisinopril-hydrochlorothiazide (PRINZIDE,ZESTORETIC) 20-25 MG per tablet, Take 1 tablet by mouth Daily., Disp: 90 tablet, Rfl: 3  •  metFORMIN (GLUCOPHAGE) 500 MG tablet, Take 3 pills in am and 2 in pm, Disp: 450 tablet, Rfl: 3  •  ONETOUCH VERIO test strip, USE TO CHECK BLOOD SUGARS ONCE DAILY, Disp: 100 each, Rfl: 4  •  TACLONEX 0.005-0.064 % external suspension, APPLY TO PSORIASIS ON SCALP/TRUNK/EXTREMITIES EVERY OTHER DAY AS NEEDED, Disp: , Rfl: 5  •  VITAMIN E PO, Take 1 capsule by mouth Daily. HOLD PRIOR TO SURG, Disp: , Rfl:     No Known Allergies    Review of Systems  "  Constitutional: Negative for fatigue and fever.   Respiratory: Negative for shortness of breath.    Cardiovascular: Negative for chest pain, palpitations and leg swelling.   Gastrointestinal: Negative for abdominal pain, blood in stool, constipation, diarrhea, nausea and vomiting.   Genitourinary: Negative for decreased urine volume, dysuria, flank pain, frequency, pelvic pain, vaginal bleeding, vaginal discharge and vaginal pain.   Skin: Negative for color change and wound.   Neurological: Negative for dizziness, tingling and numbness.       Objective     /56   Pulse 83   Temp 98.3 °F (36.8 °C)   Ht 152.4 cm (60\")   Wt 66.7 kg (147 lb)   LMP  (LMP Unknown)   SpO2 98%   BMI 28.71 kg/m²   Wt Readings from Last 3 Encounters:   10/27/20 66.7 kg (147 lb)   10/16/20 67.8 kg (149 lb 7.6 oz)   10/01/20 68.5 kg (151 lb)     Temp Readings from Last 3 Encounters:   10/27/20 98.3 °F (36.8 °C)   10/17/20 97.1 °F (36.2 °C) (Oral)   10/01/20 97.2 °F (36.2 °C) (Infrared)     BP Readings from Last 3 Encounters:   10/27/20 104/56   10/17/20 99/56   10/01/20 150/80     Pulse Readings from Last 3 Encounters:   10/27/20 83   10/17/20 78   10/01/20 88       Physical Exam  Vitals signs reviewed.   Constitutional:       Appearance: She is well-developed.   HENT:      Head: Normocephalic and atraumatic.   Cardiovascular:      Rate and Rhythm: Normal rate and regular rhythm.      Heart sounds: Normal heart sounds. No murmur.   Pulmonary:      Effort: Pulmonary effort is normal. No respiratory distress.      Breath sounds: Normal breath sounds.   Abdominal:      General: Abdomen is flat.      Palpations: Abdomen is soft.      Tenderness: There is no abdominal tenderness.      Comments: Multiple small laparoscopic incisions with Steri-Strips still intact, no erythema/edema/drainage noted   Musculoskeletal: Normal range of motion.   Skin:     General: Skin is warm and dry.   Neurological:      Mental Status: She is alert. "   Psychiatric:         Behavior: Behavior normal.         Thought Content: Thought content normal.         Judgment: Judgment normal.         Assessment/Plan     Diagnoses and all orders for this visit:    1. Hospital discharge follow-up (Primary)    2. Anemia, unspecified type  -     CBC & Differential    3. Leg cramps  Comments:  She will try tonic water and/or magnesium supplement and add Pedialyte  Orders:  -     Comprehensive Metabolic Panel    4. Status post hysterectomy  Comments:  Doing very well    5. Vaginal enterocele  Comments:  Repaired by Dr. Karen Allen the same time she had a hysterectomy    Doing very well overall.  If leg cramps continue to bother her she will contact the office.    No changes to medications.  She can continue Tylenol or ibuprofen as needed for pain.    She is due to follow-up with Dr. Karen Allen in December.    Return for worsening of sx.             Answers for HPI/ROS submitted by the patient on 10/26/2020   What is the primary reason for your visit?: Other  Please describe your symptoms.: Follow up from surgery  Have you had these symptoms before?: No  How long have you been having these symptoms?: 1-2 weeks  Please list any medications you are currently taking for this condition.: Phenazopyridine, 200 mg   3 Times a day for 10 days

## 2020-10-28 DIAGNOSIS — D64.9 ANEMIA, UNSPECIFIED TYPE: Primary | ICD-10-CM

## 2020-10-30 ENCOUNTER — OFFICE VISIT (OUTPATIENT)
Dept: INTERNAL MEDICINE | Facility: CLINIC | Age: 65
End: 2020-10-30

## 2020-10-30 VITALS
WEIGHT: 148 LBS | HEIGHT: 60 IN | TEMPERATURE: 97.6 F | SYSTOLIC BLOOD PRESSURE: 164 MMHG | RESPIRATION RATE: 17 BRPM | DIASTOLIC BLOOD PRESSURE: 71 MMHG | BODY MASS INDEX: 29.06 KG/M2 | OXYGEN SATURATION: 97 % | HEART RATE: 84 BPM

## 2020-10-30 DIAGNOSIS — R31.9 HEMATURIA, UNSPECIFIED TYPE: Primary | ICD-10-CM

## 2020-10-30 DIAGNOSIS — R39.9 UTI SYMPTOMS: ICD-10-CM

## 2020-10-30 LAB
BACTERIA UR QL AUTO: ABNORMAL /HPF
BILIRUB UR QL STRIP: NEGATIVE
CLARITY UR: CLEAR
COLOR UR: YELLOW
GLUCOSE UR STRIP-MCNC: NEGATIVE MG/DL
HGB UR QL STRIP.AUTO: ABNORMAL
HYALINE CASTS UR QL AUTO: ABNORMAL /LPF
KETONES UR QL STRIP: NEGATIVE
LEUKOCYTE ESTERASE UR QL STRIP.AUTO: ABNORMAL
NITRITE UR QL STRIP: NEGATIVE
PH UR STRIP.AUTO: 7 [PH] (ref 5–8)
PROT UR QL STRIP: NEGATIVE
RBC # UR: ABNORMAL /HPF
REF LAB TEST METHOD: ABNORMAL
SP GR UR STRIP: 1.01 (ref 1–1.03)
SQUAMOUS #/AREA URNS HPF: ABNORMAL /HPF
UROBILINOGEN UR QL STRIP: ABNORMAL
WBC UR QL AUTO: ABNORMAL /HPF

## 2020-10-30 PROCEDURE — 81001 URINALYSIS AUTO W/SCOPE: CPT | Performed by: NURSE PRACTITIONER

## 2020-10-30 PROCEDURE — 99213 OFFICE O/P EST LOW 20 MIN: CPT | Performed by: NURSE PRACTITIONER

## 2020-10-30 RX ORDER — NITROFURANTOIN 25; 75 MG/1; MG/1
100 CAPSULE ORAL 2 TIMES DAILY
Qty: 14 CAPSULE | Refills: 0 | Status: SHIPPED | OUTPATIENT
Start: 2020-10-30 | End: 2020-11-06

## 2020-10-30 NOTE — PROGRESS NOTES
"Checo Tovar is a 64 y.o. female.   Chief Complaint   Patient presents with   • Blood in Urine     Pt presents here today with blood in urine.       Patient presents for evaluation of dysuria.  This is a 64-year-old female patient of CLEVELAND Lizarraga.  This patient is new to me.  Earlier this month she underwent laparoscopic hysterectomy with Dr. Karen Allen.  Reports that she did have a catheter during this surgery.  She had hospital follow-up with CLEVELAND Campos 3 days ago on 10/27/2020.  At that time she was not having these urinary symptoms.  States that symptoms began yesterday.  She endorses frequency, urgency and burning of urination and slight \"pink tent\" to the urine.  No blood clots in the urine.  No vaginal bleeding.  Her lap sites on the abdomen are healing well.  She has had no flank pain, fever, chills, shortness of breath or chest discomfort.  Reports having had UTIs in the past.  She has taken nothing over-the-counter for her symptoms.  Denies development of any other new issues today.       The following portions of the patient's history were reviewed and updated as appropriate: allergies, current medications, past family history, past medical history, past social history, past surgical history and problem list.    Review of Systems   Constitutional: Negative for activity change, chills, fatigue, fever, unexpected weight gain and unexpected weight loss.   HENT: Negative for congestion, hearing loss, postnasal drip, sinus pressure, sneezing, sore throat, swollen glands and tinnitus.    Eyes: Negative for photophobia, pain and visual disturbance.   Respiratory: Negative for cough, chest tightness, shortness of breath and wheezing.    Cardiovascular: Negative for chest pain, palpitations and leg swelling.   Gastrointestinal: Negative for abdominal distention, abdominal pain, constipation, diarrhea, nausea and vomiting.   Endocrine: Negative for polydipsia, polyphagia and " "polyuria.   Genitourinary: Positive for dysuria, frequency, hematuria and urgency.   Neurological: Negative for dizziness, weakness, numbness and headache.   All other systems reviewed and are negative.      Objective    /71 (BP Location: Right arm, Patient Position: Sitting, Cuff Size: Adult)   Pulse 84   Temp 97.6 °F (36.4 °C) (Oral)   Resp 17   Ht 152.4 cm (60\")   Wt 67.1 kg (148 lb)   LMP  (LMP Unknown)   SpO2 97%   BMI 28.90 kg/m²     Physical Exam  Vitals signs and nursing note reviewed.   Constitutional:       General: She is not in acute distress.     Appearance: Normal appearance. She is well-developed. She is not ill-appearing, toxic-appearing or diaphoretic.   HENT:      Head: Atraumatic.      Nose: Nose normal.      Mouth/Throat:      Mouth: Mucous membranes are moist.      Pharynx: Oropharynx is clear.   Eyes:      Pupils: Pupils are equal, round, and reactive to light.   Neck:      Musculoskeletal: Normal range of motion and neck supple.   Cardiovascular:      Rate and Rhythm: Normal rate and regular rhythm.      Pulses: Normal pulses.      Heart sounds: Normal heart sounds.      Comments: No peripheral edema  Pulmonary:      Effort: Pulmonary effort is normal. No respiratory distress.      Breath sounds: Normal breath sounds. No stridor. No wheezing, rhonchi or rales.   Chest:      Chest wall: No tenderness.   Abdominal:      General: Bowel sounds are normal. There is no distension.      Palpations: Abdomen is soft. There is no mass.      Tenderness: There is no abdominal tenderness. There is no right CVA tenderness, left CVA tenderness, guarding or rebound.      Hernia: No hernia is present.   Musculoskeletal: Normal range of motion.   Skin:     General: Skin is warm and dry.      Capillary Refill: Capillary refill takes less than 2 seconds.   Neurological:      General: No focal deficit present.      Mental Status: She is alert and oriented to person, place, and time. Mental status is " at baseline.   Psychiatric:         Mood and Affect: Mood normal.         Behavior: Behavior normal.         Thought Content: Thought content normal.         Judgment: Judgment normal.       Brief Urine Lab Results  (Last result in the past 365 days)      Color   Clarity   Blood   Leuk Est   Nitrite   Protein   CREAT   Urine HCG        10/30/20 1141 Yellow Clear Trace Trace Negative Negative               Current outpatient and discharge medications have been reconciled for the patient.  Reviewed by: CLEVELAND Gabriel      Assessment/Plan   Diagnoses and all orders for this visit:    1. Hematuria, unspecified type (Primary)  -     Urinalysis With Microscopic - Urine, Clean Catch  -     Urine Culture - Urine, Urine, Clean Catch  -     Urinalysis without microscopic (no culture) - Urine, Clean Catch  -     Urinalysis, Microscopic Only - Urine, Clean Catch  -     nitrofurantoin, macrocrystal-monohydrate, (Macrobid) 100 MG capsule; Take 1 capsule by mouth 2 (Two) Times a Day for 7 days.  Dispense: 14 capsule; Refill: 0    2. UTI symptoms  -     Urinalysis With Microscopic - Urine, Clean Catch  -     Urine Culture - Urine, Urine, Clean Catch  -     Urinalysis without microscopic (no culture) - Urine, Clean Catch  -     Urinalysis, Microscopic Only - Urine, Clean Catch  -     nitrofurantoin, macrocrystal-monohydrate, (Macrobid) 100 MG capsule; Take 1 capsule by mouth 2 (Two) Times a Day for 7 days.  Dispense: 14 capsule; Refill: 0    -Urinalysis showing signs of UTI consistent with clinical symptoms.  Prescribing Macrobid 100 mg twice daily x7 days.  She is encouraged to begin over-the-counter vitamin C supplement to acidify the urine and increase water intake.    -Blood pressure elevated today at 164/71.  She has a cuff at home and I have asked her to keep close monitoring of blood pressure throughout the next week, goal of less than 130/80.    -Urine being sent for culture.    We will contact patient with culture  results and any further recommendations.  Follow-up as needed and routinely with PCP, CLEVELAND Lizarraga.

## 2020-11-02 LAB
BACTERIA UR CULT: ABNORMAL
OTHER ANTIBIOTIC SUSC ISLT: ABNORMAL

## 2020-11-02 RX ORDER — SULFAMETHOXAZOLE AND TRIMETHOPRIM 800; 160 MG/1; MG/1
1 TABLET ORAL 2 TIMES DAILY
Qty: 14 TABLET | Refills: 0 | Status: SHIPPED | OUTPATIENT
Start: 2020-11-02 | End: 2020-11-09

## 2020-11-28 ENCOUNTER — RESULTS ENCOUNTER (OUTPATIENT)
Dept: INTERNAL MEDICINE | Facility: CLINIC | Age: 65
End: 2020-11-28

## 2020-11-28 DIAGNOSIS — D64.9 ANEMIA, UNSPECIFIED TYPE: ICD-10-CM

## 2020-12-08 LAB
BASOPHILS # BLD AUTO: 0.03 10*3/MM3 (ref 0–0.2)
BASOPHILS NFR BLD AUTO: 0.5 % (ref 0–1.5)
EOSINOPHIL # BLD AUTO: 0.23 10*3/MM3 (ref 0–0.4)
EOSINOPHIL NFR BLD AUTO: 3.5 % (ref 0.3–6.2)
ERYTHROCYTE [DISTWIDTH] IN BLOOD BY AUTOMATED COUNT: 13.6 % (ref 12.3–15.4)
HCT VFR BLD AUTO: 32.1 % (ref 34–46.6)
HGB BLD-MCNC: 10.1 G/DL (ref 12–15.9)
IMM GRANULOCYTES # BLD AUTO: 0.01 10*3/MM3 (ref 0–0.05)
IMM GRANULOCYTES NFR BLD AUTO: 0.2 % (ref 0–0.5)
LYMPHOCYTES # BLD AUTO: 1.36 10*3/MM3 (ref 0.7–3.1)
LYMPHOCYTES NFR BLD AUTO: 20.8 % (ref 19.6–45.3)
MCH RBC QN AUTO: 25.8 PG (ref 26.6–33)
MCHC RBC AUTO-ENTMCNC: 31.5 G/DL (ref 31.5–35.7)
MCV RBC AUTO: 82.1 FL (ref 79–97)
MONOCYTES # BLD AUTO: 0.44 10*3/MM3 (ref 0.1–0.9)
MONOCYTES NFR BLD AUTO: 6.7 % (ref 5–12)
NEUTROPHILS # BLD AUTO: 4.48 10*3/MM3 (ref 1.7–7)
NEUTROPHILS NFR BLD AUTO: 68.3 % (ref 42.7–76)
NRBC BLD AUTO-RTO: 0 /100 WBC (ref 0–0.2)
PLATELET # BLD AUTO: 366 10*3/MM3 (ref 140–450)
RBC # BLD AUTO: 3.91 10*6/MM3 (ref 3.77–5.28)
WBC # BLD AUTO: 6.55 10*3/MM3 (ref 3.4–10.8)

## 2021-01-04 ENCOUNTER — OFFICE VISIT (OUTPATIENT)
Dept: OBSTETRICS AND GYNECOLOGY | Age: 66
End: 2021-01-04

## 2021-01-04 VITALS
DIASTOLIC BLOOD PRESSURE: 62 MMHG | WEIGHT: 145 LBS | BODY MASS INDEX: 28.47 KG/M2 | SYSTOLIC BLOOD PRESSURE: 128 MMHG | HEIGHT: 60 IN

## 2021-01-04 DIAGNOSIS — Z01.419 ENCOUNTER FOR GYNECOLOGICAL EXAMINATION WITHOUT ABNORMAL FINDING: Primary | ICD-10-CM

## 2021-01-04 PROCEDURE — G0101 CA SCREEN;PELVIC/BREAST EXAM: HCPCS | Performed by: OBSTETRICS & GYNECOLOGY

## 2021-01-04 RX ORDER — PHENOL 1.4 %
600 AEROSOL, SPRAY (ML) MUCOUS MEMBRANE 2 TIMES DAILY WITH MEALS
COMMUNITY
End: 2021-09-15

## 2021-01-04 RX ORDER — ERGOCALCIFEROL (VITAMIN D2) 10 MCG
400 TABLET ORAL DAILY
COMMUNITY
End: 2021-07-27

## 2021-01-04 RX ORDER — MULTIVIT WITH MINERALS/LUTEIN
250 TABLET ORAL DAILY
COMMUNITY

## 2021-01-04 NOTE — PROGRESS NOTES
Subjective     Chief Complaint   Patient presents with   • Gynecologic Exam     AC, was Dr August patient       History of Present Illness    Pretty Tovar is a 65 y.o.  who presents for annual exam.  The patient formally saw Dr. Michelle.  She had extensive surgery with Dr. Allen for prolapse.  She had a laparoscopic hysterectomy and bilateral salpingectomy.  Her ovaries were not removed.  She also had a uterosacral colpopexy and pubovaginal sling.  She is very happy with the results.  She has 5 grandchildren and 2 children.  She does not exercise due to some knee problems but plans to try this year.  She is not had any vaginal bleeding or pelvic pain.    Obstetric History:  OB History        2    Para   2    Term   2            AB        Living   3       SAB        TAB        Ectopic        Molar        Multiple   1    Live Births   3               Menstrual History:     No LMP recorded (lmp unknown). Patient has had a hysterectomy.         Current contraception: status post hysterectomy  History of abnormal Pap smear: no  Received Gardasil immunization: no  Perform regular self breast exam : yes  Family history of uterine or ovarian cancer: no  Family History of colon cancer: no  Family history of breast cancer: no    Mammogram: up to date. 2020  Colonoscopy: up to date.2018 - repeat 5 year   DEXA 2018 - primary follows     Exercise: not active  Calcium/Vitamin D: adequate intake and uses supplements    The following portions of the patient's history were reviewed and updated as appropriate: allergies, current medications, past family history, past medical history, past social history, past surgical history and problem list.    Review of Systems    Review of Systems   Constitutional: Negative for fatigue.   Respiratory: Negative for shortness of breath.    Gastrointestinal: Negative for abdominal pain.   Genitourinary: Negative for dysuria.   Neurological: Negative for headaches.  "  Psychiatric/Behavioral: Negative for dysphoric mood.     Objective   Physical Exam    /62   Ht 152.4 cm (60\")   Wt 65.8 kg (145 lb)   LMP  (LMP Unknown)   Breastfeeding No   BMI 28.32 kg/m²     General:   alert, appears stated age and cooperative   Neck: thyroid normal to palpation   Heart: regular rate and rhythm   Lungs: clear to auscultation bilaterally   Abdomen: soft, non-tender, without masses or organomegaly   Breast: inspection negative, no nipple discharge or bleeding, no masses or nodularity palpable   Vulva:  External genitalia appear normal.  There is no prolapse.  Urethra appears normal without lesions, Bartholin's, Urethra, [place]'s normal   Vagina: normal mucosa, normal discharge   Cervix:  Surgically absent   Uterus:  Surgically absent   Adnexa: no mass, fullness, tenderness   Rectal: normal rectal, no masses, guaiac negative stool obtained, no rectocele and sphincter tone normal     Assessment/Plan   Diagnoses and all orders for this visit:    1. Encounter for gynecological examination without abnormal finding (Primary)      Patient is doing well overall.  I encouraged exercise and handouts were given.  She will follow up with bone density with primary care.  She will continue her calcium and vitamin D.      All questions answered.  Breast self exam technique reviewed and patient encouraged to perform self-exam monthly.  Discussed healthy lifestyle modifications.  Recommended 30 minutes of aerobic exercise five times per week.  Discussed calcium needs to prevent osteoporosis.                     "

## 2021-02-16 DIAGNOSIS — E78.00 PURE HYPERCHOLESTEROLEMIA: ICD-10-CM

## 2021-02-16 RX ORDER — ROSUVASTATIN CALCIUM 20 MG/1
10 TABLET, FILM COATED ORAL DAILY
Qty: 45 TABLET | Refills: 5 | Status: SHIPPED | OUTPATIENT
Start: 2021-02-16 | End: 2021-08-24 | Stop reason: SDUPTHER

## 2021-02-22 ENCOUNTER — TELEPHONE (OUTPATIENT)
Dept: INTERNAL MEDICINE | Facility: CLINIC | Age: 66
End: 2021-02-22

## 2021-02-25 ENCOUNTER — PATIENT MESSAGE (OUTPATIENT)
Dept: INTERNAL MEDICINE | Facility: CLINIC | Age: 66
End: 2021-02-25

## 2021-02-25 NOTE — TELEPHONE ENCOUNTER
From: Pretty Tovar  To: CLEVELAND Lizarraga  Sent: 2/25/2021 9:57 AM EST  Subject: Non-Urgent Medical Question    Ms Tang, I have an appointment scheduled for March 9 @ 11:00. I do not have bloodwork scheduled.  Do I need to make an appointment prior to visit ?    Thank you, Pretty Tovar. 1955

## 2021-03-09 ENCOUNTER — OFFICE VISIT (OUTPATIENT)
Dept: INTERNAL MEDICINE | Facility: CLINIC | Age: 66
End: 2021-03-09

## 2021-03-09 VITALS
SYSTOLIC BLOOD PRESSURE: 128 MMHG | TEMPERATURE: 97.8 F | DIASTOLIC BLOOD PRESSURE: 78 MMHG | WEIGHT: 145.8 LBS | OXYGEN SATURATION: 99 % | HEIGHT: 60 IN | HEART RATE: 87 BPM | BODY MASS INDEX: 28.62 KG/M2

## 2021-03-09 DIAGNOSIS — E11.3293 TYPE 2 DIABETES MELLITUS WITH BOTH EYES AFFECTED BY MILD NONPROLIFERATIVE RETINOPATHY WITHOUT MACULAR EDEMA, WITHOUT LONG-TERM CURRENT USE OF INSULIN (HCC): ICD-10-CM

## 2021-03-09 DIAGNOSIS — M25.50 ARTHRALGIA OF MULTIPLE SITES: ICD-10-CM

## 2021-03-09 DIAGNOSIS — I10 BENIGN ESSENTIAL HYPERTENSION: ICD-10-CM

## 2021-03-09 DIAGNOSIS — Z00.00 WELCOME TO MEDICARE PREVENTIVE VISIT: Primary | ICD-10-CM

## 2021-03-09 DIAGNOSIS — E78.00 PURE HYPERCHOLESTEROLEMIA: ICD-10-CM

## 2021-03-09 LAB — ERYTHROCYTE [SEDIMENTATION RATE] IN BLOOD: 44 MM/HR (ref 0–20)

## 2021-03-09 PROCEDURE — 85651 RBC SED RATE NONAUTOMATED: CPT | Performed by: NURSE PRACTITIONER

## 2021-03-09 PROCEDURE — 36415 COLL VENOUS BLD VENIPUNCTURE: CPT | Performed by: NURSE PRACTITIONER

## 2021-03-09 PROCEDURE — 99214 OFFICE O/P EST MOD 30 MIN: CPT | Performed by: NURSE PRACTITIONER

## 2021-03-09 PROCEDURE — G0402 INITIAL PREVENTIVE EXAM: HCPCS | Performed by: NURSE PRACTITIONER

## 2021-03-09 NOTE — PROGRESS NOTES
The ABCs of the Annual Wellness Visit  Welcome to Medicare Visit    Chief Complaint   Patient presents with   • Welcome To Medicare       Subjective   History of Present Illness:  Pretty Tovar is a 65 y.o. female who presents for a  Welcome to Medicare Visit.    HEALTH RISK ASSESSMENT    Recent Hospitalizations:  No hospitalization(s) within the last year.    Current Medical Providers:  Patient Care Team:  Violet Tang APRN as PCP - General (Family Medicine)  Alexandru Elaine MD as Referring Physician (Dermatology)  Vinay Ansari OD (Optometry)  Zachary Sibley MD as Consulting Physician (Obstetrics and Gynecology)  Karen Allen MD as Consulting Physician (Urogynecology)  Alba Anne MD as Consulting Physician (Cardiology)    Smoking Status:  Social History     Tobacco Use   Smoking Status Never Smoker   Smokeless Tobacco Never Used   Tobacco Comment    daily caffine       Alcohol Consumption:  Social History     Substance and Sexual Activity   Alcohol Use No       Depression Screen:   PHQ-2/PHQ-9 Depression Screening 3/9/2021   Little interest or pleasure in doing things 0   Feeling down, depressed, or hopeless 0   Trouble falling or staying asleep, or sleeping too much 0   Feeling tired or having little energy 0   Poor appetite or overeating 0   Feeling bad about yourself - or that you are a failure or have let yourself or your family down 0   Trouble concentrating on things, such as reading the newspaper or watching television 0   Moving or speaking so slowly that other people could have noticed. Or the opposite - being so fidgety or restless that you have been moving around a lot more than usual 0   Thoughts that you would be better off dead, or of hurting yourself in some way 0   Total Score 0   If you checked off any problems, how difficult have these problems made it for you to do your work, take care of things at home, or get along with other people? Not difficult at all       Fall Risk  Screen:  NOE Fall Risk Assessment was completed, and patient is at LOW risk for falls.Assessment completed on:3/9/2021    Health Habits and Functional and Cognitive Screening:  Functional & Cognitive Status 3/9/2021   Do you have difficulty preparing food and eating? No   Do you have difficulty bathing yourself, getting dressed or grooming yourself? No   Do you have difficulty using the toilet? No   Do you have difficulty moving around from place to place? No   Do you have trouble with steps or getting out of a bed or a chair? No   Current Diet Well Balanced Diet   Dental Exam Not up to date   Eye Exam Up to date   Exercise (times per week) 1 times per week   Current Exercises Include Walking   Do you need help using the phone?  No   Are you deaf or do you have serious difficulty hearing?  No   Do you need help with transportation? No   Do you need help shopping? No   Do you need help preparing meals?  No   Do you need help with housework?  No   Do you need help with laundry? No   Do you need help taking your medications? No   Do you need help managing money? No   Do you ever drive or ride in a car without wearing a seat belt? No   Have you felt unusual stress, anger or loneliness in the last month? No   Who do you live with? Spouse   If you need help, do you have trouble finding someone available to you? No   Have you been bothered in the last four weeks by sexual problems? No   Do you have difficulty concentrating, remembering or making decisions? No         Does the patient have evidence of cognitive impairment? No    Asprin use counseling:Does not need ASA but is currently taking (advised patient that ASA is not indicated and patient chooses to stop it)    Visual Acuity:    No exam data present    Age-appropriate Screening Schedule:  Refer to the list below for future screening recommendations based on patient's age, sex and/or medical conditions. Orders for these recommended tests are listed in the plan  section. The patient has been provided with a written plan.    Health Maintenance   Topic Date Due   • MAMMOGRAM  06/08/2021   • DIABETIC FOOT EXAM  06/09/2021   • URINE MICROALBUMIN  06/09/2021   • HEMOGLOBIN A1C  09/09/2021   • DIABETIC EYE EXAM  09/21/2021   • LIPID PANEL  03/09/2022   • DXA SCAN  08/28/2023   • COLONOSCOPY  02/21/2028   • TDAP/TD VACCINES (3 - Td) 04/12/2028   • INFLUENZA VACCINE  Completed   • ZOSTER VACCINE  Completed   • PAP SMEAR  Discontinued          The following portions of the patient's history were reviewed and updated as appropriate: allergies, current medications, past family history, past medical history, past social history, past surgical history and problem list.    Outpatient Medications Prior to Visit   Medication Sig Dispense Refill   • aspirin 81 MG EC tablet Take 81 mg by mouth Daily. INSTRUCTED PT TO FOLLOW MD INSTRUCTIONS REGARDING HOLDING FOR SURGERY     • BD PEN NEEDLE SEUN U/F 32G X 4 MM misc USE 1 PEN NEEDLE WITH EACH  VICTOZA INJECTION 100 each 1   • calcium carbonate (OS-JANETH) 600 MG tablet Take 600 mg by mouth Daily.     • Cinnamon 500 MG tablet Take 1,000 mg by mouth 2 (two) times a day.     • Crestor 20 MG tablet Take 0.5 tablets by mouth Daily. 45 tablet 5   • ENSTILAR 0.005-0.064 % foam APPLY TO PSORIASIS ON EXTREMITIES DAILY FOR 3 TO 4 DAYS THEN STOP REST OF WEEK, THEN REPEAT  4   • levothyroxine (SYNTHROID, LEVOTHROID) 88 MCG tablet Take 1 tablet by mouth Daily. 90 tablet 1   • Liraglutide (Victoza) 18 MG/3ML solution pen-injector injection Inject 1.2 mg under the skin into the appropriate area as directed Daily. 6 pen 3   • lisinopril (PRINIVIL,ZESTRIL) 20 MG tablet Take 1 tablet by mouth Daily. 90 tablet 3   • lisinopril-hydrochlorothiazide (PRINZIDE,ZESTORETIC) 20-25 MG per tablet Take 1 tablet by mouth Daily. 90 tablet 3   • metFORMIN (GLUCOPHAGE) 500 MG tablet Take 3 pills in am and 2 in pm 450 tablet 3   • ONETOUCH VERIO test strip USE TO CHECK BLOOD  SUGARS ONCE DAILY 100 each 4   • TACLONEX 0.005-0.064 % external suspension APPLY TO PSORIASIS ON SCALP/TRUNK/EXTREMITIES EVERY OTHER DAY AS NEEDED  5   • vitamin C (ASCORBIC ACID) 250 MG tablet Take 250 mg by mouth Daily.     • Vitamin D, Cholecalciferol, (CHOLECALCIFEROL) 10 MCG (400 UNIT) tablet Take 400 Units by mouth Daily.     • VITAMIN E PO Take 1 capsule by mouth Daily. HOLD PRIOR TO SURG       No facility-administered medications prior to visit.       Patient Active Problem List   Diagnosis   • Benign essential hypertension   • Type 2 diabetes mellitus, without long-term current use of insulin (CMS/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)   • Pelvic relaxation due to vaginal prolapse   • Hyperplastic rectal polyp   • Postmenopausal bleeding   • Uterovaginal prolapse, incomplete   • Cystocele, midline   • Cystocele, lateral   • Cystocele, lateral   • Vaginal enterocele   • Rectocele   • Loss of perineal body, female   • Female stress incontinence   • Feeling of incomplete bladder emptying   • Incompetence of pubocervical tissue   • Incompetence of rectovaginal tissue       Advanced Care Planning:  ACP discussion was held with the patient during this visit. Patient does not have an advance directive, information provided.    Review of Systems   Constitutional: Negative for activity change, appetite change, chills, fatigue and fever.   HENT: Negative for congestion, ear pain, hearing loss, mouth sores, nosebleeds, postnasal drip, rhinorrhea, sinus pressure, sneezing, sore throat, tinnitus, trouble swallowing and voice change.    Eyes: Positive for visual disturbance (wears glasses, she is up to date ).   Respiratory: Negative for cough, chest tightness, shortness of breath and wheezing.    Cardiovascular:  "Negative for chest pain, palpitations and leg swelling.   Gastrointestinal: Negative for abdominal pain, anal bleeding, blood in stool, constipation, diarrhea, nausea and vomiting.   Endocrine: Negative for cold intolerance, heat intolerance, polydipsia, polyphagia and polyuria.   Genitourinary: Negative for dysuria, frequency, hematuria and urgency.   Musculoskeletal: Positive for arthralgias (shoulders, arms, elbows, wrist and knees, started about three months ago without a precipitating event). Negative for back pain, gait problem, joint swelling, myalgias, neck pain and neck stiffness.   Skin: Negative for color change and rash.        NEGATIVE BREAST MASS, BREAST PAIN, NIPPLE DISCHARGE, SKIN CHANGES, OR LUMP IN ARMPIT   Neurological: Negative for dizziness, tremors, seizures, syncope, speech difficulty, weakness, numbness and headaches.   Hematological: Negative for adenopathy. Does not bruise/bleed easily.   Psychiatric/Behavioral: Negative for behavioral problems, confusion, decreased concentration, dysphoric mood, hallucinations, sleep disturbance and suicidal ideas. The patient is not nervous/anxious.        Compared to one year ago, the patient feels her physical health is worse.  Compared to one year ago, the patient feels her mental health is the same.    Reviewed chart for potential of high risk medication in the elderly: yes  Reviewed chart for potential of harmful drug interactions in the elderly:yes    Objective         Vitals:    03/09/21 1124   BP: 128/78   BP Location: Left arm   Patient Position: Sitting   Cuff Size: Adult   Pulse: 87   Temp: 97.8 °F (36.6 °C)   SpO2: 99%   Weight: 66.1 kg (145 lb 12.8 oz)   Height: 152.4 cm (60\")       Body mass index is 28.47 kg/m².  Discussed the patient's BMI with her. The BMI is above average; BMI management plan is completed.    Physical Exam  Vitals and nursing note reviewed.   Constitutional:       Appearance: She is well-developed.   HENT:      Head: " Normocephalic.      Right Ear: Hearing, tympanic membrane, ear canal and external ear normal.      Left Ear: Hearing, tympanic membrane, ear canal and external ear normal.      Nose:      Right Sinus: No maxillary sinus tenderness or frontal sinus tenderness.      Left Sinus: No maxillary sinus tenderness or frontal sinus tenderness.   Eyes:      General: Lids are normal.      Extraocular Movements: Extraocular movements intact.      Conjunctiva/sclera: Conjunctivae normal.      Pupils: Pupils are equal, round, and reactive to light.   Neck:      Thyroid: No thyroid mass or thyromegaly.      Vascular: No carotid bruit or JVD.      Trachea: Trachea normal. No tracheal deviation.   Cardiovascular:      Rate and Rhythm: Normal rate and regular rhythm.      Pulses:           Carotid pulses are 2+ on the right side and 2+ on the left side.       Radial pulses are 2+ on the right side and 2+ on the left side.        Dorsalis pedis pulses are 2+ on the right side and 2+ on the left side.        Posterior tibial pulses are 2+ on the right side and 2+ on the left side.      Heart sounds: S1 normal and S2 normal. No murmur. No friction rub. No gallop.       Comments: Repeat bp left arm 122/72  Pulmonary:      Effort: Pulmonary effort is normal.      Breath sounds: Normal breath sounds.   Chest:      Chest wall: There is no dullness to percussion.   Abdominal:      General: Bowel sounds are normal. There is no abdominal bruit.      Palpations: Abdomen is soft.      Tenderness: There is no abdominal tenderness. There is no guarding or rebound.      Hernia: No hernia is present.   Genitourinary:     Labia:         Right: No rash, tenderness, lesion or injury.         Left: No rash, tenderness, lesion or injury.       Vagina: Normal.      Cervix: Normal.      Uterus: Normal.       Adnexa:         Right: No mass, tenderness or fullness.          Left: No mass, tenderness or fullness.        Rectum: Guaiac result negative. No mass  or tenderness.   Musculoskeletal:      Right shoulder: Normal range of motion.      Left shoulder: Normal range of motion.      Right upper arm: No tenderness.      Left upper arm: No tenderness.      Cervical back: Neck supple.      Right knee: No swelling.      Left knee: No swelling.      Comments: Weak  strength   Crepitus bilateral knees   (-)SLR    Lymphadenopathy:      Head:      Right side of head: No submental, submandibular, tonsillar, preauricular, posterior auricular or occipital adenopathy.      Left side of head: No submental, submandibular, tonsillar, preauricular, posterior auricular or occipital adenopathy.      Cervical: No cervical adenopathy.      Upper Body:      Right upper body: No supraclavicular adenopathy.      Left upper body: No supraclavicular adenopathy.   Skin:     General: Skin is warm and dry.      Findings: No rash.   Neurological:      Mental Status: She is alert.      Cranial Nerves: No cranial nerve deficit.      Sensory: No sensory deficit.      Deep Tendon Reflexes:      Reflex Scores:       Patellar reflexes are 2+ on the right side and 2+ on the left side.  Psychiatric:         Mood and Affect: Mood and affect normal.         Speech: Speech normal.         Behavior: Behavior normal.         Thought Content: Thought content normal.         Cognition and Memory: Cognition and memory normal.         Judgment: Judgment normal.         Lab Results   Component Value Date    GLU 87 03/09/2021    CHLPL 127 03/09/2021    TRIG 83 03/09/2021    HDL 61 (H) 03/09/2021    LDL 50 03/09/2021    VLDL 16 03/09/2021    HGBA1C 6.00 (H) 03/09/2021        Procedures    Assessment/Plan   Medicare Risks and Personalized Health Plan  CMS Preventative Services Quick Reference  Abdominal Aortic Aneurysm Screening  Advance Directive Discussion  Immunizations Discussed/Encouraged (specific immunizations; covid 19 vaccine )  Obesity/Overweight     The above risks/problems have been discussed with the  patient.  Pertinent information has been shared with the patient in the After Visit Summary.  Follow up plans and orders are seen below in the Assessment/Plan Section.    Diagnoses and all orders for this visit:    1. Welcome to Medicare preventive visit (Primary)    2. Benign essential hypertension  Comments:  well controlled   Orders:  -     CBC & Differential  -     Comprehensive Metabolic Panel    3. Pure hypercholesterolemia  Comments:  tolerating statin therapy  Orders:  -     Lipid Panel    4. Type 2 diabetes mellitus with both eyes affected by mild nonproliferative retinopathy without macular edema, without long-term current use of insulin (CMS/[facility])  Comments:  will check A1c last 6.0%  Orders:  -     Hemoglobin A1c    5. Arthralgia of multiple sites  Comments:  due to sudden onset with obtain labs-r/o PMR  Orders:  -     ALEX With / DsDNA, RNP, Sjogrens A / B, Smith  -     C-reactive Protein  -     Sedimentation Rate  -     Rheumatoid Factor        Follow Up:  Return in about 6 months (around 9/9/2021) for Recheck, DM, HTN. HLD, fasting labs, establish care CLEVELAND Gabriel .     An After Visit Summary and PPPS were given to the patient.     She was given handout for vascular screening and advance directive.   She was reminded of covid precautions  It was recommended she obtain covid immunization

## 2021-03-09 NOTE — PATIENT INSTRUCTIONS
Medicare Wellness  Personal Prevention Plan of Service     Date of Office Visit:  2021  Encounter Provider:  CLEVELAND Lizarraga  Place of Service:  [facility]  Patient Name: Pretty Tovar  :  1955    As part of the Medicare Wellness portion of your visit today, we are providing you with this personalized preventive plan of services (PPPS). This plan is based upon recommendations of the [place] Preventive Services Task Force (USPSTF) and the Advisory Committee on Immunization Practices (ACIP).    This lists the preventive care services that should be considered, and provides dates of when you are due. Items listed as completed are up-to-date and do not require any further intervention.    Health Maintenance   Topic Date Due   • COVID-19 Vaccine (1 of 2) Never done   • HEMOGLOBIN A1C  2021   • LIPID PANEL  2021   • MAMMOGRAM  2021   • DIABETIC FOOT EXAM  2021   • URINE MICROALBUMIN  2021   • DIABETIC EYE EXAM  2021   • ANNUAL WELLNESS VISIT  2022   • Pneumococcal Vaccine 65+ (1 of 1 - PPSV23) 2022   • DXA SCAN  2023   • COLONOSCOPY  2028   • TDAP/TD VACCINES (3 - Td) 2028   • HEPATITIS C SCREENING  Completed   • INFLUENZA VACCINE  Completed   • ZOSTER VACCINE  Completed   • MENINGOCOCCAL VACCINE  Aged Out   • PAP SMEAR  Discontinued       No orders of the defined types were placed in this encounter.      Return in about 6 months (around 2021) for Recheck.

## 2021-03-10 LAB
ALBUMIN SERPL-MCNC: 4.2 G/DL (ref 3.5–5.2)
ALBUMIN/GLOB SERPL: 1.3 G/DL
ALP SERPL-CCNC: 73 U/L (ref 39–117)
ALT SERPL-CCNC: 12 U/L (ref 1–33)
ANA SER QL: NEGATIVE
AST SERPL-CCNC: 17 U/L (ref 1–32)
BASOPHILS # BLD AUTO: 0.02 10*3/MM3 (ref 0–0.2)
BASOPHILS NFR BLD AUTO: 0.3 % (ref 0–1.5)
BILIRUB SERPL-MCNC: 0.3 MG/DL (ref 0–1.2)
BUN SERPL-MCNC: 10 MG/DL (ref 8–23)
BUN/CREAT SERPL: 28.6 (ref 7–25)
CALCIUM SERPL-MCNC: 10.2 MG/DL (ref 8.6–10.5)
CHLORIDE SERPL-SCNC: 99 MMOL/L (ref 98–107)
CHOLEST SERPL-MCNC: 127 MG/DL (ref 0–200)
CO2 SERPL-SCNC: 30.5 MMOL/L (ref 22–29)
CREAT SERPL-MCNC: 0.35 MG/DL (ref 0.57–1)
CRP SERPL-MCNC: 0.99 MG/DL (ref 0–0.5)
EOSINOPHIL # BLD AUTO: 0.15 10*3/MM3 (ref 0–0.4)
EOSINOPHIL NFR BLD AUTO: 2.3 % (ref 0.3–6.2)
ERYTHROCYTE [DISTWIDTH] IN BLOOD BY AUTOMATED COUNT: 16 % (ref 12.3–15.4)
GLOBULIN SER CALC-MCNC: 3.3 GM/DL
GLUCOSE SERPL-MCNC: 87 MG/DL (ref 65–99)
HBA1C MFR BLD: 6 % (ref 4.8–5.6)
HCT VFR BLD AUTO: 33.5 % (ref 34–46.6)
HDLC SERPL-MCNC: 61 MG/DL (ref 40–60)
HGB BLD-MCNC: 10.3 G/DL (ref 12–15.9)
IMM GRANULOCYTES # BLD AUTO: 0.01 10*3/MM3 (ref 0–0.05)
IMM GRANULOCYTES NFR BLD AUTO: 0.2 % (ref 0–0.5)
LDLC SERPL CALC-MCNC: 50 MG/DL (ref 0–100)
LYMPHOCYTES # BLD AUTO: 1.34 10*3/MM3 (ref 0.7–3.1)
LYMPHOCYTES NFR BLD AUTO: 20.4 % (ref 19.6–45.3)
MCH RBC QN AUTO: 23.7 PG (ref 26.6–33)
MCHC RBC AUTO-ENTMCNC: 30.7 G/DL (ref 31.5–35.7)
MCV RBC AUTO: 77.2 FL (ref 79–97)
MONOCYTES # BLD AUTO: 0.43 10*3/MM3 (ref 0.1–0.9)
MONOCYTES NFR BLD AUTO: 6.6 % (ref 5–12)
NEUTROPHILS # BLD AUTO: 4.61 10*3/MM3 (ref 1.7–7)
NEUTROPHILS NFR BLD AUTO: 70.2 % (ref 42.7–76)
NRBC BLD AUTO-RTO: 0 /100 WBC (ref 0–0.2)
PLATELET # BLD AUTO: 350 10*3/MM3 (ref 140–450)
POTASSIUM SERPL-SCNC: 4.3 MMOL/L (ref 3.5–5.2)
PROT SERPL-MCNC: 7.5 G/DL (ref 6–8.5)
RBC # BLD AUTO: 4.34 10*6/MM3 (ref 3.77–5.28)
RHEUMATOID FACT SERPL-ACNC: <10 IU/ML (ref 0–13.9)
SODIUM SERPL-SCNC: 142 MMOL/L (ref 136–145)
TRIGL SERPL-MCNC: 83 MG/DL (ref 0–150)
VLDLC SERPL CALC-MCNC: 16 MG/DL (ref 5–40)
WBC # BLD AUTO: 6.56 10*3/MM3 (ref 3.4–10.8)

## 2021-03-12 DIAGNOSIS — M35.3 POLYMYALGIA RHEUMATICA (HCC): Primary | ICD-10-CM

## 2021-03-12 RX ORDER — PREDNISONE 1 MG/1
15 TABLET ORAL DAILY
Qty: 90 TABLET | Refills: 1 | Status: SHIPPED | OUTPATIENT
Start: 2021-03-12 | End: 2021-07-27

## 2021-03-17 DIAGNOSIS — E06.3 HASHIMOTO'S THYROIDITIS: ICD-10-CM

## 2021-03-17 RX ORDER — LEVOTHYROXINE SODIUM 88 UG/1
TABLET ORAL
Qty: 30 TABLET | Refills: 0 | Status: SHIPPED | OUTPATIENT
Start: 2021-03-17 | End: 2021-04-15

## 2021-03-19 ENCOUNTER — BULK ORDERING (OUTPATIENT)
Dept: CASE MANAGEMENT | Facility: OTHER | Age: 66
End: 2021-03-19

## 2021-03-19 DIAGNOSIS — Z23 IMMUNIZATION DUE: ICD-10-CM

## 2021-03-30 ENCOUNTER — TELEPHONE (OUTPATIENT)
Dept: INTERNAL MEDICINE | Facility: CLINIC | Age: 66
End: 2021-03-30

## 2021-03-30 NOTE — TELEPHONE ENCOUNTER
PATIENT RETURNING DEVIKA'S CALL. SHE STATES SHE RECEIVED CALL BUT NOT SURE WHY SHE NEEDED TO BE SEEN, MAYBE LABS. SHE WAS A PATIENT OF DARREN MURRAY. SHE HAS AN APPOINTMENT WITH DEVIKA ORELLANA  9/15/[id number]    PLEASE CALL [phone]

## 2021-03-31 NOTE — TELEPHONE ENCOUNTER
I spoke with pt.  I did see on Violet's lab note that she needed recheck of iron 4 weeks from starting OTC iron supplement, so I scheduled her with [place] for a short visit so she can get that rechecked.

## 2021-04-14 ENCOUNTER — OFFICE VISIT (OUTPATIENT)
Dept: INTERNAL MEDICINE | Facility: CLINIC | Age: 66
End: 2021-04-14

## 2021-04-14 VITALS
TEMPERATURE: 98.4 F | HEIGHT: 60 IN | OXYGEN SATURATION: 100 % | WEIGHT: 144 LBS | BODY MASS INDEX: 28.27 KG/M2 | HEART RATE: 83 BPM | RESPIRATION RATE: 16 BRPM | DIASTOLIC BLOOD PRESSURE: 74 MMHG | SYSTOLIC BLOOD PRESSURE: 116 MMHG

## 2021-04-14 DIAGNOSIS — D64.9 LOW HEMOGLOBIN: Primary | ICD-10-CM

## 2021-04-14 PROCEDURE — 99213 OFFICE O/P EST LOW 20 MIN: CPT | Performed by: NURSE PRACTITIONER

## 2021-04-14 RX ORDER — FERROUS SULFATE TAB EC 324 MG (65 MG FE EQUIVALENT) 324 (65 FE) MG
324 TABLET DELAYED RESPONSE ORAL
COMMUNITY

## 2021-04-14 NOTE — PROGRESS NOTES
"Chief Complaint  Follow-up (Pt presents here today for a follow up on her Iron.)    Subjective          Pretty Tovar presents to [facility]  Patient presents for follow-up hemoglobin/iron levels.  This is a 65-year-old female.  On 3/9/2021 previous PCP CLEVELAND Lizarraga ilan a CBC which showed hemoglobin level of 10.3.  She recommended that the patient start an over-the-counter iron supplement and follow-up in 4 weeks for retest.  Patient reports that she is tolerating the iron well, taking 65 units over-the-counter daily of elemental iron/ferrous sulfate.  Reports no side effects or constipation.  She does report that she feels a little more energetic since she has been on the iron.  Denies development of any other new issues today.      Objective   Vital Signs:   /74 (BP Location: Right arm, Patient Position: Sitting, Cuff Size: Adult)   Pulse 83   Temp 98.4 °F (36.9 °C)   Resp 16   Ht 152.4 cm (60\")   Wt 65.3 kg (144 lb)   SpO2 100%   BMI 28.12 kg/m²     Physical Exam  Vitals and nursing note reviewed.   Constitutional:       General: She is not in acute distress.     Appearance: Normal appearance. She is well-developed. She is not ill-appearing, toxic-appearing or diaphoretic.   HENT:      Head: Atraumatic.   Eyes:      Pupils: Pupils are equal, round, and reactive to light.   Neck:      Vascular: No carotid bruit.   Cardiovascular:      Rate and Rhythm: Normal rate and regular rhythm.      Pulses: Normal pulses.      Heart sounds: Normal heart sounds.   Pulmonary:      Effort: Pulmonary effort is normal. No respiratory distress.      Breath sounds: Normal breath sounds. No stridor. No wheezing, rhonchi or rales.   Chest:      Chest wall: No tenderness.   Abdominal:      General: Bowel sounds are normal. There is no distension.      Palpations: Abdomen is soft.      Tenderness: There is no abdominal tenderness.   Musculoskeletal:         General: Normal range " of motion.      Cervical back: Normal range of motion and neck supple. No rigidity or tenderness.   Lymphadenopathy:      Cervical: No cervical adenopathy.   Skin:     General: Skin is warm and dry.      Capillary Refill: Capillary refill takes less than 2 seconds.   Neurological:      General: No focal deficit present.      Mental Status: She is alert and oriented to person, place, and time. Mental status is at baseline.   Psychiatric:         Mood and Affect: Mood normal.         Behavior: Behavior normal.         Thought Content: Thought content normal.         Judgment: Judgment normal.        Result Review :   The following data was reviewed by: CLEVELAND Bryant on 04/14/2021:  Common labs    Common Labsle 10/27/20 10/27/20 12/8/20 3/9/21 3/9/21 3/9/21 3/9/21    1034 1034  1226 1226 1226 1226   Glucose 95    87     BUN 8    10     Creatinine 0.46 (A)    0.35 (A)     eGFR Non African Am 137    >150     eGFR  Am >150    >150     Sodium 139    142     Potassium 4.0    4.3     Chloride 98    99     Calcium 9.3    10.2     Total Protein 6.8    7.5     Albumin 4.20    4.20     Total Bilirubin 0.3    0.3     Alkaline Phosphatase 58    73     AST (SGOT) 17    17     ALT (SGPT) 10    12     WBC  7.83 6.55 6.56      Hemoglobin  8.3 (A) 10.1 (A) 10.3 (A)      Hematocrit  26.9 (A) 32.1 (A) 33.5 (A)      Platelets  [phone]      Total Cholesterol      127    Triglycerides      83    HDL Cholesterol      61 (A)    LDL Cholesterol       50    Hemoglobin A1C       6.00 (A)   (A) Abnormal value       Comments are available for some flowsheets but are not being displayed.           CBC & Differential (03/09/2021 12:26 PM)    Current outpatient and discharge medications have been reconciled for the patient.  Reviewed by: CLEVELAND Bryant           Assessment and Plan    Diagnoses and all orders for this visit:    1. Low hemoglobin (Primary)  -     CBC No Differential  -     Iron and TIBC  -      Ferritin      Previous hemoglobin level from 3/9/2021 was 10.3.  Rechecking CBC along with iron profile and ferritin.  For now continue ferrous sulfate 325 mg daily, I will adjust therapy if needed based on labs.    We will contact patient with lab results and further recommendations.  Follow-up as needed and she has an appointment to establish care with me in September 2021.    Follow Up   Return if symptoms worsen or fail to improve, for Next scheduled follow up.  Patient was given instructions and counseling regarding her condition or for health maintenance advice. Please see specific information pulled into the AVS if appropriate.

## 2021-04-15 DIAGNOSIS — E06.3 HASHIMOTO'S THYROIDITIS: ICD-10-CM

## 2021-04-15 LAB
ERYTHROCYTE [DISTWIDTH] IN BLOOD BY AUTOMATED COUNT: 18.4 % (ref 12.3–15.4)
FERRITIN SERPL-MCNC: 13.9 NG/ML (ref 13–150)
HCT VFR BLD AUTO: 37.6 % (ref 34–46.6)
HGB BLD-MCNC: 12.1 G/DL (ref 12–15.9)
IRON SATN MFR SERPL: 16 % (ref 20–50)
IRON SERPL-MCNC: 82 MCG/DL (ref 37–145)
MCH RBC QN AUTO: 26.4 PG (ref 26.6–33)
MCHC RBC AUTO-ENTMCNC: 32.2 G/DL (ref 31.5–35.7)
MCV RBC AUTO: 81.9 FL (ref 79–97)
PLATELET # BLD AUTO: 297 10*3/MM3 (ref 140–450)
RBC # BLD AUTO: 4.59 10*6/MM3 (ref 3.77–5.28)
TIBC SERPL-MCNC: 505 MCG/DL
UIBC SERPL-MCNC: 423 MCG/DL (ref 112–346)
WBC # BLD AUTO: 8.47 10*3/MM3 (ref 3.4–10.8)

## 2021-04-15 RX ORDER — LEVOTHYROXINE SODIUM 88 UG/1
TABLET ORAL
Qty: 90 TABLET | Refills: 1 | Status: SHIPPED | OUTPATIENT
Start: 2021-04-15 | End: 2021-10-13

## 2021-04-15 NOTE — PROGRESS NOTES
Good evening Ms. Tovar, your labs are back and hemoglobin has increased to normal range. Iron saturation levels still a bit low and I think will continue to trend up as we continue the iron supplement. For now please continue current iron dosing. Let me know if you have any questions and have a good day,   CLEVELAND Bryant

## 2021-06-15 DIAGNOSIS — E11.3299 MILD NONPROLIFERATIVE DIABETIC RETINOPATHY WITHOUT MACULAR EDEMA ASSOCIATED WITH TYPE 2 DIABETES MELLITUS (HCC): ICD-10-CM

## 2021-06-15 RX ORDER — LIRAGLUTIDE 6 MG/ML
1.2 INJECTION SUBCUTANEOUS DAILY
Qty: 6 PEN | Refills: 1 | Status: SHIPPED | OUTPATIENT
Start: 2021-06-15 | End: 2022-01-06

## 2021-06-17 DIAGNOSIS — E11.3299 MILD NONPROLIFERATIVE DIABETIC RETINOPATHY WITHOUT MACULAR EDEMA ASSOCIATED WITH TYPE 2 DIABETES MELLITUS (HCC): ICD-10-CM

## 2021-06-17 DIAGNOSIS — I10 BENIGN ESSENTIAL HYPERTENSION: ICD-10-CM

## 2021-06-17 DIAGNOSIS — E11.3293 TYPE 2 DIABETES MELLITUS WITH BOTH EYES AFFECTED BY MILD NONPROLIFERATIVE RETINOPATHY WITHOUT MACULAR EDEMA, WITHOUT LONG-TERM CURRENT USE OF INSULIN (HCC): ICD-10-CM

## 2021-06-17 RX ORDER — LISINOPRIL AND HYDROCHLOROTHIAZIDE 25; 20 MG/1; MG/1
1 TABLET ORAL DAILY
Qty: 90 TABLET | Refills: 3 | Status: SHIPPED | OUTPATIENT
Start: 2021-06-17 | End: 2022-07-12

## 2021-06-17 RX ORDER — LISINOPRIL 20 MG/1
20 TABLET ORAL DAILY
Qty: 90 TABLET | Refills: 3 | Status: SHIPPED | OUTPATIENT
Start: 2021-06-17 | End: 2022-07-12

## 2021-07-15 ENCOUNTER — PATIENT MESSAGE (OUTPATIENT)
Dept: CARDIOLOGY | Facility: CLINIC | Age: 66
End: 2021-07-15

## 2021-07-16 ENCOUNTER — TELEPHONE (OUTPATIENT)
Dept: CARDIOLOGY | Facility: CLINIC | Age: 66
End: 2021-07-16

## 2021-07-16 NOTE — TELEPHONE ENCOUNTER
Regarding: RE: Non-Urgent Medical Question  ----- Message from CLEVELAND Mota sent at 7/15/2021 11:46 AM EDT -----       ----- Message sent from Bernice Heart APRN to Pretty Tovar at 7/15/2021 11:45 AM -----   I have composed your letter already and it will be faxed today stating we want an EKG prior and then you are cleared.     Thanks      CLEVELAND Mota  [facility]   [address] [place], [place] [id number]  Ph: [phone]  Fax: [phone]      ----- Message -----       From:Pretty Tovar       Sent:7/15/2021 11:32 AM EDT         To:Alba Anne MD    Subject:RE: Non-Urgent Medical Question    I apologize.  I do appreciate you getting back to me so quickly.    I believe an EKG was mentioned when I go to pre op testing on July 27.  I will verify and let you know if there are  any changes.    Will your office be sending over written clearance to Dr. Tavarez's office?    Thank you MsSanaz Bernice for your help.    Pretty      ----- Message -----       From:CLEVELAND Mota       Sent:7/15/2021 11:17 AM EDT         To:Pretty Tovar    Subject:RE: Non-Urgent Medical Question    Pretty- I am helping Dr. Anne with messages. I tried to reach you today via telephone but there was no answer. You were low risk for hysterectomy/bladder surgery. You are also low risk for orthopedic surgery. Are you going to have an EKG with your scheduled pre op test? If not, we should set one up in pur office to ensure that has not changed. Thank you          CLEVELAND Mota  [facility]   [address] [place], [place] [id number]  Ph: [phone]  Fax: [phone]      ----- Message -----       From:Pretty Tovar       Sent:7/15/2021 10:40 AM EDT         To:Alba Anne MD    Subject:Non-Urgent Medical Question    Good Morning Dr. Anne,              I am having Left Total Knee Replacement on August 10 and Dr. Francis Tavarez's office has requested  that  I get written clearance.  This can be sent to their office at [phone]  ATTN:  Sheela.              I visited with you last October when I had hysterectomy/bladder surgery.  If you feel that I need to be seen  before sending clearance, please let me know.              Thank you for your help regarding this matter.    Pretty Tovar    1955

## 2021-07-16 NOTE — TELEPHONE ENCOUNTER
Letter faxed to Sheela with Dr. Tavarez's office.  Fax# [phone].  Faxed confirmation received./ BILLY

## 2021-07-21 ENCOUNTER — TRANSCRIBE ORDERS (OUTPATIENT)
Dept: PREADMISSION TESTING | Facility: HOSPITAL | Age: 66
End: 2021-07-21

## 2021-07-27 ENCOUNTER — HOSPITAL ENCOUNTER (OUTPATIENT)
Dept: GENERAL RADIOLOGY | Facility: HOSPITAL | Age: 66
Discharge: HOME | End: 2021-07-27

## 2021-07-27 ENCOUNTER — PRE-ADMISSION TESTING (OUTPATIENT)
Dept: PREADMISSION TESTING | Facility: HOSPITAL | Age: 66
End: 2021-07-27

## 2021-07-27 VITALS
RESPIRATION RATE: 20 BRPM | TEMPERATURE: 98.6 F | HEART RATE: 101 BPM | BODY MASS INDEX: 28.86 KG/M2 | WEIGHT: 147 LBS | OXYGEN SATURATION: 94 % | DIASTOLIC BLOOD PRESSURE: 68 MMHG | SYSTOLIC BLOOD PRESSURE: 142 MMHG | HEIGHT: 60 IN

## 2021-07-27 LAB
ALBUMIN SERPL-MCNC: 4.4 G/DL (ref 3.5–5.2)
ALBUMIN/GLOB SERPL: 1.4 G/DL
ALP SERPL-CCNC: 63 U/L (ref 39–117)
ALT SERPL W P-5'-P-CCNC: 17 U/L (ref 1–33)
ANION GAP SERPL CALCULATED.3IONS-SCNC: 11.9 MMOL/L (ref 5–15)
AST SERPL-CCNC: 20 U/L (ref 1–32)
BACTERIA UR QL AUTO: NORMAL /HPF
BILIRUB SERPL-MCNC: 0.3 MG/DL (ref 0–1.2)
BILIRUB UR QL STRIP: NEGATIVE
BUN SERPL-MCNC: 13 MG/DL (ref 8–23)
BUN/CREAT SERPL: 22.8 (ref 7–25)
CALCIUM SPEC-SCNC: 9.9 MG/DL (ref 8.6–10.5)
CHLORIDE SERPL-SCNC: 98 MMOL/L (ref 98–107)
CLARITY UR: CLEAR
CO2 SERPL-SCNC: 29.1 MMOL/L (ref 22–29)
COLOR UR: YELLOW
CREAT SERPL-MCNC: 0.57 MG/DL (ref 0.57–1)
DEPRECATED RDW RBC AUTO: 43.8 FL (ref 37–54)
ERYTHROCYTE [DISTWIDTH] IN BLOOD BY AUTOMATED COUNT: 14.3 % (ref 12.3–15.4)
GFR SERPL CREATININE-BSD FRML MDRD: 106 ML/MIN/1.73
GLOBULIN UR ELPH-MCNC: 3.2 GM/DL
GLUCOSE SERPL-MCNC: 86 MG/DL (ref 65–99)
GLUCOSE UR STRIP-MCNC: NEGATIVE MG/DL
HBA1C MFR BLD: 5.99 % (ref 4.8–5.6)
HCT VFR BLD AUTO: 38.4 % (ref 34–46.6)
HGB BLD-MCNC: 12.6 G/DL (ref 12–15.9)
HGB UR QL STRIP.AUTO: NEGATIVE
HYALINE CASTS UR QL AUTO: NORMAL /LPF
INR PPP: 0.93 (ref 0.9–1.1)
KETONES UR QL STRIP: NEGATIVE
LEUKOCYTE ESTERASE UR QL STRIP.AUTO: ABNORMAL
MCH RBC QN AUTO: 27.8 PG (ref 26.6–33)
MCHC RBC AUTO-ENTMCNC: 32.8 G/DL (ref 31.5–35.7)
MCV RBC AUTO: 84.8 FL (ref 79–97)
NITRITE UR QL STRIP: NEGATIVE
PH UR STRIP.AUTO: 6.5 [PH] (ref 5–8)
PLATELET # BLD AUTO: 325 10*3/MM3 (ref 140–450)
PMV BLD AUTO: 10.5 FL (ref 6–12)
POTASSIUM SERPL-SCNC: 4.4 MMOL/L (ref 3.5–5.2)
PROT SERPL-MCNC: 7.6 G/DL (ref 6–8.5)
PROT UR QL STRIP: NEGATIVE
PROTHROMBIN TIME: 12.3 SECONDS (ref 11.7–14.2)
QT INTERVAL: 379 MS
RBC # BLD AUTO: 4.53 10*6/MM3 (ref 3.77–5.28)
RBC # UR: NORMAL /HPF
REF LAB TEST METHOD: NORMAL
SODIUM SERPL-SCNC: 139 MMOL/L (ref 136–145)
SP GR UR STRIP: 1.02 (ref 1–1.03)
SQUAMOUS #/AREA URNS HPF: NORMAL /HPF
UROBILINOGEN UR QL STRIP: ABNORMAL
WBC # BLD AUTO: 6.72 10*3/MM3 (ref 3.4–10.8)
WBC UR QL AUTO: NORMAL /HPF

## 2021-07-27 PROCEDURE — 71046 X-RAY EXAM CHEST 2 VIEWS: CPT

## 2021-07-27 PROCEDURE — 85610 PROTHROMBIN TIME: CPT

## 2021-07-27 PROCEDURE — 83036 HEMOGLOBIN GLYCOSYLATED A1C: CPT

## 2021-07-27 PROCEDURE — 81001 URINALYSIS AUTO W/SCOPE: CPT

## 2021-07-27 PROCEDURE — 85027 COMPLETE CBC AUTOMATED: CPT

## 2021-07-27 PROCEDURE — 36415 COLL VENOUS BLD VENIPUNCTURE: CPT

## 2021-07-27 PROCEDURE — 80053 COMPREHEN METABOLIC PANEL: CPT

## 2021-07-27 PROCEDURE — 73560 X-RAY EXAM OF KNEE 1 OR 2: CPT

## 2021-07-27 PROCEDURE — 93005 ELECTROCARDIOGRAM TRACING: CPT

## 2021-07-27 PROCEDURE — 93010 ELECTROCARDIOGRAM REPORT: CPT | Performed by: INTERNAL MEDICINE

## 2021-07-27 RX ORDER — LANOLIN ALCOHOL/MO/W.PET/CERES
400 CREAM (GRAM) TOPICAL DAILY
COMMUNITY

## 2021-07-27 RX ORDER — MULTIPLE VITAMINS W/ MINERALS TAB 9MG-400MCG
1 TAB ORAL DAILY
COMMUNITY

## 2021-07-27 RX ORDER — FOLIC ACID 0.8 MG
500 TABLET ORAL DAILY
COMMUNITY

## 2021-07-27 RX ORDER — AMOXICILLIN 500 MG
1200 CAPSULE ORAL DAILY
COMMUNITY

## 2021-07-27 RX ORDER — NAPROXEN SODIUM 220 MG
220 TABLET ORAL 2 TIMES DAILY PRN
COMMUNITY
End: 2021-08-11 | Stop reason: HOSPADM

## 2021-07-27 RX ORDER — CHLORHEXIDINE GLUCONATE 500 MG/1
1 CLOTH TOPICAL
COMMUNITY
End: 2021-08-11 | Stop reason: HOSPADM

## 2021-07-27 ASSESSMENT — KOOS JR
KOOS JR SCORE: 12
KOOS JR SCORE: 57.14

## 2021-07-27 NOTE — DISCHARGE INSTRUCTIONS
Take the following medications the morning of surgery:      If you are on prescription narcotic pain medication to control your pain you may also take that medication the morning of surgery.    General Instructions:  • Do not eat solid food after midnight the night before surgery.  • You may drink clear liquids day of surgery but must stop at least one hour before your hospital arrival time.  • It is beneficial for you to have a clear drink that contains carbohydrates the day of surgery.  We suggest a 12 to 20 ounce bottle of Gatorade or Powerade for non-diabetic patients or a 12 to 20 ounce bottle of G2 or Powerade Zero for diabetic patients. (Pediatric patients, are not advised to drink a 12 to 20 ounce carbohydrate drink)    Clear liquids are liquids you can see through.  Nothing red in color.     Plain water                               Sports drinks  Sodas                                   Gelatin (Jell-O)  Fruit juices without pulp such as white grape juice and apple juice  Popsicles that contain no fruit or yogurt  Tea or coffee (no cream or milk added)  Gatorade / Powerade  G2 / Powerade Zero    • Infants may have breast milk up to four hours before surgery.  • Infants drinking formula may drink formula up to six hours before surgery.   • Patients who avoid smoking, chewing tobacco and alcohol for 4 weeks prior to surgery have a reduced risk of post-operative complications.  Quit smoking as many days before surgery as you can.  • Do not smoke, use chewing tobacco or drink alcohol the day of surgery.   • If applicable bring your C-PAP/ BI-PAP machine.  • Bring any papers given to you in the doctor’s office.  • Wear clean comfortable clothes.  • Do not wear contact lenses, false eyelashes or make-up.  Bring a case for your glasses.   • Bring crutches or walker if applicable.  • Remove all piercings.  Leave jewelry and any other valuables at home.  • Hair extensions with metal clips must be removed prior to  surgery.  • The Pre-Admission Testing nurse will instruct you to bring medications if unable to obtain an accurate list in Pre-Admission Testing.        If you were given a blood bank ID arm band remember to bring it with you the day of surgery.    Preventing a Surgical Site Infection:  • For 2 to 3 days before surgery, avoid shaving with a razor because the razor can irritate skin and make it easier to develop an infection.    • Any areas of open skin can increase the risk of a post-operative wound infection by allowing bacteria to enter and travel throughout the body.  Notify your surgeon if you have any skin wounds / rashes even if it is not near the expected surgical site.  The area will need assessed to determine if surgery should be delayed until it is healed.  • The night prior to surgery shower using a fresh bar of anti-bacterial soap (such as Dial) and clean washcloth.  Sleep in a clean bed with clean clothing.  Do not allow pets to sleep with you.  • Shower on the morning of surgery using a fresh bar of anti-bacterial soap (such as Dial) and clean washcloth.  Dry with a clean towel and dress in clean clothing.  • Ask your surgeon if you will be receiving antibiotics prior to surgery.  • Make sure you, your family, and all healthcare providers clean their hands with soap and water or an alcohol based hand  before caring for you or your wound.    Day of surgery: 8/10/2021   1030  Your arrival time is approximately two hours before your scheduled surgery time.  Upon arrival, a Pre-op nurse and Anesthesiologist will review your health history, obtain vital signs, and answer questions you may have.  The only belongings needed at this time will be a list of your home medications and if applicable your C-PAP/BI-PAP machine.  A Pre-op nurse will start an IV and you may receive medication in preparation for surgery, including something to help you relax.     Please be aware that surgery does come with  discomfort.  We want to make every effort to control your discomfort so please discuss any uncontrolled symptoms with your nurse.   Your doctor will most likely have prescribed pain medications.      If you are going home after surgery you will receive individualized written care instructions before being discharged.  A responsible adult must drive you to and from the hospital on the day of your surgery and stay with you for 24 hours.  Discharge prescriptions can be filled by the hospital pharmacy during regular pharmacy hours.  If you are having surgery late in the day/evening your prescription may be e-prescribed to your pharmacy.  Please verify your pharmacy hours or chose a 24 hour pharmacy to avoid not having access to your prescription because your pharmacy has closed for the day.    If you are staying overnight following surgery, you will be transported to your hospital room following the recovery period.  [facility] has all private rooms.    If you have any questions please call Pre-Admission Testing at [phone].  Deductibles and co-payments are collected on the day of service. Please be prepared to pay the required co-pay, deductible or deposit on the day of service as defined by your plan.    Patient Education for Self-Quarantine Process    • Following your COVID testing, we strongly recommend that you wear a mask when you are with other people and practice social distancing.   • Limit your activities to only required outings.  • Wash your hands with soap and water frequently for at least 20 seconds.   • Avoid touching your eyes, nose and mouth with unwashed hands.  • Do not share anything - utensils, drinking glasses, food from the same bowl.   • Sanitize household surfaces daily. Include all high touch areas (door handles, light switches, phones, countertops, etc.)    Call your surgeon immediately if you experience any of the following symptoms:  • Sore Throat  • Shortness of Breath  or difficulty breathing  • Cough  • Chills  • Body soreness or muscle pain  • Headache  • Fever  • New loss of taste or smell  • Do not arrive for your surgery ill.  Your procedure will need to be rescheduled to another time.  You will need to call your physician before the day of surgery to avoid any unnecessary exposure to hospital staff as well as other patients.    CHLORHEXIDINE CLOTH INSTRUCTIONS  The morning of surgery follow these instructions using the Chlorhexidine cloths you've been given.  These steps reduce bacteria on the body.  Do not use the cloths near your eyes, ears mouth, genitalia or on open wounds.  Throw the cloths away after use but do not try to flush them down a toilet.      • Open and remove one cloth at a time from the package.    • Leave the cloth unfolded and begin the bathing.  • Massage the skin with the cloths using gentle pressure to remove bacteria.  Do not scrub harshly.   • Follow the steps below with one 2% CHG cloth per area (6 total cloths).  • One cloth for neck, shoulders and chest.  • One cloth for both arms, hands, fingers and underarms (do underarms last).  • One cloth for the abdomen followed by groin.  • One cloth for right leg and foot including between the toes.  • One cloth for left leg and foot including between the toes.  • The last cloth is to be used for the back of the neck, back and buttocks.    Allow the CHG to air dry 3 minutes on the skin which will give it time to work and decrease the chance of irritation.  The skin may feel sticky until it is dry.  Do not rinse with water or any other liquid or you will lose the beneficial effects of the CHG.  If mild skin irritation occurs, do rinse the skin to remove the CHG.  Report this to the nurse at time of admission.  Do not apply lotions, creams, ointments, deodorants or perfumes after using the clothes. Dress in clean clothes before coming to the hospital.    BACTROBAN NASAL OINTMENT  There are many germs  normally in your nose. Bactroban is an ointment that will help reduce these germs. Please follow these instructions for Bactroban use:      __1__The day before surgery in the morning  Date__8/9/2021______    _2___The day before surgery in the evening              Date_8/9/2021_______    _3___The day of surgery in the morning    Date8/10/2021________    **Squirt ½ package of Bactroban Ointment onto a cotton applicator and apply to inside of 1st nostril.  Squirt the remaining Bactroban and apply to the inside of the other nostril.    PERIDEX- ORAL:  Use only if your surgeon has ordered  Use the night before and morning of surgery - Swish, gargle, and spit - do not swallow.

## 2021-07-30 ENCOUNTER — TELEPHONE (OUTPATIENT)
Dept: INTERNAL MEDICINE | Facility: CLINIC | Age: 66
End: 2021-07-30

## 2021-07-30 ENCOUNTER — TELEPHONE (OUTPATIENT)
Dept: CARDIOLOGY | Facility: CLINIC | Age: 66
End: 2021-07-30

## 2021-07-30 NOTE — TELEPHONE ENCOUNTER
I reviewed her EKG and compared it to one from last October and it is unchanged.  When I saw her last October for preoperative evaluation I felt she was low risk for cardiovascular event with surgery.  I have no reason to believe that she is at increased risk based upon this EKG which is unchanged.  I would recommend proceeding with her surgery but if there is any further concern, she should come in to be seen by myself or CLEVELAND Mota for further evaluation

## 2021-07-30 NOTE — TELEPHONE ENCOUNTER
The patient has been informed that she is ok to proceed and that she can have Dr Eugene office look in epic for you clearance information under this note.

## 2021-07-30 NOTE — TELEPHONE ENCOUNTER
The patient called and states that she has a need surgery scheduled with DR Tavarez on 8/10/21. Her ECG was abnormal and she would like to see if you can review it and possible clear her.

## 2021-08-07 ENCOUNTER — LAB (OUTPATIENT)
Dept: LAB | Facility: HOSPITAL | Age: 66
End: 2021-08-07

## 2021-08-07 LAB — SARS-COV-2 ORF1AB RESP QL NAA+PROBE: NOT DETECTED

## 2021-08-07 PROCEDURE — U0005 INFEC AGEN DETEC AMPLI PROBE: HCPCS

## 2021-08-07 PROCEDURE — C9803 HOPD COVID-19 SPEC COLLECT: HCPCS

## 2021-08-07 PROCEDURE — U0004 COV-19 TEST NON-CDC HGH THRU: HCPCS

## 2021-08-10 ENCOUNTER — ANESTHESIA (OUTPATIENT)
Dept: PERIOP | Facility: HOSPITAL | Age: 66
End: 2021-08-10

## 2021-08-10 ENCOUNTER — APPOINTMENT (OUTPATIENT)
Dept: GENERAL RADIOLOGY | Facility: HOSPITAL | Age: 66
End: 2021-08-10

## 2021-08-10 ENCOUNTER — HOSPITAL ENCOUNTER (OUTPATIENT)
Facility: HOSPITAL | Age: 66
Discharge: HOME | End: 2021-08-11
Attending: ORTHOPAEDIC SURGERY | Admitting: ORTHOPAEDIC SURGERY

## 2021-08-10 ENCOUNTER — ANESTHESIA EVENT (OUTPATIENT)
Dept: PERIOP | Facility: HOSPITAL | Age: 66
End: 2021-08-10

## 2021-08-10 DIAGNOSIS — M17.12 PRIMARY OSTEOARTHRITIS OF LEFT KNEE: Primary | ICD-10-CM

## 2021-08-10 LAB
GLUCOSE BLDC GLUCOMTR-MCNC: 115 MG/DL (ref 70–130)
GLUCOSE BLDC GLUCOMTR-MCNC: 139 MG/DL (ref 70–130)
GLUCOSE BLDC GLUCOMTR-MCNC: 83 MG/DL (ref 70–130)

## 2021-08-10 PROCEDURE — 25010000002 NEOSTIGMINE 5 MG/10ML SOLUTION: Performed by: NURSE ANESTHETIST, CERTIFIED REGISTERED

## 2021-08-10 PROCEDURE — 25010000002 MIDAZOLAM PER 1 MG: Performed by: ANESTHESIOLOGY

## 2021-08-10 PROCEDURE — 25010000002 HYDROMORPHONE PER 4 MG: Performed by: NURSE ANESTHETIST, CERTIFIED REGISTERED

## 2021-08-10 PROCEDURE — 25010000002 CEFAZOLIN PER 500 MG: Performed by: NURSE ANESTHETIST, CERTIFIED REGISTERED

## 2021-08-10 PROCEDURE — 25010000002 FENTANYL CITRATE (PF) 50 MCG/ML SOLUTION: Performed by: ANESTHESIOLOGY

## 2021-08-10 PROCEDURE — 25010000002 ROPIVACAINE PER 1 MG: Performed by: ANESTHESIOLOGY

## 2021-08-10 PROCEDURE — 25010000002 ONDANSETRON PER 1 MG: Performed by: NURSE ANESTHETIST, CERTIFIED REGISTERED

## 2021-08-10 PROCEDURE — C1713 ANCHOR/SCREW BN/BN,TIS/BN: HCPCS | Performed by: ORTHOPAEDIC SURGERY

## 2021-08-10 PROCEDURE — 25010000003 CEFAZOLIN IN DEXTROSE 2-4 GM/100ML-% SOLUTION: Performed by: ORTHOPAEDIC SURGERY

## 2021-08-10 PROCEDURE — 25010000002 PROPOFOL 10 MG/ML EMULSION: Performed by: NURSE ANESTHETIST, CERTIFIED REGISTERED

## 2021-08-10 PROCEDURE — 25010000002 EPINEPHRINE 30 MG/30ML SOLUTION

## 2021-08-10 PROCEDURE — 25010000002 FENTANYL CITRATE (PF) 50 MCG/ML SOLUTION: Performed by: NURSE ANESTHETIST, CERTIFIED REGISTERED

## 2021-08-10 PROCEDURE — 73560 X-RAY EXAM OF KNEE 1 OR 2: CPT

## 2021-08-10 PROCEDURE — 25010000002 MORPHINE PER 10 MG: Performed by: ORTHOPAEDIC SURGERY

## 2021-08-10 PROCEDURE — C1776 JOINT DEVICE (IMPLANTABLE): HCPCS | Performed by: ORTHOPAEDIC SURGERY

## 2021-08-10 PROCEDURE — 82962 GLUCOSE BLOOD TEST: CPT

## 2021-08-10 PROCEDURE — 25010000002 ROPIVACAINE PER 1 MG

## 2021-08-10 PROCEDURE — 25010000002 KETOROLAC TROMETHAMINE PER 15 MG

## 2021-08-10 PROCEDURE — 25010000002 DEXAMETHASONE PER 1 MG: Performed by: NURSE ANESTHETIST, CERTIFIED REGISTERED

## 2021-08-10 PROCEDURE — 25010000002 KETOROLAC TROMETHAMINE PER 15 MG: Performed by: NURSE ANESTHETIST, CERTIFIED REGISTERED

## 2021-08-10 PROCEDURE — 76942 ECHO GUIDE FOR BIOPSY: CPT | Performed by: ORTHOPAEDIC SURGERY

## 2021-08-10 DEVICE — CAP TOTL KN CMT PRIMARY: Type: IMPLANTABLE DEVICE | Site: KNEE | Status: FUNCTIONAL

## 2021-08-10 DEVICE — IMPLANTABLE DEVICE
Type: IMPLANTABLE DEVICE | Site: KNEE | Status: FUNCTIONAL
Brand: VANGUARD® KNEE SYSTEM

## 2021-08-10 DEVICE — IMPLANTABLE DEVICE
Type: IMPLANTABLE DEVICE | Site: KNEE | Status: FUNCTIONAL
Brand: BIOMET KNEE SYSTEM

## 2021-08-10 DEVICE — CMT BONE R 1X40: Type: IMPLANTABLE DEVICE | Site: KNEE | Status: FUNCTIONAL

## 2021-08-10 DEVICE — IMPLANTABLE DEVICE
Type: IMPLANTABLE DEVICE | Site: KNEE | Status: FUNCTIONAL
Brand: BIOMET® KNEE SYSTEM

## 2021-08-10 RX ORDER — LABETALOL HYDROCHLORIDE 5 MG/ML
5 INJECTION, SOLUTION INTRAVENOUS
Status: DISCONTINUED | OUTPATIENT
Start: 2021-08-10 | End: 2021-08-10 | Stop reason: HOSPADM

## 2021-08-10 RX ORDER — IBUPROFEN 600 MG/1
600 TABLET ORAL ONCE AS NEEDED
Status: DISCONTINUED | OUTPATIENT
Start: 2021-08-10 | End: 2021-08-10 | Stop reason: HOSPADM

## 2021-08-10 RX ORDER — NALOXONE HCL 0.4 MG/ML
0.2 VIAL (ML) INJECTION AS NEEDED
Status: DISCONTINUED | OUTPATIENT
Start: 2021-08-10 | End: 2021-08-10 | Stop reason: HOSPADM

## 2021-08-10 RX ORDER — HYDROMORPHONE HCL 110MG/55ML
PATIENT CONTROLLED ANALGESIA SYRINGE INTRAVENOUS AS NEEDED
Status: DISCONTINUED | OUTPATIENT
Start: 2021-08-10 | End: 2021-08-10 | Stop reason: SURG

## 2021-08-10 RX ORDER — PROMETHAZINE HYDROCHLORIDE 12.5 MG/1
12.5 TABLET ORAL EVERY 6 HOURS PRN
Status: DISCONTINUED | OUTPATIENT
Start: 2021-08-10 | End: 2021-08-11 | Stop reason: HOSPADM

## 2021-08-10 RX ORDER — KETOROLAC TROMETHAMINE 30 MG/ML
INJECTION, SOLUTION INTRAMUSCULAR; INTRAVENOUS AS NEEDED
Status: DISCONTINUED | OUTPATIENT
Start: 2021-08-10 | End: 2021-08-10 | Stop reason: SURG

## 2021-08-10 RX ORDER — FERROUS SULFATE 325(65) MG
325 TABLET ORAL
Status: DISCONTINUED | OUTPATIENT
Start: 2021-08-11 | End: 2021-08-11 | Stop reason: HOSPADM

## 2021-08-10 RX ORDER — SODIUM CHLORIDE 0.9 % (FLUSH) 0.9 %
1-10 SYRINGE (ML) INJECTION AS NEEDED
Status: DISCONTINUED | OUTPATIENT
Start: 2021-08-10 | End: 2021-08-11 | Stop reason: HOSPADM

## 2021-08-10 RX ORDER — OXYCODONE HYDROCHLORIDE AND ACETAMINOPHEN 5; 325 MG/1; MG/1
2 TABLET ORAL EVERY 4 HOURS PRN
Status: DISCONTINUED | OUTPATIENT
Start: 2021-08-10 | End: 2021-08-11 | Stop reason: HOSPADM

## 2021-08-10 RX ORDER — HYDRALAZINE HYDROCHLORIDE 20 MG/ML
5 INJECTION INTRAMUSCULAR; INTRAVENOUS
Status: DISCONTINUED | OUTPATIENT
Start: 2021-08-10 | End: 2021-08-10 | Stop reason: HOSPADM

## 2021-08-10 RX ORDER — LISINOPRIL 20 MG/1
20 TABLET ORAL DAILY
Status: DISCONTINUED | OUTPATIENT
Start: 2021-08-10 | End: 2021-08-11 | Stop reason: HOSPADM

## 2021-08-10 RX ORDER — SODIUM CHLORIDE 0.9 % (FLUSH) 0.9 %
3 SYRINGE (ML) INJECTION EVERY 12 HOURS SCHEDULED
Status: DISCONTINUED | OUTPATIENT
Start: 2021-08-10 | End: 2021-08-11 | Stop reason: HOSPADM

## 2021-08-10 RX ORDER — FENTANYL CITRATE 50 UG/ML
INJECTION, SOLUTION INTRAMUSCULAR; INTRAVENOUS
Status: COMPLETED | OUTPATIENT
Start: 2021-08-10 | End: 2021-08-10

## 2021-08-10 RX ORDER — ASPIRIN 325 MG
325 TABLET, DELAYED RELEASE (ENTERIC COATED) ORAL 2 TIMES DAILY WITH MEALS
Status: DISCONTINUED | OUTPATIENT
Start: 2021-08-11 | End: 2021-08-11 | Stop reason: HOSPADM

## 2021-08-10 RX ORDER — SODIUM CHLORIDE 450 MG/100ML
100 INJECTION, SOLUTION INTRAVENOUS CONTINUOUS
Status: DISCONTINUED | OUTPATIENT
Start: 2021-08-10 | End: 2021-08-11 | Stop reason: HOSPADM

## 2021-08-10 RX ORDER — FAMOTIDINE 10 MG/ML
20 INJECTION, SOLUTION INTRAVENOUS ONCE
Status: COMPLETED | OUTPATIENT
Start: 2021-08-10 | End: 2021-08-10

## 2021-08-10 RX ORDER — LIDOCAINE HYDROCHLORIDE 10 MG/ML
0.5 INJECTION, SOLUTION EPIDURAL; INFILTRATION; INTRACAUDAL; PERINEURAL ONCE AS NEEDED
Status: DISCONTINUED | OUTPATIENT
Start: 2021-08-10 | End: 2021-08-10 | Stop reason: HOSPADM

## 2021-08-10 RX ORDER — ROPIVACAINE HYDROCHLORIDE 5 MG/ML
INJECTION, SOLUTION EPIDURAL; INFILTRATION; PERINEURAL
Status: COMPLETED | OUTPATIENT
Start: 2021-08-10 | End: 2021-08-10

## 2021-08-10 RX ORDER — MIDAZOLAM HYDROCHLORIDE 1 MG/ML
INJECTION INTRAMUSCULAR; INTRAVENOUS
Status: COMPLETED | OUTPATIENT
Start: 2021-08-10 | End: 2021-08-10

## 2021-08-10 RX ORDER — SODIUM CHLORIDE 0.9 % (FLUSH) 0.9 %
3-10 SYRINGE (ML) INJECTION AS NEEDED
Status: DISCONTINUED | OUTPATIENT
Start: 2021-08-10 | End: 2021-08-10 | Stop reason: HOSPADM

## 2021-08-10 RX ORDER — LEVOTHYROXINE SODIUM 88 UG/1
88 TABLET ORAL DAILY
Status: DISCONTINUED | OUTPATIENT
Start: 2021-08-10 | End: 2021-08-10

## 2021-08-10 RX ORDER — CHOLECALCIFEROL (VITAMIN D3) 125 MCG
5 CAPSULE ORAL NIGHTLY PRN
Status: DISCONTINUED | OUTPATIENT
Start: 2021-08-10 | End: 2021-08-11 | Stop reason: HOSPADM

## 2021-08-10 RX ORDER — LANOLIN ALCOHOL/MO/W.PET/CERES
400 CREAM (GRAM) TOPICAL DAILY
Status: DISCONTINUED | OUTPATIENT
Start: 2021-08-10 | End: 2021-08-11 | Stop reason: HOSPADM

## 2021-08-10 RX ORDER — ONDANSETRON 4 MG/1
4 TABLET, FILM COATED ORAL EVERY 6 HOURS PRN
Status: DISCONTINUED | OUTPATIENT
Start: 2021-08-10 | End: 2021-08-11 | Stop reason: HOSPADM

## 2021-08-10 RX ORDER — NALOXONE HCL 0.4 MG/ML
0.4 VIAL (ML) INJECTION
Status: DISCONTINUED | OUTPATIENT
Start: 2021-08-10 | End: 2021-08-11 | Stop reason: HOSPADM

## 2021-08-10 RX ORDER — CELECOXIB 200 MG/1
200 CAPSULE ORAL ONCE
Status: COMPLETED | OUTPATIENT
Start: 2021-08-10 | End: 2021-08-10

## 2021-08-10 RX ORDER — FENTANYL CITRATE 50 UG/ML
INJECTION, SOLUTION INTRAMUSCULAR; INTRAVENOUS AS NEEDED
Status: DISCONTINUED | OUTPATIENT
Start: 2021-08-10 | End: 2021-08-10 | Stop reason: SURG

## 2021-08-10 RX ORDER — ONDANSETRON 2 MG/ML
4 INJECTION INTRAMUSCULAR; INTRAVENOUS EVERY 6 HOURS PRN
Status: DISCONTINUED | OUTPATIENT
Start: 2021-08-10 | End: 2021-08-11 | Stop reason: HOSPADM

## 2021-08-10 RX ORDER — ACETAMINOPHEN 325 MG/1
325 TABLET ORAL EVERY 4 HOURS PRN
Status: DISCONTINUED | OUTPATIENT
Start: 2021-08-10 | End: 2021-08-11 | Stop reason: HOSPADM

## 2021-08-10 RX ORDER — ROCURONIUM BROMIDE 10 MG/ML
INJECTION, SOLUTION INTRAVENOUS AS NEEDED
Status: DISCONTINUED | OUTPATIENT
Start: 2021-08-10 | End: 2021-08-10 | Stop reason: SURG

## 2021-08-10 RX ORDER — FENTANYL CITRATE 50 UG/ML
50 INJECTION, SOLUTION INTRAMUSCULAR; INTRAVENOUS
Status: DISCONTINUED | OUTPATIENT
Start: 2021-08-10 | End: 2021-08-10 | Stop reason: HOSPADM

## 2021-08-10 RX ORDER — OXYCODONE HYDROCHLORIDE AND ACETAMINOPHEN 5; 325 MG/1; MG/1
1 TABLET ORAL EVERY 4 HOURS PRN
Status: DISCONTINUED | OUTPATIENT
Start: 2021-08-10 | End: 2021-08-11 | Stop reason: HOSPADM

## 2021-08-10 RX ORDER — PROPOFOL 10 MG/ML
VIAL (ML) INTRAVENOUS AS NEEDED
Status: DISCONTINUED | OUTPATIENT
Start: 2021-08-10 | End: 2021-08-10 | Stop reason: SURG

## 2021-08-10 RX ORDER — ONDANSETRON 2 MG/ML
4 INJECTION INTRAMUSCULAR; INTRAVENOUS ONCE AS NEEDED
Status: DISCONTINUED | OUTPATIENT
Start: 2021-08-10 | End: 2021-08-10 | Stop reason: HOSPADM

## 2021-08-10 RX ORDER — NEOSTIGMINE METHYLSULFATE 0.5 MG/ML
INJECTION, SOLUTION INTRAVENOUS AS NEEDED
Status: DISCONTINUED | OUTPATIENT
Start: 2021-08-10 | End: 2021-08-10 | Stop reason: SURG

## 2021-08-10 RX ORDER — DOCUSATE SODIUM 100 MG/1
100 CAPSULE, LIQUID FILLED ORAL 2 TIMES DAILY PRN
Status: DISCONTINUED | OUTPATIENT
Start: 2021-08-10 | End: 2021-08-11 | Stop reason: HOSPADM

## 2021-08-10 RX ORDER — GLYCOPYRROLATE 0.2 MG/ML
INJECTION INTRAMUSCULAR; INTRAVENOUS AS NEEDED
Status: DISCONTINUED | OUTPATIENT
Start: 2021-08-10 | End: 2021-08-10 | Stop reason: SURG

## 2021-08-10 RX ORDER — ONDANSETRON 2 MG/ML
INJECTION INTRAMUSCULAR; INTRAVENOUS AS NEEDED
Status: DISCONTINUED | OUTPATIENT
Start: 2021-08-10 | End: 2021-08-10 | Stop reason: SURG

## 2021-08-10 RX ORDER — SODIUM CHLORIDE 0.9 % (FLUSH) 0.9 %
3 SYRINGE (ML) INJECTION EVERY 12 HOURS SCHEDULED
Status: DISCONTINUED | OUTPATIENT
Start: 2021-08-10 | End: 2021-08-10 | Stop reason: HOSPADM

## 2021-08-10 RX ORDER — DIAZEPAM 5 MG/1
5 TABLET ORAL 2 TIMES DAILY PRN
Status: DISCONTINUED | OUTPATIENT
Start: 2021-08-10 | End: 2021-08-11 | Stop reason: HOSPADM

## 2021-08-10 RX ORDER — ROSUVASTATIN CALCIUM 10 MG/1
10 TABLET, COATED ORAL DAILY
Status: DISCONTINUED | OUTPATIENT
Start: 2021-08-10 | End: 2021-08-11 | Stop reason: HOSPADM

## 2021-08-10 RX ORDER — ACETAMINOPHEN 500 MG
1000 TABLET ORAL ONCE
Status: COMPLETED | OUTPATIENT
Start: 2021-08-10 | End: 2021-08-10

## 2021-08-10 RX ORDER — HYDROMORPHONE HYDROCHLORIDE 1 MG/ML
0.5 INJECTION, SOLUTION INTRAMUSCULAR; INTRAVENOUS; SUBCUTANEOUS
Status: DISCONTINUED | OUTPATIENT
Start: 2021-08-10 | End: 2021-08-10 | Stop reason: HOSPADM

## 2021-08-10 RX ORDER — OXYCODONE AND ACETAMINOPHEN 10; 325 MG/1; MG/1
1 TABLET ORAL EVERY 4 HOURS PRN
Status: DISCONTINUED | OUTPATIENT
Start: 2021-08-10 | End: 2021-08-10 | Stop reason: HOSPADM

## 2021-08-10 RX ORDER — EPHEDRINE SULFATE 50 MG/ML
5 INJECTION, SOLUTION INTRAVENOUS ONCE AS NEEDED
Status: DISCONTINUED | OUTPATIENT
Start: 2021-08-10 | End: 2021-08-10 | Stop reason: HOSPADM

## 2021-08-10 RX ORDER — HYDROCODONE BITARTRATE AND ACETAMINOPHEN 7.5; 325 MG/1; MG/1
1 TABLET ORAL ONCE AS NEEDED
Status: DISCONTINUED | OUTPATIENT
Start: 2021-08-10 | End: 2021-08-10 | Stop reason: HOSPADM

## 2021-08-10 RX ORDER — MIDAZOLAM HYDROCHLORIDE 1 MG/ML
0.5 INJECTION INTRAMUSCULAR; INTRAVENOUS
Status: DISCONTINUED | OUTPATIENT
Start: 2021-08-10 | End: 2021-08-10 | Stop reason: HOSPADM

## 2021-08-10 RX ORDER — LEVOTHYROXINE SODIUM 88 UG/1
88 TABLET ORAL DAILY
Status: DISCONTINUED | OUTPATIENT
Start: 2021-08-11 | End: 2021-08-11 | Stop reason: HOSPADM

## 2021-08-10 RX ORDER — LISINOPRIL 20 MG/1
20 TABLET ORAL DAILY
Status: DISCONTINUED | OUTPATIENT
Start: 2021-08-10 | End: 2021-08-10 | Stop reason: SDUPTHER

## 2021-08-10 RX ORDER — CLINDAMYCIN PHOSPHATE 900 MG/50ML
900 INJECTION INTRAVENOUS EVERY 8 HOURS
Status: COMPLETED | OUTPATIENT
Start: 2021-08-10 | End: 2021-08-11

## 2021-08-10 RX ORDER — MAGNESIUM HYDROXIDE 1200 MG/15ML
LIQUID ORAL AS NEEDED
Status: DISCONTINUED | OUTPATIENT
Start: 2021-08-10 | End: 2021-08-10 | Stop reason: HOSPADM

## 2021-08-10 RX ORDER — TRANEXAMIC ACID 100 MG/ML
INJECTION, SOLUTION INTRAVENOUS AS NEEDED
Status: DISCONTINUED | OUTPATIENT
Start: 2021-08-10 | End: 2021-08-10 | Stop reason: SURG

## 2021-08-10 RX ORDER — SODIUM CHLORIDE, SODIUM LACTATE, POTASSIUM CHLORIDE, CALCIUM CHLORIDE 600; 310; 30; 20 MG/100ML; MG/100ML; MG/100ML; MG/100ML
9 INJECTION, SOLUTION INTRAVENOUS CONTINUOUS
Status: DISCONTINUED | OUTPATIENT
Start: 2021-08-10 | End: 2021-08-11 | Stop reason: HOSPADM

## 2021-08-10 RX ORDER — CEFAZOLIN SODIUM 2 G/100ML
2 INJECTION, SOLUTION INTRAVENOUS ONCE
Status: COMPLETED | OUTPATIENT
Start: 2021-08-10 | End: 2021-08-10

## 2021-08-10 RX ORDER — DEXAMETHASONE SODIUM PHOSPHATE 10 MG/ML
INJECTION INTRAMUSCULAR; INTRAVENOUS AS NEEDED
Status: DISCONTINUED | OUTPATIENT
Start: 2021-08-10 | End: 2021-08-10 | Stop reason: SURG

## 2021-08-10 RX ORDER — FLUMAZENIL 0.1 MG/ML
0.2 INJECTION INTRAVENOUS AS NEEDED
Status: DISCONTINUED | OUTPATIENT
Start: 2021-08-10 | End: 2021-08-10 | Stop reason: HOSPADM

## 2021-08-10 RX ORDER — MIDAZOLAM HYDROCHLORIDE 1 MG/ML
1 INJECTION INTRAMUSCULAR; INTRAVENOUS
Status: DISCONTINUED | OUTPATIENT
Start: 2021-08-10 | End: 2021-08-10 | Stop reason: HOSPADM

## 2021-08-10 RX ORDER — MORPHINE SULFATE 2 MG/ML
4 INJECTION, SOLUTION INTRAMUSCULAR; INTRAVENOUS
Status: DISCONTINUED | OUTPATIENT
Start: 2021-08-10 | End: 2021-08-11 | Stop reason: HOSPADM

## 2021-08-10 RX ORDER — LIDOCAINE HYDROCHLORIDE 20 MG/ML
INJECTION, SOLUTION INFILTRATION; PERINEURAL AS NEEDED
Status: DISCONTINUED | OUTPATIENT
Start: 2021-08-10 | End: 2021-08-10 | Stop reason: SURG

## 2021-08-10 RX ORDER — CEFAZOLIN SODIUM 500 MG/2.2ML
INJECTION, POWDER, FOR SOLUTION INTRAMUSCULAR; INTRAVENOUS AS NEEDED
Status: DISCONTINUED | OUTPATIENT
Start: 2021-08-10 | End: 2021-08-10 | Stop reason: SURG

## 2021-08-10 RX ADMIN — METFORMIN HYDROCHLORIDE 500 MG: 500 TABLET ORAL at 22:26

## 2021-08-10 RX ADMIN — CEFAZOLIN 2 G: 225 INJECTION, POWDER, FOR SOLUTION INTRAMUSCULAR; INTRAVENOUS at 16:01

## 2021-08-10 RX ADMIN — PROPOFOL 200 MG: 10 INJECTION, EMULSION INTRAVENOUS at 14:32

## 2021-08-10 RX ADMIN — FENTANYL CITRATE 50 MCG: 50 INJECTION, SOLUTION INTRAMUSCULAR; INTRAVENOUS at 16:55

## 2021-08-10 RX ADMIN — HYDROMORPHONE HYDROCHLORIDE 0.5 MG: 2 INJECTION, SOLUTION INTRAMUSCULAR; INTRAVENOUS; SUBCUTANEOUS at 15:05

## 2021-08-10 RX ADMIN — MUPIROCIN 1 APPLICATION: 20 OINTMENT TOPICAL at 22:29

## 2021-08-10 RX ADMIN — MIDAZOLAM 1 MG: 1 INJECTION INTRAMUSCULAR; INTRAVENOUS at 13:06

## 2021-08-10 RX ADMIN — KETOROLAC TROMETHAMINE 30 MG: 30 INJECTION, SOLUTION INTRAMUSCULAR at 16:07

## 2021-08-10 RX ADMIN — LIDOCAINE HYDROCHLORIDE 60 MG: 20 INJECTION, SOLUTION INFILTRATION; PERINEURAL at 14:32

## 2021-08-10 RX ADMIN — CEFAZOLIN SODIUM 2 G: 2 INJECTION, SOLUTION INTRAVENOUS at 14:23

## 2021-08-10 RX ADMIN — Medication 400 MCG: at 22:23

## 2021-08-10 RX ADMIN — FAMOTIDINE 20 MG: 10 INJECTION INTRAVENOUS at 11:37

## 2021-08-10 RX ADMIN — ACETAMINOPHEN 1000 MG: 500 TABLET, FILM COATED ORAL at 11:32

## 2021-08-10 RX ADMIN — NEOSTIGMINE METHYLSULFATE 3 MG: 0.5 INJECTION INTRAVENOUS at 16:16

## 2021-08-10 RX ADMIN — DEXAMETHASONE SODIUM PHOSPHATE 8 MG: 10 INJECTION INTRAMUSCULAR; INTRAVENOUS at 14:38

## 2021-08-10 RX ADMIN — FENTANYL CITRATE 50 MCG: 50 INJECTION, SOLUTION INTRAMUSCULAR; INTRAVENOUS at 17:00

## 2021-08-10 RX ADMIN — ONDANSETRON 4 MG: 2 INJECTION INTRAMUSCULAR; INTRAVENOUS at 16:07

## 2021-08-10 RX ADMIN — SODIUM CHLORIDE, POTASSIUM CHLORIDE, SODIUM LACTATE AND CALCIUM CHLORIDE 9 ML/HR: 600; 310; 30; 20 INJECTION, SOLUTION INTRAVENOUS at 11:32

## 2021-08-10 RX ADMIN — HYDROMORPHONE HYDROCHLORIDE 0.5 MG: 1 INJECTION, SOLUTION INTRAMUSCULAR; INTRAVENOUS; SUBCUTANEOUS at 17:01

## 2021-08-10 RX ADMIN — GLYCOPYRROLATE 0.4 MG: 0.2 INJECTION INTRAMUSCULAR; INTRAVENOUS at 16:23

## 2021-08-10 RX ADMIN — ROSUVASTATIN CALCIUM 10 MG: 10 TABLET, FILM COATED ORAL at 22:25

## 2021-08-10 RX ADMIN — NEOSTIGMINE METHYLSULFATE 2 MG: 0.5 INJECTION INTRAVENOUS at 16:23

## 2021-08-10 RX ADMIN — SODIUM CHLORIDE 100 ML/HR: 4.5 INJECTION, SOLUTION INTRAVENOUS at 22:29

## 2021-08-10 RX ADMIN — FENTANYL CITRATE 50 MCG: 50 INJECTION INTRAMUSCULAR; INTRAVENOUS at 14:26

## 2021-08-10 RX ADMIN — FENTANYL CITRATE 50 MCG: 50 INJECTION INTRAMUSCULAR; INTRAVENOUS at 14:55

## 2021-08-10 RX ADMIN — SODIUM CHLORIDE, POTASSIUM CHLORIDE, SODIUM LACTATE AND CALCIUM CHLORIDE: 600; 310; 30; 20 INJECTION, SOLUTION INTRAVENOUS at 16:11

## 2021-08-10 RX ADMIN — TRANEXAMIC ACID 1000 MG: 1 INJECTION, SOLUTION INTRAVENOUS at 14:42

## 2021-08-10 RX ADMIN — MIDAZOLAM 1 MG: 1 INJECTION INTRAMUSCULAR; INTRAVENOUS at 13:03

## 2021-08-10 RX ADMIN — FENTANYL CITRATE 50 MCG: 50 INJECTION INTRAMUSCULAR; INTRAVENOUS at 13:03

## 2021-08-10 RX ADMIN — PROPOFOL 50 MG: 10 INJECTION, EMULSION INTRAVENOUS at 15:04

## 2021-08-10 RX ADMIN — ROCURONIUM BROMIDE 35 MG: 50 INJECTION INTRAVENOUS at 14:32

## 2021-08-10 RX ADMIN — CELECOXIB 200 MG: 200 CAPSULE ORAL at 11:32

## 2021-08-10 RX ADMIN — ROPIVACAINE HYDROCHLORIDE 30 ML: 5 INJECTION, SOLUTION EPIDURAL; INFILTRATION; PERINEURAL at 13:13

## 2021-08-10 RX ADMIN — GLYCOPYRROLATE 0.4 MG: 0.2 INJECTION INTRAMUSCULAR; INTRAVENOUS at 16:16

## 2021-08-10 RX ADMIN — HYDROMORPHONE HYDROCHLORIDE 0.5 MG: 2 INJECTION, SOLUTION INTRAMUSCULAR; INTRAVENOUS; SUBCUTANEOUS at 15:11

## 2021-08-10 NOTE — H&P
"  Orthopaedic Surgery History and Physical    Patient Name:  Pretty Tovar  YOB: 1955   Age: 65 y.o.  Medical Records Number:  [id number]    Date of Admission:  8/10/2021 10:23 AM    Chief Complaint:  Primary osteoarthritis of left knee [M17.12]    Pretty Tovar is a 65 y.o. female who presents c/o severe left knee pain.  The pain has been on and off for many years, worsening to the point where the pain is becoming disabling.  The pain is a constant dull ache with occasional sharp, stabbing pain.  The patient has failed conservative treatment and would like to proceed with left total knee arthroplasty.    /70 (BP Location: Left arm, Patient Position: Lying)   Pulse 89   Temp 97.7 °F (36.5 °C) (Oral)   Resp 18   Ht 152.4 cm (60\")   Wt 65.3 kg (143 lb 15.4 oz)   LMP  (LMP Unknown)   SpO2 96%   BMI 28.12 kg/m²     Past Medical History:    Past Medical History:   Diagnosis Date   • Abnormal liver function tests 2017    Description: , \"-\"hepatitis profile   • Anemia    • Anesthesia complication     SLOW TO WAKE UP   • Colon polyps    • Diabetes mellitus (CMS/HCC)    • H/O bone density study 10/2010    normal   • H/O cardiovascular stress test    • H/O echocardiogram 2001    2-D   • Heart murmur    • Hyperkalemia 2019   • Hyperlipidemia    • Hypertension    • Hypothyroidism 2018   • Knee pain    • Osteoarthritis of knee     left   • Plantar warts     foot   • Pregnancy      twins x1   • Psoriasis    • Psoriasis    • Urine incontinence    • Uterovaginal prolapse        Past Surgical History:   Past Surgical History:   Procedure Laterality Date   • BREAST BIOPSY Right 2006    benign   • COLONOSCOPY     • COLONOSCOPY W/ POLYPECTOMY  2018    Dr Resendiz, 6 mm rectal polyp - hyperplastic   • PUBOVAGINAL SLING N/A 10/16/2020    Procedure: Pubovaginal sling Posterior colporrhaphy fixation Insertion of vaginal grafts Cystourethroscopy;  Surgeon: Karen Allen " WALDEMAR SANCHEZ MD;  Location: [facility] MAIN OR;  Service: Gynecology;  Laterality: N/A;   • TOTAL LAPAROSCOPIC HYSTERECTOMY, SACROCOLPOPEXY N/A 10/16/2020    Procedure: Laparoscopic uterosacral ligament colpopexy sacral colpopexy  Laparoscopic paravaginal repair Laparoscopic hysterectomy with bilateral salpingectomy  Laparoscopic abdominal enterocele repair;  Surgeon: Karen Allen MD;  Location: [facility] MAIN OR;  Service: Gynecology;  Laterality: N/A;   • TUBAL ABDOMINAL LIGATION         Social History:    Social History     Socioeconomic History   • Marital status:      Spouse name: SUZANNE    • Number of children: 3   • Years of education: Not on file   • Highest education level: Not on file   Tobacco Use   • Smoking status: Never Smoker   • Smokeless tobacco: Never Used   • Tobacco comment: daily caffine   Vaping Use   • Vaping Use: Never used   Substance and Sexual Activity   • Alcohol use: Never   • Drug use: Never       Family History:    Family History   Problem Relation Age of Onset   • Heart disease Father    • Hypertension Father    • Diabetes Brother    • Heart disease Maternal Grandmother    • Other Son         TWIN   • Other Daughter         TWIN    • BRCA 1/2 Neg Hx    • Breast cancer Neg Hx    • Colon cancer Neg Hx    • Endometrial cancer Neg Hx    • Ovarian cancer Neg Hx    • Malig Hyperthermia Neg Hx        Current Medications:  Scheduled Meds:ceFAZolin, 2 g, Intravenous, Once  sodium chloride, 3 mL, Intravenous, Q12H      Continuous Infusions:lactated ringers, 9 mL/hr, Last Rate: 9 mL/hr (08/10/21 1132)      PRN Meds:.fentanyl  •  lidocaine PF 1%  •  midazolam  •  midazolam  •  sodium chloride    Current Facility-Administered Medications:   •  ceFAZolin in dextrose (ANCEF) IVPB solution 2 g, 2 g, Intravenous, Once, Francis Tavarez MD  •  fentaNYL citrate (PF) (SUBLIMAZE) injection 50 mcg, 50 mcg, Intravenous, Q10 Min PRN, Sheela Gómez MD  •  lactated ringers infusion, 9 mL/hr, Intravenous,  Continuous, Sheela Gómez MD, Last Rate: 9 mL/hr at 08/10/21 1132, 9 mL/hr at 08/10/21 1132  •  lidocaine PF 1% (XYLOCAINE) injection 0.5 mL, 0.5 mL, Injection, Once PRN, Sheela Gómez MD  •  midazolam (VERSED) injection 0.5 mg, 0.5 mg, Intravenous, Q10 Min PRN, Sheela Gómez MD  •  midazolam (VERSED) injection 1 mg, 1 mg, Intravenous, Q10 Min PRN, Sheela Gómez MD  •  sodium chloride 0.9 % flush 3 mL, 3 mL, Intravenous, Q12H, Sheela Gómez MD  •  sodium chloride 0.9 % flush 3-10 mL, 3-10 mL, Intravenous, PRN, Sheela Gómez MD    Allergies:  No Known Allergies    Review of Systems:   HEENT: Patient denies any headaches, vision changes, change in hearing, or tinnitus, Patient denies any rhinorrhea,epistaxis, sinus pain, mouth or dental problems, sore throat or hoarseness, or dysphagia  Pulmonary: Patient denies any cough, congestion, SOA, or wheezing  Cardiovascular: Patient denies any chest pain, dyspnea, palpitations, weakness, intolerance of exercise, varicosities, swelling of extremities, known murmur  Gastrointestinal:  Patient denies nausea, vomiting, diarrhea, constipation, loss  of appetite, change in appetite, dysphagia, gas, heartburn, melena, change in bowel habits, use of laxatives or other drugs to alter the function of the gastrointestinal tract.  Genital/Urinary: Patient denies dysuria, change in color of urine, change in frequency of urination, pain with urgency, incontinence, retention, or nocturia.  Musculoskeletal: Patient denies increased warmth; redness; or swelling of joints; limitation of function; deformity; crepitation: pain in a joint or an extremity, the neck, or the back, especially with movement.  Neurological: Patient denies dizziness, tremor, ataxia, difficulty in speaking, change in speech, paresthesia, loss of sensation, seizures, syncope, changes in memory.  Endocrine system: Patient denies tremors, palpitations, intolerance of heat or cold, polyuria, polydipsia, polyphagia,  diaphoresis, exophthalmos, or goiter.  Psychological: Patient denies thoughts/plans or harming self or other; depression,  insomnia, night terrors, jerrica, memory loss, disorientation.  Skin: Patient denies any bruising, rashes, discoloration, pruritus, wounds, ulcers, decubiti, changes in the hair or nails  Hematopoietic: Patient denies history of spontaneous or excessive bleeding, epistaxis, hematuria, melena, fatigue, enlarged or tender lymph nodes, pallor, history of anemia.        Physical Exam:   Awake, A&O x3, affect normal, no acute distress  Ambulating with a limp due to knee pain  Knee ROM is limited due to pain (5-115)  Strength is 4/5 in the quad, hamstring and calf  Cap refill is normal, Sensation intact    Card:  RR, HD Stable  Pulm:  Regular breathing, no S.O.A  Abd:  Soft, NT, ND    Lab Results (last 24 hours)     Procedure Component Value Units Date/Time    POC Glucose Once [[id number]]  (Normal) Collected: 08/10/21 1058    Specimen: Blood Updated: 08/10/21 1100     Glucose 83 mg/dL      Comment: Meter: [id number] : [id number] Tamar Dubose S NA             XR Chest PA & Lateral, XR Knee 1 or 2 View Left    Result Date: 7/27/2021  Narrative: XR CHEST PA AND LATERAL-, XR KNEE 1 OR 2 VW LEFT-  07/27/2021  HISTORY: Preop. Knee surgery. Hypertension.  PA AND LATERAL CHEST.  FINDINGS: Heart size is at the upper limits of normal. Lungs appear free of acute infiltrates. There are degenerative changes in the spine.      Impression: Borderline cardiomegaly.  LEFT KNEE 2 VIEWS  FINDINGS: There is severe medial tibiofemoral joint space narrowing. Hypertrophic changes are seen in the knee. Patellofemoral joint space narrowing is seen.  There is some distal superficial femoral artery calcification.  No fractures or other acute bony abnormalities are seen.  IMPRESSION: Moderately severe degenerative arthritis of the left knee.  This report was finalized on 7/27/2021 9:16 PM by Dr. Anthony Estrella M.D.           Assessment:  End-stage Primary Left Knee Osteoarthritis    Plan:  Patient's pain is becoming disabling, despite extensive conservative treatment.  Radiographs reveal end-stage degenerative changes.  The risks of surgery, including, but not limited to, heart attack, stroke, dying, DVT, nerve injury, vascular injury, arthrofibrosis and infection were discussed.  The alternatives and benefits were also discussed.  All questions answered and the patient wishes to proceed with left total knee arthroplasty.    Francis Gay PA-C  [facility] [place] [id number] [phone]    8/10/2021    CC: Paris Singer APRN, Francis Tavarez MD

## 2021-08-10 NOTE — OP NOTE
Orthopaedic Surgery Operative Note    Patient Name:  Pretty Tovar  YOB: 1955  Age: 65 y.o.  Medical Records Number:  [id number]    Date of Procedure:  8/10/2021    Pre-operative Diagnosis:  Primary Osteoarthritis Left Knee    Post-operative Diagnosis:  Primary Osteoarthritis Left Knee    Procedure Performed:  Left Total Knee Arthroplasty    Implants:  Biomet Vanguard TKA, 60 Femoral Component, 67 Tibial Tray with a 40 mm Modular Stem, 31 3-Peg Patella, 14 Posterior Lippede  Polyethylene Insert    Surgeon:  Francis Tavarez M.D.    Assistant: Kasia Buck (who was present during the critical portions of the case, thereby decreasing operative time and patient morbidity)    Anesthetic Type:  General    Estimated Blood Loss:  250cc's    Specimens: * No orders in the log *    No Complications      Indications for Procedure:  Pretty Tovar is a 65 y.o. female suffering from end stage degenerative changes in the left knee.  The patients pain is becoming disabling, despite extensive conservative care, including NSAIDS, therapy and injections. The risks, benefits and alternatives were discussed and the patient wishes to proceed with left total knee arthroplasty.      Procedure Performed:    After informed consent was obtained, the correct patient identified, the correct operative side marked by the operative surgeon, and pre-operative IV Kefzol  given (prior to tourniquet inflation), the patient was taken to the operating room and placed supine on the operating table.  After general anesthesia was induced, a surgical time out was performed and 1 gm of Tranxemic Acid given, a well-padded tourniquet was placed and the patient's left lower extremity was prepped with chloraprep and draped in a sterile fashion.    A midline incision was made overlying the left knee and we sharply dissected down to expose the parapatellar retinaculum.  A muscle sparing, mid-vastus, parapatellar arthrotomy was  performed and we elevated the soft tissue both medially and laterally.  The menisci and ACL were excised.  We everted the patella and measured this to be 22 mm and we removed 7 mm of bone down to 15 mm.  We measured the patella to be 31 and drilled our three drill holes.  We protected the patella with the patella protector and turned our attention to the femur and the tibia.    We drilled drill holes into the femoral and the tibial intramedullary canals and proceeded to irrigate the canals to remove the fatty marrow.  We used the intramedullary sylvia and the 5 degree valgus cut block to make our distal femoral cut.  Once we had a smooth surface, we measured the femur to be 60.  We assured we had rotational alignment using the epicondylar axis, then we used the four-in-one cut block to make our anterior, posterior, anterior and posterior chamfer cuts.  We placed our femoral trial, had excellent fit, extended the knee and marked Polk's line on the tibia.      We then turned our attention to the tibia, where we irrigated the canal again.  We used the intramedullary sylvia and the 3 degree posterior sloped cut block to remove 2 mm of bone from the effected side of the tibia.  Prior to making our cut, we assured we had rotational alignment using the external guide and Shy's line.  Once we had a smooth surface, we measured the tibia to be 67.  We then removed soft tissue and bony debris from the posterior aspect of the knee.    We placed our trial implants and had excellent fit, range of motion, stability and ligament balance, throughout a complete arc of motion with a 14 lipped polyethylene liner.  We removed our trial implants, punched the tibial keel, copiously irrigated the knee, then cemented our implants in place using Biomet cement.  Once the cement cured, we trialed again with the 12, 14 and 16 AS, standard and posterior lipped polyethylene liners.  The 14 lipped gave us the best range of motion, stability  "and ligament balance, throughout a complete arc of motion.  We removed the trials, copiously irrigated the knee, gave IV antibiotics and local anesthetic, then placed our permanent polyethylene liner.  We placed a 1/8\" hemovac drain, then closed the arthrotomy with #1 Vicryl pop-off sutures.  The subcutaneous tissue was closed with 2-0 vicryl pop-off sutures.  The skin was closed with staples.  We placed a sterile dressing of Xeroform, 4x4's, abdominal pads, cast padding and an Ace Wrap.  All sponge and needle counts were correct.  The patient was then awakened from general anesthesia and taken to the recovery room in stable condition.    The patient will be started on Aspirin 325 mg twice daily for DVT prophylaxis.  IV antibiotics will be discontinued within 24 hours of surgery.  Immediately prior to surgery, there were no acute Thromboembolic nor Cardiovascular risk factors.  An updated Medical Reconciliation form is on the chart.    Francis Gay PA-C  [facility]  [address] [place] [address]  [phone]    8/10/2021        "

## 2021-08-10 NOTE — ANESTHESIA PROCEDURE NOTES
Airway  Urgency: elective    Date/Time: 8/10/2021 2:34 PM  Airway not difficult    General Information and Staff    Patient location during procedure: OR  Anesthesiologist: Kurt Dykes MD  CRNA: Shira Mendoza CRNA    Indications and Patient Condition  Indications for airway management: airway protection    Preoxygenated: yes  MILS maintained throughout  Mask difficulty assessment: 1 - vent by mask    Final Airway Details  Final airway type: endotracheal airway      Successful airway: ETT  Cuffed: yes   Successful intubation technique: direct laryngoscopy  Facilitating devices/methods: intubating stylet  Endotracheal tube insertion site: oral  Blade: Tolbert  Blade size: 2  ETT size (mm): 7.0  Cormack-Lehane Classification: grade I - full view of glottis  Placement verified by: chest auscultation and capnometry   Cuff volume (mL): 8  Measured from: lips  ETT/EBT  to lips (cm): 21  Number of attempts at approach: 1  Assessment: lips, teeth, and gum same as pre-op and atraumatic intubation    Additional Comments  Pt preoxygenated, SIVI, bag mask vent, ATETI, dentition as before

## 2021-08-10 NOTE — ANESTHESIA PROCEDURE NOTES
Peripheral Block      Patient location during procedure: holding area  Start time: 8/10/2021 12:52 PM  Reason for block: at surgeon's request and post-op pain management  Performed by  Anesthesiologist: Sheela Gómez MD  Preanesthetic Checklist  Completed: patient identified, IV checked, site marked, risks and benefits discussed, surgical consent, monitors and equipment checked, pre-op evaluation and timeout performed  Prep:  Pt Position: supine  Prep: ChloraPrep  Patient monitoring: continuous pulse oximetry  Procedure  Sedation:yes  Performed under: PNB  Guidance:ultrasound guided  ULTRASOUND INTERPRETATION.  Using ultrasound guidance a 20 G gauge needle was placed in close proximity to the femoral nerve, at which point, under ultrasound guidance anesthetic was injected in the area of the nerve and spread of the anesthesia was seen on ultrasound in close proximity thereto.  There were no abnormalities seen on ultrasound; a digital image was taken; and the patient tolerated the procedure with no complications. Images:still images obtained    Laterality:left  Block Type:adductor canal block  Injection Technique:single-shot  Needle Type:echogenic  Needle Gauge:20 G      Medications Used: ropivacaine (NAROPIN) 0.5 % injection, 30 mL      Post Assessment  Injection Assessment: negative aspiration for heme, no paresthesia on injection and incremental injection  Patient Tolerance:comfortable throughout block  Complications:no

## 2021-08-10 NOTE — ANESTHESIA PREPROCEDURE EVALUATION
Anesthesia Evaluation                  Airway   Mallampati: I  TM distance: >3 FB  Neck ROM: full  No difficulty expected  Dental - normal exam     Pulmonary - normal exam   (-) not a smoker  Cardiovascular - normal exam    (+) hypertension 2 medications or greater, hyperlipidemia,       Neuro/Psych  GI/Hepatic/Renal/Endo    (+)   diabetes mellitus type 2, thyroid problem hypothyroidism    Musculoskeletal     Abdominal    Substance History      OB/GYN    (-)  Pregnant        Other   arthritis,                      Anesthesia Plan    ASA 2     general with block     intravenous induction     Anesthetic plan, all risks, benefits, and alternatives have been provided, discussed and informed consent has been obtained with: patient.

## 2021-08-11 ENCOUNTER — READMISSION MANAGEMENT (OUTPATIENT)
Dept: CALL CENTER | Facility: HOSPITAL | Age: 66
End: 2021-08-11

## 2021-08-11 ENCOUNTER — HOME HEALTH ADMISSION (OUTPATIENT)
Dept: HOME HEALTH SERVICES | Facility: HOME HEALTHCARE | Age: 66
End: 2021-08-11

## 2021-08-11 ENCOUNTER — TRANSCRIBE ORDERS (OUTPATIENT)
Dept: HOME HEALTH SERVICES | Facility: HOME HEALTHCARE | Age: 66
End: 2021-08-11

## 2021-08-11 VITALS
SYSTOLIC BLOOD PRESSURE: 119 MMHG | OXYGEN SATURATION: 98 % | TEMPERATURE: 97.8 F | WEIGHT: 143.96 LBS | DIASTOLIC BLOOD PRESSURE: 67 MMHG | RESPIRATION RATE: 16 BRPM | HEART RATE: 90 BPM | HEIGHT: 60 IN | BODY MASS INDEX: 28.26 KG/M2

## 2021-08-11 DIAGNOSIS — Z96.652 AFTERCARE FOLLOWING LEFT KNEE JOINT REPLACEMENT SURGERY: Primary | ICD-10-CM

## 2021-08-11 DIAGNOSIS — Z47.1 AFTERCARE FOLLOWING LEFT KNEE JOINT REPLACEMENT SURGERY: Primary | ICD-10-CM

## 2021-08-11 LAB
ANION GAP SERPL CALCULATED.3IONS-SCNC: 9.3 MMOL/L (ref 5–15)
BUN SERPL-MCNC: 9 MG/DL (ref 8–23)
BUN/CREAT SERPL: 21.4 (ref 7–25)
CALCIUM SPEC-SCNC: 8.6 MG/DL (ref 8.6–10.5)
CHLORIDE SERPL-SCNC: 97 MMOL/L (ref 98–107)
CO2 SERPL-SCNC: 26.7 MMOL/L (ref 22–29)
CREAT SERPL-MCNC: 0.42 MG/DL (ref 0.57–1)
DEPRECATED RDW RBC AUTO: 44.1 FL (ref 37–54)
ERYTHROCYTE [DISTWIDTH] IN BLOOD BY AUTOMATED COUNT: 14.4 % (ref 12.3–15.4)
GFR SERPL CREATININE-BSD FRML MDRD: >150 ML/MIN/1.73
GLUCOSE BLDC GLUCOMTR-MCNC: 104 MG/DL (ref 70–130)
GLUCOSE SERPL-MCNC: 105 MG/DL (ref 65–99)
HCT VFR BLD AUTO: 30.5 % (ref 34–46.6)
HGB BLD-MCNC: 10 G/DL (ref 12–15.9)
MCH RBC QN AUTO: 27.6 PG (ref 26.6–33)
MCHC RBC AUTO-ENTMCNC: 32.8 G/DL (ref 31.5–35.7)
MCV RBC AUTO: 84.3 FL (ref 79–97)
PLATELET # BLD AUTO: 239 10*3/MM3 (ref 140–450)
PMV BLD AUTO: 9.9 FL (ref 6–12)
POTASSIUM SERPL-SCNC: 4.9 MMOL/L (ref 3.5–5.2)
RBC # BLD AUTO: 3.62 10*6/MM3 (ref 3.77–5.28)
SODIUM SERPL-SCNC: 133 MMOL/L (ref 136–145)
WBC # BLD AUTO: 9.22 10*3/MM3 (ref 3.4–10.8)

## 2021-08-11 PROCEDURE — 80048 BASIC METABOLIC PNL TOTAL CA: CPT | Performed by: ORTHOPAEDIC SURGERY

## 2021-08-11 PROCEDURE — 97530 THERAPEUTIC ACTIVITIES: CPT

## 2021-08-11 PROCEDURE — G0378 HOSPITAL OBSERVATION PER HR: HCPCS

## 2021-08-11 PROCEDURE — 97161 PT EVAL LOW COMPLEX 20 MIN: CPT

## 2021-08-11 PROCEDURE — 82962 GLUCOSE BLOOD TEST: CPT

## 2021-08-11 PROCEDURE — 85027 COMPLETE CBC AUTOMATED: CPT | Performed by: ORTHOPAEDIC SURGERY

## 2021-08-11 RX ORDER — OXYCODONE HYDROCHLORIDE AND ACETAMINOPHEN 5; 325 MG/1; MG/1
1-2 TABLET ORAL EVERY 4 HOURS PRN
Qty: 60 TABLET | Refills: 0 | Status: SHIPPED | OUTPATIENT
Start: 2021-08-11 | End: 2021-09-15

## 2021-08-11 RX ORDER — ASPIRIN 325 MG
325 TABLET, DELAYED RELEASE (ENTERIC COATED) ORAL 2 TIMES DAILY WITH MEALS
Qty: 60 TABLET | Refills: 0 | Status: SHIPPED | OUTPATIENT
Start: 2021-08-11 | End: 2021-10-29

## 2021-08-11 RX ORDER — PSEUDOEPHEDRINE HCL 30 MG
100 TABLET ORAL 2 TIMES DAILY PRN
Qty: 30 CAPSULE | Refills: 1 | Status: SHIPPED | OUTPATIENT
Start: 2021-08-11

## 2021-08-11 RX ADMIN — CLINDAMYCIN PHOSPHATE 900 MG: 900 INJECTION, SOLUTION INTRAVENOUS at 00:20

## 2021-08-11 RX ADMIN — OXYCODONE AND ACETAMINOPHEN 1 TABLET: 5; 325 TABLET ORAL at 08:52

## 2021-08-11 RX ADMIN — ASPIRIN 325 MG: 325 TABLET, COATED ORAL at 08:52

## 2021-08-11 RX ADMIN — METFORMIN HYDROCHLORIDE 500 MG: 500 TABLET ORAL at 08:52

## 2021-08-11 RX ADMIN — FERROUS SULFATE TAB 325 MG (65 MG ELEMENTAL FE) 325 MG: 325 (65 FE) TAB at 08:52

## 2021-08-11 RX ADMIN — CLINDAMYCIN PHOSPHATE 900 MG: 900 INJECTION, SOLUTION INTRAVENOUS at 08:52

## 2021-08-11 RX ADMIN — MUPIROCIN 1 APPLICATION: 20 OINTMENT TOPICAL at 08:52

## 2021-08-11 RX ADMIN — ROSUVASTATIN CALCIUM 10 MG: 10 TABLET, FILM COATED ORAL at 08:52

## 2021-08-11 RX ADMIN — SODIUM CHLORIDE, PRESERVATIVE FREE 3 ML: 5 INJECTION INTRAVENOUS at 08:53

## 2021-08-11 RX ADMIN — LEVOTHYROXINE SODIUM 88 MCG: 0.09 TABLET ORAL at 08:52

## 2021-08-11 RX ADMIN — Medication 400 MCG: at 08:52

## 2021-08-11 NOTE — CASE MANAGEMENT/SOCIAL WORK
Case Management Discharge Note      Final Note: Home with [facility].         Selected Continued Care - Admitted Since 8/10/2021     Destination    No services have been selected for the patient.              Durable Medical Equipment    No services have been selected for the patient.              Dialysis/Infusion    No services have been selected for the patient.              Home Medical Care Coordination complete.    Service Provider Selected Services Address Phone Fax Patient Preferred    [facility]  Home Health Services [address] [address] [phone] --          Therapy    No services have been selected for the patient.              Community Resources    No services have been selected for the patient.              Community & DME    No services have been selected for the patient.                       Final Discharge Disposition Code: 06 - home with home health care

## 2021-08-11 NOTE — OUTREACH NOTE
Prep Survey      Responses   [facility] patient discharged from?  [place]   Is LACE score < 7 ?  No   Emergency Room discharge w/ pulse ox?  No   Eligibility  [facility]   Date of Admission  08/10/21   Date of Discharge  08/11/21   Discharge Disposition  Home or Self Care   Discharge diagnosis  Left Total Knee Arthroplasty   Does the patient have one of the following disease processes/diagnoses(primary or secondary)?  Total Joint Replacement   Does the patient have Home health ordered?  Yes   What is the Home health agency?    [facility]   Is there a DME ordered?  No   Prep survey completed?  Yes          Paula Iglesias RN

## 2021-08-11 NOTE — THERAPY EVALUATION
"Patient Name: Pretty Tovar  : 1955    MRN: [id number]                              Today's Date: 2021       Admit Date: 8/10/2021    Visit Dx:     ICD-10-CM ICD-9-CM   1. Primary osteoarthritis of left knee  M17.12 715.16     Patient Active Problem List   Diagnosis   • Benign essential hypertension   • Type 2 diabetes mellitus, without long-term current use of insulin (CMS/HCC)   • Pure hypercholesterolemia   • Overweight (BMI 25.0-29.9)   • Hashimoto's thyroiditis   • Health care maintenance   • Mild nonproliferative diabetic retinopathy without macular edema associated with type 2 diabetes mellitus (CMS/HCC)   • Pelvic relaxation due to vaginal prolapse   • Hyperplastic rectal polyp   • Postmenopausal bleeding   • Uterovaginal prolapse, incomplete   • Cystocele, midline   • Cystocele, lateral   • Cystocele, lateral   • Vaginal enterocele   • Rectocele   • Loss of perineal body, female   • Female stress incontinence   • Feeling of incomplete bladder emptying   • Incompetence of pubocervical tissue   • Incompetence of rectovaginal tissue   • Primary osteoarthritis of left knee     Past Medical History:   Diagnosis Date   • Abnormal liver function tests 2017    Description: , \"-\"hepatitis profile   • Anemia    • Anesthesia complication     SLOW TO WAKE UP   • Colon polyps    • Diabetes mellitus (CMS/HCC)    • H/O bone density study 10/2010    normal   • H/O cardiovascular stress test    • H/O echocardiogram 2001    2-D   • Heart murmur    • Hyperkalemia 2019   • Hyperlipidemia    • Hypertension    • Hypothyroidism 2018   • Knee pain    • Osteoarthritis of knee 2005    left   • Plantar warts     foot   • Pregnancy      twins x1   • Psoriasis    • Psoriasis    • Urine incontinence    • Uterovaginal prolapse      Past Surgical History:   Procedure Laterality Date   • BREAST BIOPSY Right 2006    benign   • COLONOSCOPY     • COLONOSCOPY W/ POLYPECTOMY  2018    Dr Resendiz, 6 " mm rectal polyp - hyperplastic   • PUBOVAGINAL SLING N/A 10/16/2020    Procedure: Pubovaginal sling Posterior colporrhaphy fixation Insertion of vaginal grafts Cystourethroscopy;  Surgeon: Karen Allen MD;  Location: [facility];  Service: Gynecology;  Laterality: N/A;   • TOTAL KNEE ARTHROPLASTY Left 8/10/2021    Procedure: TOTAL KNEE ARTHROPLASTY;  Surgeon: Francis Tavarez MD;  Location: [facility];  Service: Orthopedics;  Laterality: Left;   • TOTAL LAPAROSCOPIC HYSTERECTOMY, SACROCOLPOPEXY N/A 10/16/2020    Procedure: Laparoscopic uterosacral ligament colpopexy sacral colpopexy  Laparoscopic paravaginal repair Laparoscopic hysterectomy with bilateral salpingectomy  Laparoscopic abdominal enterocele repair;  Surgeon: Karen Allen MD;  Location: [facility];  Service: Gynecology;  Laterality: N/A;   • TUBAL ABDOMINAL LIGATION       General Information     [facility] Name 08/11/21 0857          Physical Therapy Time and Intention    Document Type  evaluation  -CF     Mode of Treatment  individual therapy;physical therapy  -     Row Name 08/11/21 0857          General Information    Patient Profile Reviewed  yes  -CF     Prior Level of Function  independent:  -CF     Existing Precautions/Restrictions  fall  -CF     Barriers to Rehab  none identified  -     Row Name 08/11/21 0857          Living Environment    Lives With  spouse  -     Row Name 08/11/21 0857          Home Main Entrance    Number of Stairs, Main Entrance  two  -CF     Stair Railings, Main Entrance  railing on right side (ascending)  -     Row Name 08/11/21 0857          Stairs Within Home, Primary    Number of Stairs, Within Home, Primary  none  -     Row Name 08/11/21 0857          Cognition    Orientation Status (Cognition)  oriented x 4  -     Row Name 08/11/21 0857          Safety Issues, Functional Mobility    Impairments Affecting Function (Mobility)  endurance/activity tolerance;range of motion (ROM);strength;pain  -CF        User Key  (r) = Recorded By, (t) = Taken By, (c) = Cosigned By    Initials Name Provider Type    CF Sydney Pal PT Physical Therapist        Mobility     Row Name 08/11/21 0857          Bed Mobility    Comment (Bed Mobility)  NT up in chair  -CF     Row Name 08/11/21 0857          Sit-Stand Transfer    Sit-Stand [place] (Transfers)  contact guard;verbal cues  -CF     Assistive Device (Sit-Stand Transfers)  walker, front-wheeled  -CF     Row Name 08/11/21 0857          Gait/Stairs (Locomotion)    [place] Level (Gait)  verbal cues;contact guard  -CF     Assistive Device (Gait)  walker, front-wheeled  -CF     Distance in Feet (Gait)  100  -CF     Deviations/Abnormal Patterns (Gait)  julius decreased;gait speed decreased;stride length decreased;antalgic  -CF     Bilateral Gait Deviations  forward flexed posture  -CF     Handrail Location (Stairs)  right side (ascending)  -CF     Number of Steps (Stairs)  2  -CF     Ascending Technique (Stairs)  step-to-step  -CF     Descending Technique (Stairs)  step-to-step  -CF     Comment (Gait/Stairs)  Cues for L heel strike and reciprocal gait pattern  -     Row Name 08/11/21 0857          Mobility    Extremity Weight-bearing Status  left lower extremity  -CF     Left Lower Extremity (Weight-bearing Status)  weight-bearing as tolerated (WBAT)  -CF       User Key  (r) = Recorded By, (t) = Taken By, (c) = Cosigned By    Initials Name Provider Type    CF Sydney Pal PT Physical Therapist        Obj/Interventions     Row Name 08/11/21 0858          Range of Motion Comprehensive    Comment, General Range of Motion  L knee approx 5-80  -     Row Name 08/11/21 0858          Strength Comprehensive (MMT)    Comment, General Manual Muscle Testing (MMT) Assessment  general post op weakness on L  -CF     Row Name 08/11/21 0858          Motor Skills    Therapeutic Exercise  other (see comments) tka exercises x10 reps  -     Row Name 08/11/21 0858           Sensory Assessment (Somatosensory)    Sensory Assessment (Somatosensory)  LE sensation intact  -       User Key  (r) = Recorded By, (t) = Taken By, (c) = Cosigned By    Initials Name Provider Type    CF Sydney Pal, PT Physical Therapist        Goals/Plan    No documentation.       Clinical Impression     Row Name 08/11/21 0859          Pain    Additional Documentation  Pain Scale: Numbers Pre/Post-Treatment (Group)  -CF     Row Name 08/11/21 0859          Pain Scale: Numbers Pre/Post-Treatment    Pretreatment Pain Rating  0/10 - no pain  -CF     Posttreatment Pain Rating  5/10  -CF     Pain Location - Side  Left  -     Pain Location - Orientation  incisional  -     Pain Location  knee  -CF     Pain Intervention(s)  Repositioned;Ambulation/increased activity;Cold pack;Elevated  -     Row Name 08/11/21 0859          Plan of Care Review    Plan of Care Reviewed With  patient  -CF     Outcome Summary  Pt is POD 1 L TKA. Pt reports IND at baseline and lives with  with 2 steps to enter home, r hnd rail present. Pt will need RW for d/c. Pt demonstrates expected post op weaknes, decreased ROM and decreased activity tolerance. Pt ambulated with cga and completed 2 steps safely. Pt has met inpatient PT goals but may benefit from ongoing skilled PT to address above deficits. Pt is OK to d/c home with assist and HH from PT standpoint.  -     Row Name 08/11/21 0859          Therapy Assessment/Plan (PT)    Rehab Potential (PT)  good, to achieve stated therapy goals  -     Criteria for Skilled Interventions Met (PT)  yes  -CF     Predicted Duration of Therapy Intervention (PT)  1 week  -     Row Name 08/11/21 0859          Vital Signs    O2 Delivery Pre Treatment  room air  -[facility] Name 08/11/21 0859          Positioning and Restraints    Pre-Treatment Position  sitting in chair/recliner  -CF     Post Treatment Position  chair  -CF     In Chair  reclined;call light within reach;encouraged to call  for assist;exit alarm on  -CF       User Key  (r) = Recorded By, (t) = Taken By, (c) = Cosigned By    Initials Name Provider Type    CF Sydney Pal PT Physical Therapist        Outcome Measures     Row Name 08/11/21 0902          How much help from another person do you currently need...    Turning from your back to your side while in flat bed without using bedrails?  4  -CF     Moving from lying on back to sitting on the side of a flat bed without bedrails?  4  -CF     Moving to and from a bed to a chair (including a wheelchair)?  3  -CF     Standing up from a chair using your arms (e.g., wheelchair, bedside chair)?  3  -CF     Climbing 3-5 steps with a railing?  3  -CF     To walk in hospital room?  3  -CF     AM-PAC 6 Clicks Score (PT)  20  -CF     Row Name 08/11/21 0902          Functional Assessment    Outcome Measure Options  AM-PAC 6 Clicks Basic Mobility (PT)  -CF       User Key  (r) = Recorded By, (t) = Taken By, (c) = Cosigned By    Initials Name Provider Type    CF Sydney Pal PT Physical Therapist                       Physical Therapy Education                 Title: PT OT SLP Therapies (Done)     Topic: Physical Therapy (Done)     Point: Mobility training (Done)     Learning Progress Summary           Patient Acceptance, E, VU by CF at 8/11/2021 0903                   Point: Home exercise program (Done)     Learning Progress Summary           Patient Acceptance, E, VU by CF at 8/11/2021 0903                   Point: Body mechanics (Done)     Learning Progress Summary           Patient Acceptance, E, VU by CF at 8/11/2021 0903                   Point: Precautions (Done)     Learning Progress Summary           Patient Acceptance, E, VU by CF at 8/11/2021 0903                               User Key     Initials Effective Dates Name Provider Type Discipline    CF 06/16/21 -  Sydney Pal PT Physical Therapist PT              PT Recommendation and Plan     Plan of Care Reviewed With:  patient  Outcome Summary: Pt is POD 1 L TKA. Pt reports IND at baseline and lives with  with 2 steps to enter home, r hnd rail present. Pt will need RW for d/c. Pt demonstrates expected post op weaknes, decreased ROM and decreased activity tolerance. Pt ambulated with cga and completed 2 steps safely. Pt has met inpatient PT goals but may benefit from ongoing skilled PT to address above deficits. Pt is OK to d/c home with assist and HH from PT standpoint.     Time Calculation:   PT Charges     Row Name 08/11/21 0924             Time Calculation    Start Time  0851  -CF      Stop Time  0915  -CF      Time Calculation (min)  24 min  -CF      PT Received On  08/11/21  -CF      PT - Next Appointment  08/12/21  -CF      PT Goal Re-Cert Due Date  08/14/21  -CF         Time Calculation- PT    Total Timed Code Minutes- PT  18 minute(s)  -CF         Timed Charges    24512 - PT Therapeutic Activity Minutes  18  -CF         Total Minutes    Timed Charges Total Minutes  18  -CF       Total Minutes  18  -CF        User Key  (r) = Recorded By, (t) = Taken By, (c) = Cosigned By    Initials Name Provider Type    CF Sydney Pal, PT Physical Therapist        Therapy Charges for Today     Code Description Service Date Service Provider Modifiers Qty    25083451438 HC PT EVAL LOW COMPLEXITY 2 8/11/2021 Sydney Pal, PT GP 1    87806948048 HC PT THERAPEUTIC ACT EA 15 MIN 8/11/2021 Sydney Pal, PT GP 1          PT G-Codes  Outcome Measure Options: AM-PAC 6 Clicks Basic Mobility (PT)  AM-PAC 6 Clicks Score (PT): 20    Sydney Pal PT  8/11/2021

## 2021-08-11 NOTE — DISCHARGE PLACEMENT REQUEST
"Tae Tovar (65 y.o. Female)     Date of Birth Social Security Number Address Home Phone MRN    1955  [id number] [place] [id number] [phone] [id number]    Tenriism Marital Status          Restorationism        Admission Date Admission Type Admitting Provider Attending Provider Department, Room/Bed    8/10/21 Elective Francis Tavarez MD Goodin, Robert A, MD [address], P885/1    Discharge Date Discharge Disposition Discharge Destination         Home or Self Care              Attending Provider: Francis Tavarez MD    Allergies: No Known Allergies    Isolation: None   Infection: None   Code Status: Prior    Ht: 152.4 cm (60\")   Wt: 65.3 kg (143 lb 15.4 oz)    Admission Cmt: None   Principal Problem: None                Active Insurance as of 8/10/2021     Primary Coverage     Payor Plan Insurance Group Employer/Plan Group    MEDICARE MEDICARE A & B      Payor Plan Address Payor Plan Phone Number Payor Plan Fax Number Effective Dates    [address] [phone]  12/1/2020 - None Entered    [facility] [id number]       Subscriber Name Subscriber Birth Date Member ID       TAE TOVAR 1955 [id number]           Secondary Coverage     Payor Plan Insurance Group Employer/Plan Group    AARP MC SUP AARP HEALTH CARE OPTIONS      Payor Plan Address Payor Plan Phone Number Payor Plan Fax Number Effective Dates    [facility] [phone]  1/1/2021 - None Entered    [address]       [facility] [address]       Subscriber Name Subscriber Birth Date Member ID       TAE TOVAR 1955 [id number]                 Emergency Contacts      (Rel.) Home Phone Work Phone Mobile Phone    Yovani Tovar (Spouse) [phone] -- [phone]              "

## 2021-08-11 NOTE — PROGRESS NOTES
Patient is discharging today . Face sheet information is correct. Orders transcribed for 1 week 1, 4 week1,  Then to outpatient at [facility] 8/23/21/ at 10am. Thank you!

## 2021-08-11 NOTE — PLAN OF CARE
Goal Outcome Evaluation:  Plan of Care Reviewed With: patient        Progress: improving  Outcome Summary: vss, left knee dsg c/d/i, neurovascular status wnl, voiding well, reports adequate pain control, educated on monitoring BG due to diabetes hx, discharge plan is for home and possibly today

## 2021-08-11 NOTE — PROGRESS NOTES
Orthopaedic Surgery  Progress Note  8/11/2021    Patients Name:  Pretty Tovar  YOB: 1955  Age: 65 y.o.  Medical Records Number:  [id number]  Date of Admission: 8/10/2021    No complaints except pain    Vitals:  Vitals:    08/10/21 1736 08/10/21 1902 08/10/21 2350 08/11/21 0300   BP: 146/76 146/72 148/73 145/67   BP Location: Left arm Left arm Left arm Left arm   Patient Position: Lying Lying Lying Lying   Pulse: 83 81 82 93   Resp: 14 16 16 16   Temp: 97.7 °F (36.5 °C) 97.7 °F (36.5 °C) 97.5 °F (36.4 °C) 97.1 °F (36.2 °C)   TempSrc: Oral Skin Skin Skin   SpO2: 100% 100% 98% 99%   Weight:       Height:           LLE:  NVI, calf nontender, Sensation intact  No signs of DVT    Incision: clean, no signs of infection    Lab Results (last 24 hours)     Procedure Component Value Units Date/Time    Basic Metabolic Panel [[id number]]  (Abnormal) Collected: 08/11/21 0555    Specimen: Blood Updated: 08/11/21 0657     Glucose 105 mg/dL      BUN 9 mg/dL      Creatinine 0.42 mg/dL      Sodium 133 mmol/L      Potassium 4.9 mmol/L      Chloride 97 mmol/L      CO2 26.7 mmol/L      Calcium 8.6 mg/dL      eGFR Non African Amer >150 mL/min/1.73      BUN/Creatinine Ratio 21.4     Anion Gap 9.3 mmol/L     Narrative:      GFR Normal >60  Chronic Kidney Disease <60  Kidney Failure <15      POC Glucose Once [[id number]]  (Normal) Collected: 08/11/21 0644    Specimen: Blood Updated: 08/11/21 0646     Glucose 104 mg/dL      Comment: Meter: [id number] : [id number] Best LYNN       CBC (No Diff) [[id number]]  (Abnormal) Collected: 08/11/21 0555    Specimen: Blood Updated: 08/11/21 0626     WBC 9.22 10*3/mm3      RBC 3.62 10*6/mm3      Hemoglobin 10.0 g/dL      Hematocrit 30.5 %      MCV 84.3 fL      MCH 27.6 pg      MCHC 32.8 g/dL      RDW 14.4 %      RDW-SD 44.1 fl      MPV 9.9 fL      Platelets 239 10*3/mm3     POC Glucose Once [[id number]]  (Abnormal) Collected: 08/10/21 2202    Specimen: Blood Updated:  08/10/21 2203     Glucose 139 mg/dL      Comment: Meter: [id number] : [id number] Best LYNN       POC Glucose Once [[id number]]  (Normal) Collected: 08/10/21 1642    Specimen: Blood Updated: 08/10/21 1644     Glucose 115 mg/dL      Comment: Meter: [id number] : [id number] Jo Luna RN             XR Knee 1 or 2 View Left    Result Date: 8/10/2021  Narrative: XR KNEE 1 OR 2 VW LEFT-  INDICATIONS: Postoperative evaluation.  TECHNIQUE: Frontal and lateral views of the left knee  COMPARISON: 07/27/2021  FINDINGS:   Intact appearing knee arthroplasty hardware is seen with adjacent surgical soft tissue gas, drain, overlying skin staples. No acute fracture is identified. Arterial calcification is present.       Impression:  Postsurgical changes.    This report was finalized on 8/10/2021 5:02 PM by Dr. Martín Valdez M.D.      Peripheral Block    Result Date: 8/10/2021  Narrative: Sheela Gómez MD     8/10/2021  1:18 PM Peripheral Block Patient location during procedure: holding area Start time: 8/10/2021 12:52 PM Reason for block: at surgeon's request and post-op pain management Performed by Anesthesiologist: Sheela Gómez MD Preanesthetic Checklist Completed: patient identified, IV checked, site marked, risks and benefits discussed, surgical consent, monitors and equipment checked, pre-op evaluation and timeout performed Prep: Pt Position: supine Prep: ChloraPrep Patient monitoring: continuous pulse oximetry Procedure Sedation:yes Performed under: PNB Guidance:ultrasound guided ULTRASOUND INTERPRETATION.  Using ultrasound guidance a 20 G gauge needle was placed in close proximity to the femoral nerve, at which point, under ultrasound guidance anesthetic was injected in the area of the nerve and spread of the anesthesia was seen on ultrasound in close proximity thereto.  There were no abnormalities seen on ultrasound; a digital image was taken; and the patient tolerated the procedure with no  complications. Images:still images obtained Laterality:left Block Type:adductor canal block Injection Technique:single-shot Needle Type:echogenic Needle Gauge:20 G Medications Used: ropivacaine (NAROPIN) 0.5 % injection, 30 mL Post Assessment Injection Assessment: negative aspiration for heme, no paresthesia on injection and incremental injection Patient Tolerance:comfortable throughout block Complications:no     XR Chest PA & Lateral, XR Knee 1 or 2 View Left    Result Date: 7/27/2021  Narrative: XR CHEST PA AND LATERAL-, XR KNEE 1 OR 2 VW LEFT-  07/27/2021  HISTORY: Preop. Knee surgery. Hypertension.  PA AND LATERAL CHEST.  FINDINGS: Heart size is at the upper limits of normal. Lungs appear free of acute infiltrates. There are degenerative changes in the spine.      Impression: Borderline cardiomegaly.  LEFT KNEE 2 VIEWS  FINDINGS: There is severe medial tibiofemoral joint space narrowing. Hypertrophic changes are seen in the knee. Patellofemoral joint space narrowing is seen.  There is some distal superficial femoral artery calcification.  No fractures or other acute bony abnormalities are seen.  IMPRESSION: Moderately severe degenerative arthritis of the left knee.  This report was finalized on 7/27/2021 9:16 PM by Dr. Anthony Estrella M.D.        Assesment/Plan:    Procedures:  Left TKA  Postoperative Day: 1  Weightbearing Status:  WBAT with walker  DVT Prophylaxis:  ASA for DVT prophylaxis    Disposition:  Home with home health after PT today, if comfortable and mobilizing safely    Francis Gay PA-C  [facility]  [address] [place], [place] [id number] [phone]    8/11/2021

## 2021-08-11 NOTE — DISCHARGE SUMMARY
Orthopaedic Surgery Discharge Summary    Patient Name:  Pretty Tovar  YOB: 1955  Age: 65 y.o.  Medical Records Number:  [id number]    Date of Admission:  8/10/2021  Date of Discharge:  8/11/2021    Primary Discharge Diagnosis:  Primary osteoarthritis of left knee [M17.12]    Secondary Discharge Diagnosis:    Problems Addressed this Visit        Musculoskeletal and Injuries    Primary osteoarthritis of left knee - Primary    Relevant Medications    oxyCODONE-acetaminophen (PERCOCET) 5-325 MG per tablet      Diagnoses       Codes Comments    Primary osteoarthritis of left knee    -  Primary ICD-10-CM: M17.12  ICD-9-CM: 715.16           Procedures Performed:  Left Total Knee Arthroplasty      Hospital Course:    Pretty Tovar is a 65 y.o.  female who underwent successful left tka on 8/10/2021.  Pretty Tovar was started on Aspirin 325 mg po twice daily post-operatively for DVT prophylaxis.  On post-op day 1 the patients dressing was changed and their incision was clean, with no signs of infection and their calf was soft, with no signs of DVT.  The patient progressed well with physical therapy and the patients hemoglobin remained stable. On post-operative day 1 the patient was felt ready for discharge.     Vitals:  Vitals:    08/10/21 1736 08/10/21 1902 08/10/21 2350 08/11/21 0300   BP: 146/76 146/72 148/73 145/67   BP Location: Left arm Left arm Left arm Left arm   Patient Position: Lying Lying Lying Lying   Pulse: 83 81 82 93   Resp: 14 16 16 16   Temp: 97.7 °F (36.5 °C) 97.7 °F (36.5 °C) 97.5 °F (36.4 °C) 97.1 °F (36.2 °C)   TempSrc: Oral Skin Skin Skin   SpO2: 100% 100% 98% 99%   Weight:       Height:           Discharge Medications:      Discharge Medications      New Medications      Instructions Start Date   aspirin  MG tablet   325 mg, Oral, 2 Times Daily With Meals      docusate sodium 100 MG capsule   100 mg, Oral, 2 Times Daily PRN      oxyCODONE-acetaminophen 5-325 MG  per tablet  Commonly known as: PERCOCET   1-2 tablets, Oral, Every 4 Hours PRN         Changes to Medications      Instructions Start Date   Victoza 18 MG/3ML solution pen-injector injection  Generic drug: Liraglutide  What changed: when to take this   1.2 mg, Subcutaneous, Daily         Continue These Medications      Instructions Start Date   BD Pen Needle Nikkie U/F 32G X 4 MM misc  Generic drug: Insulin Pen Needle   USE 1 PEN NEEDLE WITH EACH  VICTOZA INJECTION      Biotin 82948 MCG tablet dispersible   10,000 mg, Translingual, Daily      calcium carbonate 600 MG tablet  Commonly known as: OS-JANETH   600 mg, Oral, 2 Times Daily With Meals      Cinnamon 500 MG tablet   1,000 mg, Oral, 2 times daily      CO Q 10 PO   1 capsule, Oral, Daily      Crestor 20 MG tablet  Generic drug: rosuvastatin   10 mg, Oral, Daily      ferrous sulfate 324 (65 Fe) MG tablet delayed-release EC tablet   324 mg, Oral, Daily With Breakfast      fish oil 1200 MG capsule capsule   1,200 mg, Oral, Daily      folic acid 400 MCG tablet  Commonly known as: FOLVITE   400 mcg, Oral, Daily      levothyroxine 88 MCG tablet  Commonly known as: SYNTHROID, LEVOTHROID   TAKE ONE TABLET BY MOUTH DAILY      lisinopril 20 MG tablet  Commonly known as: PRINIVIL,ZESTRIL   20 mg, Oral, Daily      lisinopril-hydrochlorothiazide 20-25 MG per tablet  Commonly known as: PRINZIDE,ZESTORETIC   1 tablet, Oral, Daily      Magnesium 500 MG capsule   500 mg, Oral, Daily      metFORMIN 500 MG tablet  Commonly known as: GLUCOPHAGE   Take 3 pills in am and 2 in pm      Multivitamin Adults 50+ tablet tablet   1 tablet, Oral, Daily      OneTouch Verio test strip  Generic drug: glucose blood   USE TO CHECK BLOOD SUGARS ONCE DAILY      Taclonex 0.005-0.064 % external suspension  Generic drug: calcipotriene-betamethasone   APPLY TO PSORIASIS ON SCALP/TRUNK/EXTREMITIES EVERY OTHER DAY AS NEEDED      Enstilar 0.005-0.064 % foam  Generic drug: Calcipotriene-Betameth Diprop   APPLY  TO PSORIASIS ON EXTREMITIES DAILY FOR 3 TO 4 DAYS THEN STOP REST OF WEEK, THEN REPEAT      vitamin C 250 MG tablet  Commonly known as: ASCORBIC ACID   250 mg, Oral, Daily      vitamin D3 125 MCG (5000 UT) capsule capsule   5,000 Units, Oral, Daily      VITAMIN E PO   1 capsule, Oral, Daily, HOLD PRIOR TO SURG          Stop These Medications    Chlorhexidine Gluconate Cloth 2 % pads     GLUCOSAMINE-CHONDROITIN PO     mupirocin 2 % nasal ointment  Commonly known as: BACTROBAN     naproxen sodium 220 MG tablet  Commonly known as: ALEVE            Pain Medications:  Percocet 5/325 mg 1-2 po q 4-6 hours prn pain    DVT Prophylaxis:  Enteric Coated Aspirin 325 mg po twice daily for 2 weeks, then one daily for 4 weeks      Total Knee Joint Replacement Discharge Instructions:    I. ACTIVITIES:  1. Exercises:  ? Complete exercise program as taught by the hospital physical therapist 2 times per day  ? Exercise program will be advanced by the physical therapist  ? During the day be up ambulating every 2 hours (while awake) for short distances  ? Complete the ankle pump exercises at least 10 times per hour (while awake)  ? Elevate legs most of the day the first week post operatively and thereafter elevate legs when in bed and for at least 30 minutes during the day. Caution must be taken to avoid pillow placement under the bend of the knee as this can led to flexion contractures of the knee.  ? Use cold packs 20-30 minutes approximately 5 times per day. This should be done before and after completing your exercises and at any time you are experiencing pain/ stiffness in your operative extremity.      2. Activities of Daily Living:  ? No tub baths, hot tubs, or swimming pools for 4 weeks  ? May shower and let water run over the incision on post-operative day #5 if no drainage. Do not scrub or rub the incision. Simply let the water run over the incision and pat dry.    II. Restrictions  ? Do not cross legs or kneel  ? Your surgeon  will discuss with you when you will be able to drive again.  ? Weight bearing is as tolerated  ? First week stay inside on even terrain. May go up and down stairs one stair at a time utilizing the hand rail  ? After one week, you may venture outside.    III. Precautions:  ? Everyone that comes near you should wash their hands  ? No elective dental, genital-urinary, or colon procedures or surgical procedures for 12 weeks after surgery unless absolutely necessary.  ?  If dental work or surgical procedure is deemed absolutely necessary, you will need to contact your surgeon as you will need to take antibiotics 1 hour prior to any dental work (including teeth cleanings).  ? Please discuss with your surgeon prophylactic antibiotics as the length of time this intervention will be necessary for you varies with each patient’s health history and situation.  ? Avoid sick people. If you must be around someone who is ill, they should wear a mask.  ? Avoid visits to the Emergency Room or Urgent Care unless you are having a life threatening event.   ? If ordered stockings are to be placed on in the morning and removed at night. Monitor the stockings to ensure that any swelling is not causing the stockings to become too tight. In this case, remove stockings immediately.    IV. INCISION CARE:  ? Wash your hands prior to dressing changes  ? Change the dressing as needed to keep incision clean and dry. Utilize dry gauze and paper tape. Avoid touching the side of the gauze that goes against the incision with your hands.  ? No creams or ointments to the incision  ? May remove dressing once the incision is free of drainage  ? Do not touch or pick at the incision  ? Check incision every day and notify surgeon immediately if any of the following signs or symptoms are noted:  o Increase in redness  o Increase in swelling around the incision and of the entire extremity  o Increase in pain  o Drainage oozing from the incision  o Pulling  apart of the edges of the incision  o Increase in overall body temperature (greater than 100.5 degrees)  ? Your surgeon will instruct you regarding suture or staple removal    V. Medications:   1. Anticoagulants: You will be discharged on an anticoagulant. This is a prophylactic medication that helps prevent blood clots during your post-operative period. The type and length of dosage varies based on your individual needs, procedure performed, and surgeon’s preference.  ? While taking the anticoagulant, you should avoid taking any additional aspirin, ibuprofen (Advil or Motrin), Aleve (Naprosyn) or other non-steroidal anti-inflammatory medications.   ? Notify surgeon immediately if any anayeli bleeding is noted in the urine, stool, emesis, or from the nose or the incision. Blood in the stool will often appear as black rather than red. Blood in urine may appear as pink. Blood in emesis may appear as brown/black like coffee grounds.  ? You will need to apply pressure for longer periods of time to any cuts or abrasions to stop bleeding  ? Avoid alcohol while taking anticoagulants    2. Stool Softeners: You will be at greater risk of constipation after surgery due to being less mobile and the pain medications.   ? Take stool softeners as instructed by your surgeon while on pain medications. Over the counter Colace 100 mg 1-2 capsules twice daily.   ? If stools become too loose or too frequent, please decreases the dosage or stop the stool softener.  ? If constipation occurs despite use of stool softeners, you are to continue the stool softeners and add a laxative (Milk of Magnesia 1 ounce daily as needed)  ? Drink plenty of fluids, and eat fruits and vegetables during your recovery time    3. Pain Medications utilized after surgery are narcotics and the law requires that the following information be given to all patients that are prescribed narcotics:  ? CLASSIFICATION: Pain medications are called Opioids and are  narcotics  ? LEGALITIES: It is illegal to share narcotics with others and to drive within 24 hours of taking narcotics  ? POTENTIAL SIDE EFFECTS: Potential side effects of opioids include: nausea, vomiting, itching, dizziness, drowsiness, dry mouth, constipation, and difficulty urinating.  ? POTENTIAL ADVERSE EFFECTS:   o Opioid tolerance can develop with use of pain medications and this simply means that it requires more and more of the medication to control pain; however, this is seen more in patients that use opioids for longer periods of time.  o Opioid dependence can develop with use of Opioids and this simply means that to stop the medication can cause withdrawal symptoms; however, this is seen with patients that use Opioids for longer periods of time.  o Opioid addiction can develop with use of Opioids and the incidence of this is very unlikely in patients who take the medications as ordered and stop the medications as instructed.  o Opioid overdose can be dangerous, but is unlikely when the medication is taken as ordered and stopped when ordered. It is important not to mix opioids with alcohol or with and type of sedative such as Benadryl as this can lead to over sedation and respiratory difficulty.  ? DOSAGE:   o Pain medications will need to be taken consistently for the first week to decrease pain and promote adequate pain relief and participation in physical therapy.  o After the initial surgical pain begins to resolve, you may begin to decrease the pain medication. By the end of 6-8 weeks, you should be off of pain medications.  o Refills will not be given by the office during evening hours, on weekends, or after 6-8 weeks post-op.  o To seek refills on pain medications during the initial 6 week post-operative period, you must call the office 48 hours in advance to request the refill. The office will then notify you when to  the prescription. DO NOT wait until you are out of the medication to  request a refill.    V. FOLLOW-UP VISITS:  ? You will need to follow up in the office with your surgeon in 3 weeks. Please call this number [phone] to schedule this appointment.  If you have any concerns or suspected complications prior to your follow up visit, please call your surgeons office. Do not wait until your appointment time if you suspect complications. These will need to be addressed in the office promptly.    Francis Gay PA-C  [facility]  [address] [place] [address]  [phone]    8/11/2021    CC:Paris Singer, APRN:Francis Tavarez MD

## 2021-08-11 NOTE — PLAN OF CARE
Goal Outcome Evaluation:  Plan of Care Reviewed With: patient           Outcome Summary: Pt is POD 1 L TKA. Pt reports IND at baseline and lives with  with 2 steps to enter home, r hnd rail present. Pt will need RW for d/c. Pt demonstrates expected post op weaknes, decreased ROM and decreased activity tolerance. Pt ambulated with cga and completed 2 steps safely. Pt has met inpatient PT goals but may benefit from ongoing skilled PT to address above deficits. Pt is OK to d/c home with assist and HH from PT standpoint.

## 2021-08-11 NOTE — ANESTHESIA POSTPROCEDURE EVALUATION
Patient: Pretty Tovar    Procedure Summary     Date: 08/10/21 Room / Location: [facility] OR  / [facility] MAIN OR    Anesthesia Start: 1425 Anesthesia Stop: 1641    Procedure: TOTAL KNEE ARTHROPLASTY (Left Knee) Diagnosis:     Surgeons: Francis Tavarez MD Provider: Kurt Dykes MD    Anesthesia Type: general with block ASA Status: 2          Anesthesia Type: general with block    Vitals  Vitals Value Taken Time   /60 08/10/21 1721   Temp 36.7 °C (98 °F) 08/10/21 1720   Pulse 82 08/10/21 1725   Resp 14 08/10/21 1720   SpO2 100 % 08/10/21 1725   Vitals shown include unvalidated device data.        Post Anesthesia Care and Evaluation    Patient location during evaluation: PACU  Patient participation: complete - patient participated  Level of consciousness: awake and alert  Pain management: adequate  Airway patency: patent  Anesthetic complications: No anesthetic complications    Cardiovascular status: acceptable  Respiratory status: acceptable  Hydration status: acceptable    Comments: ---------------------------               08/11/21                      0700         ---------------------------   BP:          119/67         Pulse:         90           Resp:          16           Temp:   36.6 °C (97.8 °F)   SpO2:          98%         ---------------------------

## 2021-08-11 NOTE — CASE MANAGEMENT/SOCIAL WORK
Discharge Planning Assessment  [facility]     Patient Name: Pretty Tovar  MRN: [id number]  Today's Date: 8/11/2021    Admit Date: 8/10/2021    Discharge Needs Assessment    No documentation.       Discharge Plan     Row Name 08/11/21 1223       Plan    Plan  Home with family support & [facility].    Patient/Family in Agreement with Plan  yes    Plan Comments  Spoke with the patient, verified current information and explained the role of the CCP. Patient said she has family support. She plans to d/c home with family support &  PT. She requests [facility]. Referral sent in [facility]. Patient also said her family will transport her home by car at d/c. No other needs identified.        Continued Care and Services - Admitted Since 8/10/2021     Home Medical Care     Service Provider Request Status Selected Services Address Phone Fax Patient Preferred     [facility]  Accepted N/A [address] [address] [phone] --              Expected Discharge Date and Time     Expected Discharge Date Expected Discharge Time    Aug 11, 2021           María Shah RN

## 2021-08-12 ENCOUNTER — TRANSITIONAL CARE MANAGEMENT TELEPHONE ENCOUNTER (OUTPATIENT)
Dept: CALL CENTER | Facility: HOSPITAL | Age: 66
End: 2021-08-12

## 2021-08-12 ENCOUNTER — HOME CARE VISIT (OUTPATIENT)
Dept: HOME HEALTH SERVICES | Facility: HOME HEALTHCARE | Age: 66
End: 2021-08-12

## 2021-08-12 VITALS
HEART RATE: 100 BPM | HEIGHT: 60 IN | OXYGEN SATURATION: 98 % | DIASTOLIC BLOOD PRESSURE: 60 MMHG | TEMPERATURE: 97.8 F | SYSTOLIC BLOOD PRESSURE: 134 MMHG | WEIGHT: 145 LBS | BODY MASS INDEX: 28.47 KG/M2

## 2021-08-12 PROCEDURE — G0151 HHCP-SERV OF PT,EA 15 MIN: HCPCS

## 2021-08-12 NOTE — HOME HEALTH
REASON FOR REFERRAL:  decreased ability to ambulate in and out of the home following recent hospital stay s/p left total knee arthroplasty       MEDICAL HISTORY: Has had left knee problems for many years which progressively became worse. Had left total knee arthroplasty on 8/10/21 and discharged home on 8/11/21.      SKILLED PHYSICAL THERAPY IS MEDICALLY NECESSARY FOR: remediation of decreased strength, decreased range of motion, decreased ambulatory ability, pain/edema, increased risk of falls, decreased transfers, decrease knowledge of diabetic footcare.

## 2021-08-12 NOTE — OUTREACH NOTE
Call Center TCM Note      Responses   [facility] [place], operated by [facility] patient discharged from?  [place]   Does the patient have one of the following disease processes/diagnoses(primary or secondary)?  Total Joint Replacement   Joint surgery performed?  Knee   TCM attempt successful?  No   Unsuccessful attempts  Attempt 2          Georgina Glover MA    8/12/2021, 16:05 EDT

## 2021-08-12 NOTE — OUTREACH NOTE
Call Center TCM Note      Responses   [facility] patient discharged from?  [place]   Does the patient have one of the following disease processes/diagnoses(primary or secondary)?  Total Joint Replacement   Joint surgery performed?  Knee   TCM attempt successful?  No   Unsuccessful attempts  Attempt 1          Georgina Glover MA    8/12/2021, 14:26 EDT

## 2021-08-13 ENCOUNTER — TRANSITIONAL CARE MANAGEMENT TELEPHONE ENCOUNTER (OUTPATIENT)
Dept: CALL CENTER | Facility: HOSPITAL | Age: 66
End: 2021-08-13

## 2021-08-13 ENCOUNTER — HOME CARE VISIT (OUTPATIENT)
Dept: HOME HEALTH SERVICES | Facility: HOME HEALTHCARE | Age: 66
End: 2021-08-13

## 2021-08-13 VITALS
TEMPERATURE: 98 F | DIASTOLIC BLOOD PRESSURE: 56 MMHG | HEART RATE: 108 BPM | SYSTOLIC BLOOD PRESSURE: 120 MMHG | OXYGEN SATURATION: 98 %

## 2021-08-13 PROCEDURE — G0151 HHCP-SERV OF PT,EA 15 MIN: HCPCS

## 2021-08-13 NOTE — OUTREACH NOTE
Call Center TCM Note      Responses   [facility] patient discharged from?  [place]   Does the patient have one of the following disease processes/diagnoses(primary or secondary)?  Total Joint Replacement   Joint surgery performed?  Knee   TCM attempt successful?  Yes   Call start time  1400   Call end time  1412   Discharge diagnosis  Left Total Knee Arthroplasty   Person spoke with today (if not patient) and relationship  Patient   Does the patient have all medications related to this admission filled (includes all antibiotics, pain medications, etc.)  Yes   Is the patient taking all medications as directed (includes completed medication regime)?  Yes   Is the patient able to teach back alternate methods of pain control?  Ice, Short, frequent activity, Reposition, Correct alignment   Does the patient have a follow up appointment with their surgeon?  Yes [8/30/21]   Has the patient kept scheduled appointments due by today?  N/A   Comments  [facility] appt on 8/16/21 at 12:45 AM   What is the Home health agency?    [facility]   Has home health visited the patient within 72 hours of discharge?  Yes   Psychosocial issues?  No   Has the patient began therapy sessions (either in the home or as an out patient)?  Yes   If the patient has started attending therapy, what post op day did they begin to attend (either in home or as an out patient)?    8/12/21   Does the patient have a wound vac in place?  N/A   Has the patient fallen since discharge?  No   Did the patient receive a copy of their discharge instructions?  Yes   Nursing interventions  Reviewed instructions with patient   What is the patient's perception of their functional status since discharge?  Improving   Is the patient able to teach back signs and symptoms of infection?  Temp >100.4 for 24h or longer, Incisional drainage, Blisters around incision, Increased swelling or redness around incision (not associated with surgical edema), Severe discomfort or  pain, Changes in mobility, Shortness of breath or chest pain   Is the patient able to teach back how to prevent infection?  Check incision daily, Wash hands before and after touching incision, Eat well-balanced diet, Shower only as directed by surgeon, No tub baths, hot tub or swimming   Is the patient able to teach back signs and symptoms of DVT?  Shortness of breath or chest pain, Area hot to touch, Redness in calf, Swelling in calf, Severe pain in calf   If the patient is a current smoker, are they able to teach back resources for cessation?  Not a smoker   Is the patient/caregiver able to teach back the hierarchy of who to call/visit for symptoms/problems? PCP, Specialist, Home health nurse, Urgent Care, ED, 911  Yes   TCM call completed?  Yes   Wrap up additional comments  Pt states she is doing good. States she has had 2 PT visits already. No questions/concerns.          Zuleika Knott RN    8/13/2021, 14:13 EDT

## 2021-08-16 ENCOUNTER — OFFICE VISIT (OUTPATIENT)
Dept: INTERNAL MEDICINE | Facility: CLINIC | Age: 66
End: 2021-08-16

## 2021-08-16 ENCOUNTER — HOME CARE VISIT (OUTPATIENT)
Dept: HOME HEALTH SERVICES | Facility: HOME HEALTHCARE | Age: 66
End: 2021-08-16

## 2021-08-16 VITALS
TEMPERATURE: 97.7 F | WEIGHT: 148.2 LBS | DIASTOLIC BLOOD PRESSURE: 62 MMHG | SYSTOLIC BLOOD PRESSURE: 128 MMHG | OXYGEN SATURATION: 98 % | HEART RATE: 100 BPM | BODY MASS INDEX: 29.09 KG/M2 | HEIGHT: 60 IN

## 2021-08-16 VITALS
SYSTOLIC BLOOD PRESSURE: 110 MMHG | OXYGEN SATURATION: 98 % | TEMPERATURE: 98 F | HEART RATE: 97 BPM | DIASTOLIC BLOOD PRESSURE: 64 MMHG

## 2021-08-16 DIAGNOSIS — Z96.652 S/P TKR (TOTAL KNEE REPLACEMENT), LEFT: Primary | ICD-10-CM

## 2021-08-16 DIAGNOSIS — Z09 HOSPITAL DISCHARGE FOLLOW-UP: ICD-10-CM

## 2021-08-16 PROCEDURE — 99213 OFFICE O/P EST LOW 20 MIN: CPT | Performed by: FAMILY MEDICINE

## 2021-08-16 PROCEDURE — G0151 HHCP-SERV OF PT,EA 15 MIN: HCPCS

## 2021-08-16 NOTE — PROGRESS NOTES
Transitional Care Follow Up Visit  Subjective     Pretty Tovar is a 65 y.o. female who presents for a transitional care management visit.    Within 48 business hours after discharge our office contacted her via telephone to coordinate her care and needs.      I reviewed and discussed the details of that call along with the discharge summary, hospital problems, inpatient lab results, inpatient diagnostic studies, and consultation reports with Pretty.     Current outpatient and discharge medications have been reconciled for the patient.  Reviewed by: Niesha Wang MD       Date of TCM Phone Call 8/11/2021   [facility]   Date of Admission 8/10/2021   Date of Discharge 8/11/2021   Discharge Disposition Home or Self Care     Risk for Readmission (LACE) Score: 7 (8/11/2021  6:01 AM)      History of Present Illness   Course During Hospital Stay:   Admitted 8/10-8/11/2021  65 y.o.  female who underwent successful left tka on 8/10/2021.  Pretty Tovar was started on Aspirin 325 mg po twice daily post-operatively for DVT prophylaxis.  On post-op day 1 the patients dressing was changed and their incision was clean, with no signs of infection and their calf was soft, with no signs of DVT.  The patient progressed well with physical therapy and the patients hemoglobin remained stable. On post-operative day 1 the patient was felt ready for discharge.     Pt states she is doing well. Has worked with Physical therapy two times since discharge. Next appt this afternoon. Pt states pain medication 1 every 6 hours.    Denies drowsiness or constipation.     Ortho follow up on Aug 30th.       The following portions of the patient's history were reviewed and updated as appropriate: allergies, current medications, past family history, past medical history, past social history, past surgical history and problem list.    Review of Systems   Constitutional: Negative for fatigue and fever.    Gastrointestinal: Negative for abdominal pain, constipation and diarrhea.   Musculoskeletal: Positive for joint swelling.   Skin: Positive for wound.           Current Outpatient Medications:   •  aspirin  MG tablet, Take 1 tablet by mouth 2 (Two) Times a Day With Meals., Disp: 60 tablet, Rfl: 0  •  BD PEN NEEDLE SEUN U/F 32G X 4 MM misc, USE 1 PEN NEEDLE WITH EACH  VICTOZA INJECTION, Disp: 100 each, Rfl: 1  •  Biotin 02262 MCG tablet dispersible, Place 10,000 mg on the tongue Daily., Disp: , Rfl:   •  calcium carbonate (OS-JANETH) 600 MG tablet, Take 600 mg by mouth 2 (Two) Times a Day With Meals., Disp: , Rfl:   •  Cinnamon 500 MG tablet, Take 1,000 mg by mouth 2 (two) times a day., Disp: , Rfl:   •  Coenzyme Q10 (CO Q 10 PO), Take 1 capsule by mouth Daily., Disp: , Rfl:   •  Crestor 20 MG tablet, Take 0.5 tablets by mouth Daily., Disp: 45 tablet, Rfl: 5  •  docusate sodium 100 MG capsule, Take 1 capsule by mouth 2 (Two) Times a Day As Needed for Constipation., Disp: 30 capsule, Rfl: 1  •  ENSTILAR 0.005-0.064 % foam, APPLY TO PSORIASIS ON EXTREMITIES DAILY FOR 3 TO 4 DAYS THEN STOP REST OF WEEK, THEN REPEAT, Disp: , Rfl: 4  •  ferrous sulfate 324 (65 Fe) MG tablet delayed-release EC tablet, Take 324 mg by mouth Daily With Breakfast., Disp: , Rfl:   •  folic acid (FOLVITE) 400 MCG tablet, Take 400 mcg by mouth Daily., Disp: , Rfl:   •  levothyroxine (SYNTHROID, LEVOTHROID) 88 MCG tablet, TAKE ONE TABLET BY MOUTH DAILY, Disp: 90 tablet, Rfl: 1  •  Liraglutide (Victoza) 18 MG/3ML solution pen-injector injection, Inject 1.2 mg under the skin into the appropriate area as directed Daily. (Patient taking differently: Inject 1.2 mg under the skin into the appropriate area as directed Every Night.), Disp: 6 pen, Rfl: 1  •  lisinopril (PRINIVIL,ZESTRIL) 20 MG tablet, Take 1 tablet by mouth Daily., Disp: 90 tablet, Rfl: 3  •  lisinopril-hydrochlorothiazide (PRINZIDE,ZESTORETIC) 20-25 MG per tablet, Take 1 tablet by  mouth Daily., Disp: 90 tablet, Rfl: 3  •  Magnesium 500 MG capsule, Take 500 mg by mouth Daily., Disp: , Rfl:   •  metFORMIN (GLUCOPHAGE) 500 MG tablet, Take 3 pills in am and 2 in pm, Disp: 450 tablet, Rfl: 3  •  multivitamin with minerals (Multivitamin Adults 50+) tablet tablet, Take 1 tablet by mouth Daily., Disp: , Rfl:   •  Omega-3 Fatty Acids (fish oil) 1200 MG capsule capsule, Take 1,200 mg by mouth Daily., Disp: , Rfl:   •  ONETOUCH VERIO test strip, USE TO CHECK BLOOD SUGARS ONCE DAILY, Disp: 100 each, Rfl: 4  •  oxyCODONE-acetaminophen (PERCOCET) 5-325 MG per tablet, Take 1-2 tablets by mouth Every 4 (Four) Hours As Needed (pain)., Disp: 60 tablet, Rfl: 0  •  TACLONEX 0.005-0.064 % external suspension, APPLY TO PSORIASIS ON SCALP/TRUNK/EXTREMITIES EVERY OTHER DAY AS NEEDED, Disp: , Rfl: 5  •  vitamin C (ASCORBIC ACID) 250 MG tablet, Take 250 mg by mouth Daily., Disp: , Rfl:   •  vitamin D3 (Vitamin D) 125 MCG (5000 UT) capsule capsule, Take 5,000 Units by mouth Daily., Disp: , Rfl:   •  VITAMIN E PO, Take 1 capsule by mouth Daily. HOLD PRIOR TO SURG, Disp: , Rfl:   Objective   Physical Exam  Constitutional:       General: She is not in acute distress.     Appearance: She is not ill-appearing.      Comments: Walking with walker   Musculoskeletal:         General: Swelling and tenderness present.      Comments: L knee, limited ROM    Skin:     General: Skin is warm.      Findings: Erythema present.      Comments: L knee min erythematous   Neurological:      Mental Status: She is alert.         Assessment/Plan   Problems Addressed this Visit        Musculoskeletal and Injuries    S/P TKR (total knee replacement), left - Primary     Continue physical therapy  Pain controlled on percocet, will add in extra strength tylenol as substitute for percocet when she weans down.   Follow up with ortho Aug 30th             Other Visit Diagnoses     Hospital discharge follow-up          Diagnoses       Codes Comments     S/P TKR (total knee replacement), left    -  Primary ICD-10-CM: Z96.652  ICD-9-CM: V43.65     Hospital discharge follow-up     ICD-10-CM: Z09  ICD-9-CM: V67.59

## 2021-08-16 NOTE — ASSESSMENT & PLAN NOTE
Continue physical therapy  Pain controlled on percocet, will add in extra strength tylenol as substitute for percocet when she weans down.   Follow up with ortho Aug 30th

## 2021-08-18 ENCOUNTER — READMISSION MANAGEMENT (OUTPATIENT)
Dept: CALL CENTER | Facility: HOSPITAL | Age: 66
End: 2021-08-18

## 2021-08-18 ENCOUNTER — HOME CARE VISIT (OUTPATIENT)
Dept: HOME HEALTH SERVICES | Facility: HOME HEALTHCARE | Age: 66
End: 2021-08-18

## 2021-08-18 VITALS
TEMPERATURE: 97.8 F | DIASTOLIC BLOOD PRESSURE: 58 MMHG | OXYGEN SATURATION: 98 % | SYSTOLIC BLOOD PRESSURE: 116 MMHG | HEART RATE: 75 BPM

## 2021-08-18 PROCEDURE — G0151 HHCP-SERV OF PT,EA 15 MIN: HCPCS

## 2021-08-18 NOTE — OUTREACH NOTE
Total Joint Week 2 Survey      Responses   [facility] patient discharged from?  [facility]   Does the patient have one of the following disease processes/diagnoses(primary or secondary)?  Total Joint Replacement   Joint surgery performed?  Knee   Week 2 attempt successful?  Yes   Call start time  1235   Call end time  1239   Has the patient been back in either the hospital or Emergency Department since discharge?  No   Discharge diagnosis  Left Total Knee Arthroplasty   Does the patient have all medications related to this admission filled (includes all antibiotics, pain medications, etc.)  Yes   Is the patient taking all medications as directed (includes completed medication regime)?  Yes   Is the patient able to teach back alternate methods of pain control?  Ice, Knee-elevation/no pillow under knee, Correct alignment, Short, frequent activity   Does the patient have a follow up appointment with their surgeon?  Yes   Has the patient kept scheduled appointments due by today?  Yes   What is the Home health agency?    [facility]   Has home health visited the patient within 72 hours of discharge?  Yes   Psychosocial issues?  No   Has the patient began therapy sessions (either in the home or as an out patient)?  Yes   Does the patient have a wound vac in place?  N/A   Has the patient fallen since discharge?  No   If the patient has fallen, were there any injuries?  No   Did the patient receive a copy of their discharge instructions?  Yes   Nursing interventions  Reviewed instructions with patient   What is the patient's perception of their functional status since discharge?  Improving   Is the patient able to teach back signs and symptoms of infection?  Temp >100.4 for 24h or longer, Severe discomfort or pain, Increased swelling or redness around incision (not associated with surgical edema), Blisters around incision, Incisional drainage, Changes in mobility, Shortness of breath or chest pain   If the patient has signs  and symptoms of infection, list patient actions taken  Denies any issues at this time.   Is the patient able to teach back how to prevent infection?  Check incision daily, Wash hands before and after touching incision, Keep incision covered if drainage, Keep incision covered if pets in house, Shower only as directed by surgeon, Eat well-balanced diet, No tub baths, hot tub or swimming, No lotion or creams   Is the patient able to teach back signs and symptoms of DVT?  Redness in calf, Area hot to touch, Severe pain in calf, Swelling in calf, Shortness of breath or chest pain   Is the patient able to teach back home safety measures?  Ability to shower, Accessibility to necessary areas in home   Did the patient implement home safety suggestions from pre-surgery classes if attended?  N/A   If the patient is a current smoker, are they able to teach back resources for cessation?  Not a smoker   Is the patient/caregiver able to teach back the hierarchy of who to call/visit for symptoms/problems? PCP, Specialist, Home health nurse, Urgent Care, ED, 911  Yes   Additional teach back comments  She is improving. Says knee looks good, she is attending therapy.   Week 2 call completed?  Yes   Wrap up additional comments  No concerns at this time.          Destiney Conrad RN

## 2021-08-20 ENCOUNTER — HOME CARE VISIT (OUTPATIENT)
Dept: HOME HEALTH SERVICES | Facility: HOME HEALTHCARE | Age: 66
End: 2021-08-20

## 2021-08-20 VITALS
HEART RATE: 98 BPM | TEMPERATURE: 92 F | DIASTOLIC BLOOD PRESSURE: 58 MMHG | OXYGEN SATURATION: 99 % | SYSTOLIC BLOOD PRESSURE: 120 MMHG

## 2021-08-20 PROCEDURE — G0151 HHCP-SERV OF PT,EA 15 MIN: HCPCS

## 2021-08-23 ENCOUNTER — PATIENT MESSAGE (OUTPATIENT)
Dept: INTERNAL MEDICINE | Facility: CLINIC | Age: 66
End: 2021-08-23

## 2021-08-23 ENCOUNTER — TREATMENT (OUTPATIENT)
Dept: PHYSICAL THERAPY | Facility: CLINIC | Age: 66
End: 2021-08-23

## 2021-08-23 DIAGNOSIS — E78.00 PURE HYPERCHOLESTEROLEMIA: ICD-10-CM

## 2021-08-23 DIAGNOSIS — M25.562 ACUTE POSTOPERATIVE PAIN OF LEFT KNEE: ICD-10-CM

## 2021-08-23 DIAGNOSIS — Z96.652 S/P TKR (TOTAL KNEE REPLACEMENT), LEFT: Primary | ICD-10-CM

## 2021-08-23 DIAGNOSIS — G89.18 ACUTE POSTOPERATIVE PAIN OF LEFT KNEE: ICD-10-CM

## 2021-08-23 DIAGNOSIS — R26.2 DIFFICULTY WALKING: ICD-10-CM

## 2021-08-23 PROCEDURE — 97110 THERAPEUTIC EXERCISES: CPT | Performed by: PHYSICAL THERAPIST

## 2021-08-23 PROCEDURE — 97161 PT EVAL LOW COMPLEX 20 MIN: CPT | Performed by: PHYSICAL THERAPIST

## 2021-08-23 PROCEDURE — 97530 THERAPEUTIC ACTIVITIES: CPT | Performed by: PHYSICAL THERAPIST

## 2021-08-23 NOTE — PROGRESS NOTES
"Physical Therapy Initial Evaluation and Plan of Care    Patient: Pretty Tovar   : 1955  Diagnosis/ICD-10 Code:  S/P TKR (total knee replacement), left [Z96.652]  Referring practitioner: Francis Tavarez MD    Subjective Evaluation    History of Present Illness  Date of surgery: 8/10/2021  Mechanism of injury: S/p (L) TKA by Dr. Tavarez on 08/10/2021. Received home health PT services until Friday.  Recovery has been uncomplicated.  Patient reports pain has been fairly well-controlled; she takes pain medication 1-2 times daily with Tylenol prn in between doses.  She is not yet sleeping bed (rather, a recliner) because she is not a back sleeper.  Reports her (R) knee is in \"bad shape\" too which will require TKA in the near future.      Patient Occupation: [employer]; reports she is attempting RTW today Quality of life: good    Pain  Current pain rating: 3 (\"really tight\")  At best pain ratin  At worst pain ratin  Location: (L) anterior knee surrounding incision bilaterally  Relieving factors: medications and ice (applying ice 4-5 times daily)  Aggravating factors: sleeping, prolonged positioning, ambulation, squatting, stairs, standing and movement  Progression: improved    Social Support  Lives in: one-story house (2 steps to enter house; basement)  Lives with: spouse    Diagnostic Tests  No diagnostic tests performed    Treatments  Previous treatment: physical therapy  Discharged from (in last 30 days): home health care  Patient Goals  Patient goals for therapy: return to work, decreased pain, increased motion, increased strength and decreased edema             Subjective Questionnaire: LEFS: 37/80    Objective          Observations   Left Knee   Positive for edema.     Additional Knee Observation Details  Min/mod (+) TTP (L) knee incision, lateral aspect of knee, popliteal space  Diffuse ecchymosis (L) anteromedial thigh, medial knee, anterior lower leg, and (L) " "proximal calf  Intact staples along length of incision  Moderate redness (L) superomedial aspect of knee; patient presents photo of the redness from 3 days ago and it has indeed improved; patient's HHPT reached out to her surgeon about this already    Active Range of Motion   Left Knee   Flexion: 58 degrees   Extension: 10 (foot supported) degrees   Extensor la degrees     Right Knee   Flexion: 98 degrees   Extension: 0 degrees   Extensor la degrees     Strength/Myotome Testing     Left Hip   Planes of Motion   Flexion: 3+    Right Hip   Planes of Motion   Flexion: 4    Right Knee   Flexion: 4+ (within limited ROM)  Extension: 4+  Quadriceps contraction: good    Left Ankle/Foot   Dorsiflexion: 4-    Right Ankle/Foot   Dorsiflexion: 4+    Additional Strength Details  (L) knee MMT deferred    Swelling     Left Knee Girth Measurement (cm)   Joint line: 43.9.    Right Knee Girth Measurement (cm)   Joint line: 40.2.    Ambulation     Observational Gait   Gait: antalgic and asymmetric   Decreased walking speed, stride length, left stance time and right step length.   Base of support: increased    Additional Observational Gait Details  Patient ambulates with STC (R) UE utilizing ineffective support pattern with cane, decreased active knee flexion during swing phase of gait (\"stiff leg\"), and lack of heel toe gait pattern          Assessment & Plan     Assessment  Impairments: abnormal coordination, abnormal gait, abnormal muscle firing, abnormal or restricted ROM, activity intolerance, impaired balance, impaired physical strength, lacks appropriate home exercise program and pain with function  Assessment details: Patient is a 65 y.o female s/p (L) TKA by Dr. Tavarez on 08/10/2021.  She has received home health PT services and reports uncomplicated recovery.  She reports symptoms 0-8/10 (L) knee and difficulty with ROM, ambulation, standing, sleeping, and stair negotiation. She exhibits moderate effusion, " significantly decreased (L) knee flexion > extension ROM, decreased (L) LE strength, and compensated/antalgic gait pattern.  Her LEFS score is 37/80 indicating a significant perceived degree of functional limitation.  She will benefit from skilled PT services to address these deficits and assist patient in optimal recovery s/p surgery.  Prognosis: good  Functional Limitations: sleeping, walking, uncomfortable because of pain, sitting, standing and stooping  Goals  Plan Goals: STGs: to be met in 4 weeks  1. Patient will be independent with initial HEP  2. Patient will report improved (L) knee symptoms 0-4/10 for improved ADL and positional tolerance  3. Patient will demonstrate improved (L) knee AROM 5-100 deg for improved joint mobility  4. Patient will ambulate short community distances without AD with minimal gait compensation for improved functional mobility  5. Patient will successfully return to sleeping in bed for improved restorative healing    LTGs: to be met in 8 weeks  1. Patient will be independent with progressed HEP  2. Patient will report improved (L) knee symptoms 0-2/10 for improved tolerance to ADLs and functional mobility  3. Patient will demonstrate improved (L) knee AROM 0-115 deg for improved joint mobility  4. Patient will have improved (L) LE strength >/= 4+/5 for improved function  5. Patient will ambulate moderate community distances on level and unlevel terrain as well as negotiate steps reciprocally for evidence of improved functional mobility  6. Patient will have improved LEFS score >/= 50/80 for subjective evidence of functional improvement    Plan  Therapy options: will be seen for skilled physical therapy services  Planned modality interventions: cryotherapy and electrical stimulation/Russian stimulation  Planned therapy interventions: ADL retraining, balance/weight-bearing training, fine motor coordination training, flexibility, functional ROM exercises, gait training, home exercise  program, joint mobilization, manual therapy, motor coordination training, neuromuscular re-education, soft tissue mobilization, strengthening, stretching and therapeutic activities  Frequency: 2-3 times weekly.  Duration in visits: 20  Treatment plan discussed with: patient        Manual Therapy:         mins  16585;  Therapeutic Exercise:    18     mins  19571;     Neuromuscular Chase:        mins  36706;    Therapeutic Activity:     12     mins  29504;     Gait Training:           mins  36132;     Ultrasound:          mins  65701;    Electrical Stimulation:         mins  44039 ( );  Dry Needling          mins self-pay    Timed Treatment:   30   mins   Total Treatment:     51   mins    PT SIGNATURE: Vicky Patel PT, DPT, OCS   DATE TREATMENT INITIATED: 8/23/2021    Medicare Initial Certification  Certification Period: 11/21/2021  I certify that the therapy services are furnished while this patient is under my care.  The services outlined above are required by this patient, and will be reviewed every 90 days.     PHYSICIAN: Francis Tavarze MD      DATE:     Please sign and return via fax to [phone]. Thank you, [facility].

## 2021-08-24 RX ORDER — ROSUVASTATIN CALCIUM 20 MG/1
10 TABLET, FILM COATED ORAL DAILY
Qty: 45 TABLET | Refills: 5 | Status: SHIPPED | OUTPATIENT
Start: 2021-08-24 | End: 2021-09-01 | Stop reason: CLARIF

## 2021-08-24 NOTE — TELEPHONE ENCOUNTER
From: Pretty Tovar  To: Paris SingerCLEVELAND  Sent: 8/23/2021 11:09 PM EDT  Subject: Prescription Question    Good Morning Ms. Toledo,    Could you please send a Prescription refill for   Rosuvastatin to [facility]. [address].  Thank you so much for your help.    I hope you have a great day.    Pretty Tovar. 1955

## 2021-08-25 ENCOUNTER — TREATMENT (OUTPATIENT)
Dept: PHYSICAL THERAPY | Facility: CLINIC | Age: 66
End: 2021-08-25

## 2021-08-25 DIAGNOSIS — G89.18 ACUTE POSTOPERATIVE PAIN OF LEFT KNEE: ICD-10-CM

## 2021-08-25 DIAGNOSIS — R26.2 DIFFICULTY WALKING: ICD-10-CM

## 2021-08-25 DIAGNOSIS — M25.562 ACUTE POSTOPERATIVE PAIN OF LEFT KNEE: ICD-10-CM

## 2021-08-25 DIAGNOSIS — Z96.652 S/P TKR (TOTAL KNEE REPLACEMENT), LEFT: Primary | ICD-10-CM

## 2021-08-25 PROCEDURE — 97530 THERAPEUTIC ACTIVITIES: CPT | Performed by: PHYSICAL THERAPIST

## 2021-08-25 PROCEDURE — 97110 THERAPEUTIC EXERCISES: CPT | Performed by: PHYSICAL THERAPIST

## 2021-08-25 NOTE — PROGRESS NOTES
Physical Therapy Daily Treatment Note      Patient: Pretty Tovar   : 1955  Referring practitioner: Francis Tavarez MD  Date of Initial Visit: Type: THERAPY  Noted: 2021  Today's Date: 2021  Patient seen for 2 sessions           Subjective Evaluation    History of Present Illness    Subjective comment: My knee was more painful and stiff at work yesterday.  I realized I need to get up more often to move my knee.  I have been doing my stretches and did do some at work; I even tried the rocking chair stretches at home.Pain  Current pain rating: 3           Objective          Active Range of Motion   Left Knee   Flexion: 75 degrees     Passive Range of Motion   Left Knee   Flexion: 79 degrees       See Exercise, Manual, and Modality Logs for complete treatment.   *23 staples removed from (L) TKA incision this date; ok'd prior per Dr. Tavarez  *Initiated SLR, SAQ, and NuStep    Assessment & Plan     Assessment  Assessment details: Patient demonstrates improved (L) knee AROM and PROM flexion this date compared to initial evaluation, but this is still quite limited and patient has not yet achieved 90 deg flexion ROM.  Encouraged patient in persistent emphasis on flexion ROM for anticipated continued progress.  Also encouraged her to manage swelling with ice and elevation but to begin transitioning away from dependence on (L) LE elevated position to normalize activities and mobility.  At this time she is still encouraged to change positions frequently to avoid stiffness of the knee joint.            Progress per Plan of Care with emphasis on restoration of knee flexion ROM.         Timed:  Manual Therapy:         mins  45246;  Therapeutic Exercise:    36     mins  13100;     Neuromuscular Chase:        mins  66188;    Therapeutic Activity:     11     mins  10455;     Gait Training:           mins  47325;     Ultrasound:          mins  95719;    Untimed:  Electrical Stimulation:         mins  75233  ( );  Mechanical Traction:         mins  30518;     Timed Treatment:   47   mins   Total Treatment:     47   mins    Vicky Patel PT, DPT, OCS  Physical Therapist  [place] License #[id number]

## 2021-08-27 ENCOUNTER — TREATMENT (OUTPATIENT)
Dept: PHYSICAL THERAPY | Facility: CLINIC | Age: 66
End: 2021-08-27

## 2021-08-27 DIAGNOSIS — Z96.652 S/P TKR (TOTAL KNEE REPLACEMENT), LEFT: Primary | ICD-10-CM

## 2021-08-27 DIAGNOSIS — G89.18 ACUTE POSTOPERATIVE PAIN OF LEFT KNEE: ICD-10-CM

## 2021-08-27 DIAGNOSIS — R26.2 DIFFICULTY WALKING: ICD-10-CM

## 2021-08-27 DIAGNOSIS — M25.562 ACUTE POSTOPERATIVE PAIN OF LEFT KNEE: ICD-10-CM

## 2021-08-27 PROCEDURE — 97110 THERAPEUTIC EXERCISES: CPT | Performed by: PHYSICAL THERAPIST

## 2021-08-27 PROCEDURE — 97112 NEUROMUSCULAR REEDUCATION: CPT | Performed by: PHYSICAL THERAPIST

## 2021-08-27 NOTE — PROGRESS NOTES
"   Physical Therapy Daily Treatment Note      Patient: Pretty Tovar   : 1955  Referring practitioner: Francis Tavarez MD  Date of Initial Visit: Type: THERAPY  Noted: 2021  Today's Date: 2021  Patient seen for 3 sessions           Subjective Evaluation    Pain  Current pain ratin (\"really tight\")           Objective          Passive Range of Motion   Left Knee   Flexion: 82 degrees       See Exercise, Manual, and Modality Logs for complete treatment.   *Initiated calf stretch with strap, glut set, and LAQ    Assessment & Plan     Assessment  Assessment details: Patient demonstrates good improvements in (L) knee PROM since initial evaluation 4 days ago.  She remains significantly limited in (L) knee flexion ROM while knee extension ROM is mildly limited.  Minimal quadriceps lag is evident during SAQ, LAQ, and SLR activities.  Her gait quality has improved approximately 25% this week as well including increased symmetry of stance as well as active knee flexion during (L) swing phase of gait.          Progress strengthening /stabilization /functional activity with persistent emphasis on restoration of (L) knee ROM.         Timed:  Manual Therapy:         mins  94978;  Therapeutic Exercise:    32     mins  92297;     Neuromuscular Chase:    9    mins  42404;    Therapeutic Activity:          mins  57783;     Gait Training:           mins  87381;     Ultrasound:          mins  37977;    Untimed:  Electrical Stimulation:         mins  38738 ( );  Mechanical Traction:         mins  63938;     Timed Treatment:   41   mins   Total Treatment:     41   mins    Vicky Patel PT, DPT, OCS  Physical Therapist  [place] License #[id number]                  "

## 2021-08-30 ENCOUNTER — TREATMENT (OUTPATIENT)
Dept: PHYSICAL THERAPY | Facility: CLINIC | Age: 66
End: 2021-08-30

## 2021-08-30 DIAGNOSIS — M25.562 ACUTE POSTOPERATIVE PAIN OF LEFT KNEE: ICD-10-CM

## 2021-08-30 DIAGNOSIS — R26.2 DIFFICULTY WALKING: ICD-10-CM

## 2021-08-30 DIAGNOSIS — Z96.652 S/P TKR (TOTAL KNEE REPLACEMENT), LEFT: Primary | ICD-10-CM

## 2021-08-30 DIAGNOSIS — G89.18 ACUTE POSTOPERATIVE PAIN OF LEFT KNEE: ICD-10-CM

## 2021-08-30 PROCEDURE — 97112 NEUROMUSCULAR REEDUCATION: CPT | Performed by: PHYSICAL THERAPIST

## 2021-08-30 PROCEDURE — 97110 THERAPEUTIC EXERCISES: CPT | Performed by: PHYSICAL THERAPIST

## 2021-08-30 NOTE — PROGRESS NOTES
Physical Therapy Daily Treatment Note      Patient: Pretty Tovar   : 1955  Referring practitioner: Francis Tavarez MD  Date of Initial Visit: Type: THERAPY  Noted: 2021  Today's Date: 2021  Patient seen for 4 sessions           Subjective Evaluation    History of Present Illness    Subjective comment: My knee is starting to feel better. I've been really working on my exercises, stretches, icing, and starting to walk without needing my cane as much.       Objective   See Exercise, Manual, and Modality Logs for complete treatment.   *Initiated seated hamstring stretch  *Modified supine knee flexion stretch to include use of Swiss ball    Assessment & Plan     Assessment  Assessment details: Patient achieves more effective knee flexion stretch in supine with use of Swiss ball than without.  Knee flexion, though not measured this date, is approaching 90 degrees per visual assessment.  Her gait quality is improving including symmetry of stance and active knee flexion during swing as well as terminal knee extension during stance.  As such, patient ambulates short community distances without reliance upon STC, though TKE deficit is noted in closed chain.           Progress per Plan of Care         Timed:  Manual Therapy:         mins  32431;  Therapeutic Exercise:    22     mins  71424;     Neuromuscular Chase:    11    mins  13555;    Therapeutic Activity:          mins  54610;     Gait Training:           mins  83815;     Ultrasound:          mins  45189;    Untimed:  Electrical Stimulation:         mins  91148 ( );  Mechanical Traction:         mins  83179;     Timed Treatment:   33   mins   Total Treatment:     33   mins    Vicky Patel PT, DPT, OCS  Physical Therapist  [place] License #[id number]

## 2021-09-01 ENCOUNTER — PATIENT MESSAGE (OUTPATIENT)
Dept: INTERNAL MEDICINE | Facility: CLINIC | Age: 66
End: 2021-09-01

## 2021-09-01 ENCOUNTER — TREATMENT (OUTPATIENT)
Dept: PHYSICAL THERAPY | Facility: CLINIC | Age: 66
End: 2021-09-01

## 2021-09-01 DIAGNOSIS — Z96.652 S/P TKR (TOTAL KNEE REPLACEMENT), LEFT: Primary | ICD-10-CM

## 2021-09-01 DIAGNOSIS — E78.00 PURE HYPERCHOLESTEROLEMIA: ICD-10-CM

## 2021-09-01 DIAGNOSIS — M25.562 ACUTE POSTOPERATIVE PAIN OF LEFT KNEE: ICD-10-CM

## 2021-09-01 DIAGNOSIS — R26.2 DIFFICULTY WALKING: ICD-10-CM

## 2021-09-01 DIAGNOSIS — G89.18 ACUTE POSTOPERATIVE PAIN OF LEFT KNEE: ICD-10-CM

## 2021-09-01 PROCEDURE — 97112 NEUROMUSCULAR REEDUCATION: CPT | Performed by: PHYSICAL THERAPIST

## 2021-09-01 PROCEDURE — 97110 THERAPEUTIC EXERCISES: CPT | Performed by: PHYSICAL THERAPIST

## 2021-09-01 RX ORDER — ROSUVASTATIN CALCIUM 20 MG/1
20 TABLET, COATED ORAL DAILY
Qty: 90 TABLET | Refills: 2 | Status: SHIPPED | OUTPATIENT
Start: 2021-09-01 | End: 2021-09-02 | Stop reason: ALTCHOICE

## 2021-09-01 NOTE — PROGRESS NOTES
Physical Therapy Daily Progress Note      Visit # 5      Subjective Evaluation    History of Present Illness    Subjective comment: Pt reports that her (L) knee feels tight most mornings,Pain  Current pain rating: 3           Objective   See Exercise, Manual, and Modality Logs for complete treatment.       Assessment & Plan     Assessment  Assessment details: Pt tolerated treatment with no c/o pain.  Pt displayed good form on all her exercise and is motivated to regain her ROM and strength.  Continue to progress as tolerated.                     Manual Therapy:    0     mins  69811;  Therapeutic Exercise:    22     mins  79832;     Neuromuscular Chase:    10    mins  51779;    Therapeutic Activity:     0     mins  13779;     Gait Trainin     mins  41852;     Ultrasound:     0     mins  13209;    Work Hardening           0      mins 70502  Iontophoresis               0   mins 02389  E-Stim                          _0_ mins 67639 ( )    Timed Treatment:   32   mins   Total Treatment:     32   mins    Varun Mead PTA  Physical Therapist Assistant

## 2021-09-01 NOTE — TELEPHONE ENCOUNTER
From: Pretty Tovar  To: Paris SingerCLEVELAND  Sent: 9/1/2021 11:30 AM EDT  Subject: Prescription Question    Ms Toledo,   I requested earlier this month for a new prescription for my Crestor, which you sent to [facility]. However, insurance will not authorize this prescription. I believe the prescription needs to be written for the generic, Rosuvastatin. If you could please send a revised prescription, I would appreciate it.     Sorry for the inconvenience.     I hope you have a nice day and enjoy the long weekend.    Thanks so much, Pretty Tovar

## 2021-09-02 DIAGNOSIS — E78.00 PURE HYPERCHOLESTEROLEMIA: Primary | ICD-10-CM

## 2021-09-02 NOTE — TELEPHONE ENCOUNTER
Attempted to call report. Unable to give report. Waiting on Holger to assess the pt and determine if she has been appropriately placed.   Generic sent in for Pt

## 2021-09-03 ENCOUNTER — READMISSION MANAGEMENT (OUTPATIENT)
Dept: CALL CENTER | Facility: HOSPITAL | Age: 66
End: 2021-09-03

## 2021-09-03 ENCOUNTER — TREATMENT (OUTPATIENT)
Dept: PHYSICAL THERAPY | Facility: CLINIC | Age: 66
End: 2021-09-03

## 2021-09-03 DIAGNOSIS — Z96.652 S/P TKR (TOTAL KNEE REPLACEMENT), LEFT: Primary | ICD-10-CM

## 2021-09-03 DIAGNOSIS — G89.18 ACUTE POSTOPERATIVE PAIN OF LEFT KNEE: ICD-10-CM

## 2021-09-03 DIAGNOSIS — R26.2 DIFFICULTY WALKING: ICD-10-CM

## 2021-09-03 DIAGNOSIS — M25.562 ACUTE POSTOPERATIVE PAIN OF LEFT KNEE: ICD-10-CM

## 2021-09-03 PROCEDURE — 97110 THERAPEUTIC EXERCISES: CPT | Performed by: PHYSICAL THERAPIST

## 2021-09-03 PROCEDURE — 97140 MANUAL THERAPY 1/> REGIONS: CPT | Performed by: PHYSICAL THERAPIST

## 2021-09-03 NOTE — OUTREACH NOTE
Total Joint Month 1 Survey      Responses   [facility] patient discharged from?  [place]   Does the patient have one of the following disease processes/diagnoses(primary or secondary)?  Total Joint Replacement   Joint surgery performed?  Knee   Month 1 attempt successful?  Yes   Call start time  1635   Call end time  1638   Has the patient been back in either the hospital or Emergency Department since discharge?  No   Discharge diagnosis  Left Total Knee Arthroplasty   Is the patient taking all medications as directed (includes completed medication regime)?  Yes   Is the patient able to teach back alternate methods of pain control?  Ice, Knee-elevation/no pillow under knee, Reposition, Correct alignment, Short, frequent activity   Has the patient kept scheduled appointments due by today?  Yes   Is the patient still receiving Home Health Services?  N/A   Is the patient still attending therapy sessions(either in the home or as an outpatient)?  Yes [outpt PT 3x's/week]   Has the patient fallen since discharge?  No   What is the patient's perception of their functional status since discharge?  Improving   Is the patient able to teach back signs and symptoms of infection?  Temp >100.4 for 24h or longer, Incisional drainage, Increased swelling or redness around incision (not associated with surgical edema), Severe discomfort or pain, Changes in mobility, Shortness of breath or chest pain   If the patient has signs and symptoms of infection, list patient actions taken  free of infection   Is the patient/caregiver able to teach back the hierarchy of who to call/visit for symptoms/problems? PCP, Specialist, Home health nurse, Urgent Care, ED, 911  Yes   Additional teach back comments  using cane when going out   Month 1 call completed?  Yes          Celestina Ga RN

## 2021-09-03 NOTE — PROGRESS NOTES
Physical Therapy Daily Treatment Note      Patient: Pretty Tovar   : 1955  Referring practitioner: Francis Tavarez MD  Date of Initial Visit: Type: THERAPY  Noted: 2021  Today's Date: 9/3/2021  Patient seen for 6 sessions           Subjective Evaluation    History of Present Illness    Subjective comment: I saw Dr. Tavarez Monday and he is pleased with how I am doing.  My knee is not swelling as much but it stays stiff in the morning.  It is also really sensitive to touch on the side but my  said that's probably the nerves.  My knee does feel better after therapy and after I do my exercises at home.       Objective          Active Range of Motion     Additional Active Range of Motion Details  AAROM (L) knee 83 deg  PROM (L) knee approx 87-88 deg per visual assessment      See Exercise, Manual, and Modality Logs for complete treatment.   *Initiated sidelying hip abduction and clamshells    Assessment & Plan     Assessment  Assessment details: Patient has not yet achieved 90 deg (L) knee flexion ROM but tolerates moderately aggressive passive ROM/stretching this date to work toward this.  Per visual assessment, PROM attempts are approximating 90 deg but slightly shy.  (L) knee swelling is visibly reduced as is redness at (L) superomedial aspect of TKA incision.  Patient relays a good report received from her surgeon.  She is encouraged in persistent and progressive ROM emphasis to regain functional mobility of her (L) knee post-operatively.          Progress per Plan of Care         Timed:  Manual Therapy:    9     mins  90778;  Therapeutic Exercise:    33     mins  35481;     Neuromuscular Chase:        mins  37989;    Therapeutic Activity:          mins  85901;     Gait Training:           mins  30521;     Ultrasound:          mins  72674;    Untimed:  Electrical Stimulation:         mins  61895 ( );  Mechanical Traction:         mins  43789;     Timed Treatment:   42   mins    Total Treatment:     42   mins    Vicky Patel PT, DPT, OCS  Physical Therapist  [place] License #[id number]

## 2021-09-07 ENCOUNTER — TREATMENT (OUTPATIENT)
Dept: PHYSICAL THERAPY | Facility: CLINIC | Age: 66
End: 2021-09-07

## 2021-09-07 DIAGNOSIS — G89.18 ACUTE POSTOPERATIVE PAIN OF LEFT KNEE: ICD-10-CM

## 2021-09-07 DIAGNOSIS — R26.2 DIFFICULTY WALKING: ICD-10-CM

## 2021-09-07 DIAGNOSIS — Z96.652 S/P TKR (TOTAL KNEE REPLACEMENT), LEFT: Primary | ICD-10-CM

## 2021-09-07 DIAGNOSIS — M25.562 ACUTE POSTOPERATIVE PAIN OF LEFT KNEE: ICD-10-CM

## 2021-09-07 PROCEDURE — 97110 THERAPEUTIC EXERCISES: CPT | Performed by: PHYSICAL THERAPIST

## 2021-09-07 PROCEDURE — 97140 MANUAL THERAPY 1/> REGIONS: CPT | Performed by: PHYSICAL THERAPIST

## 2021-09-07 PROCEDURE — 97112 NEUROMUSCULAR REEDUCATION: CPT | Performed by: PHYSICAL THERAPIST

## 2021-09-07 NOTE — PROGRESS NOTES
Physical Therapy Daily Treatment Note      Patient: Pretty Tovar   : 1955  Referring practitioner: Francis Tavarez MD  Date of Initial Visit: Type: THERAPY  Noted: 2021  Today's Date: 2021  Patient seen for 7 sessions         Subjective Evaluation    History of Present Illness    Subjective comment: I've started putting vitamin E on my knee which seems to be helping.  I worked really hard on my exercises over the weekend.  Some days it feels like it's loosening up and other days it doesn't.       Objective   See Exercise, Manual, and Modality Logs for complete treatment.   *Initiated hamstring curls vs red band, standing heel raises, and standing marching    Assessment & Plan     Assessment  Assessment details: Patient is pain-limited in PROM/manual stretching of (L) knee joint into flexion.  She is able to tolerate nearly 90 degrees passively but requires cueing for breathing as well as weightbearing through (L) hip to prevent compensatory hip hike.  She demonstrates fair ROM arcs with performance of hamstring curls but does not yet achieve 90 degrees.  Patient ensures diligent HEP compliance outside of PT.  She will benefit from progression of ROM activities as able to optimize ROM recovery post-operatively.          Progress per Plan of Care         Timed:  Manual Therapy:    11     mins  85899;  Therapeutic Exercise:    21     mins  54034;     Neuromuscular Chase:    15    mins  77698;    Therapeutic Activity:          mins  72150;     Gait Training:           mins  63184;     Ultrasound:          mins  76492;    Untimed:  Electrical Stimulation:         mins  44105 ( );  Mechanical Traction:         mins  69294;     Timed Treatment:   47   mins   Total Treatment:     47   mins    Vicky Patel PT, DPT, OCS  Physical Therapist  [place] License #[id number]

## 2021-09-09 ENCOUNTER — TREATMENT (OUTPATIENT)
Dept: PHYSICAL THERAPY | Facility: CLINIC | Age: 66
End: 2021-09-09

## 2021-09-09 DIAGNOSIS — G89.18 ACUTE POSTOPERATIVE PAIN OF LEFT KNEE: ICD-10-CM

## 2021-09-09 DIAGNOSIS — R26.2 DIFFICULTY WALKING: ICD-10-CM

## 2021-09-09 DIAGNOSIS — M25.562 ACUTE POSTOPERATIVE PAIN OF LEFT KNEE: ICD-10-CM

## 2021-09-09 DIAGNOSIS — Z96.652 S/P TKR (TOTAL KNEE REPLACEMENT), LEFT: Primary | ICD-10-CM

## 2021-09-09 PROCEDURE — 97110 THERAPEUTIC EXERCISES: CPT | Performed by: PHYSICAL THERAPIST

## 2021-09-09 PROCEDURE — 97140 MANUAL THERAPY 1/> REGIONS: CPT | Performed by: PHYSICAL THERAPIST

## 2021-09-09 NOTE — PROGRESS NOTES
Physical Therapy Daily Treatment Note      Patient: Pretty Tovar   : 1955  Referring practitioner: Francis Tavarez MD  Date of Initial Visit: Type: THERAPY  Noted: 2021  Today's Date: 2021  Patient seen for 8 sessions           Subjective Evaluation    History of Present Illness    Subjective comment: I had a really good day yesterday.  It seemed like the swelling was down and I felt like it was moving better.         Objective          Active Range of Motion   Left Knee   Flexion: 86 degrees     Additional Active Range of Motion Details  (L) knee AAROM flexion 90 deg    Passive Range of Motion   Left Knee   Flexion: 4 degrees       See Exercise, Manual, and Modality Logs for complete treatment.       Assessment & Plan     Assessment  Assessment details: Patient achieves 90 deg (L) knee flexion AAROM with great effort this date.  The joint remains quite stiff and patient is pain-limited with moderately aggressive stretching for knee flexion.  However, she is ambulating short community distances without AD and improving gait mechanics/decreasing compensation.  She remains compliant and highly motivated with HEP.          Progress strengthening /stabilization /functional activity - initiate partial squats and step-ups.         Timed:  Manual Therapy:    10     mins  08794;  Therapeutic Exercise:    36     mins  59996;     Neuromuscular Chase:        mins  24839;    Therapeutic Activity:          mins  80880;     Gait Training:           mins  91836;     Ultrasound:          mins  27882;    Untimed:  Electrical Stimulation:         mins  86823 ( );  Mechanical Traction:         mins  25213;     Timed Treatment:   46   mins   Total Treatment:     51   mins    Vicky Patel PT, DPT, OCS  Physical Therapist  [place] License #[id number]

## 2021-09-14 ENCOUNTER — TREATMENT (OUTPATIENT)
Dept: PHYSICAL THERAPY | Facility: CLINIC | Age: 66
End: 2021-09-14

## 2021-09-14 DIAGNOSIS — M25.562 ACUTE POSTOPERATIVE PAIN OF LEFT KNEE: ICD-10-CM

## 2021-09-14 DIAGNOSIS — Z96.652 S/P TKR (TOTAL KNEE REPLACEMENT), LEFT: Primary | ICD-10-CM

## 2021-09-14 DIAGNOSIS — R26.2 DIFFICULTY WALKING: ICD-10-CM

## 2021-09-14 DIAGNOSIS — G89.18 ACUTE POSTOPERATIVE PAIN OF LEFT KNEE: ICD-10-CM

## 2021-09-14 PROCEDURE — 97110 THERAPEUTIC EXERCISES: CPT | Performed by: PHYSICAL THERAPIST

## 2021-09-14 PROCEDURE — 97112 NEUROMUSCULAR REEDUCATION: CPT | Performed by: PHYSICAL THERAPIST

## 2021-09-14 NOTE — PROGRESS NOTES
"Physical Therapy Daily Progress Note      Visit # 9      Subjective Evaluation    History of Present Illness    Subjective comment: Pt reports that her knee pain is \"not too bad\" this morning.Pain  Current pain rating: 3           Objective   See Exercise, Manual, and Modality Logs for complete treatment.   Added step ups and partial squats    Assessment & Plan     Assessment  Assessment details: Pt completed treatment with minimal c/o pain in (L) knee.  Pt was able to progress reps on all strengthening exercises with no issue.  Pt tolerated addition of step up and partial squats to facilitate neuromuscular control.                       Manual Therapy:    0     mins  44187;  Therapeutic Exercise:    37     mins  44309;     Neuromuscular Chase:    8    mins  03924;    Therapeutic Activity:     0     mins  16748;     Gait Trainin     mins  98724;     Ultrasound:     0     mins  54408;    Work Hardening           0      mins 06471  Iontophoresis               0   mins 62257  E-Stim                          _0_ mins 31927 ( )    Timed Treatment:   45   mins   Total Treatment:     45   mins    Varun Mead PTA  Physical Therapist Assistant  "

## 2021-09-15 ENCOUNTER — OFFICE VISIT (OUTPATIENT)
Dept: INTERNAL MEDICINE | Facility: CLINIC | Age: 66
End: 2021-09-15

## 2021-09-15 VITALS
BODY MASS INDEX: 27.68 KG/M2 | WEIGHT: 141 LBS | SYSTOLIC BLOOD PRESSURE: 145 MMHG | TEMPERATURE: 98.4 F | OXYGEN SATURATION: 98 % | RESPIRATION RATE: 17 BRPM | DIASTOLIC BLOOD PRESSURE: 77 MMHG | HEART RATE: 91 BPM | HEIGHT: 60 IN

## 2021-09-15 DIAGNOSIS — E61.1 IRON DEFICIENCY: ICD-10-CM

## 2021-09-15 DIAGNOSIS — Z00.00 HEALTH CARE MAINTENANCE: Chronic | ICD-10-CM

## 2021-09-15 DIAGNOSIS — E11.3293 TYPE 2 DIABETES MELLITUS WITH BOTH EYES AFFECTED BY MILD NONPROLIFERATIVE RETINOPATHY WITHOUT MACULAR EDEMA, WITHOUT LONG-TERM CURRENT USE OF INSULIN (HCC): Chronic | ICD-10-CM

## 2021-09-15 DIAGNOSIS — I10 BENIGN ESSENTIAL HYPERTENSION: Primary | ICD-10-CM

## 2021-09-15 DIAGNOSIS — E78.00 PURE HYPERCHOLESTEROLEMIA: ICD-10-CM

## 2021-09-15 DIAGNOSIS — E06.3 HASHIMOTO'S THYROIDITIS: Chronic | ICD-10-CM

## 2021-09-15 PROBLEM — E11.9 TYPE 2 DIABETES MELLITUS, WITHOUT LONG-TERM CURRENT USE OF INSULIN (HCC): Chronic | Status: ACTIVE | Noted: 2017-02-09

## 2021-09-15 PROCEDURE — 99214 OFFICE O/P EST MOD 30 MIN: CPT | Performed by: NURSE PRACTITIONER

## 2021-09-15 RX ORDER — CLOBETASOL PROPIONATE 0.46 MG/ML
1 SOLUTION TOPICAL AS NEEDED
COMMUNITY
Start: 2021-08-19

## 2021-09-15 NOTE — PROGRESS NOTES
"Chief Complaint  Establish Care (Pt presents here today to establish care.)    Subjective          Pretty Tovar presents to [facility]  Patient presents to officially establish care and for 6-month follow-up on chronic conditions.  This is a 65-year-old female former patient of CLEVELAND Lizarraga.  This patient is new to me.    She has type 2 diabetes treated with Metformin 1500 mg a.m., 1000 mg p.m. as well as Victoza 1.2 mg daily.  Reports good compliance with this regimen.  A1c was 5.99 on 7/27/2021.    She is status post left TKA with Dr. Tavarez on 8/10/2021.  She is working with physical therapy and healing nicely.    For Hashimoto's thyroiditis, hypothyroidism she takes levothyroxine 88 mcg daily with good compliance.      She has hyperlipidemia treated with Crestor 20 mg daily endorsing good compliance with this medication.    She has hypertension treated with lisinopril-HCTZ 20-25 mg daily and an additional lisinopril 20 mg daily reporting good compliance with these medications.    She has iron deficiency treated with ferrous sulfate 325 mg daily reporting good compliance with this medication.    Overall reports that she is doing very well and denies development of any other new issues today.      Objective   Vital Signs:   /77 (BP Location: Left arm, Patient Position: Sitting, Cuff Size: Adult)   Pulse 91   Temp 98.4 °F (36.9 °C)   Resp 17   Ht 152.4 cm (60\")   Wt 64 kg (141 lb)   SpO2 98%   BMI 27.54 kg/m²     Physical Exam  Vitals and nursing note reviewed.   Constitutional:       General: She is not in acute distress.     Appearance: Normal appearance. She is well-developed. She is not ill-appearing, toxic-appearing or diaphoretic.   HENT:      Head: Normocephalic and atraumatic.      Right Ear: External ear normal.      Left Ear: External ear normal.   Eyes:      Pupils: Pupils are equal, round, and reactive to light.   Neck:      Vascular: No " carotid bruit.   Cardiovascular:      Rate and Rhythm: Normal rate and regular rhythm.      Pulses: Normal pulses.      Heart sounds: Normal heart sounds.      Comments: No peripheral edema  Pulmonary:      Effort: Pulmonary effort is normal. No respiratory distress.      Breath sounds: Normal breath sounds. No stridor. No wheezing, rhonchi or rales.   Chest:      Chest wall: No tenderness.   Abdominal:      General: Bowel sounds are normal. There is no distension.      Palpations: Abdomen is soft. There is no mass.      Tenderness: There is no abdominal tenderness. There is no right CVA tenderness, left CVA tenderness, guarding or rebound.      Hernia: No hernia is present.   Musculoskeletal:         General: Normal range of motion.      Cervical back: Normal range of motion and neck supple. No rigidity or tenderness.   Lymphadenopathy:      Cervical: No cervical adenopathy.   Skin:     General: Skin is warm and dry.      Capillary Refill: Capillary refill takes less than 2 seconds.   Neurological:      General: No focal deficit present.      Mental Status: She is alert and oriented to person, place, and time. Mental status is at baseline.   Psychiatric:         Mood and Affect: Mood normal.         Behavior: Behavior normal.         Thought Content: Thought content normal.         Judgment: Judgment normal.        Result Review :   The following data was reviewed by: CLEVELAND Bryant on 09/15/2021:  Common labs    Common Labsle 4/14/21 7/27/21 7/27/21 7/27/21 8/11/21 8/11/21     1417 1417 1418 0555 0555   Glucose   86   105 (A)   BUN   13   9   Creatinine   0.57   0.42 (A)   eGFR Non African Am   106   >150   Sodium   139   133 (A)   Potassium   4.4   4.9   Chloride   98   97 (A)   Calcium   9.9   8.6   Albumin   4.40      Total Bilirubin   0.3      Alkaline Phosphatase   63      AST (SGOT)   20      ALT (SGPT)   17      WBC 8.47 6.72   9.22    Hemoglobin 12.1 12.6   10.0 (A)    Hematocrit 37.6 38.4   30.5 (A)     Platelets 297 325   239    Hemoglobin A1C    5.99 (A)     (A) Abnormal value       Comments are available for some flowsheets but are not being displayed.           Current outpatient and discharge medications have been reconciled for the patient.  Reviewed by: CLEVELAND Bryant           Assessment and Plan    Diagnoses and all orders for this visit:    1. Benign essential hypertension (Primary)  Assessment & Plan:  Hypertension is stable, continue lisinopril-HCTZ 20-25 mg daily and lisinopril 20 mg daily for a total of 40 mg daily of lisinopril.  Blood pressure is mildly elevated today, she is encouraged to check her home blood pressures regularly, goal of less than 130/80 is reinforced along with DASH diet.  She will notify me if her blood pressure is higher than goal regularly.    Orders:  -     CBC No Differential  -     Comprehensive metabolic panel    2. Pure hypercholesterolemia  Assessment & Plan:  Continue Crestor 20 mg daily.  Lipid panel today and we will adjust therapy if needed.    Orders:  -     Lipid panel    3. Type 2 diabetes mellitus with both eyes affected by mild nonproliferative retinopathy without macular edema, without long-term current use of insulin (CMS/[facility])  Assessment & Plan:  Stable, continue Victoza, Metformin.  A1c 5.99 as of 7/27/2021.  Recheck at next visit.  Microalbumin today.  Diabetic foot exam performed today.    Orders:  -     MicroAlbumin, Urine, Random - Urine, Clean Catch    4. Hashimoto's thyroiditis  Assessment & Plan:  Continue levothyroxine 88 mcg daily.  TSH, free T4 today and we will adjust therapy if needed.    Orders:  -     TSH  -     T4, Free    5. Health care maintenance  Assessment & Plan:  Well-balanced diet is encouraged.    The COVID-19 vaccine is encouraged.    Per my records she is due for screening mammogram.  She believes she has had one done within the last 1 year with GYN.  She will verify this with GYN and get up-to-date on mammogram as soon as  possible if needed.      6. Iron deficiency  Comments:  Continue ferrous sulfate 325 mg daily.  Checking CBC, ferritin, iron profile today and we will adjust therapy if needed.  Orders:  -     Ferritin  -     Iron and TIBC      We will contact patient with lab results and any further recommendations.  Follow-up as needed and I will see her back in 6 months for a Medicare wellness visit.    Follow Up   Return in about 6 months (around 3/15/2022) for Medicare Wellness.  Patient was given instructions and counseling regarding her condition or for health maintenance advice. Please see specific information pulled into the AVS if appropriate.

## 2021-09-15 NOTE — ASSESSMENT & PLAN NOTE
Stable, continue Victoza, Metformin.  A1c 5.99 as of 7/27/2021.  Recheck at next visit.  Microalbumin today.  Diabetic foot exam performed today.

## 2021-09-15 NOTE — ASSESSMENT & PLAN NOTE
Well-balanced diet is encouraged.    The COVID-19 vaccine is encouraged.    Per my records she is due for screening mammogram.  She believes she has had one done within the last 1 year with GYN.  She will verify this with GYN and get up-to-date on mammogram as soon as possible if needed.

## 2021-09-15 NOTE — ASSESSMENT & PLAN NOTE
Hypertension is stable, continue lisinopril-HCTZ 20-25 mg daily and lisinopril 20 mg daily for a total of 40 mg daily of lisinopril.  Blood pressure is mildly elevated today, she is encouraged to check her home blood pressures regularly, goal of less than 130/80 is reinforced along with DASH diet.  She will notify me if her blood pressure is higher than goal regularly.

## 2021-09-16 ENCOUNTER — TREATMENT (OUTPATIENT)
Dept: PHYSICAL THERAPY | Facility: CLINIC | Age: 66
End: 2021-09-16

## 2021-09-16 DIAGNOSIS — M25.562 ACUTE POSTOPERATIVE PAIN OF LEFT KNEE: ICD-10-CM

## 2021-09-16 DIAGNOSIS — Z96.652 S/P TKR (TOTAL KNEE REPLACEMENT), LEFT: Primary | ICD-10-CM

## 2021-09-16 DIAGNOSIS — G89.18 ACUTE POSTOPERATIVE PAIN OF LEFT KNEE: ICD-10-CM

## 2021-09-16 DIAGNOSIS — R26.2 DIFFICULTY WALKING: ICD-10-CM

## 2021-09-16 LAB
ALBUMIN SERPL-MCNC: 4.8 G/DL (ref 3.5–5.2)
ALBUMIN/GLOB SERPL: 1.7 G/DL
ALP SERPL-CCNC: 63 U/L (ref 39–117)
ALT SERPL-CCNC: 11 U/L (ref 1–33)
AST SERPL-CCNC: 18 U/L (ref 1–32)
BILIRUB SERPL-MCNC: 0.2 MG/DL (ref 0–1.2)
BUN SERPL-MCNC: 12 MG/DL (ref 8–23)
BUN/CREAT SERPL: 26.7 (ref 7–25)
CALCIUM SERPL-MCNC: 10.3 MG/DL (ref 8.6–10.5)
CHLORIDE SERPL-SCNC: 94 MMOL/L (ref 98–107)
CHOLEST SERPL-MCNC: 172 MG/DL (ref 0–200)
CO2 SERPL-SCNC: 29.7 MMOL/L (ref 22–29)
CREAT SERPL-MCNC: 0.45 MG/DL (ref 0.57–1)
ERYTHROCYTE [DISTWIDTH] IN BLOOD BY AUTOMATED COUNT: 14.4 % (ref 12.3–15.4)
FERRITIN SERPL-MCNC: 127 NG/ML (ref 13–150)
GLOBULIN SER CALC-MCNC: 2.8 GM/DL
GLUCOSE SERPL-MCNC: 97 MG/DL (ref 65–99)
HCT VFR BLD AUTO: 37.1 % (ref 34–46.6)
HDLC SERPL-MCNC: 66 MG/DL (ref 40–60)
HGB BLD-MCNC: 11.8 G/DL (ref 12–15.9)
IRON SATN MFR SERPL: 15 % (ref 20–50)
IRON SERPL-MCNC: 74 MCG/DL (ref 37–145)
LDLC SERPL CALC-MCNC: 86 MG/DL (ref 0–100)
MCH RBC QN AUTO: 27.6 PG (ref 26.6–33)
MCHC RBC AUTO-ENTMCNC: 31.8 G/DL (ref 31.5–35.7)
MCV RBC AUTO: 86.7 FL (ref 79–97)
MICROALBUMIN UR-MCNC: <3 UG/ML
PLATELET # BLD AUTO: 391 10*3/MM3 (ref 140–450)
POTASSIUM SERPL-SCNC: 4.3 MMOL/L (ref 3.5–5.2)
PROT SERPL-MCNC: 7.6 G/DL (ref 6–8.5)
RBC # BLD AUTO: 4.28 10*6/MM3 (ref 3.77–5.28)
SODIUM SERPL-SCNC: 136 MMOL/L (ref 136–145)
T4 FREE SERPL-MCNC: 1.52 NG/DL (ref 0.93–1.7)
TIBC SERPL-MCNC: 488 MCG/DL
TRIGL SERPL-MCNC: 112 MG/DL (ref 0–150)
TSH SERPL DL<=0.005 MIU/L-ACNC: 2.54 UIU/ML (ref 0.27–4.2)
UIBC SERPL-MCNC: 414 MCG/DL (ref 112–346)
VLDLC SERPL CALC-MCNC: 20 MG/DL (ref 5–40)
WBC # BLD AUTO: 5.89 10*3/MM3 (ref 3.4–10.8)

## 2021-09-16 PROCEDURE — 97140 MANUAL THERAPY 1/> REGIONS: CPT | Performed by: PHYSICAL THERAPIST

## 2021-09-16 PROCEDURE — 97110 THERAPEUTIC EXERCISES: CPT | Performed by: PHYSICAL THERAPIST

## 2021-09-16 NOTE — PROGRESS NOTES
Re-Assessment / Re-Certification      Patient: Pretty Tovar   : 1955  Diagnosis/ICD-10 Code:  S/P TKR (total knee replacement), left [Z96.652]  Referring practitioner: Francis Tavarez MD  Date of Initial Visit: 2021  Today's Date: 2021  Patient seen for 10 sessions      Subjective:   Subjective Questionnaire: LEFS: 48/80  Clinical Progress: improved  Home Program Compliance: Yes  Treatment has included: therapeutic exercise, neuromuscular re-education, manual therapy, therapeutic activity and cryotherapy    Subjective Evaluation    History of Present Illness    Subjective comment: My knee is feeling alright, a little sore.  It's stiff in the mornings.  I've been working really hard on getting it to move better.  Overall I'm sleeping pretty well.  I'm doing well with the 2 steps getting into the house but I go up with my good leg and down with my bad.  Pain  Current pain ratin  At best pain ratin  At worst pain ratin  Location: (L) inferomedial knee  Quality: dull ache         Objective          Active Range of Motion   Left Knee   Flexion: 87 degrees   Extension: 4 (foot supported) degrees   Extensor lag: 15 degrees     Additional Active Range of Motion Details  AAROM (L) knee flexion 90 deg    Passive Range of Motion   Left Knee   Flexion: 93 degrees     Strength/Myotome Testing     Left Knee   Flexion: 4- (within available ROM)  Extension: 4 (within available ROM)  Quadriceps contraction: fair    Swelling     Left Knee Girth Measurement (cm)   Joint line: 41.0.    Ambulation     Observational Gait     Additional Observational Gait Details  Patient ambulates with decreased TKE during stance phase of gait and decreased push-off during terminal stance      Assessment & Plan     Assessment  Assessment details: Patient remains motivated and compliant with PT interventions.  Her pain level is steadily reducing and her gait pattern and overall functional mobility improving.  However,  the process of regaining (L) knee flexion ROM has been effortful and much more gradual than desired.  Upon initiating outpatient PT, her (L) knee flexion ROM was only 58 degrees and has now improved to 87 deg AROM, 90 deg AAROM, and 93 deg PROM with pain.  This is somewhat concerning.  (L) knee swelling has greatly reduced and her quadriceps activation is fair to good.  At this time most PT goals remain unmet for which patient would benefit from continued skilled PT services to optimize functional outcome s/p surgery.    Goals  Plan Goals: STGs: to be met in 3 weeks  1. Patient will be independent with initial HEP - MET  2. Patient will report improved (L) knee symptoms 0-4/10 for improved ADL and positional tolerance - PARTIALLY MET  3. Patient will demonstrate improved (L) knee AROM 5-100 deg for improved joint mobility - NOT MET  4. Patient will ambulate short community distances without AD with minimal gait compensation for improved functional mobility - MET  5. Patient will successfully return to sleeping in bed for improved restorative healing - MET    LTGs: to be met in 6 weeks  1. Patient will be independent with progressed HEP - NOT MET  2. Patient will report improved (L) knee symptoms 0-2/10 for improved tolerance to ADLs and functional mobility - NOT MET  3. Patient will demonstrate improved (L) knee AROM 0-115 deg for improved joint mobility - NOT MET  4. Patient will have improved (L) LE strength >/= 4+/5 for improved function - NOT MET  5. Patient will ambulate moderate community distances on level and unlevel terrain as well as negotiate steps reciprocally for evidence of improved functional mobility - NOT MET  6. Patient will have improved LEFS score >/= 50/80 for subjective evidence of functional improvement - NOT MET      Progress toward previous goals: Partially Met , remainder ongoing  Recommendations: Continue as planned  Timeframe: 6 weeks  Prognosis to achieve goals: good    PT Signature:  Vicky Patel, PT, DPT, [facility] License # [id number]      Based upon review of the patient's progress and continued therapy plan, it is my medical opinion that Pretty Tovar should continue physical therapy treatment at [facility]  [address] [place] [id number] [phone].    Signature: __________________________________  Francis Tavarez MD    Manual Therapy:    9    mins  11432;  Therapeutic Exercise:    35     mins  69143;     Neuromuscular Chase:        mins  00655;    Therapeutic Activity:          mins  70434;     Gait Training:           mins  79681;     Ultrasound:          mins  29702;    Electrical Stimulation:         mins  60483 ( );  Dry Needling          mins self-pay    Timed Treatment:   44   mins   Total Treatment:     58   mins    Please sign and return via fax to [phone]. Thank you, [facility].

## 2021-09-20 NOTE — PROGRESS NOTES
Good morning Ms. Tovar, your labs are back.  Your hemoglobin level has risen almost 2 full points since last check which is great.  Normal white blood cells and platelets.  Metabolic panel is showing stability including kidney function, liver enzymes and electrolytes.  Cholesterol panel looks good.  Thyroid numbers are stable, continue current levothyroxine dose.  Iron labs look stable, continue current iron supplement. Let me know if you have any questions. Have a good day,   CLEVELAND Bryant no

## 2021-09-21 ENCOUNTER — TREATMENT (OUTPATIENT)
Dept: PHYSICAL THERAPY | Facility: CLINIC | Age: 66
End: 2021-09-21

## 2021-09-21 DIAGNOSIS — M25.562 ACUTE POSTOPERATIVE PAIN OF LEFT KNEE: ICD-10-CM

## 2021-09-21 DIAGNOSIS — Z96.652 S/P TKR (TOTAL KNEE REPLACEMENT), LEFT: Primary | ICD-10-CM

## 2021-09-21 DIAGNOSIS — R26.2 DIFFICULTY WALKING: ICD-10-CM

## 2021-09-21 DIAGNOSIS — G89.18 ACUTE POSTOPERATIVE PAIN OF LEFT KNEE: ICD-10-CM

## 2021-09-21 PROCEDURE — 97140 MANUAL THERAPY 1/> REGIONS: CPT | Performed by: PHYSICAL THERAPIST

## 2021-09-21 PROCEDURE — 97110 THERAPEUTIC EXERCISES: CPT | Performed by: PHYSICAL THERAPIST

## 2021-09-21 NOTE — PROGRESS NOTES
"   Physical Therapy Daily Treatment Note      Patient: Pretty Tovar   : 1955  Referring practitioner: Francis Tavarez MD  Date of Initial Visit: Type: THERAPY  Noted: 2021  Today's Date: 2021  Patient seen for 11 sessions         Subjective Evaluation    History of Present Illness    Subjective comment: My knee isn't painful this morning, but it's stiff and tight.  It feels stiff most mornings.         Objective   See Exercise, Manual, and Modality Logs for complete treatment.   *Progressed step height to 6\"  *Initiated TKE vs ball    Assessment & Plan     Assessment  Assessment details: Patient tolerates aggressive (L) knee PROM attempts into flexion.  Per visual assessment patient is able to increase knee flexion a few degrees with great effort and moderate reported pain.  ROM gains are more gradual than would typically be expected s/p TKA procedure but are steady.  Overall she is fairly high functioning for ADLs and basic functional mobility but gait is moderately compensated.          Progress strengthening /stabilization /functional activity         Timed:  Manual Therapy:    9     mins  57274;  Therapeutic Exercise:    36     mins  90417;     Neuromuscular Chase:        mins  77294;    Therapeutic Activity:          mins  90697;     Gait Training:           mins  41636;     Ultrasound:          mins  25010;    Untimed:  Electrical Stimulation:         mins  05691 ( );  Mechanical Traction:         mins  89551;     Timed Treatment:   44   mins   Total Treatment:     59   mins    Vicky Patel PT, DPT, [facility]  Physical Therapist  [place] License #[id number]                  "

## 2021-09-23 ENCOUNTER — TREATMENT (OUTPATIENT)
Dept: PHYSICAL THERAPY | Facility: CLINIC | Age: 66
End: 2021-09-23

## 2021-09-23 DIAGNOSIS — G89.18 ACUTE POSTOPERATIVE PAIN OF LEFT KNEE: ICD-10-CM

## 2021-09-23 DIAGNOSIS — M25.562 ACUTE POSTOPERATIVE PAIN OF LEFT KNEE: ICD-10-CM

## 2021-09-23 DIAGNOSIS — R26.2 DIFFICULTY WALKING: ICD-10-CM

## 2021-09-23 DIAGNOSIS — Z96.652 S/P TKR (TOTAL KNEE REPLACEMENT), LEFT: Primary | ICD-10-CM

## 2021-09-23 PROCEDURE — 97110 THERAPEUTIC EXERCISES: CPT | Performed by: PHYSICAL THERAPIST

## 2021-09-23 PROCEDURE — 97140 MANUAL THERAPY 1/> REGIONS: CPT | Performed by: PHYSICAL THERAPIST

## 2021-09-23 NOTE — PROGRESS NOTES
Physical Therapy Daily Treatment Note      Patient: Pretty Tovar   : 1955  Referring practitioner: Francis Tavarez MD  Date of Initial Visit: Type: THERAPY  Noted: 2021  Today's Date: 2021  Patient seen for 12 sessions           Subjective Evaluation    History of Present Illness    Subjective comment: My knee feels about the same.  It feels so stiff in the mornings.  I am waiting for the day that it doesn't.  I did get up and do the rocking chair stretches this morning.        Objective          Active Range of Motion   Left Knee   Flexion: 93 degrees     Passive Range of Motion   Left Knee   Flexion: 100 degrees       See Exercise, Manual, and Modality Logs for complete treatment.   *Increased reps of step-up activity    Assessment & Plan     Assessment  Assessment details: Patient demonstrates mild improvement in (L) knee AROM and moderate improvement in (L) knee PROM this date, both achieved with effort.  Her (L) knee remains quite stiff and she is pain-limited at available end ranges, but she is demonstrating gradual progress toward ROM goals.  She is encouraged to increase aggressiveness of AAROM stretching of (L) knee flexion at home in sitting, supine, and standing.  She remains highly motivated and compliant with PT interventions.            Progress strengthening /stabilization /functional activity         Timed:  Manual Therapy:    10     mins  91904;  Therapeutic Exercise:    36     mins  95573;     Neuromuscular Chase:        mins  33773;    Therapeutic Activity:          mins  39991;     Gait Training:           mins  75667;     Ultrasound:          mins  75027;    Untimed:  Electrical Stimulation:         mins  20443 ( );  Mechanical Traction:         mins  09891;     Timed Treatment:   46   mins   Total Treatment:     57   mins    Vicky Patel PT, DPT, OCS  Physical Therapist  [place] License #[id number]

## 2021-09-28 ENCOUNTER — TREATMENT (OUTPATIENT)
Dept: PHYSICAL THERAPY | Facility: CLINIC | Age: 66
End: 2021-09-28

## 2021-09-28 DIAGNOSIS — R26.2 DIFFICULTY WALKING: ICD-10-CM

## 2021-09-28 DIAGNOSIS — G89.18 ACUTE POSTOPERATIVE PAIN OF LEFT KNEE: ICD-10-CM

## 2021-09-28 DIAGNOSIS — M25.562 ACUTE POSTOPERATIVE PAIN OF LEFT KNEE: ICD-10-CM

## 2021-09-28 DIAGNOSIS — Z96.652 S/P TKR (TOTAL KNEE REPLACEMENT), LEFT: Primary | ICD-10-CM

## 2021-09-28 PROCEDURE — 97112 NEUROMUSCULAR REEDUCATION: CPT | Performed by: PHYSICAL THERAPIST

## 2021-09-28 PROCEDURE — 97110 THERAPEUTIC EXERCISES: CPT | Performed by: PHYSICAL THERAPIST

## 2021-09-28 PROCEDURE — 97140 MANUAL THERAPY 1/> REGIONS: CPT | Performed by: PHYSICAL THERAPIST

## 2021-09-28 NOTE — PROGRESS NOTES
Physical Therapy Daily Treatment Note      Patient: Pretty Tovar   : 1955  Referring practitioner: Francis Tavarez MD  Date of Initial Visit: Type: THERAPY  Noted: 2021  Today's Date: 2021  Patient seen for 13 sessions         Subjective Evaluation    History of Present Illness    Subjective comment: Same old, same old.  I keep working on getting my knee to bend.  I had a breakdown last night about feeling like I wasn't making progress.       Objective   See Exercise, Manual, and Modality Logs for complete treatment.       Assessment & Plan     Assessment  Assessment details: Patient responds positively to modification of seat height during skateboard knee flexion stretch during which she is able to achieve more effective knee flexion end range stretch.  She exhibits mild persistent lack of TKE during ambulation as well but overall ambulates with fair mechanics and good stability and independence.            Other - reassess for MD.         Timed:  Manual Therapy:    11     mins  67271;  Therapeutic Exercise:    33     mins  90486;     Neuromuscular Chase:    10    mins  49193;    Therapeutic Activity:          mins  32170;     Gait Training:           mins  95163;     Ultrasound:          mins  10530;    Untimed:  Electrical Stimulation:         mins  99941 ( );  Mechanical Traction:         mins  35654;     Timed Treatment:   54   mins   Total Treatment:     59   mins    Vicky Patel PT, DPT, OCS  Physical Therapist  [place] License #[id number]

## 2021-09-30 ENCOUNTER — TREATMENT (OUTPATIENT)
Dept: PHYSICAL THERAPY | Facility: CLINIC | Age: 66
End: 2021-09-30

## 2021-09-30 DIAGNOSIS — M25.562 ACUTE POSTOPERATIVE PAIN OF LEFT KNEE: ICD-10-CM

## 2021-09-30 DIAGNOSIS — R26.2 DIFFICULTY WALKING: ICD-10-CM

## 2021-09-30 DIAGNOSIS — Z96.652 S/P TKR (TOTAL KNEE REPLACEMENT), LEFT: Primary | ICD-10-CM

## 2021-09-30 DIAGNOSIS — G89.18 ACUTE POSTOPERATIVE PAIN OF LEFT KNEE: ICD-10-CM

## 2021-09-30 PROCEDURE — 97110 THERAPEUTIC EXERCISES: CPT | Performed by: PHYSICAL THERAPIST

## 2021-09-30 PROCEDURE — 97530 THERAPEUTIC ACTIVITIES: CPT | Performed by: PHYSICAL THERAPIST

## 2021-09-30 NOTE — PROGRESS NOTES
"Physical Therapy Progress Note      9/30/2021  Francis Tavarez MD    Re: Pretty Tovar  ________________________________________________________________    Visit # 14    Mrs. Pretty Tovar, has attended 14/14 PT sessions.  Treatment has consisted of: patient education, therapeutic activity, therapeutic exercise, and manual therapy.    S: Mrs. Pretty Tovar states: \"My knee is feeling pretty good.  I really don't have much pain in it -- occasionally some aching which Tylenol will take away.  It just feels really stiff in the mornings and then again by the end of the day after I've been up on it.\"    Subjective     Objective          Active Range of Motion   Left Knee   Flexion: 95 degrees   Extension: 5 degrees   Extensor lag: 10 degrees     Passive Range of Motion   Left Knee   Flexion: 102 degrees     Strength/Myotome Testing     Left Knee   Flexion: 4 (within available ROM)  Extension: 4+ (within available ROM)  Quadriceps contraction: fair    Swelling     Left Knee Girth Measurement (cm)   Joint line: 40.9.      See Exercise, Manual, and Modality Logs for complete treatment.       Assessment & Plan     Assessment  Assessment details: Patient has been highly motivated and compliant with PT interventions.  In addition to multifaceted efforts in the clinic, patient has worked diligently to regain (L) knee flexion ROM, but her knee has been quite stiff and ROM gains are not progressing as quickly as hoped.  She exhibits a mild knee extensor deficit as well which causes her gait without assistive device to be mildly compensated but overall functional.  Patient's symptoms are otherwise well controlled and she performs work tasks and community mobility at a satisfactory level.  She will benefit from continued skilled PT services to optimize recovery of functional (L) knee ROM post-operatively.        Awaiting MD orders           Manual Therapy:         mins  94615;  Therapeutic Exercise:    42     mins  " 42871;     Neuromuscular Chase:        mins  43455;    Therapeutic Activity:     14     mins  98838;     Gait Training:           mins  01462;     Ultrasound:          mins  60614;    Electrical Stimulation:         mins  36733 ( );  Dry Needling          mins self-pay    Timed Treatment:   56   mins   Total Treatment:     56   mins    Vicky Patel PT, DPT, OCS  Physical Therapist  [place] License # [id number]

## 2021-10-05 ENCOUNTER — TREATMENT (OUTPATIENT)
Dept: PHYSICAL THERAPY | Facility: CLINIC | Age: 66
End: 2021-10-05

## 2021-10-05 ENCOUNTER — READMISSION MANAGEMENT (OUTPATIENT)
Dept: CALL CENTER | Facility: HOSPITAL | Age: 66
End: 2021-10-05

## 2021-10-05 DIAGNOSIS — G89.18 ACUTE POSTOPERATIVE PAIN OF LEFT KNEE: ICD-10-CM

## 2021-10-05 DIAGNOSIS — R26.2 DIFFICULTY WALKING: ICD-10-CM

## 2021-10-05 DIAGNOSIS — M25.562 ACUTE POSTOPERATIVE PAIN OF LEFT KNEE: ICD-10-CM

## 2021-10-05 DIAGNOSIS — Z96.652 S/P TKR (TOTAL KNEE REPLACEMENT), LEFT: Primary | ICD-10-CM

## 2021-10-05 PROCEDURE — 97110 THERAPEUTIC EXERCISES: CPT | Performed by: PHYSICAL THERAPIST

## 2021-10-05 PROCEDURE — 97530 THERAPEUTIC ACTIVITIES: CPT | Performed by: PHYSICAL THERAPIST

## 2021-10-05 NOTE — OUTREACH NOTE
Total Joint Month 2 Survey      Responses   [facility] patient discharged from?  [facility]   Does the patient have one of the following disease processes/diagnoses(primary or secondary)?  Total Joint Replacement   Joint surgery performed?  Knee   Month 2 attempt successful?  No   Unsuccessful attempts  Attempt 1          Georgina Ren RN

## 2021-10-05 NOTE — PROGRESS NOTES
Physical Therapy Daily Progress Note      Visit # 15      Subjective Evaluation    History of Present Illness    Subjective comment: Pt reports that her Dr. has placed her on prednisone to help with the sweeling in her knee.       Objective   See Exercise, Manual, and Modality Logs for complete treatment.       Assessment & Plan     Assessment  Assessment details: Pt tolerated treatment with mild c/o pain from knee flexion.  Pt needs minimal vc to perform her prescribed exercises.  She is very motivated to improve her (L) knee ROM for a full return to ADL's.                     Manual Therapy:    6     mins  40052;  Therapeutic Exercise:    40     mins  35035;     Neuromuscular Chase:    0    mins  94371;    Therapeutic Activity:     10     mins  17692;     Gait Trainin     mins  31615;     Ultrasound:     0     mins  41359;    Work Hardening           0      mins 49681  Iontophoresis               0   mins 80357  E-Stim                          _0_ mins 74122 ( )    Timed Treatment:   56   mins   Total Treatment:     56   mins    Varun Mead PTA  Physical Therapist Assistant

## 2021-10-07 ENCOUNTER — TREATMENT (OUTPATIENT)
Dept: PHYSICAL THERAPY | Facility: CLINIC | Age: 66
End: 2021-10-07

## 2021-10-07 DIAGNOSIS — G89.18 ACUTE POSTOPERATIVE PAIN OF LEFT KNEE: ICD-10-CM

## 2021-10-07 DIAGNOSIS — Z96.652 S/P TKR (TOTAL KNEE REPLACEMENT), LEFT: Primary | ICD-10-CM

## 2021-10-07 DIAGNOSIS — R26.2 DIFFICULTY WALKING: ICD-10-CM

## 2021-10-07 DIAGNOSIS — M25.562 ACUTE POSTOPERATIVE PAIN OF LEFT KNEE: ICD-10-CM

## 2021-10-07 PROCEDURE — 97112 NEUROMUSCULAR REEDUCATION: CPT | Performed by: PHYSICAL THERAPIST

## 2021-10-07 PROCEDURE — 97110 THERAPEUTIC EXERCISES: CPT | Performed by: PHYSICAL THERAPIST

## 2021-10-07 NOTE — PROGRESS NOTES
Physical Therapy Daily Progress Note      Visit # 16      Subjective Evaluation    History of Present Illness    Subjective comment: Pt denies any pain in (L) knee today.       Objective   See Exercise, Manual, and Modality Logs for complete treatment.       Assessment & Plan     Assessment  Assessment details: Pt completed treatment with minimal c/o pain in (L) knee.  Pt was able to progress reps on most  exercises with no issues.  Continue to progress as tolerated.                     Manual Therapy:    0     mins  63644;  Therapeutic Exercise:    39     mins  30496;     Neuromuscular Chase:    12    mins  35754;    Therapeutic Activity:     0     mins  05626;     Gait Trainin     mins  10217;     Ultrasound:     0     mins  11698;    Work Hardening           0      mins 92533  Iontophoresis               0   mins 72687  E-Stim                          _0_ mins 42716 ( )    Timed Treatment:   51   mins   Total Treatment:     51   mins    Varun Mead PTA  Physical Therapist Assistant

## 2021-10-08 ENCOUNTER — READMISSION MANAGEMENT (OUTPATIENT)
Dept: CALL CENTER | Facility: HOSPITAL | Age: 66
End: 2021-10-08

## 2021-10-08 NOTE — OUTREACH NOTE
Total Joint Month 2 Survey      Responses   [facility] patient discharged from?  [facility]   Does the patient have one of the following disease processes/diagnoses(primary or secondary)?  Total Joint Replacement   Joint surgery performed?  Knee   Month 2 attempt successful?  Yes   Call start time  1508   Call end time  1512   Has the patient been back in either the hospital or Emergency Department since discharge?  No   Discharge diagnosis  Left Total Knee Arthroplasty   Is the patient taking all medications as directed (includes completed medication regime)?  Yes   Is the patient able to teach back alternate methods of pain control?  Ice, Knee-elevation/no pillow under knee, Correct alignment, Reposition, Short, frequent activity   Has the patient kept scheduled appointments due by today?  Yes   Is the patient still receiving Home Health Services?  N/A   Is the patient still attending therapy sessions(either in the home or as an outpatient)?  Yes   Has the patient fallen since discharge?  No   What is the patient's perception of their functional status since discharge?  Improving   Is the patient able to teach back signs and symptoms of infection?  Increased swelling or redness around incision (not associated with surgical edema)   Is the patient/caregiver able to teach back the hierarchy of who to call/visit for symptoms/problems? PCP, Specialist, Home health nurse, Urgent Care, ED, 911  Yes   Additional teach back comments  states is planning to extend PT until reaching max knee bend   Month 2 Call Completed?  Yes          Celestina Ga RN

## 2021-10-12 ENCOUNTER — TREATMENT (OUTPATIENT)
Dept: PHYSICAL THERAPY | Facility: CLINIC | Age: 66
End: 2021-10-12

## 2021-10-12 DIAGNOSIS — R26.2 DIFFICULTY WALKING: ICD-10-CM

## 2021-10-12 DIAGNOSIS — Z96.652 S/P TKR (TOTAL KNEE REPLACEMENT), LEFT: Primary | ICD-10-CM

## 2021-10-12 DIAGNOSIS — G89.18 ACUTE POSTOPERATIVE PAIN OF LEFT KNEE: ICD-10-CM

## 2021-10-12 DIAGNOSIS — M25.562 ACUTE POSTOPERATIVE PAIN OF LEFT KNEE: ICD-10-CM

## 2021-10-12 PROCEDURE — 97110 THERAPEUTIC EXERCISES: CPT | Performed by: PHYSICAL THERAPIST

## 2021-10-12 PROCEDURE — 97140 MANUAL THERAPY 1/> REGIONS: CPT | Performed by: PHYSICAL THERAPIST

## 2021-10-12 NOTE — PROGRESS NOTES
Physical Therapy Daily Treatment Note      Patient: Pretty Tovar   : 1955  Referring practitioner: Francis Tavarez MD  Date of Initial Visit: Type: THERAPY  Noted: 2021  Today's Date: 10/12/2021  Patient seen for 17 sessions         Subjective Evaluation    History of Present Illness    Subjective comment: I'm taking the prednisone the doctor prescribed.  He said it is probably scar tissue keeping my knee from bending. If it isn't better by the end of the month when I see him again, he said he may want to do a manipulation.  I really don't want to have to do that.       Objective          Active Range of Motion   Left Knee   Flexion: 95 degrees   Extension: 1 degrees     Passive Range of Motion   Left Knee   Flexion: 99 degrees       See Exercise, Manual, and Modality Logs for complete treatment.       Assessment & Plan     Assessment  Assessment details: Despite patient's best efforts and aggressive manual techniques, patient's (L) knee AROM flexion remains unchanged and PROM is a few degrees less than last measurements 1+ wk ago.  She has however achieved good gains in knee extension ROM which is now nearly full.  Patient displays signs of discouragement and disappointment in ROM findings this date and is encouraged to remain positive.  She presents multiple questions regarding knee manipulation technique and recovery.  These are answered to the best ability of the PT.            Progress per Plan of Care with persistent strong emphasis on restoration of knee flexion ROM.         Timed:  Manual Therapy:   12    mins  79604;  Therapeutic Exercise:    35     mins  21157;     Neuromuscular Chase:        mins  72026;    Therapeutic Activity:          mins  17927;     Gait Training:           mins  48374;     Ultrasound:          mins  16818;    Untimed:  Electrical Stimulation:         mins  05860 ( );  Mechanical Traction:         mins  18905;     Timed Treatment:   47   mins   Total  Treatment:     62   mins    Vicky Patel PT, DPT, OCS  Physical Therapist  [place] License #[id number]

## 2021-10-13 DIAGNOSIS — E06.3 HASHIMOTO'S THYROIDITIS: ICD-10-CM

## 2021-10-13 RX ORDER — LEVOTHYROXINE SODIUM 88 UG/1
TABLET ORAL
Qty: 90 TABLET | Refills: 1 | Status: SHIPPED | OUTPATIENT
Start: 2021-10-13 | End: 2022-04-11

## 2021-10-14 ENCOUNTER — TREATMENT (OUTPATIENT)
Dept: PHYSICAL THERAPY | Facility: CLINIC | Age: 66
End: 2021-10-14

## 2021-10-14 DIAGNOSIS — G89.18 ACUTE POSTOPERATIVE PAIN OF LEFT KNEE: ICD-10-CM

## 2021-10-14 DIAGNOSIS — R26.2 DIFFICULTY WALKING: ICD-10-CM

## 2021-10-14 DIAGNOSIS — M25.562 ACUTE POSTOPERATIVE PAIN OF LEFT KNEE: ICD-10-CM

## 2021-10-14 DIAGNOSIS — Z96.652 S/P TKR (TOTAL KNEE REPLACEMENT), LEFT: Primary | ICD-10-CM

## 2021-10-14 DIAGNOSIS — Z12.31 ENCOUNTER FOR SCREENING MAMMOGRAM FOR MALIGNANT NEOPLASM OF BREAST: Primary | ICD-10-CM

## 2021-10-14 PROCEDURE — 97110 THERAPEUTIC EXERCISES: CPT | Performed by: PHYSICAL THERAPIST

## 2021-10-14 PROCEDURE — 97140 MANUAL THERAPY 1/> REGIONS: CPT | Performed by: PHYSICAL THERAPIST

## 2021-10-14 NOTE — PROGRESS NOTES
"Re-Assessment / Re-Certification      Patient: Pretty Tovar   : 1955  Diagnosis/ICD-10 Code:  S/P TKR (total knee replacement), left [Z96.652]  Referring practitioner: Francis Tavarez MD  Date of Initial Visit: 10/14/2021  Today's Date: 10/14/2021  Patient seen for 18 sessions      Subjective:   Subjective Questionnaire: LEFS: 56/80  Clinical Progress: improved  Home Program Compliance: Yes  Treatment has included: therapeutic exercise, neuromuscular re-education, manual therapy and therapeutic activity    Subjective Evaluation    History of Present Illness    Subjective comment: My knee feels really tight today.  I can't get it back very far.  I've been trying to work it to stretch it.  Pain  Pain scale: \"tightness\"  Pain scale at highest: \"discomfort\"         Objective          Active Range of Motion   Left Knee   Flexion: 97 degrees     Passive Range of Motion   Left Knee   Flexion: 102 degrees     Strength/Myotome Testing     Left Hip   Planes of Motion   Flexion: 4+    Left Knee   Flexion: 4+  Left knee extension strength: 5-/5 within available ROM.  Quadriceps contraction: good    Left Ankle/Foot   Dorsiflexion: 4+      Assessment & Plan     Assessment  Assessment details: Patient remains motivated and compliant with PT interventions.  She reports minimal (L) knee symptoms other than stiffness and tightness.  She exhibits improved gait pattern and functional strength improvements, however she remains quite limited in the active and passive flexion ROM of her (L) knee joint, despite diligent efforts.  This is somewhat concerning given patient's time from surgery.  The idea of a manipulation under anesthesia has been discussed by her surgeon.  Patient is anxious about the thought of this but also motivated to pursue an optimal outcome.  She demonstrates good progress toward remaining goals other than ROM.    Goals  Plan Goals: STGs: to be met in 3 weeks  1. Patient will be independent with " initial HEP - MET  2. Patient will report improved (L) knee symptoms 0-4/10 for improved ADL and positional tolerance - MET  3. Patient will demonstrate improved (L) knee AROM 5-100 deg for improved joint mobility - NOT MET  4. Patient will ambulate short community distances without AD with minimal gait compensation for improved functional mobility - MET  5. Patient will successfully return to sleeping in bed for improved restorative healing - MET    LTGs: to be met in 6 weeks  1. Patient will be independent with progressed HEP - NOT MET  2. Patient will report improved (L) knee symptoms 0-2/10 for improved tolerance to ADLs and functional mobility - PARTIALLY MET  3. Patient will demonstrate improved (L) knee AROM 0-115 deg for improved joint mobility - NOT MET  4. Patient will have improved (L) LE strength >/= 4+/5 for improved function - NOT MET  5. Patient will ambulate moderate community distances on level and unlevel terrain as well as negotiate steps reciprocally for evidence of improved functional mobility - NOT MET  6. Patient will have improved LEFS score >/= 50/80 for subjective evidence of functional improvement - MET      Progress toward previous goals: Partially Met  Recommendations: Continue as planned  Timeframe: 1 month  Prognosis to achieve goals: good    PT Signature: Vicky Patel, PT, DPT, OCS  [place] License # [id number]      Based upon review of the patient's progress and continued therapy plan, it is my medical opinion that Pretty Tovar should continue physical therapy treatment at [facility]  [address] [place] [id number] [phone].    Signature: __________________________________  Francis Tavarez MD    Manual Therapy:    11     mins  75386;  Therapeutic Exercise:    33     mins  42616;     Neuromuscular Chase:        mins  20601;    Therapeutic Activity:          mins  22690;     Gait Training:           mins  98701;      Ultrasound:          mins  36164;    Electrical Stimulation:         mins  02055 ( );  Dry Needling          mins self-pay    Timed Treatment:   41   mins   Total Treatment:     62   mins    Please sign and return via fax to [phone]. Thank you, [facility].

## 2021-10-16 ENCOUNTER — TRANSCRIBE ORDERS (OUTPATIENT)
Dept: ADMINISTRATIVE | Facility: HOSPITAL | Age: 66
End: 2021-10-16

## 2021-10-16 DIAGNOSIS — Z12.31 BREAST CANCER SCREENING BY MAMMOGRAM: Primary | ICD-10-CM

## 2021-10-19 ENCOUNTER — TREATMENT (OUTPATIENT)
Dept: PHYSICAL THERAPY | Facility: CLINIC | Age: 66
End: 2021-10-19

## 2021-10-19 DIAGNOSIS — R26.2 DIFFICULTY WALKING: ICD-10-CM

## 2021-10-19 DIAGNOSIS — G89.18 ACUTE POSTOPERATIVE PAIN OF LEFT KNEE: ICD-10-CM

## 2021-10-19 DIAGNOSIS — M25.562 ACUTE POSTOPERATIVE PAIN OF LEFT KNEE: ICD-10-CM

## 2021-10-19 DIAGNOSIS — Z96.652 S/P TKR (TOTAL KNEE REPLACEMENT), LEFT: Primary | ICD-10-CM

## 2021-10-19 PROCEDURE — 97110 THERAPEUTIC EXERCISES: CPT | Performed by: PHYSICAL THERAPIST

## 2021-10-19 PROCEDURE — 97140 MANUAL THERAPY 1/> REGIONS: CPT | Performed by: PHYSICAL THERAPIST

## 2021-10-19 NOTE — PROGRESS NOTES
Physical Therapy Daily Treatment Note      Patient: Pretty Tovar   : 1955  Referring practitioner: Francis Tavarez MD  Date of Initial Visit: Type: THERAPY  Noted: 2021  Today's Date: 10/19/2021  Patient seen for 19 sessions         Subjective Evaluation    History of Present Illness    Subjective comment: My knee feels about the same. It is what it is.       Objective   See Exercise, Manual, and Modality Logs for complete treatment.       Assessment & Plan     Assessment  Assessment details: Patient exhibits persistent lack of (L) knee AROM and PROM flexion despite diligent, best efforts to achieve greater ROM.  Available end range is strong capsular and accompanied by moderate pain.  She is able to progress seat on NuStep at end of PT session following aggressive self and manually PT-assisted stretching activities emphasizing flexion, however she remains more limited than where she should be at this point post-operatively despite diligent PT compliance.          Progress per Plan of Care with persistent emphasis on restoration of knee flexion ROM.         Timed:  Manual Therapy:    11     mins  51913;  Therapeutic Exercise:    28     mins  17076;     Neuromuscular Chase:        mins  68288;    Therapeutic Activity:          mins  84427;     Gait Training:           mins  25017;     Ultrasound:          mins  92631;    Untimed:  Electrical Stimulation:         mins  71708 ( );  Mechanical Traction:         mins  09693;     Timed Treatment:   39   mins   Total Treatment:     46   mins    Vicky Patel PT, DPT, OCS  Physical Therapist  [place] License #[id number]

## 2021-10-22 ENCOUNTER — TREATMENT (OUTPATIENT)
Dept: PHYSICAL THERAPY | Facility: CLINIC | Age: 66
End: 2021-10-22

## 2021-10-22 DIAGNOSIS — R26.2 DIFFICULTY WALKING: ICD-10-CM

## 2021-10-22 DIAGNOSIS — G89.18 ACUTE POSTOPERATIVE PAIN OF LEFT KNEE: ICD-10-CM

## 2021-10-22 DIAGNOSIS — M25.562 ACUTE POSTOPERATIVE PAIN OF LEFT KNEE: ICD-10-CM

## 2021-10-22 DIAGNOSIS — Z96.652 S/P TKR (TOTAL KNEE REPLACEMENT), LEFT: Primary | ICD-10-CM

## 2021-10-22 PROCEDURE — 97140 MANUAL THERAPY 1/> REGIONS: CPT | Performed by: PHYSICAL THERAPIST

## 2021-10-22 PROCEDURE — 97110 THERAPEUTIC EXERCISES: CPT | Performed by: PHYSICAL THERAPIST

## 2021-10-22 NOTE — PROGRESS NOTES
Physical Therapy Daily Treatment Note      Patient: Pretty Tovar   : 1955  Referring practitioner: Frnacis Tavarez MD  Date of Initial Visit: Type: THERAPY  Noted: 2021  Today's Date: 10/22/2021  Patient seen for 20 sessions           Subjective Evaluation    History of Present Illness    Subjective comment: My knee didn't feel quite as tight this morning when I got up but it still feels stiff.  I keep hoping one morning I wll wake up        Objective   See Exercise, Manual, and Modality Logs for complete treatment.       Assessment & Plan     Assessment  Assessment details: Despite best efforts, (L) knee PROM remains limited by firm end feel.  Mild pain accompanies this but is not the primary limiting factor. She relays feeling of perceived improvement and increased knee flexion following intervention.  She has great difficulty weightbearing through (L) hip to minimize compensation due to such stiffness of the knee joint when flexed. Plan to emphasize knee flexion ROM activities aggressively prior to f/u with surgeon.          Progress per Plan of Care with emphasis on restoration of (L) knee flexion.         Timed:  Manual Therapy:    13     mins  84862;  Therapeutic Exercise:    29     mins  20690;     Neuromuscular Chase:        mins  62330;    Therapeutic Activity:          mins  39466;     Gait Training:           mins  88334;     Ultrasound:          mins  83776;    Untimed:  Electrical Stimulation:         mins  33659 ( );  Mechanical Traction:         mins  42601;     Timed Treatment:   42   mins   Total Treatment:    47   mins    Vicky Patel PT, DPT, OCS  Physical Therapist  [place] License #[id number]

## 2021-10-26 ENCOUNTER — TREATMENT (OUTPATIENT)
Dept: PHYSICAL THERAPY | Facility: CLINIC | Age: 66
End: 2021-10-26

## 2021-10-26 DIAGNOSIS — R26.2 DIFFICULTY WALKING: ICD-10-CM

## 2021-10-26 DIAGNOSIS — Z96.652 S/P TKR (TOTAL KNEE REPLACEMENT), LEFT: Primary | ICD-10-CM

## 2021-10-26 DIAGNOSIS — G89.18 ACUTE POSTOPERATIVE PAIN OF LEFT KNEE: ICD-10-CM

## 2021-10-26 DIAGNOSIS — M25.562 ACUTE POSTOPERATIVE PAIN OF LEFT KNEE: ICD-10-CM

## 2021-10-26 PROCEDURE — 97140 MANUAL THERAPY 1/> REGIONS: CPT | Performed by: PHYSICAL THERAPIST

## 2021-10-26 PROCEDURE — 97110 THERAPEUTIC EXERCISES: CPT | Performed by: PHYSICAL THERAPIST

## 2021-10-26 NOTE — PROGRESS NOTES
Physical Therapy Daily Treatment Note      Patient: Pretty Tovar   : 1955  Referring practitioner: Francis Tavarez MD  Date of Initial Visit: Type: THERAPY  Noted: 2021  Today's Date: 10/26/2021  Patient seen for 21 sessions           Subjective Evaluation    History of Present Illness    Subjective comment: My knee doesn't feel as stiff and feels to me like it is moving a little better.       Objective          Active Range of Motion   Left Knee   Flexion: 98 degrees     Passive Range of Motion   Left Knee   Flexion: 103 degrees       See Exercise, Manual, and Modality Logs for complete treatment.       Assessment & Plan     Assessment  Assessment details: Patient exhibits very minimal improvement in (L) knee PROM and AROM flexion despite great efforts and diligent HEP compiance.  Firm end feel restricts (L) knee ROM as well as creation of compensatory mechanisms along the kinetic chain (I.e. elevation of (L) pelvis, (L) trunk side flexion).  Encouraged persistent compliance with HEP, notably ROM and stretching components, prior to return to MD this Friday.          Progress per Plan of Care. Reassess for MD.         Timed:  Manual Therapy:    12     mins  76387;  Therapeutic Exercise:    26     mins  47618;     Neuromuscular Chase:        mins  97816;    Therapeutic Activity:          mins  79932;     Gait Training:           mins  17600;     Ultrasound:          mins  64374;    Untimed:  Electrical Stimulation:         mins  86491 ( );  Mechanical Traction:         mins  23836;     Timed Treatment:   38   mins   Total Treatment:     49   mins    Vicky Patel PT, DPT, OCS  Physical Therapist  [place] License #[id number]

## 2021-10-28 ENCOUNTER — TREATMENT (OUTPATIENT)
Dept: PHYSICAL THERAPY | Facility: CLINIC | Age: 66
End: 2021-10-28

## 2021-10-28 DIAGNOSIS — Z96.652 S/P TKR (TOTAL KNEE REPLACEMENT), LEFT: Primary | ICD-10-CM

## 2021-10-28 DIAGNOSIS — M25.562 ACUTE POSTOPERATIVE PAIN OF LEFT KNEE: ICD-10-CM

## 2021-10-28 DIAGNOSIS — R26.2 DIFFICULTY WALKING: ICD-10-CM

## 2021-10-28 DIAGNOSIS — G89.18 ACUTE POSTOPERATIVE PAIN OF LEFT KNEE: ICD-10-CM

## 2021-10-28 PROCEDURE — 97140 MANUAL THERAPY 1/> REGIONS: CPT | Performed by: PHYSICAL THERAPIST

## 2021-10-28 PROCEDURE — 97110 THERAPEUTIC EXERCISES: CPT | Performed by: PHYSICAL THERAPIST

## 2021-10-28 NOTE — PROGRESS NOTES
"Physical Therapy Progress Note      10/28/2021  Francis Tavarez MD    Re: Pretty Tovar  ________________________________________________________________    Visit # 22    Mrs. Pretty Tovar, has attended 22 PT sessions post-operatively.  Treatment has consisted of: patient education, therapeutic activity, manual therapy, and therapeutic exercise.     S: Mrs. Pretty Tovar states: \"My knee doesn't feel quite as tight/stiff when I walk as it used to.  I don't really feel too limited with it but it just doesn't seem to want to bend any further.\"    Subjective Evaluation    Pain  Current pain ratin           Objective          Active Range of Motion   Left Knee   Flexion: 100 degrees   Extension: 0 degrees     Passive Range of Motion   Left Knee   Flexion: 101 degrees     Strength/Myotome Testing     Left Knee   Flexion: 4+ (within available ROM)  Left knee extension strength: 5-/5.      See Exercise, Manual, and Modality Logs for complete treatment.       Assessment & Plan     Assessment  Assessment details: Patient remains motivated and compliant with PT interventions.  She consistently reports minimal symptoms (L) knee and normalizing function, however improvements in (L) knee flexion AROM and PROM have reached an apparent plateau.  Her strength is functional within available ROM and her gait is minimally compensated.  At this time it would appear beneficial for physical therapy discipline to await outcome of patient appointment with surgeon.          Awaiting MD orders - patient to f/u with MD tomorrow, 10/29/2021.         Manual Therapy:    10     mins  61372;  Therapeutic Exercise:    28     mins  61665;     Neuromuscular Chase:        mins  61071;    Therapeutic Activity:          mins  36533;     Gait Training:           mins  35866;     Ultrasound:          mins  98974;    Electrical Stimulation:         mins  51713 ( );  Dry Needling          mins self-pay    Timed Treatment:   38   " mins   Total Treatment:     48   mins    Vicky Patel PT, DPT, OCS  Physical Therapist  [place] License # [id number]

## 2021-10-29 ENCOUNTER — PRE-ADMISSION TESTING (OUTPATIENT)
Dept: PREADMISSION TESTING | Facility: HOSPITAL | Age: 66
End: 2021-10-29

## 2021-10-29 VITALS
BODY MASS INDEX: 27.48 KG/M2 | DIASTOLIC BLOOD PRESSURE: 71 MMHG | HEIGHT: 60 IN | RESPIRATION RATE: 16 BRPM | WEIGHT: 140 LBS | SYSTOLIC BLOOD PRESSURE: 150 MMHG | TEMPERATURE: 98 F | HEART RATE: 95 BPM | OXYGEN SATURATION: 100 %

## 2021-10-29 LAB
ALBUMIN SERPL-MCNC: 4.7 G/DL (ref 3.5–5.2)
ALBUMIN/GLOB SERPL: 1.6 G/DL
ALP SERPL-CCNC: 53 U/L (ref 39–117)
ALT SERPL W P-5'-P-CCNC: 16 U/L (ref 1–33)
ANION GAP SERPL CALCULATED.3IONS-SCNC: 10.7 MMOL/L (ref 5–15)
AST SERPL-CCNC: 20 U/L (ref 1–32)
BACTERIA UR QL AUTO: NORMAL /HPF
BILIRUB SERPL-MCNC: 0.2 MG/DL (ref 0–1.2)
BILIRUB UR QL STRIP: NEGATIVE
BUN SERPL-MCNC: 10 MG/DL (ref 8–23)
BUN/CREAT SERPL: 18.9 (ref 7–25)
CALCIUM SPEC-SCNC: 10.2 MG/DL (ref 8.6–10.5)
CHLORIDE SERPL-SCNC: 100 MMOL/L (ref 98–107)
CLARITY UR: CLEAR
CO2 SERPL-SCNC: 28.3 MMOL/L (ref 22–29)
COLOR UR: YELLOW
CREAT SERPL-MCNC: 0.53 MG/DL (ref 0.57–1)
DEPRECATED RDW RBC AUTO: 45 FL (ref 37–54)
ERYTHROCYTE [DISTWIDTH] IN BLOOD BY AUTOMATED COUNT: 14 % (ref 12.3–15.4)
GFR SERPL CREATININE-BSD FRML MDRD: 116 ML/MIN/1.73
GLOBULIN UR ELPH-MCNC: 3 GM/DL
GLUCOSE SERPL-MCNC: 98 MG/DL (ref 65–99)
GLUCOSE UR STRIP-MCNC: NEGATIVE MG/DL
HBA1C MFR BLD: 6.06 % (ref 4.8–5.6)
HCT VFR BLD AUTO: 42.9 % (ref 34–46.6)
HGB BLD-MCNC: 13.1 G/DL (ref 12–15.9)
HGB UR QL STRIP.AUTO: NEGATIVE
HYALINE CASTS UR QL AUTO: NORMAL /LPF
INR PPP: 0.95 (ref 0.9–1.1)
KETONES UR QL STRIP: NEGATIVE
LEUKOCYTE ESTERASE UR QL STRIP.AUTO: NEGATIVE
MCH RBC QN AUTO: 26.8 PG (ref 26.6–33)
MCHC RBC AUTO-ENTMCNC: 30.5 G/DL (ref 31.5–35.7)
MCV RBC AUTO: 87.9 FL (ref 79–97)
NITRITE UR QL STRIP: NEGATIVE
PH UR STRIP.AUTO: 8 [PH] (ref 5–8)
PLATELET # BLD AUTO: 308 10*3/MM3 (ref 140–450)
PMV BLD AUTO: 10.1 FL (ref 6–12)
POTASSIUM SERPL-SCNC: 5.2 MMOL/L (ref 3.5–5.2)
PROT SERPL-MCNC: 7.7 G/DL (ref 6–8.5)
PROT UR QL STRIP: NEGATIVE
PROTHROMBIN TIME: 12.5 SECONDS (ref 11.7–14.2)
RBC # BLD AUTO: 4.88 10*6/MM3 (ref 3.77–5.28)
RBC # UR: NORMAL /HPF
REF LAB TEST METHOD: NORMAL
SODIUM SERPL-SCNC: 139 MMOL/L (ref 136–145)
SP GR UR STRIP: <=1.005 (ref 1–1.03)
SQUAMOUS #/AREA URNS HPF: NORMAL /HPF
UROBILINOGEN UR QL STRIP: NORMAL
WBC # BLD AUTO: 4.48 10*3/MM3 (ref 3.4–10.8)
WBC UR QL AUTO: NORMAL /HPF

## 2021-10-29 PROCEDURE — 36415 COLL VENOUS BLD VENIPUNCTURE: CPT

## 2021-10-29 PROCEDURE — 85610 PROTHROMBIN TIME: CPT

## 2021-10-29 PROCEDURE — 81001 URINALYSIS AUTO W/SCOPE: CPT

## 2021-10-29 PROCEDURE — 85027 COMPLETE CBC AUTOMATED: CPT

## 2021-10-29 PROCEDURE — 87086 URINE CULTURE/COLONY COUNT: CPT

## 2021-10-29 PROCEDURE — 80053 COMPREHEN METABOLIC PANEL: CPT

## 2021-10-29 PROCEDURE — 83036 HEMOGLOBIN GLYCOSYLATED A1C: CPT

## 2021-10-30 ENCOUNTER — LAB (OUTPATIENT)
Dept: LAB | Facility: HOSPITAL | Age: 66
End: 2021-10-30

## 2021-10-30 LAB — BACTERIA SPEC AEROBE CULT: NO GROWTH

## 2021-10-30 PROCEDURE — U0004 COV-19 TEST NON-CDC HGH THRU: HCPCS

## 2021-10-30 PROCEDURE — U0005 INFEC AGEN DETEC AMPLI PROBE: HCPCS

## 2021-10-30 PROCEDURE — C9803 HOPD COVID-19 SPEC COLLECT: HCPCS

## 2021-10-31 LAB — SARS-COV-2 ORF1AB RESP QL NAA+PROBE: NOT DETECTED

## 2021-11-02 ENCOUNTER — ANESTHESIA (OUTPATIENT)
Dept: PERIOP | Facility: HOSPITAL | Age: 66
End: 2021-11-02

## 2021-11-02 ENCOUNTER — HOSPITAL ENCOUNTER (OUTPATIENT)
Facility: HOSPITAL | Age: 66
Setting detail: HOSPITAL OUTPATIENT SURGERY
Discharge: HOME | End: 2021-11-02
Attending: ORTHOPAEDIC SURGERY | Admitting: ORTHOPAEDIC SURGERY

## 2021-11-02 ENCOUNTER — READMISSION MANAGEMENT (OUTPATIENT)
Dept: CALL CENTER | Facility: HOSPITAL | Age: 66
End: 2021-11-02

## 2021-11-02 ENCOUNTER — TREATMENT (OUTPATIENT)
Dept: PHYSICAL THERAPY | Facility: CLINIC | Age: 66
End: 2021-11-02

## 2021-11-02 ENCOUNTER — ANESTHESIA EVENT (OUTPATIENT)
Dept: PERIOP | Facility: HOSPITAL | Age: 66
End: 2021-11-02

## 2021-11-02 VITALS
RESPIRATION RATE: 18 BRPM | WEIGHT: 140.4 LBS | HEART RATE: 84 BPM | BODY MASS INDEX: 27.56 KG/M2 | HEIGHT: 60 IN | OXYGEN SATURATION: 100 % | SYSTOLIC BLOOD PRESSURE: 160 MMHG | TEMPERATURE: 97.5 F | DIASTOLIC BLOOD PRESSURE: 84 MMHG

## 2021-11-02 DIAGNOSIS — R26.2 DIFFICULTY WALKING: ICD-10-CM

## 2021-11-02 DIAGNOSIS — G89.18 ACUTE POSTOPERATIVE PAIN OF LEFT KNEE: ICD-10-CM

## 2021-11-02 DIAGNOSIS — Z98.890 S/P SURGICAL MANIPULATION OF KNEE JOINT: ICD-10-CM

## 2021-11-02 DIAGNOSIS — M25.562 ACUTE POSTOPERATIVE PAIN OF LEFT KNEE: ICD-10-CM

## 2021-11-02 DIAGNOSIS — Z96.652 S/P TKR (TOTAL KNEE REPLACEMENT), LEFT: Primary | ICD-10-CM

## 2021-11-02 LAB — GLUCOSE BLDC GLUCOMTR-MCNC: 97 MG/DL (ref 70–130)

## 2021-11-02 PROCEDURE — 97140 MANUAL THERAPY 1/> REGIONS: CPT | Performed by: PHYSICAL THERAPIST

## 2021-11-02 PROCEDURE — 25010000002 HYDROMORPHONE PER 4 MG: Performed by: NURSE ANESTHETIST, CERTIFIED REGISTERED

## 2021-11-02 PROCEDURE — 25010000002 PROPOFOL 10 MG/ML EMULSION: Performed by: NURSE ANESTHETIST, CERTIFIED REGISTERED

## 2021-11-02 PROCEDURE — 25010000002 FENTANYL CITRATE (PF) 50 MCG/ML SOLUTION: Performed by: NURSE ANESTHETIST, CERTIFIED REGISTERED

## 2021-11-02 PROCEDURE — 82962 GLUCOSE BLOOD TEST: CPT

## 2021-11-02 PROCEDURE — 0 CEFAZOLIN IN DEXTROSE 2-4 GM/100ML-% SOLUTION: Performed by: ORTHOPAEDIC SURGERY

## 2021-11-02 PROCEDURE — 25010000002 TRIAMCINOLONE PER 10 MG: Performed by: ORTHOPAEDIC SURGERY

## 2021-11-02 PROCEDURE — 97110 THERAPEUTIC EXERCISES: CPT | Performed by: PHYSICAL THERAPIST

## 2021-11-02 RX ORDER — EPHEDRINE SULFATE 50 MG/ML
5 INJECTION, SOLUTION INTRAVENOUS ONCE AS NEEDED
Status: DISCONTINUED | OUTPATIENT
Start: 2021-11-02 | End: 2021-11-02 | Stop reason: HOSPADM

## 2021-11-02 RX ORDER — HYDROCODONE BITARTRATE AND ACETAMINOPHEN 7.5; 325 MG/1; MG/1
1 TABLET ORAL ONCE AS NEEDED
Status: DISCONTINUED | OUTPATIENT
Start: 2021-11-02 | End: 2021-11-02 | Stop reason: HOSPADM

## 2021-11-02 RX ORDER — OXYCODONE HYDROCHLORIDE AND ACETAMINOPHEN 5; 325 MG/1; MG/1
1-2 TABLET ORAL EVERY 4 HOURS PRN
Qty: 40 TABLET | Refills: 0 | Status: SHIPPED | OUTPATIENT
Start: 2021-11-02 | End: 2022-03-17

## 2021-11-02 RX ORDER — CELECOXIB 200 MG/1
200 CAPSULE ORAL ONCE
Status: COMPLETED | OUTPATIENT
Start: 2021-11-02 | End: 2021-11-02

## 2021-11-02 RX ORDER — BUPIVACAINE HYDROCHLORIDE 5 MG/ML
INJECTION, SOLUTION EPIDURAL; INTRACAUDAL AS NEEDED
Status: DISCONTINUED | OUTPATIENT
Start: 2021-11-02 | End: 2021-11-02 | Stop reason: HOSPADM

## 2021-11-02 RX ORDER — MIDAZOLAM HYDROCHLORIDE 1 MG/ML
1 INJECTION INTRAMUSCULAR; INTRAVENOUS
Status: DISCONTINUED | OUTPATIENT
Start: 2021-11-02 | End: 2021-11-02 | Stop reason: HOSPADM

## 2021-11-02 RX ORDER — DIPHENHYDRAMINE HYDROCHLORIDE 50 MG/ML
12.5 INJECTION INTRAMUSCULAR; INTRAVENOUS
Status: DISCONTINUED | OUTPATIENT
Start: 2021-11-02 | End: 2021-11-02 | Stop reason: HOSPADM

## 2021-11-02 RX ORDER — LIDOCAINE HYDROCHLORIDE 20 MG/ML
INJECTION, SOLUTION INFILTRATION; PERINEURAL AS NEEDED
Status: DISCONTINUED | OUTPATIENT
Start: 2021-11-02 | End: 2021-11-02 | Stop reason: SURG

## 2021-11-02 RX ORDER — PROMETHAZINE HYDROCHLORIDE 25 MG/1
25 TABLET ORAL ONCE AS NEEDED
Status: DISCONTINUED | OUTPATIENT
Start: 2021-11-02 | End: 2021-11-02 | Stop reason: HOSPADM

## 2021-11-02 RX ORDER — ONDANSETRON 2 MG/ML
4 INJECTION INTRAMUSCULAR; INTRAVENOUS ONCE AS NEEDED
Status: DISCONTINUED | OUTPATIENT
Start: 2021-11-02 | End: 2021-11-02 | Stop reason: HOSPADM

## 2021-11-02 RX ORDER — HYDROMORPHONE HCL 110MG/55ML
PATIENT CONTROLLED ANALGESIA SYRINGE INTRAVENOUS AS NEEDED
Status: DISCONTINUED | OUTPATIENT
Start: 2021-11-02 | End: 2021-11-02 | Stop reason: SURG

## 2021-11-02 RX ORDER — SODIUM CHLORIDE 0.9 % (FLUSH) 0.9 %
3 SYRINGE (ML) INJECTION EVERY 12 HOURS SCHEDULED
Status: DISCONTINUED | OUTPATIENT
Start: 2021-11-02 | End: 2021-11-02 | Stop reason: HOSPADM

## 2021-11-02 RX ORDER — PROPOFOL 10 MG/ML
VIAL (ML) INTRAVENOUS AS NEEDED
Status: DISCONTINUED | OUTPATIENT
Start: 2021-11-02 | End: 2021-11-02 | Stop reason: SURG

## 2021-11-02 RX ORDER — LIDOCAINE HYDROCHLORIDE 10 MG/ML
0.5 INJECTION, SOLUTION EPIDURAL; INFILTRATION; INTRACAUDAL; PERINEURAL ONCE AS NEEDED
Status: DISCONTINUED | OUTPATIENT
Start: 2021-11-02 | End: 2021-11-02 | Stop reason: HOSPADM

## 2021-11-02 RX ORDER — FENTANYL CITRATE 50 UG/ML
INJECTION, SOLUTION INTRAMUSCULAR; INTRAVENOUS AS NEEDED
Status: DISCONTINUED | OUTPATIENT
Start: 2021-11-02 | End: 2021-11-02 | Stop reason: SURG

## 2021-11-02 RX ORDER — TRIAMCINOLONE ACETONIDE 40 MG/ML
INJECTION, SUSPENSION INTRA-ARTICULAR; INTRAMUSCULAR AS NEEDED
Status: DISCONTINUED | OUTPATIENT
Start: 2021-11-02 | End: 2021-11-02 | Stop reason: HOSPADM

## 2021-11-02 RX ORDER — FENTANYL CITRATE 50 UG/ML
50 INJECTION, SOLUTION INTRAMUSCULAR; INTRAVENOUS
Status: DISCONTINUED | OUTPATIENT
Start: 2021-11-02 | End: 2021-11-02 | Stop reason: HOSPADM

## 2021-11-02 RX ORDER — FAMOTIDINE 10 MG/ML
20 INJECTION, SOLUTION INTRAVENOUS ONCE
Status: COMPLETED | OUTPATIENT
Start: 2021-11-02 | End: 2021-11-02

## 2021-11-02 RX ORDER — FLUMAZENIL 0.1 MG/ML
0.2 INJECTION INTRAVENOUS AS NEEDED
Status: DISCONTINUED | OUTPATIENT
Start: 2021-11-02 | End: 2021-11-02 | Stop reason: HOSPADM

## 2021-11-02 RX ORDER — LABETALOL HYDROCHLORIDE 5 MG/ML
5 INJECTION, SOLUTION INTRAVENOUS
Status: DISCONTINUED | OUTPATIENT
Start: 2021-11-02 | End: 2021-11-02 | Stop reason: HOSPADM

## 2021-11-02 RX ORDER — PROMETHAZINE HYDROCHLORIDE 25 MG/1
25 SUPPOSITORY RECTAL ONCE AS NEEDED
Status: DISCONTINUED | OUTPATIENT
Start: 2021-11-02 | End: 2021-11-02 | Stop reason: HOSPADM

## 2021-11-02 RX ORDER — ACETAMINOPHEN 500 MG
1000 TABLET ORAL ONCE
Status: COMPLETED | OUTPATIENT
Start: 2021-11-02 | End: 2021-11-02

## 2021-11-02 RX ORDER — OXYCODONE AND ACETAMINOPHEN 7.5; 325 MG/1; MG/1
2 TABLET ORAL EVERY 4 HOURS PRN
Status: DISCONTINUED | OUTPATIENT
Start: 2021-11-02 | End: 2021-11-02 | Stop reason: HOSPADM

## 2021-11-02 RX ORDER — SODIUM CHLORIDE 0.9 % (FLUSH) 0.9 %
3-10 SYRINGE (ML) INJECTION AS NEEDED
Status: DISCONTINUED | OUTPATIENT
Start: 2021-11-02 | End: 2021-11-02 | Stop reason: HOSPADM

## 2021-11-02 RX ORDER — CEFAZOLIN SODIUM 2 G/100ML
2 INJECTION, SOLUTION INTRAVENOUS ONCE
Status: COMPLETED | OUTPATIENT
Start: 2021-11-02 | End: 2021-11-02

## 2021-11-02 RX ORDER — SODIUM CHLORIDE, SODIUM LACTATE, POTASSIUM CHLORIDE, CALCIUM CHLORIDE 600; 310; 30; 20 MG/100ML; MG/100ML; MG/100ML; MG/100ML
9 INJECTION, SOLUTION INTRAVENOUS CONTINUOUS
Status: DISCONTINUED | OUTPATIENT
Start: 2021-11-02 | End: 2021-11-02 | Stop reason: HOSPADM

## 2021-11-02 RX ORDER — HYDRALAZINE HYDROCHLORIDE 20 MG/ML
5 INJECTION INTRAMUSCULAR; INTRAVENOUS
Status: DISCONTINUED | OUTPATIENT
Start: 2021-11-02 | End: 2021-11-02 | Stop reason: HOSPADM

## 2021-11-02 RX ORDER — HYDROMORPHONE HYDROCHLORIDE 1 MG/ML
0.5 INJECTION, SOLUTION INTRAMUSCULAR; INTRAVENOUS; SUBCUTANEOUS
Status: DISCONTINUED | OUTPATIENT
Start: 2021-11-02 | End: 2021-11-02 | Stop reason: HOSPADM

## 2021-11-02 RX ORDER — MIDAZOLAM HYDROCHLORIDE 1 MG/ML
0.5 INJECTION INTRAMUSCULAR; INTRAVENOUS
Status: DISCONTINUED | OUTPATIENT
Start: 2021-11-02 | End: 2021-11-02 | Stop reason: HOSPADM

## 2021-11-02 RX ORDER — DIPHENHYDRAMINE HCL 25 MG
25 CAPSULE ORAL
Status: DISCONTINUED | OUTPATIENT
Start: 2021-11-02 | End: 2021-11-02 | Stop reason: HOSPADM

## 2021-11-02 RX ORDER — NALOXONE HCL 0.4 MG/ML
0.2 VIAL (ML) INJECTION AS NEEDED
Status: DISCONTINUED | OUTPATIENT
Start: 2021-11-02 | End: 2021-11-02 | Stop reason: HOSPADM

## 2021-11-02 RX ADMIN — LIDOCAINE HYDROCHLORIDE 60 MG: 20 INJECTION, SOLUTION INFILTRATION; PERINEURAL at 06:55

## 2021-11-02 RX ADMIN — PROPOFOL 50 MG: 10 INJECTION, EMULSION INTRAVENOUS at 06:57

## 2021-11-02 RX ADMIN — ACETAMINOPHEN 1000 MG: 500 TABLET, FILM COATED ORAL at 05:54

## 2021-11-02 RX ADMIN — HYDROMORPHONE HYDROCHLORIDE 0.5 MG: 2 INJECTION, SOLUTION INTRAMUSCULAR; INTRAVENOUS; SUBCUTANEOUS at 06:50

## 2021-11-02 RX ADMIN — FENTANYL CITRATE 50 MCG: 0.05 INJECTION, SOLUTION INTRAMUSCULAR; INTRAVENOUS at 06:57

## 2021-11-02 RX ADMIN — SODIUM CHLORIDE, POTASSIUM CHLORIDE, SODIUM LACTATE AND CALCIUM CHLORIDE 9 ML/HR: 600; 310; 30; 20 INJECTION, SOLUTION INTRAVENOUS at 06:38

## 2021-11-02 RX ADMIN — PROPOFOL 50 MG: 10 INJECTION, EMULSION INTRAVENOUS at 06:59

## 2021-11-02 RX ADMIN — FAMOTIDINE 20 MG: 10 INJECTION INTRAVENOUS at 06:38

## 2021-11-02 RX ADMIN — CELECOXIB 200 MG: 200 CAPSULE ORAL at 05:54

## 2021-11-02 RX ADMIN — CEFAZOLIN SODIUM 2 G: 2 INJECTION, SOLUTION INTRAVENOUS at 06:45

## 2021-11-02 RX ADMIN — PROPOFOL 100 MG: 10 INJECTION, EMULSION INTRAVENOUS at 06:55

## 2021-11-02 NOTE — ANESTHESIA PREPROCEDURE EVALUATION
Anesthesia Evaluation                  Airway   Mallampati: I  TM distance: >3 FB  Neck ROM: full  No difficulty expected  Dental - normal exam     Pulmonary - normal exam   (-) not a smoker  Cardiovascular - normal exam    (+) hypertension 2 medications or greater, hyperlipidemia,       Neuro/Psych  GI/Hepatic/Renal/Endo    (+)   diabetes mellitus type 2, thyroid problem hypothyroidism    Musculoskeletal     Abdominal    Substance History      OB/GYN    (-)  Pregnant        Other   arthritis,                        Anesthesia Plan    ASA 2     general     intravenous induction     Anesthetic plan, all risks, benefits, and alternatives have been provided, discussed and informed consent has been obtained with: patient.

## 2021-11-02 NOTE — OUTREACH NOTE
Total Joint Month 3 Survey      Responses   [facility] patient discharged from? [place]   Does the patient have one of the following disease processes/diagnoses(primary or secondary)? Total Joint Replacement   Joint surgery performed? Knee   Month 3 attempt successful? No   Revoke Readmitted          Ofelia Russell RN

## 2021-11-02 NOTE — PROGRESS NOTES
Physical Therapy Daily Treatment Note      Patient: Pretty Tovar   : 1955  Referring practitioner: Francis Tavarez MD  Date of Initial Visit: Type: THERAPY  Noted: 2021  Today's Date: 2021  Patient seen for 23 sessions           Subjective Evaluation    History of Present Illness    Subjective comment: I can already tell my knee doesn't feel as stiff.  I feel like I'm walking better.  I did take 2 pain pills before I came because they told me to. Pain  Current pain ratin           Objective          Active Range of Motion   Left Knee   Flexion: 92 (108 deg after stretching) degrees     Passive Range of Motion   Left Knee   Flexion: 105 (113 degrees after stretching) degrees       See Exercise, Manual, and Modality Logs for complete treatment.       Assessment & Plan     Assessment  Assessment details: Patient presents s/p (L) knee manipulation with injection this morning.  She reports improved perception of active knee flexion (L) knee following manipulation.  She demonstrates improved (L) knee PROM and AROM flexion compared to prior to manipulation, and end feel is soft with mild to moderate reported soreness.  She is encouraged to utilize newly gained knee flexion AROM during swing phase of gait as well to work on normalizing gait pattern. Plan to see patient in PT daily x 1 week and then transition to 2-3 times weekly as needed to optimize outcome following knee manipulation.          Progress per Plan of Care - emphasize stretching and ROM.         Timed:  Manual Therapy:    16     mins  56634;  Therapeutic Exercise:    32     mins  99657;     Neuromuscular Chase:        mins  46942;    Therapeutic Activity:          mins  35783;     Gait Training:           mins  60250;     Ultrasound:          mins  80687;    Untimed:  Electrical Stimulation:         mins  87141 ( );  Mechanical Traction:         mins  14361;     Timed Treatment:   48   mins   Total Treatment:     48    marcelino Patel, PT, DPT, OCS  Physical Therapist  [place] License #[id number]

## 2021-11-02 NOTE — ANESTHESIA POSTPROCEDURE EVALUATION
"Patient: Pretty Tovar    Procedure Summary     Date: 11/02/21 Room / Location: [facility] OR [address] [place] [address] MAIN OR    Anesthesia Start: 0647 Anesthesia Stop: 0720    Procedure: KNEE MANIPULATION with steroid injection (Left ) Diagnosis:     Surgeons: Francis Tavarez MD Provider: Rafa Cardona MD    Anesthesia Type: general ASA Status: 2          Anesthesia Type: general    Vitals  Vitals Value Taken Time   /81 11/02/21 0741   Temp 36.4 °C (97.5 °F) 11/02/21 0717   Pulse 81 11/02/21 0747   Resp 16 11/02/21 0740   SpO2 99 % 11/02/21 0748   Vitals shown include unvalidated device data.        Post Anesthesia Care and Evaluation    Patient location during evaluation: bedside  Patient participation: complete - patient participated  Level of consciousness: awake and alert  Pain management: adequate  Airway patency: patent  Anesthetic complications: No anesthetic complications    Cardiovascular status: acceptable  Respiratory status: acceptable  Hydration status: acceptable    Comments: /84   Pulse 84   Temp 36.4 °C (97.5 °F) (Oral)   Resp 18   Ht 152.4 cm (60\")   Wt 63.7 kg (140 lb 6.4 oz)   LMP  (LMP Unknown)   SpO2 100%   BMI 27.42 kg/m²       "

## 2021-11-02 NOTE — H&P
"  Orthopaedic Surgery History and Physical    Patient Name:  Pretty Tovar  YOB: 1955   Age: 65 y.o.  Medical Records Number:  [id number]    Date of Admission:  2021  5:25 AM    Chief Complaint:  No admission diagnoses are documented for this encounter.    Pretty Tovar is a 65 y.o. female who presents c/o severe left knee pain and stiffness 10 weeks s/p left tka.  The pain and stiffness have been worsening to the point where the pain is becoming disabling.  The pain is a constant dull ache with occasional sharp, stabbing pain.  The patient has failed conservative treatment and would like to proceed with left total knee arthroplasty manipulation.    /69 (BP Location: Right arm, Patient Position: Sitting)   Pulse 98   Temp 98.5 °F (36.9 °C) (Oral)   Resp 20   Ht 152.4 cm (60\")   Wt 63.7 kg (140 lb 6.4 oz)   LMP  (LMP Unknown)   SpO2 99%   BMI 27.42 kg/m²     Past Medical History:    Past Medical History:   Diagnosis Date   • Abnormal liver function tests 2017    Description: , \"-\"hepatitis profile   • Anemia    • Anesthesia complication     SLOW TO WAKE UP   • Colon polyps    • Diabetes mellitus (HCC)    • H/O bone density study 10/2010    normal   • H/O cardiovascular stress test    • H/O echocardiogram 2001    2-D   • Heart murmur    • Hyperkalemia 2019   • Hyperlipidemia    • Hypertension    • Hypothyroidism 2018   • Left knee pain    • Limited joint range of motion     LEFT KNEE   • Osteoarthritis of knee     left   • Plantar warts     foot   • Pregnancy      twins x1   • Psoriasis    • Psoriasis    • Urine incontinence    • Uterovaginal prolapse        Past Surgical History:   Past Surgical History:   Procedure Laterality Date   • BREAST BIOPSY Right 2006    benign   • COLONOSCOPY     • COLONOSCOPY W/ POLYPECTOMY  2018    Dr Resendiz, 6 mm rectal polyp - hyperplastic   • PUBOVAGINAL SLING N/A 10/16/2020    Procedure: Pubovaginal sling " Posterior colporrhaphy fixation Insertion of vaginal grafts Cystourethroscopy;  Surgeon: Karen Allen MD;  Location: [facility] MAIN OR;  Service: Gynecology;  Laterality: N/A;   • TOTAL KNEE ARTHROPLASTY Left 8/10/2021    Procedure: TOTAL KNEE ARTHROPLASTY;  Surgeon: Francis Tavarez MD;  Location: [facility] MAIN OR;  Service: Orthopedics;  Laterality: Left;   • TOTAL LAPAROSCOPIC HYSTERECTOMY, SACROCOLPOPEXY N/A 10/16/2020    Procedure: Laparoscopic uterosacral ligament colpopexy sacral colpopexy  Laparoscopic paravaginal repair Laparoscopic hysterectomy with bilateral salpingectomy  Laparoscopic abdominal enterocele repair;  Surgeon: Karen Allen MD;  Location: [facility] MAIN OR;  Service: Gynecology;  Laterality: N/A;   • TUBAL ABDOMINAL LIGATION         Social History:    Social History     Socioeconomic History   • Marital status:      Spouse name: SUZANNE    • Number of children: 3   Tobacco Use   • Smoking status: Never Smoker   • Smokeless tobacco: Never Used   • Tobacco comment: daily caffine   Vaping Use   • Vaping Use: Never used   Substance and Sexual Activity   • Alcohol use: Never   • Drug use: Never       Family History:    Family History   Problem Relation Age of Onset   • Heart disease Father    • Hypertension Father    • Diabetes Brother    • Heart disease Maternal Grandmother    • Other Son         TWIN   • Other Daughter         TWIN    • BRCA 1/2 Neg Hx    • Breast cancer Neg Hx    • Colon cancer Neg Hx    • Endometrial cancer Neg Hx    • Ovarian cancer Neg Hx    • Malig Hyperthermia Neg Hx        Current Medications:  Scheduled Meds:ceFAZolin, 2 g, Intravenous, Once  sodium chloride, 3 mL, Intravenous, Q12H      Continuous Infusions:lactated ringers, 9 mL/hr, Last Rate: 9 mL/hr (11/02/21 0638)      PRN Meds:.fentanyl  •  lidocaine PF 1%  •  midazolam  •  midazolam  •  sodium chloride    Current Facility-Administered Medications:   •  ceFAZolin in dextrose (ANCEF) IVPB solution 2 g, 2 g,  Intravenous, Once, Francis Tavarez MD  •  fentaNYL citrate (PF) (SUBLIMAZE) injection 50 mcg, 50 mcg, Intravenous, Q10 Min PRN, Rafa Cardona MD  •  lactated ringers infusion, 9 mL/hr, Intravenous, Continuous, Rafa Cardona MD, Last Rate: 9 mL/hr at 11/02/21 0638, 9 mL/hr at 11/02/21 0638  •  lidocaine PF 1% (XYLOCAINE) injection 0.5 mL, 0.5 mL, Injection, Once PRN, Rafa Cradona MD  •  midazolam (VERSED) injection 0.5 mg, 0.5 mg, Intravenous, Q10 Min PRN, Rafa Cardona MD  •  midazolam (VERSED) injection 1 mg, 1 mg, Intravenous, Q10 Min PRN, Rafa Cardona MD  •  sodium chloride 0.9 % flush 3 mL, 3 mL, Intravenous, Q12H, Rafa Cardona MD  •  sodium chloride 0.9 % flush 3-10 mL, 3-10 mL, Intravenous, PRN, Rafa Cardona MD    Allergies:  No Known Allergies    Review of Systems:   HEENT: Patient denies any headaches, vision changes, change in hearing, or tinnitus, Patient denies any rhinorrhea,epistaxis, sinus pain, mouth or dental problems, sore throat or hoarseness, or dysphagia  Pulmonary: Patient denies any cough, congestion, SOA, or wheezing  Cardiovascular: Patient denies any chest pain, dyspnea, palpitations, weakness, intolerance of exercise, varicosities, swelling of extremities, known murmur  Gastrointestinal:  Patient denies nausea, vomiting, diarrhea, constipation, loss  of appetite, change in appetite, dysphagia, gas, heartburn, melena, change in bowel habits, use of laxatives or other drugs to alter the function of the gastrointestinal tract.  Genital/Urinary: Patient denies dysuria, change in color of urine, change in frequency of urination, pain with urgency, incontinence, retention, or nocturia.  Musculoskeletal: Patient denies increased warmth; redness; or swelling of joints; limitation of function; deformity; crepitation: pain in a joint or an extremity, the neck, or the back, especially with movement.  Neurological: Patient denies dizziness, tremor,  ataxia, difficulty in speaking, change in speech, paresthesia, loss of sensation, seizures, syncope, changes in memory.  Endocrine system: Patient denies tremors, palpitations, intolerance of heat or cold, polyuria, polydipsia, polyphagia, diaphoresis, exophthalmos, or goiter.  Psychological: Patient denies thoughts/plans or harming self or other; depression,  insomnia, night terrors, jerrica, memory loss, disorientation.  Skin: Patient denies any bruising, rashes, discoloration, pruritus, wounds, ulcers, decubiti, changes in the hair or nails  Hematopoietic: Patient denies history of spontaneous or excessive bleeding, epistaxis, hematuria, melena, fatigue, enlarged or tender lymph nodes, pallor, history of anemia.        Physical Exam:   Awake, A&O x3, affect normal, no acute distress  Ambulating with a limp due to knee pain  Knee ROM is limited due to pain (5-115)  Strength is 4/5 in the quad, hamstring and calf  Cap refill is normal, Sensation intact    Card:  RR, HD Stable  Pulm:  Regular breathing, no S.O.A  Abd:  Soft, NT, ND    Lab Results (last 24 hours)     Procedure Component Value Units Date/Time    POC Glucose Once [[id number]]  (Normal) Collected: 11/02/21 0541    Specimen: Blood Updated: 11/02/21 0543     Glucose 97 mg/dL      Comment: Meter: [id number] : [id number] Jacobo Doherty RN             No results found.      Assessment:  Left Total Knee Arthrofibrosis    Plan:  Patient's pain and stiffness are becoming disabling, despite extensive physical therapy for 10 weeks following her left tka.  Radiographs reveal good implant position and alignment.  The risks of surgery, including, but not limited to, heart attack, stroke, dying, DVT, nerve injury, vascular injury, arthrofibrosis and infection were discussed.  The alternatives and benefits were also discussed.  All questions answered and the patient wishes to proceed with left total knee arthroplasty manipulation and injection.    Francis Tavarez  MD Nie[place] BIRDC  [facility]  [address] [place], [place] [address]  [phone]    11/2/2021    CC: Paris Singer APRN, Francis Tavarez MD

## 2021-11-02 NOTE — OP NOTE
Orthopaedic Surgery Operative Note    Patient Name:  Pretty Tovar  YOB: 1955  Age: 65 y.o.  Medical Records Number:  [id number]    Date of Procedure:  11/2/2021    Pre-operative Diagnosis:  Arthrofibrosis Left Total Knee Arthroplasty    Post-operative Diagnosis:  Arthrofibrosisi Left Total Knee Arthroplasty    Procedure Performed:  Left Total Knee Arthroplasty Manipulation with Injection of 30 cc's 0.5% Marcaine with Epinephrine and 40 mg Kenalog    Surgeon:  Francis Tavarez M.D.    Anesthetic Type:  General    Estimated Blood Loss:  0cc's    Specimens: * No orders in the log *    No Complications    Indications for Procedure:      Pretty Tovar is a 65 y.o. patient who is complaining of worsening left knee stiffness and pain following a total knee arthoplasty.  The patient continues to have worsening symptoms despite extensive physical therapy and conservative treatment.  The stiffness and pain are interfering with the patient's activities.  The risks, benefits and alternatives to knee manipulation were discussed, including, but not limited to stroke, heart attack, dying, dvt, continued arthrofibrosis, infection, nerve and/or vascular injury, tendon and/or ligament injury, as well as possible fracture.  Pretty Tovar understands these risks, all questions answered and the patient wishes to proceed with left total knee manipulation and injection.    Procedure Performed:    After informed consent was obtained, the correct patient identified and the correct operative side marked by the operative surgeon, the patient was taken to the operating room.  General anesthesia was induced, a surgical time out was performed, and the left knee was examined and noted to have an arc of motion of 5-85 degrees.  We carefully manipulated the left knee over a 2-3 minute timeframe, taking care to avoid any excessive force which could lead to iatrogenic extensor mechanism rupture, fracture, or joint  damage.  There were noted to be significant adhesions at 80-85 degrees of flexion.  Release of these adhesions were both physically felt and heard.  After careful manipulation, we were able to obtain 0 - 110 degree range of motion.  We mobilized the patella and then injected 30 cc's 0.5% Marcaine with epinephrine and 40 mg of Kenalog after a sterile prep.  The patient was awakened from general anesthesia and taken to the recovery room in stable condition.  No complications noted.      Francis Gay PA-C  [facility]  [address] [place], [place] [address] [phone]    11/2/2021

## 2021-11-03 ENCOUNTER — TREATMENT (OUTPATIENT)
Dept: PHYSICAL THERAPY | Facility: CLINIC | Age: 66
End: 2021-11-03

## 2021-11-03 DIAGNOSIS — Z96.652 S/P TKR (TOTAL KNEE REPLACEMENT), LEFT: Primary | ICD-10-CM

## 2021-11-03 DIAGNOSIS — Z98.890 S/P SURGICAL MANIPULATION OF KNEE JOINT: ICD-10-CM

## 2021-11-03 DIAGNOSIS — R26.2 DIFFICULTY WALKING: ICD-10-CM

## 2021-11-03 DIAGNOSIS — G89.18 ACUTE POSTOPERATIVE PAIN OF LEFT KNEE: ICD-10-CM

## 2021-11-03 DIAGNOSIS — M25.562 ACUTE POSTOPERATIVE PAIN OF LEFT KNEE: ICD-10-CM

## 2021-11-03 PROCEDURE — 97110 THERAPEUTIC EXERCISES: CPT | Performed by: PHYSICAL THERAPIST

## 2021-11-03 PROCEDURE — 97140 MANUAL THERAPY 1/> REGIONS: CPT | Performed by: PHYSICAL THERAPIST

## 2021-11-03 NOTE — PROGRESS NOTES
Physical Therapy Daily Treatment Note      Patient: Pretty Tovar   : 1955  Referring practitioner: Francis Tavarez MD  Date of Initial Visit: Type: THERAPY  Noted: 2021  Today's Date: 11/3/2021  Patient seen for 24 sessions         Subjective Evaluation    History of Present Illness    Subjective comment: My knee feels pretty good.  It actually didn't get painful like I expected it to last night after everything wore off.  I have been trying to work on bending my knee more while walking like we talked about.       Objective   See Exercise, Manual, and Modality Logs for complete treatment.       Assessment & Plan     Assessment  Assessment details: Patient demonstrates improving gait pattern with greater active (L) knee flexion during swing phase of gait as well as improved (L) knee flexion ROM arc passively per visual assessment.  Encouraged diligent HEP compliance to retain ROM gains achieved via manipulation procedure           Progress per Plan of Care. Reassess (L) knee ROM.         Timed:  Manual Therapy:    10     mins  27610;  Therapeutic Exercise:    29     mins  91624;     Neuromuscular Chase:        mins  80118;    Therapeutic Activity:          mins  50260;     Gait Training:           mins  80020;     Ultrasound:          mins  00332;    Untimed:  Electrical Stimulation:         mins  52854 ( );  Mechanical Traction:         mins  01592;     Timed Treatment:   39   mins   Total Treatment:     39   mins    Vicky Patel PT, DPT, OCS  Physical Therapist  [place] License #[id number]

## 2021-11-04 ENCOUNTER — TREATMENT (OUTPATIENT)
Dept: PHYSICAL THERAPY | Facility: CLINIC | Age: 66
End: 2021-11-04

## 2021-11-04 DIAGNOSIS — R26.2 DIFFICULTY WALKING: ICD-10-CM

## 2021-11-04 DIAGNOSIS — Z96.652 S/P TKR (TOTAL KNEE REPLACEMENT), LEFT: Primary | ICD-10-CM

## 2021-11-04 DIAGNOSIS — M25.562 ACUTE POSTOPERATIVE PAIN OF LEFT KNEE: ICD-10-CM

## 2021-11-04 DIAGNOSIS — G89.18 ACUTE POSTOPERATIVE PAIN OF LEFT KNEE: ICD-10-CM

## 2021-11-04 DIAGNOSIS — Z98.890 S/P SURGICAL MANIPULATION OF KNEE JOINT: ICD-10-CM

## 2021-11-04 PROCEDURE — 97140 MANUAL THERAPY 1/> REGIONS: CPT | Performed by: PHYSICAL THERAPIST

## 2021-11-04 PROCEDURE — 97110 THERAPEUTIC EXERCISES: CPT | Performed by: PHYSICAL THERAPIST

## 2021-11-04 NOTE — PROGRESS NOTES
Physical Therapy Daily Treatment Note      Patient: Pretty Tovar   : 1955  Referring practitioner: Francis Tavarez MD  Date of Initial Visit: Type: THERAPY  Noted: 2021  Today's Date: 2021  Patient seen for 25 sessions           Subjective Evaluation    History of Present Illness    Subjective comment: My quad muscle started getting sore, probably from where they worked on it.  I did some exercises for that area to help it.  Otherwise my knee feels good.       Objective          Active Range of Motion   Left Knee   Flexion: 110 degrees     Passive Range of Motion   Left Knee   Flexion: 114 degrees       See Exercise, Manual, and Modality Logs for complete treatment.       Assessment & Plan     Assessment  Assessment details: Patient demonstrates improved (L) knee AROM and PROM flexion this date, but these are achieved with effort and slightly increased symptoms (L) knee.  Patient benefits from persistent verbal and tactile cueing to weightbear through (L) hip during knee flexion stretching in sitting to minimize compensation.  She exhibits independence with ambulation utilizing 25% improved knee flexion during active swing phase.           Progress per Plan of Care with persistent emphasis on maximizing (L) knee ROM.         Timed:  Manual Therapy:    10     mins  44167;  Therapeutic Exercise:    28     mins  61151;     Neuromuscular Chase:        mins  16187;    Therapeutic Activity:          mins  53646;     Gait Training:           mins  76242;     Ultrasound:          mins  84358;    Untimed:  Electrical Stimulation:         mins  57481 ( );  Mechanical Traction:         mins  65368;     Timed Treatment:   38   mins   Total Treatment:     42   mins    Vicky Patel PT, DPT, OCS  Physical Therapist  [place] License #[id number]

## 2021-11-05 ENCOUNTER — TREATMENT (OUTPATIENT)
Dept: PHYSICAL THERAPY | Facility: CLINIC | Age: 66
End: 2021-11-05

## 2021-11-05 DIAGNOSIS — Z98.890 S/P SURGICAL MANIPULATION OF KNEE JOINT: ICD-10-CM

## 2021-11-05 DIAGNOSIS — M25.562 ACUTE POSTOPERATIVE PAIN OF LEFT KNEE: ICD-10-CM

## 2021-11-05 DIAGNOSIS — G89.18 ACUTE POSTOPERATIVE PAIN OF LEFT KNEE: ICD-10-CM

## 2021-11-05 DIAGNOSIS — Z96.652 S/P TKR (TOTAL KNEE REPLACEMENT), LEFT: Primary | ICD-10-CM

## 2021-11-05 DIAGNOSIS — R26.2 DIFFICULTY WALKING: ICD-10-CM

## 2021-11-05 PROCEDURE — 97140 MANUAL THERAPY 1/> REGIONS: CPT | Performed by: PHYSICAL THERAPIST

## 2021-11-05 PROCEDURE — 97110 THERAPEUTIC EXERCISES: CPT | Performed by: PHYSICAL THERAPIST

## 2021-11-05 NOTE — PROGRESS NOTES
Physical Therapy Daily Treatment Note      Patient: Pretty Tovar   : 1955  Referring practitioner: Francis Tavarez MD  Date of Initial Visit: Type: THERAPY  Noted: 2021  Today's Date: 2021  Patient seen for 26 sessions           Subjective Evaluation    History of Present Illness    Subjective comment: My knee feels better today than yesterday.  My (quad muscle) may have been so sore from where they gave me that shot. I did some exercises and stretches last night and that seemed to make it feel better.       Objective   See Exercise, Manual, and Modality Logs for complete treatment.       Assessment & Plan     Assessment  Assessment details: Patient ambulates with improved quality of gait including active knee flexion during swing phase.  Per visual assessment this date her (L) knee AROM and PROM are gradually improving.  Patient does report taking pain medication prior to PT in order to allow optimal tolerance to PT interventions. Plan to reduce treatment frequency to three times weekly, pending ROM remains steady and/or exhibits gains.           Progress per Plan of Care. Reduce treatment frequency to 3 x wk. Emphasize ROM gains.         Timed:  Manual Therapy:    10     mins  35874;  Therapeutic Exercise:    36     mins  40763;     Neuromuscular Chase:        mins  36656;    Therapeutic Activity:          mins  44979;     Gait Training:           mins  20620;     Ultrasound:          mins  13635;    Untimed:  Electrical Stimulation:         mins  21959 ( );  Mechanical Traction:         mins  02825;     Timed Treatment:   46   mins   Total Treatment:     46   mins    Vicky Patel PT, DPT, OCS  Physical Therapist  [place] License #[id number]

## 2021-11-09 ENCOUNTER — TREATMENT (OUTPATIENT)
Dept: PHYSICAL THERAPY | Facility: CLINIC | Age: 66
End: 2021-11-09

## 2021-11-09 DIAGNOSIS — G89.18 ACUTE POSTOPERATIVE PAIN OF LEFT KNEE: ICD-10-CM

## 2021-11-09 DIAGNOSIS — Z98.890 S/P SURGICAL MANIPULATION OF KNEE JOINT: ICD-10-CM

## 2021-11-09 DIAGNOSIS — M25.562 ACUTE POSTOPERATIVE PAIN OF LEFT KNEE: ICD-10-CM

## 2021-11-09 DIAGNOSIS — Z96.652 S/P TKR (TOTAL KNEE REPLACEMENT), LEFT: Primary | ICD-10-CM

## 2021-11-09 DIAGNOSIS — R26.2 DIFFICULTY WALKING: ICD-10-CM

## 2021-11-09 PROCEDURE — 97140 MANUAL THERAPY 1/> REGIONS: CPT | Performed by: PHYSICAL THERAPIST

## 2021-11-09 PROCEDURE — 97110 THERAPEUTIC EXERCISES: CPT | Performed by: PHYSICAL THERAPIST

## 2021-11-10 ENCOUNTER — TREATMENT (OUTPATIENT)
Dept: PHYSICAL THERAPY | Facility: CLINIC | Age: 66
End: 2021-11-10

## 2021-11-10 DIAGNOSIS — Z98.890 S/P SURGICAL MANIPULATION OF KNEE JOINT: ICD-10-CM

## 2021-11-10 DIAGNOSIS — Z96.652 S/P TKR (TOTAL KNEE REPLACEMENT), LEFT: Primary | ICD-10-CM

## 2021-11-10 DIAGNOSIS — G89.18 ACUTE POSTOPERATIVE PAIN OF LEFT KNEE: ICD-10-CM

## 2021-11-10 DIAGNOSIS — M25.562 ACUTE POSTOPERATIVE PAIN OF LEFT KNEE: ICD-10-CM

## 2021-11-10 DIAGNOSIS — R26.2 DIFFICULTY WALKING: ICD-10-CM

## 2021-11-10 PROCEDURE — 97110 THERAPEUTIC EXERCISES: CPT | Performed by: PHYSICAL THERAPIST

## 2021-11-10 PROCEDURE — 97140 MANUAL THERAPY 1/> REGIONS: CPT | Performed by: PHYSICAL THERAPIST

## 2021-11-10 NOTE — PROGRESS NOTES
Physical Therapy Daily Treatment Note      Patient: Pretty Tovar   : 1955  Referring practitioner: Francis Tavarez MD  Date of Initial Visit: Type: THERAPY  Noted: 2021  Today's Date: 11/10/2021  Patient seen for 28 sessions           Subjective Evaluation    History of Present Illness    Subjective comment: My knee is doing alright.  It feels tight but not as tight as it did before (the manipulation).  I do a lot of stretches for it every day. I know it can take up to a year to get back to normal.       Objective   See Exercise, Manual, and Modality Logs for complete treatment.   *Initiated contract relax techniques for knee flexion ROM    Assessment/Plan      Progress per Plan of Care         Timed:  Manual Therapy:    16     mins  69042;  Therapeutic Exercise:    28     mins  63843;     Neuromuscular Chase:        mins  15924;    Therapeutic Activity:          mins  70154;     Gait Training:           mins  55655;     Ultrasound:          mins  50369;    Untimed:  Electrical Stimulation:         mins  69485 ( );  Mechanical Traction:         mins  83638;     Timed Treatment:   44   mins   Total Treatment:     55   mins    Vicky Patel PT, DPT, OCS  Physical Therapist  [place] License #[id number]

## 2021-11-12 ENCOUNTER — TREATMENT (OUTPATIENT)
Dept: PHYSICAL THERAPY | Facility: CLINIC | Age: 66
End: 2021-11-12

## 2021-11-12 DIAGNOSIS — G89.18 ACUTE POSTOPERATIVE PAIN OF LEFT KNEE: ICD-10-CM

## 2021-11-12 DIAGNOSIS — M25.562 ACUTE POSTOPERATIVE PAIN OF LEFT KNEE: ICD-10-CM

## 2021-11-12 DIAGNOSIS — R26.2 DIFFICULTY WALKING: ICD-10-CM

## 2021-11-12 DIAGNOSIS — Z98.890 S/P SURGICAL MANIPULATION OF KNEE JOINT: ICD-10-CM

## 2021-11-12 DIAGNOSIS — Z96.652 S/P TKR (TOTAL KNEE REPLACEMENT), LEFT: Primary | ICD-10-CM

## 2021-11-12 PROCEDURE — 97140 MANUAL THERAPY 1/> REGIONS: CPT | Performed by: PHYSICAL THERAPIST

## 2021-11-12 PROCEDURE — 97110 THERAPEUTIC EXERCISES: CPT | Performed by: PHYSICAL THERAPIST

## 2021-11-12 NOTE — PROGRESS NOTES
Physical Therapy Daily Treatment Note      Patient: Pretty Tovar   : 1955  Referring practitioner: Francis Tavarez MD  Date of Initial Visit: Type: THERAPY  Noted: 2021  Today's Date: 2021  Patient seen for 29 sessions         Visit Diagnoses:     ICD-10-CM ICD-9-CM   1. S/P TKR (total knee replacement), left  Z96.652 V43.65   2. Acute postoperative pain of left knee  G89.18 719.46    M25.562 338.18   3. Difficulty walking  R26.2 719.7   4. S/P surgical manipulation of knee joint  Z98.890 V45.89         Subjective Evaluation    History of Present Illness    Subjective comment: My knee actually doesn't feel too stiff this morning.       Objective          Passive Range of Motion   Left Knee   Flexion: 112 degrees       See Exercise, Manual, and Modality Logs for complete treatment.       Assessment & Plan     Assessment  Assessment details: Patient ambulates with (L) knee remaining mildly stiff with slightly decreased active knee flexion ROM during swing phase of gait. She exhibits near full active knee extension during stance phase of gait.  Available end range of motion knee flexion is achieved with moderate effort but is not significantly painful.           Progress per Plan of Care. Decrease treatment to twice weekly with an anticipation of 3 additional PT sessions to finalize HEP prior to RTMD.         Timed:  Manual Therapy:    11     mins  36288;  Therapeutic Exercise:    33     mins  69737;     Neuromuscular Chase:        mins  88938;    Therapeutic Activity:          mins  07606;     Gait Training:           mins  31766;     Ultrasound:          mins  32796;    Untimed:  Electrical Stimulation:         mins  88746 ( );  Mechanical Traction:         mins  20670;   Dry Needling              ___  mins   50176    Timed Treatment:   44   mins   Total Treatment:     50   mins    Vicky Patel, PT, DPT, OCS  Physical Therapist  [place] License #[id number]                   June 16, 2020      Michele Roy MD  [address]  [place] [address]           [facility]  [address]  [place] [address]  Phone: [phone]  Fax: [phone]          Patient: Martin Mac   MR Number: [id number]   YOB: 1950   Date of Visit: 6/16/2020       Dear Dr. Michele Roy:    Thank you for referring Martin Mac to me for evaluation. Attached you will find relevant portions of my assessment and plan of care.    If you have questions, please do not hesitate to call me. I look forward to following Martin Mac along with you.    Sincerely,    Velma White PA-C    Enclosure  CC:  No Recipients    If you would like to receive this communication electronically, please contact [email or web address] or [phone] to request more information on [employer] access.    For providers and/or their staff who would like to refer a patient to [facility], please contact us through our one-stop-shop provider referral line, [facility] , at [phone].    If you feel you have received this communication in error or would no longer like to receive these types of communications, please e-mail [email or web address]

## 2021-11-15 ENCOUNTER — TREATMENT (OUTPATIENT)
Dept: PHYSICAL THERAPY | Facility: CLINIC | Age: 66
End: 2021-11-15

## 2021-11-15 DIAGNOSIS — Z98.890 S/P SURGICAL MANIPULATION OF KNEE JOINT: ICD-10-CM

## 2021-11-15 DIAGNOSIS — M25.562 ACUTE POSTOPERATIVE PAIN OF LEFT KNEE: ICD-10-CM

## 2021-11-15 DIAGNOSIS — G89.18 ACUTE POSTOPERATIVE PAIN OF LEFT KNEE: ICD-10-CM

## 2021-11-15 DIAGNOSIS — R26.2 DIFFICULTY WALKING: ICD-10-CM

## 2021-11-15 DIAGNOSIS — Z96.652 S/P TKR (TOTAL KNEE REPLACEMENT), LEFT: Primary | ICD-10-CM

## 2021-11-15 PROCEDURE — 97140 MANUAL THERAPY 1/> REGIONS: CPT | Performed by: PHYSICAL THERAPIST

## 2021-11-15 PROCEDURE — 97110 THERAPEUTIC EXERCISES: CPT | Performed by: PHYSICAL THERAPIST

## 2021-11-15 NOTE — PROGRESS NOTES
Physical Therapy Daily Treatment Note      Patient: Pretty Tovar   : 1955  Referring practitioner: Francis Tavarez MD  Date of Initial Visit: Type: THERAPY  Noted: 2021  Today's Date: 11/15/2021  Patient seen for 30 sessions         Visit Diagnoses:     ICD-10-CM ICD-9-CM   1. S/P TKR (total knee replacement), left  Z96.652 V43.65   2. Acute postoperative pain of left knee  G89.18 719.46    M25.562 338.18   3. Difficulty walking  R26.2 719.7   4. S/P surgical manipulation of knee joint  Z98.890 V45.89         Subjective Evaluation    History of Present Illness    Subjective comment: My knee has been hurting some over the weekend.  Sometimes when I walk I have pain shoot up the outside of my thigh from my knee or the inside of my knee down my lower leg.  I have been stretching it and doing my exercises.  I don't know what's going on with it.  It does feel stronger though.       Objective   See Exercise, Manual, and Modality Logs for complete treatment.       Assessment & Plan     Assessment  Assessment details: Patient exhibits apparent stiffness of (L) knee during ambulation this date and reported pain during knee flexion stretching.  (L) knee stiffness is evident at end available range knee flexion during PROM creating compensatory (L) hip hiking which is only mildly reduced with PT overpressure at (L) iliac crest.  She is limited in full revolutions on her stationary bike at home by progressing (R) knee symptoms.            Progress per Plan of Care. Reassess knee ROM.         Timed:  Manual Therapy:    10     mins  90444;  Therapeutic Exercise:    28     mins  78979;     Neuromuscular Chase:        mins  95908;    Therapeutic Activity:          mins  37732;     Gait Training:           mins  42190;     Ultrasound:          mins  99784;    Untimed:  Electrical Stimulation:         mins  98110 ( );  Mechanical Traction:         mins  67431;   Dry Needling              ___  mins    25650    Timed Treatment:   38   mins   Total Treatment:     43   mins    Vicky Patel PT, DPT, OCS  Physical Therapist  [place] License #[id number]

## 2021-11-18 ENCOUNTER — TREATMENT (OUTPATIENT)
Dept: PHYSICAL THERAPY | Facility: CLINIC | Age: 66
End: 2021-11-18

## 2021-11-18 DIAGNOSIS — R26.2 DIFFICULTY WALKING: ICD-10-CM

## 2021-11-18 DIAGNOSIS — M25.562 ACUTE POSTOPERATIVE PAIN OF LEFT KNEE: ICD-10-CM

## 2021-11-18 DIAGNOSIS — Z96.652 S/P TKR (TOTAL KNEE REPLACEMENT), LEFT: Primary | ICD-10-CM

## 2021-11-18 DIAGNOSIS — G89.18 ACUTE POSTOPERATIVE PAIN OF LEFT KNEE: ICD-10-CM

## 2021-11-18 DIAGNOSIS — Z98.890 S/P SURGICAL MANIPULATION OF KNEE JOINT: ICD-10-CM

## 2021-11-18 PROCEDURE — 97110 THERAPEUTIC EXERCISES: CPT | Performed by: PHYSICAL THERAPIST

## 2021-11-18 PROCEDURE — 97140 MANUAL THERAPY 1/> REGIONS: CPT | Performed by: PHYSICAL THERAPIST

## 2021-11-18 NOTE — PROGRESS NOTES
Physical Therapy Daily Treatment Note      Patient: Pretty Tovar   : 1955  Referring practitioner: Francis Tavarez MD  Date of Initial Visit: Type: THERAPY  Noted: 2021  Today's Date: 2021  Patient seen for 31 sessions         Visit Diagnoses:     ICD-10-CM ICD-9-CM   1. S/P TKR (total knee replacement), left  Z96.652 V43.65   2. Acute postoperative pain of left knee  G89.18 719.46    M25.562 338.18   3. Difficulty walking  R26.2 719.7   4. S/P surgical manipulation of knee joint  Z98.890 V45.89         Subjective Evaluation    History of Present Illness    Subjective comment: My knee feels better and feels like it is moving better than the other day.        Objective          Active Range of Motion   Left Knee   Flexion: 113 degrees     Passive Range of Motion   Left Knee   Flexion: 115 degrees       See Exercise, Manual, and Modality Logs for complete treatment.       Assessment & Plan     Assessment  Assessment details: Patient demonstrates improved (L) knee AROM and PROM flexion this date, achieving greatest knee flexion ROM since surgery.  Reiterated the importance of persistent emphasis on knee extension activities as well.  Patient is less symptomatic (L) knee with flexion ROM activities this date compared to earlier this week.  Anticipate one additional PT session to finalize HEP and reassess patient prior to f/u with MD.          Other - final MD letter.  Anticipate D/C from skilled PT services.         Timed:  Manual Therapy:    12     mins  12171;  Therapeutic Exercise:    30     mins  88000;     Neuromuscular Chase:        mins  68290;    Therapeutic Activity:          mins  44555;     Gait Training:           mins  12042;     Ultrasound:          mins  03930;    Untimed:  Electrical Stimulation:         mins  46863 ( );  Mechanical Traction:         mins  35831;   Dry Needling              ___  mins   82098    Timed Treatment:   42   mins   Total Treatment:     47    marcelino Patel, PT, DPT, OCS  Physical Therapist  [place] License #[id number]

## 2021-11-23 ENCOUNTER — TREATMENT (OUTPATIENT)
Dept: PHYSICAL THERAPY | Facility: CLINIC | Age: 66
End: 2021-11-23

## 2021-11-23 DIAGNOSIS — G89.18 ACUTE POSTOPERATIVE PAIN OF LEFT KNEE: ICD-10-CM

## 2021-11-23 DIAGNOSIS — M25.562 ACUTE POSTOPERATIVE PAIN OF LEFT KNEE: ICD-10-CM

## 2021-11-23 DIAGNOSIS — R26.2 DIFFICULTY WALKING: ICD-10-CM

## 2021-11-23 DIAGNOSIS — Z98.890 S/P SURGICAL MANIPULATION OF KNEE JOINT: ICD-10-CM

## 2021-11-23 DIAGNOSIS — Z96.652 S/P TKR (TOTAL KNEE REPLACEMENT), LEFT: Primary | ICD-10-CM

## 2021-11-23 PROCEDURE — 97110 THERAPEUTIC EXERCISES: CPT | Performed by: PHYSICAL THERAPIST

## 2021-11-23 PROCEDURE — 97140 MANUAL THERAPY 1/> REGIONS: CPT | Performed by: PHYSICAL THERAPIST

## 2021-11-23 PROCEDURE — 97530 THERAPEUTIC ACTIVITIES: CPT | Performed by: PHYSICAL THERAPIST

## 2022-01-05 ENCOUNTER — PROCEDURE VISIT (OUTPATIENT)
Dept: OBSTETRICS AND GYNECOLOGY | Age: 67
End: 2022-01-05

## 2022-01-05 ENCOUNTER — APPOINTMENT (OUTPATIENT)
Dept: WOMENS IMAGING | Facility: HOSPITAL | Age: 67
End: 2022-01-05

## 2022-01-05 DIAGNOSIS — Z12.31 VISIT FOR SCREENING MAMMOGRAM: Primary | ICD-10-CM

## 2022-01-05 PROCEDURE — 77067 SCR MAMMO BI INCL CAD: CPT | Performed by: OBSTETRICS & GYNECOLOGY

## 2022-01-05 PROCEDURE — 77063 BREAST TOMOSYNTHESIS BI: CPT | Performed by: RADIOLOGY

## 2022-01-05 PROCEDURE — 77063 BREAST TOMOSYNTHESIS BI: CPT | Performed by: OBSTETRICS & GYNECOLOGY

## 2022-01-05 PROCEDURE — 77067 SCR MAMMO BI INCL CAD: CPT | Performed by: RADIOLOGY

## 2022-01-06 DIAGNOSIS — E11.3299 MILD NONPROLIFERATIVE DIABETIC RETINOPATHY WITHOUT MACULAR EDEMA ASSOCIATED WITH TYPE 2 DIABETES MELLITUS: ICD-10-CM

## 2022-01-06 RX ORDER — LIRAGLUTIDE 6 MG/ML
1.2 INJECTION SUBCUTANEOUS DAILY
Qty: 3 ML | Refills: 1 | Status: SHIPPED | OUTPATIENT
Start: 2022-01-06 | End: 2022-04-11 | Stop reason: SDUPTHER

## 2022-03-11 ENCOUNTER — DOCUMENTATION (OUTPATIENT)
Dept: PHYSICAL THERAPY | Facility: CLINIC | Age: 67
End: 2022-03-11

## 2022-03-11 DIAGNOSIS — Z96.652 S/P TKR (TOTAL KNEE REPLACEMENT), LEFT: Primary | ICD-10-CM

## 2022-03-11 DIAGNOSIS — M25.562 ACUTE POSTOPERATIVE PAIN OF LEFT KNEE: ICD-10-CM

## 2022-03-11 DIAGNOSIS — Z98.890 S/P SURGICAL MANIPULATION OF KNEE JOINT: ICD-10-CM

## 2022-03-11 DIAGNOSIS — R26.2 DIFFICULTY WALKING: ICD-10-CM

## 2022-03-11 DIAGNOSIS — G89.18 ACUTE POSTOPERATIVE PAIN OF LEFT KNEE: ICD-10-CM

## 2022-03-11 NOTE — PROGRESS NOTES
Discharge Summary  Discharge Summary from Physical Therapy Report    Patient Information  Pretty Tovar  1955    Dates  PT visit: 08/23/2021 - 11/23/2021  Number of Visits: 32     Discharge Status of Patient: See final Note dated 11/23/2021    Goals: Partially Met    Visit Diagnoses:    ICD-10-CM ICD-9-CM   1. S/P TKR (total knee replacement), left  Z96.652 V43.65   2. Acute postoperative pain of left knee  G89.18 719.46    M25.562 338.18   3. Difficulty walking  R26.2 719.7   4. S/P surgical manipulation of knee joint  Z98.890 V45.89       Discharge Plan: Continue with current home exercise program as instructed    Date of Discharge 03/11/2022        Vicky Patel, PT, DPT, OCS  Physical Therapist

## 2022-03-17 ENCOUNTER — OFFICE VISIT (OUTPATIENT)
Dept: INTERNAL MEDICINE | Facility: CLINIC | Age: 67
End: 2022-03-17

## 2022-03-17 VITALS
HEIGHT: 60 IN | WEIGHT: 139 LBS | OXYGEN SATURATION: 99 % | DIASTOLIC BLOOD PRESSURE: 76 MMHG | BODY MASS INDEX: 27.29 KG/M2 | HEART RATE: 88 BPM | TEMPERATURE: 98 F | SYSTOLIC BLOOD PRESSURE: 122 MMHG

## 2022-03-17 DIAGNOSIS — Z00.00 HEALTH CARE MAINTENANCE: Chronic | ICD-10-CM

## 2022-03-17 DIAGNOSIS — E78.00 PURE HYPERCHOLESTEROLEMIA: Chronic | ICD-10-CM

## 2022-03-17 DIAGNOSIS — Z00.00 MEDICARE ANNUAL WELLNESS VISIT, SUBSEQUENT: Primary | ICD-10-CM

## 2022-03-17 DIAGNOSIS — E11.3293 TYPE 2 DIABETES MELLITUS WITH BOTH EYES AFFECTED BY MILD NONPROLIFERATIVE RETINOPATHY WITHOUT MACULAR EDEMA, WITHOUT LONG-TERM CURRENT USE OF INSULIN: Chronic | ICD-10-CM

## 2022-03-17 DIAGNOSIS — I10 BENIGN ESSENTIAL HYPERTENSION: Chronic | ICD-10-CM

## 2022-03-17 DIAGNOSIS — E61.1 IRON DEFICIENCY: Chronic | ICD-10-CM

## 2022-03-17 DIAGNOSIS — E06.3 HASHIMOTO'S THYROIDITIS: Chronic | ICD-10-CM

## 2022-03-17 PROCEDURE — 1159F MED LIST DOCD IN RCRD: CPT | Performed by: NURSE PRACTITIONER

## 2022-03-17 PROCEDURE — G0439 PPPS, SUBSEQ VISIT: HCPCS | Performed by: NURSE PRACTITIONER

## 2022-03-17 PROCEDURE — 1170F FXNL STATUS ASSESSED: CPT | Performed by: NURSE PRACTITIONER

## 2022-03-17 PROCEDURE — 99214 OFFICE O/P EST MOD 30 MIN: CPT | Performed by: NURSE PRACTITIONER

## 2022-03-17 RX ORDER — GOLIMUMAB 100 MG/ML
INJECTION, SOLUTION SUBCUTANEOUS
COMMUNITY

## 2022-03-17 NOTE — ASSESSMENT & PLAN NOTE
Regular flu shots are advised along with COVID-19 vaccines.    Regular exercise, goal of 30 minutes/day 5 days/week along with well-balanced diet.

## 2022-03-17 NOTE — PATIENT INSTRUCTIONS
Medicare Wellness  Personal Prevention Plan of Service     Date of Office Visit:    Encounter Provider:  CLEVELAND Bryant  Place of Service:  [facility]  Patient Name: Pretty Tovar  :  1955    As part of the Medicare Wellness portion of your visit today, we are providing you with this personalized preventive plan of services (PPPS). This plan is based upon recommendations of the [place] Preventive Services Task Force (USPSTF) and the Advisory Committee on Immunization Practices (ACIP).    This lists the preventive care services that should be considered, and provides dates of when you are due. Items listed as completed are up-to-date and do not require any further intervention.    Health Maintenance   Topic Date Due    COVID-19 Vaccine (1) Never done    DIABETIC FOOT EXAM  2021    INFLUENZA VACCINE  2021    Pneumococcal Vaccine 65+ (2 of 2 - PPSV23) 2022    HEMOGLOBIN A1C  2022    LIPID PANEL  09/15/2022    URINE MICROALBUMIN  09/15/2022    DIABETIC EYE EXAM  10/11/2022    MAMMOGRAM  2023    ANNUAL WELLNESS VISIT  2023    DXA SCAN  2023    COLORECTAL CANCER SCREENING  2028    TDAP/TD VACCINES (3 - Td or Tdap) 2028    HEPATITIS C SCREENING  Completed    ZOSTER VACCINE  Completed    PAP SMEAR  Discontinued       Orders Placed This Encounter   Procedures    CBC No Differential     Order Specific Question:   Release to patient     Answer:   Immediate    Comprehensive Metabolic Panel    Lipid Panel    TSH     Order Specific Question:   Release to patient     Answer:   Immediate    T4, Free     Order Specific Question:   Release to patient     Answer:   Immediate    Hemoglobin A1c     Order Specific Question:   Release to patient     Answer:   Immediate    Iron Profile     Order Specific Question:   Release to patient     Answer:   Immediate    Ferritin     Order Specific Question:   Release to patient      Answer:   Immediate       Return in about 6 months (around 9/17/2022).

## 2022-03-17 NOTE — ASSESSMENT & PLAN NOTE
Hypertension is well controlled, continue lisinopril-HCTZ 20-25 mg daily and lisinopril 20 mg daily.  Regular home monitoring for goal of less than 140/80 along with DASH diet.

## 2022-03-17 NOTE — ASSESSMENT & PLAN NOTE
Continue ferrous sulfate 325 mg daily.  CBC, iron profile and ferritin today and we will adjust therapy if needed.

## 2022-03-17 NOTE — PROGRESS NOTES
The ABCs of the Annual Wellness Visit  Subsequent Medicare Wellness Visit    Chief Complaint   Patient presents with   • Medicare Wellness-subsequent      Subjective    History of Present Illness:  Pretty Tovar is a 66 y.o. female who presents for a Subsequent Medicare Wellness Visit and follow-up on chronic conditions.    She has type 2 diabetes currently taking Victoza 1.2 mg daily and Metformin 1500 mg in the morning.  Reports good compliance with this regimen and last A1c was well controlled in October 2021 at 6.06.    She has hypertension which has been stable with lisinopril-HCTZ 20-25 mg daily and an additional lisinopril 20 mg daily.    She has hyperlipidemia endorsing good compliance with Crestor 20 mg daily.    She has a history of Hashimoto's thyroiditis, subsequent hypothyroidism and takes levothyroxine 88 mcg daily with good compliance.    She has iron deficiency, currently supplementing with ferrous sulfate 325 mg daily long-term.  Reports good compliance with this regimen.    Overall reports that she has been doing very well and denies development of any other new issues today.    The following portions of the patient's history were reviewed and   updated as appropriate: allergies, current medications, past family history, past medical history, past social history, past surgical history and problem list.    Compared to one year ago, the patient feels her physical   health is better.    Compared to one year ago, the patient feels her mental   health is the same.    Recent Hospitalizations:  She was admitted to [facility] to knee surgery November 2021.      Current Medical Providers:  Patient Care Team:  Paris Singer APRN as PCP - General (Nurse Practitioner)  Alexandru Elaine MD as Referring Physician (Dermatology)  Vinay Ansari OD (Optometry)  Zachary Sibley MD as Consulting Physician (Obstetrics and Gynecology)  Karen Allen MD as Consulting Physician (Urogynecology)  Alba Anne MD as  Consulting Physician (Cardiology)  Kristina Wylie MD as Consulting Physician (Rheumatology)    Outpatient Medications Prior to Visit   Medication Sig Dispense Refill   • BD PEN NEEDLE SEUN U/F 32G X 4 MM misc USE 1 PEN NEEDLE WITH EACH  VICTOZA INJECTION 100 each 1   • Biotin 35887 MCG tablet dispersible Place 10,000 mg on the tongue Daily.     • Cinnamon 500 MG tablet Take 1,000 mg by mouth 2 (two) times a day. PT HOLDING FOR SURGERY     • clobetasol (TEMOVATE) 0.05 % external solution Apply 1 application topically to the appropriate area as directed As Needed.     • Coenzyme Q10 (CO Q 10 PO) Take 1 capsule by mouth Daily. PT HOLDING FOR SURGERY     • docusate sodium 100 MG capsule Take 1 capsule by mouth 2 (Two) Times a Day As Needed for Constipation. (Patient taking differently: Take 100 mg by mouth Daily.) 30 capsule 1   • ENSTILAR 0.005-0.064 % foam Apply  topically to the appropriate area as directed As Needed.  4   • ferrous sulfate 324 (65 Fe) MG tablet delayed-release EC tablet Take 324 mg by mouth Daily With Breakfast.     • folic acid (FOLVITE) 400 MCG tablet Take 400 mcg by mouth Daily.     • Golimumab (Simponi) 100 MG/ML solution auto-injector Inject  under the skin into the appropriate area as directed.     • levothyroxine (SYNTHROID, LEVOTHROID) 88 MCG tablet TAKE ONE TABLET BY MOUTH DAILY (Patient taking differently: Take 88 mcg by mouth Every Morning.) 90 tablet 1   • lisinopril (PRINIVIL,ZESTRIL) 20 MG tablet Take 1 tablet by mouth Daily. 90 tablet 3   • lisinopril-hydrochlorothiazide (PRINZIDE,ZESTORETIC) 20-25 MG per tablet Take 1 tablet by mouth Daily. 90 tablet 3   • Magnesium 500 MG capsule Take 500 mg by mouth Daily.     • metFORMIN (GLUCOPHAGE) 500 MG tablet Take 3 pills in am and 2 in pm (Patient taking differently: Take 1,500 mg by mouth Daily With Breakfast.) 450 tablet 3   • metFORMIN (GLUCOPHAGE) 500 MG tablet Take 1,000 mg by mouth Every Night.     • multivitamin with minerals  tablet tablet Take 1 tablet by mouth Daily. PT HOLDING FOR SURGERY     • Omega-3 Fatty Acids (fish oil) 1200 MG capsule capsule Take 1,200 mg by mouth Daily. PT HOLDING FOR SURGERY     • ONETOUCH VERIO test strip USE TO CHECK BLOOD SUGARS ONCE DAILY 100 each 4   • Rosuvastatin Calcium 20 MG capsule sprinkle Take 20 mg by mouth Daily. (Patient taking differently: Take 20 mg by mouth Every Night.) 90 capsule 0   • TACLONEX 0.005-0.064 % external suspension Apply 1 application topically to the appropriate area as directed As Needed.  5   • Victoza 18 MG/3ML solution pen-injector injection INJECT 1.2 MG UNDER THE SKIN INTO THE APPROPRIATE AREA AS DIRECTED DAILY. 3 mL 1   • vitamin C (ASCORBIC ACID) 250 MG tablet Take 250 mg by mouth Daily.     • vitamin D3 125 MCG (5000 UT) capsule capsule Take 5,000 Units by mouth Daily.     • VITAMIN E PO Take 1 capsule by mouth Daily. HOLD PRIOR TO SURG     • oxyCODONE-acetaminophen (PERCOCET) 5-325 MG per tablet Take 1-2 tablets by mouth Every 4 (Four) Hours As Needed (pain). 40 tablet 0     No facility-administered medications prior to visit.       No opioid medication identified on active medication list. I have reviewed chart for other potential  high risk medication/s and harmful drug interactions in the elderly.          Aspirin is not on active medication list.  Aspirin use is not indicated based on review of current medical condition/s. Risk of harm outweighs potential benefits.  .    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 (HCC)   • Pelvic relaxation due to vaginal prolapse   • Hyperplastic rectal polyp   • Postmenopausal bleeding   •  "Uterovaginal prolapse, incomplete   • Cystocele, midline   • Cystocele, lateral   • Cystocele, lateral   • Vaginal enterocele   • Rectocele   • Loss of perineal body, female   • Female stress incontinence   • Feeling of incomplete bladder emptying   • Incompetence of pubocervical tissue   • Incompetence of rectovaginal tissue   • Primary osteoarthritis of left knee   • S/P TKR (total knee replacement), left   • Iron deficiency     Advance Care Planning  Advance Directive is not on file.  ACP discussion was held with the patient during this visit. Patient does not have an advance directive, information provided.    Review of Systems   All other systems reviewed and are negative.       Objective    Vitals:    03/17/22 0813   BP: 122/76   BP Location: Left arm   Patient Position: Sitting   Cuff Size: Adult   Pulse: 88   Temp: 98 °F (36.7 °C)   SpO2: 99%   Weight: 63 kg (139 lb)   Height: 152.4 cm (60\")     BMI Readings from Last 1 Encounters:   03/17/22 27.15 kg/m²   BMI is above normal parameters. Recommendations include: educational material    Does the patient have evidence of cognitive impairment? No    Physical Exam  Vitals and nursing note reviewed.   Constitutional:       General: She is not in acute distress.     Appearance: Normal appearance. She is well-developed. She is not ill-appearing, toxic-appearing or diaphoretic.   HENT:      Head: Normocephalic and atraumatic.      Right Ear: Tympanic membrane, ear canal and external ear normal.      Left Ear: Tympanic membrane, ear canal and external ear normal.   Eyes:      Pupils: Pupils are equal, round, and reactive to light.   Neck:      Vascular: No carotid bruit.   Cardiovascular:      Rate and Rhythm: Normal rate and regular rhythm.      Pulses: Normal pulses.      Heart sounds: Normal heart sounds.      Comments: No peripheral edema  Pulmonary:      Effort: Pulmonary effort is normal. No respiratory distress.      Breath sounds: Normal breath sounds. No " stridor. No wheezing, rhonchi or rales.   Chest:      Chest wall: No tenderness.   Abdominal:      General: Bowel sounds are normal. There is no distension.      Palpations: Abdomen is soft. There is no mass.      Tenderness: There is no abdominal tenderness. There is no right CVA tenderness, left CVA tenderness, guarding or rebound.      Hernia: No hernia is present.   Musculoskeletal:         General: Normal range of motion.      Cervical back: Normal range of motion and neck supple. No rigidity or tenderness.   Lymphadenopathy:      Cervical: No cervical adenopathy.   Skin:     General: Skin is warm and dry.      Capillary Refill: Capillary refill takes less than 2 seconds.   Neurological:      General: No focal deficit present.      Mental Status: She is alert and oriented to person, place, and time. Mental status is at baseline.   Psychiatric:         Mood and Affect: Mood normal.         Behavior: Behavior normal.         Thought Content: Thought content normal.         Judgment: Judgment normal.            Current outpatient and discharge medications have been reconciled for the patient.  Reviewed by: CLEVELAND Bryant    Lab Results   Component Value Date    GLUCOSE 98 10/29/2021    BUN 10 10/29/2021    CREATININE 0.53 (L) 10/29/2021    EGFRIFNONA 116 10/29/2021    EGFRIFAFRI >150 09/15/2021    BCR 18.9 10/29/2021    K 5.2 10/29/2021    CO2 28.3 10/29/2021    CALCIUM 10.2 10/29/2021    PROTENTOTREF 7.6 09/15/2021    ALBUMIN 4.70 10/29/2021    LABIL2 1.7 09/15/2021    AST 20 10/29/2021    ALT 16 10/29/2021           HEALTH RISK ASSESSMENT    Smoking Status:  Social History     Tobacco Use   Smoking Status Never Smoker   Smokeless Tobacco Never Used   Tobacco Comment    daily caffine     Alcohol Consumption:  Social History     Substance and Sexual Activity   Alcohol Use Never     Fall Risk Screen:    STEADI Fall Risk Assessment was completed, and patient is at LOW risk for falls.Assessment completed  on:3/17/2022    Depression Screening:  PHQ-2/PHQ-9 Depression Screening 3/17/2022   Retired Total Score -   Little Interest or Pleasure in Doing Things 0-->not at all   Feeling Down, Depressed or Hopeless 0-->not at all   PHQ-9: Brief Depression Severity Measure Score 0       Health Habits and Functional and Cognitive Screening:  Functional & Cognitive Status 3/17/2022   Do you have difficulty preparing food and eating? No   Do you have difficulty bathing yourself, getting dressed or grooming yourself? No   Do you have difficulty using the toilet? No   Do you have difficulty moving around from place to place? No   Do you have trouble with steps or getting out of a bed or a chair? No   Current Diet Well Balanced Diet   Dental Exam Up to date   Eye Exam Up to date   Exercise (times per week) 2 times per week   Current Exercises Include Other   Do you need help using the phone?  No   Are you deaf or do you have serious difficulty hearing?  No   Do you need help with transportation? No   Do you need help shopping? No   Do you need help preparing meals?  No   Do you need help with housework?  No   Do you need help with laundry? No   Do you need help taking your medications? No   Do you need help managing money? No   Do you ever drive or ride in a car without wearing a seat belt? No   Have you felt unusual stress, anger or loneliness in the last month? No   Who do you live with? Spouse   If you need help, do you have trouble finding someone available to you? No   Have you been bothered in the last four weeks by sexual problems? No   Do you have difficulty concentrating, remembering or making decisions? No       Age-appropriate Screening Schedule:  Refer to the list below for future screening recommendations based on patient's age, sex and/or medical conditions. Orders for these recommended tests are listed in the plan section. The patient has been provided with a written plan.    Health Maintenance   Topic Date Due   •  DIABETIC FOOT EXAM  06/09/2021   • INFLUENZA VACCINE  08/01/2021   • HEMOGLOBIN A1C  04/29/2022   • LIPID PANEL  09/15/2022   • URINE MICROALBUMIN  09/15/2022   • DIABETIC EYE EXAM  10/11/2022   • MAMMOGRAM  01/05/2023   • DXA SCAN  08/28/2023   • TDAP/TD VACCINES (3 - Td or Tdap) 04/12/2028   • ZOSTER VACCINE  Completed   • PAP SMEAR  Discontinued              Assessment/Plan   CMS Preventative Services Quick Reference  Risk Factors Identified During Encounter  None Identified  The above risks/problems have been discussed with the patient.  Follow up actions/plans if indicated are seen below in the Assessment/Plan Section.  Pertinent information has been shared with the patient in the After Visit Summary.    Diagnoses and all orders for this visit:    1. Medicare annual wellness visit, subsequent (Primary)    2. Pure hypercholesterolemia  Assessment & Plan:  Continue Crestor.  Lipid panel today and we will adjust therapy if needed.    Orders:  -     Lipid Panel    3. Benign essential hypertension  Assessment & Plan:  Hypertension is well controlled, continue lisinopril-HCTZ 20-25 mg daily and lisinopril 20 mg daily.  Regular home monitoring for goal of less than 140/80 along with DASH diet.    Orders:  -     Comprehensive Metabolic Panel    4. Type 2 diabetes mellitus with both eyes affected by mild nonproliferative retinopathy without macular edema, without long-term current use of insulin (HCC)  Assessment & Plan:  Continue Victoza, Metformin.  Repeat A1c today and we will adjust therapy if needed.    Orders:  -     Hemoglobin A1c    5. Hashimoto's thyroiditis  Assessment & Plan:  Continue levothyroxine 88 mcg daily.  TSH, free T4 today and we will adjust therapy if needed.    Orders:  -     TSH  -     T4, Free    6. Health care maintenance  Assessment & Plan:  Regular flu shots are advised along with COVID-19 vaccines.    Regular exercise, goal of 30 minutes/day 5 days/week along with well-balanced diet.      7.  Iron deficiency  Assessment & Plan:  Continue ferrous sulfate 325 mg daily.  CBC, iron profile and ferritin today and we will adjust therapy if needed.    Orders:  -     CBC No Differential  -     Iron Profile  -     Ferritin      Follow Up:   Return in about 6 months (around 9/17/2022).     We will contact patient with lab results and any further recommendations.  Follow-up as needed and I will see her back in 6 months for recheck of chronic conditions.    An After Visit Summary and PPPS were made available to the patient.

## 2022-03-18 LAB
ALBUMIN SERPL-MCNC: 4.3 G/DL (ref 3.8–4.8)
ALBUMIN/GLOB SERPL: 1.5 {RATIO} (ref 1.2–2.2)
ALP SERPL-CCNC: 61 IU/L (ref 44–121)
ALT SERPL-CCNC: 14 IU/L (ref 0–32)
AST SERPL-CCNC: 17 IU/L (ref 0–40)
BILIRUB SERPL-MCNC: 0.3 MG/DL (ref 0–1.2)
BUN SERPL-MCNC: 12 MG/DL (ref 8–27)
BUN/CREAT SERPL: 27 (ref 12–28)
CALCIUM SERPL-MCNC: 10 MG/DL (ref 8.7–10.3)
CHLORIDE SERPL-SCNC: 100 MMOL/L (ref 96–106)
CHOLEST SERPL-MCNC: 185 MG/DL (ref 100–199)
CO2 SERPL-SCNC: 28 MMOL/L (ref 20–29)
CREAT SERPL-MCNC: 0.44 MG/DL (ref 0.57–1)
EGFRCR SERPLBLD CKD-EPI 2021: 107 ML/MIN/1.73
ERYTHROCYTE [DISTWIDTH] IN BLOOD BY AUTOMATED COUNT: 12.5 % (ref 11.7–15.4)
FERRITIN SERPL-MCNC: 41 NG/ML (ref 15–150)
GLOBULIN SER CALC-MCNC: 2.9 G/DL (ref 1.5–4.5)
GLUCOSE SERPL-MCNC: 100 MG/DL (ref 65–99)
HBA1C MFR BLD: 6 % (ref 4.8–5.6)
HCT VFR BLD AUTO: 39.7 % (ref 34–46.6)
HDLC SERPL-MCNC: 62 MG/DL
HGB BLD-MCNC: 13 G/DL (ref 11.1–15.9)
IRON SATN MFR SERPL: 34 % (ref 15–55)
IRON SERPL-MCNC: 129 UG/DL (ref 27–139)
LDLC SERPL CALC-MCNC: 91 MG/DL (ref 0–99)
MCH RBC QN AUTO: 29.2 PG (ref 26.6–33)
MCHC RBC AUTO-ENTMCNC: 32.7 G/DL (ref 31.5–35.7)
MCV RBC AUTO: 89 FL (ref 79–97)
PLATELET # BLD AUTO: 302 X10E3/UL (ref 150–450)
POTASSIUM SERPL-SCNC: 5 MMOL/L (ref 3.5–5.2)
PROT SERPL-MCNC: 7.2 G/DL (ref 6–8.5)
RBC # BLD AUTO: 4.45 X10E6/UL (ref 3.77–5.28)
SODIUM SERPL-SCNC: 143 MMOL/L (ref 134–144)
T4 FREE SERPL-MCNC: 1.42 NG/DL (ref 0.82–1.77)
TIBC SERPL-MCNC: 380 UG/DL (ref 250–450)
TRIGL SERPL-MCNC: 191 MG/DL (ref 0–149)
TSH SERPL DL<=0.005 MIU/L-ACNC: 1.72 UIU/ML (ref 0.45–4.5)
UIBC SERPL-MCNC: 251 UG/DL (ref 118–369)
VLDLC SERPL CALC-MCNC: 32 MG/DL (ref 5–40)
WBC # BLD AUTO: 4.9 X10E3/UL (ref 3.4–10.8)

## 2022-03-18 NOTE — PROGRESS NOTES
Good afternoon Ms. Tovar, lab work is back.  Blood count looks good, no anemia, normal white blood cells and platelets.  Kidney function, liver enzymes and electrolytes are stable.  Triglycerides are mildly elevated, watch intake of fats, carbs and sugars in the diet.  Total and LDL cholesterol are both stable.  Thyroid numbers are stable.  A1c is stable at 6.0.  Your iron labs have improved.  Please continue with current iron supplement and other medications.  Let me know if you have any questions and have a great day,    CLEVELAND Bryant

## 2022-04-11 DIAGNOSIS — E11.3299 MILD NONPROLIFERATIVE DIABETIC RETINOPATHY WITHOUT MACULAR EDEMA ASSOCIATED WITH TYPE 2 DIABETES MELLITUS: ICD-10-CM

## 2022-04-11 DIAGNOSIS — E06.3 HASHIMOTO'S THYROIDITIS: ICD-10-CM

## 2022-04-11 RX ORDER — LEVOTHYROXINE SODIUM 88 UG/1
TABLET ORAL
Qty: 90 TABLET | Refills: 3 | Status: SHIPPED | OUTPATIENT
Start: 2022-04-11

## 2022-04-13 RX ORDER — LIRAGLUTIDE 6 MG/ML
1.2 INJECTION SUBCUTANEOUS DAILY
Qty: 3 ML | Refills: 1 | Status: SHIPPED | OUTPATIENT
Start: 2022-04-13 | End: 2022-06-13

## 2022-06-11 DIAGNOSIS — E11.3299 MILD NONPROLIFERATIVE DIABETIC RETINOPATHY WITHOUT MACULAR EDEMA ASSOCIATED WITH TYPE 2 DIABETES MELLITUS: ICD-10-CM

## 2022-06-13 RX ORDER — LIRAGLUTIDE 6 MG/ML
1.2 INJECTION SUBCUTANEOUS DAILY
Qty: 6 PEN | Refills: 1 | Status: SHIPPED | OUTPATIENT
Start: 2022-06-13

## 2022-07-12 DIAGNOSIS — I10 BENIGN ESSENTIAL HYPERTENSION: ICD-10-CM

## 2022-07-12 RX ORDER — LISINOPRIL 20 MG/1
TABLET ORAL
Qty: 90 TABLET | Refills: 1 | Status: SHIPPED | OUTPATIENT
Start: 2022-07-12 | End: 2023-01-20 | Stop reason: SDUPTHER

## 2022-07-12 RX ORDER — LISINOPRIL AND HYDROCHLOROTHIAZIDE 25; 20 MG/1; MG/1
TABLET ORAL
Qty: 90 TABLET | Refills: 1 | Status: SHIPPED | OUTPATIENT
Start: 2022-07-12 | End: 2023-01-20 | Stop reason: SDUPTHER

## 2022-09-23 ENCOUNTER — OFFICE VISIT (OUTPATIENT)
Dept: INTERNAL MEDICINE | Facility: CLINIC | Age: 67
End: 2022-09-23

## 2022-09-23 VITALS
WEIGHT: 149 LBS | HEART RATE: 70 BPM | TEMPERATURE: 98 F | HEIGHT: 60 IN | SYSTOLIC BLOOD PRESSURE: 144 MMHG | DIASTOLIC BLOOD PRESSURE: 80 MMHG | BODY MASS INDEX: 29.25 KG/M2 | OXYGEN SATURATION: 99 %

## 2022-09-23 DIAGNOSIS — Z00.00 HEALTH CARE MAINTENANCE: Chronic | ICD-10-CM

## 2022-09-23 DIAGNOSIS — E06.3 HASHIMOTO'S THYROIDITIS: Chronic | ICD-10-CM

## 2022-09-23 DIAGNOSIS — I10 BENIGN ESSENTIAL HYPERTENSION: Chronic | ICD-10-CM

## 2022-09-23 DIAGNOSIS — E61.1 IRON DEFICIENCY: Chronic | ICD-10-CM

## 2022-09-23 DIAGNOSIS — E78.00 PURE HYPERCHOLESTEROLEMIA: Primary | Chronic | ICD-10-CM

## 2022-09-23 DIAGNOSIS — E11.3293 TYPE 2 DIABETES MELLITUS WITH BOTH EYES AFFECTED BY MILD NONPROLIFERATIVE RETINOPATHY WITHOUT MACULAR EDEMA, WITHOUT LONG-TERM CURRENT USE OF INSULIN: Chronic | ICD-10-CM

## 2022-09-23 PROCEDURE — 99214 OFFICE O/P EST MOD 30 MIN: CPT | Performed by: NURSE PRACTITIONER

## 2022-09-23 RX ORDER — MELOXICAM 15 MG/1
TABLET ORAL
COMMUNITY
Start: 2022-09-03

## 2022-09-23 NOTE — PROGRESS NOTES
"Chief Complaint   hypercholesterolemia, Hypertension (Benign ), and Diabetes    Subjective        Pretty Tovar presents to [facility]  History of Present Illness  Patient presents for 6-month follow-up on chronic conditions.  This is a 66-year-old female.    She has type 2 diabetes currently taking metformin 1500 mg in the morning and 1000 mg in the afternoon.  Victoza 1.2 mg daily.  Last A1c on 3/17/2022 was well controlled at 6.0.    History of iron deficiency and she takes ferrous sulfate 325 mg once daily.    History of Hashimoto's thyroiditis and she takes levothyroxine 88 mcg daily.    She has hypertension, well controlled with regimen of lisinopril 20 mg daily and lisinopril-HCTZ 20-25 mg daily.    She has hyperlipidemia endorsing good compliance with rosuvastatin milligrams daily.    Overall reports that she is doing well and denies development of other new issues today.      Objective   Vital Signs:  /80 (BP Location: Left arm, Patient Position: Sitting, Cuff Size: Adult)   Pulse 70   Temp 98 °F (36.7 °C) (Infrared)   Ht 152.4 cm (60\")   Wt 67.6 kg (149 lb)   SpO2 99%   BMI 29.10 kg/m²   Estimated body mass index is 29.1 kg/m² as calculated from the following:    Height as of this encounter: 152.4 cm (60\").    Weight as of this encounter: 67.6 kg (149 lb).          Physical Exam  Vitals and nursing note reviewed.   Constitutional:       General: She is not in acute distress.     Appearance: Normal appearance. She is well-developed. She is not ill-appearing, toxic-appearing or diaphoretic.   HENT:      Head: Normocephalic and atraumatic.      Right Ear: Tympanic membrane, ear canal and external ear normal.      Left Ear: Tympanic membrane, ear canal and external ear normal.   Eyes:      Pupils: Pupils are equal, round, and reactive to light.   Cardiovascular:      Rate and Rhythm: Normal rate and regular rhythm.      Pulses: Normal pulses.      Heart sounds: " Normal heart sounds.      Comments: No peripheral edema  Pulmonary:      Effort: Pulmonary effort is normal. No respiratory distress.      Breath sounds: Normal breath sounds. No stridor. No wheezing, rhonchi or rales.   Chest:      Chest wall: No tenderness.   Abdominal:      General: Bowel sounds are normal. There is no distension.      Palpations: Abdomen is soft. There is no mass.      Tenderness: There is no abdominal tenderness. There is no right CVA tenderness, left CVA tenderness, guarding or rebound.      Hernia: No hernia is present.   Musculoskeletal:         General: Normal range of motion.      Cervical back: Normal range of motion and neck supple.   Skin:     General: Skin is warm and dry.      Capillary Refill: Capillary refill takes less than 2 seconds.   Neurological:      General: No focal deficit present.      Mental Status: She is alert and oriented to person, place, and time. Mental status is at baseline.   Psychiatric:         Mood and Affect: Mood normal.         Behavior: Behavior normal.         Thought Content: Thought content normal.         Judgment: Judgment normal.        Result Review :  The following data was reviewed by: CLEVELAND Bryant on 09/23/2022:  Common labs    Common Labs 10/29/21 10/29/21 10/29/21 3/17/22 3/17/22 3/17/22 3/17/22    1019 1019 1019 0843 0843 0843 0843   Glucose   98  100 (A)     BUN   10  12     Creatinine   0.53 (A)  0.44 (A)     eGFR Non African Am   116       Sodium   139  143     Potassium   5.2  5.0     Chloride   100  100     Calcium   10.2  10.0     Total Protein     7.2     Albumin   4.70  4.3     Total Bilirubin   0.2  0.3     Alkaline Phosphatase   53  61     AST (SGOT)   20  17     ALT (SGPT)   16  14     WBC 4.48   4.9      Hemoglobin 13.1   13.0      Hematocrit 42.9   39.7      Platelets 308   302      Total Cholesterol      185    Triglycerides      191 (A)    HDL Cholesterol      62    LDL Cholesterol       91    Hemoglobin A1C  6.06 (A)      6.0 (A)   (A) Abnormal value       Comments are available for some flowsheets but are not being displayed.           Current outpatient and discharge medications have been reconciled for the patient.  Reviewed by: CLEVELAND Bryant           Assessment and Plan   Diagnoses and all orders for this visit:    1. Pure hypercholesterolemia (Primary)  Assessment & Plan:  Continue rosuvastatin.  Lipid panel today and we will adjust therapy if needed.    Orders:  -     Lipid panel    2. Benign essential hypertension  Assessment & Plan:  Hypertension is stable, continue lisinopril-HCTZ, routine home monitoring for goal of less than 140/80 and DASH diet.    Orders:  -     Comprehensive metabolic panel    3. Hashimoto's thyroiditis  Assessment & Plan:  Continue levothyroxine 88 mcg daily.  TSH, free T4 today and we will adjust therapy if needed.    Orders:  -     TSH  -     T4, Free    4. Type 2 diabetes mellitus with both eyes affected by mild nonproliferative retinopathy without macular edema, without long-term current use of insulin (HCC)  Assessment & Plan:  Continue Victoza and metformin.  A1c, microalbumin today and we will adjust therapy if needed.    Orders:  -     Hemoglobin A1c  -     MicroAlbumin, Urine, Random - Urine, Clean Catch  -     glucose blood (OneTouch Verio) test strip; Check blood glucose one daily.  Dispense: 90 each; Refill: 3    5. Health care maintenance  Assessment & Plan:  We discussed the need for Prevnar 20.  She would like to go over the written education first and she will let me know if she would like to pursue this.      6. Iron deficiency  Assessment & Plan:  Continue ferrous sulfate once daily.  CBC, ferritin and iron profile today and we will adjust therapy if needed.    Orders:  -     CBC No Differential  -     Iron and TIBC  -     Ferritin         We will contact patient with lab results and any further recommendations.  Follow-up as needed and I will see her back in 6 months for a  Medicare wellness visit.    Follow Up   Return in about 6 months (around 3/23/2023) for Medicare Wellness.  Patient was given instructions and counseling regarding her condition or for health maintenance advice. Please see specific information pulled into the AVS if appropriate.

## 2022-09-23 NOTE — ASSESSMENT & PLAN NOTE
We discussed the need for Prevnar 20.  She would like to go over the written education first and she will let me know if she would like to pursue this.

## 2022-09-23 NOTE — ASSESSMENT & PLAN NOTE
Continue ferrous sulfate once daily.  CBC, ferritin and iron profile today and we will adjust therapy if needed.

## 2022-09-24 LAB
ALBUMIN SERPL-MCNC: 4.7 G/DL (ref 3.5–5.2)
ALBUMIN/GLOB SERPL: 2 G/DL
ALP SERPL-CCNC: 58 U/L (ref 39–117)
ALT SERPL-CCNC: 21 U/L (ref 1–33)
AST SERPL-CCNC: 20 U/L (ref 1–32)
BILIRUB SERPL-MCNC: 0.3 MG/DL (ref 0–1.2)
BUN SERPL-MCNC: 14 MG/DL (ref 8–23)
BUN/CREAT SERPL: 29.2 (ref 7–25)
CALCIUM SERPL-MCNC: 9.4 MG/DL (ref 8.6–10.5)
CHLORIDE SERPL-SCNC: 100 MMOL/L (ref 98–107)
CHOLEST SERPL-MCNC: 191 MG/DL (ref 0–200)
CO2 SERPL-SCNC: 34 MMOL/L (ref 22–29)
CREAT SERPL-MCNC: 0.48 MG/DL (ref 0.57–1)
EGFRCR SERPLBLD CKD-EPI 2021: 104.6 ML/MIN/1.73
ERYTHROCYTE [DISTWIDTH] IN BLOOD BY AUTOMATED COUNT: 12.8 % (ref 12.3–15.4)
FERRITIN SERPL-MCNC: 62.3 NG/ML (ref 13–150)
GLOBULIN SER CALC-MCNC: 2.3 GM/DL
GLUCOSE SERPL-MCNC: 95 MG/DL (ref 65–99)
HBA1C MFR BLD: 6.2 % (ref 4.8–5.6)
HCT VFR BLD AUTO: 36.5 % (ref 34–46.6)
HDLC SERPL-MCNC: 77 MG/DL (ref 40–60)
HGB BLD-MCNC: 12.6 G/DL (ref 12–15.9)
IRON SATN MFR SERPL: 18 % (ref 20–50)
IRON SERPL-MCNC: 85 MCG/DL (ref 37–145)
LDLC SERPL CALC-MCNC: 92 MG/DL (ref 0–100)
MCH RBC QN AUTO: 30.1 PG (ref 26.6–33)
MCHC RBC AUTO-ENTMCNC: 34.5 G/DL (ref 31.5–35.7)
MCV RBC AUTO: 87.1 FL (ref 79–97)
MICROALBUMIN UR-MCNC: 5.3 UG/ML
PLATELET # BLD AUTO: 236 10*3/MM3 (ref 140–450)
POTASSIUM SERPL-SCNC: 4.8 MMOL/L (ref 3.5–5.2)
PROT SERPL-MCNC: 7 G/DL (ref 6–8.5)
RBC # BLD AUTO: 4.19 10*6/MM3 (ref 3.77–5.28)
SODIUM SERPL-SCNC: 142 MMOL/L (ref 136–145)
T4 FREE SERPL-MCNC: 1.24 NG/DL (ref 0.93–1.7)
TIBC SERPL-MCNC: 471 MCG/DL
TRIGL SERPL-MCNC: 126 MG/DL (ref 0–150)
TSH SERPL DL<=0.005 MIU/L-ACNC: 1.37 UIU/ML (ref 0.27–4.2)
UIBC SERPL-MCNC: 386 MCG/DL (ref 112–346)
VLDLC SERPL CALC-MCNC: 22 MG/DL (ref 5–40)
WBC # BLD AUTO: 5.37 10*3/MM3 (ref 3.4–10.8)

## 2022-09-26 NOTE — PROGRESS NOTES
Good afternoon Ms. Tovar, labs are back.  Blood count is normal with no anemia, normal white blood cells and platelets.  Kidney function, liver enzymes and electrolytes are stable.  Cholesterol panel is well controlled and thyroid numbers look good.  A1c is stable at 6.2.  Iron saturation is still slightly low, please continue with iron supplement daily.  Ferritin level is normal.  Let me know if you have any questions and have a great day,    CLEVELAND Bryant

## 2022-10-14 ENCOUNTER — PATIENT MESSAGE (OUTPATIENT)
Dept: INTERNAL MEDICINE | Facility: CLINIC | Age: 67
End: 2022-10-14

## 2022-10-14 DIAGNOSIS — E78.00 PURE HYPERCHOLESTEROLEMIA: ICD-10-CM

## 2022-10-26 DIAGNOSIS — E11.3293 TYPE 2 DIABETES MELLITUS WITH BOTH EYES AFFECTED BY MILD NONPROLIFERATIVE RETINOPATHY WITHOUT MACULAR EDEMA, WITHOUT LONG-TERM CURRENT USE OF INSULIN: ICD-10-CM

## 2022-10-26 DIAGNOSIS — E11.3299 MILD NONPROLIFERATIVE DIABETIC RETINOPATHY WITHOUT MACULAR EDEMA ASSOCIATED WITH TYPE 2 DIABETES MELLITUS: ICD-10-CM

## 2023-01-06 ENCOUNTER — PROCEDURE VISIT (OUTPATIENT)
Dept: OBSTETRICS AND GYNECOLOGY | Age: 68
End: 2023-01-06
Payer: MEDICARE

## 2023-01-06 DIAGNOSIS — Z12.31 VISIT FOR SCREENING MAMMOGRAM: Primary | ICD-10-CM

## 2023-01-06 PROCEDURE — 77063 BREAST TOMOSYNTHESIS BI: CPT | Performed by: OBSTETRICS & GYNECOLOGY

## 2023-01-06 PROCEDURE — 77067 SCR MAMMO BI INCL CAD: CPT | Performed by: OBSTETRICS & GYNECOLOGY

## 2023-01-20 DIAGNOSIS — I10 BENIGN ESSENTIAL HYPERTENSION: ICD-10-CM

## 2023-01-20 RX ORDER — LISINOPRIL AND HYDROCHLOROTHIAZIDE 25; 20 MG/1; MG/1
1 TABLET ORAL DAILY
Qty: 90 TABLET | Refills: 1 | Status: SHIPPED | OUTPATIENT
Start: 2023-01-20

## 2023-01-20 RX ORDER — LISINOPRIL 20 MG/1
20 TABLET ORAL DAILY
Qty: 90 TABLET | Refills: 1 | Status: SHIPPED | OUTPATIENT
Start: 2023-01-20

## 2023-04-16 DIAGNOSIS — E78.00 PURE HYPERCHOLESTEROLEMIA: ICD-10-CM

## 2023-04-17 RX ORDER — ROSUVASTATIN CALCIUM 20 MG/1
TABLET, COATED ORAL
Qty: 90 TABLET | OUTPATIENT
Start: 2023-04-17

## 2023-06-09 DIAGNOSIS — E11.3299 MILD NONPROLIFERATIVE DIABETIC RETINOPATHY WITHOUT MACULAR EDEMA ASSOCIATED WITH TYPE 2 DIABETES MELLITUS: ICD-10-CM

## 2023-06-09 RX ORDER — LIRAGLUTIDE 6 MG/ML
1.2 INJECTION SUBCUTANEOUS DAILY
OUTPATIENT
Start: 2023-06-09

## 2023-06-09 NOTE — TELEPHONE ENCOUNTER
Caller: Pretty Tovar    Relationship: Self    Best call back number: [phone]    Requested Prescriptions:   Requested Prescriptions     Pending Prescriptions Disp Refills    Liraglutide (Victoza) 18 MG/3ML solution pen-injector injection       Sig: Inject 1.2 mg under the skin into the appropriate area as directed Daily.        Pharmacy where request should be sent: [id number] - [address] - [phone]  - [phone]     Last office visit with prescribing clinician: 9/23/2022   Last telemedicine visit with prescribing clinician: Visit date not found   Next office visit with prescribing clinician: Visit date not found     Additional details provided by patient: OUT OF MEDICATION. HAS NEW PATIENT APPT SCHEDULED WITH ANOTHER PROVIDER, BUT CAN'T GET IN FOR 2 WEEKS.     Does the patient have less than a 3 day supply:  [x] Yes  [] No    Would you like a call back once the refill request has been completed: [x] Yes [] No    If the office needs to give you a call back, can they leave a voicemail: [] Yes [] No    Wesly Rizo   06/09/23 11:05 EDT

## 2023-06-23 PROBLEM — N81.12 CYSTOCELE, LATERAL: Status: RESOLVED | Noted: 2020-10-15 | Resolved: 2023-06-23

## 2023-06-23 PROBLEM — Z00.00 HEALTH CARE MAINTENANCE: Chronic | Status: RESOLVED | Noted: 2017-02-09 | Resolved: 2023-06-23

## 2023-08-30 DIAGNOSIS — Z12.31 VISIT FOR SCREENING MAMMOGRAM: Primary | ICD-10-CM

## 2023-10-16 NOTE — TELEPHONE ENCOUNTER
Caller: LaPretty    Relationship: Self    Best call back number: [phone]    Requested Prescriptions:   Requested Prescriptions     Pending Prescriptions Disp Refills   • lisinopril-hydrochlorothiazide (PRINZIDE,ZESTORETIC) 20-25 MG per tablet 90 tablet 1     Sig: Take 1 tablet by mouth Daily.   • lisinopril (PRINIVIL,ZESTRIL) 20 MG tablet 90 tablet 1     Sig: Take 1 tablet by mouth Daily.        Pharmacy where request should be sent: [facility] [id number] - [address] - [phone]  - [phone] FX     Additional details provided by patient: PATIENT HAS ABOUT ONE WEEK,WANTS TO KNOW IF SOMEONE WILL FILL THIS UNTIL SHE CAN ESTABLISH WITH A NEW PROVIDER.     Does the patient have less than a 3 day supply:  [] Yes  [x] No    Would you like a call back once the refill request has been completed: [] Yes [x] No    If the office needs to give you a call back, can they leave a voicemail: [x] Yes [] No    Wesly Kidd Rep   01/20/23 15:03 EST      Doxycycline Pregnancy And Lactation Text: This medication is Pregnancy Category D and not consider safe during pregnancy. It is also excreted in breast milk but is considered safe for shorter treatment courses.

## 2023-11-30 DIAGNOSIS — E11.3299 MILD NONPROLIFERATIVE DIABETIC RETINOPATHY WITHOUT MACULAR EDEMA ASSOCIATED WITH TYPE 2 DIABETES MELLITUS: ICD-10-CM

## 2023-11-30 RX ORDER — LIRAGLUTIDE 6 MG/ML
INJECTION SUBCUTANEOUS
Qty: 6 ML | Refills: 5 | Status: SHIPPED | OUTPATIENT
Start: 2023-11-30

## 2023-12-06 DIAGNOSIS — E11.3293 TYPE 2 DIABETES MELLITUS WITH BOTH EYES AFFECTED BY MILD NONPROLIFERATIVE RETINOPATHY WITHOUT MACULAR EDEMA, WITHOUT LONG-TERM CURRENT USE OF INSULIN: Primary | ICD-10-CM

## 2023-12-12 LAB
ALBUMIN SERPL-MCNC: 4.4 G/DL (ref 3.9–4.9)
ALBUMIN/GLOB SERPL: 1.5 {RATIO} (ref 1.2–2.2)
ALP SERPL-CCNC: 55 IU/L (ref 44–121)
ALT SERPL-CCNC: 33 IU/L (ref 0–32)
AST SERPL-CCNC: 30 IU/L (ref 0–40)
BILIRUB SERPL-MCNC: 0.3 MG/DL (ref 0–1.2)
BUN SERPL-MCNC: 14 MG/DL (ref 8–27)
BUN/CREAT SERPL: 25 (ref 12–28)
CALCIUM SERPL-MCNC: 10.6 MG/DL (ref 8.7–10.3)
CHLORIDE SERPL-SCNC: 102 MMOL/L (ref 96–106)
CO2 SERPL-SCNC: 25 MMOL/L (ref 20–29)
CREAT SERPL-MCNC: 0.56 MG/DL (ref 0.57–1)
EGFRCR SERPLBLD CKD-EPI 2021: 100 ML/MIN/1.73
GLOBULIN SER CALC-MCNC: 3 G/DL (ref 1.5–4.5)
GLUCOSE SERPL-MCNC: 107 MG/DL (ref 70–99)
HBA1C MFR BLD: 6.3 % (ref 4.8–5.6)
MICROALBUMIN UR-MCNC: <3 UG/ML
POTASSIUM SERPL-SCNC: 4.9 MMOL/L (ref 3.5–5.2)
PROT SERPL-MCNC: 7.4 G/DL (ref 6–8.5)
SODIUM SERPL-SCNC: 142 MMOL/L (ref 134–144)

## 2023-12-15 ENCOUNTER — OFFICE VISIT (OUTPATIENT)
Dept: INTERNAL MEDICINE | Facility: CLINIC | Age: 68
End: 2023-12-15
Payer: MEDICARE

## 2023-12-15 VITALS
RESPIRATION RATE: 16 BRPM | OXYGEN SATURATION: 97 % | BODY MASS INDEX: 29.84 KG/M2 | SYSTOLIC BLOOD PRESSURE: 136 MMHG | WEIGHT: 152 LBS | HEART RATE: 71 BPM | DIASTOLIC BLOOD PRESSURE: 64 MMHG | HEIGHT: 60 IN

## 2023-12-15 DIAGNOSIS — E78.00 PURE HYPERCHOLESTEROLEMIA: Chronic | ICD-10-CM

## 2023-12-15 DIAGNOSIS — E11.3293 TYPE 2 DIABETES MELLITUS WITH BOTH EYES AFFECTED BY MILD NONPROLIFERATIVE RETINOPATHY WITHOUT MACULAR EDEMA, WITHOUT LONG-TERM CURRENT USE OF INSULIN: Chronic | ICD-10-CM

## 2023-12-15 DIAGNOSIS — I10 BENIGN ESSENTIAL HYPERTENSION: Primary | Chronic | ICD-10-CM

## 2023-12-15 DIAGNOSIS — E06.3 HASHIMOTO'S THYROIDITIS: Chronic | ICD-10-CM

## 2023-12-15 DIAGNOSIS — E06.3 HASHIMOTO'S THYROIDITIS: ICD-10-CM

## 2023-12-15 DIAGNOSIS — Z78.0 POST-MENOPAUSAL: ICD-10-CM

## 2023-12-15 PROCEDURE — 1159F MED LIST DOCD IN RCRD: CPT | Performed by: NURSE PRACTITIONER

## 2023-12-15 PROCEDURE — 3078F DIAST BP <80 MM HG: CPT | Performed by: NURSE PRACTITIONER

## 2023-12-15 PROCEDURE — 3075F SYST BP GE 130 - 139MM HG: CPT | Performed by: NURSE PRACTITIONER

## 2023-12-15 PROCEDURE — 1160F RVW MEDS BY RX/DR IN RCRD: CPT | Performed by: NURSE PRACTITIONER

## 2023-12-15 PROCEDURE — 3044F HG A1C LEVEL LT 7.0%: CPT | Performed by: NURSE PRACTITIONER

## 2023-12-15 PROCEDURE — 99214 OFFICE O/P EST MOD 30 MIN: CPT | Performed by: NURSE PRACTITIONER

## 2023-12-15 RX ORDER — BLOOD SUGAR DIAGNOSTIC
STRIP MISCELLANEOUS
Qty: 90 EACH | Refills: 3 | Status: SHIPPED | OUTPATIENT
Start: 2023-12-15

## 2023-12-15 RX ORDER — LEVOTHYROXINE SODIUM 88 MCG
88 TABLET ORAL DAILY
Qty: 90 TABLET | Refills: 3 | Status: SHIPPED | OUTPATIENT
Start: 2023-12-15

## 2023-12-15 RX ORDER — BLOOD SUGAR DIAGNOSTIC
STRIP MISCELLANEOUS
Qty: 90 EACH | Refills: 3 | Status: SHIPPED | OUTPATIENT
Start: 2023-12-15 | End: 2023-12-15 | Stop reason: SDUPTHER

## 2024-01-18 ENCOUNTER — TELEPHONE (OUTPATIENT)
Dept: INTERNAL MEDICINE | Facility: CLINIC | Age: 69
End: 2024-01-18
Payer: MEDICARE

## 2024-01-18 RX ORDER — LEVOTHYROXINE SODIUM 88 UG/1
88 TABLET ORAL DAILY
Qty: 90 TABLET | Refills: 3 | Status: SHIPPED | OUTPATIENT
Start: 2024-01-18

## 2024-01-18 NOTE — TELEPHONE ENCOUNTER
----- Message from Michelle Condon MA sent at 1/18/2024 11:31 AM EST -----  Regarding: FW: medication  Contact: [phone]  Ok to send in generic?  ----- Message -----  From: Pretty Tovar  Sent: 1/18/2024  10:50 AM EST  To: [facility]  Subject: medication                                       Good Morning Ms Duran,  I asked that you change my Levothyroxine to  Synthroid because my  said they would pay for the brand medication.  However,  the price is way more than what I had been paying.    I explained to the pharmacy that I requested the brand name because of insurance. Could you please send a prescription for Levothroxine so that they know you are okay with the generic medication.    I apologize for the inconvenience.  I appreciate your time and taking care of this.    [facility].    Thank you, Pretty

## 2024-03-06 DIAGNOSIS — E78.00 PURE HYPERCHOLESTEROLEMIA: ICD-10-CM

## 2024-03-15 DIAGNOSIS — E78.00 PURE HYPERCHOLESTEROLEMIA: ICD-10-CM

## 2024-03-19 NOTE — ASSESSMENT & PLAN NOTE
Continue levothyroxine 88 mcg daily.  TSH, free T4 today and we will adjust therapy if needed.   [Itching] : Itching [Skin Rash] : skin rash [Negative] : Heme/Lymph [de-identified] : to bilateral lower legs

## 2024-03-21 ENCOUNTER — TELEPHONE (OUTPATIENT)
Dept: INTERNAL MEDICINE | Facility: CLINIC | Age: 69
End: 2024-03-21
Payer: MEDICARE

## 2024-03-21 DIAGNOSIS — E78.00 PURE HYPERCHOLESTEROLEMIA: ICD-10-CM

## 2024-03-21 NOTE — TELEPHONE ENCOUNTER
Caller: Pretty Tovar    Relationship: Self    Best call back number:     [phone] (Mobile)       Requested Prescriptions:   Rosuvastatin Calcium 20 MG capsule [facility]        Pharmacy where request should be sent:  [facility] [id number] - [address] - [phone] PH - [phone] FX     Last office visit with prescribing clinician: 12/15/2023   Last telemedicine visit with prescribing clinician: Visit date not found   Next office visit with prescribing clinician: 6/17/2024     Additional details provided by patient: PATIENT CALLED TO REQUEST A MEDICATION REFILL ON MEDICATION. PATIENT HAS A 7 DAY SUPPLY LEFT.      Does the patient have less than a 3 day supply:  [] Yes  [x] No    Would you like a call back once the refill request has been completed: [] Yes [] No    If the office needs to give you a call back, can they leave a voicemail: [] Yes [] No    Wesly Dobbs Rep   03/21/24 09:24 EDT         THANKS

## 2024-03-27 DIAGNOSIS — E11.3299 MILD NONPROLIFERATIVE DIABETIC RETINOPATHY WITHOUT MACULAR EDEMA ASSOCIATED WITH TYPE 2 DIABETES MELLITUS: ICD-10-CM

## 2024-03-27 RX ORDER — LIRAGLUTIDE 6 MG/ML
1.2 INJECTION SUBCUTANEOUS DAILY
Qty: 6 ML | Refills: 5 | Status: SHIPPED | OUTPATIENT
Start: 2024-03-27

## 2024-04-10 ENCOUNTER — HOSPITAL ENCOUNTER (OUTPATIENT)
Facility: HOSPITAL | Age: 69
Discharge: HOME | End: 2024-04-10
Admitting: OBSTETRICS & GYNECOLOGY
Payer: MEDICARE

## 2024-04-10 ENCOUNTER — OFFICE VISIT (OUTPATIENT)
Dept: OBSTETRICS AND GYNECOLOGY | Age: 69
End: 2024-04-10
Payer: MEDICARE

## 2024-04-10 VITALS
BODY MASS INDEX: 30.82 KG/M2 | SYSTOLIC BLOOD PRESSURE: 138 MMHG | HEIGHT: 60 IN | DIASTOLIC BLOOD PRESSURE: 62 MMHG | WEIGHT: 157 LBS

## 2024-04-10 DIAGNOSIS — Z12.31 VISIT FOR SCREENING MAMMOGRAM: ICD-10-CM

## 2024-04-10 DIAGNOSIS — Z12.31 SCREENING MAMMOGRAM FOR BREAST CANCER: Primary | ICD-10-CM

## 2024-04-10 DIAGNOSIS — Z01.419 ENCOUNTER FOR GYNECOLOGICAL EXAMINATION WITHOUT ABNORMAL FINDING: ICD-10-CM

## 2024-04-10 PROBLEM — N81.12 CYSTOCELE, LATERAL: Status: RESOLVED | Noted: 2020-10-15 | Resolved: 2024-04-10

## 2024-04-10 PROBLEM — N81.10 PELVIC RELAXATION DUE TO VAGINAL PROLAPSE: Status: RESOLVED | Noted: 2018-02-08 | Resolved: 2024-04-10

## 2024-04-10 PROBLEM — N81.82 INCOMPETENCE OF PUBOCERVICAL TISSUE: Status: RESOLVED | Noted: 2020-10-15 | Resolved: 2024-04-10

## 2024-04-10 PROBLEM — N81.6 RECTOCELE: Status: RESOLVED | Noted: 2020-10-15 | Resolved: 2024-04-10

## 2024-04-10 PROBLEM — N81.83 INCOMPETENCE OF RECTOVAGINAL TISSUE: Status: RESOLVED | Noted: 2020-10-15 | Resolved: 2024-04-10

## 2024-04-10 PROBLEM — N81.11 CYSTOCELE, MIDLINE: Status: RESOLVED | Noted: 2020-10-15 | Resolved: 2024-04-10

## 2024-04-10 PROBLEM — N81.2 UTEROVAGINAL PROLAPSE, INCOMPLETE: Status: RESOLVED | Noted: 2020-09-14 | Resolved: 2024-04-10

## 2024-04-10 PROBLEM — N81.5 VAGINAL ENTEROCELE: Status: RESOLVED | Noted: 2020-10-15 | Resolved: 2024-04-10

## 2024-04-10 PROBLEM — N95.0 POSTMENOPAUSAL BLEEDING: Status: RESOLVED | Noted: 2019-03-18 | Resolved: 2024-04-10

## 2024-04-10 PROBLEM — N81.89 LOSS OF PERINEAL BODY, FEMALE: Status: RESOLVED | Noted: 2020-10-15 | Resolved: 2024-04-10

## 2024-04-10 PROCEDURE — 77067 SCR MAMMO BI INCL CAD: CPT

## 2024-04-10 PROCEDURE — 77063 BREAST TOMOSYNTHESIS BI: CPT

## 2024-04-10 NOTE — PROGRESS NOTES
Subjective     Chief Complaint   Patient presents with    Gynecologic Exam     Cc: annual visit with mammogram today , hx hysterectomy , last mammogram 23 benign , dexa 18  Colonoscopy 2018       History of Present Illness    Pretty Tovar is a 68 y.o.  who presents for annual exam.  The patient has had prior prolapse surgery.  She has had a hysterectomy and sacrocolpopexy to suspend the top of vagina.  She states that she is doing pretty well.  She occasionally has a small amount of leakage if she does not go to the bathroom on time.  She is still working.  She is sexually active without problems.  No vaginal bleeding.  She still has her ovaries but no pelvic pain.  Obstetric History:  OB History          2    Para   2    Term   2            AB        Living   3         SAB        IAB        Ectopic        Molar        Multiple   1    Live Births   3               Menstrual History:     No LMP recorded (lmp unknown). Patient has had a hysterectomy.         Current contraception: status post hysterectomy  History of abnormal Pap smear: no  Received Gardasil immunization: no  Perform regular self breast exam : yes  Family history of uterine or ovarian cancer: no  Family History of colon cancer: no  Family history of breast cancer: no    Mammogram: done today.  Colonoscopy: up to date. 2018  q10   DEXA: ordered.    Exercise: not active  Calcium/Vitamin D: adequate intake and uses supplements    The following portions of the patient's history were reviewed and updated as appropriate: allergies, current medications, past family history, past medical history, past social history, past surgical history, and problem list.    Review of Systems    Review of Systems   Constitutional: Negative for fatigue.   Respiratory: Negative for shortness of breath.    Gastrointestinal: Negative for abdominal pain.   Genitourinary: Negative for dysuria.   Neurological: Negative for headaches.  "  Psychiatric/Behavioral: Negative for dysphoric mood.     Objective   Physical Exam    /62   Ht 152.4 cm (60\")   Wt 71.2 kg (157 lb)   LMP  (LMP Unknown)   BMI 30.66 kg/m²     General:   alert, appears stated age and cooperative   Neck: thyroid normal to palpation   Heart: regular rate and rhythm   Lungs: clear to auscultation bilaterally   Abdomen: soft, non-tender, without masses or organomegaly   Breast: inspection negative, no nipple discharge or bleeding, no masses or nodularity palpable   Vulva: normal, Bartholin's, Urethra, Swifton's normal   Vagina: normal mucosa, normal discharge   Cervix: Absent   Uterus: Absent   Adnexa: no mass, fullness, tenderness   Rectal: No masses palpated.  Good sphincter tone.     Assessment & Plan   Diagnoses and all orders for this visit:    1. Screening mammogram for breast cancer (Primary)  -     Mammo Screening Digital Tomosynthesis Bilateral With CAD; Future    2. Encounter for gynecological examination without abnormal finding    Patient is doing well overall.  Encouraged patient to start a walking program  We discussed diet.  Status post hysterectomy so Pap is not needed.    All questions answered.  Breast self exam technique reviewed and patient encouraged to perform self-exam monthly.  Discussed healthy lifestyle modifications.  Recommended 30 minutes of aerobic exercise five times per week.  Discussed calcium needs to prevent osteoporosis.                          "

## 2024-04-15 ENCOUNTER — TELEPHONE (OUTPATIENT)
Dept: OBSTETRICS AND GYNECOLOGY | Age: 69
End: 2024-04-15
Payer: MEDICARE

## 2024-04-15 ENCOUNTER — TELEPHONE (OUTPATIENT)
Dept: INTERNAL MEDICINE | Facility: CLINIC | Age: 69
End: 2024-04-15
Payer: MEDICARE

## 2024-04-15 DIAGNOSIS — R92.8 ABNORMAL MAMMOGRAM: Primary | ICD-10-CM

## 2024-04-15 DIAGNOSIS — N64.89 BREAST ASYMMETRY: ICD-10-CM

## 2024-04-15 RX ORDER — ROSUVASTATIN CALCIUM 20 MG/1
20 TABLET, COATED ORAL DAILY
Qty: 90 TABLET | Refills: 1 | Status: SHIPPED | OUTPATIENT
Start: 2024-04-15

## 2024-04-15 NOTE — TELEPHONE ENCOUNTER
Caller: Pretty Tovar    Relationship: Self    Best call back number: [phone]     What was the call regarding: PATIENT ASKS WHY SHE WAS CHANGED FROM SIMVASTATIN TO EZALLOR SPRINKLE.    EZALLOR SPRINKLE IS VERY EXPENSIVE, AND IF WESTLEY ASMMY AGREES, SHE WOULD LIKE TO CONTINUE SIMVASTATIN INSTEAD.

## 2024-04-15 NOTE — TELEPHONE ENCOUNTER
4/15/2024  pt and imaging results given and the need for additional images of R breast. Orders placed for R dx mammo/US and someone will call to schedule

## 2024-04-15 NOTE — PROGRESS NOTES
"Subjective   Pretty Tovar is a 67 y.o. female. Patient is here today for   Chief Complaint   Patient presents with    Diabetes   Answers submitted by the patient for this visit:  Other (Submitted on 2023)  Please describe your symptoms.: routine check up  Have you had these symptoms before?: Yes  How long have you been having these symptoms?: Greater than 2 weeks  Primary Reason for Visit (Submitted on 2023)  What is the primary reason for your visit?: Other         Vitals:    12/15/23 0824   BP: 136/64   Pulse: 71   Resp: 16   SpO2: 97%     Body mass index is 29.69 kg/m².  The following portions of the patient's history were reviewed and updated as appropriate: allergies, current medications, past family history, past medical history, past social history, past surgical history and problem list.    Past Medical History:   Diagnosis Date    Abnormal liver function tests 2017    Description: , \"-\"hepatitis profile    Anemia     Anesthesia complication     SLOW TO WAKE UP    Colon polyps     Diabetes mellitus     H/O bone density study 10/2010    normal    H/O cardiovascular stress test     H/O echocardiogram 2001    2-D    Heart murmur     Hyperkalemia 2019    Hyperlipidemia     Hypertension     Hypothyroidism 2018    Left knee pain     Limited joint range of motion     LEFT KNEE    Osteoarthritis of knee 2005    left    Plantar warts     foot    Pregnancy      twins x1    Psoriasis     Psoriasis     Urinary tract infection 2020    Urine incontinence     Uterovaginal prolapse       No Known Allergies   Social History     Socioeconomic History    Marital status:      Spouse name: SUZANNE     Number of children: 3   Tobacco Use    Smoking status: Never    Smokeless tobacco: Never    Tobacco comments:     daily caffine   Vaping Use    Vaping Use: Never used   Substance and Sexual Activity    Alcohol use: Never    Drug use: Never    Sexual activity: Yes     Partners: Male "     Birth control/protection: Post-menopausal, Hysterectomy, Surgical     Comment: SPOUSE = JACK         Current Outpatient Medications:     BD PEN NEEDLE SEUN U/F 32G X 4 MM misc, USE 1 PEN NEEDLE WITH EACH  VICTOZA INJECTION, Disp: 100 each, Rfl: 1    Biotin 76479 MCG tablet dispersible, Place 10,000 mg on the tongue Daily., Disp: , Rfl:     Cinnamon 500 MG tablet, Take 1,000 mg by mouth 2 (two) times a day. PT HOLDING FOR SURGERY, Disp: , Rfl:     clobetasol (TEMOVATE) 0.05 % external solution, Apply 1 application  topically to the appropriate area as directed As Needed., Disp: , Rfl:     Coenzyme Q10 (CO Q 10 PO), Take 1 capsule by mouth Daily. PT HOLDING FOR SURGERY, Disp: , Rfl:     docusate sodium 100 MG capsule, Take 1 capsule by mouth 2 (Two) Times a Day As Needed for Constipation. (Patient taking differently: Take 1 capsule by mouth Daily.), Disp: 30 capsule, Rfl: 1    ferrous sulfate 324 (65 Fe) MG tablet delayed-release EC tablet, Take 1 tablet by mouth Daily With Breakfast., Disp: , Rfl:     folic acid (FOLVITE) 400 MCG tablet, Take 1 tablet by mouth Daily., Disp: , Rfl:     glucose blood (OneTouch Verio) test strip, Check blood glucose one daily., Disp: 90 each, Rfl: 3    Golimumab (Simponi) 100 MG/ML solution auto-injector, Inject  under the skin into the appropriate area as directed., Disp: , Rfl:     lisinopril (PRINIVIL,ZESTRIL) 20 MG tablet, Take 1 tablet by mouth Daily., Disp: 90 tablet, Rfl: 3    lisinopril-hydrochlorothiazide (PRINZIDE,ZESTORETIC) 20-25 MG per tablet, Take 1 tablet by mouth Daily., Disp: 90 tablet, Rfl: 3    meloxicam (MOBIC) 15 MG tablet, , Disp: , Rfl:     metFORMIN (GLUCOPHAGE) 500 MG tablet, TAKE THREE TABLETS BY MOUTH EVERY MORNING AND THEN TAKE TWO TABLETS BY MOUTH IN THE EVENING, Disp: 450 tablet, Rfl: 3    multivitamin with minerals tablet tablet, Take 1 tablet by mouth Daily. PT HOLDING FOR SURGERY, Disp: , Rfl:     Omega-3 Fatty Acids (fish oil) 1200 MG capsule  capsule, Take 1 capsule by mouth Daily. PT HOLDING FOR SURGERY, Disp: , Rfl:     Rosuvastatin Calcium 20 MG capsule sprinkle, Take 20 mg by mouth Every Night., Disp: 90 capsule, Rfl: 1    TACLONEX 0.005-0.064 % external suspension, Apply 1 application topically to the appropriate area as directed As Needed., Disp: , Rfl: 5    triamcinolone (KENALOG) 0.1 % ointment, APPLY TO AFFECTED AREA ON RIGHT LEG TWICE A DAY X 1 WEEK, THEN ONCE DAILY AS NEEDED, Disp: , Rfl:     Victoza 18 MG/3ML solution pen-injector injection, DIAL AND INJECT UNDER THE SKIN 1.2 MG DAILY, Disp: 6 mL, Rfl: 5    vitamin C (ASCORBIC ACID) 250 MG tablet, Take 1 tablet by mouth Daily., Disp: , Rfl:     vitamin D3 125 MCG (5000 UT) capsule capsule, Take 1 capsule by mouth Daily., Disp: , Rfl:     VITAMIN E PO, Take 1 capsule by mouth Daily. HOLD PRIOR TO SURG, Disp: , Rfl:     Synthroid 88 MCG tablet, Take 1 tablet by mouth Daily., Disp: 90 tablet, Rfl: 3     Objective     History of Present Illness  Ms Tovar is a 67 year old female patient who is here for a follow up for Type 2 DM.   Diabetes  She presents for her follow-up diabetic visit. She has type 2 diabetes mellitus. Associated symptoms include polydipsia (dry mouth). Pertinent negatives for diabetes include no blurred vision, no fatigue, no polyphagia, no polyuria, no visual change, no weakness and no weight loss. There are no hypoglycemic complications. There are no diabetic complications. Risk factors for coronary artery disease include diabetes mellitus, dyslipidemia and post-menopausal. Current diabetic treatment includes diet and oral agent (dual therapy). She is compliant with treatment all of the time. She is following a generally healthy diet. An ACE inhibitor/angiotensin II receptor blocker is being taken. She does not see a podiatrist.Eye exam is current.        Review of Systems   Constitutional:  Negative for fatigue and weight loss.   HENT: Negative.     Eyes:  Negative for  blurred vision.   Respiratory: Negative.     Cardiovascular: Negative.    Endocrine: Positive for polydipsia (dry mouth). Negative for polyphagia and polyuria.   Genitourinary:  Negative for difficulty urinating.   Neurological:  Negative for weakness.       Physical Exam  Vitals and nursing note reviewed.   Constitutional:       General: She is not in acute distress.  HENT:      Head: Normocephalic.   Cardiovascular:      Rate and Rhythm: Normal rate and regular rhythm.      Heart sounds: Normal heart sounds.   Pulmonary:      Effort: Pulmonary effort is normal.      Breath sounds: Normal breath sounds.   Skin:     General: Skin is warm and dry.   Neurological:      Mental Status: She is alert and oriented to person, place, and time.   Psychiatric:         Mood and Affect: Mood normal.       Orders Only on 12/06/2023   Component Date Value Ref Range Status    Glucose 12/11/2023 107 (H)  70 - 99 mg/dL Final    BUN 12/11/2023 14  8 - 27 mg/dL Final    Creatinine 12/11/2023 0.56 (L)  0.57 - 1.00 mg/dL Final    EGFR Result 12/11/2023 100  >59 mL/min/1.73 Final    BUN/Creatinine Ratio 12/11/2023 25  12 - 28 Final    Sodium 12/11/2023 142  134 - 144 mmol/L Final    Potassium 12/11/2023 4.9  3.5 - 5.2 mmol/L Final    Chloride 12/11/2023 102  96 - 106 mmol/L Final    Total CO2 12/11/2023 25  20 - 29 mmol/L Final    Calcium 12/11/2023 10.6 (H)  8.7 - 10.3 mg/dL Final    Total Protein 12/11/2023 7.4  6.0 - 8.5 g/dL Final    Albumin 12/11/2023 4.4  3.9 - 4.9 g/dL Final    Globulin 12/11/2023 3.0  1.5 - 4.5 g/dL Final    A/G Ratio 12/11/2023 1.5  1.2 - 2.2 Final    Total Bilirubin 12/11/2023 0.3  0.0 - 1.2 mg/dL Final    Alkaline Phosphatase 12/11/2023 55  44 - 121 IU/L Final    AST (SGOT) 12/11/2023 30  0 - 40 IU/L Final    ALT (SGPT) 12/11/2023 33 (H)  0 - 32 IU/L Final    Hemoglobin A1C 12/11/2023 6.3 (H)  4.8 - 5.6 % Final    Comment:          Prediabetes: 5.7 - 6.4           Diabetes: >6.4           Glycemic control for  adults with diabetes: <7.0      Microalbumin, Urine 12/11/2023 <3.0  Not Estab. ug/mL Final    **Verified by repeat analysis**         Assessment    ASSESSMENT    Problems Addressed this Visit       Benign essential hypertension - Primary (Chronic)    Type 2 diabetes mellitus, without long-term current use of insulin (Chronic)    Relevant Medications    glucose blood (OneTouch Verio) test strip    Pure hypercholesterolemia (Chronic)    Hashimoto's thyroiditis (Chronic)    Relevant Medications    Synthroid 88 MCG tablet     Other Visit Diagnoses       Post-menopausal        Relevant Orders    DEXA Bone Density Axial          Diagnoses         Codes Comments    Benign essential hypertension    -  Primary ICD-10-CM: I10  ICD-9-CM: 401.1     Type 2 diabetes mellitus with both eyes affected by mild nonproliferative retinopathy without macular edema, without long-term current use of insulin     ICD-10-CM: E11.3293  ICD-9-CM: 250.50, 362.04     Hashimoto's thyroiditis     ICD-10-CM: E06.3  ICD-9-CM: 245.2     Pure hypercholesterolemia     ICD-10-CM: E78.00  ICD-9-CM: 272.0     Post-menopausal     ICD-10-CM: Z78.0  ICD-9-CM: V49.81     Hashimoto's thyroiditis     ICD-10-CM: E06.3  ICD-9-CM: 245.2 stable             PLAN    HTN is well controlled- continue lisinopril/hctz  HLD- on statin   Hypothyroidism- pt reports insurance only covers brand name rx - synthroid, new rx sent to pharmacy  Type 2 DM- continue to monitor glucose intermittently and as needed. A1C and fasting glucose are at goal. Continue with healthy diet and exercise as tolerated   Diabetic eye exam is due next month  DEXA scan ordered     Return in about 6 months (around 6/15/2024) for medicare wellness, with labs. Tsh, free t4, A1c, UA, lipid panel, cbc, cmp    Unable to assess due to medical condition

## 2024-04-19 PROBLEM — R92.8 ABNORMALITY OF RIGHT BREAST ON SCREENING MAMMOGRAM: Status: ACTIVE | Noted: 2024-04-19

## 2024-04-22 ENCOUNTER — APPOINTMENT (OUTPATIENT)
Dept: WOMENS IMAGING | Facility: HOSPITAL | Age: 69
End: 2024-04-22
Payer: MEDICARE

## 2024-04-22 PROCEDURE — G0279 TOMOSYNTHESIS, MAMMO: HCPCS | Performed by: RADIOLOGY

## 2024-04-22 PROCEDURE — 76642 ULTRASOUND BREAST LIMITED: CPT | Performed by: RADIOLOGY

## 2024-04-22 PROCEDURE — 77065 DX MAMMO INCL CAD UNI: CPT | Performed by: RADIOLOGY

## 2024-04-24 ENCOUNTER — TELEPHONE (OUTPATIENT)
Dept: OBSTETRICS AND GYNECOLOGY | Age: 69
End: 2024-04-24
Payer: MEDICARE

## 2024-04-24 DIAGNOSIS — N63.10 MASS OF RIGHT BREAST, UNSPECIFIED QUADRANT: ICD-10-CM

## 2024-04-24 DIAGNOSIS — N64.89 BREAST ASYMMETRY: Primary | ICD-10-CM

## 2024-04-24 DIAGNOSIS — R92.8 OTHER ABNORMAL AND INCONCLUSIVE FINDINGS ON DIAGNOSTIC IMAGING OF BREAST: ICD-10-CM

## 2024-04-24 NOTE — TELEPHONE ENCOUNTER
4/24/2024   pt and imaging results given and the recommendation of 2 R breast biopsies. Order placed for R US guided and R stereotatic biopsy. Someone will call to schedule.

## 2024-04-25 DIAGNOSIS — E11.3299 MILD NONPROLIFERATIVE DIABETIC RETINOPATHY WITHOUT MACULAR EDEMA ASSOCIATED WITH TYPE 2 DIABETES MELLITUS: ICD-10-CM

## 2024-04-25 RX ORDER — LIRAGLUTIDE 6 MG/ML
1.2 INJECTION SUBCUTANEOUS DAILY
Qty: 6 ML | Refills: 5 | Status: SHIPPED | OUTPATIENT
Start: 2024-04-25

## 2024-05-07 ENCOUNTER — TELEPHONE (OUTPATIENT)
Dept: INTERNAL MEDICINE | Facility: CLINIC | Age: 69
End: 2024-05-07
Payer: MEDICARE

## 2024-05-14 ENCOUNTER — APPOINTMENT (OUTPATIENT)
Dept: WOMENS IMAGING | Facility: HOSPITAL | Age: 69
End: 2024-05-14
Payer: MEDICARE

## 2024-05-14 ENCOUNTER — HOSPITAL ENCOUNTER (OUTPATIENT)
Dept: MAMMOGRAPHY | Facility: HOSPITAL | Age: 69
Discharge: HOME | End: 2024-05-14
Payer: MEDICARE

## 2024-05-14 ENCOUNTER — LAB REQUISITION (OUTPATIENT)
Dept: LAB | Facility: HOSPITAL | Age: 69
End: 2024-05-14
Payer: MEDICARE

## 2024-05-14 DIAGNOSIS — N63.0 UNSPECIFIED LUMP IN UNSPECIFIED BREAST: ICD-10-CM

## 2024-05-14 DIAGNOSIS — N63.10 MASS OF RIGHT BREAST, UNSPECIFIED QUADRANT: ICD-10-CM

## 2024-05-14 PROCEDURE — 88342 IMHCHEM/IMCYTCHM 1ST ANTB: CPT | Performed by: OBSTETRICS & GYNECOLOGY

## 2024-05-14 PROCEDURE — 88341 IMHCHEM/IMCYTCHM EA ADD ANTB: CPT | Performed by: OBSTETRICS & GYNECOLOGY

## 2024-05-14 PROCEDURE — 19081 BX BREAST 1ST LESION STRTCTC: CPT | Performed by: RADIOLOGY

## 2024-05-14 PROCEDURE — 88360 TUMOR IMMUNOHISTOCHEM/MANUAL: CPT | Performed by: OBSTETRICS & GYNECOLOGY

## 2024-05-14 PROCEDURE — C1819 TISSUE LOCALIZATION-EXCISION: HCPCS | Performed by: RADIOLOGY

## 2024-05-14 PROCEDURE — 19083 BX BREAST 1ST LESION US IMAG: CPT | Performed by: RADIOLOGY

## 2024-05-14 PROCEDURE — A4648 IMPLANTABLE TISSUE MARKER: HCPCS | Performed by: RADIOLOGY

## 2024-05-14 PROCEDURE — 88305 TISSUE EXAM BY PATHOLOGIST: CPT | Performed by: OBSTETRICS & GYNECOLOGY

## 2024-05-14 PROCEDURE — 76098 X-RAY EXAM SURGICAL SPECIMEN: CPT

## 2024-05-16 ENCOUNTER — TELEPHONE (OUTPATIENT)
Dept: SURGERY | Facility: CLINIC | Age: 69
End: 2024-05-16
Payer: MEDICARE

## 2024-05-16 DIAGNOSIS — C50.911 MALIGNANT NEOPLASM OF RIGHT FEMALE BREAST, UNSPECIFIED ESTROGEN RECEPTOR STATUS, UNSPECIFIED SITE OF BREAST: Primary | ICD-10-CM

## 2024-05-16 NOTE — TELEPHONE ENCOUNTER
I left PT a message about her new PT appt with .    New PT appt: 05/30/2024 at 01:00 PM    Gave PT our office phone number if she had any further questions or concerns.    MEN

## 2024-05-17 DIAGNOSIS — C50.811 MALIGNANT NEOPLASM OF OVERLAPPING SITES OF RIGHT BREAST IN FEMALE, ESTROGEN RECEPTOR NEGATIVE: Primary | ICD-10-CM

## 2024-05-17 DIAGNOSIS — Z17.1 MALIGNANT NEOPLASM OF OVERLAPPING SITES OF RIGHT BREAST IN FEMALE, ESTROGEN RECEPTOR NEGATIVE: Primary | ICD-10-CM

## 2024-05-17 LAB
DX PRELIMINARY: NORMAL
LAB AP CASE REPORT: NORMAL
LAB AP CLINICAL INFORMATION: NORMAL
LAB AP SPECIAL STAINS: NORMAL
LAB AP SYNOPTIC CHECKLIST: NORMAL
PATH REPORT.FINAL DX SPEC: NORMAL
PATH REPORT.GROSS SPEC: NORMAL

## 2024-05-20 ENCOUNTER — TELEPHONE (OUTPATIENT)
Dept: SURGERY | Facility: CLINIC | Age: 69
End: 2024-05-20
Payer: MEDICARE

## 2024-05-20 NOTE — TELEPHONE ENCOUNTER
Called PT to inform her we are still getting her MRI scheduled but we were able to get her U/S scheduled.    U/S: 05/21/2024  Arrive: 12:45 Pm  Done: 01:00 PM    MRI: 05/24/2024  Arrive: 02:00 PM  Scan: 02:30 PM    New PT appt: 05/30/2024 at 02:00 PM    PT gave a verbal understanding of appt, date, time and location    MEN

## 2024-05-21 ENCOUNTER — HOSPITAL ENCOUNTER (OUTPATIENT)
Dept: ULTRASOUND IMAGING | Facility: HOSPITAL | Age: 69
Discharge: HOME | End: 2024-05-21
Admitting: STUDENT IN AN ORGANIZED HEALTH CARE EDUCATION/TRAINING PROGRAM
Payer: MEDICARE

## 2024-05-21 DIAGNOSIS — Z17.1 MALIGNANT NEOPLASM OF OVERLAPPING SITES OF RIGHT BREAST IN FEMALE, ESTROGEN RECEPTOR NEGATIVE: ICD-10-CM

## 2024-05-21 DIAGNOSIS — C50.811 MALIGNANT NEOPLASM OF OVERLAPPING SITES OF RIGHT BREAST IN FEMALE, ESTROGEN RECEPTOR NEGATIVE: ICD-10-CM

## 2024-05-21 PROCEDURE — 76882 US LMTD JT/FCL EVL NVASC XTR: CPT

## 2024-05-23 DIAGNOSIS — F40.240 CLAUSTROPHOBIA: Primary | ICD-10-CM

## 2024-05-23 RX ORDER — DIAZEPAM 5 MG/1
5 TABLET ORAL ONCE
Qty: 1 TABLET | Refills: 0 | Status: SHIPPED | OUTPATIENT
Start: 2024-05-23 | End: 2024-05-23

## 2024-05-24 ENCOUNTER — HOSPITAL ENCOUNTER (OUTPATIENT)
Facility: HOSPITAL | Age: 69
Discharge: HOME | End: 2024-05-24
Payer: MEDICARE

## 2024-05-24 DIAGNOSIS — Z17.1 MALIGNANT NEOPLASM OF OVERLAPPING SITES OF RIGHT BREAST IN FEMALE, ESTROGEN RECEPTOR NEGATIVE: ICD-10-CM

## 2024-05-24 DIAGNOSIS — C50.811 MALIGNANT NEOPLASM OF OVERLAPPING SITES OF RIGHT BREAST IN FEMALE, ESTROGEN RECEPTOR NEGATIVE: ICD-10-CM

## 2024-05-24 PROCEDURE — C8937 CAD BREAST MRI: HCPCS

## 2024-05-24 PROCEDURE — A9577 INJ MULTIHANCE: HCPCS | Performed by: STUDENT IN AN ORGANIZED HEALTH CARE EDUCATION/TRAINING PROGRAM

## 2024-05-24 PROCEDURE — 0 GADOBENATE DIMEGLUMINE 529 MG/ML SOLUTION: Performed by: STUDENT IN AN ORGANIZED HEALTH CARE EDUCATION/TRAINING PROGRAM

## 2024-05-24 PROCEDURE — C8908 MRI W/O FOL W/CONT, BREAST,: HCPCS

## 2024-05-24 RX ADMIN — GADOBENATE DIMEGLUMINE 14 ML: 529 INJECTION, SOLUTION INTRAVENOUS at 15:27

## 2024-05-30 ENCOUNTER — OFFICE VISIT (OUTPATIENT)
Dept: SURGERY | Facility: CLINIC | Age: 69
End: 2024-05-30
Payer: MEDICARE

## 2024-05-30 VITALS
BODY MASS INDEX: 30.43 KG/M2 | WEIGHT: 155 LBS | DIASTOLIC BLOOD PRESSURE: 86 MMHG | HEIGHT: 60 IN | RESPIRATION RATE: 18 BRPM | HEART RATE: 77 BPM | OXYGEN SATURATION: 99 % | SYSTOLIC BLOOD PRESSURE: 138 MMHG

## 2024-05-30 DIAGNOSIS — C50.811 MALIGNANT NEOPLASM OF OVERLAPPING SITES OF RIGHT BREAST IN FEMALE, ESTROGEN RECEPTOR NEGATIVE: Primary | ICD-10-CM

## 2024-05-30 DIAGNOSIS — Z17.1 MALIGNANT NEOPLASM OF OVERLAPPING SITES OF RIGHT BREAST IN FEMALE, ESTROGEN RECEPTOR NEGATIVE: Primary | ICD-10-CM

## 2024-05-30 NOTE — PROGRESS NOTES
[facility]     Referring Provider: Zachary Sibley MD     Chief complaint: newly diagnosed breast cancer    Subjective    HPI: Ms. Pretty Tovar is a 68 y.o. yo woman, seen at the request of Zachary Sibley MD for a new diagnosis of right breast cancer.      This was initially detected as an imaging abnormality. Her work-up is detailed in the oncologic history below.   She denies any breast lumps, pain, skin changes, or nipple discharge.    She denies any prior history of abnormal mammograms or breast biopsies.     She reports she has a pertinent PMH of rheumatoid arthritis - treated with injections every 2 months, HTN, Diabetes - very well controlled.     She was joined today in clinic by her . They all participated in the discussion, after her examination, and she gave her consent for them to participate.  She gave consent for her  to be present during her examination and participate in the discussion.        Oncology/Hematology History   Malignant neoplasm of overlapping sites of right breast in female, estrogen receptor negative   5/14/2024 Cancer Staged    Staging form: Breast, AJCC 8th Edition  - Clinical stage from 5/14/2024: Stage IA (cT1c, cN0, cM0, G3, ER-, KS-, HER2+) - Signed by Aracely Frye MD on 5/31/2024 5/31/2024 Initial Diagnosis    Malignant neoplasm of overlapping sites of right breast in female, estrogen receptor negative         Review of Systems   Constitutional: Negative.    HENT:  Negative.     Eyes: Negative.    Respiratory: Negative.     Cardiovascular: Negative.    Gastrointestinal: Negative.    Endocrine: Negative.    Genitourinary: Negative.     Musculoskeletal:  Positive for arthralgias.   Skin: Negative.    Neurological: Negative.    Hematological: Negative.    Psychiatric/Behavioral: Negative.         Medications:    Current Outpatient Medications:     BD PEN NEEDLE SEUN U/F 32G X 4 MM misc, USE 1 PEN NEEDLE WITH EACH  VICTOZA INJECTION, Disp: 100 each, Rfl: 1     Biotin 43618 MCG tablet dispersible, Place 10,000 mg on the tongue Daily., Disp: , Rfl:     Cinnamon 500 MG tablet, Take 1,000 mg by mouth 2 (two) times a day. PT HOLDING FOR SURGERY, Disp: , Rfl:     Coenzyme Q10 (CO Q 10 PO), Take 1 capsule by mouth Daily. PT HOLDING FOR SURGERY, Disp: , Rfl:     Golimumab (Simponi) 100 MG/ML solution auto-injector, Inject  under the skin into the appropriate area as directed., Disp: , Rfl:     levothyroxine (Synthroid) 88 MCG tablet, Take 1 tablet by mouth Daily., Disp: 90 tablet, Rfl: 3    Liraglutide (Victoza) 18 MG/3ML solution pen-injector injection, Inject 1.2 mg under the skin into the appropriate area as directed Daily., Disp: 6 mL, Rfl: 5    lisinopril-hydrochlorothiazide (PRINZIDE,ZESTORETIC) 20-25 MG per tablet, Take 1 tablet by mouth Daily., Disp: 90 tablet, Rfl: 3    meloxicam (MOBIC) 15 MG tablet, , Disp: , Rfl:     metFORMIN (GLUCOPHAGE) 500 MG tablet, TAKE THREE TABLETS BY MOUTH EVERY MORNING AND THEN TAKE TWO TABLETS BY MOUTH IN THE EVENING, Disp: 450 tablet, Rfl: 3    multivitamin with minerals tablet tablet, Take 1 tablet by mouth Daily. PT HOLDING FOR SURGERY, Disp: , Rfl:     Omega-3 Fatty Acids (fish oil) 1200 MG capsule capsule, Take 1 capsule by mouth Daily. PT HOLDING FOR SURGERY, Disp: , Rfl:     rosuvastatin (Crestor) 20 MG tablet, Take 1 tablet by mouth Daily., Disp: 90 tablet, Rfl: 1    vitamin C (ASCORBIC ACID) 250 MG tablet, Take 4 tablets by mouth Daily., Disp: , Rfl:     vitamin D3 125 MCG (5000 UT) capsule capsule, Take 1 capsule by mouth Daily., Disp: , Rfl:     VITAMIN E PO, Take 1 capsule by mouth Daily. HOLD PRIOR TO SURG, Disp: , Rfl:     clobetasol (TEMOVATE) 0.05 % external solution, Apply 1 Application topically to the appropriate area as directed As Needed. (Patient not taking: Reported on 5/30/2024), Disp: , Rfl:     docusate sodium 100 MG capsule, Take 1 capsule by mouth 2 (Two) Times a Day As Needed for Constipation. (Patient not  "taking: Reported on 2024), Disp: 30 capsule, Rfl: 1    ferrous sulfate 324 (65 Fe) MG tablet delayed-release EC tablet, Take 1 tablet by mouth Daily With Breakfast., Disp: , Rfl:     folic acid (FOLVITE) 400 MCG tablet, Take 1 tablet by mouth Daily., Disp: , Rfl:     glucose blood (OneTouch Verio) test strip, Check blood glucose one daily. (Patient not taking: Reported on 2024), Disp: 90 each, Rfl: 3    lisinopril (PRINIVIL,ZESTRIL) 20 MG tablet, Take 1 tablet by mouth Daily. (Patient not taking: Reported on 2024), Disp: 90 tablet, Rfl: 3    TACLONEX 0.005-0.064 % external suspension, Apply 1 application topically to the appropriate area as directed As Needed. (Patient not taking: Reported on 2024), Disp: , Rfl: 5    triamcinolone (KENALOG) 0.1 % ointment, APPLY TO AFFECTED AREA ON RIGHT LEG TWICE A DAY X 1 WEEK, THEN ONCE DAILY AS NEEDED (Patient not taking: Reported on 2024), Disp: , Rfl:     Allergies:  No Known Allergies    Medical history:  Past Medical History:   Diagnosis Date    Abnormal liver function tests 2017    Description: , \"-\"hepatitis profile    Anemia     Anesthesia complication     SLOW TO WAKE UP    Arthritis     Colon polyps     Cystocele, lateral 10/15/2020    Cystocele, midline 10/15/2020    Diabetes mellitus     H/O bone density study 10/2010    normal    H/O cardiovascular stress test     H/O echocardiogram 2001    2-D    Heart murmur     Hyperkalemia 2019    Hyperlipidemia     Hypertension     Hypothyroidism 2018    Incompetence of pubocervical tissue 10/15/2020    Incompetence of rectovaginal tissue 10/15/2020    Left knee pain     Limited joint range of motion     LEFT KNEE    Loss of perineal body, female 10/15/2020    Osteoarthritis of knee 2005    left    Pelvic relaxation due to vaginal prolapse 2018    Plantar warts     foot    Postmenopausal bleeding 2019    Pregnancy      twins x1    Psoriasis     Psoriasis     " Rectocele 10/15/2020    Urinary tract infection 2020    Urine incontinence     Uterovaginal prolapse     Uterovaginal prolapse, incomplete 09/14/2020    Vaginal enterocele 10/15/2020       Surgical History:  Past Surgical History:   Procedure Laterality Date    BREAST BIOPSY Right 2006    benign    COLONOSCOPY      COLONOSCOPY W/ POLYPECTOMY  02/21/2018    Dr Resendiz, 6 mm rectal polyp - hyperplastic    JOINT MANIPULATION Left 11/2/2021    Procedure: KNEE MANIPULATION with steroid injection;  Surgeon: Francis Tavarez MD;  Location: [facility] MAIN OR;  Service: Orthopedics;  Laterality: Left;    PUBOVAGINAL SLING N/A 10/16/2020    Procedure: Pubovaginal sling Posterior colporrhaphy fixation Insertion of vaginal grafts Cystourethroscopy;  Surgeon: Karen Allen MD;  Location: [facility] MAIN OR;  Service: Gynecology;  Laterality: N/A;    TOTAL KNEE ARTHROPLASTY Left 8/10/2021    Procedure: TOTAL KNEE ARTHROPLASTY;  Surgeon: Francis Tavarez MD;  Location: [facility] MAIN OR;  Service: Orthopedics;  Laterality: Left;    TOTAL LAPAROSCOPIC HYSTERECTOMY, SACROCOLPOPEXY N/A 10/16/2020    Procedure: Laparoscopic uterosacral ligament colpopexy sacral colpopexy  Laparoscopic paravaginal repair Laparoscopic hysterectomy with bilateral salpingectomy  Laparoscopic abdominal enterocele repair;  Surgeon: Karen Allen MD;  Location: [facility] OR;  Service: Gynecology;  Laterality: N/A;    TUBAL ABDOMINAL LIGATION         Family History:  Family History   Problem Relation Age of Onset    Heart disease Father     Hypertension Father     Diabetes Brother         Passed away 2012    Heart disease Maternal Grandmother     Other Son         TWIN    Other Daughter         TWIN     Thyroid disease Daughter     Diabetes Brother     Diabetes Brother     Heart disease Brother     Hypertension Brother     BRCA 1/2 Neg Hx     Breast cancer Neg Hx     Colon cancer Neg Hx     Endometrial cancer Neg Hx     Ovarian cancer Neg Hx     Malig  Hyperthermia Neg Hx        Family Cancer History: relationship - (age of diagnosis/current age or age at death)  Breast: N/A   Ovarian: N/A  Colon: N/A  Pancreas: N/A  Prostate: N/A    Social History:   Social History     Socioeconomic History    Marital status:      Spouse name: SUZANNE     Number of children: 3   Tobacco Use    Smoking status: Never    Smokeless tobacco: Never    Tobacco comments:     daily caffine   Vaping Use    Vaping status: Never Used   Substance and Sexual Activity    Alcohol use: Never    Drug use: Never    Sexual activity: Yes     Partners: Male     Birth control/protection: Post-menopausal, Hysterectomy, Surgical     Comment: SPOUSE = SUZANNE        She lives with her .  She works as a .          GYNECOLOGIC HISTORY:   G: 3. P: 2. AB: 0.  Last menstrual period: 53 years old  Age at menarche: 11  Age at first childbirth: 23  Lactation/How long: N/A  Age at menopause: 53  Total years of oral contraceptive use: 2  Total years of hormone replacement therapy: 0      Objective   PHYSICAL EXAMINATION:   Vitals:    05/30/24 1357   BP: 138/86   Pulse: 77   Resp: 18   SpO2: 99%     Examination chaperoned by Joslyn English.  ECOG 0 - Asymptomatic  General: NAD, well appearing  Psych: a&o x 3, normal mood and affect  Eyes: EOMI, no scleral icterus  ENMT: neck supple without masses or thyromegaly, mucus membranes moist  Resp: normal effort  CV: RRR, no edema   GI: soft, NT, ND  MSK: normal gait, normal ROM in bilateral shoulders  Lymph nodes:  no cervical, supraclavicular or axillary lymphadenopathy  Breast: symmetric, Bra 38B, minimal ptosis  Right: significant hematoma, resolving No visible abnormalities on inspection while seated, with arms raised or hands on hips. No masses, skin changes, or nipple abnormalities.  Left: No visible abnormalities on inspection while seated, with arms raised or hands on hips. No masses, skin changes, or nipple abnormalities.    Previous IMAGING: I have  independently reviewed her imagin/10/24 Bilateral Screening Mammogram ([facility])  Breasts are heterogeneously dense.  Finding 1: Round and course heterogenous calcifications with diffuse distribution in both breasts.  Finding 2: High density, oval mass measuring 10 mm with obscured margins seen in the middle 1/3 upper outer region of the right breast.   Finding 3: focal asymmetry with questioned architectural distortion measuring 11 mm seen int he middle 1/3 region of the right breast at 12:00  Impression  1: calcifications are benign   2: mass in the middle 1/3 upper outer region of the right breast requires additional evaluation.  3: focal asymmetry with questioned architectural distortion in the middle 1/3 region of the right breast requires additional evaluation.  BIRADS 0    24 Right Diagnostic Mammogram  Breast is heterogeneously dense which may obscure small masses.   Finding 1 - additional eval for oval mass upper outer seen 4/10/24. On present there is an isodense oval mass measuring 10 mm with obscured margins in the middle 1/3 region of the right breast 11:00  Finding 2 - additional evaluation for the focal asymmetry in the right breast 12:00 seen 4/10/24. On present there is a focal asymetry measuring 10 mm in the middle 1/3 region of the right breast at 12:00  Right Breast US  Finding 1: US shows oval hypoechoic heterogeneous mass with indistinct margins measruing [id number] mm in the middle 1/3 region of the right breast at 11 oclock 3 cm fn.   Finding 2 - no sonographic correlate  Impression:  Finding 1 - mass in the middle 1/3 region of the right breast at 11:00 is suspicious - recommend US guided biopsy  Finding 2 - focal asymmetry in the middle 1/3 region of the right breast at 12:00 is suspicious. Stereotactic biopsy is recommended  [id number]      24   Sterotactic biopsy, 12:00 - Using a 9 gauge vacume assisted device 10 core biopsies were obtained. A tophat clip was placed. Impression -  two view mammogram shows clip in the expected location. Pathology shows DCIS, is concordant.   US biopsy 11:00 for 11 mm mass - using a 12 gauge [employer] vacuum assisted device 8 cores were obtained. Impression: two view mammogram and US shows clip in expected location. Pathology shows invasive ductal cancer - is concordant.     5/21/24 Right Axillary US  FINDINGS:  Targeted sonographic evaluation of the right axilla was performed.  Benign-appearing predominantly fatty lymph nodes are identified. No morphologically abnormal right axillary lymph nodes are identified.     IMPRESSION:  Benign-appearing right axillary lymph nodes. Recommend clinical  follow-up. Surgical management is again recommended for biopsy-proven right breast malignancy.     BI-RADS Category 2: Benign    5/24/24 Breast MRI  FINDINGS: There is heterogeneous fibroglandular tissue. There is mild background parenchymal enhancement.     RIGHT BREAST:    At 11:00 in the middle right breast, 4.7 cm posterior to the nipple, there is a 1.1 x 1.1 x 1.1 cm enhancing mass with a corresponding focus of susceptibility from a biopsy clip within the posterior aspect, which represents a site of biopsy-proven malignancy. Along the ventral margin of the mass is a 1.3 x 0.8 x 1.1 cm T1 hyperintense postbiopsy hematoma.     At 12:00 in the middle to posterior right breast, centered 5 cm  posterior to the nipple, there is a 2.1 cm AP dimension, 2.8 cm  transverse dimension, 2.9 cm craniocaudal dimension T1 hyperintense postbiopsy hematoma with a corresponding focus of susceptibility from a biopsy clip marking an additional site of biopsy-proven malignancy. A 0.8 x 0.8 x 0.8 cm T2 hyperintense biopsy cavity is noted along the inferior margin of the hematoma. A biopsy tract extends to the overlying skin superiorly.  At 9:00 in the middle to posterior right breast, 5 m posterior to the nipple, there is a 0.8 cm AP dimension, 1.2 cm transverse dimension, 1.4 cm craniocaudal  dimension irregular enhancing mass, which is suspicious.     Extending from 9:00 to 2:00 in the anterior, middle, and posterior right breast, there is regional non-mass enhancement measuring approximately 4.7 cm in AP dimension, 4.7 cm in transverse dimension, and at least 4.6 cm in craniocaudal dimension. This encompasses the 2 sites of biopsy-proven malignancy, as well as the additional suspicious enhancing mass at the 10 o'clock position. Suspicious enhancement terminates approximately 2.2 cm from the base of the nipple. There is mild asymmetric right nipple retraction. No suspicious enhancement is identified in the right nipple or chest wall. The visualized axilla is within normal limits.     LEFT BREAST:    No suspicious enhancing mass or area of non-mass enhancement is identified.  The visualized axilla is within normal limits.     EXTRAMAMMARY FINDINGS:  There are no pathologically enlarged internal mammary chain lymph nodes  on either side.    There is a tiny hiatal hernia.     IMPRESSION AND RECOMMENDATION:  1.  Regional non-mass enhancement extending from 9:00 to 2:00 in the anterior, middle, and posterior right breast, which measures up to approximately 4.7 cm in maximum dimension, encompasses 2 sites of biopsy-proven malignancy, as well as a discrete suspicious irregular enhancing mass at 10:00 in the middle right breast. These findings are suspicious for multicentric malignancy and are described in detail above. Recommend oncologic and surgical management.  2.  No MRI evidence of malignancy in the left breast.      BI-RADS Category 4: Suspicious    PATHOLOGY:   5/14/24  1.  Breast, right, 12:00, focal asymmetry, biopsy (Top-): Ductal carcinoma in situ               -A.  The ductal carcinoma in situ is of high nuclear grade with solid architecture and expansive comedonecrosis.  The ductal carcinoma in situ has apocrine cytology               -B.  The ductal carcinoma in situ measures up to 4.5  mm in greatest confluence but is seen in several cores               -C.  Microcalcifications are seen associated with the ductal carcinoma in situ     2.  Breast, right, 11:00, biopsy (mini clip marker): Invasive ductal adenocarcinoma with apocrine features               -A.  The invasive tumor is Aroda grade III (tubular grade 3, nuclear grade 3, mitotic grade 2).               -B.  The invasive tumor measures up to 4.8 mm on the slides               -C.  Microcalcifications are not identified               -D.  No perineural or lymphovascular invasion is seen               -E.  No definitive ductal carcinoma in situ is seen               -F.  There is an extensive lymphoid infiltrate around the tumor  DCIS  Estrogen Receptor (ER) Status  Negative (less than 1%)     Internal control cells present and stain as expected   Test Type  Food and Drug Administration (FDA) cleared (test / vendor): [employer]   Primary Antibody  SP1   Scoring System  Harrison   Proportion Score  0   Intensity Score  0   Total Harrison Score  0   Test(s) Performed     Progesterone Receptor (PgR) Status  Negative (less than 1%)     Internal control cells present and stain as expected   Test Type  Food and Drug Administration (FDA) cleared (test / vendor): [employer]   Primary Antibody  1E2   Scoring System  Harrison   Proportion Score  1   Intensity Score  2   Total Harrison Score  3   Test(s) Performed  Ki-67   Ki-67 Percentage of Positive Nuclei  25 %     IDC  Estrogen Receptor (ER) Status  Negative (less than 1%)     Internal control cells present and stain as expected   Test Type  Food and Drug Administration (FDA) cleared (test / vendor): [employer]   Primary Antibody  SP1   Scoring System  Harrison   Proportion Score  0   Intensity Score  0   Total Harrison Score  0   Test(s) Performed     Progesterone Receptor (PgR) Status  Negative (less than 1%)     Internal control cells present and stain as expected   Test Type  Food and Drug Administration (FDA)  cleared (test / vendor): [employer]   Primary Antibody  1E2   Scoring System  Harrison   Proportion Score  0   Intensity Score  0   Total Harrison Score  0   Test(s) Performed     HER2 by Immunohistochemistry  Positive (Score 3+)   Percentage of Cells with Uniform Intense Complete Membrane Staining  100 %   Test Type  Food and Drug Administration (FDA) cleared (test / vendor): [employer]   Primary Antibody  4B5   Test(s) Performed  Ki-67   Ki-67 Percentage of Positive Nuclei  60 %       Assessment & Plan   Assessment:  68 y.o. F with a new diagnosis of right: IDC grade 3, ER/NY -, Her2 +; [id number], Clinical anatomic stage IA, Clinical prognostic stage IA.      Discussion:  I had an extensive discussion with the patient and family about the nature of this breast cancer diagnosis. We reviewed the components of breast tissue including ducts and lobules. We reviewed her pathology report in detail. We reviewed breast cancer histology, including stage, grade, ER/NY receptors, HER2 receptors and how this applies to her diagnosis. We discussed the multidisciplinary approach to breast cancer care.  Treatments can include surgery, radiation therapy, and medical therapy (chemotherapy, targeted therapy, and/or endocrine therapy).  The exact type of treatment and the order of therapy depends on the size and location/distribution of breast disease, the involvement of the regional lymph node basins, concern for or presence of metastatic disease, potential genetic mutations, and the individual breast cancer tumor markers expressed by the cancer. We also discussed other treatment options including the option of not undergoing any surgical treatment, with a palliative rather than curative intent and the risks associated with this including disease progression.    The patient's clinical stage is documented as above. This was discussed with the patient prior to initiation of treatment. All available pathology reports were discussed with the  patient today. All treatment decisions were made via shared decision making with the patient. This patient was evaluated for appropriate ancillary referrals including, pre-treatment functional assessment, exercise program, nutrition program, genetics, and lymphedema clinic. This patient received preoperative and postoperative education. The patient was offered a breast reconstruction referral appropriate to plan surgical intervention; risks and benefits were discussed today. The patient was educated on perioperative multimodal pain management strategies. Barriers to effective and efficient care are/will be evaluated by the nurse navigator.    We discussed these options and recommendations for treatment:    Surgical Options:  We discussed the surgical management of breast cancer.  Partial mastectomy with radiation and mastectomy were explained with option of reconstruction.  The patient was informed that from a survival standpoint, both procedures are oncologically equivalent    In her case she is not a candidate for breast conservation due to extent of disease and volume of breast tissue. Mastectomy was discussed including surgical incision planning and reconstruction options.    We discussed axillary staging. I described the procedure for sentinel lymph node biopsy in detail, including the preoperative injection of a radiotracer and intraoperative injection of lymphazurin blue dye. I explained that this is a mapping test and not a cancer test, that all of the lymph nodes containing these dyes will be removed for complete testing by pathology, and that the results could impact the decision for adjuvant treatment or additional surgery.     Medical Oncology:  Will require chemotherapy for Hormone negative, Her2 positive breast cancer. Extent of disease will help delineate which course of therapy would be required.     Radiation Oncology:  With the available information no radiation will be required for small burden of  disease and clinically no lymph nodes involved. It would be recommended if there were a significant burden of axillary disease identified during surgery and or if there were close margins. However small burden of disease treated with mastectomy may avert her requirement of radiation therapy    Role for Genetic Testing:  I explained that most breast cancer is not hereditary, that I do not believe this plays a role in her case, and that she does not meet NCCN criteria for genetic testing. I would not recommend a bilateral mastectomy.    Plan:  A multidisciplinary plan has been formulated for the patient:      # Breast Surgical Oncology:  -Consents completed and signed. Discussed the risks and benefits of port placement, right mastectomy, sentinel lymph node biopsy possible axillary dissection at length including estimated time for procedure, postoperative recovery, and postoperative instructions. Discussed the risks to include pain, bleeding, infection, nerve injury, numbness, seroma, skin complications including necrosis, breast asymmetry, need for re-excision, lymphedema, inability to breast feed in the future, possible need for axillary dissection at time of surgery or at a later date, lymphocele, and scar.  Patient appears to understand and wishes to proceed.  All questions were answered.   -Plastic surgery referral for reconstruction      # Medical Oncology:  -has appointment next week  - discussed patient with Dr. Belcher, will plan to place port at time of surgery.   - no role for hormonal blockade  - will need chemotherapy for HER2+ disease, clincially is not in the lymph nodes and primary breast lesion is less than 2 cm. Plan for chemotherapy following surgery    #  Radiation Oncology:  -Will refer postoperatively. If indicated - discussed AMOROS data in clinically negative axilla, consideration for sentinel lymph node biopsy and dyllan radiation vs completion axillary dissection at the time of surgery - both  would be appropriate management of axillary disease.  - will attempt to avoid radiation with additional lymph node surgery at time of excision     # Nurse Navigation  - Referral made today    # Lymphedema clinic  - Referral was placed today     # Genetic Testing   - not indicated    # Breast Imaging  - Next Screening mammogram due: left mammogram May 2025      Aracely Frye MD  Breast Surgical Oncology    I spent 60 minutes caring for Pretty  on this date of service. This time includes time spent by me in the following activities: preparing for the visit, reviewing tests, obtaining and/or reviewing a separately obtained history, performing a medically appropriate examination and/or evaluation , counseling and educating the patient/family/caregiver, ordering medications, tests, or procedures, referring and communicating with other health care professionals , documenting information in the medical record, independently interpreting results and communicating that information with the patient/family/caregiver, and care coordination.      CC:  MD Carlyn Cook, [place]

## 2024-05-30 NOTE — LETTER
May 31, 2024     CLEVELAND Sanchez  [address] [facility] [address]  [place] [id number]    Patient: Pretty Tovar   YOB: 1955   Date of Visit: 5/30/2024     Dear CLEVELAND Sanchez:       Thank you for referring Pretty Tovar to me for evaluation. Below are the relevant portions of my assessment and plan of care.    If you have questions, please do not hesitate to call me. I look forward to following Pretty along with you.         Sincerely,        Aracely Frye MD        CC: MD Melva Cook Sarah, MD  05/31/24 5413  Sign when Signing Visit  [facility]     Referring Provider: Zachary Sibley MD     Chief complaint: newly diagnosed breast cancer    Subjective   HPI: Ms. Pretty Tovar is a 68 y.o. yo woman, seen at the request of Zachary Sibley MD for a new diagnosis of right breast cancer.      This was initially detected as an imaging abnormality. Her work-up is detailed in the oncologic history below.   She denies any breast lumps, pain, skin changes, or nipple discharge.    She denies any prior history of abnormal mammograms or breast biopsies.     She reports she has a pertinent PMH of rheumatoid arthritis - treated with injections every 2 months, HTN, Diabetes - very well controlled.     She was joined today in clinic by her . They all participated in the discussion, after her examination, and she gave her consent for them to participate.  She gave consent for her  to be present during her examination and participate in the discussion.        Oncology/Hematology History   Malignant neoplasm of overlapping sites of right breast in female, estrogen receptor negative   5/14/2024 Cancer Staged    Staging form: Breast, AJCC 8th Edition  - Clinical stage from 5/14/2024: Stage IA (cT1c, cN0, cM0, G3, ER-, GA-, HER2+) - Signed by Aracely Frye MD on 5/31/2024 5/31/2024 Initial Diagnosis    Malignant neoplasm of overlapping sites of right breast in female,  estrogen receptor negative         Review of Systems   Constitutional: Negative.    HENT:  Negative.     Eyes: Negative.    Respiratory: Negative.     Cardiovascular: Negative.    Gastrointestinal: Negative.    Endocrine: Negative.    Genitourinary: Negative.     Musculoskeletal:  Positive for arthralgias.   Skin: Negative.    Neurological: Negative.    Hematological: Negative.    Psychiatric/Behavioral: Negative.         Medications:    Current Outpatient Medications:   •  BD PEN NEEDLE SEUN U/F 32G X 4 MM misc, USE 1 PEN NEEDLE WITH EACH  VICTOZA INJECTION, Disp: 100 each, Rfl: 1  •  Biotin 60855 MCG tablet dispersible, Place 10,000 mg on the tongue Daily., Disp: , Rfl:   •  Cinnamon 500 MG tablet, Take 1,000 mg by mouth 2 (two) times a day. PT HOLDING FOR SURGERY, Disp: , Rfl:   •  Coenzyme Q10 (CO Q 10 PO), Take 1 capsule by mouth Daily. PT HOLDING FOR SURGERY, Disp: , Rfl:   •  Golimumab (Simponi) 100 MG/ML solution auto-injector, Inject  under the skin into the appropriate area as directed., Disp: , Rfl:   •  levothyroxine (Synthroid) 88 MCG tablet, Take 1 tablet by mouth Daily., Disp: 90 tablet, Rfl: 3  •  Liraglutide (Victoza) 18 MG/3ML solution pen-injector injection, Inject 1.2 mg under the skin into the appropriate area as directed Daily., Disp: 6 mL, Rfl: 5  •  lisinopril-hydrochlorothiazide (PRINZIDE,ZESTORETIC) 20-25 MG per tablet, Take 1 tablet by mouth Daily., Disp: 90 tablet, Rfl: 3  •  meloxicam (MOBIC) 15 MG tablet, , Disp: , Rfl:   •  metFORMIN (GLUCOPHAGE) 500 MG tablet, TAKE THREE TABLETS BY MOUTH EVERY MORNING AND THEN TAKE TWO TABLETS BY MOUTH IN THE EVENING, Disp: 450 tablet, Rfl: 3  •  multivitamin with minerals tablet tablet, Take 1 tablet by mouth Daily. PT HOLDING FOR SURGERY, Disp: , Rfl:   •  Omega-3 Fatty Acids (fish oil) 1200 MG capsule capsule, Take 1 capsule by mouth Daily. PT HOLDING FOR SURGERY, Disp: , Rfl:   •  rosuvastatin (Crestor) 20 MG tablet, Take 1 tablet by mouth Daily.,  "Disp: 90 tablet, Rfl: 1  •  vitamin C (ASCORBIC ACID) 250 MG tablet, Take 4 tablets by mouth Daily., Disp: , Rfl:   •  vitamin D3 125 MCG (5000 UT) capsule capsule, Take 1 capsule by mouth Daily., Disp: , Rfl:   •  VITAMIN E PO, Take 1 capsule by mouth Daily. HOLD PRIOR TO SURG, Disp: , Rfl:   •  clobetasol (TEMOVATE) 0.05 % external solution, Apply 1 Application topically to the appropriate area as directed As Needed. (Patient not taking: Reported on 5/30/2024), Disp: , Rfl:   •  docusate sodium 100 MG capsule, Take 1 capsule by mouth 2 (Two) Times a Day As Needed for Constipation. (Patient not taking: Reported on 5/30/2024), Disp: 30 capsule, Rfl: 1  •  ferrous sulfate 324 (65 Fe) MG tablet delayed-release EC tablet, Take 1 tablet by mouth Daily With Breakfast., Disp: , Rfl:   •  folic acid (FOLVITE) 400 MCG tablet, Take 1 tablet by mouth Daily., Disp: , Rfl:   •  glucose blood (OneTouch Verio) test strip, Check blood glucose one daily. (Patient not taking: Reported on 5/30/2024), Disp: 90 each, Rfl: 3  •  lisinopril (PRINIVIL,ZESTRIL) 20 MG tablet, Take 1 tablet by mouth Daily. (Patient not taking: Reported on 5/30/2024), Disp: 90 tablet, Rfl: 3  •  TACLONEX 0.005-0.064 % external suspension, Apply 1 application topically to the appropriate area as directed As Needed. (Patient not taking: Reported on 5/30/2024), Disp: , Rfl: 5  •  triamcinolone (KENALOG) 0.1 % ointment, APPLY TO AFFECTED AREA ON RIGHT LEG TWICE A DAY X 1 WEEK, THEN ONCE DAILY AS NEEDED (Patient not taking: Reported on 5/30/2024), Disp: , Rfl:     Allergies:  No Known Allergies    Medical history:  Past Medical History:   Diagnosis Date   • Abnormal liver function tests 02/09/2017    Description: 2000, \"-\"hepatitis profile   • Anemia    • Anesthesia complication     SLOW TO WAKE UP   • Arthritis    • Colon polyps    • Cystocele, lateral 10/15/2020   • Cystocele, midline 10/15/2020   • Diabetes mellitus    • H/O bone density study 10/2010    normal "   • H/O cardiovascular stress test    • H/O echocardiogram 2001    2-D   • Heart murmur    • Hyperkalemia 2019   • Hyperlipidemia    • Hypertension    • Hypothyroidism    • Incompetence of pubocervical tissue 10/15/2020   • Incompetence of rectovaginal tissue 10/15/2020   • Left knee pain    • Limited joint range of motion     LEFT KNEE   • Loss of perineal body, female 10/15/2020   • Osteoarthritis of knee 2005    left   • Pelvic relaxation due to vaginal prolapse 2018   • Plantar warts     foot   • Postmenopausal bleeding 2019   • Pregnancy      twins x1   • Psoriasis    • Psoriasis    • Rectocele 10/15/2020   • Urinary tract infection    • Urine incontinence    • Uterovaginal prolapse    • Uterovaginal prolapse, incomplete 2020   • Vaginal enterocele 10/15/2020       Surgical History:  Past Surgical History:   Procedure Laterality Date   • BREAST BIOPSY Right 2006    benign   • COLONOSCOPY     • COLONOSCOPY W/ POLYPECTOMY  2018    Dr Resendiz, 6 mm rectal polyp - hyperplastic   • JOINT MANIPULATION Left 2021    Procedure: KNEE MANIPULATION with steroid injection;  Surgeon: Francis Tavarez MD;  Location: [facility];  Service: Orthopedics;  Laterality: Left;   • PUBOVAGINAL SLING N/A 10/16/2020    Procedure: Pubovaginal sling Posterior colporrhaphy fixation Insertion of vaginal grafts Cystourethroscopy;  Surgeon: Karen Allen MD;  Location: [facility] OR;  Service: Gynecology;  Laterality: N/A;   • TOTAL KNEE ARTHROPLASTY Left 8/10/2021    Procedure: TOTAL KNEE ARTHROPLASTY;  Surgeon: Francis Tvaarez MD;  Location: [facility] OR;  Service: Orthopedics;  Laterality: Left;   • TOTAL LAPAROSCOPIC HYSTERECTOMY, SACROCOLPOPEXY N/A 10/16/2020    Procedure: Laparoscopic uterosacral ligament colpopexy sacral colpopexy  Laparoscopic paravaginal repair Laparoscopic hysterectomy with bilateral salpingectomy  Laparoscopic abdominal enterocele repair;  Surgeon:  Karen Allen MD;  Location: [facility] OR;  Service: Gynecology;  Laterality: N/A;   • TUBAL ABDOMINAL LIGATION         Family History:  Family History   Problem Relation Age of Onset   • Heart disease Father    • Hypertension Father    • Diabetes Brother         Passed away 2012   • Heart disease Maternal Grandmother    • Other Son         TWIN   • Other Daughter         TWIN    • Thyroid disease Daughter    • Diabetes Brother    • Diabetes Brother    • Heart disease Brother    • Hypertension Brother    • BRCA 1/2 Neg Hx    • Breast cancer Neg Hx    • Colon cancer Neg Hx    • Endometrial cancer Neg Hx    • Ovarian cancer Neg Hx    • Malig Hyperthermia Neg Hx        Family Cancer History: relationship - (age of diagnosis/current age or age at death)  Breast: N/A   Ovarian: N/A  Colon: N/A  Pancreas: N/A  Prostate: N/A    Social History:   Social History     Socioeconomic History   • Marital status:      Spouse name: SUZANNE    • Number of children: 3   Tobacco Use   • Smoking status: Never   • Smokeless tobacco: Never   • Tobacco comments:     daily caffine   Vaping Use   • Vaping status: Never Used   Substance and Sexual Activity   • Alcohol use: Never   • Drug use: Never   • Sexual activity: Yes     Partners: Male     Birth control/protection: Post-menopausal, Hysterectomy, Surgical     Comment: SPOUSE = SUZANNE        She lives with her .  She works as a .          GYNECOLOGIC HISTORY:   G: 3. P: 2. AB: 0.  Last menstrual period: 53 years old  Age at menarche: 11  Age at first childbirth: 23  Lactation/How long: N/A  Age at menopause: 53  Total years of oral contraceptive use: 2  Total years of hormone replacement therapy: 0      Objective  PHYSICAL EXAMINATION:   Vitals:    05/30/24 1357   BP: 138/86   Pulse: 77   Resp: 18   SpO2: 99%     Examination chaperoned by Joslyn English.  ECOG 0 - Asymptomatic  General: NAD, well appearing  Psych: a&o x 3, normal mood and affect  Eyes: EOMI, no scleral  icterus  ENMT: neck supple without masses or thyromegaly, mucus membranes moist  Resp: normal effort  CV: RRR, no edema   GI: soft, NT, ND  MSK: normal gait, normal ROM in bilateral shoulders  Lymph nodes:  no cervical, supraclavicular or axillary lymphadenopathy  Breast: symmetric, Bra 38B, minimal ptosis  Right: significant hematoma, resolving No visible abnormalities on inspection while seated, with arms raised or hands on hips. No masses, skin changes, or nipple abnormalities.  Left: No visible abnormalities on inspection while seated, with arms raised or hands on hips. No masses, skin changes, or nipple abnormalities.    Previous IMAGING: I have independently reviewed her imagin/10/24 Bilateral Screening Mammogram ([facility])  Breasts are heterogeneously dense.  Finding 1: Round and course heterogenous calcifications with diffuse distribution in both breasts.  Finding 2: High density, oval mass measuring 10 mm with obscured margins seen in the middle 1/3 upper outer region of the right breast.   Finding 3: focal asymmetry with questioned architectural distortion measuring 11 mm seen int he middle 1/3 region of the right breast at 12:00  Impression  1: calcifications are benign   2: mass in the middle 1/3 upper outer region of the right breast requires additional evaluation.  3: focal asymmetry with questioned architectural distortion in the middle 1/3 region of the right breast requires additional evaluation.  BIRADS 0    24 Right Diagnostic Mammogram  Breast is heterogeneously dense which may obscure small masses.   Finding 1 - additional eval for oval mass upper outer seen 4/10/24. On present there is an isodense oval mass measuring 10 mm with obscured margins in the middle 1/3 region of the right breast 11:00  Finding 2 - additional evaluation for the focal asymmetry in the right breast 12:00 seen 4/10/24. On present there is a focal asymetry measuring 10 mm in the middle 1/3 region of the right breast  at 12:00  Right Breast US  Finding 1: US shows oval hypoechoic heterogeneous mass with indistinct margins measruing [id number] mm in the middle 1/3 region of the right breast at 11 oclock 3 cm fn.   Finding 2 - no sonographic correlate  Impression:  Finding 1 - mass in the middle 1/3 region of the right breast at 11:00 is suspicious - recommend US guided biopsy  Finding 2 - focal asymmetry in the middle 1/3 region of the right breast at 12:00 is suspicious. Stereotactic biopsy is recommended  [id number]      5/14/24   Sterotactic biopsy, 12:00 - Using a 9 gauge vacume assisted device 10 core biopsies were obtained. A tophat clip was placed. Impression - two view mammogram shows clip in the expected location. Pathology shows DCIS, is concordant.   US biopsy 11:00 for 11 mm mass - using a 12 gauge suros vacuum assisted device 8 cores were obtained. Impression: two view mammogram and US shows clip in expected location. Pathology shows invasive ductal cancer - is concordant.     5/21/24 Right Axillary US  FINDINGS:  Targeted sonographic evaluation of the right axilla was performed.  Benign-appearing predominantly fatty lymph nodes are identified. No morphologically abnormal right axillary lymph nodes are identified.     IMPRESSION:  Benign-appearing right axillary lymph nodes. Recommend clinical  follow-up. Surgical management is again recommended for biopsy-proven right breast malignancy.     BI-RADS Category 2: Benign    5/24/24 Breast MRI  FINDINGS: There is heterogeneous fibroglandular tissue. There is mild background parenchymal enhancement.     RIGHT BREAST:    At 11:00 in the middle right breast, 4.7 cm posterior to the nipple, there is a 1.1 x 1.1 x 1.1 cm enhancing mass with a corresponding focus of susceptibility from a biopsy clip within the posterior aspect, which represents a site of biopsy-proven malignancy. Along the ventral margin of the mass is a 1.3 x 0.8 x 1.1 cm T1 hyperintense postbiopsy hematoma.     At  12:00 in the middle to posterior right breast, centered 5 cm  posterior to the nipple, there is a 2.1 cm AP dimension, 2.8 cm  transverse dimension, 2.9 cm craniocaudal dimension T1 hyperintense postbiopsy hematoma with a corresponding focus of susceptibility from a biopsy clip marking an additional site of biopsy-proven malignancy. A 0.8 x 0.8 x 0.8 cm T2 hyperintense biopsy cavity is noted along the inferior margin of the hematoma. A biopsy tract extends to the overlying skin superiorly.  At 9:00 in the middle to posterior right breast, 5 m posterior to the nipple, there is a 0.8 cm AP dimension, 1.2 cm transverse dimension, 1.4 cm craniocaudal dimension irregular enhancing mass, which is suspicious.     Extending from 9:00 to 2:00 in the anterior, middle, and posterior right breast, there is regional non-mass enhancement measuring approximately 4.7 cm in AP dimension, 4.7 cm in transverse dimension, and at least 4.6 cm in craniocaudal dimension. This encompasses the 2 sites of biopsy-proven malignancy, as well as the additional suspicious enhancing mass at the 10 o'clock position. Suspicious enhancement terminates approximately 2.2 cm from the base of the nipple. There is mild asymmetric right nipple retraction. No suspicious enhancement is identified in the right nipple or chest wall. The visualized axilla is within normal limits.     LEFT BREAST:    No suspicious enhancing mass or area of non-mass enhancement is identified.  The visualized axilla is within normal limits.     EXTRAMAMMARY FINDINGS:  There are no pathologically enlarged internal mammary chain lymph nodes  on either side.    There is a tiny hiatal hernia.     IMPRESSION AND RECOMMENDATION:  1.  Regional non-mass enhancement extending from 9:00 to 2:00 in the anterior, middle, and posterior right breast, which measures up to approximately 4.7 cm in maximum dimension, encompasses 2 sites of biopsy-proven malignancy, as well as a discrete suspicious  irregular enhancing mass at 10:00 in the middle right breast. These findings are suspicious for multicentric malignancy and are described in detail above. Recommend oncologic and surgical management.  2.  No MRI evidence of malignancy in the left breast.      BI-RADS Category 4: Suspicious    PATHOLOGY:   5/14/24  1.  Breast, right, 12:00, focal asymmetry, biopsy (Top-): Ductal carcinoma in situ               -A.  The ductal carcinoma in situ is of high nuclear grade with solid architecture and expansive comedonecrosis.  The ductal carcinoma in situ has apocrine cytology               -B.  The ductal carcinoma in situ measures up to 4.5 mm in greatest confluence but is seen in several cores               -C.  Microcalcifications are seen associated with the ductal carcinoma in situ     2.  Breast, right, 11:00, biopsy (mini clip marker): Invasive ductal adenocarcinoma with apocrine features               -A.  The invasive tumor is [place] grade III (tubular grade 3, nuclear grade 3, mitotic grade 2).               -B.  The invasive tumor measures up to 4.8 mm on the slides               -C.  Microcalcifications are not identified               -D.  No perineural or lymphovascular invasion is seen               -E.  No definitive ductal carcinoma in situ is seen               -F.  There is an extensive lymphoid infiltrate around the tumor  DCIS  Estrogen Receptor (ER) Status  Negative (less than 1%)     Internal control cells present and stain as expected   Test Type  Food and Drug Administration (FDA) cleared (test / vendor): VentVaultize   Primary Antibody  SP1   Scoring System  Harrison   Proportion Score  0   Intensity Score  0   Total Harrison Score  0   Test(s) Performed     Progesterone Receptor (PgR) Status  Negative (less than 1%)     Internal control cells present and stain as expected   Test Type  Food and Drug Administration (FDA) cleared (test / vendor): Ventena   Primary Antibody  1E2   Scoring  System  Harrison   Proportion Score  1   Intensity Score  2   Total Harrison Score  3   Test(s) Performed  Ki-67   Ki-67 Percentage of Positive Nuclei  25 %     IDC  Estrogen Receptor (ER) Status  Negative (less than 1%)     Internal control cells present and stain as expected   Test Type  Food and Drug Administration (FDA) cleared (test / vendor): Ventena   Primary Antibody  SP1   Scoring System  Harrison   Proportion Score  0   Intensity Score  0   Total Harrison Score  0   Test(s) Performed     Progesterone Receptor (PgR) Status  Negative (less than 1%)     Internal control cells present and stain as expected   Test Type  Food and Drug Administration (FDA) cleared (test / vendor): Ventena   Primary Antibody  1E2   Scoring System  Harrison   Proportion Score  0   Intensity Score  0   Total Harrison Score  0   Test(s) Performed     HER2 by Immunohistochemistry  Positive (Score 3+)   Percentage of Cells with Uniform Intense Complete Membrane Staining  100 %   Test Type  Food and Drug Administration (FDA) cleared (test / vendor): Ventena   Primary Antibody  4B5   Test(s) Performed  Ki-67   Ki-67 Percentage of Positive Nuclei  60 %       Assessment & Plan  Assessment:  68 y.o. F with a new diagnosis of right: IDC grade 3, ER/MA -, Her2 +; [id number], Clinical anatomic stage IA, Clinical prognostic stage IA.      Discussion:  I had an extensive discussion with the patient and family about the nature of this breast cancer diagnosis. We reviewed the components of breast tissue including ducts and lobules. We reviewed her pathology report in detail. We reviewed breast cancer histology, including stage, grade, ER/MA receptors, HER2 receptors and how this applies to her diagnosis. We discussed the multidisciplinary approach to breast cancer care.  Treatments can include surgery, radiation therapy, and medical therapy (chemotherapy, targeted therapy, and/or endocrine therapy).  The exact type of treatment and the order of therapy depends  on the size and location/distribution of breast disease, the involvement of the regional lymph node basins, concern for or presence of metastatic disease, potential genetic mutations, and the individual breast cancer tumor markers expressed by the cancer. We also discussed other treatment options including the option of not undergoing any surgical treatment, with a palliative rather than curative intent and the risks associated with this including disease progression.    The patient's clinical stage is documented as above. This was discussed with the patient prior to initiation of treatment. All available pathology reports were discussed with the patient today. All treatment decisions were made via shared decision making with the patient. This patient was evaluated for appropriate ancillary referrals including, pre-treatment functional assessment, exercise program, nutrition program, genetics, and lymphedema clinic. This patient received preoperative and postoperative education. The patient was offered a breast reconstruction referral appropriate to plan surgical intervention; risks and benefits were discussed today. The patient was educated on perioperative multimodal pain management strategies. Barriers to effective and efficient care are/will be evaluated by the nurse navigator.    We discussed these options and recommendations for treatment:    Surgical Options:  We discussed the surgical management of breast cancer.  Partial mastectomy with radiation and mastectomy were explained with option of reconstruction.  The patient was informed that from a survival standpoint, both procedures are oncologically equivalent    In her case she is not a candidate for breast conservation due to extent of disease and volume of breast tissue. Mastectomy was discussed including surgical incision planning and reconstruction options.    We discussed axillary staging. I described the procedure for sentinel lymph node biopsy in  detail, including the preoperative injection of a radiotracer and intraoperative injection of lymphazurin blue dye. I explained that this is a mapping test and not a cancer test, that all of the lymph nodes containing these dyes will be removed for complete testing by pathology, and that the results could impact the decision for adjuvant treatment or additional surgery.     Medical Oncology:  Will require chemotherapy for Hormone negative, Her2 positive breast cancer. Extent of disease will help delineate which course of therapy would be required.     Radiation Oncology:  With the available information no radiation will be required for small burden of disease and clinically no lymph nodes involved. It would be recommended if there were a significant burden of axillary disease identified during surgery and or if there were close margins. However small burden of disease treated with mastectomy may avert her requirement of radiation therapy    Role for Genetic Testing:  I explained that most breast cancer is not hereditary, that I do not believe this plays a role in her case, and that she does not meet NCCN criteria for genetic testing. I would not recommend a bilateral mastectomy.    Plan:  A multidisciplinary plan has been formulated for the patient:      # Breast Surgical Oncology:  -Consents completed and signed. Discussed the risks and benefits of port placement, right mastectomy, sentinel lymph node biopsy possible axillary dissection at length including estimated time for procedure, postoperative recovery, and postoperative instructions. Discussed the risks to include pain, bleeding, infection, nerve injury, numbness, seroma, skin complications including necrosis, breast asymmetry, need for re-excision, lymphedema, inability to breast feed in the future, possible need for axillary dissection at time of surgery or at a later date, lymphocele, and scar.  Patient appears to understand and wishes to proceed.  All  questions were answered.   -Plastic surgery referral for reconstruction      # Medical Oncology:  -has appointment next week  - discussed patient with Dr. Belcehr, will plan to place port at time of surgery.   - no role for hormonal blockade  - will need chemotherapy for HER2+ disease, clincially is not in the lymph nodes and primary breast lesion is less than 2 cm. Plan for chemotherapy following surgery    #  Radiation Oncology:  -Will refer postoperatively. If indicated - discussed AMOROS data in clinically negative axilla, consideration for sentinel lymph node biopsy and dyllan radiation vs completion axillary dissection at the time of surgery - both would be appropriate management of axillary disease.  - will attempt to avoid radiation with additional lymph node surgery at time of excision     # Nurse Navigation  - Referral made today    # Lymphedema clinic  - Referral was placed today     # Genetic Testing   - not indicated    # Breast Imaging  - Next Screening mammogram due: left mammogram May 2025      Aracely Frye MD  Breast Surgical Oncology    I spent 60 minutes caring for Pretty  on this date of service. This time includes time spent by me in the following activities: preparing for the visit, reviewing tests, obtaining and/or reviewing a separately obtained history, performing a medically appropriate examination and/or evaluation , counseling and educating the patient/family/caregiver, ordering medications, tests, or procedures, referring and communicating with other health care professionals , documenting information in the medical record, independently interpreting results and communicating that information with the patient/family/caregiver, and care coordination.      CC:  MD Carlyn Cook, [place]

## 2024-05-31 ENCOUNTER — PREP FOR SURGERY (OUTPATIENT)
Dept: OTHER | Facility: HOSPITAL | Age: 69
End: 2024-05-31

## 2024-05-31 DIAGNOSIS — Z17.1 MALIGNANT NEOPLASM OF OVERLAPPING SITES OF RIGHT BREAST IN FEMALE, ESTROGEN RECEPTOR NEGATIVE: Primary | ICD-10-CM

## 2024-05-31 DIAGNOSIS — C50.811 MALIGNANT NEOPLASM OF OVERLAPPING SITES OF RIGHT BREAST IN FEMALE, ESTROGEN RECEPTOR NEGATIVE: Primary | ICD-10-CM

## 2024-05-31 RX ORDER — DIAZEPAM 5 MG/1
10 TABLET ORAL ONCE
Status: CANCELLED | OUTPATIENT
Start: 2024-07-03 | End: 2024-06-01

## 2024-06-03 ENCOUNTER — PATIENT OUTREACH (OUTPATIENT)
Dept: OTHER | Facility: HOSPITAL | Age: 69
End: 2024-06-03
Payer: MEDICARE

## 2024-06-03 ENCOUNTER — TELEPHONE (OUTPATIENT)
Dept: SURGERY | Facility: CLINIC | Age: 69
End: 2024-06-03
Payer: MEDICARE

## 2024-06-03 NOTE — TELEPHONE ENCOUNTER
Called PT with her consult date with Dr.Wemerling.    Consult: 06/12/2024 at 11:00 AM    PT gave a verbal understanding of appt date,time,and location    Appt reminder sent out 06/03/2024.    MEN

## 2024-06-03 NOTE — PROGRESS NOTES
Referral received from Dr. Frye's office. I called Ms. Tovar and introduced myself and navigational services. She stated the consult with Dr. Frye went well and she is leaning toward a mastectomy with reconstruction. She will meet with Dr. Montiel June 12th to discuss reconstruction. She has a good understanding of her pathology and treatment options. She was able to verbalize teach back on her plan of care.      She stated she has a wonderful support system with her , family and friends. She stated she feels comfortable talking to them about needs or issues.      She stated she has no financial or transportation concerns at this time. She has no resource needs or ongoing concerns at this time.      She stated she is doing well emotionally but has her good days and bad days. We discussed that can be normal and we have support options if the need arises. She was thankful for the information.      We discussed integrative therapies and other [facility]. She will received a navigation folder with the following information in the mail:     [facility], Cancer and Restorative Exercise (CARE), [employer] Exercise program, Guide for the Newly Diagnosed, [facility], [facility], Massage Therapy, Reiki Therapy, [employer] [place], Cancer Nutrition, and [facility].     She verbalized appreciation for navigational services and she has my contact information and will call with any questions that arise.

## 2024-06-05 ENCOUNTER — LAB (OUTPATIENT)
Dept: LAB | Facility: HOSPITAL | Age: 69
End: 2024-06-05
Payer: MEDICARE

## 2024-06-05 ENCOUNTER — CONSULT (OUTPATIENT)
Dept: ONCOLOGY | Facility: CLINIC | Age: 69
End: 2024-06-05
Payer: MEDICARE

## 2024-06-05 VITALS
SYSTOLIC BLOOD PRESSURE: 134 MMHG | HEIGHT: 60 IN | BODY MASS INDEX: 31.45 KG/M2 | TEMPERATURE: 98.5 F | HEART RATE: 78 BPM | OXYGEN SATURATION: 97 % | WEIGHT: 160.2 LBS | RESPIRATION RATE: 18 BRPM | DIASTOLIC BLOOD PRESSURE: 64 MMHG

## 2024-06-05 DIAGNOSIS — Z01.89 ENCOUNTER FOR OTHER SPECIFIED SPECIAL EXAMINATIONS: ICD-10-CM

## 2024-06-05 DIAGNOSIS — Z17.1 MALIGNANT NEOPLASM OF OVERLAPPING SITES OF RIGHT BREAST IN FEMALE, ESTROGEN RECEPTOR NEGATIVE: Primary | ICD-10-CM

## 2024-06-05 DIAGNOSIS — C50.811 MALIGNANT NEOPLASM OF OVERLAPPING SITES OF RIGHT BREAST IN FEMALE, ESTROGEN RECEPTOR NEGATIVE: Primary | ICD-10-CM

## 2024-06-05 DIAGNOSIS — R79.89 ABNORMAL CBC: Primary | ICD-10-CM

## 2024-06-05 DIAGNOSIS — I10 BENIGN ESSENTIAL HYPERTENSION: ICD-10-CM

## 2024-06-05 DIAGNOSIS — E11.3293 TYPE 2 DIABETES MELLITUS WITH BOTH EYES AFFECTED BY MILD NONPROLIFERATIVE RETINOPATHY WITHOUT MACULAR EDEMA, WITHOUT LONG-TERM CURRENT USE OF INSULIN: Primary | ICD-10-CM

## 2024-06-05 DIAGNOSIS — E78.00 PURE HYPERCHOLESTEROLEMIA: ICD-10-CM

## 2024-06-05 DIAGNOSIS — E06.3 HASHIMOTO'S THYROIDITIS: ICD-10-CM

## 2024-06-05 LAB
BASOPHILS # BLD AUTO: 0.04 10*3/MM3 (ref 0–0.2)
BASOPHILS NFR BLD AUTO: 0.8 % (ref 0–1.5)
DEPRECATED RDW RBC AUTO: 43.4 FL (ref 37–54)
EOSINOPHIL # BLD AUTO: 0.38 10*3/MM3 (ref 0–0.4)
EOSINOPHIL NFR BLD AUTO: 7.4 % (ref 0.3–6.2)
ERYTHROCYTE [DISTWIDTH] IN BLOOD BY AUTOMATED COUNT: 12.9 % (ref 12.3–15.4)
HCT VFR BLD AUTO: 39.4 % (ref 34–46.6)
HGB BLD-MCNC: 13.1 G/DL (ref 12–15.9)
IMM GRANULOCYTES # BLD AUTO: 0.01 10*3/MM3 (ref 0–0.05)
IMM GRANULOCYTES NFR BLD AUTO: 0.2 % (ref 0–0.5)
LYMPHOCYTES # BLD AUTO: 1.45 10*3/MM3 (ref 0.7–3.1)
LYMPHOCYTES NFR BLD AUTO: 28.1 % (ref 19.6–45.3)
MCH RBC QN AUTO: 30.9 PG (ref 26.6–33)
MCHC RBC AUTO-ENTMCNC: 33.2 G/DL (ref 31.5–35.7)
MCV RBC AUTO: 92.9 FL (ref 79–97)
MONOCYTES # BLD AUTO: 0.38 10*3/MM3 (ref 0.1–0.9)
MONOCYTES NFR BLD AUTO: 7.4 % (ref 5–12)
NEUTROPHILS NFR BLD AUTO: 2.9 10*3/MM3 (ref 1.7–7)
NEUTROPHILS NFR BLD AUTO: 56.1 % (ref 42.7–76)
NRBC BLD AUTO-RTO: 0 /100 WBC (ref 0–0.2)
PLATELET # BLD AUTO: 273 10*3/MM3 (ref 140–450)
PMV BLD AUTO: 10.7 FL (ref 6–12)
RBC # BLD AUTO: 4.24 10*6/MM3 (ref 3.77–5.28)
WBC NRBC COR # BLD AUTO: 5.16 10*3/MM3 (ref 3.4–10.8)

## 2024-06-05 PROCEDURE — 36415 COLL VENOUS BLD VENIPUNCTURE: CPT

## 2024-06-05 PROCEDURE — 85025 COMPLETE CBC W/AUTO DIFF WBC: CPT

## 2024-06-05 RX ORDER — AMOXICILLIN 500 MG/1
500 CAPSULE ORAL
COMMUNITY

## 2024-06-05 RX ORDER — LISINOPRIL 20 MG/1
20 TABLET ORAL
COMMUNITY

## 2024-06-05 NOTE — PROGRESS NOTES
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    HISTORY OF PRESENT ILLNESS:  The patient is a 68 y.o. year old female who is here for an opinion about the above issue.    History of Present Illness patient is a 68-year-old female with 4-year history of rheumatoid arthritis on Simponi who was found on her most recent screening mammogram have an abnormality in the right breast with a 10 mm oval mass at the 11:00 and an 11 mm mass at 12:00.  His abnormalities persisted and diagnostic imaging and the patient underwent biopsy of both these areas.  The area at 12:00 was DCIS high-grade ER/MA negative and the lesion at 11:00 was grade 3 invasive ductal carcinoma measuring 4.8 mm ER/MA negative HER2 3+    Patient was referred to Dr. Aracely Frye and underwent bilateral screening mammograms which showed large hematomas in both biopsy sites with a residual 1.1 x 1.1 cm mass at 11:00 and 12:00 was large hematoma with a biopsy cavity and at 9:00 there was another 1.2 x 1.4 cm mass which was suspicious and then extending from 90 to o'clock there was non-mass enhancement measuring 4.7 x 4.7 centimeters encompassing the 2 biopsy sites and the 9:00 mass    Patient has been referred to discuss treatment but the plan is to proceed with surgery first as masses were nonpalpable and small and initial imaging although now with a large postbiopsy hematoma was palpation is unreliable    Patient is  2 para 3 first childbirth was at age 22 she did not breast-feed menarche was at age 11 menopause 50.   "She has been on no post menopausal hormone replacement    Family history is completely negative for any malignancy and no genetic testing was done    She is not a smoker or drinker  She has not had a DVT MI or stroke    She has been on Simponi for her rheumatoid arthritis her last dose was in April and she is going to skip the  dose because surgery is scheduled    Her last bone density in 2018 was normal    No history of unusual bleeding with her hysterectomy or knee replacement    Past Medical History:   Diagnosis Date    Abnormal liver function tests 2017    Description: , \"-\"hepatitis profile    Anemia     Anesthesia complication     SLOW TO WAKE UP    Arthritis     Colon polyps     Cystocele, lateral 10/15/2020    Cystocele, midline 10/15/2020    Diabetes mellitus     H/O bone density study 10/2010    normal    H/O cardiovascular stress test     H/O echocardiogram 2001    2-D    Heart murmur     Hyperkalemia 2019    Hyperlipidemia     Hypertension     Hypothyroidism 2018    Incompetence of pubocervical tissue 10/15/2020    Incompetence of rectovaginal tissue 10/15/2020    Left knee pain     Limited joint range of motion     LEFT KNEE    Loss of perineal body, female 10/15/2020    Osteoarthritis of knee 2005    left    Pelvic relaxation due to vaginal prolapse 2018    Plantar warts     foot    Postmenopausal bleeding 2019    Pregnancy      twins x1    Psoriasis     Psoriasis     Rectocele 10/15/2020    Urinary tract infection     Urine incontinence     Uterovaginal prolapse     Uterovaginal prolapse, incomplete 2020    Vaginal enterocele 10/15/2020        Past Surgical History:   Procedure Laterality Date    BREAST BIOPSY Right 2006    benign    COLONOSCOPY      COLONOSCOPY W/ POLYPECTOMY  2018    Dr Resendiz, 6 mm rectal polyp - hyperplastic    JOINT MANIPULATION Left 2021    Procedure: KNEE MANIPULATION with steroid injection;  Surgeon: Daysi, " Francis BA MD;  Location: [facility] MAIN OR;  Service: Orthopedics;  Laterality: Left;    PUBOVAGINAL SLING N/A 10/16/2020    Procedure: Pubovaginal sling Posterior colporrhaphy fixation Insertion of vaginal grafts Cystourethroscopy;  Surgeon: Karen Allen MD;  Location: [facility] MAIN OR;  Service: Gynecology;  Laterality: N/A;    TOTAL KNEE ARTHROPLASTY Left 8/10/2021    Procedure: TOTAL KNEE ARTHROPLASTY;  Surgeon: Francis Tavarez MD;  Location: [facility] MAIN OR;  Service: Orthopedics;  Laterality: Left;    TOTAL LAPAROSCOPIC HYSTERECTOMY, SACROCOLPOPEXY N/A 10/16/2020    Procedure: Laparoscopic uterosacral ligament colpopexy sacral colpopexy  Laparoscopic paravaginal repair Laparoscopic hysterectomy with bilateral salpingectomy  Laparoscopic abdominal enterocele repair;  Surgeon: Karen Allen MD;  Location: [facility] OR;  Service: Gynecology;  Laterality: N/A;    TUBAL ABDOMINAL LIGATION          Current Outpatient Medications on File Prior to Visit   Medication Sig Dispense Refill    amoxicillin (AMOXIL) 500 MG capsule Take 1 capsule by mouth. For Dental Procedures      BD PEN NEEDLE SEUN U/F 32G X 4 MM misc USE 1 PEN NEEDLE WITH EACH  VICTOZA INJECTION 100 each 1    Biotin 53477 MCG tablet dispersible Place 10,000 mg on the tongue Daily.      Cinnamon 500 MG tablet Take 1,000 mg by mouth 2 (two) times a day. PT HOLDING FOR SURGERY      Coenzyme Q10 (CO Q 10 PO) Take 1 capsule by mouth Daily. PT HOLDING FOR SURGERY      Golimumab (Simponi) 100 MG/ML solution auto-injector Inject  under the skin into the appropriate area as directed.      levothyroxine (Synthroid) 88 MCG tablet Take 1 tablet by mouth Daily. 90 tablet 3    Liraglutide (Victoza) 18 MG/3ML solution pen-injector injection Inject 1.2 mg under the skin into the appropriate area as directed Daily. 6 mL 5    lisinopril (PRINIVIL,ZESTRIL) 20 MG tablet Take 1 tablet by mouth.      lisinopril-hydrochlorothiazide (PRINZIDE,ZESTORETIC) 20-25 MG per tablet  Take 1 tablet by mouth Daily. 90 tablet 3    meloxicam (MOBIC) 15 MG tablet       metFORMIN (GLUCOPHAGE) 500 MG tablet TAKE THREE TABLETS BY MOUTH EVERY MORNING AND THEN TAKE TWO TABLETS BY MOUTH IN THE EVENING 450 tablet 3    multivitamin with minerals tablet tablet Take 1 tablet by mouth Daily. PT HOLDING FOR SURGERY      Omega-3 Fatty Acids (fish oil) 1200 MG capsule capsule Take 1 capsule by mouth Daily. PT HOLDING FOR SURGERY      rosuvastatin (Crestor) 20 MG tablet Take 1 tablet by mouth Daily. 90 tablet 1    vitamin C (ASCORBIC ACID) 250 MG tablet Take 4 tablets by mouth Daily.      vitamin D3 125 MCG (5000 UT) capsule capsule Take 1 capsule by mouth Daily.      VITAMIN E PO Take 1 capsule by mouth Daily. HOLD PRIOR TO SURG      [DISCONTINUED] ferrous sulfate 324 (65 Fe) MG tablet delayed-release EC tablet Take 1 tablet by mouth Daily With Breakfast.       No current facility-administered medications on file prior to visit.        ALLERGIES:  No Known Allergies     Social History     Socioeconomic History    Marital status:      Spouse name: SUZANNE     Number of children: 3   Tobacco Use    Smoking status: Never    Smokeless tobacco: Never    Tobacco comments:     daily caffine   Vaping Use    Vaping status: Never Used   Substance and Sexual Activity    Alcohol use: Never    Drug use: Never    Sexual activity: Yes     Partners: Male     Birth control/protection: Post-menopausal, Hysterectomy, Surgical     Comment: SPOUSE = SUZANNE         Family History   Problem Relation Age of Onset    Heart disease Father     Hypertension Father     Diabetes Brother         Passed away 2012    Heart disease Maternal Grandmother     Other Son         TWIN    Other Daughter         TWIN     Thyroid disease Daughter     Diabetes Brother     Diabetes Brother     Heart disease Brother     Hypertension Brother     BRCA 1/2 Neg Hx     Breast cancer Neg Hx     Colon cancer Neg Hx     Endometrial cancer Neg Hx     Ovarian cancer  "Neg Hx     Malig Hyperthermia Neg Hx         Review of Systems   Musculoskeletal:  Positive for arthralgias.        Objective     Vitals:    06/05/24 1506   BP: 134/64   Pulse: 78   Resp: 18   Temp: 98.5 °F (36.9 °C)   TempSrc: Oral   SpO2: 97%   Weight: 72.7 kg (160 lb 3.2 oz)   Height: 152.4 cm (60\")   PainSc: 0-No pain         6/5/2024     2:51 PM   Current Status   ECOG score 0       Physical Exam  Chest:            CONSTITUTIONAL:  Vital signs reviewed.  No distress, looks comfortable.  EYES:  Conjunctivae and lids unremarkable.  PERRLA  EARS,NOSE,MOUTH,THROAT:  Ears and nose appear unremarkable.  Lips, teeth, gums appear unremarkable.  RESPIRATORY:  Normal respiratory effort.  Lungs clear to auscultation bilaterally.  BREASTS: Left breast is benign the right breast shows a firm hematoma at the 12 o'clock position with no definite palpable masses or axillary adenopathy  CARDIOVASCULAR:  Normal S1, S2.  No murmurs rubs or gallops.  No significant lower extremity edema.  GASTROINTESTINAL: Abdomen appears unremarkable.  Nontender.  No hepatomegaly.  No splenomegaly.  LYMPHATIC:  No cervical, supraclavicular, axillary lymphadenopathy.  SKIN:  Warm.  No rashes.  PSYCHIATRIC:  Normal judgment and insight.  Normal mood and affect.      RECENT LABS:  Hematology WBC   Date Value Ref Range Status   06/05/2024 5.16 3.40 - 10.80 10*3/mm3 Final   06/23/2023 5.38 3.40 - 10.80 10*3/mm3 Final     RBC   Date Value Ref Range Status   06/05/2024 4.24 3.77 - 5.28 10*6/mm3 Final   06/23/2023 4.06 3.77 - 5.28 10*6/mm3 Final     Hemoglobin   Date Value Ref Range Status   06/05/2024 13.1 12.0 - 15.9 g/dL Final     Hematocrit   Date Value Ref Range Status   06/05/2024 39.4 34.0 - 46.6 % Final     Platelets   Date Value Ref Range Status   06/05/2024 273 140 - 450 10*3/mm3 Final          ynoptic Checklist   DCIS OF THE BREAST: Biopsy   Protocol posted: 9/20/2023DCIS OF THE BREAST: BIOPSY - All Specimens  SPECIMEN   Procedure  Needle " "biopsy   Specimen Laterality  Right   TUMOR   Tumor Site  Clock position     12 o'clock   Histologic Type  Ductal carcinoma in situ (DCIS)   Architectural Patterns  Solid   Nuclear Grade  Grade III (high)   Necrosis  Present, central (expansive \"comedo\" necrosis)   Microcalcifications  Present in DCIS   .   Breast Biomarker Reporting Template   Protocol posted: 12/13/2023BREAST BIOMARKER REPORTING TEMPLATE - All Specimens  Test(s) Performed     Estrogen Receptor (ER) Status  Negative (less than 1%)     Internal control cells present and stain as expected   Test Type  Food and Drug Administration (FDA) cleared (test / vendor): Ventena   Primary Antibody  SP1   Scoring System  Harrison   Proportion Score  0   Intensity Score  0   Total Harrison Score  0   Test(s) Performed     Progesterone Receptor (PgR) Status  Negative (less than 1%)     Internal control cells present and stain as expected   Test Type  Food and Drug Administration (FDA) cleared (test / vendor): Ventena   Primary Antibody  1E2   Scoring System  Harrison   Proportion Score  1   Intensity Score  2   Total Harrison Score  3   Test(s) Performed  Ki-67   Ki-67 Percentage of Positive Nuclei  25 %   Primary Antibody  30-9   Cold Ischemia and Fixation Times  Meet requirements specified in latest version of the ASCO / CAP Guidelines   Cold Ischemia Time (minutes)  12 min   Fixation Time (hours)  8 hours   Testing Performed on Block Number(s)  1B   METHODS   Fixative  Formalin   Image Analysis  Not performed   .   INVASIVE CARCINOMA OF THE BREAST: Biopsy   Protocol posted: 3/22/2023INVASIVE CARCINOMA OF THE BREAST: BIOPSY - All Specimens  SPECIMEN   Procedure  Needle biopsy   Specimen Laterality  Right   TUMOR   Tumor Site  Clock position     11 o'clock   Histologic Type  Invasive carcinoma of no special type (ductal)   Histologic Type Comment  The tumor has apocrine features   Histologic Grade (Migel Histologic Score)     Glandular (Acinar) / Tubular " Differentiation  Score 3   Nuclear Pleomorphism  Score 3   Mitotic Rate  Score 2   Overall Grade  Grade 3 (scores of 8 or 9)   Largest Invasive Focus in this Limited Biopsy Sample  4.8 mm   Ductal Carcinoma In Situ (DCIS)  Not identified   Lymphatic and / or Vascular Invasion  Not identified   Microcalcifications  Not identified   .   Breast Biomarker Reporting Template   Protocol posted: 12/13/2023BREAST BIOMARKER REPORTING TEMPLATE - All Specimens  Test(s) Performed     Estrogen Receptor (ER) Status  Negative (less than 1%)     Internal control cells present and stain as expected   Test Type  Food and Drug Administration (FDA) cleared (test / vendor): Ventena   Primary Antibody  SP1   Scoring System  Harrison   Proportion Score  0   Intensity Score  0   Total Harrison Score  0   Test(s) Performed     Progesterone Receptor (PgR) Status  Negative (less than 1%)     Internal control cells present and stain as expected   Test Type  Food and Drug Administration (FDA) cleared (test / vendor): Ventena   Primary Antibody  1E2   Scoring System  Harrison   Proportion Score  0   Intensity Score  0   Total Harrison Score  0   Test(s) Performed     HER2 by Immunohistochemistry  Positive (Score 3+)   Percentage of Cells with Uniform Intense Complete Membrane Staining  100 %   Test Type  Food and Drug Administration (FDA) cleared (test / vendor): Ventena   Primary Antibody  4B5   Test(s) Performed  Ki-67   Ki-67 Percentage of Positive Nuclei  60 %            Assessment & Plan     cT1cN0 grade 3 ER/TX- her2 +RIGHT BREAST CANCER post biopsy with associated high-grade DCIS ER/TX negative and MRI evidence of non-mass enhancement which would preclude lumpectomy    2. Rheumatoid arthritis on Simponi for 3 and half years    3.  Negative family history of any malignancy    Plan  We discussed the natural history of HER2 positive breast cancer I told her that unless the tumor was bigger than we had anticipated on imaging we will plan for surgery  first followed by Taxol Herceptin for 12 weeks and then Herceptin for the rest of the year.  If we have unexpected findings at surgery we would tailor the treatment to be most appropriate for her but she knows she will have some chemo at any rate.  I have asked her to have an echocardiogram done and we will see her back 2 to 3 weeks after surgery to review the final path and make a treatment decision    Overall we expect her to have a good response to adjuvant treatment in the setting and has a very high likelihood of cure unless we are completely warned about her imaging and she has much more disease at surgery then we expect      I spent 60 total minutes, face-to-face, caring for Pretty today. Greater than 50% of this time involved counseling and/or coordination of care as documented within this note.

## 2024-06-07 ENCOUNTER — HOSPITAL ENCOUNTER (OUTPATIENT)
Dept: CARDIOLOGY | Facility: HOSPITAL | Age: 69
Discharge: HOME | End: 2024-06-07
Payer: MEDICARE

## 2024-06-07 VITALS
HEIGHT: 60 IN | BODY MASS INDEX: 31.41 KG/M2 | HEART RATE: 88 BPM | SYSTOLIC BLOOD PRESSURE: 148 MMHG | DIASTOLIC BLOOD PRESSURE: 80 MMHG | WEIGHT: 160 LBS | OXYGEN SATURATION: 97 %

## 2024-06-07 DIAGNOSIS — Z01.89 ENCOUNTER FOR OTHER SPECIFIED SPECIAL EXAMINATIONS: ICD-10-CM

## 2024-06-07 DIAGNOSIS — Z17.1 MALIGNANT NEOPLASM OF OVERLAPPING SITES OF RIGHT BREAST IN FEMALE, ESTROGEN RECEPTOR NEGATIVE: ICD-10-CM

## 2024-06-07 DIAGNOSIS — C50.811 MALIGNANT NEOPLASM OF OVERLAPPING SITES OF RIGHT BREAST IN FEMALE, ESTROGEN RECEPTOR NEGATIVE: ICD-10-CM

## 2024-06-07 LAB
AORTIC ARCH: 2.5 CM
AORTIC DIMENSIONLESS INDEX: 0.4 (DI)
ASCENDING AORTA: 2.7 CM
BH CV ECHO LEFT VENTRICLE GLOBAL LONGITUDINAL STRAIN: -21.4 %
BH CV ECHO MEAS - ACS: 1.16 CM
BH CV ECHO MEAS - AO MAX PG: 17 MMHG
BH CV ECHO MEAS - AO MEAN PG: 10.4 MMHG
BH CV ECHO MEAS - AO ROOT DIAM: 3.2 CM
BH CV ECHO MEAS - AO V2 MAX: 206.3 CM/SEC
BH CV ECHO MEAS - AO V2 VTI: 45.4 CM
BH CV ECHO MEAS - AVA(I,D): 1.16 CM2
BH CV ECHO MEAS - EDV(CUBED): 85.2 ML
BH CV ECHO MEAS - EDV(MOD-SP2): 80 ML
BH CV ECHO MEAS - EDV(MOD-SP4): 84 ML
BH CV ECHO MEAS - EF(MOD-BP): 60.9 %
BH CV ECHO MEAS - EF(MOD-SP2): 63.7 %
BH CV ECHO MEAS - EF(MOD-SP4): 60.7 %
BH CV ECHO MEAS - ESV(CUBED): 18 ML
BH CV ECHO MEAS - ESV(MOD-SP2): 29 ML
BH CV ECHO MEAS - ESV(MOD-SP4): 33 ML
BH CV ECHO MEAS - FS: 40.4 %
BH CV ECHO MEAS - IVS/LVPW: 1.13 CM
BH CV ECHO MEAS - IVSD: 0.9 CM
BH CV ECHO MEAS - LAT PEAK E' VEL: 8.3 CM/SEC
BH CV ECHO MEAS - LV DIASTOLIC VOL/BSA (35-75): 49.5 CM2
BH CV ECHO MEAS - LV MASS(C)D: 118.6 GRAMS
BH CV ECHO MEAS - LV MAX PG: 2.6 MMHG
BH CV ECHO MEAS - LV MEAN PG: 1 MMHG
BH CV ECHO MEAS - LV SYSTOLIC VOL/BSA (12-30): 19.4 CM2
BH CV ECHO MEAS - LV V1 MAX: 81 CM/SEC
BH CV ECHO MEAS - LV V1 VTI: 17.8 CM
BH CV ECHO MEAS - LVIDD: 4.4 CM
BH CV ECHO MEAS - LVIDS: 2.6 CM
BH CV ECHO MEAS - LVOT AREA: 2.9 CM2
BH CV ECHO MEAS - LVOT DIAM: 1.94 CM
BH CV ECHO MEAS - LVPWD: 0.8 CM
BH CV ECHO MEAS - MED PEAK E' VEL: 7 CM/SEC
BH CV ECHO MEAS - MR MAX PG: 6.8 MMHG
BH CV ECHO MEAS - MR MAX VEL: 130.3 CM/SEC
BH CV ECHO MEAS - MV A DUR: 0.1 SEC
BH CV ECHO MEAS - MV A MAX VEL: 126 CM/SEC
BH CV ECHO MEAS - MV DEC SLOPE: 464.3 CM/SEC2
BH CV ECHO MEAS - MV DEC TIME: 0.19 SEC
BH CV ECHO MEAS - MV E MAX VEL: 95.5 CM/SEC
BH CV ECHO MEAS - MV E/A: 0.76
BH CV ECHO MEAS - MV MAX PG: 6.4 MMHG
BH CV ECHO MEAS - MV MEAN PG: 2.7 MMHG
BH CV ECHO MEAS - MV P1/2T: 49.7 MSEC
BH CV ECHO MEAS - MV V2 VTI: 22.1 CM
BH CV ECHO MEAS - MVA(P1/2T): 4.4 CM2
BH CV ECHO MEAS - MVA(VTI): 2.37 CM2
BH CV ECHO MEAS - PA ACC TIME: 0.1 SEC
BH CV ECHO MEAS - PA V2 MAX: 115.4 CM/SEC
BH CV ECHO MEAS - PULM A REVS DUR: 0.11 SEC
BH CV ECHO MEAS - PULM A REVS VEL: 32.6 CM/SEC
BH CV ECHO MEAS - PULM DIAS VEL: 54 CM/SEC
BH CV ECHO MEAS - PULM S/D: 0.98
BH CV ECHO MEAS - PULM SYS VEL: 53.1 CM/SEC
BH CV ECHO MEAS - QP/QS: 0.76
BH CV ECHO MEAS - RAP SYSTOLE: 8 MMHG
BH CV ECHO MEAS - RV MAX PG: 2.02 MMHG
BH CV ECHO MEAS - RV V1 MAX: 71.1 CM/SEC
BH CV ECHO MEAS - RV V1 VTI: 12.4 CM
BH CV ECHO MEAS - RVOT DIAM: 2.03 CM
BH CV ECHO MEAS - RVSP: 16.8 MMHG
BH CV ECHO MEAS - SUP REN AO DIAM: 1.7 CM
BH CV ECHO MEAS - SV(LVOT): 52.5 ML
BH CV ECHO MEAS - SV(MOD-SP2): 51 ML
BH CV ECHO MEAS - SV(MOD-SP4): 51 ML
BH CV ECHO MEAS - SV(RVOT): 39.8 ML
BH CV ECHO MEAS - SVI(LVOT): 30.9 ML/M2
BH CV ECHO MEAS - SVI(MOD-SP2): 30 ML/M2
BH CV ECHO MEAS - SVI(MOD-SP4): 30 ML/M2
BH CV ECHO MEAS - TAPSE (>1.6): 2.12 CM
BH CV ECHO MEAS - TR MAX PG: 8.8 MMHG
BH CV ECHO MEAS - TR MAX VEL: 148 CM/SEC
BH CV ECHO MEASUREMENTS AVERAGE E/E' RATIO: 12.48
BH CV XLRA - RV BASE: 2.7 CM
BH CV XLRA - RV LENGTH: 7.3 CM
BH CV XLRA - RV MID: 2.29 CM
BH CV XLRA - TDI S': 12.6 CM/SEC
LEFT ATRIUM VOLUME INDEX: 20.4 ML/M2
SINUS: 2.7 CM
STJ: 2.7 CM

## 2024-06-07 PROCEDURE — 93356 MYOCRD STRAIN IMG SPCKL TRCK: CPT

## 2024-06-07 PROCEDURE — 93306 TTE W/DOPPLER COMPLETE: CPT

## 2024-06-07 PROCEDURE — 25510000001 PERFLUTREN (DEFINITY) 8.476 MG IN SODIUM CHLORIDE (PF) 0.9 % 10 ML INJECTION: Performed by: INTERNAL MEDICINE

## 2024-06-07 RX ADMIN — PERFLUTREN 1.5 ML: 6.52 INJECTION, SUSPENSION INTRAVENOUS at 09:48

## 2024-06-11 LAB
ALBUMIN SERPL-MCNC: 4.5 G/DL (ref 3.5–5.2)
ALBUMIN/GLOB SERPL: 1.8 G/DL
ALP SERPL-CCNC: 57 U/L (ref 39–117)
ALT SERPL-CCNC: 24 U/L (ref 1–33)
APPEARANCE UR: CLEAR
AST SERPL-CCNC: 26 U/L (ref 1–32)
BACTERIA #/AREA URNS HPF: NORMAL /HPF
BASOPHILS # BLD AUTO: 0.04 10*3/MM3 (ref 0–0.2)
BASOPHILS NFR BLD AUTO: 0.9 % (ref 0–1.5)
BILIRUB SERPL-MCNC: 0.3 MG/DL (ref 0–1.2)
BILIRUB UR QL STRIP: NEGATIVE
BUN SERPL-MCNC: 19 MG/DL (ref 8–23)
BUN/CREAT SERPL: 30.6 (ref 7–25)
CALCIUM SERPL-MCNC: 9.5 MG/DL (ref 8.6–10.5)
CASTS URNS MICRO: NORMAL
CHLORIDE SERPL-SCNC: 99 MMOL/L (ref 98–107)
CHOLEST SERPL-MCNC: 171 MG/DL (ref 0–200)
CO2 SERPL-SCNC: 28 MMOL/L (ref 22–29)
COLOR UR: YELLOW
CREAT SERPL-MCNC: 0.62 MG/DL (ref 0.57–1)
EGFRCR SERPLBLD CKD-EPI 2021: 97.1 ML/MIN/1.73
EOSINOPHIL # BLD AUTO: 0.29 10*3/MM3 (ref 0–0.4)
EOSINOPHIL NFR BLD AUTO: 6.5 % (ref 0.3–6.2)
EPI CELLS #/AREA URNS HPF: NORMAL /HPF
ERYTHROCYTE [DISTWIDTH] IN BLOOD BY AUTOMATED COUNT: 12.9 % (ref 12.3–15.4)
GLOBULIN SER CALC-MCNC: 2.5 GM/DL
GLUCOSE SERPL-MCNC: 128 MG/DL (ref 65–99)
GLUCOSE UR QL STRIP: NEGATIVE
HBA1C MFR BLD: 6.4 % (ref 4.8–5.6)
HCT VFR BLD AUTO: 37.4 % (ref 34–46.6)
HDLC SERPL-MCNC: 55 MG/DL (ref 40–60)
HGB BLD-MCNC: 12.2 G/DL (ref 12–15.9)
HGB UR QL STRIP: NEGATIVE
IMM GRANULOCYTES # BLD AUTO: 0.01 10*3/MM3 (ref 0–0.05)
IMM GRANULOCYTES NFR BLD AUTO: 0.2 % (ref 0–0.5)
KETONES UR QL STRIP: NEGATIVE
LDLC SERPL CALC-MCNC: 92 MG/DL (ref 0–100)
LDLC/HDLC SERPL: 1.61 {RATIO}
LEUKOCYTE ESTERASE UR QL STRIP: ABNORMAL
LYMPHOCYTES # BLD AUTO: 1.31 10*3/MM3 (ref 0.7–3.1)
LYMPHOCYTES NFR BLD AUTO: 29.6 % (ref 19.6–45.3)
MCH RBC QN AUTO: 29.8 PG (ref 26.6–33)
MCHC RBC AUTO-ENTMCNC: 32.6 G/DL (ref 31.5–35.7)
MCV RBC AUTO: 91.2 FL (ref 79–97)
MONOCYTES # BLD AUTO: 0.42 10*3/MM3 (ref 0.1–0.9)
MONOCYTES NFR BLD AUTO: 9.5 % (ref 5–12)
NEUTROPHILS # BLD AUTO: 2.36 10*3/MM3 (ref 1.7–7)
NEUTROPHILS NFR BLD AUTO: 53.3 % (ref 42.7–76)
NITRITE UR QL STRIP: NEGATIVE
NRBC BLD AUTO-RTO: 0 /100 WBC (ref 0–0.2)
PH UR STRIP: 6.5 [PH] (ref 5–8)
PLATELET # BLD AUTO: 244 10*3/MM3 (ref 140–450)
POTASSIUM SERPL-SCNC: 4.5 MMOL/L (ref 3.5–5.2)
PROT SERPL-MCNC: 7 G/DL (ref 6–8.5)
PROT UR QL STRIP: NEGATIVE
RBC # BLD AUTO: 4.1 10*6/MM3 (ref 3.77–5.28)
RBC #/AREA URNS HPF: NORMAL /HPF
SODIUM SERPL-SCNC: 137 MMOL/L (ref 136–145)
SP GR UR STRIP: 1.01 (ref 1–1.03)
T4 FREE SERPL-MCNC: 1.52 NG/DL (ref 0.92–1.68)
TRIGL SERPL-MCNC: 137 MG/DL (ref 0–150)
TSH SERPL DL<=0.005 MIU/L-ACNC: 0.95 UIU/ML (ref 0.27–4.2)
UROBILINOGEN UR STRIP-MCNC: ABNORMAL MG/DL
VLDLC SERPL CALC-MCNC: 24 MG/DL (ref 5–40)
WBC # BLD AUTO: 4.43 10*3/MM3 (ref 3.4–10.8)
WBC #/AREA URNS HPF: NORMAL /HPF

## 2024-06-17 ENCOUNTER — OFFICE VISIT (OUTPATIENT)
Dept: INTERNAL MEDICINE | Facility: CLINIC | Age: 69
End: 2024-06-17
Payer: MEDICARE

## 2024-06-17 VITALS
HEART RATE: 69 BPM | HEIGHT: 60 IN | BODY MASS INDEX: 30.82 KG/M2 | SYSTOLIC BLOOD PRESSURE: 120 MMHG | OXYGEN SATURATION: 98 % | RESPIRATION RATE: 16 BRPM | WEIGHT: 157 LBS | DIASTOLIC BLOOD PRESSURE: 50 MMHG

## 2024-06-17 DIAGNOSIS — E06.3 HASHIMOTO'S THYROIDITIS: Chronic | ICD-10-CM

## 2024-06-17 DIAGNOSIS — E78.00 PURE HYPERCHOLESTEROLEMIA: Chronic | ICD-10-CM

## 2024-06-17 DIAGNOSIS — I10 BENIGN ESSENTIAL HYPERTENSION: Chronic | ICD-10-CM

## 2024-06-17 DIAGNOSIS — E11.3293 TYPE 2 DIABETES MELLITUS WITH BOTH EYES AFFECTED BY MILD NONPROLIFERATIVE RETINOPATHY WITHOUT MACULAR EDEMA, WITHOUT LONG-TERM CURRENT USE OF INSULIN: Primary | Chronic | ICD-10-CM

## 2024-06-17 PROBLEM — E11.3299 MILD NONPROLIFERATIVE DIABETIC RETINOPATHY WITHOUT MACULAR EDEMA ASSOCIATED WITH TYPE 2 DIABETES MELLITUS: Status: RESOLVED | Noted: 2017-03-07 | Resolved: 2024-06-17

## 2024-06-17 PROCEDURE — 1126F AMNT PAIN NOTED NONE PRSNT: CPT | Performed by: NURSE PRACTITIONER

## 2024-06-17 PROCEDURE — 1159F MED LIST DOCD IN RCRD: CPT | Performed by: NURSE PRACTITIONER

## 2024-06-17 PROCEDURE — 3074F SYST BP LT 130 MM HG: CPT | Performed by: NURSE PRACTITIONER

## 2024-06-17 PROCEDURE — 1160F RVW MEDS BY RX/DR IN RCRD: CPT | Performed by: NURSE PRACTITIONER

## 2024-06-17 PROCEDURE — 3078F DIAST BP <80 MM HG: CPT | Performed by: NURSE PRACTITIONER

## 2024-06-17 PROCEDURE — G0439 PPPS, SUBSEQ VISIT: HCPCS | Performed by: NURSE PRACTITIONER

## 2024-06-17 PROCEDURE — 99214 OFFICE O/P EST MOD 30 MIN: CPT | Performed by: NURSE PRACTITIONER

## 2024-06-17 PROCEDURE — 3044F HG A1C LEVEL LT 7.0%: CPT | Performed by: NURSE PRACTITIONER

## 2024-06-17 NOTE — PROGRESS NOTES
The ABCs of the Annual Wellness Visit  Subsequent Medicare Wellness Visit    Subjective    Pretty Tovar is a 68 y.o. female who presents for a Subsequent Medicare Wellness Visit.    The following portions of the patient's history were reviewed and   updated as appropriate: allergies, current medications, past family history, past medical history, past social history, past surgical history, and problem list.    Compared to one year ago, the patient feels her physical   health is the same.    Compared to one year ago, the patient feels her mental   health is the same    Recent Hospitalizations:  She was not admitted to the hospital during the last year.       Current Medical Providers:  Patient Care Team:  Renee Alcocer APRN as PCP - General (Family Medicine)  Vinay Ansari OD (Optometry)  Zachary Sibley MD as Consulting Physician (Obstetrics and Gynecology)  Karen Allen MD as Consulting Physician (Urogynecology)  Alba Anne MD as Consulting Physician (Cardiology)  Kristina Wylie MD as Consulting Physician (Rheumatology)  Zachary Sibley MD as Referring Physician (Obstetrics and Gynecology)  Ashkan Belcher MD as Consulting Physician (Hematology and Oncology)  Milla Jones, RN as Nurse Navigator (Oncology)    Outpatient Medications Prior to Visit   Medication Sig Dispense Refill    amoxicillin (AMOXIL) 500 MG capsule Take 1 capsule by mouth. For Dental Procedures      BD PEN NEEDLE SEUN U/F 32G X 4 MM misc USE 1 PEN NEEDLE WITH EACH  VICTOZA INJECTION 100 each 1    Biotin 50270 MCG tablet dispersible Place 10,000 mg on the tongue Daily.      Cinnamon 500 MG tablet Take 1,000 mg by mouth 2 (two) times a day. PT HOLDING FOR SURGERY      Coenzyme Q10 (CO Q 10 PO) Take 1 capsule by mouth Daily. PT HOLDING FOR SURGERY      Golimumab (Simponi) 100 MG/ML solution auto-injector Inject  under the skin into the appropriate area as directed.      levothyroxine (Synthroid) 88 MCG tablet  Take 1 tablet by mouth Daily. 90 tablet 3    Liraglutide (Victoza) 18 MG/3ML solution pen-injector injection Inject 1.2 mg under the skin into the appropriate area as directed Daily. 6 mL 5    lisinopril (PRINIVIL,ZESTRIL) 20 MG tablet Take 1 tablet by mouth.      lisinopril-hydrochlorothiazide (PRINZIDE,ZESTORETIC) 20-25 MG per tablet Take 1 tablet by mouth Daily. 90 tablet 3    meloxicam (MOBIC) 15 MG tablet       multivitamin with minerals tablet tablet Take 1 tablet by mouth Daily. PT HOLDING FOR SURGERY      Omega-3 Fatty Acids (fish oil) 1200 MG capsule capsule Take 1 capsule by mouth Daily. PT HOLDING FOR SURGERY      rosuvastatin (Crestor) 20 MG tablet Take 1 tablet by mouth Daily. 90 tablet 1    vitamin C (ASCORBIC ACID) 250 MG tablet Take 4 tablets by mouth Daily.      vitamin D3 125 MCG (5000 UT) capsule capsule Take 1 capsule by mouth Daily.      VITAMIN E PO Take 1 capsule by mouth Daily. HOLD PRIOR TO SURG      metFORMIN (GLUCOPHAGE) 500 MG tablet TAKE THREE TABLETS BY MOUTH EVERY MORNING AND THEN TAKE TWO TABLETS BY MOUTH IN THE EVENING 450 tablet 3     No facility-administered medications prior to visit.       No opioid medication identified on active medication list. I have reviewed chart for other potential  high risk medication/s and harmful drug interactions in the elderly.        Aspirin is not on active medication list.  Aspirin use is not indicated based on review of current medical condition/s. Risk of harm outweighs potential benefits.  .    Patient Active Problem List   Diagnosis    Benign essential hypertension    Type 2 diabetes mellitus, without long-term current use of insulin    Pure hypercholesterolemia    Overweight (BMI 25.0-29.9)    Hashimoto's thyroiditis    Hyperplastic rectal polyp    Female stress incontinence    Feeling of incomplete bladder emptying    Primary osteoarthritis of left knee    S/P TKR (total knee replacement), left    Iron deficiency    Abnormality of right  "breast on screening mammogram    Malignant neoplasm of overlapping sites of right breast in female, estrogen receptor negative     Advance Care Planning   Advance Care Planning     Advance Directive is not on file.  ACP discussion was held with the patient during this visit. Patient does not have an advance directive, information provided.     Objective    Vitals:    24 0849   BP: 120/50   Pulse: 69   Resp: 16   SpO2: 98%   Weight: 71.2 kg (157 lb)   Height: 152.4 cm (60\")   PainSc: 0-No pain     Estimated body mass index is 30.66 kg/m² as calculated from the following:    Height as of this encounter: 152.4 cm (60\").    Weight as of this encounter: 71.2 kg (157 lb).           Does the patient have evidence of cognitive impairment? No    Lab Results   Component Value Date    CHLPL 171 06/10/2024    TRIG 137 06/10/2024    HDL 55 06/10/2024    LDL 92 06/10/2024    VLDL 24 06/10/2024    HGBA1C 6.40 (H) 06/10/2024        HEALTH RISK ASSESSMENT    Smoking Status:  Social History     Tobacco Use   Smoking Status Never   Smokeless Tobacco Never   Tobacco Comments    daily caffine     Alcohol Consumption:  Social History     Substance and Sexual Activity   Alcohol Use Never     Fall Risk Screen:    NOE Fall Risk Assessment was completed, and patient is at LOW risk for falls.Assessment completed on:2024    Depression Screenin/17/2024     8:51 AM   PHQ-2/PHQ-9 Depression Screening   Little Interest or Pleasure in Doing Things 0-->not at all   Feeling Down, Depressed or Hopeless 0-->not at all   PHQ-9: Brief Depression Severity Measure Score 0       Health Habits and Functional and Cognitive Screenin/10/2024    10:04 AM   Functional & Cognitive Status   Do you have difficulty preparing food and eating? No   Do you have difficulty bathing yourself, getting dressed or grooming yourself? No   Do you have difficulty using the toilet? No   Do you have difficulty moving around from place to place? No "   Do you have trouble with steps or getting out of a bed or a chair? No   Current Diet Other   Dental Exam Up to date   Eye Exam Up to date   Exercise (times per week) Other   Current Exercises Include No Regular Exercise   Do you need help using the phone?  No   Are you deaf or do you have serious difficulty hearing?  No   Do you need help to go to places out of walking distance? No   Do you need help shopping? No   Do you need help preparing meals?  No   Do you need help with housework?  No   Do you need help with laundry? No   Do you need help taking your medications? No   Do you need help managing money? No   Do you ever drive or ride in a car without wearing a seat belt? No   Have you felt unusual stress, anger or loneliness in the last month? Yes   Who do you live with? Spouse   If you need help, do you have trouble finding someone available to you? No   Have you been bothered in the last four weeks by sexual problems? No   Do you have difficulty concentrating, remembering or making decisions? No       Age-appropriate Screening Schedule:  Refer to the list below for future screening recommendations based on patient's age, sex and/or medical conditions. Orders for these recommended tests are listed in the plan section. The patient has been provided with a written plan.    Health Maintenance   Topic Date Due    DXA SCAN  08/28/2023    COVID-19 Vaccine (1 - 2023-24 season) 06/19/2024 (Originally 9/1/2023)    Pneumococcal Vaccine 65+ (2 of 2 - PCV) 06/23/2024 (Originally 3/21/2018)    RSV Vaccine - Adults (1 - 1-dose 60+ series) 06/17/2025 (Originally 12/23/2015)    DIABETIC FOOT EXAM  06/23/2024    BMI FOLLOWUP  06/23/2024    INFLUENZA VACCINE  08/01/2024    HEMOGLOBIN A1C  12/10/2024    URINE MICROALBUMIN  12/11/2024    DIABETIC EYE EXAM  01/29/2025    MAMMOGRAM  05/14/2025    LIPID PANEL  06/10/2025    ANNUAL WELLNESS VISIT  06/17/2025    COLORECTAL CANCER SCREENING  02/21/2028    TDAP/TD VACCINES (4 - Td or  Tdap) 04/12/2028    HEPATITIS C SCREENING  Completed    ZOSTER VACCINE  Completed    PAP SMEAR  Discontinued                  CMS Preventative Services Quick Reference  Risk Factors Identified During Encounter  Immunizations Discussed/Encouraged: Prevnar 20 (Pneumococcal 20-valent conjugate), COVID19, and RSV (Respiratory Syncytial Virus)  The above risks/problems have been discussed with the patient.  Pertinent information has been shared with the patient in the After Visit Summary.  An After Visit Summary and PPPS were made available to the patient.    Follow Up:   Next Medicare Wellness visit to be scheduled in 1 year.       Additional E&M Note during same encounter follows:  Patient has multiple medical problems which are significant and separately identifiable that require additional work above and beyond the Medicare Wellness Visit.      Chief Complaint  Medicare Wellness-subsequent and Diabetes    Subjective        HPI  Pretty Tovar is also being seen today for a 6 month follow up for type 2 DM, HTN, HLD, and Hypothyroidism. She was recently diagnosed with breast cancer found on mammogram ordered by gyn and is seeing oncology. She has had some increased stress and worry but overall she managing it.   She has been compliant with her medication and is not experiencing any side effects.   I reviewed labs with her in detail  Hospital Outpatient Visit on 06/07/2024   Component Date Value Ref Range Status    Ascending aorta 06/07/2024 2.7  cm Final    Aortic arch 06/07/2024 2.5  cm Final    Abdo Ao Diam 06/07/2024 1.7  cm Final    EF(MOD-bp) 06/07/2024 60.9  % Final    LV GLOBAL STRAIN  06/07/2024 -21.4  % Final    LVIDd 06/07/2024 4.4  cm Final    LVIDs 06/07/2024 2.6  cm Final    IVSd 06/07/2024 0.90  cm Final    LVPWd 06/07/2024 0.80  cm Final    FS 06/07/2024 40.4  % Final    IVS/LVPW 06/07/2024 1.13  cm Final    ESV(cubed) 06/07/2024 18.0  ml Final    LV Sys Vol (BSA corrected) 06/07/2024 19.4  cm2 Final     EDV(cubed) 06/07/2024 85.2  ml Final    LV Sanchez Vol (BSA corrected) 06/07/2024 49.5  cm2 Final    LV mass(C)d 06/07/2024 118.6  grams Final    LVOT area 06/07/2024 2.9  cm2 Final    LVOT diam 06/07/2024 1.94  cm Final    EDV(MOD-sp2) 06/07/2024 80.0  ml Final    EDV(MOD-sp4) 06/07/2024 84.0  ml Final    ESV(MOD-sp2) 06/07/2024 29.0  ml Final    ESV(MOD-sp4) 06/07/2024 33.0  ml Final    SV(MOD-sp2) 06/07/2024 51.0  ml Final    SV(MOD-sp4) 06/07/2024 51.0  ml Final    SVi(MOD-SP2) 06/07/2024 30.0  ml/m2 Final    SVi(MOD-SP4) 06/07/2024 30.0  ml/m2 Final    SVi (LVOT) 06/07/2024 30.9  ml/m2 Final    EF(MOD-sp2) 06/07/2024 63.7  % Final    EF(MOD-sp4) 06/07/2024 60.7  % Final    MV E max raj 06/07/2024 95.5  cm/sec Final    MV A max raj 06/07/2024 126.0  cm/sec Final    MV dec time 06/07/2024 0.19  sec Final    MV E/A 06/07/2024 0.76   Final    Pulm A Revs Dur 06/07/2024 0.11  sec Final    MV A dur 06/07/2024 0.10  sec Final    LA ESV Index (BP) 06/07/2024 20.4  ml/m2 Final    Med Peak E' Raj 06/07/2024 7.0  cm/sec Final    Lat Peak E' Raj 06/07/2024 8.3  cm/sec Final    TR max raj 06/07/2024 148.0  cm/sec Final    Avg E/e' ratio 06/07/2024 12.48   Final    SV(LVOT) 06/07/2024 52.5  ml Final    SV(RVOT) 06/07/2024 39.8  ml Final    Qp/Qs 06/07/2024 0.76   Final    RV Base 06/07/2024 2.7  cm Final    RV Mid 06/07/2024 2.29  cm Final    RV Length 06/07/2024 7.3  cm Final    TAPSE (>1.6) 06/07/2024 2.12  cm Final    RV S' 06/07/2024 12.6  cm/sec Final    Pulm Sys Raj 06/07/2024 53.1  cm/sec Final    Pulm Sanchez Raj 06/07/2024 54.0  cm/sec Final    Pulm S/D 06/07/2024 0.98   Final    Pulm A Revs Raj 06/07/2024 32.6  cm/sec Final    LV V1 max 06/07/2024 81.0  cm/sec Final    LV V1 max PG 06/07/2024 2.6  mmHg Final    LV V1 mean PG 06/07/2024 1.00  mmHg Final    LV V1 VTI 06/07/2024 17.8  cm Final    Ao pk raj 06/07/2024 206.3  cm/sec Final    Ao max PG 06/07/2024 17.0  mmHg Final    Ao mean PG 06/07/2024 10.4  mmHg Final     Ao V2 VTI 06/07/2024 45.4  cm Final    FER(I,D) 06/07/2024 1.16  cm2 Final    MV max PG 06/07/2024 6.4  mmHg Final    MV mean PG 06/07/2024 2.7  mmHg Final    MV V2 VTI 06/07/2024 22.1  cm Final    MV P1/2t 06/07/2024 49.7  msec Final    MVA(P1/2t) 06/07/2024 4.4  cm2 Final    MVA(VTI) 06/07/2024 2.37  cm2 Final    MV dec slope 06/07/2024 464.3  cm/sec2 Final    MR max ronit 06/07/2024 130.3  cm/sec Final    MR max PG 06/07/2024 6.8  mmHg Final    TR max PG 06/07/2024 8.8  mmHg Final    RVSP(TR) 06/07/2024 16.8  mmHg Final    RAP systole 06/07/2024 8.0  mmHg Final    RVOT diam 06/07/2024 2.03  cm Final    RV V1 max PG 06/07/2024 2.02  mmHg Final    RV V1 max 06/07/2024 71.1  cm/sec Final    RV V1 VTI 06/07/2024 12.4  cm Final    PA V2 max 06/07/2024 115.4  cm/sec Final    PA acc time 06/07/2024 0.10  sec Final    Ao root diam 06/07/2024 3.2  cm Final    ACS 06/07/2024 1.16  cm Final    Sinus 06/07/2024 2.7  cm Final    STJ 06/07/2024 2.7  cm Final    Dimensionless Index 06/07/2024 0.40  (DI) Final   Lab on 06/05/2024   Component Date Value Ref Range Status    WBC 06/05/2024 5.16  3.40 - 10.80 10*3/mm3 Final    RBC 06/05/2024 4.24  3.77 - 5.28 10*6/mm3 Final    Hemoglobin 06/05/2024 13.1  12.0 - 15.9 g/dL Final    Hematocrit 06/05/2024 39.4  34.0 - 46.6 % Final    MCV 06/05/2024 92.9  79.0 - 97.0 fL Final    MCH 06/05/2024 30.9  26.6 - 33.0 pg Final    MCHC 06/05/2024 33.2  31.5 - 35.7 g/dL Final    RDW 06/05/2024 12.9  12.3 - 15.4 % Final    RDW-SD 06/05/2024 43.4  37.0 - 54.0 fl Final    MPV 06/05/2024 10.7  6.0 - 12.0 fL Final    Platelets 06/05/2024 273  140 - 450 10*3/mm3 Final    Neutrophil % 06/05/2024 56.1  42.7 - 76.0 % Final    Lymphocyte % 06/05/2024 28.1  19.6 - 45.3 % Final    Monocyte % 06/05/2024 7.4  5.0 - 12.0 % Final    Eosinophil % 06/05/2024 7.4 (H)  0.3 - 6.2 % Final    Basophil % 06/05/2024 0.8  0.0 - 1.5 % Final    Immature Grans % 06/05/2024 0.2  0.0 - 0.5 % Final    Neutrophils, Absolute  06/05/2024 2.90  1.70 - 7.00 10*3/mm3 Final    Lymphocytes, Absolute 06/05/2024 1.45  0.70 - 3.10 10*3/mm3 Final    Monocytes, Absolute 06/05/2024 0.38  0.10 - 0.90 10*3/mm3 Final    Eosinophils, Absolute 06/05/2024 0.38  0.00 - 0.40 10*3/mm3 Final    Basophils, Absolute 06/05/2024 0.04  0.00 - 0.20 10*3/mm3 Final    Immature Grans, Absolute 06/05/2024 0.01  0.00 - 0.05 10*3/mm3 Final    nRBC 06/05/2024 0.0  0.0 - 0.2 /100 WBC Final   Orders Only on 06/05/2024   Component Date Value Ref Range Status    Glucose 06/10/2024 128 (H)  65 - 99 mg/dL Final    BUN 06/10/2024 19  8 - 23 mg/dL Final    Creatinine 06/10/2024 0.62  0.57 - 1.00 mg/dL Final    EGFR Result 06/10/2024 97.1  >60.0 mL/min/1.73 Final    Comment: GFR Normal >60  Chronic Kidney Disease <60  Kidney Failure <15      BUN/Creatinine Ratio 06/10/2024 30.6 (H)  7.0 - 25.0 Final    Sodium 06/10/2024 137  136 - 145 mmol/L Final    Potassium 06/10/2024 4.5  3.5 - 5.2 mmol/L Final    Chloride 06/10/2024 99  98 - 107 mmol/L Final    Total CO2 06/10/2024 28.0  22.0 - 29.0 mmol/L Final    Calcium 06/10/2024 9.5  8.6 - 10.5 mg/dL Final    Total Protein 06/10/2024 7.0  6.0 - 8.5 g/dL Final    Albumin 06/10/2024 4.5  3.5 - 5.2 g/dL Final    Globulin 06/10/2024 2.5  gm/dL Final    A/G Ratio 06/10/2024 1.8  g/dL Final    Total Bilirubin 06/10/2024 0.3  0.0 - 1.2 mg/dL Final    Alkaline Phosphatase 06/10/2024 57  39 - 117 U/L Final    AST (SGOT) 06/10/2024 26  1 - 32 U/L Final    ALT (SGPT) 06/10/2024 24  1 - 33 U/L Final    Total Cholesterol 06/10/2024 171  0 - 200 mg/dL Final    Comment: Cholesterol Reference Ranges  (U.S. Department of Health and Human Services ATP III  Classifications)  Desirable          <200 mg/dL  Borderline High    200-239 mg/dL  High Risk          >240 mg/dL  Triglyceride Reference Ranges  (U.S. Department of Health and Human Services ATP III  Classifications)  Normal           <150 mg/dL  Borderline High  150-199 mg/dL  High             200-499  mg/dL  Very High        >500 mg/dL  HDL Reference Ranges  (U.S. Department of Health and Human Services ATP III  Classifications)  Low     <40 mg/dl (major risk factor for CHD)  High    >60 mg/dl ('negative' risk factor for CHD)  LDL Reference Ranges  (U.S. Department of Health and Human Services ATP III  Classifications)  Optimal          <100 mg/dL  Near Optimal     100-129 mg/dL  Borderline High  130-159 mg/dL  High             160-189 mg/dL  Very High        >189 mg/dL      Triglycerides 06/10/2024 137  0 - 150 mg/dL Final    HDL Cholesterol 06/10/2024 55  40 - 60 mg/dL Final    VLDL Cholesterol Kristian 06/10/2024 24  5 - 40 mg/dL Final    LDL Chol Calc ([facility]) 06/10/2024 92  0 - 100 mg/dL Final    LDL/HDL RATIO 06/10/2024 1.61   Final    WBC 06/10/2024 4.43  3.40 - 10.80 10*3/mm3 Final    RBC 06/10/2024 4.10  3.77 - 5.28 10*6/mm3 Final    Hemoglobin 06/10/2024 12.2  12.0 - 15.9 g/dL Final    Hematocrit 06/10/2024 37.4  34.0 - 46.6 % Final    MCV 06/10/2024 91.2  79.0 - 97.0 fL Final    MCH 06/10/2024 29.8  26.6 - 33.0 pg Final    MCHC 06/10/2024 32.6  31.5 - 35.7 g/dL Final    RDW 06/10/2024 12.9  12.3 - 15.4 % Final    Platelets 06/10/2024 244  140 - 450 10*3/mm3 Final    Neutrophil Rel % 06/10/2024 53.3  42.7 - 76.0 % Final    Lymphocyte Rel % 06/10/2024 29.6  19.6 - 45.3 % Final    Monocyte Rel % 06/10/2024 9.5  5.0 - 12.0 % Final    Eosinophil Rel % 06/10/2024 6.5 (H)  0.3 - 6.2 % Final    Basophil Rel % 06/10/2024 0.9  0.0 - 1.5 % Final    Neutrophils Absolute 06/10/2024 2.36  1.70 - 7.00 10*3/mm3 Final    Lymphocytes Absolute 06/10/2024 1.31  0.70 - 3.10 10*3/mm3 Final    Monocytes Absolute 06/10/2024 0.42  0.10 - 0.90 10*3/mm3 Final    Eosinophils Absolute 06/10/2024 0.29  0.00 - 0.40 10*3/mm3 Final    Basophils Absolute 06/10/2024 0.04  0.00 - 0.20 10*3/mm3 Final    Immature Granulocyte Rel % 06/10/2024 0.2  0.0 - 0.5 % Final    Immature Grans Absolute 06/10/2024 0.01  0.00 - 0.05 10*3/mm3 Final    nRBC  "06/10/2024 0.0  0.0 - 0.2 /100 WBC Final    Hemoglobin A1C 06/10/2024 6.40 (H)  4.80 - 5.60 % Final    Comment: Hemoglobin A1C Ranges:  Increased Risk for Diabetes  5.7% to 6.4%  Diabetes                     >= 6.5%  Diabetic Goal                < 7.0%      Specific Gravity, UA 06/10/2024 1.007  1.005 - 1.030 Final    pH, UA 06/10/2024 6.5  5.0 - 8.0 Final    Color, UA 06/10/2024 Yellow   Final    REFERENCE RANGE: Yellow, Straw    Appearance, UA 06/10/2024 Clear  Clear Final    Leukocytes, UA 06/10/2024 See below: (A)  Negative Final    Small (1+)    Protein 06/10/2024 Negative  Negative Final    Glucose, UA 06/10/2024 Negative  Negative Final    Ketones 06/10/2024 Negative  Negative Final    Blood, UA 06/10/2024 Negative  Negative Final    Bilirubin, UA 06/10/2024 Negative  Negative Final    Urobilinogen, UA 06/10/2024 Comment   Final    Comment: 0.2 E.U./dL  REFERENCE RANGE: 0.2 - 1.0 E.U./dL      Nitrite, UA 06/10/2024 Negative  Negative Final    TSH 06/10/2024 0.955  0.270 - 4.200 uIU/mL Final    Free T4 06/10/2024 1.52  0.92 - 1.68 ng/dL Final    WBC, UA 06/10/2024 0-2  /HPF Final    REFERENCE RANGE: None Seen, 0-2    RBC, UA 06/10/2024 Comment  /HPF Final    Comment: None Seen  REFERENCE RANGE: None Seen, 0-2      Epithelial Cells (non renal) 06/10/2024 0-2  /HPF Final    REFERENCE RANGE: None Seen, 0-2    Cast Type 06/10/2024 Comment   Final    HYALINE CASTS, UA            None Seen        /LPF       None Seen  N    Bacteria, UA 06/10/2024 Comment  None Seen /HPF Final    None Seen         Review of Systems   Constitutional:  Negative for fatigue.   HENT: Negative.     Respiratory: Negative.     Cardiovascular: Negative.    Gastrointestinal: Negative.    Endocrine: Negative for polydipsia, polyphagia and polyuria.   Musculoskeletal: Negative.    Psychiatric/Behavioral:  Negative for dysphoric mood and sleep disturbance.        Objective   Vital Signs:  /50   Pulse 69   Resp 16   Ht 152.4 cm (60\")   " Wt 71.2 kg (157 lb)   SpO2 98%   BMI 30.66 kg/m²     Physical Exam  Vitals and nursing note reviewed.   Constitutional:       General: She is not in acute distress.  HENT:      Head: Normocephalic.   Cardiovascular:      Rate and Rhythm: Normal rate and regular rhythm.      Heart sounds: Normal heart sounds.   Pulmonary:      Effort: Pulmonary effort is normal.      Breath sounds: Normal breath sounds.   Feet:      Right foot:      Skin integrity: Dry skin present.      Left foot:      Skin integrity: Dry skin present.      Comments: Diabetic foot exam: performed   Left: Filament test present   Pulses Dorsalis Pedis:  present    Vibratory sensation normal   Proprioception normal        Right: Filament test present   Pulses Dorsalis Pedis:  present   Vibratory sensation normal   Proprioception normal       Toenail polish on   Skin:     General: Skin is warm and dry.   Neurological:      Mental Status: She is alert and oriented to person, place, and time.                         Assessment and Plan   Diagnoses and all orders for this visit:    1. Type 2 diabetes mellitus with both eyes affected by mild nonproliferative retinopathy without macular edema, without long-term current use of insulin (Primary)  -     metFORMIN (GLUCOPHAGE) 500 MG tablet; TAKE THREE TABLETS BY MOUTH EVERY MORNING AND THEN TAKE TWO TABLETS BY MOUTH IN THE EVENING  Dispense: 450 tablet; Refill: 3    2. Hashimoto's thyroiditis    3. Benign essential hypertension    4. Mild nonproliferative diabetic retinopathy without macular edema associated with type 2 diabetes mellitus  -     metFORMIN (GLUCOPHAGE) 500 MG tablet; TAKE THREE TABLETS BY MOUTH EVERY MORNING AND THEN TAKE TWO TABLETS BY MOUTH IN THE EVENING  Dispense: 450 tablet; Refill: 3    5. Pure hypercholesterolemia      Type 2 DM- A1c and fasting glucose at goal, continue metformin, diabetic foot exam performed today, Diabetic eye exam UTD  HTN well controlled  HLD- continue statin    Hypothyroidism- tsh is normal, continue current dose of levothyroxine        Follow Up   Return in about 6 months (around 12/17/2024) for with labs a1c, cmp.  Patient was given instructions and counseling regarding her condition or for health maintenance advice. Please see specific information pulled into the AVS if appropriate.

## 2024-06-20 ENCOUNTER — TELEPHONE (OUTPATIENT)
Dept: OTHER | Facility: HOSPITAL | Age: 69
End: 2024-06-20
Payer: MEDICARE

## 2024-06-20 NOTE — TELEPHONE ENCOUNTER
Oncology Social Work  Voicemail message was left for patient to return call to OSW. VEDA Hutchison

## 2024-06-20 NOTE — TELEPHONE ENCOUNTER
Oncology Social Work  Distress Screening Follow-up    Diagnosis: Right breast cancer    Distress Level: 5 (6/5/2024  3:00 PM)    Practical Problems:    Treatment decisions: Y    Emotional Concerns:    Worry or anxiety: Y     Interventions:     Financial Needs: financial counselor (offered)  Emotional Needs: emotional suppport/coping strategies    Comments:  Spoke to the patient via telephone; explained role of OSW and supportive oncology services.  Discussed the patient's distress score of 5 and her concerns of worry/anxiety and Treatment decisions being related to the unknown of her diagnosis.  The patient states that she filled out her distress screen prior to meeting with the medical oncologist therefore she had a lot of worries and concerns.  She states that after meeting with the medical oncologist a lot of her questions and concerns were addressed.  The patient states that she does not have a family history of cancer.  The patient states that she does have a great support system and currently denies any supportive oncology needs.  The patient was informed that a letter will be mailed to her listing available Supportive Oncology Services and [facility] contact information.  The patient has already been sent information about [employer] and [facility] from nurse navigator Milla.  The patient was encouraged to contact [facility] for any supportive needs that may arise.  VEDA Hutchison

## 2024-06-21 DIAGNOSIS — Z17.1 MALIGNANT NEOPLASM OF OVERLAPPING SITES OF RIGHT BREAST IN FEMALE, ESTROGEN RECEPTOR NEGATIVE: Primary | ICD-10-CM

## 2024-06-21 DIAGNOSIS — C50.811 MALIGNANT NEOPLASM OF OVERLAPPING SITES OF RIGHT BREAST IN FEMALE, ESTROGEN RECEPTOR NEGATIVE: Primary | ICD-10-CM

## 2024-06-24 ENCOUNTER — PATIENT OUTREACH (OUTPATIENT)
Dept: OTHER | Facility: HOSPITAL | Age: 69
End: 2024-06-24
Payer: MEDICARE

## 2024-06-24 NOTE — PROGRESS NOTES
Called Ms. Tovar to see how she was doing. Left a message with my contact information and asked her to call back at her convenience.      She called back and stated she is ok, but was unable to keep her surgery date of July 3rd and stated she spoke with Richard about that. She is waiting for a call back on when it can be rescheduled for and will contact that office with any questions. Otherwise, she is doing well and has no needs at this time. She was thankful for the call and will reach out if any needs arise.

## 2024-06-25 ENCOUNTER — PRE-ADMISSION TESTING (OUTPATIENT)
Dept: PREADMISSION TESTING | Facility: HOSPITAL | Age: 69
End: 2024-06-25
Payer: MEDICARE

## 2024-06-25 VITALS
HEART RATE: 73 BPM | WEIGHT: 157 LBS | TEMPERATURE: 98 F | HEIGHT: 60 IN | SYSTOLIC BLOOD PRESSURE: 168 MMHG | BODY MASS INDEX: 30.82 KG/M2 | RESPIRATION RATE: 18 BRPM | DIASTOLIC BLOOD PRESSURE: 72 MMHG | OXYGEN SATURATION: 100 %

## 2024-06-25 DIAGNOSIS — Z17.1 MALIGNANT NEOPLASM OF OVERLAPPING SITES OF RIGHT BREAST IN FEMALE, ESTROGEN RECEPTOR NEGATIVE: ICD-10-CM

## 2024-06-25 DIAGNOSIS — C50.811 MALIGNANT NEOPLASM OF OVERLAPPING SITES OF RIGHT BREAST IN FEMALE, ESTROGEN RECEPTOR NEGATIVE: ICD-10-CM

## 2024-06-25 LAB
ANION GAP SERPL CALCULATED.3IONS-SCNC: 13.6 MMOL/L (ref 5–15)
BASOPHILS # BLD AUTO: 0.02 10*3/MM3 (ref 0–0.2)
BASOPHILS NFR BLD AUTO: 0.5 % (ref 0–1.5)
BUN SERPL-MCNC: 14 MG/DL (ref 8–23)
BUN/CREAT SERPL: 29.8 (ref 7–25)
CALCIUM SPEC-SCNC: 9.4 MG/DL (ref 8.6–10.5)
CHLORIDE SERPL-SCNC: 100 MMOL/L (ref 98–107)
CO2 SERPL-SCNC: 26.4 MMOL/L (ref 22–29)
CREAT SERPL-MCNC: 0.47 MG/DL (ref 0.57–1)
DEPRECATED RDW RBC AUTO: 43 FL (ref 37–54)
EGFRCR SERPLBLD CKD-EPI 2021: 103.8 ML/MIN/1.73
EOSINOPHIL # BLD AUTO: 0.33 10*3/MM3 (ref 0–0.4)
EOSINOPHIL NFR BLD AUTO: 7.5 % (ref 0.3–6.2)
ERYTHROCYTE [DISTWIDTH] IN BLOOD BY AUTOMATED COUNT: 12.8 % (ref 12.3–15.4)
GLUCOSE SERPL-MCNC: 109 MG/DL (ref 65–99)
HCT VFR BLD AUTO: 37.2 % (ref 34–46.6)
HGB BLD-MCNC: 12.1 G/DL (ref 12–15.9)
IMM GRANULOCYTES # BLD AUTO: 0.01 10*3/MM3 (ref 0–0.05)
IMM GRANULOCYTES NFR BLD AUTO: 0.2 % (ref 0–0.5)
LYMPHOCYTES # BLD AUTO: 1.17 10*3/MM3 (ref 0.7–3.1)
LYMPHOCYTES NFR BLD AUTO: 26.5 % (ref 19.6–45.3)
MCH RBC QN AUTO: 29.8 PG (ref 26.6–33)
MCHC RBC AUTO-ENTMCNC: 32.5 G/DL (ref 31.5–35.7)
MCV RBC AUTO: 91.6 FL (ref 79–97)
MONOCYTES # BLD AUTO: 0.41 10*3/MM3 (ref 0.1–0.9)
MONOCYTES NFR BLD AUTO: 9.3 % (ref 5–12)
NEUTROPHILS NFR BLD AUTO: 2.48 10*3/MM3 (ref 1.7–7)
NEUTROPHILS NFR BLD AUTO: 56 % (ref 42.7–76)
NRBC BLD AUTO-RTO: 0 /100 WBC (ref 0–0.2)
PLATELET # BLD AUTO: 222 10*3/MM3 (ref 140–450)
PMV BLD AUTO: 10.4 FL (ref 6–12)
POTASSIUM SERPL-SCNC: 4 MMOL/L (ref 3.5–5.2)
QT INTERVAL: 414 MS
QTC INTERVAL: 431 MS
RBC # BLD AUTO: 4.06 10*6/MM3 (ref 3.77–5.28)
SODIUM SERPL-SCNC: 140 MMOL/L (ref 136–145)
WBC NRBC COR # BLD AUTO: 4.42 10*3/MM3 (ref 3.4–10.8)

## 2024-06-25 PROCEDURE — 36415 COLL VENOUS BLD VENIPUNCTURE: CPT

## 2024-06-25 PROCEDURE — 80048 BASIC METABOLIC PNL TOTAL CA: CPT

## 2024-06-25 PROCEDURE — 85025 COMPLETE CBC W/AUTO DIFF WBC: CPT

## 2024-06-25 PROCEDURE — 93010 ELECTROCARDIOGRAM REPORT: CPT | Performed by: INTERNAL MEDICINE

## 2024-06-25 PROCEDURE — 93005 ELECTROCARDIOGRAM TRACING: CPT

## 2024-06-25 RX ORDER — LIDOCAINE AND PRILOCAINE 25; 25 MG/G; MG/G
CREAM TOPICAL TAKE AS DIRECTED
COMMUNITY
Start: 2024-06-05

## 2024-06-25 RX ORDER — LORATADINE 10 MG/1
10 TABLET ORAL NIGHTLY
COMMUNITY

## 2024-06-25 RX ORDER — GINSENG 100 MG
1 CAPSULE ORAL DAILY
COMMUNITY

## 2024-06-25 NOTE — DISCHARGE INSTRUCTIONS
Take the following medications the morning of surgery:    LEVOTHYROXINE      If you are on prescription narcotic pain medication to control your pain you may also take that medication the morning of surgery.    General Instructions:  Do not eat solid food after midnight the night before surgery.  You may drink clear liquids day of surgery but must stop at least one hour before your hospital arrival time.  It is beneficial for you to have a clear drink that contains carbohydrates the day of surgery.  We suggest a 12 to 20 ounce bottle of Gatorade or Powerade for non-diabetic patients or a 12 to 20 ounce bottle of G2 or Powerade Zero for diabetic patients. (Pediatric patients, are not advised to drink a 12 to 20 ounce carbohydrate drink)    Clear liquids are liquids you can see through.  Nothing red in color.     Plain water                               Sports drinks  Sodas                                   Gelatin (Jell-O)  Fruit juices without pulp such as white grape juice and apple juice  Popsicles that contain no fruit or yogurt  Tea or coffee (no cream or milk added)  Gatorade / Powerade  G2 / Powerade Zero    Infants may have breast milk up to four hours before surgery.  Infants drinking formula may drink formula up to six hours before surgery.   Patients who avoid smoking, chewing tobacco and alcohol for 4 weeks prior to surgery have a reduced risk of post-operative complications.  Quit smoking as many days before surgery as you can.  Do not smoke, use chewing tobacco or drink alcohol the day of surgery.   If applicable bring your C-PAP/ BI-PAP machine in with you to preop day of surgery.  Bring any papers given to you in the doctor’s office.  Wear clean comfortable clothes.  Do not wear contact lenses, false eyelashes or make-up.  Bring a case for your glasses.   Bring crutches or walker if applicable.  Remove all piercings.  Leave jewelry and any other valuables at home.  Hair extensions with metal clips  must be removed prior to surgery.  The Pre-Admission Testing nurse will instruct you to bring medications if unable to obtain an accurate list in Pre-Admission Testing.            Preventing a Surgical Site Infection:  For 2 to 3 days before surgery, avoid shaving with a razor because the razor can irritate skin and make it easier to develop an infection.    Any areas of open skin can increase the risk of a post-operative wound infection by allowing bacteria to enter and travel throughout the body.  Notify your surgeon if you have any skin wounds / rashes even if it is not near the expected surgical site.  The area will need assessed to determine if surgery should be delayed until it is healed.  The night prior to surgery shower using a fresh bar of anti-bacterial soap (such as Dial) and clean washcloth.  Sleep in a clean bed with clean clothing.  Do not allow pets to sleep with you.  Shower on the morning of surgery using a fresh bar of anti-bacterial soap (such as Dial) and clean washcloth.  Dry with a clean towel and dress in clean clothing.  Ask your surgeon if you will be receiving antibiotics prior to surgery.  Make sure you, your family, and all healthcare providers clean their hands with soap and water or an alcohol based hand  before caring for you or your wound.    Day of surgery:  Your arrival time is approximately two hours before your scheduled surgery time.  Upon arrival, a Pre-op nurse and Anesthesiologist will review your health history, obtain vital signs, and answer questions you may have.  The only belongings needed at this time will be a list of your home medications and if applicable your C-PAP/BI-PAP machine.  A Pre-op nurse will start an IV and you may receive medication in preparation for surgery, including something to help you relax.     Please be aware that surgery does come with discomfort.  We want to make every effort to control your discomfort so please discuss any  uncontrolled symptoms with your nurse.   Your doctor will most likely have prescribed pain medications.      If you are going home after surgery you will receive individualized written care instructions before being discharged.  A responsible adult must drive you to and from the hospital on the day of your surgery and ideally stay with you through the night.   .  Discharge prescriptions can be filled by the hospital pharmacy during regular pharmacy hours.  If you are having surgery late in the day/evening your prescription may be e-prescribed to your pharmacy.  Please verify your pharmacy hours or chose a 24 hour pharmacy to avoid not having access to your prescription because your pharmacy has closed for the day.    If you are staying overnight following surgery, you will be transported to your hospital room following the recovery period.  [facility] has all private rooms.    If you have any questions please call Pre-Admission Testing at [phone].  Deductibles and co-payments are collected on the day of service. Please be prepared to pay the required co-pay, deductible or deposit on the day of service as defined by your plan.    Call your surgeon immediately if you experience any of the following symptoms:  Sore Throat  Shortness of Breath or difficulty breathing  Cough  Chills  Body soreness or muscle pain  Headache  Fever  New loss of taste or smell  Do not arrive for your surgery ill.  Your procedure will need to be rescheduled to another time.  You will need to call your physician before the day of surgery to avoid any unnecessary exposure to hospital staff as well as other patients.

## 2024-06-27 ENCOUNTER — HOSPITAL ENCOUNTER (OUTPATIENT)
Dept: OCCUPATIONAL THERAPY | Facility: HOSPITAL | Age: 69
Setting detail: THERAPIES SERIES
Discharge: HOME | End: 2024-06-27
Payer: MEDICARE

## 2024-06-27 DIAGNOSIS — Z17.1 MALIGNANT NEOPLASM OF OVERLAPPING SITES OF RIGHT BREAST IN FEMALE, ESTROGEN RECEPTOR NEGATIVE: ICD-10-CM

## 2024-06-27 DIAGNOSIS — Z91.89 AT RISK FOR LYMPHEDEMA: ICD-10-CM

## 2024-06-27 DIAGNOSIS — C50.811 MALIGNANT NEOPLASM OF OVERLAPPING SITES OF RIGHT BREAST IN FEMALE, ESTROGEN RECEPTOR NEGATIVE: ICD-10-CM

## 2024-06-27 DIAGNOSIS — Z01.818 OTHER SPECIFIED PRE-OPERATIVE EXAMINATION: Primary | ICD-10-CM

## 2024-06-27 PROCEDURE — 97165 OT EVAL LOW COMPLEX 30 MIN: CPT

## 2024-06-27 PROCEDURE — 93702 BIS XTRACELL FLUID ANALYSIS: CPT

## 2024-06-27 NOTE — THERAPY EVALUATION
"Outpatient Occupational Therapy Lymphedema Initial Evaluation  [facility]     Patient Name: Pretty Tovar  : 1955  MRN: [id number]  Today's Date: 2024      Visit Date: 2024    Patient Active Problem List   Diagnosis    Benign essential hypertension    Type 2 diabetes mellitus, without long-term current use of insulin    Pure hypercholesterolemia    Overweight (BMI 25.0-29.9)    Hashimoto's thyroiditis    Hyperplastic rectal polyp    Female stress incontinence    Feeling of incomplete bladder emptying    Primary osteoarthritis of left knee    S/P TKR (total knee replacement), left    Iron deficiency    Abnormality of right breast on screening mammogram    Malignant neoplasm of overlapping sites of right breast in female, estrogen receptor negative        Past Medical History:   Diagnosis Date    Abnormal liver function tests 2017    Description: , \"-\"hepatitis profile    Anemia     Anesthesia complication     SLOW TO WAKE UP WITH HYSTERECTOMY    Arthritis     Cancer 24    Right Breast    Colon polyps     Cystocele, lateral 10/15/2020    Cystocele, midline 10/15/2020    Diabetes mellitus     Frequent UTI     H/O bone density study 10/2010    normal    H/O cardiovascular stress test     H/O echocardiogram 2001    2-D    Hashimoto's thyroiditis     Heart murmur     Hyperkalemia 2019    Hyperlipidemia     Hypertension     Hypothyroidism 2018    Incompetence of pubocervical tissue 10/15/2020    Incompetence of rectovaginal tissue 10/15/2020    Limited joint range of motion     LEFT KNEE    Loss of perineal body, female 10/15/2020    Osteoarthritis of knee 2005    left    Pelvic relaxation due to vaginal prolapse 2018    Plantar warts     foot    Postmenopausal bleeding 2019    Pregnancy      twins x1    Psoriasis     RA (rheumatoid arthritis)     Rectocele 10/15/2020    Urinary tract infection 2020    Urine incontinence     BETTER WITH SLING    " Uterovaginal prolapse     Uterovaginal prolapse, incomplete 09/14/2020    Vaginal enterocele 10/15/2020        Past Surgical History:   Procedure Laterality Date    BREAST BIOPSY Right 2006    benign    COLONOSCOPY      COLONOSCOPY W/ POLYPECTOMY  02/21/2018    Dr Resendiz, 6 mm rectal polyp - hyperplastic    JOINT MANIPULATION Left 11/02/2021    Procedure: KNEE MANIPULATION with steroid injection;  Surgeon: Francis Tavarez MD;  Location: [facility] OR;  Service: Orthopedics;  Laterality: Left;    PUBOVAGINAL SLING N/A 10/16/2020    Procedure: Pubovaginal sling Posterior colporrhaphy fixation Insertion of vaginal grafts Cystourethroscopy;  Surgeon: Karen Allen MD;  Location: [facility] OR;  Service: Gynecology;  Laterality: N/A;    TOTAL KNEE ARTHROPLASTY Left 08/10/2021    Procedure: TOTAL KNEE ARTHROPLASTY;  Surgeon: Francis Tavarez MD;  Location: [facility] OR;  Service: Orthopedics;  Laterality: Left;    TOTAL LAPAROSCOPIC HYSTERECTOMY, SACROCOLPOPEXY N/A 10/16/2020    Procedure: Laparoscopic uterosacral ligament colpopexy sacral colpopexy  Laparoscopic paravaginal repair Laparoscopic hysterectomy with bilateral salpingectomy  Laparoscopic abdominal enterocele repair;  Surgeon: Karen Allen MD;  Location: [facility] OR;  Service: Gynecology;  Laterality: N/A;    TUBAL ABDOMINAL LIGATION           Visit Dx:     ICD-10-CM ICD-9-CM   1. Other specified pre-operative examination  Z01.818 V72.83   2. Malignant neoplasm of overlapping sites of right breast in female, estrogen receptor negative  C50.811 174.8    Z17.1 V86.1   3. At risk for lymphedema  Z91.89 V49.89        Patient History       Row Name 06/27/24 0800             History    Chief Complaint Other 2 (comment)  Preop evaluation  -RE      Date Current Problem(s) Began 05/31/24  -RE      Brief Description of Current Complaint Patient presents for preoperative evaluation prior to a planned right mastectomy with a sentinel lymph node biopsy for  right breast cancer.  Patient is not planning on having reconstruction.  -RE      Patient/Caregiver Goals Know what to do to help the symptoms;Return to prior level of function  -RE      Hand Dominance left-handed  -RE      History of Previous Related Injuries none  -RE      Are you or can you be pregnant No  -RE         Pain     Pain at Present 0  -RE      Pain at Best 0  -RE      Pain at Worst 0  -RE      Pain Comments no pain  -RE      Is your sleep disturbed? No  -RE      Difficulties at work? No issues.  -RE      Difficulties with ADL's? No issues.  -RE      Difficulties with recreational activities? No issues.  -RE         Fall Risk Assessment    Any falls in the past year: No  -RE         Services    Prior Rehab/Home Health Experiences No  -RE      Are you currently receiving Home Health services No  -RE      Do you plan to receive Home Health services in the near future No  -RE         Daily Activities    Primary Language English  -RE      Are you able to read Yes  -RE      Are you able to write Yes  -RE      How does patient learn best? Listening;Reading;Demonstration  -RE      Teaching needs identified Management of Condition;Home Exercise Program  -RE      Patient is concerned about/has problems with Other (comment)  lymphedema prevention  -RE      Barriers to learning None  -RE      Explanation of Functional Status Problem no issues  -RE      Pt Participated in POC and Goals Yes  -RE         Safety    Are you being hurt, hit, or frightened by anyone at home or in your life? No  -RE      Are you being neglected by a caregiver No  -RE      Have you had any of the following issues with N/A  -RE                User Key  (r) = Recorded By, (t) = Taken By, (c) = Cosigned By      Initials Name Provider Type    RE Yasemin Elizabeth OTR Occupational Therapist                     Lymphedema       Row Name 06/27/24 0800             Subjective Pain    Able to rate subjective pain? yes  -RE      Pre-Treatment Pain Level  0  -RE      Post-Treatment Pain Level 0  -RE      Subjective Pain Comment pre-operative; denies pain  -RE         Lymphedema Assessment    Lymphedema Classification RUE:;at risk/stage 0  -RE      Lymphedema Cancer Related Sx right;simple mastectomy;sentinel node biopsy  planned  -RE      Lymphedema Surgery Comments no reconstruction  -RE      Chemo Received yes  planned  -RE      Radiation Therapy Received --  TBD  -RE         Posture/Observations    Posture- WNL Posture is WNL  -RE      Posture/Observations Comments WNL  -RE         General ROM    GENERAL ROM COMMENTS BUE WFL  -RE         MMT (Manual Muscle Testing)    General MMT Comments BUE WFL  -RE         Lymphedema Edema Assessment    Edema Assessment Comment no obvious edema; pre-operative  -RE         Skin Changes/Observations    Skin Observations Comment normal  -RE         Lymphedema Sensation    Lymphedema Sensation Comments normal  -RE         Lymphedema Pulses/Capillary Refill    Lymph Pulses Capillary Refill Comments normal  -RE         Lymphedema Measurements    Measurement Type(s) Quick Girth  -RE         Compression/Skin Care    Compression/Skin Care Comments discussed compression garment for prevention  -RE         L-Dex Bioimpedence Screening    L-Dex Measurement Extremity RUE  -RE      L-Dex Patient Position Standing  -RE      L-Dex UE Dominate Side Left  -RE      L-Dex UE At Risk Side Right  -RE      L-Dex UE Pre Surgical Value Yes  -RE      L-Dex UE Score -0.5  -RE      L-Dex UE Baseline Score -0.5  -RE      L-Dex UE Value Change 0  -RE      L-Dex UE Comment WNL  -RE                User Key  (r) = Recorded By, (t) = Taken By, (c) = Cosigned By      Initials Name Provider Type    RE Yasemin Elizabeth OTR Occupational Therapist                    The patient had a preop baseline SOZO measurement which I reviewed today. The score is WNL  see scanned to EMR. Bioimpedance spectroscopy helps identify the   onset of lymphedema in an arm or leg before  "patients experience noticeable swelling. Research has   shown that 92% of patients with early detection of lymphedema using L-Dex combined with   intervention do not progress to chronic lymphedema through three years. Additionally, as of March 2023, the NCCN Guidelines® for Survivorship recommend proactive screening for lymphedema using   bioimpedance spectroscopy. Whenever possible, patients are tested for baseline L-Dex score before   cancer treatment begins and then are reassessed during regular follow-up visits using the SOZO device.   Otherwise, this can be started postoperatively and continued during regular follow-up visits. If the   patient’s L-Dex score increases above normal levels, that is a sign that lymphedema is developing and a   referral is made to occupational therapy for further evaluation and early compression treatment.   Lymphedema assessment with the SOZO L-Dex score is recommended to be done every 3 months for   the first 3 years and then every 6 months for years 4 and 5 followed by annually afterwards        Therapy Education  Education Details: Discussed personal risk factors for lymphedema postoperatively for the affected upper extremity and trunk quadrant.  I provided patient with \"What Is Lymphedema\" and \"Healthy Habits for Patients at Risk for Lymphedema\" and reviewed with the patient.  We also discussed indications for bioimpedance and patient's current L-Dex value.  I also explained the recommended prevention and surveillance schedule using bioimpedance.  Given: Symptoms/condition management, Other (comment)  Program: New  How Provided: Verbal, Written  Provided to: Patient  Level of Understanding: Teach back education performed, Verbalized         OT Goals       Row Name 06/27/24 0800          OT Short Term Goals    STG Date to Achieve 07/27/24  -RE     STG 1 Patient will demonstrate proper awareness of “What is Lymphedema?” and “Healthy Habits” for improved prevention, management, " care of symptoms and ease of transition to self-care of condition.  -RE     STG 1 Progress New  -RE        Long Term Goals    LTG Date to Achieve 09/27/24  -RE     LTG 1 Patient will participate in bioimpedance scans every 3 months as a method of early detection of lymphedema to allow for early intervention.  -RE     LTG 1 Progress New  -RE     LTG 2 Patient's bioimpedance score to remain below 6.0 for decreased risk of stage II lymphedema.  -RE     LTG 2 Progress New  -RE        Time Calculation    OT Goal Re-Cert Due Date 09/27/24  -RE               User Key  (r) = Recorded By, (t) = Taken By, (c) = Cosigned By      Initials Name Provider Type    Yasemin Bradford OTR Occupational Therapist                     OT Assessment/Plan       Row Name 06/27/24 0855          OT Assessment    Assessment Comments Patient presents to Occupational Therapy preoperatively for planned breast cancer surgery to include right mastectomy with planned sentinel lymph node biopsy.  Patient is scheduled for surgery on 7/3/2024.  Baseline range of motion postural and bioimpedance measurements were taken today to be compared to measurements retaken postoperatively.  At that time, any reduced movement, decline in function or postural issues will be addressed with skilled therapy and new goals will be established.  Patient's strength and active range of motion are within functional limits.  Patient's L-Dex value today was -0.5 which is well within normal limits.  Personal risk factors for lymphedema postoperatively for the right upper extremity and trunk quadrant were also assessed today and basic lymphedema precautions were discussed.  A more detailed discussion regarding personal risk factors will take place postoperatively once the number of nodes removed and the plan for further medical care is known.  -RE     Please refer to paper survey for additional self-reported information No  -RE     OT Diagnosis At risk for lymphedema;  preoperative evaluation  -RE     OT Rehab Potential Good  -RE     Patient/caregiver participated in establishment of treatment plan and goals Yes  -RE     Patient would benefit from skilled therapy intervention Yes  -RE        OT Plan    OT Frequency Other (comment)  -RE     Predicted Duration of Therapy Intervention (OT) Follow-up 6 weeks postoperatively and then every 3 months for bioimpedance  -RE     Planned CPT's? OT EVAL LOW COMPLEXITY: 89995;OT SELF CARE/MGMT/TRAIN 15 MIN: 44824;OT BIS XTRACELL FLUID ANALYSIS: 72705  -RE     Planned Therapy Interventions (Optional Details) home exercise program;patient/family education;other (see comments)  -RE     OT Plan Comments Follow-up 6 weeks postop  -RE               User Key  (r) = Recorded By, (t) = Taken By, (c) = Cosigned By      Initials Name Provider Type    Yasemin Bradford OTR Occupational Therapist                    Outcome Measure Options: Quick DASH  Quick DASH  Quick Dash Comments: 0% disability  Number of Questions Answered: 11  Quick DASH Score: 0         Time Calculation:   OT Start Time: 0815  OT Stop Time: 0848  OT Time Calculation (min): 33 min  Untimed Charges  OT Eval/Re-eval Minutes: 23  27131 - OT Bioimpedence Rx Minutes: 10  Total Minutes  Untimed Charges Total Minutes: 33   Total Minutes: 33     Therapy Charges for Today       Code Description Service Date Service Provider Modifiers Qty    59777411966 HC OT EVAL LOW COMPLEXITY 2 6/27/2024 Yasemin Elizabeth OTR GO 1    76736623303 HC OT BIS XTRACELL FLUID ANALYSIS 6/27/2024 Yasemin Elizabeth OTR GO 1          Dictated utilizing Dragon dictation:  Much of this encounter note is an electronic transcription/translation of spoken language to printed text. The electronic translation of spoken language may permit erroneous, or at times, nonsensical words or phrases to be inadvertently transcribed; Although I have reviewed the note for such errors, some may still exist.            Yasemin Elizabeth  OTR  6/27/2024

## 2024-06-30 ENCOUNTER — HOSPITAL ENCOUNTER (OUTPATIENT)
Dept: BONE DENSITY | Facility: HOSPITAL | Age: 69
Discharge: HOME | End: 2024-06-30
Admitting: NURSE PRACTITIONER
Payer: MEDICARE

## 2024-06-30 DIAGNOSIS — Z78.0 POST-MENOPAUSAL: ICD-10-CM

## 2024-06-30 PROCEDURE — 77080 DXA BONE DENSITY AXIAL: CPT

## 2024-07-01 ENCOUNTER — TELEPHONE (OUTPATIENT)
Dept: INTERNAL MEDICINE | Facility: CLINIC | Age: 69
End: 2024-07-01
Payer: MEDICARE

## 2024-07-01 NOTE — TELEPHONE ENCOUNTER
----- Message from Renee Alcocer sent at 7/1/2024  2:33 PM EDT -----  Please notify Ms Tovar that her bone density is normal  Recommend screening in 2 years

## 2024-07-03 ENCOUNTER — ANCILLARY PROCEDURE (OUTPATIENT)
Dept: LAB | Facility: HOSPITAL | Age: 69
End: 2024-07-03
Payer: MEDICARE

## 2024-07-03 ENCOUNTER — APPOINTMENT (OUTPATIENT)
Dept: GENERAL RADIOLOGY | Facility: HOSPITAL | Age: 69
End: 2024-07-03
Payer: MEDICARE

## 2024-07-03 ENCOUNTER — HOSPITAL ENCOUNTER (OUTPATIENT)
Facility: HOSPITAL | Age: 69
Setting detail: HOSPITAL OUTPATIENT SURGERY
Discharge: HOME | End: 2024-07-03
Attending: STUDENT IN AN ORGANIZED HEALTH CARE EDUCATION/TRAINING PROGRAM | Admitting: STUDENT IN AN ORGANIZED HEALTH CARE EDUCATION/TRAINING PROGRAM
Payer: MEDICARE

## 2024-07-03 ENCOUNTER — ANESTHESIA (OUTPATIENT)
Dept: PERIOP | Facility: HOSPITAL | Age: 69
End: 2024-07-03
Payer: MEDICARE

## 2024-07-03 ENCOUNTER — HOSPITAL ENCOUNTER (OUTPATIENT)
Dept: NUCLEAR MEDICINE | Facility: HOSPITAL | Age: 69
Discharge: HOME | End: 2024-07-03
Payer: MEDICARE

## 2024-07-03 ENCOUNTER — TRANSCRIBE ORDERS (OUTPATIENT)
Dept: LAB | Facility: HOSPITAL | Age: 69
End: 2024-07-03
Payer: MEDICARE

## 2024-07-03 ENCOUNTER — ANESTHESIA EVENT (OUTPATIENT)
Dept: PERIOP | Facility: HOSPITAL | Age: 69
End: 2024-07-03
Payer: MEDICARE

## 2024-07-03 VITALS
WEIGHT: 157 LBS | OXYGEN SATURATION: 98 % | HEART RATE: 75 BPM | DIASTOLIC BLOOD PRESSURE: 78 MMHG | SYSTOLIC BLOOD PRESSURE: 150 MMHG | RESPIRATION RATE: 16 BRPM | BODY MASS INDEX: 30.82 KG/M2 | TEMPERATURE: 97.7 F | HEIGHT: 60 IN

## 2024-07-03 DIAGNOSIS — C50.811 MALIGNANT NEOPLASM OF OVERLAPPING SITES OF RIGHT BREAST IN FEMALE, ESTROGEN RECEPTOR NEGATIVE: ICD-10-CM

## 2024-07-03 DIAGNOSIS — C50.811 MALIGNANT NEOPLASM OF OVERLAPPING SITES OF RIGHT BREAST IN FEMALE, ESTROGEN RECEPTOR NEGATIVE: Primary | ICD-10-CM

## 2024-07-03 DIAGNOSIS — C50.911 INVASIVE DUCTAL CARCINOMA OF RIGHT BREAST: Primary | ICD-10-CM

## 2024-07-03 DIAGNOSIS — C50.911 INVASIVE DUCTAL CARCINOMA OF RIGHT BREAST: ICD-10-CM

## 2024-07-03 DIAGNOSIS — Z17.1 MALIGNANT NEOPLASM OF OVERLAPPING SITES OF RIGHT BREAST IN FEMALE, ESTROGEN RECEPTOR NEGATIVE: Primary | ICD-10-CM

## 2024-07-03 DIAGNOSIS — Z17.1 MALIGNANT NEOPLASM OF OVERLAPPING SITES OF RIGHT BREAST IN FEMALE, ESTROGEN RECEPTOR NEGATIVE: ICD-10-CM

## 2024-07-03 LAB
GLUCOSE BLDC GLUCOMTR-MCNC: 135 MG/DL (ref 70–130)
GLUCOSE BLDC GLUCOMTR-MCNC: 173 MG/DL (ref 70–130)

## 2024-07-03 PROCEDURE — 76000 FLUOROSCOPY <1 HR PHYS/QHP: CPT

## 2024-07-03 PROCEDURE — 25010000002 HYDROMORPHONE 1 MG/ML SOLUTION: Performed by: NURSE ANESTHETIST, CERTIFIED REGISTERED

## 2024-07-03 PROCEDURE — 25010000002 ISOSULFAN BLUE 1 % SOLUTION: Performed by: STUDENT IN AN ORGANIZED HEALTH CARE EDUCATION/TRAINING PROGRAM

## 2024-07-03 PROCEDURE — 38792 RA TRACER ID OF SENTINL NODE: CPT

## 2024-07-03 PROCEDURE — 82948 REAGENT STRIP/BLOOD GLUCOSE: CPT

## 2024-07-03 PROCEDURE — 0 TECHETIUM TC99M TILMANOCEPT: Performed by: STUDENT IN AN ORGANIZED HEALTH CARE EDUCATION/TRAINING PROGRAM

## 2024-07-03 PROCEDURE — C1788 PORT, INDWELLING, IMP: HCPCS | Performed by: STUDENT IN AN ORGANIZED HEALTH CARE EDUCATION/TRAINING PROGRAM

## 2024-07-03 PROCEDURE — 25010000002 ONDANSETRON PER 1 MG: Performed by: NURSE ANESTHETIST, CERTIFIED REGISTERED

## 2024-07-03 PROCEDURE — 25010000002 HYDRALAZINE PER 20 MG: Performed by: NURSE ANESTHETIST, CERTIFIED REGISTERED

## 2024-07-03 PROCEDURE — 38900 IO MAP OF SENT LYMPH NODE: CPT | Performed by: STUDENT IN AN ORGANIZED HEALTH CARE EDUCATION/TRAINING PROGRAM

## 2024-07-03 PROCEDURE — 78195 LYMPH SYSTEM IMAGING: CPT | Performed by: STUDENT IN AN ORGANIZED HEALTH CARE EDUCATION/TRAINING PROGRAM

## 2024-07-03 PROCEDURE — 25010000002 CEFAZOLIN PER 500 MG: Performed by: STUDENT IN AN ORGANIZED HEALTH CARE EDUCATION/TRAINING PROGRAM

## 2024-07-03 PROCEDURE — 77001 FLUOROGUIDE FOR VEIN DEVICE: CPT | Performed by: STUDENT IN AN ORGANIZED HEALTH CARE EDUCATION/TRAINING PROGRAM

## 2024-07-03 PROCEDURE — A9520 TC99 TILMANOCEPT DIAG 0.5MCI: HCPCS | Performed by: STUDENT IN AN ORGANIZED HEALTH CARE EDUCATION/TRAINING PROGRAM

## 2024-07-03 PROCEDURE — 25010000002 GLYCOPYRROLATE 0.2 MG/ML SOLUTION: Performed by: NURSE ANESTHETIST, CERTIFIED REGISTERED

## 2024-07-03 PROCEDURE — 25010000002 DEXAMETHASONE PER 1 MG: Performed by: NURSE ANESTHETIST, CERTIFIED REGISTERED

## 2024-07-03 PROCEDURE — 36561 INSERT TUNNELED CV CATH: CPT | Performed by: STUDENT IN AN ORGANIZED HEALTH CARE EDUCATION/TRAINING PROGRAM

## 2024-07-03 PROCEDURE — 25010000002 FENTANYL CITRATE (PF) 50 MCG/ML SOLUTION: Performed by: NURSE ANESTHETIST, CERTIFIED REGISTERED

## 2024-07-03 PROCEDURE — 0 BUPIVACAINE LIPOSOME 1.3 % SUSPENSION: Performed by: STUDENT IN AN ORGANIZED HEALTH CARE EDUCATION/TRAINING PROGRAM

## 2024-07-03 PROCEDURE — 88342 IMHCHEM/IMCYTCHM 1ST ANTB: CPT | Performed by: STUDENT IN AN ORGANIZED HEALTH CARE EDUCATION/TRAINING PROGRAM

## 2024-07-03 PROCEDURE — 25010000002 SUGAMMADEX 200 MG/2ML SOLUTION: Performed by: NURSE ANESTHETIST, CERTIFIED REGISTERED

## 2024-07-03 PROCEDURE — 25010000002 HEPARIN (PORCINE) PER 1000 UNITS: Performed by: STUDENT IN AN ORGANIZED HEALTH CARE EDUCATION/TRAINING PROGRAM

## 2024-07-03 PROCEDURE — C9290 INJ, BUPIVACAINE LIPOSOME: HCPCS | Performed by: STUDENT IN AN ORGANIZED HEALTH CARE EDUCATION/TRAINING PROGRAM

## 2024-07-03 PROCEDURE — 76098 X-RAY EXAM SURGICAL SPECIMEN: CPT

## 2024-07-03 PROCEDURE — 25810000003 LACTATED RINGERS PER 1000 ML: Performed by: ANESTHESIOLOGY

## 2024-07-03 PROCEDURE — 19303 MAST SIMPLE COMPLETE: CPT | Performed by: STUDENT IN AN ORGANIZED HEALTH CARE EDUCATION/TRAINING PROGRAM

## 2024-07-03 PROCEDURE — 88307 TISSUE EXAM BY PATHOLOGIST: CPT | Performed by: STUDENT IN AN ORGANIZED HEALTH CARE EDUCATION/TRAINING PROGRAM

## 2024-07-03 PROCEDURE — 25010000002 LABETALOL 5 MG/ML SOLUTION: Performed by: NURSE ANESTHETIST, CERTIFIED REGISTERED

## 2024-07-03 PROCEDURE — 88341 IMHCHEM/IMCYTCHM EA ADD ANTB: CPT | Performed by: STUDENT IN AN ORGANIZED HEALTH CARE EDUCATION/TRAINING PROGRAM

## 2024-07-03 PROCEDURE — 25010000002 BUPIVACAINE (PF) 0.5 % SOLUTION 10 ML VIAL: Performed by: STUDENT IN AN ORGANIZED HEALTH CARE EDUCATION/TRAINING PROGRAM

## 2024-07-03 PROCEDURE — 38525 BIOPSY/REMOVAL LYMPH NODES: CPT | Performed by: STUDENT IN AN ORGANIZED HEALTH CARE EDUCATION/TRAINING PROGRAM

## 2024-07-03 PROCEDURE — 25010000002 PROPOFOL 10 MG/ML EMULSION: Performed by: NURSE ANESTHETIST, CERTIFIED REGISTERED

## 2024-07-03 DEVICE — POWERPORT CLEARVUE ISP IMPLANTABLE PORT WITH ATTACHABLE 8F POLYURETHANE OPEN-ENDED SINGLE-LUMEN VENOUS CATHETER PROCEDURAL KIT
Type: IMPLANTABLE DEVICE | Site: CLAVICLE | Status: FUNCTIONAL
Brand: POWERPORT CLEARVUE

## 2024-07-03 DEVICE — LIGACLIP MCA MULTIPLE CLIP APPLIERS, 20 MEDIUM CLIPS
Type: IMPLANTABLE DEVICE | Site: BREAST | Status: FUNCTIONAL
Brand: LIGACLIP

## 2024-07-03 RX ORDER — MAGNESIUM HYDROXIDE 1200 MG/15ML
LIQUID ORAL AS NEEDED
Status: DISCONTINUED | OUTPATIENT
Start: 2024-07-03 | End: 2024-07-03 | Stop reason: HOSPADM

## 2024-07-03 RX ORDER — DROPERIDOL 2.5 MG/ML
0.62 INJECTION, SOLUTION INTRAMUSCULAR; INTRAVENOUS
Status: DISCONTINUED | OUTPATIENT
Start: 2024-07-03 | End: 2024-07-03 | Stop reason: HOSPADM

## 2024-07-03 RX ORDER — HYDROMORPHONE HYDROCHLORIDE 1 MG/ML
0.25 INJECTION, SOLUTION INTRAMUSCULAR; INTRAVENOUS; SUBCUTANEOUS
Status: DISCONTINUED | OUTPATIENT
Start: 2024-07-03 | End: 2024-07-03 | Stop reason: HOSPADM

## 2024-07-03 RX ORDER — IPRATROPIUM BROMIDE AND ALBUTEROL SULFATE 2.5; .5 MG/3ML; MG/3ML
3 SOLUTION RESPIRATORY (INHALATION) ONCE AS NEEDED
Status: DISCONTINUED | OUTPATIENT
Start: 2024-07-03 | End: 2024-07-03 | Stop reason: HOSPADM

## 2024-07-03 RX ORDER — FENTANYL CITRATE 50 UG/ML
25 INJECTION, SOLUTION INTRAMUSCULAR; INTRAVENOUS
Status: DISCONTINUED | OUTPATIENT
Start: 2024-07-03 | End: 2024-07-03 | Stop reason: HOSPADM

## 2024-07-03 RX ORDER — DOCUSATE SODIUM 250 MG
250 CAPSULE ORAL 2 TIMES DAILY PRN
Qty: 28 CAPSULE | Refills: 0 | Status: SHIPPED | OUTPATIENT
Start: 2024-07-03 | End: 2024-07-17

## 2024-07-03 RX ORDER — GLYCOPYRROLATE 0.2 MG/ML
INJECTION INTRAMUSCULAR; INTRAVENOUS AS NEEDED
Status: DISCONTINUED | OUTPATIENT
Start: 2024-07-03 | End: 2024-07-03 | Stop reason: SURG

## 2024-07-03 RX ORDER — PROMETHAZINE HYDROCHLORIDE 25 MG/1
25 SUPPOSITORY RECTAL ONCE AS NEEDED
Status: DISCONTINUED | OUTPATIENT
Start: 2024-07-03 | End: 2024-07-03 | Stop reason: HOSPADM

## 2024-07-03 RX ORDER — FENTANYL CITRATE 50 UG/ML
50 INJECTION, SOLUTION INTRAMUSCULAR; INTRAVENOUS ONCE AS NEEDED
Status: DISCONTINUED | OUTPATIENT
Start: 2024-07-03 | End: 2024-07-03 | Stop reason: HOSPADM

## 2024-07-03 RX ORDER — LABETALOL HYDROCHLORIDE 5 MG/ML
INJECTION, SOLUTION INTRAVENOUS AS NEEDED
Status: DISCONTINUED | OUTPATIENT
Start: 2024-07-03 | End: 2024-07-03 | Stop reason: SURG

## 2024-07-03 RX ORDER — LIDOCAINE HYDROCHLORIDE 20 MG/ML
INJECTION, SOLUTION INFILTRATION; PERINEURAL AS NEEDED
Status: DISCONTINUED | OUTPATIENT
Start: 2024-07-03 | End: 2024-07-03 | Stop reason: SURG

## 2024-07-03 RX ORDER — SODIUM CHLORIDE 0.9 % (FLUSH) 0.9 %
3 SYRINGE (ML) INJECTION EVERY 12 HOURS SCHEDULED
Status: DISCONTINUED | OUTPATIENT
Start: 2024-07-03 | End: 2024-07-03 | Stop reason: HOSPADM

## 2024-07-03 RX ORDER — HYDRALAZINE HYDROCHLORIDE 20 MG/ML
INJECTION INTRAMUSCULAR; INTRAVENOUS AS NEEDED
Status: DISCONTINUED | OUTPATIENT
Start: 2024-07-03 | End: 2024-07-03 | Stop reason: SURG

## 2024-07-03 RX ORDER — HYDROCODONE BITARTRATE AND ACETAMINOPHEN 7.5; 325 MG/1; MG/1
1 TABLET ORAL EVERY 4 HOURS PRN
Status: DISCONTINUED | OUTPATIENT
Start: 2024-07-03 | End: 2024-07-03 | Stop reason: HOSPADM

## 2024-07-03 RX ORDER — BUPIVACAINE HYDROCHLORIDE 5 MG/ML
INJECTION, SOLUTION PERINEURAL AS NEEDED
Status: DISCONTINUED | OUTPATIENT
Start: 2024-07-03 | End: 2024-07-03 | Stop reason: HOSPADM

## 2024-07-03 RX ORDER — ONDANSETRON 2 MG/ML
4 INJECTION INTRAMUSCULAR; INTRAVENOUS ONCE AS NEEDED
Status: DISCONTINUED | OUTPATIENT
Start: 2024-07-03 | End: 2024-07-03 | Stop reason: HOSPADM

## 2024-07-03 RX ORDER — ISOSULFAN BLUE 50 MG/5ML
INJECTION, SOLUTION SUBCUTANEOUS AS NEEDED
Status: DISCONTINUED | OUTPATIENT
Start: 2024-07-03 | End: 2024-07-03 | Stop reason: HOSPADM

## 2024-07-03 RX ORDER — DIAZEPAM 5 MG/1
10 TABLET ORAL ONCE
Status: COMPLETED | OUTPATIENT
Start: 2024-07-03 | End: 2024-07-03

## 2024-07-03 RX ORDER — HYDROCODONE BITARTRATE AND ACETAMINOPHEN 5; 325 MG/1; MG/1
1 TABLET ORAL ONCE AS NEEDED
Status: COMPLETED | OUTPATIENT
Start: 2024-07-03 | End: 2024-07-03

## 2024-07-03 RX ORDER — SODIUM CHLORIDE, SODIUM LACTATE, POTASSIUM CHLORIDE, CALCIUM CHLORIDE 600; 310; 30; 20 MG/100ML; MG/100ML; MG/100ML; MG/100ML
9 INJECTION, SOLUTION INTRAVENOUS CONTINUOUS
Status: DISCONTINUED | OUTPATIENT
Start: 2024-07-03 | End: 2024-07-03 | Stop reason: HOSPADM

## 2024-07-03 RX ORDER — PROPOFOL 10 MG/ML
VIAL (ML) INTRAVENOUS AS NEEDED
Status: DISCONTINUED | OUTPATIENT
Start: 2024-07-03 | End: 2024-07-03 | Stop reason: SURG

## 2024-07-03 RX ORDER — EPHEDRINE SULFATE 50 MG/ML
5 INJECTION, SOLUTION INTRAVENOUS ONCE AS NEEDED
Status: DISCONTINUED | OUTPATIENT
Start: 2024-07-03 | End: 2024-07-03 | Stop reason: HOSPADM

## 2024-07-03 RX ORDER — DIPHENHYDRAMINE HYDROCHLORIDE 50 MG/ML
12.5 INJECTION INTRAMUSCULAR; INTRAVENOUS
Status: DISCONTINUED | OUTPATIENT
Start: 2024-07-03 | End: 2024-07-03 | Stop reason: HOSPADM

## 2024-07-03 RX ORDER — NALOXONE HCL 0.4 MG/ML
0.2 VIAL (ML) INJECTION AS NEEDED
Status: DISCONTINUED | OUTPATIENT
Start: 2024-07-03 | End: 2024-07-03 | Stop reason: HOSPADM

## 2024-07-03 RX ORDER — LIDOCAINE HYDROCHLORIDE 10 MG/ML
0.5 INJECTION, SOLUTION INFILTRATION; PERINEURAL ONCE AS NEEDED
Status: DISCONTINUED | OUTPATIENT
Start: 2024-07-03 | End: 2024-07-03 | Stop reason: HOSPADM

## 2024-07-03 RX ORDER — ROCURONIUM BROMIDE 10 MG/ML
INJECTION, SOLUTION INTRAVENOUS AS NEEDED
Status: DISCONTINUED | OUTPATIENT
Start: 2024-07-03 | End: 2024-07-03 | Stop reason: SURG

## 2024-07-03 RX ORDER — TRAMADOL HYDROCHLORIDE 50 MG/1
50 TABLET ORAL EVERY 8 HOURS PRN
Qty: 12 TABLET | Refills: 0 | Status: SHIPPED | OUTPATIENT
Start: 2024-07-03 | End: 2024-07-07

## 2024-07-03 RX ORDER — DEXMEDETOMIDINE HYDROCHLORIDE 100 UG/ML
INJECTION, SOLUTION INTRAVENOUS AS NEEDED
Status: DISCONTINUED | OUTPATIENT
Start: 2024-07-03 | End: 2024-07-03 | Stop reason: SURG

## 2024-07-03 RX ORDER — LABETALOL HYDROCHLORIDE 5 MG/ML
5 INJECTION, SOLUTION INTRAVENOUS
Status: DISCONTINUED | OUTPATIENT
Start: 2024-07-03 | End: 2024-07-03 | Stop reason: HOSPADM

## 2024-07-03 RX ORDER — ONDANSETRON 2 MG/ML
INJECTION INTRAMUSCULAR; INTRAVENOUS AS NEEDED
Status: DISCONTINUED | OUTPATIENT
Start: 2024-07-03 | End: 2024-07-03 | Stop reason: SURG

## 2024-07-03 RX ORDER — FAMOTIDINE 10 MG/ML
20 INJECTION, SOLUTION INTRAVENOUS ONCE
Status: COMPLETED | OUTPATIENT
Start: 2024-07-03 | End: 2024-07-03

## 2024-07-03 RX ORDER — HYDRALAZINE HYDROCHLORIDE 20 MG/ML
5 INJECTION INTRAMUSCULAR; INTRAVENOUS
Status: DISCONTINUED | OUTPATIENT
Start: 2024-07-03 | End: 2024-07-03 | Stop reason: HOSPADM

## 2024-07-03 RX ORDER — GABAPENTIN 100 MG/1
100 CAPSULE ORAL 3 TIMES DAILY PRN
Qty: 42 CAPSULE | Refills: 0 | Status: SHIPPED | OUTPATIENT
Start: 2024-07-03 | End: 2024-07-12

## 2024-07-03 RX ORDER — PROMETHAZINE HYDROCHLORIDE 25 MG/1
25 TABLET ORAL ONCE AS NEEDED
Status: DISCONTINUED | OUTPATIENT
Start: 2024-07-03 | End: 2024-07-03 | Stop reason: HOSPADM

## 2024-07-03 RX ORDER — MIDAZOLAM HYDROCHLORIDE 1 MG/ML
0.5 INJECTION INTRAMUSCULAR; INTRAVENOUS
Status: DISCONTINUED | OUTPATIENT
Start: 2024-07-03 | End: 2024-07-03 | Stop reason: HOSPADM

## 2024-07-03 RX ORDER — FENTANYL CITRATE 50 UG/ML
INJECTION, SOLUTION INTRAMUSCULAR; INTRAVENOUS AS NEEDED
Status: DISCONTINUED | OUTPATIENT
Start: 2024-07-03 | End: 2024-07-03 | Stop reason: SURG

## 2024-07-03 RX ORDER — FLUMAZENIL 0.1 MG/ML
0.2 INJECTION INTRAVENOUS AS NEEDED
Status: DISCONTINUED | OUTPATIENT
Start: 2024-07-03 | End: 2024-07-03 | Stop reason: HOSPADM

## 2024-07-03 RX ORDER — DEXAMETHASONE SODIUM PHOSPHATE 4 MG/ML
INJECTION, SOLUTION INTRA-ARTICULAR; INTRALESIONAL; INTRAMUSCULAR; INTRAVENOUS; SOFT TISSUE AS NEEDED
Status: DISCONTINUED | OUTPATIENT
Start: 2024-07-03 | End: 2024-07-03 | Stop reason: SURG

## 2024-07-03 RX ORDER — SODIUM CHLORIDE 0.9 % (FLUSH) 0.9 %
3-10 SYRINGE (ML) INJECTION AS NEEDED
Status: DISCONTINUED | OUTPATIENT
Start: 2024-07-03 | End: 2024-07-03 | Stop reason: HOSPADM

## 2024-07-03 RX ADMIN — DIAZEPAM 10 MG: 5 TABLET ORAL at 09:14

## 2024-07-03 RX ADMIN — SUGAMMADEX 200 MG: 100 INJECTION, SOLUTION INTRAVENOUS at 14:51

## 2024-07-03 RX ADMIN — FENTANYL CITRATE 25 MCG: 50 INJECTION, SOLUTION INTRAMUSCULAR; INTRAVENOUS at 12:41

## 2024-07-03 RX ADMIN — ROCURONIUM BROMIDE 30 MG: 10 INJECTION, SOLUTION INTRAVENOUS at 11:48

## 2024-07-03 RX ADMIN — LIDOCAINE HYDROCHLORIDE 80 MG: 20 INJECTION, SOLUTION INFILTRATION; PERINEURAL at 11:48

## 2024-07-03 RX ADMIN — ONDANSETRON 4 MG: 2 INJECTION INTRAMUSCULAR; INTRAVENOUS at 14:40

## 2024-07-03 RX ADMIN — LABETALOL HYDROCHLORIDE 5 MG: 5 INJECTION, SOLUTION INTRAVENOUS at 13:26

## 2024-07-03 RX ADMIN — FENTANYL CITRATE 50 MCG: 50 INJECTION, SOLUTION INTRAMUSCULAR; INTRAVENOUS at 13:28

## 2024-07-03 RX ADMIN — GLYCOPYRROLATE 0.2 MG: 0.2 INJECTION INTRAMUSCULAR; INTRAVENOUS at 11:48

## 2024-07-03 RX ADMIN — LABETALOL HYDROCHLORIDE 5 MG: 5 INJECTION, SOLUTION INTRAVENOUS at 13:06

## 2024-07-03 RX ADMIN — FAMOTIDINE 20 MG: 10 INJECTION INTRAVENOUS at 09:14

## 2024-07-03 RX ADMIN — HYDRALAZINE HYDROCHLORIDE 6 MG: 20 INJECTION, SOLUTION INTRAMUSCULAR; INTRAVENOUS at 13:57

## 2024-07-03 RX ADMIN — SODIUM CHLORIDE, POTASSIUM CHLORIDE, SODIUM LACTATE AND CALCIUM CHLORIDE: 600; 310; 30; 20 INJECTION, SOLUTION INTRAVENOUS at 13:09

## 2024-07-03 RX ADMIN — SODIUM CHLORIDE, POTASSIUM CHLORIDE, SODIUM LACTATE AND CALCIUM CHLORIDE 9 ML/HR: 600; 310; 30; 20 INJECTION, SOLUTION INTRAVENOUS at 09:14

## 2024-07-03 RX ADMIN — TILMANOCEPT 1 DOSE: KIT at 11:20

## 2024-07-03 RX ADMIN — PROPOFOL 200 MG: 10 INJECTION, EMULSION INTRAVENOUS at 11:48

## 2024-07-03 RX ADMIN — PROPOFOL 25 MCG/KG/MIN: 10 INJECTION, EMULSION INTRAVENOUS at 12:03

## 2024-07-03 RX ADMIN — FENTANYL CITRATE 25 MCG: 50 INJECTION, SOLUTION INTRAMUSCULAR; INTRAVENOUS at 12:49

## 2024-07-03 RX ADMIN — LABETALOL HYDROCHLORIDE 5 MG: 5 INJECTION, SOLUTION INTRAVENOUS at 13:21

## 2024-07-03 RX ADMIN — HYDROMORPHONE HYDROCHLORIDE 0.5 MG: 1 INJECTION, SOLUTION INTRAMUSCULAR; INTRAVENOUS; SUBCUTANEOUS at 12:57

## 2024-07-03 RX ADMIN — DEXAMETHASONE SODIUM PHOSPHATE 8 MG: 4 INJECTION, SOLUTION INTRA-ARTICULAR; INTRALESIONAL; INTRAMUSCULAR; INTRAVENOUS; SOFT TISSUE at 11:53

## 2024-07-03 RX ADMIN — FENTANYL CITRATE 50 MCG: 50 INJECTION, SOLUTION INTRAMUSCULAR; INTRAVENOUS at 13:52

## 2024-07-03 RX ADMIN — DEXMEDETOMIDINE HCL 10 MCG: 100 INJECTION INTRAVENOUS at 13:46

## 2024-07-03 RX ADMIN — SODIUM CHLORIDE 2000 MG: 900 INJECTION INTRAVENOUS at 11:35

## 2024-07-03 RX ADMIN — LABETALOL HYDROCHLORIDE 5 MG: 5 INJECTION, SOLUTION INTRAVENOUS at 13:18

## 2024-07-03 RX ADMIN — HYDROCODONE BITARTRATE AND ACETAMINOPHEN 1 TABLET: 5; 325 TABLET ORAL at 16:04

## 2024-07-03 NOTE — ANESTHESIA PROCEDURE NOTES
Airway  Urgency: elective    Date/Time: 7/3/2024 11:51 AM  Airway not difficult    General Information and Staff    Patient location during procedure: OR  Anesthesiologist: Chris Mtz MD  CRNA/CAA: Ondina Flores CRNA    Indications and Patient Condition  Indications for airway management: airway protection    Preoxygenated: yes  Mask difficulty assessment: 1 - vent by mask    Final Airway Details  Final airway type: endotracheal airway      Successful airway: ETT  Cuffed: yes   Successful intubation technique: direct laryngoscopy  Facilitating devices/methods: intubating stylet  Endotracheal tube insertion site: oral  Blade: Raymond  Blade size: 3  ETT size (mm): 7.0  Cormack-Lehane Classification: grade I - full view of glottis  Placement verified by: chest auscultation and capnometry   Measured from: lips  ETT/EBT  to lips (cm): 21  Number of attempts at approach: 1  Assessment: lips, teeth, and gum same as pre-op and atraumatic intubation

## 2024-07-03 NOTE — H&P
[facility]     Referring Provider: Aracely Frye MD     Chief complaint: newly diagnosed breast cancer    Subjective    HPI: Ms. Pretty Tovar is a 68 y.o. yo woman, with a  new diagnosis of right breast cancer.      This was initially detected as an imaging abnormality. Her work-up is detailed in the oncologic history below.   She denies any breast lumps, pain, skin changes, or nipple discharge.    She denies any prior history of abnormal mammograms or breast biopsies.     She reports she has a pertinent PMH of rheumatoid arthritis - treated with injections every 2 months, HTN, Diabetes - very well controlled.     She was joined today in clinic by her . They all participated in the discussion, after her examination, and she gave her consent for them to participate.  She gave consent for her  to be present during her examination and participate in the discussion.     HPI 7/3 - met with medical oncology plan for adjuvant chemotherapy - patient declined reconstruction with plastic surgery. Plan for right mastectomy, sentinel lymph node biopsy - possible dissection.        Oncology/Hematology History   Malignant neoplasm of overlapping sites of right breast in female, estrogen receptor negative   5/14/2024 Cancer Staged    Staging form: Breast, AJCC 8th Edition  - Clinical stage from 5/14/2024: Stage IA (cT1c, cN0, cM0, G3, ER-, CO-, HER2+) - Signed by Aracely Frye MD on 5/31/2024 5/31/2024 Initial Diagnosis    Malignant neoplasm of overlapping sites of right breast in female, estrogen receptor negative         Review of Systems   Constitutional: Negative.    HENT:  Negative.     Eyes: Negative.    Respiratory: Negative.     Cardiovascular: Negative.    Gastrointestinal: Negative.    Endocrine: Negative.    Genitourinary: Negative.     Musculoskeletal:  Positive for arthralgias.   Skin: Negative.    Neurological: Negative.    Hematological: Negative.    Psychiatric/Behavioral: Negative.    "      Medications:    Current Facility-Administered Medications:     ceFAZolin 2000 mg IVPB in 100 mL NS (MBP), 2,000 mg, Intravenous, Once, Aracely Frye MD    fentaNYL citrate (PF) (SUBLIMAZE) injection 50 mcg, 50 mcg, Intravenous, Once PRN, Ady Cam MD    lactated ringers infusion, 9 mL/hr, Intravenous, Continuous, Ady Cam MD, Last Rate: 9 mL/hr at 24 0914, 9 mL/hr at 24 0914    lidocaine (XYLOCAINE) 1 % injection 0.5 mL, 0.5 mL, Intradermal, Once PRN, Ady Cam MD    midazolam (VERSED) injection 0.5 mg, 0.5 mg, Intravenous, Q5 Min PRN, Ady Cam MD    sodium chloride 0.9 % flush 3 mL, 3 mL, Intravenous, Q12H, Ady Cam MD    sodium chloride 0.9 % flush 3-10 mL, 3-10 mL, Intravenous, PRN, Ady Cam MD    Allergies:  No Known Allergies    Medical history:  Past Medical History:   Diagnosis Date    Abnormal liver function tests 2017    Description: , \"-\"hepatitis profile    Anemia     Anesthesia complication     SLOW TO WAKE UP WITH HYSTERECTOMY    Arthritis     Cancer 24    Right Breast    Colon polyps     Cystocele, lateral 10/15/2020    Cystocele, midline 10/15/2020    Diabetes mellitus     Frequent UTI     H/O bone density study 10/2010    normal    H/O cardiovascular stress test     H/O echocardiogram 2001    2-D    Hashimoto's thyroiditis     Heart murmur     Hyperkalemia 2019    Hyperlipidemia     Hypertension     Hypothyroidism 2018    Incompetence of pubocervical tissue 10/15/2020    Incompetence of rectovaginal tissue 10/15/2020    Limited joint range of motion     LEFT KNEE    Loss of perineal body, female 10/15/2020    Osteoarthritis of knee 2005    left    Pelvic relaxation due to vaginal prolapse 2018    Plantar warts     foot    Postmenopausal bleeding 2019    Pregnancy      twins x1    Psoriasis     RA (rheumatoid arthritis)     Rectocele 10/15/2020    Urinary tract infection 2020    Urine " incontinence     BETTER WITH SLING    Uterovaginal prolapse     Uterovaginal prolapse, incomplete 09/14/2020    Vaginal enterocele 10/15/2020       Surgical History:  Past Surgical History:   Procedure Laterality Date    BREAST BIOPSY Right 2006    benign    COLONOSCOPY      COLONOSCOPY W/ POLYPECTOMY  02/21/2018    Dr Resendiz, 6 mm rectal polyp - hyperplastic    JOINT MANIPULATION Left 11/02/2021    Procedure: KNEE MANIPULATION with steroid injection;  Surgeon: Francis Tavarez MD;  Location: [facility] MAIN OR;  Service: Orthopedics;  Laterality: Left;    PUBOVAGINAL SLING N/A 10/16/2020    Procedure: Pubovaginal sling Posterior colporrhaphy fixation Insertion of vaginal grafts Cystourethroscopy;  Surgeon: Karen Allen MD;  Location: [facility] MAIN OR;  Service: Gynecology;  Laterality: N/A;    TOTAL KNEE ARTHROPLASTY Left 08/10/2021    Procedure: TOTAL KNEE ARTHROPLASTY;  Surgeon: Francis Tavarez MD;  Location: [facility] MAIN OR;  Service: Orthopedics;  Laterality: Left;    TOTAL LAPAROSCOPIC HYSTERECTOMY, SACROCOLPOPEXY N/A 10/16/2020    Procedure: Laparoscopic uterosacral ligament colpopexy sacral colpopexy  Laparoscopic paravaginal repair Laparoscopic hysterectomy with bilateral salpingectomy  Laparoscopic abdominal enterocele repair;  Surgeon: Karen Allen MD;  Location: [facility] MAIN OR;  Service: Gynecology;  Laterality: N/A;    TUBAL ABDOMINAL LIGATION         Family History:  Family History   Problem Relation Age of Onset    Heart disease Father     Hypertension Father     Diabetes Brother         Passed away 2012    Heart disease Maternal Grandmother     Other Son         TWIN    Other Daughter         TWIN     Thyroid disease Daughter     Diabetes Brother     COPD Brother     Diabetes Brother     Heart disease Brother     Hypertension Brother     COPD Mother         Passed away 1988    BRCA 1/2 Neg Hx     Breast cancer Neg Hx     Colon cancer Neg Hx     Endometrial cancer Neg Hx     Ovarian cancer Neg Hx      Malig Hyperthermia Neg Hx        Family Cancer History: relationship - (age of diagnosis/current age or age at death)  Breast: N/A   Ovarian: N/A  Colon: N/A  Pancreas: N/A  Prostate: N/A    Social History:   Social History     Socioeconomic History    Marital status:      Spouse name: SUZANNE     Number of children: 3   Tobacco Use    Smoking status: Never    Smokeless tobacco: Never    Tobacco comments:     daily caffine   Vaping Use    Vaping status: Never Used   Substance and Sexual Activity    Alcohol use: Never    Drug use: Never    Sexual activity: Yes     Partners: Male     Birth control/protection: Post-menopausal, Hysterectomy, Surgical     Comment: SPOUSE = SUZANNE        She lives with her .  She works as a .          GYNECOLOGIC HISTORY:   G: 3. P: 2. AB: 0.  Last menstrual period: 53 years old  Age at menarche: 11  Age at first childbirth: 23  Lactation/How long: N/A  Age at menopause: 53  Total years of oral contraceptive use: 2  Total years of hormone replacement therapy: 0      Objective   PHYSICAL EXAMINATION:   Vitals:    07/03/24 0917   BP: 149/71   Pulse: 74   Resp: 16   Temp: 97.6 °F (36.4 °C)   SpO2: 98%     Examination chaperoned by Joslyn English.  ECOG 0 - Asymptomatic  General: NAD, well appearing  Psych: a&o x 3, normal mood and affect  Eyes: EOMI, no scleral icterus  ENMT: neck supple without masses or thyromegaly, mucus membranes moist  Resp: normal effort  CV: RRR, no edema   GI: soft, NT, ND  MSK: normal gait, normal ROM in bilateral shoulders  Lymph nodes:  no cervical, supraclavicular or axillary lymphadenopathy  Breast: symmetric, Bra 38B, minimal ptosis  Right: significant hematoma, resolving No visible abnormalities on inspection while seated, with arms raised or hands on hips. No masses, skin changes, or nipple abnormalities.  Left: No visible abnormalities on inspection while seated, with arms raised or hands on hips. No masses, skin changes, or nipple  abnormalities.    Previous IMAGING: I have independently reviewed her imagin/10/24 Bilateral Screening Mammogram ([facility])  Breasts are heterogeneously dense.  Finding 1: Round and course heterogenous calcifications with diffuse distribution in both breasts.  Finding 2: High density, oval mass measuring 10 mm with obscured margins seen in the middle 1/3 upper outer region of the right breast.   Finding 3: focal asymmetry with questioned architectural distortion measuring 11 mm seen int he middle 1/3 region of the right breast at 12:00  Impression  1: calcifications are benign   2: mass in the middle 1/3 upper outer region of the right breast requires additional evaluation.  3: focal asymmetry with questioned architectural distortion in the middle 1/3 region of the right breast requires additional evaluation.  BIRADS 0    24 Right Diagnostic Mammogram  Breast is heterogeneously dense which may obscure small masses.   Finding 1 - additional eval for oval mass upper outer seen 4/10/24. On present there is an isodense oval mass measuring 10 mm with obscured margins in the middle 1/3 region of the right breast 11:00  Finding 2 - additional evaluation for the focal asymmetry in the right breast 12:00 seen 4/10/24. On present there is a focal asymetry measuring 10 mm in the middle 1/3 region of the right breast at 12:00  Right Breast US  Finding 1: US shows oval hypoechoic heterogeneous mass with indistinct margins measruing [id number] mm in the middle 1/3 region of the right breast at 11 oclock 3 cm fn.   Finding 2 - no sonographic correlate  Impression:  Finding 1 - mass in the middle 1/3 region of the right breast at 11:00 is suspicious - recommend US guided biopsy  Finding 2 - focal asymmetry in the middle 1/3 region of the right breast at 12:00 is suspicious. Stereotactic biopsy is recommended  [id number]      24   Sterotactic biopsy, 12:00 - Using a 9 gauge vacume assisted device 10 core biopsies were  obtained. A tophat clip was placed. Impression - two view mammogram shows clip in the expected location. Pathology shows DCIS, is concordant.   US biopsy 11:00 for 11 mm mass - using a 12 gauge Balzoos vacuum assisted device 8 cores were obtained. Impression: two view mammogram and US shows clip in expected location. Pathology shows invasive ductal cancer - is concordant.     5/21/24 Right Axillary US  FINDINGS:  Targeted sonographic evaluation of the right axilla was performed.  Benign-appearing predominantly fatty lymph nodes are identified. No morphologically abnormal right axillary lymph nodes are identified.     IMPRESSION:  Benign-appearing right axillary lymph nodes. Recommend clinical  follow-up. Surgical management is again recommended for biopsy-proven right breast malignancy.     BI-RADS Category 2: Benign    5/24/24 Breast MRI  FINDINGS: There is heterogeneous fibroglandular tissue. There is mild background parenchymal enhancement.     RIGHT BREAST:    At 11:00 in the middle right breast, 4.7 cm posterior to the nipple, there is a 1.1 x 1.1 x 1.1 cm enhancing mass with a corresponding focus of susceptibility from a biopsy clip within the posterior aspect, which represents a site of biopsy-proven malignancy. Along the ventral margin of the mass is a 1.3 x 0.8 x 1.1 cm T1 hyperintense postbiopsy hematoma.     At 12:00 in the middle to posterior right breast, centered 5 cm  posterior to the nipple, there is a 2.1 cm AP dimension, 2.8 cm  transverse dimension, 2.9 cm craniocaudal dimension T1 hyperintense postbiopsy hematoma with a corresponding focus of susceptibility from a biopsy clip marking an additional site of biopsy-proven malignancy. A 0.8 x 0.8 x 0.8 cm T2 hyperintense biopsy cavity is noted along the inferior margin of the hematoma. A biopsy tract extends to the overlying skin superiorly.  At 9:00 in the middle to posterior right breast, 5 m posterior to the nipple, there is a 0.8 cm AP dimension,  1.2 cm transverse dimension, 1.4 cm craniocaudal dimension irregular enhancing mass, which is suspicious.     Extending from 9:00 to 2:00 in the anterior, middle, and posterior right breast, there is regional non-mass enhancement measuring approximately 4.7 cm in AP dimension, 4.7 cm in transverse dimension, and at least 4.6 cm in craniocaudal dimension. This encompasses the 2 sites of biopsy-proven malignancy, as well as the additional suspicious enhancing mass at the 10 o'clock position. Suspicious enhancement terminates approximately 2.2 cm from the base of the nipple. There is mild asymmetric right nipple retraction. No suspicious enhancement is identified in the right nipple or chest wall. The visualized axilla is within normal limits.     LEFT BREAST:    No suspicious enhancing mass or area of non-mass enhancement is identified.  The visualized axilla is within normal limits.     EXTRAMAMMARY FINDINGS:  There are no pathologically enlarged internal mammary chain lymph nodes  on either side.    There is a tiny hiatal hernia.     IMPRESSION AND RECOMMENDATION:  1.  Regional non-mass enhancement extending from 9:00 to 2:00 in the anterior, middle, and posterior right breast, which measures up to approximately 4.7 cm in maximum dimension, encompasses 2 sites of biopsy-proven malignancy, as well as a discrete suspicious irregular enhancing mass at 10:00 in the middle right breast. These findings are suspicious for multicentric malignancy and are described in detail above. Recommend oncologic and surgical management.  2.  No MRI evidence of malignancy in the left breast.      BI-RADS Category 4: Suspicious    PATHOLOGY:   5/14/24  1.  Breast, right, 12:00, focal asymmetry, biopsy (Top-): Ductal carcinoma in situ               -A.  The ductal carcinoma in situ is of high nuclear grade with solid architecture and expansive comedonecrosis.  The ductal carcinoma in situ has apocrine cytology               -B.   The ductal carcinoma in situ measures up to 4.5 mm in greatest confluence but is seen in several cores               -C.  Microcalcifications are seen associated with the ductal carcinoma in situ     2.  Breast, right, 11:00, biopsy (mini clip marker): Invasive ductal adenocarcinoma with apocrine features               -A.  The invasive tumor is Migel grade III (tubular grade 3, nuclear grade 3, mitotic grade 2).               -B.  The invasive tumor measures up to 4.8 mm on the slides               -C.  Microcalcifications are not identified               -D.  No perineural or lymphovascular invasion is seen               -E.  No definitive ductal carcinoma in situ is seen               -F.  There is an extensive lymphoid infiltrate around the tumor  DCIS  Estrogen Receptor (ER) Status  Negative (less than 1%)     Internal control cells present and stain as expected   Test Type  Food and Drug Administration (FDA) cleared (test / vendor): Ventena   Primary Antibody  SP1   Scoring System  Harrison   Proportion Score  0   Intensity Score  0   Total Harrison Score  0   Test(s) Performed     Progesterone Receptor (PgR) Status  Negative (less than 1%)     Internal control cells present and stain as expected   Test Type  Food and Drug Administration (FDA) cleared (test / vendor): Ventena   Primary Antibody  1E2   Scoring System  Harrison   Proportion Score  1   Intensity Score  2   Total Harrison Score  3   Test(s) Performed  Ki-67   Ki-67 Percentage of Positive Nuclei  25 %     IDC  Estrogen Receptor (ER) Status  Negative (less than 1%)     Internal control cells present and stain as expected   Test Type  Food and Drug Administration (FDA) cleared (test / vendor): Ventena   Primary Antibody  SP1   Scoring System  Harrison   Proportion Score  0   Intensity Score  0   Total Harrison Score  0   Test(s) Performed     Progesterone Receptor (PgR) Status  Negative (less than 1%)     Internal control cells present and stain as expected    Test Type  Food and Drug Administration (FDA) cleared (test / vendor): Ventena   Primary Antibody  1E2   Scoring System  Harrison   Proportion Score  0   Intensity Score  0   Total Harrison Score  0   Test(s) Performed     HER2 by Immunohistochemistry  Positive (Score 3+)   Percentage of Cells with Uniform Intense Complete Membrane Staining  100 %   Test Type  Food and Drug Administration (FDA) cleared (test / vendor): Ventena   Primary Antibody  4B5   Test(s) Performed  Ki-67   Ki-67 Percentage of Positive Nuclei  60 %       Assessment & Plan   Assessment:  68 y.o. F with a new diagnosis of right: IDC grade 3, ER/ND -, Her2 +; [id number], Clinical anatomic stage IA, Clinical prognostic stage IA.      Discussion:  I had an extensive discussion with the patient and family about the nature of this breast cancer diagnosis. We reviewed the components of breast tissue including ducts and lobules. We reviewed her pathology report in detail. We reviewed breast cancer histology, including stage, grade, ER/ND receptors, HER2 receptors and how this applies to her diagnosis. We discussed the multidisciplinary approach to breast cancer care.  Treatments can include surgery, radiation therapy, and medical therapy (chemotherapy, targeted therapy, and/or endocrine therapy).  The exact type of treatment and the order of therapy depends on the size and location/distribution of breast disease, the involvement of the regional lymph node basins, concern for or presence of metastatic disease, potential genetic mutations, and the individual breast cancer tumor markers expressed by the cancer. We also discussed other treatment options including the option of not undergoing any surgical treatment, with a palliative rather than curative intent and the risks associated with this including disease progression.    The patient's clinical stage is documented as above. This was discussed with the patient prior to initiation of treatment. All available  pathology reports were discussed with the patient today. All treatment decisions were made via shared decision making with the patient. This patient was evaluated for appropriate ancillary referrals including, pre-treatment functional assessment, exercise program, nutrition program, genetics, and lymphedema clinic. This patient received preoperative and postoperative education. The patient was offered a breast reconstruction referral appropriate to plan surgical intervention; risks and benefits were discussed today. The patient was educated on perioperative multimodal pain management strategies. Barriers to effective and efficient care are/will be evaluated by the nurse navigator.    We discussed these options and recommendations for treatment:    Surgical Options:  We discussed the surgical management of breast cancer.  Partial mastectomy with radiation and mastectomy were explained with option of reconstruction.  The patient was informed that from a survival standpoint, both procedures are oncologically equivalent    In her case she is not a candidate for breast conservation due to extent of disease and volume of breast tissue. Mastectomy was discussed including surgical incision planning and reconstruction options.    We discussed axillary staging. I described the procedure for sentinel lymph node biopsy in detail, including the preoperative injection of a radiotracer and intraoperative injection of lymphazurin blue dye. I explained that this is a mapping test and not a cancer test, that all of the lymph nodes containing these dyes will be removed for complete testing by pathology, and that the results could impact the decision for adjuvant treatment or additional surgery.     Medical Oncology:  Will require chemotherapy for Hormone negative, Her2 positive breast cancer. Extent of disease will help delineate which course of therapy would be required.     Radiation Oncology:  With the available information no  radiation will be required for small burden of disease and clinically no lymph nodes involved. It would be recommended if there were a significant burden of axillary disease identified during surgery and or if there were close margins. However small burden of disease treated with mastectomy may avert her requirement of radiation therapy    Role for Genetic Testing:  I explained that most breast cancer is not hereditary, that I do not believe this plays a role in her case, and that she does not meet NCCN criteria for genetic testing. I would not recommend a bilateral mastectomy.    Plan:  A multidisciplinary plan has been formulated for the patient:      # Breast Surgical Oncology:  -Consents completed and signed. Discussed the risks and benefits of port placement, right mastectomy, sentinel lymph node biopsy possible axillary dissection at length including estimated time for procedure, postoperative recovery, and postoperative instructions. Discussed the risks to include pain, bleeding, infection, nerve injury, numbness, seroma, skin complications including necrosis, breast asymmetry, need for re-excision, lymphedema, inability to breast feed in the future, possible need for axillary dissection at time of surgery or at a later date, lymphocele, and scar.  Patient appears to understand and wishes to proceed.  All questions were answered.   -Plastic surgery referral for reconstruction - she declined recon  - OR today for right mastectomy, slnb, possible dissection      # Medical Oncology:  - established with Dr. Belcher, port placement today.   - will need chemotherapy for HER2+ disease, clincially is not in the lymph nodes and primary breast lesion is less than 2 cm. Plan for chemotherapy following surgery    #  Radiation Oncology:  -Will refer postoperatively. If indicated - discussed AMOROS data in clinically negative axilla, consideration for sentinel lymph node biopsy and dyllan radiation vs completion axillary  dissection at the time of surgery - both would be appropriate management of axillary disease.  - will attempt to avoid radiation with additional lymph node surgery at time of excision     # Nurse Navigation  - Referral made previously    # Lymphedema clinic  - established    # Genetic Testing   - not indicated    # Breast Imaging  - Next Screening mammogram due: left mammogram May 2025      Aracely Frye MD  Breast Surgical Oncology        CC:  MD Carlyn Joseph Jessica L, APRN

## 2024-07-03 NOTE — ANESTHESIA PREPROCEDURE EVALUATION
Anesthesia Evaluation     Patient summary reviewed and Nursing notes reviewed   history of anesthetic complications:  prolonged sedation  NPO Solid Status: > 8 hours  NPO Liquid Status: > 2 hours           Airway   Mallampati: II  TM distance: >3 FB  Neck ROM: full  No difficulty expected  Comment: Grade I view with MAC 3  Dental - normal exam     Pulmonary - normal exam    breath sounds clear to auscultation  Cardiovascular - normal exam    ECG reviewed  Rhythm: regular  Rate: normal    (+) hypertension less than 2 medications, valvular problems/murmurs murmur, hyperlipidemia    ROS comment: EF 60%, mild AS by ECHO 6/24  PE comment: No murmurs heard    Neuro/Psych  GI/Hepatic/Renal/Endo    (+) obesity, diabetes mellitus type 2, thyroid problem hypothyroidism    Musculoskeletal     Abdominal   (+) obese   Substance History      OB/GYN          Other   arthritis, autoimmune disease rheumatoid arthritis,   history of cancer active      Other Comment: Right breast CA                Anesthesia Plan    ASA 3     general     intravenous induction     Anesthetic plan, risks, benefits, and alternatives have been provided, discussed and informed consent has been obtained with: patient.    CODE STATUS:

## 2024-07-03 NOTE — OP NOTE
BREAST MASTECTOMY WITH SENTINEL NODE BIOPSY, INSERTION VENOUS ACCESS DEVICE  Procedure Report    Patient Name:  Pretty Tovar  YOB: 1955    Date of Surgery:  7/3/2024     Indications:  right breast cancer, biopsy proven DCIS and IDC in 2 separate sites.    Pre-op Diagnosis:   Malignant neoplasm of overlapping sites of right breast in female, estrogen receptor negative [C50.811, Z17.1]       Post-Op Diagnosis Codes:     * Malignant neoplasm of overlapping sites of right breast in female, estrogen receptor negative [C50.811, Z17.1]      Procedure(s):  RIGHT MASTECTOMY WITH SENTINEL NODE BIOPSY  INSERTION OF PORTACATH        Staff:  Surgeon(s):  Aracely Frye MD    Assistant: Kasia Buck RNFA    Anesthesia: General    Estimated Blood Loss:  30 cc    Implants:    Implant Name Type Inv. Item Serial No.  Lot No. LRB No. Used Action   CLIPAPPLR M/ ENDO LIGACLIP9 3/8IN MD - [id number] Implant CLIPAPPLR M/ ENDO LIGACLIP9 3/8IN MD  [employer]  DIV OF J AND J [id number] Right 1 Implanted   KT PRT POWERPORT CLEARVUE MOD/BUMP INTERMD 8F 45CM - [id number] Implant KT PRT POWERPORT CLEARVUE MOD/BUMP INTERMD 8F 45CM  BARD PERIPHER[employer]ASC[employer] [id number] Left 1 Implanted       Specimen:          Specimens       ID Source Type Tests Collected By Collected At Frozen?    A Breast, Right Tissue TISSUE PATHOLOGY EXAM   Aracely Frye MD 7/3/24 7647 No    Description: RIGHT BREAST MASTECTOMY, SHORT STITCH SUPERIOR, LONG STITCH LATERAL    B [place] Lymph Node Tissue TISSUE PATHOLOGY EXAM   Aracely Frye MD 7/3/24 9432 No    Description: RIGHT AXILLARY SENTINEL LYMPH NODES                Findings: sentinel lymph nodes with radioisotope in the nodes. Right breast excised.     Complications: none    Description of Procedure:   The risks and benefits of the procedure were explained in detail to the patient.  Informed consent was obtained.  Prior to arrival to the operating room, the patient had  radionucleotide injected into the right breast for subsequent sentinel lymph node identification. The patient was then brought back to the operating room suite and placed supine on the operating table. Sequential compression devices were applied to the lower extremities and perioperative antibiotics were administered.      Once adequate anesthesia had been achieved by the anesthesia team, the bilateral chest and right axilla were prepped and draped in the usual sterile fashion. The skin and subcutaneous tissue on the left upper chest was infiltrated with Lidocaine 1% and Marcaine 0.25% in a 50/50 mixture (with epinephrine).  The subclavian vein was accessed on the initial attempt with an 18 gauge needle and a guide wire was placed without difficulty utilizing a Seldinger technique.  C-arm fluoroscopy confirmed the proper position of the guide wire in the superior vena cava.  A 3 cm incision was made in the anterior chest wall and a pocket was created for the port.  The catheter was irrigated with heparinized saline, cut to length (20 cm) and secured to the port.  Over the guide wire, the vein dilator and the peel-away sheath were inserted uneventfully.  Position was confirmed with fluoroscopy.  The wire and the vein dilator were then removed and the catheter was tunneled in the peel-away sheath.  Good flushing was obtained from the port.  C-arm fluoroscopy confirmed the good position of the tip of the catheter and no kinking. The port worked with good blood return and easy flushing. The incision was closed in layers with 3-0 vicryl deep dermal and 4-0 monocryl subcuticular.    Dermabond was placed over the wounds.    A post procedure chest xray was ordered to confirm final positioning of the port and ensure no pneumothorax was present.    Next blue dye was injected in the right breast for dual dye sentinel lymph node identification. An elliptical incision was planned encompassing the nipple areolar complex of the  right breast as marked by plastic surgery and dissection was carried down to the underlying subcutaneous tissue.  Skin flaps were developed superiorly to the clavicle, inferiorly to the rectus, medially to the sternum and laterally to the latissimus dorsi.    Through the same incision, attention was drawn to the right axilla. All lymph nodes with increased radioactivity from the previously injected radiotracer, blue lymph nodes, or nodes at the end of a blue channel were removed.   Upon removal of said nodes,there was no radioactive signal within 90% of the sentinel nodes identified.  Hemostasis was confirmed.  The breast was then dissected off of the chest wall with care taken to include all of the underlying muscular fascia. Next I ensured there was excellent hemostasis. A 15F channel drain was then placed and secured in position. A pectoralis and latissimus block was then performed using 20 cc exparel for post op pain management. The incision was then closed as an intended flat closure in two layers using interrupted 3-0 Vicryl and 4-0 Monocryl.           [place] Node Biopsy for Breast Cancer - Right  Operation performed with curative intent. Yes   Tracer(s) used to identify sentinel nodes in the upfront surgery (non-neoadjuvant) setting (select all that apply). Dye and Radioactive tracer   Tracer(s) used to identify sentinel nodes in the neoadjuvant setting (select all that apply). N/A   All nodes (colored or non-colored) present at the end of a dye-filled lymphatic channel were removed. Yes   All significantly radioactive nodes were removed. Yes   All palpably suspicious nodes were removed. Yes   Biopsy-proven positive nodes marked with clips prior to chemotherapy were identified and removed. N/A        Assistant: Kasia Buck RNFA  was responsible for performing the following activities: Retraction, Suction, Irrigation, and Closing and their skilled assistance was necessary for the success of this  case.    Aracely Frye MD     Date: 7/3/2024  Time: 14:50 EDT

## 2024-07-03 NOTE — ANESTHESIA POSTPROCEDURE EVALUATION
"Patient: Pretty Tovar    Procedure Summary       Date: 07/03/24 Room / Location: [facility] OR [address] [place] [address] [facility] MAIN OR    Anesthesia Start: 1140 Anesthesia Stop: 1508    Procedures:       RIGHT MASTECTOMY WITH SENTINEL NODE BIOPSY (Right: Breast)      INSERTION OF PORTACATH Diagnosis:       Malignant neoplasm of overlapping sites of right breast in female, estrogen receptor negative      (Malignant neoplasm of overlapping sites of right breast in female, estrogen receptor negative [C50.811, Z17.1])    Surgeons: Aracely Frye MD Provider: Chris Mtz MD    Anesthesia Type: general ASA Status: 3            Anesthesia Type: general    Vitals  Vitals Value Taken Time   /80 07/03/24 1630   Temp 36.5 °C (97.7 °F) 07/03/24 1630   Pulse 84 07/03/24 1631   Resp 16 07/03/24 1630   SpO2 98 % 07/03/24 1631   Vitals shown include unfiled device data.        Post Anesthesia Care and Evaluation    Level of consciousness: awake and alert  Pain management: adequate    Airway patency: patent  Anesthetic complications: No anesthetic complications  PONV Status: none  Cardiovascular status: acceptable  Respiratory status: acceptable  Hydration status: acceptable    Comments: /74   Pulse 76   Temp 36.5 °C (97.7 °F) (Oral)   Resp 16   Ht 152.4 cm (60\")   Wt 71.2 kg (157 lb)   LMP  (LMP Unknown)   SpO2 97%   BMI 30.66 kg/m²       "

## 2024-07-05 ENCOUNTER — HOSPITAL ENCOUNTER (OUTPATIENT)
Facility: HOSPITAL | Age: 69
Discharge: HOME | End: 2024-07-05
Attending: EMERGENCY MEDICINE | Admitting: EMERGENCY MEDICINE
Payer: MEDICARE

## 2024-07-05 ENCOUNTER — APPOINTMENT (OUTPATIENT)
Dept: GENERAL RADIOLOGY | Facility: HOSPITAL | Age: 69
End: 2024-07-05
Payer: MEDICARE

## 2024-07-05 VITALS
SYSTOLIC BLOOD PRESSURE: 134 MMHG | DIASTOLIC BLOOD PRESSURE: 82 MMHG | OXYGEN SATURATION: 100 % | WEIGHT: 154 LBS | TEMPERATURE: 98.8 F | BODY MASS INDEX: 30.23 KG/M2 | RESPIRATION RATE: 14 BRPM | HEIGHT: 60 IN | HEART RATE: 102 BPM

## 2024-07-05 DIAGNOSIS — S92.522A CLOSED DISPLACED FRACTURE OF MIDDLE PHALANX OF LESSER TOE OF LEFT FOOT, INITIAL ENCOUNTER: Primary | ICD-10-CM

## 2024-07-05 PROCEDURE — 73630 X-RAY EXAM OF FOOT: CPT

## 2024-07-05 PROCEDURE — G0463 HOSPITAL OUTPT CLINIC VISIT: HCPCS | Performed by: EMERGENCY MEDICINE

## 2024-07-05 PROCEDURE — 99202 OFFICE O/P NEW SF 15 MIN: CPT | Performed by: EMERGENCY MEDICINE

## 2024-07-05 RX ORDER — NAPROXEN 500 MG/1
500 TABLET ORAL 2 TIMES DAILY PRN
Qty: 30 TABLET | Refills: 0 | Status: SHIPPED | OUTPATIENT
Start: 2024-07-05 | End: 2024-07-12

## 2024-07-05 NOTE — DISCHARGE INSTRUCTIONS
Return to EMD for increased pain, fever, vomiting or difficulty breathing. Drink plenty of fluids. No heavy lifting/exertion x 1 week.  Follow up with your PCM with the next week.  Use hard shoe and crutches for the next week at least.  Follow-up with orthopedic referral as discussed.

## 2024-07-05 NOTE — FSED PROVIDER NOTE
"Subjective   History of Present Illness  68-year-old female presents complaining of left third toe pain and deformity x 2 days.  Patient states she advertently kicked a stool on Wednesday and her left third toe has been laterally deviated since then.  Patient states she believes she may have a dislocated toe and states she had vamshi taped it.  Patient states no new injury and no other focal issues or complaints.        Review of Systems   Musculoskeletal:         Left toe injury   All other systems reviewed and are negative.      Past Medical History:   Diagnosis Date    Abnormal liver function tests 2017    Description: , \"-\"hepatitis profile    Anemia     Anesthesia complication     SLOW TO WAKE UP WITH HYSTERECTOMY    Arthritis     Cancer 24    Right Breast    Colon polyps     Cystocele, lateral 10/15/2020    Cystocele, midline 10/15/2020    Diabetes mellitus     Frequent UTI     H/O bone density study 10/2010    normal    H/O cardiovascular stress test     H/O echocardiogram 2001    2-D    Hashimoto's thyroiditis     Heart murmur     Hyperkalemia 2019    Hyperlipidemia     Hypertension     Hypothyroidism 2018    Incompetence of pubocervical tissue 10/15/2020    Incompetence of rectovaginal tissue 10/15/2020    Limited joint range of motion     LEFT KNEE    Loss of perineal body, female 10/15/2020    Osteoarthritis of knee 2005    left    Pelvic relaxation due to vaginal prolapse 2018    Plantar warts     foot    Postmenopausal bleeding 2019    Pregnancy      twins x1    Psoriasis     RA (rheumatoid arthritis)     Rectocele 10/15/2020    Urinary tract infection     Urine incontinence     BETTER WITH SLING    Uterovaginal prolapse     Uterovaginal prolapse, incomplete 2020    Vaginal enterocele 10/15/2020       No Known Allergies    Past Surgical History:   Procedure Laterality Date    BREAST BIOPSY Right 2006    benign    COLONOSCOPY      COLONOSCOPY W/ " POLYPECTOMY  02/21/2018    Dr Resendiz, 6 mm rectal polyp - hyperplastic    JOINT MANIPULATION Left 11/02/2021    Procedure: KNEE MANIPULATION with steroid injection;  Surgeon: Francis Tavarez MD;  Location: [facility] MAIN OR;  Service: Orthopedics;  Laterality: Left;    PUBOVAGINAL SLING N/A 10/16/2020    Procedure: Pubovaginal sling Posterior colporrhaphy fixation Insertion of vaginal grafts Cystourethroscopy;  Surgeon: Karen Allen MD;  Location: [facility] MAIN OR;  Service: Gynecology;  Laterality: N/A;    TOTAL KNEE ARTHROPLASTY Left 08/10/2021    Procedure: TOTAL KNEE ARTHROPLASTY;  Surgeon: Francis Tavarez MD;  Location: [facility] MAIN OR;  Service: Orthopedics;  Laterality: Left;    TOTAL LAPAROSCOPIC HYSTERECTOMY, SACROCOLPOPEXY N/A 10/16/2020    Procedure: Laparoscopic uterosacral ligament colpopexy sacral colpopexy  Laparoscopic paravaginal repair Laparoscopic hysterectomy with bilateral salpingectomy  Laparoscopic abdominal enterocele repair;  Surgeon: Karen Allen MD;  Location: [facility] OR;  Service: Gynecology;  Laterality: N/A;    TUBAL ABDOMINAL LIGATION         Family History   Problem Relation Age of Onset    Heart disease Father     Hypertension Father     Diabetes Brother         Passed away 2012    Heart disease Maternal Grandmother     Other Son         TWIN    Other Daughter         TWIN     Thyroid disease Daughter     Diabetes Brother     COPD Brother     Diabetes Brother     Heart disease Brother     Hypertension Brother     COPD Mother         Passed away 1988    BRCA 1/2 Neg Hx     Breast cancer Neg Hx     Colon cancer Neg Hx     Endometrial cancer Neg Hx     Ovarian cancer Neg Hx     Malig Hyperthermia Neg Hx        Social History     Socioeconomic History    Marital status:      Spouse name: SUZANNE     Number of children: 3   Tobacco Use    Smoking status: Never    Smokeless tobacco: Never    Tobacco comments:     daily caffine   Vaping Use    Vaping status: Never Used    Substance and Sexual Activity    Alcohol use: Never    Drug use: Never    Sexual activity: Yes     Partners: Male     Birth control/protection: Post-menopausal, Hysterectomy, Surgical     Comment: SPOUSE = SUZANNE            Objective   Physical Exam  Vitals and nursing note reviewed.   Constitutional:       Appearance: Normal appearance. She is normal weight.   HENT:      Head: Normocephalic and atraumatic.   Musculoskeletal:         General: Normal range of motion.      Comments: Left third toe with moderate lateral deviation with mild ecchymosis noted at the proximal aspect of toe, no tenderness to palpation, distal vascular intact   Skin:     General: Skin is warm and dry.   Neurological:      General: No focal deficit present.      Mental Status: She is alert and oriented to person, place, and time. Mental status is at baseline.   Psychiatric:         Mood and Affect: Mood normal.         Behavior: Behavior normal.         Thought Content: Thought content normal.         Judgment: Judgment normal.         Procedures           ED Course                                           Medical Decision Making  68-year-old female presents with left third toe injury consistent likely with fracture versus dislocation and will do x-ray.  Patient otherwise with no other issues or complaints whatsoever.    Patient does have fracture of the third toe and patient for crutches hard shoe and will vamshi tape toes and patient for follow-up with orthopedics.    Of note, pt refused crutches but ok with hard shoe.     Problems Addressed:  Closed displaced fracture of middle phalanx of lesser toe of left foot, initial encounter: complicated acute illness or injury    Amount and/or Complexity of Data Reviewed  Radiology: ordered.    Risk  Prescription drug management.        Final diagnoses:   None       ED Disposition  ED Disposition       None            No follow-up provider specified.       Medication List      No changes were made  to your prescriptions during this visit.

## 2024-07-08 DIAGNOSIS — I10 BENIGN ESSENTIAL HYPERTENSION: ICD-10-CM

## 2024-07-08 LAB
LAB AP CASE REPORT: NORMAL
LAB AP SPECIAL STAINS: NORMAL
LAB AP SYNOPTIC CHECKLIST: NORMAL
PATH REPORT.FINAL DX SPEC: NORMAL
PATH REPORT.GROSS SPEC: NORMAL

## 2024-07-08 RX ORDER — LISINOPRIL AND HYDROCHLOROTHIAZIDE 25; 20 MG/1; MG/1
1 TABLET ORAL DAILY
Qty: 90 TABLET | Refills: 3 | Status: SHIPPED | OUTPATIENT
Start: 2024-07-08

## 2024-07-08 RX ORDER — LISINOPRIL 20 MG/1
20 TABLET ORAL DAILY
Qty: 90 TABLET | Refills: 3 | Status: SHIPPED | OUTPATIENT
Start: 2024-07-08

## 2024-07-09 ENCOUNTER — TELEPHONE (OUTPATIENT)
Dept: ONCOLOGY | Facility: CLINIC | Age: 69
End: 2024-07-09
Payer: MEDICARE

## 2024-07-09 ENCOUNTER — TELEPHONE (OUTPATIENT)
Dept: SURGERY | Facility: CLINIC | Age: 69
End: 2024-07-09
Payer: MEDICARE

## 2024-07-09 NOTE — TELEPHONE ENCOUNTER
Called Pretty Tovar to discuss pathology results.  All questions answered.  I will follow up with her in clinic as scheduled.      Aracely Frye MD

## 2024-07-09 NOTE — TELEPHONE ENCOUNTER
Caller: Tae Tovar    Relationship: Self    Best call back number: [phone]    What is the best time to reach you: ANY    Who are you requesting to speak with (clinical staff, provider,  specific staff member): CLINICAL    What was the call regarding: TAE IS GOING TO THE [facility] TOMORROW TO HAVE A FILLING REDONE, SHE IS MAKING SURE THAT THIS IS OK AND IF SHE NEEDS TO TAKE  ANY PRECAUTIONS        PLEASE ADVISE

## 2024-07-10 DIAGNOSIS — Z17.1 MALIGNANT NEOPLASM OF OVERLAPPING SITES OF RIGHT BREAST IN FEMALE, ESTROGEN RECEPTOR NEGATIVE: Primary | ICD-10-CM

## 2024-07-10 DIAGNOSIS — Z79.899 HIGH RISK MEDICATION USE: ICD-10-CM

## 2024-07-10 DIAGNOSIS — C50.811 MALIGNANT NEOPLASM OF OVERLAPPING SITES OF RIGHT BREAST IN FEMALE, ESTROGEN RECEPTOR NEGATIVE: Primary | ICD-10-CM

## 2024-07-12 ENCOUNTER — TELEPHONE (OUTPATIENT)
Dept: SURGERY | Facility: CLINIC | Age: 69
End: 2024-07-12
Payer: MEDICARE

## 2024-07-12 ENCOUNTER — OFFICE VISIT (OUTPATIENT)
Dept: INTERNAL MEDICINE | Facility: CLINIC | Age: 69
End: 2024-07-12
Payer: MEDICARE

## 2024-07-12 VITALS
RESPIRATION RATE: 16 BRPM | SYSTOLIC BLOOD PRESSURE: 144 MMHG | HEIGHT: 60 IN | WEIGHT: 157 LBS | BODY MASS INDEX: 30.82 KG/M2 | DIASTOLIC BLOOD PRESSURE: 70 MMHG

## 2024-07-12 DIAGNOSIS — Z90.11 STATUS POST RIGHT MASTECTOMY: ICD-10-CM

## 2024-07-12 DIAGNOSIS — C50.811 MALIGNANT NEOPLASM OF OVERLAPPING SITES OF RIGHT BREAST IN FEMALE, ESTROGEN RECEPTOR NEGATIVE: Primary | ICD-10-CM

## 2024-07-12 DIAGNOSIS — Z17.1 MALIGNANT NEOPLASM OF OVERLAPPING SITES OF RIGHT BREAST IN FEMALE, ESTROGEN RECEPTOR NEGATIVE: Primary | ICD-10-CM

## 2024-07-12 PROCEDURE — 99213 OFFICE O/P EST LOW 20 MIN: CPT | Performed by: NURSE PRACTITIONER

## 2024-07-12 PROCEDURE — 3077F SYST BP >= 140 MM HG: CPT | Performed by: NURSE PRACTITIONER

## 2024-07-12 PROCEDURE — 3044F HG A1C LEVEL LT 7.0%: CPT | Performed by: NURSE PRACTITIONER

## 2024-07-12 PROCEDURE — 1126F AMNT PAIN NOTED NONE PRSNT: CPT | Performed by: NURSE PRACTITIONER

## 2024-07-12 PROCEDURE — 3078F DIAST BP <80 MM HG: CPT | Performed by: NURSE PRACTITIONER

## 2024-07-12 NOTE — TELEPHONE ENCOUNTER
Called PT to let her know that  wants her to come in earlier for her post-op appt.    Post-op: 07/15/2024 at 02:15 PM    Pt gave a verbal understanding of appt date, time, and location.    Appt reminder sent out on 07/12/2024.    MEN

## 2024-07-12 NOTE — PROGRESS NOTES
"Checo Tovar is a 68 y.o. female. Patient is here today for   Chief Complaint   Patient presents with    Surgery Follow Up     Right Masectomy   Answers submitted by the patient for this visit:  Other (Submitted on 7/9/2024)  Please describe your symptoms.: Follow up after surgery  Have you had these symptoms before?: No  How long have you been having these symptoms?: 1-2 weeks  Please list any medications you are currently taking for this condition.: None  Please describe any probable cause for these symptoms. : Cancer - Right Breast Mastectomy  Primary Reason for Visit (Submitted on 7/9/2024)  What is the primary reason for your visit?: Other         Vitals:    07/12/24 0934   BP: 144/70   Resp: 16     Body mass index is 30.66 kg/m².  The following portions of the patient's history were reviewed and updated as appropriate: allergies, current medications, past family history, past medical history, past social history, past surgical history and problem list.    Past Medical History:   Diagnosis Date    Abnormal liver function tests 02/09/2017    Description: 2000, \"-\"hepatitis profile    Anemia     Anesthesia complication     SLOW TO WAKE UP WITH HYSTERECTOMY    Arthritis     Cancer 5/16/24    Right Breast    Colon polyps     Cystocele, lateral 10/15/2020    Cystocele, midline 10/15/2020    Diabetes mellitus     Frequent UTI     H/O bone density study 10/2010    normal    H/O cardiovascular stress test 2001    H/O echocardiogram 03/2001    2-D    Hashimoto's thyroiditis     Heart murmur     Hyperkalemia 05/16/2019    Hyperlipidemia     Hypertension     Hypothyroidism 2018    Incompetence of pubocervical tissue 10/15/2020    Incompetence of rectovaginal tissue 10/15/2020    Limited joint range of motion     LEFT KNEE    Loss of perineal body, female 10/15/2020    Osteoarthritis of knee 2005    left    Pelvic relaxation due to vaginal prolapse 02/08/2018    Plantar warts     foot    Postmenopausal " bleeding 2019    Pregnancy      twins x1    Psoriasis     RA (rheumatoid arthritis)     Rectocele 10/15/2020    Urinary tract infection     Urine incontinence     BETTER WITH SLING    Uterovaginal prolapse     Uterovaginal prolapse, incomplete 2020    Vaginal enterocele 10/15/2020      No Known Allergies   Social History     Socioeconomic History    Marital status:      Spouse name: JACK     Number of children: 3   Tobacco Use    Smoking status: Never    Smokeless tobacco: Never    Tobacco comments:     daily caffine   Vaping Use    Vaping status: Never Used   Substance and Sexual Activity    Alcohol use: Never    Drug use: Never    Sexual activity: Yes     Partners: Male     Birth control/protection: Post-menopausal, Hysterectomy, Surgical     Comment: SPOUSE = JACK         Current Outpatient Medications:     amoxicillin (AMOXIL) 500 MG capsule, Take 1 capsule by mouth Take As Directed. For Dental Procedures, Disp: , Rfl:     BD PEN NEEDLE SEUN U/F 32G X 4 MM misc, USE 1 PEN NEEDLE WITH EACH  VICTOZA INJECTION, Disp: 100 each, Rfl: 1    Biotin 14869 MCG tablet dispersible, Place 10,000 mg on the tongue Daily. PT HOLDING FOR SURGERY, Disp: , Rfl:     Calcium Carbonate-Vit D-Min (CALCIUM 1200 PO), Take 1 tablet by mouth Daily. PT HOLDING FOR SURGERY, Disp: , Rfl:     Cinnamon 500 MG tablet, Take 1,000 mg by mouth 2 (two) times a day. PT HOLDING FOR SURGERY, Disp: , Rfl:     Coenzyme Q10 (CO Q 10 PO), Take 1 capsule by mouth Daily. PT HOLDING FOR SURGERY, Disp: , Rfl:     CRANBERRY-VITAMIN C-D MANNOSE PO, Take 2 capsules by mouth Daily. PT HOLDING FOR SURGERY, Disp: , Rfl:     docusate sodium (COLACE) 250 MG capsule, Take 1 capsule by mouth 2 (Two) Times a Day As Needed for Constipation for up to 14 days., Disp: 28 capsule, Rfl: 0    Golimumab (Simponi) 100 MG/ML solution auto-injector, Inject  under the skin into the appropriate area as directed Every 2 (Two) Months. LAST DOSE APRIL 10 ,  2024, Disp: , Rfl:     levothyroxine (Synthroid) 88 MCG tablet, Take 1 tablet by mouth Daily., Disp: 90 tablet, Rfl: 3    Liraglutide (Victoza) 18 MG/3ML solution pen-injector injection, Inject 1.2 mg under the skin into the appropriate area as directed Daily. (Patient taking differently: Inject 1.2 mg under the skin into the appropriate area as directed Every Night. HOLD 1 DAY BEFORE SURGERY), Disp: 6 mL, Rfl: 5    lisinopril (PRINIVIL,ZESTRIL) 20 MG tablet, TAKE 1 TABLET BY MOUTH DAILY, Disp: 90 tablet, Rfl: 3    lisinopril-hydrochlorothiazide (PRINZIDE,ZESTORETIC) 20-25 MG per tablet, TAKE 1 TABLET BY MOUTH DAILY, Disp: 90 tablet, Rfl: 3    loratadine (Claritin) 10 MG tablet, Take 1 tablet by mouth Every Night., Disp: , Rfl:     meloxicam (MOBIC) 15 MG tablet, Take 1 tablet by mouth Daily. PT HOLDING FOR SURGERY, Disp: , Rfl:     metFORMIN (GLUCOPHAGE) 500 MG tablet, TAKE THREE TABLETS BY MOUTH EVERY MORNING AND THEN TAKE TWO TABLETS BY MOUTH IN THE EVENING (Patient taking differently: Take 3 tablets by mouth Daily With Breakfast. TAKE THREE TABLETS BY MOUTH EVERY MORNING AND THEN TAKE TWO TABLETS BY MOUTH IN THE EVENING), Disp: 450 tablet, Rfl: 3    multivitamin with minerals tablet tablet, Take 1 tablet by mouth Daily. PT HOLDING FOR SURGERY, Disp: , Rfl:     Omega-3 Fatty Acids (fish oil) 1200 MG capsule capsule, Take 1 capsule by mouth Daily. PT HOLDING FOR SURGERY, Disp: , Rfl:     rosuvastatin (Crestor) 20 MG tablet, Take 1 tablet by mouth Daily. (Patient taking differently: Take 1 tablet by mouth Every Night.), Disp: 90 tablet, Rfl: 1    TURMERIC PO, Take 1,400 mg by mouth Daily. PT HOLDING FOR SURGERY, Disp: , Rfl:     vitamin C (ASCORBIC ACID) 250 MG tablet, Take 4 tablets by mouth Daily., Disp: , Rfl:     vitamin D3 125 MCG (5000 UT) capsule capsule, Take 1 capsule by mouth Daily. PT HOLDING FOR SURGERY, Disp: , Rfl:     VITAMIN E PO, Take 1 capsule by mouth Daily. HOLD PRIOR TO SURG, Disp: , Rfl:      Zinc 50 MG tablet, Take 1 tablet by mouth Daily. PT HOLDING FOR SURGERY, Disp: , Rfl:      Objective     History of Present Illness  Ms Tovar is a 68 year old female patient who is here for a hospital follow up. She has breast cancer and is s/p right mastectomy on 7/3/2024. She starts chemotherapy next week. She has been doing well. She still has a drain and got 17cc out last night. She is having minimal pain.   She was also recently in the ER with a broken toe on the left foot.     The following data was reviewed by: CLEVELAND Rodriguez on 07/12/2024:  Common labs          6/5/2024    14:41 6/10/2024    08:24 6/25/2024    09:12   Common Labs   Glucose  128  109    BUN  19  14    Creatinine  0.62  0.47    Sodium  137  140    Potassium  4.5  4.0    Chloride  99  100    Calcium  9.5  9.4    Total Protein  7.0     Albumin  4.5     Total Bilirubin  0.3     Alkaline Phosphatase  57     AST (SGOT)  26     ALT (SGPT)  24     WBC 5.16  4.43  4.42    Hemoglobin 13.1  12.2  12.1    Hematocrit 39.4  37.4  37.2    Platelets 273  244  222    Total Cholesterol  171     Triglycerides  137     HDL Cholesterol  55     LDL Cholesterol   92     Hemoglobin A1C  6.40       Data reviewed : Recent hospitalization notes hospital records and ER notes         Review of Systems   Respiratory: Negative.     Cardiovascular: Negative.    Skin:  Positive for wound.       Physical Exam  Vitals and nursing note reviewed.   Cardiovascular:      Rate and Rhythm: Normal rate.   Pulmonary:      Effort: Pulmonary effort is normal.   Chest:      Comments: Right chest incision well approximated, there is a small amount of drainage in drain    Neurological:      Mental Status: She is alert.         Assessment    ASSESSMENT    Problems Addressed this Visit       Malignant neoplasm of overlapping sites of right breast in female, estrogen receptor negative - Primary     Other Visit Diagnoses       Status post right mastectomy              Diagnoses          Codes Comments    Malignant neoplasm of overlapping sites of right breast in female, estrogen receptor negative    -  Primary ICD-10-CM: C50.811, Z17.1  ICD-9-CM: 174.8, V86.1     Status post right mastectomy     ICD-10-CM: Z90.11  ICD-9-CM: V45.71             PLAN  Overall she is doing well. She is starting chemo next week  She will continue current medications and follow up as scheduled in December or sooner if needed

## 2024-07-15 ENCOUNTER — OFFICE VISIT (OUTPATIENT)
Dept: SURGERY | Facility: CLINIC | Age: 69
End: 2024-07-15
Payer: MEDICARE

## 2024-07-15 ENCOUNTER — OFFICE VISIT (OUTPATIENT)
Dept: ONCOLOGY | Facility: CLINIC | Age: 69
End: 2024-07-15
Payer: MEDICARE

## 2024-07-15 VITALS
SYSTOLIC BLOOD PRESSURE: 181 MMHG | WEIGHT: 159.3 LBS | HEIGHT: 60 IN | OXYGEN SATURATION: 99 % | DIASTOLIC BLOOD PRESSURE: 69 MMHG | BODY MASS INDEX: 31.28 KG/M2 | TEMPERATURE: 98.1 F | RESPIRATION RATE: 16 BRPM | HEART RATE: 61 BPM

## 2024-07-15 VITALS
HEART RATE: 73 BPM | RESPIRATION RATE: 18 BRPM | BODY MASS INDEX: 31.22 KG/M2 | HEIGHT: 60 IN | DIASTOLIC BLOOD PRESSURE: 78 MMHG | OXYGEN SATURATION: 97 % | SYSTOLIC BLOOD PRESSURE: 140 MMHG | WEIGHT: 159 LBS

## 2024-07-15 DIAGNOSIS — Z17.1 MALIGNANT NEOPLASM OF OVERLAPPING SITES OF RIGHT BREAST IN FEMALE, ESTROGEN RECEPTOR NEGATIVE: Primary | ICD-10-CM

## 2024-07-15 DIAGNOSIS — C50.811 MALIGNANT NEOPLASM OF OVERLAPPING SITES OF RIGHT BREAST IN FEMALE, ESTROGEN RECEPTOR NEGATIVE: Primary | ICD-10-CM

## 2024-07-15 PROCEDURE — 3077F SYST BP >= 140 MM HG: CPT | Performed by: NURSE PRACTITIONER

## 2024-07-15 PROCEDURE — 99215 OFFICE O/P EST HI 40 MIN: CPT | Performed by: NURSE PRACTITIONER

## 2024-07-15 PROCEDURE — 99024 POSTOP FOLLOW-UP VISIT: CPT | Performed by: STUDENT IN AN ORGANIZED HEALTH CARE EDUCATION/TRAINING PROGRAM

## 2024-07-15 PROCEDURE — 3078F DIAST BP <80 MM HG: CPT | Performed by: STUDENT IN AN ORGANIZED HEALTH CARE EDUCATION/TRAINING PROGRAM

## 2024-07-15 PROCEDURE — 1126F AMNT PAIN NOTED NONE PRSNT: CPT | Performed by: NURSE PRACTITIONER

## 2024-07-15 PROCEDURE — 3078F DIAST BP <80 MM HG: CPT | Performed by: NURSE PRACTITIONER

## 2024-07-15 PROCEDURE — 3077F SYST BP >= 140 MM HG: CPT | Performed by: STUDENT IN AN ORGANIZED HEALTH CARE EDUCATION/TRAINING PROGRAM

## 2024-07-15 RX ORDER — ONDANSETRON HYDROCHLORIDE 8 MG/1
8 TABLET, FILM COATED ORAL 3 TIMES DAILY PRN
Qty: 30 TABLET | Refills: 5 | Status: SHIPPED | OUTPATIENT
Start: 2024-07-15

## 2024-07-15 RX ORDER — DEXAMETHASONE 4 MG/1
8 TABLET ORAL 2 TIMES DAILY WITH MEALS
Qty: 4 TABLET | Refills: 0 | Status: SHIPPED | OUTPATIENT
Start: 2024-07-15 | End: 2024-07-22 | Stop reason: SDUPTHER

## 2024-07-15 RX ORDER — LIDOCAINE AND PRILOCAINE 25; 25 MG/G; MG/G
1 CREAM TOPICAL AS NEEDED
COMMUNITY

## 2024-07-15 NOTE — LETTER
July 16, 2024       No Recipients    Patient: Pretty Tovar   YOB: 1955   Date of Visit: 7/15/2024     Dear CLEVELAND Sanchez:       Thank you for referring Pretty Tovar to me for evaluation. Below are the relevant portions of my assessment and plan of care.    If you have questions, please do not hesitate to call me. I look forward to following Pretty along with you.         Sincerely,        Aracely Frye MD        CC:   No Recipients    Aracely Frye MD  07/16/24 1727  Sign when Signing Visit  Breast Surgical Oncology Post-Op Visit    Surgery: Port placement, right mastectomy and sentinel lymph node biopsy  Subjective: No acute issues. Denies f/c/wnd issues. Intermittent use of pain medication.     O:  Vitals:    07/15/24 1422   BP: 140/78   Pulse: 73   Resp: 18   SpO2: 97%     Breast incision:  healing well without evidence of infection or significant seroma      Pathology:   7/3/24  1.  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               -Histologic grade: [place] histologic score III (tubules = 3, nuclei = 3, mitosis = 2).               -Tumor site/size: Upper outer quadrant at 11:00, 10 x 10 x 10 mm.  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               -DCIS spans 15 mm extending from the upper outer quadrant into the lower outer quadrant.               -All margins are free of invasive carcinoma and DCIS (see synoptic template).               -No lymphovascular space invasion identified.               -Posterior skeletal muscle is free of tumor.               -Hormone receptors: ER and IA negative, HER2 3+ positive, Ki67 60% (BV24-83).               -Pathologic stage: pT1b, N(sn)0.     2.  [place] lymph nodes, right  axilla, dissection:               -3 lymph nodes, negative for metastatic carcinoma (0/3).  Estrogen Receptor (ER) Status  Negative        Progesterone Receptor (PgR) Status  Negative        HER2 (by immunohistochemistry)  Positive (Score 3+)   Percentage of Cells with Uniform Intense Complete Membrane Staining  100 %        Ki-67 Percentage of Positive Nuclei  60 %       Assessment: s/p port placement and right mastectomy, sentinel lymph node biopsy for  Stage I ([id number], ER-/SD-/Her2+) right breast cancer recovering well.  Discussed pathology at length as well as planned/possible adjuvant treatments.  All questions answered.    Plan:   - RTC 6 months  - Next imaging due 4/2025 -left mammogram ([facility])  - Med Onc -planning to start chemo soon, HER2 directed therapy  -Follow-up with lymphedema clinic  - Drain removed today continue dressing changes to keep area dry.  Call if issues arise prior to next visit.      Aarcely Frye MD  Breast Surgical Oncology

## 2024-07-15 NOTE — PROGRESS NOTES
[facility] Hematology/Oncology Treatment Plan Summary    Name: Pretty Tovar  [facility]# [id number]  MD: Dr. Belcher    Diagnosis:     ICD-10-CM ICD-9-CM   1. Malignant neoplasm of overlapping sites of right breast in female, estrogen receptor negative  C50.811 174.8    Z17.1 V86.1     Stage: IA    Goal of treatment: adjuvant and curative intent    Treatment Medication(s):   Taxol  Herceptin    Frequency: Taxol/Herceptin once weekly x12; then continue Herceptin every 3 weeks for total of 1 year    Starting on: 2024    Items for home use: Senokot-S (for constipation), Milk of Magnesia (for constipation), Imodium AD (for diarrhea), Tylenol (for fever and/or pain), and Thermometer    Rx written for: [x] Nausea    [] Pre-Treatment  1.  Dexamethasone 4 m tablets twice daily taken the day before first chemotherapy  2.  Ondansetron 8 m tab every 8 hours as needed for nausea.      Completing Provider: CLEVELAND Saleem           Date/time: 07/15/2024      Please note: You will be seen by a provider frequently with your treatment plan. This plan may change depending on many factors, if so, this will be discussed with you by your physician.  Last update 2022.

## 2024-07-15 NOTE — PROGRESS NOTES
TREATMENT  PREPARATION    Pretty Tovar  [id number]  1955    Chief Complaint: Treatment preparation and needs assessment    History of present illness:  Pretty Tovar is a 68 y.o. year old female who is here today for treatment preparation and needs assessment.  The patient has been diagnosed with   Encounter Diagnosis   Name Primary?    Malignant neoplasm of overlapping sites of right breast in female, estrogen receptor negative Yes    and is scheduled to begin treatment with:     Oncology History:    Oncology/Hematology History   Malignant neoplasm of overlapping sites of right breast in female, estrogen receptor negative   5/14/2024 Cancer Staged    Staging form: Breast, AJCC 8th Edition  - Clinical stage from 5/14/2024: Stage IA (cT1c, cN0, cM0, G3, ER-, RI-, HER2+) - Signed by Aracely Frye MD on 5/31/2024 5/31/2024 Initial Diagnosis    Malignant neoplasm of overlapping sites of right breast in female, estrogen receptor negative     7/19/2024 -  Chemotherapy    OP BREAST PACLitaxel / Trastuzumab (Weekly X 12)     10/25/2024 -  Chemotherapy    OP BREAST Trastuzumab Q21D (without Loading Dose)         The current medication list and allergy list were reviewed and reconciled.     Past Medical History, Past Surgical History, Social History, Family History have been reviewed and are without significant changes except as mentioned.    Physical Exam:    Vitals:    07/15/24 1003   BP: (!) 181/69   Pulse: 61   Resp: 16   Temp: 98.1 °F (36.7 °C)   SpO2: 99%     Vitals:    07/15/24 1003   PainSc: 0-No pain        ECOG score: 0             Physical Exam      NEEDS ASSESSMENTS    Genetics  The patient's new diagnosis and family history have been reviewed for genetic counseling needs. The patient will not be referred..     Psychosocial and Barriers to care  The patient has completed a PHQ-9 Depression Screening and the Distress Thermometer (DT) today.  PHQ-9 results show PHQ-2 Total Score: 0 PHQ-9 Total  Score: PHQ-9 Total Score: 0     The patient scored their distress today as   2 on a scale of 0-10 with 0 being no distress and 10 being extreme distress. Problems marked by the patient as being an issue for them within the last week include   .      Results were reviewed along with psychosocial resources offered by our cancer center.  Our Supportive Oncology team will be flagged for a score of 4 or above, and a same day call will be made for a score of 9 or 10.  A mental health referral is offered at that time. Patients who score less than 4 have been educated on our support services and can be referred to our  upon request.  The patient will not be referred to our .       Nutrition  The patient has completed the malnutrition screening today. They scored Malnutrition Screening Tool  Have you recently lost weight without trying?  If yes, how much weight have you lost?: 0--> No  Have you been eating poorly because of a decreased appetite?: 0--> No  MST score: 0   with a score of 0-1 meaning not at risk in a score of 2 or greater meaning at risk.  Patients with a score of 3 or higher will be referred to our oncology dietitian for support. Patients beginning at risk treatment regimens or who have dietary concerns will also be referred to our oncology dietitian. The patient will not be referred.    Functional Assessment  Persons who are age 70 or greater will be screened for qualification of a comprehensive geriatric assessment by our survivorship nurse practitioner.  Older adults with cancer face unique challenges. These may include an increased risk of drug reactions, financial burdens, and caregiver stress. The patient scored   . Patients scoring 14 or lower will referred for an older adult functional assessment with the survivorship advanced practice registered nurse to ensure all needed support is provided as patients plan for their treatments. NOT APPLICABLE    Intravenous Access  "Assessment  The patient and I discussed planned intravenous chemo/biotherapy as well as other IV treatments that are often needed throughout the course of treatment. These may include, but are not limited to blood transfusions, antibiotics, and IV hydration. Discussed that depending on selected treatment and vein assessment, patient may require venous access device (VAD) which could include but not limited to a Mediport or PICC line. Risks and benefits of VADs reviewed. The patient will be treated via Port.    Reproductive/Sexual Activity   People should avoid becoming pregnant and should not get a partner pregnant while undergoing chemo/biotherapy.  People of childbearing age should use effective contraception during active therapy. The best recommendation for all people is to use a barrier method for a minimum of 1 week after the last infusion of chemo/biotherapy to prevent your partner being exposed to byproducts from treatment medications in bodily fluids. Effective contraception should be discussed with your oncology team to make sure it is safe to take based on your diagnosis. Possible options include oral contraceptives, barrier methods. Chemo/biotherapy can change your ability to reproduce children in the future.  There are options for fertility preservation. NOT APPLICABLE    Advanced Care Planning  Advance Care Planning   The patient and I discussed advanced care planning, \"Conversations that Matter\".   This service is offered for development of advance directives with a certified ACP facilitator.  The patient does have an up-to-date advanced directive. This document is on file with our office. The patient is not interested in an appointment with one of our facilitators to create or update their advanced directives.    Have you reviewed your Advance Directive and is it valid for this stay?: Not applicable  Patient Requests Assistance on Advance Directives: Patient Declined          Smoking " cessation  Tobacco Use: Low Risk  (7/15/2024)    Patient History     Smoking Tobacco Use: Never     Smokeless Tobacco Use: Never     Passive Exposure: Not on file       Patient and I discussed their tobacco use history. Referral will not be made for smoking cessation.      Palliative Care  When appropriate, the patient and I discussed the availability palliative care services and when appropriate Hospice care. Palliative care is not the same as Hospice care which was explained to the patient.NOT APPLICABLE.    Survivorship   When appropriate, we discussed that we will refer the patient to survivorship clinic to discuss next steps following completion of planned treatment.  Reviewed this visit will include assessment of your physical, psychological, functional, and spiritual needs as a survivor and the need at attend this visit when scheduled.    TREATMENT EDUCATION    Today I met with the patient to discuss the chemo/biotherapy regimen recommended for treatment of Malignant neoplasm of overlapping sites of right breast in female, estrogen receptor negative  - dexAMETHasone (DECADRON) 4 MG tablet  - ondansetron (ZOFRAN) 8 MG tablet  .  The patient was given explanation of treatment premed side effects including office policy that prohibits patients to drive if sedating medications are administered, MD explanation given regarding benefits, side effects, toxicities and goals of treatment.  The patient received a Chemotherapy/Biotherapy Plan Summary including diagnosis and explanation of specific treatment plan.    SIDE EFFECTS:  Common side effects were discussed with the patient and/or significant other.  Discussion included where applicable hair loss/discoloration, anemia/fatigue, infection/chills/fever, appetite, bleeding risk/precautions, constipation, diarrhea, mouth sores, taste alteration, loss of appetite, nausea/vomiting, peripheral neuropathy, skin/nail changes, rash, muscle aches/weakness, photosensitivity,  weight gain/loss, hearing loss, dizziness, menopausal symptoms, menstrual irregularity, sterility, high blood pressure, heart damage, liver damage, lung damage, kidney damage, DVT/PE risk, fluid retention, pleural/pericardial effusion, somnolence, electrolyte/LFT imbalance, vein exercises and/or the possible need for vascular access/port placement.  The patient was advised that although uncommon, leakage of an infused medication from the vein or venous access device may lead to skin breakdown and/or other tissue damage.  The patient was advised that he/she may have pain, bleeding, and/or bruising from the insertion of a needle in their vein or venous access device (port).  The patient was further advised that, in spite of proper technique, infection with redness and irritation may rarely occur at the site where the needle was inserted.  The patient was advised that if complications occur, additional medical treatment is available.  Finally, where applicable we have reviewed rare but potential immune mediated side effects including shortness of breath, cough, chest pain (pneumonitis), abdominal pain, diarrhea (colitis), thyroiditis (hypothyroid or hyperthyroid), hepatitis and liver dysfunction, nephritis and renal dysfunction.    Discussion also included side effects specific to drugs in the treatment plan, specifically:    Treatment Plans       Name Type Plan Dates Plan Provider         Active    OP BREAST PACLitaxel / Trastuzumab (Weekly X 12) ONCOLOGY TREATMENT  7/18/2024 - Present Ashkan Belcher MD                      Questions answered and additional information discussed on topics including:  Anemia, Thrombocytopenia, Neutropenia, Nutrition and appetite changes, Constipation, Diarrhea, Nausea & vomiting, Mouth sores, Alopecia, Nervous system changes, Skin & nail changes, Organ toxicities, Home care, [place], and Hand/Foot Cooling       Assessment and Plan:    Diagnoses and all orders for this visit:    1.  Malignant neoplasm of overlapping sites of right breast in female, estrogen receptor negative (Primary)  -     dexAMETHasone (DECADRON) 4 MG tablet; Take 2 tablets by mouth 2 (Two) Times a Day With Meals. Take the day before 1st taxol chemotherapy infusion  Dispense: 4 tablet; Refill: 0  -     ondansetron (ZOFRAN) 8 MG tablet; Take 1 tablet by mouth 3 (Three) Times a Day As Needed for Nausea or Vomiting.  Dispense: 30 tablet; Refill: 5      No orders of the defined types were placed in this encounter.        The patient and I have reviewed their diagnosis and scheduled treatment plan. Needs assessment was completed where applicable including genetics, psychosocial needs, barriers to care, VAD evaluation, advanced care planning, survivorship, and palliative care services where indicated. Referrals have been ordered as appropriate based upon evaluation today and patient desires.   Chemo/biotherapy teaching was completed today and consent obtained. See separate documentation for further details.  Adequate time was given to answer questions.  Patient made aware of their care team members and contact information if they have questions or problems throughout the treatment course.  Discussion held and written information provided describing frequency of office visits and ongoing monitoring throughout the treatment plan.     Reviewed with patient any prescribed medication sent to pharmacy.  Education provided regarding proper storage, safe handling, and proper disposal of unused medication.  Proper handling of body fluids and waste discussed and written information provided.  If appropriate, patient had pretreatment labs drawn today.    Learning assessment completed at initial patient encounter. See separate flowsheet. Chemo/biotherapy education comprehension assessed at today's visit.    I spent 42 minutes caring for Pretty on this date of service. This time includes time spent by me in the following activities: preparing  for the visit, reviewing tests, obtaining and/or reviewing a separately obtained history, performing a medically appropriate examination and/or evaluation, ordering medications, tests, or procedures, documenting information in the medical record, and care coordination.     Grecia Hernandez, APRN   07/15/24

## 2024-07-16 NOTE — PROGRESS NOTES
Breast Surgical Oncology Post-Op Visit    Surgery: Port placement, right mastectomy and sentinel lymph node biopsy  Subjective: No acute issues. Denies f/c/wnd issues. Intermittent use of pain medication.     O:  Vitals:    07/15/24 1422   BP: 140/78   Pulse: 73   Resp: 18   SpO2: 97%     Breast incision:  healing well without evidence of infection or significant seroma      Pathology:   7/3/24  1.  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               -Histologic grade: [place] histologic score III (tubules = 3, nuclei = 3, mitosis = 2).               -Tumor site/size: Upper outer quadrant at 11:00, 10 x 10 x 10 mm.  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               -DCIS spans 15 mm extending from the upper outer quadrant into the lower outer quadrant.               -All margins are free of invasive carcinoma and DCIS (see synoptic template).               -No lymphovascular space invasion identified.               -Posterior skeletal muscle is free of tumor.               -Hormone receptors: ER and ID negative, HER2 3+ positive, Ki67 60% (BV24-83).               -Pathologic stage: pT1b, N(sn)0.     2.  [place] lymph nodes, right axilla, dissection:               -3 lymph nodes, negative for metastatic carcinoma (0/3).  Estrogen Receptor (ER) Status  Negative        Progesterone Receptor (PgR) Status  Negative        HER2 (by immunohistochemistry)  Positive (Score 3+)   Percentage of Cells with Uniform Intense Complete Membrane Staining  100 %        Ki-67 Percentage of Positive Nuclei  60 %       Assessment: s/p port placement and right mastectomy, sentinel lymph node biopsy for  Stage I ([id number], ER-/ID-/Her2+) right breast cancer recovering well.   Discussed pathology at length as well as planned/possible adjuvant treatments.  All questions answered.    Plan:   - RTC 6 months  - Next imaging due 4/2025 -left mammogram ([facility])  - Med Onc -planning to start chemo soon, HER2 directed therapy  -Follow-up with lymphedema clinic  - Drain removed today continue dressing changes to keep area dry.  Call if issues arise prior to next visit.      Aracely Frye MD  Breast Surgical Oncology

## 2024-07-22 ENCOUNTER — TELEPHONE (OUTPATIENT)
Dept: ONCOLOGY | Facility: CLINIC | Age: 69
End: 2024-07-22
Payer: MEDICARE

## 2024-07-22 DIAGNOSIS — C50.811 MALIGNANT NEOPLASM OF OVERLAPPING SITES OF RIGHT BREAST IN FEMALE, ESTROGEN RECEPTOR NEGATIVE: ICD-10-CM

## 2024-07-22 DIAGNOSIS — Z17.1 MALIGNANT NEOPLASM OF OVERLAPPING SITES OF RIGHT BREAST IN FEMALE, ESTROGEN RECEPTOR NEGATIVE: ICD-10-CM

## 2024-07-22 RX ORDER — DEXAMETHASONE 4 MG/1
8 TABLET ORAL 2 TIMES DAILY WITH MEALS
Qty: 4 TABLET | Refills: 0 | Status: SHIPPED | OUTPATIENT
Start: 2024-07-22

## 2024-07-22 NOTE — TELEPHONE ENCOUNTER
Sent in refill on pt's dex so she can take it before her 1st taxol on Friday. Notified pt and she v/u

## 2024-07-22 NOTE — TELEPHONE ENCOUNTER
Provider: Ye  Caller: patient  Relationship to Patient: self  Call Back Phone Number: [phone]  Reason for Call: patient is calling about getting a refill for dexamethasone for treatment

## 2024-07-26 ENCOUNTER — INFUSION (OUTPATIENT)
Dept: ONCOLOGY | Facility: HOSPITAL | Age: 69
End: 2024-07-26
Payer: MEDICARE

## 2024-07-26 ENCOUNTER — OFFICE VISIT (OUTPATIENT)
Dept: ONCOLOGY | Facility: CLINIC | Age: 69
End: 2024-07-26
Payer: MEDICARE

## 2024-07-26 ENCOUNTER — NUTRITION (OUTPATIENT)
Dept: OTHER | Facility: HOSPITAL | Age: 69
End: 2024-07-26
Payer: MEDICARE

## 2024-07-26 VITALS
BODY MASS INDEX: 31.2 KG/M2 | HEART RATE: 79 BPM | WEIGHT: 158.9 LBS | SYSTOLIC BLOOD PRESSURE: 165 MMHG | DIASTOLIC BLOOD PRESSURE: 78 MMHG | OXYGEN SATURATION: 97 % | HEIGHT: 60 IN | RESPIRATION RATE: 14 BRPM | TEMPERATURE: 97.6 F

## 2024-07-26 VITALS — DIASTOLIC BLOOD PRESSURE: 76 MMHG | HEART RATE: 67 BPM | SYSTOLIC BLOOD PRESSURE: 166 MMHG

## 2024-07-26 DIAGNOSIS — Z17.1 MALIGNANT NEOPLASM OF OVERLAPPING SITES OF RIGHT BREAST IN FEMALE, ESTROGEN RECEPTOR NEGATIVE: Primary | ICD-10-CM

## 2024-07-26 DIAGNOSIS — C50.811 MALIGNANT NEOPLASM OF OVERLAPPING SITES OF RIGHT BREAST IN FEMALE, ESTROGEN RECEPTOR NEGATIVE: Primary | ICD-10-CM

## 2024-07-26 DIAGNOSIS — Z79.899 HIGH RISK MEDICATION USE: ICD-10-CM

## 2024-07-26 DIAGNOSIS — Z45.2 FITTING AND ADJUSTMENT OF VASCULAR CATHETER: ICD-10-CM

## 2024-07-26 DIAGNOSIS — D64.9 ANEMIA, UNSPECIFIED TYPE: ICD-10-CM

## 2024-07-26 LAB
ALBUMIN SERPL-MCNC: 4 G/DL (ref 3.5–5.2)
ALBUMIN/GLOB SERPL: 1.3 G/DL
ALP SERPL-CCNC: 61 U/L (ref 39–117)
ALT SERPL W P-5'-P-CCNC: 23 U/L (ref 1–33)
ANION GAP SERPL CALCULATED.3IONS-SCNC: 14.6 MMOL/L (ref 5–15)
AST SERPL-CCNC: 21 U/L (ref 1–32)
BASOPHILS # BLD AUTO: 0.01 10*3/MM3 (ref 0–0.2)
BASOPHILS NFR BLD AUTO: 0.1 % (ref 0–1.5)
BILIRUB SERPL-MCNC: 0.2 MG/DL (ref 0–1.2)
BUN SERPL-MCNC: 21 MG/DL (ref 8–23)
BUN/CREAT SERPL: 42 (ref 7–25)
CALCIUM SPEC-SCNC: 9.3 MG/DL (ref 8.6–10.5)
CHLORIDE SERPL-SCNC: 100 MMOL/L (ref 98–107)
CO2 SERPL-SCNC: 24.4 MMOL/L (ref 22–29)
CREAT SERPL-MCNC: 0.5 MG/DL (ref 0.57–1)
DEPRECATED RDW RBC AUTO: 42.1 FL (ref 37–54)
EGFRCR SERPLBLD CKD-EPI 2021: 102.3 ML/MIN/1.73
EOSINOPHIL # BLD AUTO: 0.02 10*3/MM3 (ref 0–0.4)
EOSINOPHIL NFR BLD AUTO: 0.3 % (ref 0.3–6.2)
ERYTHROCYTE [DISTWIDTH] IN BLOOD BY AUTOMATED COUNT: 12.3 % (ref 12.3–15.4)
FERRITIN SERPL-MCNC: 57.4 NG/ML (ref 13–150)
FOLATE SERPL-MCNC: 18.3 NG/ML (ref 4.78–24.2)
GLOBULIN UR ELPH-MCNC: 3.2 GM/DL
GLUCOSE SERPL-MCNC: 162 MG/DL (ref 65–99)
HCT VFR BLD AUTO: 33.1 % (ref 34–46.6)
HGB BLD-MCNC: 10.8 G/DL (ref 12–15.9)
IMM GRANULOCYTES # BLD AUTO: 0.04 10*3/MM3 (ref 0–0.05)
IMM GRANULOCYTES NFR BLD AUTO: 0.5 % (ref 0–0.5)
IRON 24H UR-MRATE: 79 MCG/DL (ref 37–145)
IRON SATN MFR SERPL: 19 % (ref 20–50)
LYMPHOCYTES # BLD AUTO: 1.64 10*3/MM3 (ref 0.7–3.1)
LYMPHOCYTES NFR BLD AUTO: 22.3 % (ref 19.6–45.3)
MCH RBC QN AUTO: 30.8 PG (ref 26.6–33)
MCHC RBC AUTO-ENTMCNC: 32.6 G/DL (ref 31.5–35.7)
MCV RBC AUTO: 94.3 FL (ref 79–97)
MONOCYTES # BLD AUTO: 0.72 10*3/MM3 (ref 0.1–0.9)
MONOCYTES NFR BLD AUTO: 9.8 % (ref 5–12)
NEUTROPHILS NFR BLD AUTO: 4.92 10*3/MM3 (ref 1.7–7)
NEUTROPHILS NFR BLD AUTO: 67 % (ref 42.7–76)
NRBC BLD AUTO-RTO: 0 /100 WBC (ref 0–0.2)
PLATELET # BLD AUTO: 258 10*3/MM3 (ref 140–450)
PMV BLD AUTO: 10.4 FL (ref 6–12)
POTASSIUM SERPL-SCNC: 3.5 MMOL/L (ref 3.5–5.2)
PROT SERPL-MCNC: 7.2 G/DL (ref 6–8.5)
RBC # BLD AUTO: 3.51 10*6/MM3 (ref 3.77–5.28)
SODIUM SERPL-SCNC: 139 MMOL/L (ref 136–145)
TIBC SERPL-MCNC: 419 MCG/DL (ref 298–536)
TRANSFERRIN SERPL-MCNC: 281 MG/DL (ref 200–360)
VIT B12 BLD-MCNC: 858 PG/ML (ref 211–946)
WBC NRBC COR # BLD AUTO: 7.35 10*3/MM3 (ref 3.4–10.8)

## 2024-07-26 PROCEDURE — 82746 ASSAY OF FOLIC ACID SERUM: CPT | Performed by: NURSE PRACTITIONER

## 2024-07-26 PROCEDURE — 82728 ASSAY OF FERRITIN: CPT | Performed by: NURSE PRACTITIONER

## 2024-07-26 PROCEDURE — 25010000002 PACLITAXEL PER 1 MG: Performed by: INTERNAL MEDICINE

## 2024-07-26 PROCEDURE — 80053 COMPREHEN METABOLIC PANEL: CPT

## 2024-07-26 PROCEDURE — 84466 ASSAY OF TRANSFERRIN: CPT | Performed by: NURSE PRACTITIONER

## 2024-07-26 PROCEDURE — 96375 TX/PRO/DX INJ NEW DRUG ADDON: CPT

## 2024-07-26 PROCEDURE — 85025 COMPLETE CBC W/AUTO DIFF WBC: CPT

## 2024-07-26 PROCEDURE — 25010000002 DIPHENHYDRAMINE PER 50 MG: Performed by: NURSE PRACTITIONER

## 2024-07-26 PROCEDURE — 96413 CHEMO IV INFUSION 1 HR: CPT

## 2024-07-26 PROCEDURE — 25010000002 DEXAMETHASONE PER 1 MG: Performed by: NURSE PRACTITIONER

## 2024-07-26 PROCEDURE — 25810000003 SODIUM CHLORIDE 0.9 % SOLUTION 250 ML FLEX CONT: Performed by: INTERNAL MEDICINE

## 2024-07-26 PROCEDURE — 83540 ASSAY OF IRON: CPT | Performed by: NURSE PRACTITIONER

## 2024-07-26 PROCEDURE — 96417 CHEMO IV INFUS EACH ADDL SEQ: CPT

## 2024-07-26 PROCEDURE — 82607 VITAMIN B-12: CPT | Performed by: NURSE PRACTITIONER

## 2024-07-26 PROCEDURE — 36415 COLL VENOUS BLD VENIPUNCTURE: CPT | Performed by: NURSE PRACTITIONER

## 2024-07-26 PROCEDURE — 25010000002 TRASTUZUMAB-DKST 420 MG RECONSTITUTED SOLUTION 1 EACH VIAL: Performed by: INTERNAL MEDICINE

## 2024-07-26 PROCEDURE — 25010000002 HEPARIN LOCK FLUSH PER 10 UNITS: Performed by: INTERNAL MEDICINE

## 2024-07-26 PROCEDURE — 25810000003 SODIUM CHLORIDE 0.9 % SOLUTION: Performed by: NURSE PRACTITIONER

## 2024-07-26 RX ORDER — SODIUM CHLORIDE 9 MG/ML
20 INJECTION, SOLUTION INTRAVENOUS ONCE
Status: COMPLETED | OUTPATIENT
Start: 2024-07-26 | End: 2024-07-26

## 2024-07-26 RX ORDER — SODIUM CHLORIDE 0.9 % (FLUSH) 0.9 %
10 SYRINGE (ML) INJECTION AS NEEDED
Status: DISCONTINUED | OUTPATIENT
Start: 2024-07-26 | End: 2024-07-26 | Stop reason: HOSPADM

## 2024-07-26 RX ORDER — FAMOTIDINE 10 MG/ML
20 INJECTION, SOLUTION INTRAVENOUS ONCE
Status: CANCELLED | OUTPATIENT
Start: 2024-07-26

## 2024-07-26 RX ORDER — HEPARIN SODIUM (PORCINE) LOCK FLUSH IV SOLN 100 UNIT/ML 100 UNIT/ML
500 SOLUTION INTRAVENOUS AS NEEDED
OUTPATIENT
Start: 2024-07-26

## 2024-07-26 RX ORDER — MONTELUKAST SODIUM 10 MG/1
10 TABLET ORAL ONCE
Status: COMPLETED | OUTPATIENT
Start: 2024-07-26 | End: 2024-07-26

## 2024-07-26 RX ORDER — FAMOTIDINE 10 MG/ML
20 INJECTION, SOLUTION INTRAVENOUS ONCE
Status: COMPLETED | OUTPATIENT
Start: 2024-07-26 | End: 2024-07-26

## 2024-07-26 RX ORDER — MONTELUKAST SODIUM 10 MG/1
10 TABLET ORAL ONCE
Status: CANCELLED
Start: 2024-07-26 | End: 2024-07-26

## 2024-07-26 RX ORDER — SODIUM CHLORIDE 0.9 % (FLUSH) 0.9 %
10 SYRINGE (ML) INJECTION AS NEEDED
OUTPATIENT
Start: 2024-07-26

## 2024-07-26 RX ORDER — SODIUM CHLORIDE 9 MG/ML
20 INJECTION, SOLUTION INTRAVENOUS ONCE
Status: CANCELLED | OUTPATIENT
Start: 2024-07-26

## 2024-07-26 RX ORDER — HEPARIN SODIUM (PORCINE) LOCK FLUSH IV SOLN 100 UNIT/ML 100 UNIT/ML
500 SOLUTION INTRAVENOUS AS NEEDED
Status: DISCONTINUED | OUTPATIENT
Start: 2024-07-26 | End: 2024-07-26 | Stop reason: HOSPADM

## 2024-07-26 RX ORDER — DIPHENHYDRAMINE HYDROCHLORIDE 50 MG/ML
50 INJECTION INTRAMUSCULAR; INTRAVENOUS AS NEEDED
Status: CANCELLED | OUTPATIENT
Start: 2024-07-26

## 2024-07-26 RX ORDER — FAMOTIDINE 10 MG/ML
20 INJECTION, SOLUTION INTRAVENOUS AS NEEDED
Status: CANCELLED | OUTPATIENT
Start: 2024-07-26

## 2024-07-26 RX ADMIN — DEXAMETHASONE SODIUM PHOSPHATE 12 MG: 10 INJECTION INTRAMUSCULAR; INTRAVENOUS at 10:07

## 2024-07-26 RX ADMIN — FAMOTIDINE 20 MG: 10 INJECTION INTRAVENOUS at 10:04

## 2024-07-26 RX ADMIN — MONTELUKAST 10 MG: 10 TABLET, FILM COATED ORAL at 10:04

## 2024-07-26 RX ADMIN — Medication 10 ML: at 14:26

## 2024-07-26 RX ADMIN — SODIUM CHLORIDE 20 ML/HR: 9 INJECTION, SOLUTION INTRAVENOUS at 10:04

## 2024-07-26 RX ADMIN — TRASTUZUMAB-DKST 280 MG: KIT at 13:00

## 2024-07-26 RX ADMIN — DIPHENHYDRAMINE HYDROCHLORIDE 50 MG: 50 INJECTION, SOLUTION INTRAMUSCULAR; INTRAVENOUS at 10:23

## 2024-07-26 RX ADMIN — Medication 500 UNITS: at 14:26

## 2024-07-26 RX ADMIN — PACLITAXEL 135 MG: 6 INJECTION, SOLUTION INTRAVENOUS at 11:14

## 2024-07-26 NOTE — PROGRESS NOTES
"OUTPATIENT ONCOLOGY NUTRITION ASSESSMENT    Patient Name: Pretty Tovar  YOB: 1955  MRN: [id number]  Assessment Date: 7/26/2024    COMMENTS:   Met patient in the infusion area today. Begins Taxol and Herceptin today.   Explained RD role and the importance of adequate nutrition and hydration during treatment.  Encouraged patient to focus on getting adequate calories, protein and nutrients in order to support immune function and nutrition needs. Reviewed examples of calorically dense high protein foods to include at meals and snacks. Discussed use of oral nutrition supplements if needed to supplement intake. Suggested small more frequent meals.   Reviewed nutrition management of possible side effects during treatment including nausea/vomiting, diarrhea, constipation, fatigue, poor appetite and taste changes. Stressed importance of good oral hygiene and provided recipe for baking soda and salt water mouth rinse to use several times a day.   Provided the [employer] booklet \"Nutrition during Cancer Treatment\" as a resource as well as RD contact info for any questions. Will follow throughout treatment course and be available as needed.         Reason for Assessment New assessment, Education      Diagnosis/Problem   Breast cancer. IA   Treatment Plan Chemotherapy Taxol Herceptin   Frequency Weekly x 12, then herceptin every 3 weeks for 1 year   Goal of cancer treatment Curative, Adjuvant   Comments:        Encounter Information        Nutrition/Diet History:     Oral Nutrition Supplements:    Factors/Symptoms Affecting Intake: No factors at this time   Comments:      Medical/Surgical History Past Medical History:   Diagnosis Date    Abnormal liver function tests 02/09/2017    Description: 2000, \"-\"hepatitis profile    Anemia     Anesthesia complication     SLOW TO WAKE UP WITH HYSTERECTOMY    Arthritis     Cancer 5/16/24    Right Breast    Colon polyps     Cystocele, lateral 10/15/2020    " Cystocele, midline 10/15/2020    Diabetes mellitus     Frequent UTI     H/O bone density study 10/2010    normal    H/O cardiovascular stress test     H/O echocardiogram 2001    2-D    Hashimoto's thyroiditis     Heart murmur     Hyperkalemia 2019    Hyperlipidemia     Hypertension     Hypothyroidism 2018    Incompetence of pubocervical tissue 10/15/2020    Incompetence of rectovaginal tissue 10/15/2020    Limited joint range of motion     LEFT KNEE    Loss of perineal body, female 10/15/2020    Osteoarthritis of knee 2005    left    Pelvic relaxation due to vaginal prolapse 2018    Plantar warts     foot    Postmenopausal bleeding 2019    Pregnancy      twins x1    Psoriasis     RA (rheumatoid arthritis)     Rectocele 10/15/2020    Urinary tract infection     Urine incontinence     BETTER WITH SLING    Uterovaginal prolapse     Uterovaginal prolapse, incomplete 2020    Vaginal enterocele 10/15/2020       Past Surgical History:   Procedure Laterality Date    BREAST BIOPSY Right     benign    COLONOSCOPY      COLONOSCOPY W/ POLYPECTOMY  2018    Dr Resendiz, 6 mm rectal polyp - hyperplastic    JOINT MANIPULATION Left 2021    Procedure: KNEE MANIPULATION with steroid injection;  Surgeon: Francis Tavarez MD;  Location: [facility] OR;  Service: Orthopedics;  Laterality: Left;    JOINT REPLACEMENT  08/10/2021    LYMPH NODE BIOPSY  2024    MASTECTOMY W/ SENTINEL NODE BIOPSY Right 2024    Procedure: RIGHT MASTECTOMY WITH SENTINEL NODE BIOPSY;  Surgeon: Aracely Frye MD;  Location: [facility] OR;  Service: General;  Laterality: Right;    PUBOVAGINAL SLING N/A 10/16/2020    Procedure: Pubovaginal sling Posterior colporrhaphy fixation Insertion of vaginal grafts Cystourethroscopy;  Surgeon: Karen Allen MD;  Location: [facility] OR;  Service: Gynecology;  Laterality: N/A;    TOTAL KNEE ARTHROPLASTY Left 08/10/2021    Procedure: TOTAL KNEE ARTHROPLASTY;   "Surgeon: Francis Tavarez MD;  Location: [facility];  Service: Orthopedics;  Laterality: Left;    TOTAL LAPAROSCOPIC HYSTERECTOMY, SACROCOLPOPEXY N/A 10/16/2020    Procedure: Laparoscopic uterosacral ligament colpopexy sacral colpopexy  Laparoscopic paravaginal repair Laparoscopic hysterectomy with bilateral salpingectomy  Laparoscopic abdominal enterocele repair;  Surgeon: Karen Allen MD;  Location: [facility] OR;  Service: Gynecology;  Laterality: N/A;    TUBAL ABDOMINAL LIGATION      VENOUS ACCESS DEVICE (PORT) INSERTION N/A 07/03/2024    Procedure: INSERTION OF PORTACATH;  Surgeon: Aracely Frye MD;  Location: [facility];  Service: General;  Laterality: N/A;            Anthropometrics        Current Height Ht Readings from Last 1 Encounters:   07/26/24 152.4 cm (60\")      Current Weight Wt Readings from Last 1 Encounters:   07/26/24 72.1 kg (158 lb 14.4 oz)      BMI  31   Ideal Body Weight (IBW) 100 lb   Usual Body Weight (UBW) 160 lb   Weight Change/Trend Stable   Weight History Wt Readings from Last 30 Encounters:   07/26/24 0928 72.1 kg (158 lb 14.4 oz)   07/15/24 1422 72.1 kg (159 lb)   07/15/24 1003 72.3 kg (159 lb 4.8 oz)   07/12/24 0934 71.2 kg (157 lb)   07/05/24 0957 69.9 kg (154 lb)   07/03/24 0917 71.2 kg (157 lb)   06/25/24 0940 71.2 kg (157 lb)   06/17/24 0849 71.2 kg (157 lb)   06/07/24 0948 72.6 kg (160 lb)   06/05/24 1506 72.7 kg (160 lb 3.2 oz)   05/30/24 1357 70.3 kg (155 lb)   04/10/24 0902 71.2 kg (157 lb)   12/15/23 0824 68.9 kg (152 lb)   06/23/23 0851 71.7 kg (158 lb)   09/23/22 0834 67.6 kg (149 lb)   03/17/22 0813 63 kg (139 lb)   11/02/21 0600 63.7 kg (140 lb 6.4 oz)   10/29/21 1024 63.5 kg (140 lb)   09/15/21 0933 64 kg (141 lb)   08/16/21 0853 67.2 kg (148 lb 3.2 oz)   08/12/21 0924 65.8 kg (145 lb)   08/10/21 1117 65.3 kg (143 lb 15.4 oz)   07/27/21 1347 66.7 kg (147 lb)   04/14/21 0820 65.3 kg (144 lb)   03/09/21 1124 66.1 kg (145 lb 12.8 oz)   01/04/21 1337 65.8 kg " (145 lb)   10/30/20 1123 67.1 kg (148 lb)   10/27/20 0938 66.7 kg (147 lb)   10/16/20 0625 67.8 kg (149 lb 7.6 oz)   10/01/20 1302 68.5 kg (151 lb)          Medications           Current medications: Biotin, Calcium Carbonate-Vit D-Min, Cinnamon, Coenzyme Q10, Cranberry-Vitamin C-D Mannose, Golimumab, Insulin Pen Needle, Liraglutide, Turmeric, Vitamin E, Zinc, amoxicillin, dexAMETHasone, fish oil, levothyroxine, lidocaine-prilocaine, lisinopril, lisinopril-hydrochlorothiazide, loratadine, meloxicam, metFORMIN, multivitamin with minerals, ondansetron, rosuvastatin, vitamin C, and vitamin D3                 Tests/Procedures        Tests/Procedures Chemotherapy     Labs       Pertinent Labs    Results from last 7 days   Lab Units 07/26/24  0905   SODIUM mmol/L 139   POTASSIUM mmol/L 3.5   CHLORIDE mmol/L 100   CO2 mmol/L 24.4   BUN mg/dL 21   CREATININE mg/dL 0.50*   CALCIUM mg/dL 9.3   BILIRUBIN mg/dL 0.2   ALK PHOS U/L 61   ALT (SGPT) U/L 23   AST (SGOT) U/L 21   GLUCOSE mg/dL 162*     Results from last 7 days   Lab Units 07/26/24  0905   HEMOGLOBIN g/dL 10.8*   HEMATOCRIT % 33.1*   WBC 10*3/mm3 7.35   ALBUMIN g/dL 4.0     Results from last 7 days   Lab Units 07/26/24  0905   PLATELETS 10*3/mm3 258     COVID19   Date Value Ref Range Status   10/30/2021 Not Detected Not Detected - Ref. Range Final     Lab Results   Component Value Date    HGBA1C 6.40 (H) 06/10/2024          Physical Findings        Physical Appearance alert     Edema  not assessed   Gastrointestinal None   Tubes/Drains implantable port   Oral/Mouth Cavity WNL   Skin        Estimated/Assessed Needs        Energy Requirements    Height for Calculation      Weight for Calculation 72 kg   Method for Estimation  25 kcal/kg   EST Needs (kcal/day) 1800 kcals/d       Protein Requirements    Weight for Calculation 72 kg   EST Protein Needs (g/kg) 1.2 gm/kg   EST Daily Needs (g/day) 86 g/d       Fluid Requirements     Method for Estimation 1 mL/kcal     Estimated Needs (mL/day) 1800 ml/d           PES STATEMENT / NUTRITION DIAGNOSIS      Nutrition Dx Problem Problem:    NutritionDiagnosisProblem: Nutrition Appropriate for Condition at this Time and Knowledge Deficit    Etiology:  Medical diagnosis: Breast cancer  Factors affecting nutrition: Reported/Observed By      Signs/Symptoms:  Information Deficit and Report/Observation    Comment:      NUTRITION INTERVENTION / PLAN OF CARE      Intervention Goal(s) Maintain nutrition status and Provide information         RD Intervention/Action Follow Tx progress         Recommendations:       PO Diet Continue same      Supplements       Snacks       Other    --      Monitor/Evaluation PO intake, Pertinent labs, Weight, Symptoms, Follow up as needed   Education Education provided   --    Electronically signed by:  Princess Fried RD, LD  07/26/24 13:09 EDT

## 2024-07-26 NOTE — PROGRESS NOTES
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    HISTORY OF PRESENT ILLNESS:  The patient is a 68 y.o. year old female who is here for an opinion about the above issue.    History of Present Illness patient is a 68-year-old female with 4-year history of rheumatoid arthritis on Simponi who was found on her most recent screening mammogram have an abnormality in the right breast with a 10 mm oval mass at the 11:00 and an 11 mm mass at 12:00.  His abnormalities persisted and diagnostic imaging and the patient underwent biopsy of both these areas.  The area at 12:00 was DCIS high-grade ER/TN negative and the lesion at 11:00 was grade 3 invasive ductal carcinoma measuring 4.8 mm ER/TN negative HER2 3+    Patient was referred to Dr. Aracely Frye and underwent bilateral screening mammograms which showed large hematomas in both biopsy sites with a residual 1.1 x 1.1 cm mass at 11:00 and 12:00 was large hematoma with a biopsy cavity and at 9:00 there was another 1.2 x 1.4 cm mass which was suspicious and then extending from 90 to o'clock there was non-mass enhancement measuring 4.7 x 4.7 centimeters encompassing the 2 biopsy sites and the 9:00 mass    Patient has been referred to discuss treatment but the plan is to proceed with surgery first as masses were nonpalpable and small and initial imaging although now with a large postbiopsy hematoma was palpation is unreliable    Patient is  2 para 3 first childbirth was at age 22 she did not breast-feed menarche was at age 11 menopause 50.   "She has been on no post menopausal hormone replacement    Family history is completely negative for any malignancy and no genetic testing was done    She is not a smoker or drinker  She has not had a DVT MI or stroke    She has been on Simponi for her rheumatoid arthritis her last dose was in April and she is going to skip the  dose because surgery is scheduled    Her last bone density in 2018 was normal    No history of unusual bleeding with her hysterectomy or knee replacement    Interval history:  Patient is seen today 2024 for lab review and evaluation due for cycle 1 Taxol/ Herceptin.  Echocardiogram was done 2024 showing ejection fraction of 60.9%.  She has had her education and is ready to proceed.    Past Medical History:   Diagnosis Date    Abnormal liver function tests 2017    Description: , \"-\"hepatitis profile    Anemia     Anesthesia complication     SLOW TO WAKE UP WITH HYSTERECTOMY    Arthritis     Cancer 24    Right Breast    Colon polyps     Cystocele, lateral 10/15/2020    Cystocele, midline 10/15/2020    Diabetes mellitus     Frequent UTI     H/O bone density study 10/2010    normal    H/O cardiovascular stress test     H/O echocardiogram 2001    2-D    Hashimoto's thyroiditis     Heart murmur     Hyperkalemia 2019    Hyperlipidemia     Hypertension     Hypothyroidism 2018    Incompetence of pubocervical tissue 10/15/2020    Incompetence of rectovaginal tissue 10/15/2020    Limited joint range of motion     LEFT KNEE    Loss of perineal body, female 10/15/2020    Osteoarthritis of knee 2005    left    Pelvic relaxation due to vaginal prolapse 2018    Plantar warts     foot    Postmenopausal bleeding 2019    Pregnancy      twins x1    Psoriasis     RA (rheumatoid arthritis)     Rectocele 10/15/2020    Urinary tract infection     Urine incontinence     BETTER WITH SLING    Uterovaginal prolapse     Uterovaginal prolapse, incomplete " 09/14/2020    Vaginal enterocele 10/15/2020        Past Surgical History:   Procedure Laterality Date    BREAST BIOPSY Right 2006    benign    COLONOSCOPY      COLONOSCOPY W/ POLYPECTOMY  02/21/2018    Dr Resendiz, 6 mm rectal polyp - hyperplastic    JOINT MANIPULATION Left 11/02/2021    Procedure: KNEE MANIPULATION with steroid injection;  Surgeon: Francis Tavarez MD;  Location: [facility];  Service: Orthopedics;  Laterality: Left;    JOINT REPLACEMENT  08/10/2021    LYMPH NODE BIOPSY  07/03/2024    MASTECTOMY W/ SENTINEL NODE BIOPSY Right 07/03/2024    Procedure: RIGHT MASTECTOMY WITH SENTINEL NODE BIOPSY;  Surgeon: Aracely Frye MD;  Location: [facility] OR;  Service: General;  Laterality: Right;    PUBOVAGINAL SLING N/A 10/16/2020    Procedure: Pubovaginal sling Posterior colporrhaphy fixation Insertion of vaginal grafts Cystourethroscopy;  Surgeon: Karen Allen MD;  Location: [facility];  Service: Gynecology;  Laterality: N/A;    TOTAL KNEE ARTHROPLASTY Left 08/10/2021    Procedure: TOTAL KNEE ARTHROPLASTY;  Surgeon: Francis Tavarez MD;  Location: [facility] OR;  Service: Orthopedics;  Laterality: Left;    TOTAL LAPAROSCOPIC HYSTERECTOMY, SACROCOLPOPEXY N/A 10/16/2020    Procedure: Laparoscopic uterosacral ligament colpopexy sacral colpopexy  Laparoscopic paravaginal repair Laparoscopic hysterectomy with bilateral salpingectomy  Laparoscopic abdominal enterocele repair;  Surgeon: Karen Allen MD;  Location: [facility];  Service: Gynecology;  Laterality: N/A;    TUBAL ABDOMINAL LIGATION      VENOUS ACCESS DEVICE (PORT) INSERTION N/A 07/03/2024    Procedure: INSERTION OF PORTACATH;  Surgeon: Aracely Frye MD;  Location: [facility];  Service: General;  Laterality: N/A;        Current Outpatient Medications on File Prior to Visit   Medication Sig Dispense Refill    amoxicillin (AMOXIL) 500 MG capsule Take 1 capsule by mouth Take As Directed. For Dental Procedures      BD PEN NEEDLE SEUN  U/F 32G X 4 MM misc USE 1 PEN NEEDLE WITH EACH  VICTOZA INJECTION 100 each 1    Biotin 09133 MCG tablet dispersible Place 10,000 mg on the tongue Daily. PT HOLDING FOR SURGERY      Calcium Carbonate-Vit D-Min (CALCIUM 1200 PO) Take 1 tablet by mouth Daily. PT HOLDING FOR SURGERY      Cinnamon 500 MG tablet Take 1,000 mg by mouth 2 (two) times a day. PT HOLDING FOR SURGERY      Coenzyme Q10 (CO Q 10 PO) Take 1 capsule by mouth Daily. PT HOLDING FOR SURGERY      CRANBERRY-VITAMIN C-D MANNOSE PO Take 2 capsules by mouth Daily. PT HOLDING FOR SURGERY      dexAMETHasone (DECADRON) 4 MG tablet Take 2 tablets by mouth 2 (Two) Times a Day With Meals. Take the day before 1st taxol chemotherapy infusion 4 tablet 0    Golimumab (Simponi) 100 MG/ML solution auto-injector Inject  under the skin into the appropriate area as directed Every 2 (Two) Months. LAST DOSE APRIL 10 , 2024      levothyroxine (Synthroid) 88 MCG tablet Take 1 tablet by mouth Daily. 90 tablet 3    lidocaine-prilocaine (EMLA) 2.5-2.5 % cream Apply 1 Application topically to the appropriate area as directed As Needed for Mild Pain.      Liraglutide (Victoza) 18 MG/3ML solution pen-injector injection Inject 1.2 mg under the skin into the appropriate area as directed Daily. (Patient taking differently: Inject 1.2 mg under the skin into the appropriate area as directed Every Night. HOLD 1 DAY BEFORE SURGERY) 6 mL 5    lisinopril (PRINIVIL,ZESTRIL) 20 MG tablet TAKE 1 TABLET BY MOUTH DAILY 90 tablet 3    lisinopril-hydrochlorothiazide (PRINZIDE,ZESTORETIC) 20-25 MG per tablet TAKE 1 TABLET BY MOUTH DAILY 90 tablet 3    loratadine (Claritin) 10 MG tablet Take 1 tablet by mouth Every Night.      meloxicam (MOBIC) 15 MG tablet Take 1 tablet by mouth Daily. PT HOLDING FOR SURGERY      metFORMIN (GLUCOPHAGE) 500 MG tablet TAKE THREE TABLETS BY MOUTH EVERY MORNING AND THEN TAKE TWO TABLETS BY MOUTH IN THE EVENING (Patient taking differently: Take 3 tablets by mouth Daily  With Breakfast. TAKE THREE TABLETS BY MOUTH EVERY MORNING AND THEN TAKE TWO TABLETS BY MOUTH IN THE EVENING) 450 tablet 3    multivitamin with minerals tablet tablet Take 1 tablet by mouth Daily. PT HOLDING FOR SURGERY      Omega-3 Fatty Acids (fish oil) 1200 MG capsule capsule Take 1 capsule by mouth Daily. PT HOLDING FOR SURGERY      ondansetron (ZOFRAN) 8 MG tablet Take 1 tablet by mouth 3 (Three) Times a Day As Needed for Nausea or Vomiting. 30 tablet 5    rosuvastatin (Crestor) 20 MG tablet Take 1 tablet by mouth Daily. (Patient taking differently: Take 1 tablet by mouth Every Night.) 90 tablet 1    TURMERIC PO Take 1,400 mg by mouth Daily. PT HOLDING FOR SURGERY      vitamin C (ASCORBIC ACID) 250 MG tablet Take 4 tablets by mouth Daily.      vitamin D3 125 MCG (5000 UT) capsule capsule Take 1 capsule by mouth Daily. PT HOLDING FOR SURGERY      VITAMIN E PO Take 1 capsule by mouth Daily. HOLD PRIOR TO SURG      Zinc 50 MG tablet Take 1 tablet by mouth Daily. PT HOLDING FOR SURGERY       Current Facility-Administered Medications on File Prior to Visit   Medication Dose Route Frequency Provider Last Rate Last Admin    [COMPLETED] dexAMETHasone (DECADRON) IVPB 12 mg  12 mg Intravenous Once Alba Krause APRN   Stopped at 07/26/24 1022    [COMPLETED] diphenhydrAMINE (BENADRYL) IVPB 50 mg  50 mg Intravenous Once Alba Krause APRN   Stopped at 07/26/24 1038    [COMPLETED] famotidine (PEPCID) injection 20 mg  20 mg Intravenous Once Alba Krause APRN   20 mg at 07/26/24 1004    [COMPLETED] montelukast (SINGULAIR) tablet 10 mg  10 mg Oral Once Alba Krause APRN   10 mg at 07/26/24 1004    [COMPLETED] PACLitaxel (TAXOL) 135 mg in sodium chloride 0.9 % 272.5 mL chemo IVPB  80 mg/m2 (Treatment Plan Recorded) Intravenous Once Mariah Smart MD   Stopped at 07/26/24 1244    [COMPLETED] sodium chloride 0.9 % infusion  20 mL/hr Intravenous Once Alba Krause APRN   Stopped at 07/26/24  "1425    [COMPLETED] trastuzumab-dkst (OGIVRI) 280 mg in sodium chloride 0.9 % 250 mL chemo IVPB  4 mg/kg (Treatment Plan Recorded) Intravenous Once Mariah Smart MD   Stopped at 07/26/24 1425    [DISCONTINUED] heparin injection 500 Units  500 Units Intravenous PRN Ashkan Belcher MD   500 Units at 07/26/24 1426    [DISCONTINUED] sodium chloride 0.9 % flush 10 mL  10 mL Intravenous PRN Ashkan Belcher MD   10 mL at 07/26/24 1426        ALLERGIES:  No Known Allergies     Social History     Socioeconomic History    Marital status:      Spouse name: SUZANNE     Number of children: 3   Tobacco Use    Smoking status: Never    Smokeless tobacco: Never    Tobacco comments:     daily caffine   Vaping Use    Vaping status: Never Used   Substance and Sexual Activity    Alcohol use: Never    Drug use: Never    Sexual activity: Yes     Partners: Male     Birth control/protection: Post-menopausal, Hysterectomy, Surgical     Comment: SPOUSE = SUZANNE         Family History   Problem Relation Age of Onset    Heart disease Father     Hypertension Father     Diabetes Brother         Passed away 2012    Heart disease Maternal Grandmother     Other Son         TWIN    Other Daughter         TWIN     Thyroid disease Daughter     Other Son     Diabetes Brother     COPD Brother     Diabetes Brother     Heart disease Brother     Hypertension Brother     COPD Mother         Passed away 1988    BRCA 1/2 Neg Hx     Breast cancer Neg Hx     Colon cancer Neg Hx     Endometrial cancer Neg Hx     Ovarian cancer Neg Hx     Malig Hyperthermia Neg Hx         Review of Systems   Musculoskeletal:  Positive for arthralgias.        Objective     Vitals:    07/26/24 0928   BP: 165/78   Pulse: 79   Resp: 14   Temp: 97.6 °F (36.4 °C)   TempSrc: Oral   SpO2: 97%   Weight: 72.1 kg (158 lb 14.4 oz)   Height: 152.4 cm (60\")   PainSc: 0-No pain           7/26/2024     9:26 AM   Current Status   ECOG score 0       Physical Exam  Chest:          " "  CONSTITUTIONAL:  Vital signs reviewed.  No distress, looks comfortable.  EYES:  Conjunctivae and lids unremarkable.  PERRLA  EARS,NOSE,MOUTH,THROAT:  Ears and nose appear unremarkable.  Lips, teeth, gums appear unremarkable.  RESPIRATORY:  Normal respiratory effort.  Lungs clear to auscultation bilaterally.  BREASTS: Left breast is benign the right breast shows a firm hematoma at the 12 o'clock position with no definite palpable masses or axillary adenopathy  CARDIOVASCULAR:  Normal S1, S2.  No murmurs rubs or gallops.  No significant lower extremity edema.  GASTROINTESTINAL: Abdomen appears unremarkable.  Nontender.  No hepatomegaly.  No splenomegaly.  LYMPHATIC:  No cervical, supraclavicular, axillary lymphadenopathy.  SKIN:  Warm.  No rashes.  PSYCHIATRIC:  Normal judgment and insight.  Normal mood and affect.      RECENT LABS:  Hematology WBC   Date Value Ref Range Status   07/26/2024 7.35 3.40 - 10.80 10*3/mm3 Final   06/10/2024 4.43 3.40 - 10.80 10*3/mm3 Final     RBC   Date Value Ref Range Status   07/26/2024 3.51 (L) 3.77 - 5.28 10*6/mm3 Final   06/10/2024 4.10 3.77 - 5.28 10*6/mm3 Final     Hemoglobin   Date Value Ref Range Status   07/26/2024 10.8 (L) 12.0 - 15.9 g/dL Final     Hematocrit   Date Value Ref Range Status   07/26/2024 33.1 (L) 34.0 - 46.6 % Final     Platelets   Date Value Ref Range Status   07/26/2024 258 140 - 450 10*3/mm3 Final          ynoptic Checklist   DCIS OF THE BREAST: Biopsy   Protocol posted: 9/20/2023DCIS OF THE BREAST: BIOPSY - All Specimens  SPECIMEN   Procedure  Needle biopsy   Specimen Laterality  Right   TUMOR   Tumor Site  Clock position     12 o'clock   Histologic Type  Ductal carcinoma in situ (DCIS)   Architectural Patterns  Solid   Nuclear Grade  Grade III (high)   Necrosis  Present, central (expansive \"comedo\" necrosis)   Microcalcifications  Present in DCIS   .   Breast Biomarker Reporting Template   Protocol posted: 12/13/2023BREAST BIOMARKER REPORTING TEMPLATE - " All Specimens  Test(s) Performed     Estrogen Receptor (ER) Status  Negative (less than 1%)     Internal control cells present and stain as expected   Test Type  Food and Drug Administration (FDA) cleared (test / vendor): Ventena   Primary Antibody  SP1   Scoring System  Harrison   Proportion Score  0   Intensity Score  0   Total Harrison Score  0   Test(s) Performed     Progesterone Receptor (PgR) Status  Negative (less than 1%)     Internal control cells present and stain as expected   Test Type  Food and Drug Administration (FDA) cleared (test / vendor): Ventena   Primary Antibody  1E2   Scoring System  Harrison   Proportion Score  1   Intensity Score  2   Total Harrison Score  3   Test(s) Performed  Ki-67   Ki-67 Percentage of Positive Nuclei  25 %   Primary Antibody  30-9   Cold Ischemia and Fixation Times  Meet requirements specified in latest version of the ASCO / CAP Guidelines   Cold Ischemia Time (minutes)  12 min   Fixation Time (hours)  8 hours   Testing Performed on Block Number(s)  1B   METHODS   Fixative  Formalin   Image Analysis  Not performed   .   INVASIVE CARCINOMA OF THE BREAST: Biopsy   Protocol posted: 3/22/2023INVASIVE CARCINOMA OF THE BREAST: BIOPSY - All Specimens  SPECIMEN   Procedure  Needle biopsy   Specimen Laterality  Right   TUMOR   Tumor Site  Clock position     11 o'clock   Histologic Type  Invasive carcinoma of no special type (ductal)   Histologic Type Comment  The tumor has apocrine features   Histologic Grade (Migel Histologic Score)     Glandular (Acinar) / Tubular Differentiation  Score 3   Nuclear Pleomorphism  Score 3   Mitotic Rate  Score 2   Overall Grade  Grade 3 (scores of 8 or 9)   Largest Invasive Focus in this Limited Biopsy Sample  4.8 mm   Ductal Carcinoma In Situ (DCIS)  Not identified   Lymphatic and / or Vascular Invasion  Not identified   Microcalcifications  Not identified   .   Breast Biomarker Reporting Template   Protocol posted: 12/13/2023BREAST BIOMARKER  REPORTING TEMPLATE - All Specimens  Test(s) Performed     Estrogen Receptor (ER) Status  Negative (less than 1%)     Internal control cells present and stain as expected   Test Type  Food and Drug Administration (FDA) cleared (test / vendor): Ventena   Primary Antibody  SP1   Scoring System  Harrison   Proportion Score  0   Intensity Score  0   Total Harrison Score  0   Test(s) Performed     Progesterone Receptor (PgR) Status  Negative (less than 1%)     Internal control cells present and stain as expected   Test Type  Food and Drug Administration (FDA) cleared (test / vendor): Ventena   Primary Antibody  1E2   Scoring System  Harrison   Proportion Score  0   Intensity Score  0   Total Harrison Score  0   Test(s) Performed     HER2 by Immunohistochemistry  Positive (Score 3+)   Percentage of Cells with Uniform Intense Complete Membrane Staining  100 %   Test Type  Food and Drug Administration (FDA) cleared (test / vendor): Ventena   Primary Antibody  4B5   Test(s) Performed  Ki-67   Ki-67 Percentage of Positive Nuclei  60 %            Assessment & Plan     cT1cN0 grade 3 ER/AZ- her2 +RIGHT BREAST CANCER post biopsy with associated high-grade DCIS ER/AZ negative and MRI evidence of non-mass enhancement which would preclude lumpectomy  We discussed the natural history of HER2 positive breast cancer I told her that unless the tumor was bigger than we had anticipated on imaging we will plan for surgery first followed by Taxol Herceptin for 12 weeks and then Herceptin for the rest of the year.  If we have unexpected findings at surgery we would tailor the treatment to be most appropriate for her but she knows she will have some chemo at any rate.  I have asked her to have an echocardiogram done and we will see her back 2 to 3 weeks after surgery to review the final path and make a treatment decision  7/3/2024 right breast skin sparing mastectomy by Dr. Frye-pathology showing invasive mammary carcinoma (invasive ductal  carcinoma) Migel histological score 3.  Tumor in the upper outer quadrant at 11:00 measuring 10 x 10 x 10 mm.  Associated high-grade ductal carcinoma in situ, solid, cribriform and comedo types involving adenosis and fibroadenomatoid change with central comedonecrosis and associated microcalcifications.  DCIS spans 15 mm extending from the upper outer quadrant and to the lower outer quadrant.  All margins are free of invasive carcinoma and DCIS.  No LVSI, posterior skeletal muscle free of tumor.  ER/SD negative, HER2 3+ positive, Ki-67 60%.  Pathologic grade pT1b, N (SN) 0.  [place] lymph nodes 3 lymph nodes removed were negative for metastatic carcinoma.    Treatment recommended with weekly Taxol/ Herceptin X 12 followed by Herceptin every 3 weeks x 1 year.       2. Rheumatoid arthritis on Simponi for 3 and half years    3.  Negative family history of any malignancy    4.  Anemia: Hgb 10.8 today 7/26/2024- was normal prior to surgery (12.1 on 6/25/2024).  Denies bleeding issues.  Will check ferritin, iron profile, B12, folate.     Plan  Proceed with cycle 1 adjuvant weekly Taxol/Herceptin today.  Appointment to be seen next week by cardio oncology.  Follow-up in 1 week with MD or NP with repeat labs reassessment due for continued weekly Taxol/Herceptin.  Call/ return sooner should the patient develop any new concerns or problems.        Patient is on a high risk medication requiring close monitoring for toxicity.

## 2024-07-30 DIAGNOSIS — Z17.1 MALIGNANT NEOPLASM OF OVERLAPPING SITES OF RIGHT BREAST IN FEMALE, ESTROGEN RECEPTOR NEGATIVE: Primary | ICD-10-CM

## 2024-07-30 DIAGNOSIS — C50.811 MALIGNANT NEOPLASM OF OVERLAPPING SITES OF RIGHT BREAST IN FEMALE, ESTROGEN RECEPTOR NEGATIVE: Primary | ICD-10-CM

## 2024-07-31 ENCOUNTER — LAB (OUTPATIENT)
Dept: LAB | Facility: HOSPITAL | Age: 69
End: 2024-07-31
Payer: MEDICARE

## 2024-07-31 ENCOUNTER — TELEPHONE (OUTPATIENT)
Dept: CARDIOLOGY | Facility: CLINIC | Age: 69
End: 2024-07-31

## 2024-07-31 ENCOUNTER — OFFICE VISIT (OUTPATIENT)
Dept: CARDIOLOGY | Facility: CLINIC | Age: 69
End: 2024-07-31
Payer: MEDICARE

## 2024-07-31 VITALS
WEIGHT: 155 LBS | SYSTOLIC BLOOD PRESSURE: 160 MMHG | HEART RATE: 88 BPM | HEIGHT: 60 IN | DIASTOLIC BLOOD PRESSURE: 72 MMHG | BODY MASS INDEX: 30.43 KG/M2

## 2024-07-31 DIAGNOSIS — I10 BENIGN ESSENTIAL HYPERTENSION: Primary | Chronic | ICD-10-CM

## 2024-07-31 DIAGNOSIS — Z17.1 MALIGNANT NEOPLASM OF OVERLAPPING SITES OF RIGHT BREAST IN FEMALE, ESTROGEN RECEPTOR NEGATIVE: ICD-10-CM

## 2024-07-31 DIAGNOSIS — E11.3293 TYPE 2 DIABETES MELLITUS WITH BOTH EYES AFFECTED BY MILD NONPROLIFERATIVE RETINOPATHY WITHOUT MACULAR EDEMA, WITHOUT LONG-TERM CURRENT USE OF INSULIN: Chronic | ICD-10-CM

## 2024-07-31 DIAGNOSIS — C50.811 MALIGNANT NEOPLASM OF OVERLAPPING SITES OF RIGHT BREAST IN FEMALE, ESTROGEN RECEPTOR NEGATIVE: ICD-10-CM

## 2024-07-31 DIAGNOSIS — R06.02 SHORTNESS OF BREATH: ICD-10-CM

## 2024-07-31 DIAGNOSIS — I45.2 RBBB (RIGHT BUNDLE BRANCH BLOCK) WITH LEFT POSTERIOR FASCICULAR BLOCK: ICD-10-CM

## 2024-07-31 DIAGNOSIS — R94.31 ABNORMAL ELECTROCARDIOGRAM (ECG) (EKG): ICD-10-CM

## 2024-07-31 DIAGNOSIS — R09.89 BILATERAL CAROTID BRUITS: ICD-10-CM

## 2024-07-31 DIAGNOSIS — E78.00 PURE HYPERCHOLESTEROLEMIA: Chronic | ICD-10-CM

## 2024-07-31 LAB
NT-PROBNP SERPL-MCNC: <36 PG/ML (ref 0–900)
TROPONIN T SERPL HS-MCNC: 9 NG/L

## 2024-07-31 PROCEDURE — 83880 ASSAY OF NATRIURETIC PEPTIDE: CPT

## 2024-07-31 PROCEDURE — 84484 ASSAY OF TROPONIN QUANT: CPT

## 2024-07-31 PROCEDURE — 99204 OFFICE O/P NEW MOD 45 MIN: CPT | Performed by: INTERNAL MEDICINE

## 2024-07-31 PROCEDURE — 93000 ELECTROCARDIOGRAM COMPLETE: CPT | Performed by: INTERNAL MEDICINE

## 2024-07-31 PROCEDURE — 1160F RVW MEDS BY RX/DR IN RCRD: CPT | Performed by: INTERNAL MEDICINE

## 2024-07-31 PROCEDURE — 3078F DIAST BP <80 MM HG: CPT | Performed by: INTERNAL MEDICINE

## 2024-07-31 PROCEDURE — 36415 COLL VENOUS BLD VENIPUNCTURE: CPT

## 2024-07-31 PROCEDURE — 3077F SYST BP >= 140 MM HG: CPT | Performed by: INTERNAL MEDICINE

## 2024-07-31 PROCEDURE — 1159F MED LIST DOCD IN RCRD: CPT | Performed by: INTERNAL MEDICINE

## 2024-07-31 RX ORDER — CARVEDILOL 6.25 MG/1
6.25 TABLET ORAL 2 TIMES DAILY
Qty: 180 TABLET | Refills: 3 | Status: SHIPPED | OUTPATIENT
Start: 2024-07-31

## 2024-07-31 NOTE — PROGRESS NOTES
"Date of Office Visit: 2024  Encounter Provider: María Ramirez MD  Place of Service: [facility]  Patient Name: Pretty Tovar  :1955      Patient ID:  Pretty Tovar is a 68 y.o. 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    She is , has 3 children, owns the [employer], has never smoked, uses no drugs, uses no caffeine.  Her brother myocardial infarction, her father myocardial infarction.  Her dad had hypertension to her brothers have had hypertension.  3 of her brothers have diabetes.    She was diagnosed with invasive mammary carcinoma on 7/3/2024 of the right breast with treatment recommended for Taxol (paclitaxel) and Herceptin (trastuzumab) x 12 followed by Herceptin every 3 weeks for a year.    Labs on 2024 show glucose 162 with otherwise normal CMP, hemoglobin 10.8 with otherwise normal CBC.  Echocardiogram done 2024 showed ejection fraction of 60.9% with normal diastolic function, global longitudinal strain of -21.4%, normal RVSP, mild aortic valve stenosis.    She does not exercise regularly.  Her energy level stable.  She has no dizziness, syncope or falls.  She does not feel her heart racing or skipping.  She has no orthopnea or PND.  She has mild exertional dyspnea.  She has no exertional chest heaviness or pressure.  She has no headaches or vision changes.  She takes her medications as directed without difficulty.    Past Medical History:   Diagnosis Date    Abnormal liver function tests 2017    Description: , \"-\"hepatitis profile    Anemia     Anesthesia complication     SLOW TO WAKE UP WITH HYSTERECTOMY    Arthritis     Cancer " 24    Right Breast    Colon polyps     Cystocele, lateral 10/15/2020    Cystocele, midline 10/15/2020    Diabetes mellitus     Frequent UTI     H/O bone density study 10/2010    normal    H/O cardiovascular stress test     H/O echocardiogram 2001    2-D    Hashimoto's thyroiditis     Heart murmur     Hyperkalemia 2019    Hyperlipidemia     Hypertension     Hypothyroidism     Incompetence of pubocervical tissue 10/15/2020    Incompetence of rectovaginal tissue 10/15/2020    Limited joint range of motion     LEFT KNEE    Loss of perineal body, female 10/15/2020    Osteoarthritis of knee 2005    left    Pelvic relaxation due to vaginal prolapse 2018    Plantar warts     foot    Postmenopausal bleeding 2019    Pregnancy      twins x1    Psoriasis     RA (rheumatoid arthritis)     Rectocele 10/15/2020    Urinary tract infection     Urine incontinence     BETTER WITH SLING    Uterovaginal prolapse     Uterovaginal prolapse, incomplete 2020    Vaginal enterocele 10/15/2020         Past Surgical History:   Procedure Laterality Date    BREAST BIOPSY Right     benign    COLONOSCOPY      COLONOSCOPY W/ POLYPECTOMY  2018    Dr Resendiz, 6 mm rectal polyp - hyperplastic    JOINT MANIPULATION Left 2021    Procedure: KNEE MANIPULATION with steroid injection;  Surgeon: Francis Tavarez MD;  Location: [facility] OR;  Service: Orthopedics;  Laterality: Left;    JOINT REPLACEMENT  08/10/2021    LYMPH NODE BIOPSY  2024    MASTECTOMY W/ SENTINEL NODE BIOPSY Right 2024    Procedure: RIGHT MASTECTOMY WITH SENTINEL NODE BIOPSY;  Surgeon: Aracely Frye MD;  Location: [facility] OR;  Service: General;  Laterality: Right;    PUBOVAGINAL SLING N/A 10/16/2020    Procedure: Pubovaginal sling Posterior colporrhaphy fixation Insertion of vaginal grafts Cystourethroscopy;  Surgeon: Karen Allen MD;  Location: [facility] MAIN OR;  Service: Gynecology;  Laterality: N/A;     TOTAL KNEE ARTHROPLASTY Left 08/10/2021    Procedure: TOTAL KNEE ARTHROPLASTY;  Surgeon: Francis Tavarez MD;  Location: [facility] OR;  Service: Orthopedics;  Laterality: Left;    TOTAL LAPAROSCOPIC HYSTERECTOMY, SACROCOLPOPEXY N/A 10/16/2020    Procedure: Laparoscopic uterosacral ligament colpopexy sacral colpopexy  Laparoscopic paravaginal repair Laparoscopic hysterectomy with bilateral salpingectomy  Laparoscopic abdominal enterocele repair;  Surgeon: Karen Allen MD;  Location: [facility] OR;  Service: Gynecology;  Laterality: N/A;    TUBAL ABDOMINAL LIGATION      VENOUS ACCESS DEVICE (PORT) INSERTION N/A 07/03/2024    Procedure: INSERTION OF PORTACATH;  Surgeon: Aracely Frye MD;  Location: [facility];  Service: General;  Laterality: N/A;       Current Outpatient Medications on File Prior to Visit   Medication Sig Dispense Refill    amoxicillin (AMOXIL) 500 MG capsule Take 1 capsule by mouth Take As Directed. For Dental Procedures      BD PEN NEEDLE SEUN U/F 32G X 4 MM misc USE 1 PEN NEEDLE WITH EACH  VICTOZA INJECTION 100 each 1    Biotin 83111 MCG tablet dispersible Place 10,000 mg on the tongue Daily. PT HOLDING FOR SURGERY      Calcium Carbonate-Vit D-Min (CALCIUM 1200 PO) Take 1 tablet by mouth Daily. PT HOLDING FOR SURGERY      Cinnamon 500 MG tablet Take 1,000 mg by mouth 2 (two) times a day. PT HOLDING FOR SURGERY      Coenzyme Q10 (CO Q 10 PO) Take 1 capsule by mouth Daily. PT HOLDING FOR SURGERY      CRANBERRY-VITAMIN C-D MANNOSE PO Take 2 capsules by mouth Daily. PT HOLDING FOR SURGERY      levothyroxine (Synthroid) 88 MCG tablet Take 1 tablet by mouth Daily. 90 tablet 3    lidocaine-prilocaine (EMLA) 2.5-2.5 % cream Apply 1 Application topically to the appropriate area as directed As Needed for Mild Pain.      Liraglutide (Victoza) 18 MG/3ML solution pen-injector injection Inject 1.2 mg under the skin into the appropriate area as directed Daily. (Patient taking differently: Inject 1.2 mg  under the skin into the appropriate area as directed Every Night. HOLD 1 DAY BEFORE SURGERY) 6 mL 5    lisinopril (PRINIVIL,ZESTRIL) 20 MG tablet TAKE 1 TABLET BY MOUTH DAILY 90 tablet 3    lisinopril-hydrochlorothiazide (PRINZIDE,ZESTORETIC) 20-25 MG per tablet TAKE 1 TABLET BY MOUTH DAILY 90 tablet 3    loratadine (Claritin) 10 MG tablet Take 1 tablet by mouth Every Night.      meloxicam (MOBIC) 15 MG tablet Take 1 tablet by mouth Daily. PT HOLDING FOR SURGERY      metFORMIN (GLUCOPHAGE) 500 MG tablet TAKE THREE TABLETS BY MOUTH EVERY MORNING AND THEN TAKE TWO TABLETS BY MOUTH IN THE EVENING (Patient taking differently: Take 3 tablets by mouth Daily With Breakfast. TAKE THREE TABLETS BY MOUTH EVERY MORNING AND THEN TAKE TWO TABLETS BY MOUTH IN THE EVENING) 450 tablet 3    multivitamin with minerals tablet tablet Take 1 tablet by mouth Daily. PT HOLDING FOR SURGERY      Omega-3 Fatty Acids (fish oil) 1200 MG capsule capsule Take 1 capsule by mouth Daily. PT HOLDING FOR SURGERY      ondansetron (ZOFRAN) 8 MG tablet Take 1 tablet by mouth 3 (Three) Times a Day As Needed for Nausea or Vomiting. 30 tablet 5    rosuvastatin (Crestor) 20 MG tablet Take 1 tablet by mouth Daily. (Patient taking differently: Take 1 tablet by mouth Every Night.) 90 tablet 1    TURMERIC PO Take 1,400 mg by mouth Daily. PT HOLDING FOR SURGERY      vitamin C (ASCORBIC ACID) 250 MG tablet Take 4 tablets by mouth Daily.      vitamin D3 125 MCG (5000 UT) capsule capsule Take 1 capsule by mouth Daily. PT HOLDING FOR SURGERY      VITAMIN E PO Take 1 capsule by mouth Daily. HOLD PRIOR TO SURG      Zinc 50 MG tablet Take 1 tablet by mouth Daily. PT HOLDING FOR SURGERY      dexAMETHasone (DECADRON) 4 MG tablet Take 2 tablets by mouth 2 (Two) Times a Day With Meals. Take the day before 1st taxol chemotherapy infusion (Patient not taking: Reported on 7/31/2024) 4 tablet 0    Golimumab (Simponi) 100 MG/ML solution auto-injector Inject  under the skin into  "the appropriate area as directed Every 2 (Two) Months. LAST DOSE APRIL 10 , 2024 (Patient not taking: Reported on 7/31/2024)       No current facility-administered medications on file prior to visit.       Social History     Socioeconomic History    Marital status:      Spouse name: SUZANNE     Number of children: 3   Tobacco Use    Smoking status: Never     Passive exposure: Never    Smokeless tobacco: Never    Tobacco comments:     Caffeine: 1 drink daily   Vaping Use    Vaping status: Never Used   Substance and Sexual Activity    Alcohol use: Never    Drug use: Never    Sexual activity: Yes     Partners: Male     Birth control/protection: Post-menopausal, Tubal ligation, Essure, Hysterectomy, Same-sex partner, Surgical     Comment: SPOUSE = SUZANNE              Procedures    ECG 12 Lead    Date/Time: 7/31/2024 9:50 AM  Performed by: María Ramirez MD    Authorized by: María Ramirez MD  Comparison: compared with previous ECG   Similar to previous ECG  Rhythm: sinus rhythm  Conduction: right bundle branch block and left posterior fascicular block    Clinical impression: abnormal EKG              Objective:      Vitals:    07/31/24 0937   BP: 160/72   Pulse: 88   Weight: 70.3 kg (155 lb)   Height: 152.4 cm (60\")     Body mass index is 30.27 kg/m².    Vitals reviewed.   Constitutional:       General: Not in acute distress.     Appearance: Not diaphoretic.   Neck:      Vascular: No carotid bruit or JVD.   Pulmonary:      Effort: Pulmonary effort is normal.      Breath sounds: Normal breath sounds.   Cardiovascular:      Normal rate. Regular rhythm.      Murmurs: There is a grade 2/6 midsystolic murmur at the URSB and ULSB.      No gallop.  No rub.   Pulses:     Carotid: 2+ with bruit bilaterally.     Radial: 2+ bilaterally.     Dorsalis pedis: 2+ bilaterally.     Posterior tibial: 2+ bilaterally.  Edema:     Peripheral edema absent.   Neurological:      Cranial Nerves: No cranial nerve deficit. " "        Lab Review:       Assessment:      Diagnosis Plan   1. Benign essential hypertension        2. Pure hypercholesterolemia        3. Type 2 diabetes mellitus with both eyes affected by mild nonproliferative retinopathy without macular edema, without long-term current use of insulin        4. 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 Malignant neoplasm of overlapping sites of right breast in female, estrogen receptor negative  Adult Transthoracic Echo Limited W/ Cont if Necessary Per Protocol    proBNP    High Sensitivity Troponin T      6. Abnormal electrocardiogram (ECG) (EKG)  Adult Transthoracic Echo Limited W/ Cont if Necessary Per Protocol    proBNP    High Sensitivity Troponin T      7. Shortness of breath  proBNP      8. Bilateral carotid bruits  Duplex Carotid Ultrasound CAR        Right breast cancer, received Paclitaxel (Taxol) and trastuzumab (Herceptin) on 7/26/2024.  Hypertension, goal <120/80, blood pressure is high today on lisinopril and lisinopril HCTZ.  Start carvedilol 6.25 mg twice daily in addition to these medications.  Aortic stenosis, mild  Bilateral carotid bruits  Hyperlipidemia, on rosuvastatin  Rheumatoid arthritis, on Simponi.  Sedentary lifestyle, advised 30 minutes of cardiovascular work daily.  Anemia  Hypothyroidism.     Plan:       Current recommendations are for baseline echocardiogram and limited echo every 3 months for 12 months of therapy then every 6 months for 2 years after therapy is discontinued.  Baseline troponin and proBNP for those with Herceptin treatment is recommended at baseline..    STOP-Bang Score  Have you been diagnosed with Sleep Apnea?: no  Snoring?: yes  Tired?: yes  Observed?: no  Pressure?: yes  Stop Score: 3  Body Mass Index more than 35 kg/m2?: no  Age older than 50 year old?: yes  Neck large? \">17\"/43cm-M, >16\"/41cm-F: no  Gender=Male?: no  Total Stop-Bang Score: 4    "

## 2024-07-31 NOTE — TELEPHONE ENCOUNTER
Called and left VM, will continue to try to reach pt.    HUB- please put patient straight through to triage    Winsome Baldwin RN  Triage RN  07/31/24 11:22 EDT

## 2024-07-31 NOTE — TELEPHONE ENCOUNTER
Notified Pretty Tovar of results, patient verbalizes understanding.    Kiana Gage RN  [facility] Triage  07/31/24 11:56 EDT

## 2024-08-02 ENCOUNTER — INFUSION (OUTPATIENT)
Dept: ONCOLOGY | Facility: HOSPITAL | Age: 69
End: 2024-08-02
Payer: MEDICARE

## 2024-08-02 ENCOUNTER — OFFICE VISIT (OUTPATIENT)
Dept: ONCOLOGY | Facility: CLINIC | Age: 69
End: 2024-08-02
Payer: MEDICARE

## 2024-08-02 ENCOUNTER — PATIENT ROUNDING (BHMG ONLY) (OUTPATIENT)
Dept: CARDIOLOGY | Facility: CLINIC | Age: 69
End: 2024-08-02
Payer: MEDICARE

## 2024-08-02 VITALS
OXYGEN SATURATION: 97 % | WEIGHT: 155.3 LBS | HEART RATE: 72 BPM | SYSTOLIC BLOOD PRESSURE: 133 MMHG | DIASTOLIC BLOOD PRESSURE: 67 MMHG | BODY MASS INDEX: 30.49 KG/M2 | HEIGHT: 60 IN | TEMPERATURE: 97.8 F

## 2024-08-02 DIAGNOSIS — C50.811 MALIGNANT NEOPLASM OF OVERLAPPING SITES OF RIGHT BREAST IN FEMALE, ESTROGEN RECEPTOR NEGATIVE: Primary | ICD-10-CM

## 2024-08-02 DIAGNOSIS — Z17.1 MALIGNANT NEOPLASM OF OVERLAPPING SITES OF RIGHT BREAST IN FEMALE, ESTROGEN RECEPTOR NEGATIVE: Primary | ICD-10-CM

## 2024-08-02 DIAGNOSIS — C50.811 MALIGNANT NEOPLASM OF OVERLAPPING SITES OF RIGHT BREAST IN FEMALE, ESTROGEN RECEPTOR NEGATIVE: ICD-10-CM

## 2024-08-02 DIAGNOSIS — Z17.1 MALIGNANT NEOPLASM OF OVERLAPPING SITES OF RIGHT BREAST IN FEMALE, ESTROGEN RECEPTOR NEGATIVE: ICD-10-CM

## 2024-08-02 LAB
ALBUMIN SERPL-MCNC: 4 G/DL (ref 3.5–5.2)
ALBUMIN/GLOB SERPL: 1.4 G/DL
ALP SERPL-CCNC: 60 U/L (ref 39–117)
ALT SERPL W P-5'-P-CCNC: 41 U/L (ref 1–33)
ANION GAP SERPL CALCULATED.3IONS-SCNC: 11.3 MMOL/L (ref 5–15)
AST SERPL-CCNC: 32 U/L (ref 1–32)
BASOPHILS # BLD AUTO: 0.04 10*3/MM3 (ref 0–0.2)
BASOPHILS NFR BLD AUTO: 0.9 % (ref 0–1.5)
BILIRUB SERPL-MCNC: 0.3 MG/DL (ref 0–1.2)
BUN SERPL-MCNC: 15 MG/DL (ref 8–23)
BUN/CREAT SERPL: 29.4 (ref 7–25)
CALCIUM SPEC-SCNC: 9.6 MG/DL (ref 8.6–10.5)
CHLORIDE SERPL-SCNC: 100 MMOL/L (ref 98–107)
CO2 SERPL-SCNC: 27.7 MMOL/L (ref 22–29)
CREAT SERPL-MCNC: 0.51 MG/DL (ref 0.57–1)
DEPRECATED RDW RBC AUTO: 40.6 FL (ref 37–54)
EGFRCR SERPLBLD CKD-EPI 2021: 101.8 ML/MIN/1.73
EOSINOPHIL # BLD AUTO: 0.44 10*3/MM3 (ref 0–0.4)
EOSINOPHIL NFR BLD AUTO: 10.4 % (ref 0.3–6.2)
ERYTHROCYTE [DISTWIDTH] IN BLOOD BY AUTOMATED COUNT: 12 % (ref 12.3–15.4)
GLOBULIN UR ELPH-MCNC: 2.9 GM/DL
GLUCOSE SERPL-MCNC: 161 MG/DL (ref 65–99)
HCT VFR BLD AUTO: 33.2 % (ref 34–46.6)
HGB BLD-MCNC: 11 G/DL (ref 12–15.9)
IMM GRANULOCYTES # BLD AUTO: 0.01 10*3/MM3 (ref 0–0.05)
IMM GRANULOCYTES NFR BLD AUTO: 0.2 % (ref 0–0.5)
LYMPHOCYTES # BLD AUTO: 1.22 10*3/MM3 (ref 0.7–3.1)
LYMPHOCYTES NFR BLD AUTO: 28.8 % (ref 19.6–45.3)
MCH RBC QN AUTO: 30.6 PG (ref 26.6–33)
MCHC RBC AUTO-ENTMCNC: 33.1 G/DL (ref 31.5–35.7)
MCV RBC AUTO: 92.5 FL (ref 79–97)
MONOCYTES # BLD AUTO: 0.26 10*3/MM3 (ref 0.1–0.9)
MONOCYTES NFR BLD AUTO: 6.1 % (ref 5–12)
NEUTROPHILS NFR BLD AUTO: 2.27 10*3/MM3 (ref 1.7–7)
NEUTROPHILS NFR BLD AUTO: 53.6 % (ref 42.7–76)
NRBC BLD AUTO-RTO: 0 /100 WBC (ref 0–0.2)
PLATELET # BLD AUTO: 255 10*3/MM3 (ref 140–450)
PMV BLD AUTO: 10 FL (ref 6–12)
POTASSIUM SERPL-SCNC: 4.1 MMOL/L (ref 3.5–5.2)
PROT SERPL-MCNC: 6.9 G/DL (ref 6–8.5)
RBC # BLD AUTO: 3.59 10*6/MM3 (ref 3.77–5.28)
SODIUM SERPL-SCNC: 139 MMOL/L (ref 136–145)
WBC NRBC COR # BLD AUTO: 4.24 10*3/MM3 (ref 3.4–10.8)

## 2024-08-02 PROCEDURE — 96375 TX/PRO/DX INJ NEW DRUG ADDON: CPT

## 2024-08-02 PROCEDURE — 25010000002 DIPHENHYDRAMINE PER 50 MG: Performed by: INTERNAL MEDICINE

## 2024-08-02 PROCEDURE — 96417 CHEMO IV INFUS EACH ADDL SEQ: CPT

## 2024-08-02 PROCEDURE — 85025 COMPLETE CBC W/AUTO DIFF WBC: CPT

## 2024-08-02 PROCEDURE — 25810000003 SODIUM CHLORIDE 0.9 % SOLUTION: Performed by: INTERNAL MEDICINE

## 2024-08-02 PROCEDURE — 25010000002 PACLITAXEL PER 1 MG: Performed by: INTERNAL MEDICINE

## 2024-08-02 PROCEDURE — 96413 CHEMO IV INFUSION 1 HR: CPT

## 2024-08-02 PROCEDURE — 80053 COMPREHEN METABOLIC PANEL: CPT

## 2024-08-02 PROCEDURE — 25810000003 SODIUM CHLORIDE 0.9 % SOLUTION 250 ML FLEX CONT: Performed by: INTERNAL MEDICINE

## 2024-08-02 PROCEDURE — 25010000002 DEXAMETHASONE SODIUM PHOSPHATE 100 MG/10ML SOLUTION: Performed by: INTERNAL MEDICINE

## 2024-08-02 PROCEDURE — 25010000002 TRASTUZUMAB-DKST 420 MG RECONSTITUTED SOLUTION 1 EACH VIAL: Performed by: INTERNAL MEDICINE

## 2024-08-02 PROCEDURE — 96415 CHEMO IV INFUSION ADDL HR: CPT

## 2024-08-02 RX ORDER — FAMOTIDINE 10 MG/ML
20 INJECTION, SOLUTION INTRAVENOUS ONCE
Status: COMPLETED | OUTPATIENT
Start: 2024-08-02 | End: 2024-08-02

## 2024-08-02 RX ORDER — FAMOTIDINE 10 MG/ML
20 INJECTION, SOLUTION INTRAVENOUS AS NEEDED
Status: CANCELLED | OUTPATIENT
Start: 2024-08-02

## 2024-08-02 RX ORDER — FAMOTIDINE 10 MG/ML
20 INJECTION, SOLUTION INTRAVENOUS ONCE
Status: CANCELLED | OUTPATIENT
Start: 2024-08-02

## 2024-08-02 RX ORDER — SODIUM CHLORIDE 9 MG/ML
20 INJECTION, SOLUTION INTRAVENOUS ONCE
Status: CANCELLED | OUTPATIENT
Start: 2024-08-02

## 2024-08-02 RX ORDER — DIPHENHYDRAMINE HYDROCHLORIDE 50 MG/ML
50 INJECTION INTRAMUSCULAR; INTRAVENOUS AS NEEDED
Status: CANCELLED | OUTPATIENT
Start: 2024-08-02

## 2024-08-02 RX ORDER — MONTELUKAST SODIUM 10 MG/1
10 TABLET ORAL ONCE
Status: CANCELLED
Start: 2024-08-02 | End: 2024-08-02

## 2024-08-02 RX ORDER — MONTELUKAST SODIUM 10 MG/1
10 TABLET ORAL ONCE
Status: COMPLETED | OUTPATIENT
Start: 2024-08-02 | End: 2024-08-02

## 2024-08-02 RX ORDER — SODIUM CHLORIDE 9 MG/ML
20 INJECTION, SOLUTION INTRAVENOUS ONCE
Status: COMPLETED | OUTPATIENT
Start: 2024-08-02 | End: 2024-08-02

## 2024-08-02 RX ADMIN — FAMOTIDINE 20 MG: 10 INJECTION INTRAVENOUS at 11:22

## 2024-08-02 RX ADMIN — SODIUM CHLORIDE 20 ML/HR: 9 INJECTION, SOLUTION INTRAVENOUS at 11:04

## 2024-08-02 RX ADMIN — MONTELUKAST 10 MG: 10 TABLET, FILM COATED ORAL at 11:05

## 2024-08-02 RX ADMIN — DEXAMETHASONE SODIUM PHOSPHATE 12 MG: 10 INJECTION, SOLUTION INTRAMUSCULAR; INTRAVENOUS at 11:22

## 2024-08-02 RX ADMIN — DIPHENHYDRAMINE HYDROCHLORIDE 25 MG: 50 INJECTION, SOLUTION INTRAMUSCULAR; INTRAVENOUS at 11:04

## 2024-08-02 RX ADMIN — PACLITAXEL 135 MG: 6 INJECTION, SOLUTION INTRAVENOUS at 12:18

## 2024-08-02 RX ADMIN — TRASTUZUMAB-DKST 140 MG: 420 INJECTION, POWDER, LYOPHILIZED, FOR SOLUTION INTRAVENOUS at 11:40

## 2024-08-02 NOTE — PROGRESS NOTES
"  Subjective     REASON FOR CONSULTATION:  cTis ER/SD negative right breast and cT1b N0 ER/SD negative HER2 positive ductal carcinoma right breast postbiopsy  Provide an opinion on any further workup or treatment                             REQUESTING PHYSICIAN: MD Renee Joseph    History of Present Illness patient is a 68-year-old female with a small HER2 positive ER/SD negative right breast cancer treated with mastectomy and sentinel node biopsy here for her day 8 cycle 1 Taxol Herceptin.    She has had no significant side effects and is doing well and ready to proceed with treatment today    I recommended B complex and close watch for neuropathy or cough or shortness of breath    Echocardiogram was done 6/7/2024 showing ejection fraction of 60.9%.      She is opted not to use the [place] system and knows to expect a fair degree of hair loss by the end of her treatment but is okay with this  Past Medical History:   Diagnosis Date    Abnormal liver function tests 02/09/2017    Description: 2000, \"-\"hepatitis profile    Anemia     Anesthesia complication     SLOW TO WAKE UP WITH HYSTERECTOMY    Arthritis     Cancer 5/16/24    Right Breast    Colon polyps     Cystocele, lateral 10/15/2020    Cystocele, midline 10/15/2020    Diabetes mellitus     Frequent UTI     H/O bone density study 10/2010    normal    H/O cardiovascular stress test 2001    H/O echocardiogram 03/2001    2-D    Hashimoto's thyroiditis     Heart murmur     Hyperkalemia 05/16/2019    Hyperlipidemia     Hypertension     Hypothyroidism 2018    Incompetence of pubocervical tissue 10/15/2020    Incompetence of rectovaginal tissue 10/15/2020    Limited joint range of motion     LEFT KNEE    Loss of perineal body, female 10/15/2020    Osteoarthritis of knee 2005    left    Pelvic relaxation due to vaginal prolapse 02/08/2018    Plantar warts     foot    Postmenopausal bleeding 03/18/2019    " Pregnancy      twins x1    Psoriasis     RA (rheumatoid arthritis)     Rectocele 10/15/2020    Urinary tract infection     Urine incontinence     BETTER WITH SLING    Uterovaginal prolapse     Uterovaginal prolapse, incomplete 2020    Vaginal enterocele 10/15/2020        Past Surgical History:   Procedure Laterality Date    BREAST BIOPSY Right     benign    COLONOSCOPY      COLONOSCOPY W/ POLYPECTOMY  2018    Dr Resendiz, 6 mm rectal polyp - hyperplastic    JOINT MANIPULATION Left 2021    Procedure: KNEE MANIPULATION with steroid injection;  Surgeon: Francis Tavarez MD;  Location: [facility];  Service: Orthopedics;  Laterality: Left;    JOINT REPLACEMENT  08/10/2021    LYMPH NODE BIOPSY  2024    MASTECTOMY W/ SENTINEL NODE BIOPSY Right 2024    Procedure: RIGHT MASTECTOMY WITH SENTINEL NODE BIOPSY;  Surgeon: Aracely Frye MD;  Location: [facility];  Service: General;  Laterality: Right;    PUBOVAGINAL SLING N/A 10/16/2020    Procedure: Pubovaginal sling Posterior colporrhaphy fixation Insertion of vaginal grafts Cystourethroscopy;  Surgeon: Karen Allen MD;  Location: [facility];  Service: Gynecology;  Laterality: N/A;    TOTAL KNEE ARTHROPLASTY Left 08/10/2021    Procedure: TOTAL KNEE ARTHROPLASTY;  Surgeon: Francis Tavarez MD;  Location: [facility];  Service: Orthopedics;  Laterality: Left;    TOTAL LAPAROSCOPIC HYSTERECTOMY, SACROCOLPOPEXY N/A 10/16/2020    Procedure: Laparoscopic uterosacral ligament colpopexy sacral colpopexy  Laparoscopic paravaginal repair Laparoscopic hysterectomy with bilateral salpingectomy  Laparoscopic abdominal enterocele repair;  Surgeon: Karen Allen MD;  Location: [facility];  Service: Gynecology;  Laterality: N/A;    TUBAL ABDOMINAL LIGATION      VENOUS ACCESS DEVICE (PORT) INSERTION N/A 2024    Procedure: INSERTION OF PORTACATH;  Surgeon: Aracely Frye MD;  Location: [facility];  Service: General;   Laterality: N/A;   Oncologic history  patient is a 68-year-old female with 4-year history of rheumatoid arthritis on Simponi who was found on her most recent screening mammogram have an abnormality in the right breast with a 10 mm oval mass at the 11:00 and an 11 mm mass at 12:00.  His abnormalities persisted and diagnostic imaging and the patient underwent biopsy of both these areas.  The area at 12:00 was DCIS high-grade ER/AL negative and the lesion at 11:00 was grade 3 invasive ductal carcinoma measuring 4.8 mm ER/AL negative HER2 3+    Patient was referred to Dr. Aracely Frye and underwent bilateral screening mammograms which showed large hematomas in both biopsy sites with a residual 1.1 x 1.1 cm mass at 11:00 and 12:00 was large hematoma with a biopsy cavity and at 9:00 there was another 1.2 x 1.4 cm mass which was suspicious and then extending from 90 to o'clock there was non-mass enhancement measuring 4.7 x 4.7 centimeters encompassing the 2 biopsy sites and the 9:00 mass    Patient has been referred to discuss treatment but the plan is to proceed with surgery first as masses were nonpalpable and small and initial imaging although now with a large postbiopsy hematoma was palpation is unreliable    Patient is  2 para 3 first childbirth was at age 22 she did not breast-feed menarche was at age 11 menopause 50.  She has been on no post menopausal hormone replacement    Family history is completely negative for any malignancy and no genetic testing was done    She is not a smoker or drinker  She has not had a DVT MI or stroke    She has been on Simponi for her rheumatoid arthritis her last dose was in April and she is going to skip the  dose because surgery is scheduled    Her last bone density in 2018 was normal    No history of unusual bleeding with her hysterectomy or knee replacement    We discussed the natural history of HER2 positive breast cancer I told her that unless the tumor was bigger than  we had anticipated on imaging we will plan for surgery first followed by Taxol Herceptin for 12 weeks and then Herceptin for the rest of the year.  If we have unexpected findings at surgery we would tailor the treatment to be most appropriate for her but she knows she will have some chemo at any rate.  I have asked her to have an echocardiogram done and we will see her back 2 to 3 weeks after surgery to review the final path and make a treatment decision    7/3/2024 right breast skin sparing mastectomy by Dr. Frye-pathology showing invasive mammary carcinoma (invasive ductal carcinoma) Migel histological score 3.  Tumor in the upper outer quadrant at 11:00 measuring 10 x 10 x 10 mm.  Associated high-grade ductal carcinoma in situ, solid, cribriform and comedo types involving adenosis and fibroadenomatoid change with central comedonecrosis and associated microcalcifications.  DCIS spans 15 mm extending from the upper outer quadrant and to the lower outer quadrant.  All margins are free of invasive carcinoma and DCIS.  No LVSI, posterior skeletal muscle free of tumor.  ER/MI negative, HER2 3+ positive, Ki-67 60%.  Pathologic grade pT1b, N (SN) 0.  [place] lymph nodes 3 lymph nodes removed were negative for metastatic carcinoma.      Treatment recommended with weekly Taxol/ Herceptin X 12 followed by Herceptin every 3 weeks x 1 year.     Current Outpatient Medications on File Prior to Visit   Medication Sig Dispense Refill    amoxicillin (AMOXIL) 500 MG capsule Take 1 capsule by mouth Take As Directed. For Dental Procedures      BD PEN NEEDLE SEUN U/F 32G X 4 MM misc USE 1 PEN NEEDLE WITH EACH  VICTOZA INJECTION 100 each 1    Biotin 98840 MCG tablet dispersible Place 10,000 mg on the tongue Daily. PT HOLDING FOR SURGERY      Calcium Carbonate-Vit D-Min (CALCIUM 1200 PO) Take 1 tablet by mouth Daily. PT HOLDING FOR SURGERY      carvedilol (COREG) 6.25 MG tablet Take 1 tablet by mouth 2 (Two) Times a Day. 180  tablet 3    Cinnamon 500 MG tablet Take 1,000 mg by mouth 2 (two) times a day. PT HOLDING FOR SURGERY      Coenzyme Q10 (CO Q 10 PO) Take 1 capsule by mouth Daily. PT HOLDING FOR SURGERY      CRANBERRY-VITAMIN C-D MANNOSE PO Take 2 capsules by mouth Daily. PT HOLDING FOR SURGERY      levothyroxine (Synthroid) 88 MCG tablet Take 1 tablet by mouth Daily. 90 tablet 3    lidocaine-prilocaine (EMLA) 2.5-2.5 % cream Apply 1 Application topically to the appropriate area as directed As Needed for Mild Pain.      Liraglutide (Victoza) 18 MG/3ML solution pen-injector injection Inject 1.2 mg under the skin into the appropriate area as directed Daily. (Patient taking differently: Inject 1.2 mg under the skin into the appropriate area as directed Every Night. HOLD 1 DAY BEFORE SURGERY) 6 mL 5    lisinopril (PRINIVIL,ZESTRIL) 20 MG tablet TAKE 1 TABLET BY MOUTH DAILY 90 tablet 3    lisinopril-hydrochlorothiazide (PRINZIDE,ZESTORETIC) 20-25 MG per tablet TAKE 1 TABLET BY MOUTH DAILY 90 tablet 3    loratadine (Claritin) 10 MG tablet Take 1 tablet by mouth Every Night.      meloxicam (MOBIC) 15 MG tablet Take 1 tablet by mouth Daily. PT HOLDING FOR SURGERY      metFORMIN (GLUCOPHAGE) 500 MG tablet TAKE THREE TABLETS BY MOUTH EVERY MORNING AND THEN TAKE TWO TABLETS BY MOUTH IN THE EVENING (Patient taking differently: Take 3 tablets by mouth Daily With Breakfast. TAKE THREE TABLETS BY MOUTH EVERY MORNING AND THEN TAKE TWO TABLETS BY MOUTH IN THE EVENING) 450 tablet 3    multivitamin with minerals tablet tablet Take 1 tablet by mouth Daily. PT HOLDING FOR SURGERY      Omega-3 Fatty Acids (fish oil) 1200 MG capsule capsule Take 1 capsule by mouth Daily. PT HOLDING FOR SURGERY      ondansetron (ZOFRAN) 8 MG tablet Take 1 tablet by mouth 3 (Three) Times a Day As Needed for Nausea or Vomiting. 30 tablet 5    rosuvastatin (Crestor) 20 MG tablet Take 1 tablet by mouth Daily. (Patient taking differently: Take 1 tablet by mouth Every Night.)  "90 tablet 1    TURMERIC PO Take 1,400 mg by mouth Daily. PT HOLDING FOR SURGERY      vitamin C (ASCORBIC ACID) 250 MG tablet Take 4 tablets by mouth Daily.      vitamin D3 125 MCG (5000 UT) capsule capsule Take 1 capsule by mouth Daily. PT HOLDING FOR SURGERY      VITAMIN E PO Take 1 capsule by mouth Daily. HOLD PRIOR TO SURG      Zinc 50 MG tablet Take 1 tablet by mouth Daily. PT HOLDING FOR SURGERY       No current facility-administered medications on file prior to visit.        ALLERGIES:  No Known Allergies     Social History     Socioeconomic History    Marital status:      Spouse name: SUZANNE     Number of children: 3   Tobacco Use    Smoking status: Never     Passive exposure: Never    Smokeless tobacco: Never    Tobacco comments:     Caffeine: 1 drink daily   Vaping Use    Vaping status: Never Used   Substance and Sexual Activity    Alcohol use: Never    Drug use: Never    Sexual activity: Yes     Partners: Male     Birth control/protection: Post-menopausal, Tubal ligation, Essure, Hysterectomy, Same-sex partner, Surgical     Comment: SPOUSE = SUZANNE         Family History   Problem Relation Age of Onset    COPD Mother         Passed away 1988    Heart disease Father     Hypertension Father     Diabetes Brother         Passed away 2012    Diabetes Brother     COPD Brother     Hypertension Brother         Copd    Heart attack Brother     Diabetes Brother     Heart disease Brother     Hypertension Brother     Heart disease Maternal Grandmother     Thyroid disease Daughter     BRCA 1/2 Neg Hx     Breast cancer Neg Hx     Colon cancer Neg Hx     Endometrial cancer Neg Hx     Ovarian cancer Neg Hx     Malig Hyperthermia Neg Hx         Review of Systems   Musculoskeletal:  Positive for arthralgias.        Objective     Vitals:    08/02/24 1000   BP: 133/67   Pulse: 72   Temp: 97.8 °F (36.6 °C)   TempSrc: Oral   SpO2: 97%   Weight: 70.4 kg (155 lb 4.8 oz)   Height: 152.4 cm (60\")   PainSc: 0-No pain           " "8/2/2024    10:01 AM   Current Status   ECOG score 0       Physical Exam  Chest:            CONSTITUTIONAL:  Vital signs reviewed.  No distress, looks comfortable.  EYES:  Conjunctivae and lids unremarkable.  PERRLA  EARS,NOSE,MOUTH,THROAT:  Ears and nose appear unremarkable.  Lips, teeth, gums appear unremarkable.  RESPIRATORY:  Normal respiratory effort.  Lungs clear to auscultation bilaterally.  BREASTS: Left breast is benign the right breast no definite palpable masses or axillary adenopathy  CARDIOVASCULAR:  Normal S1, S2.  No murmurs rubs or gallops.  No significant lower extremity edema.  GASTROINTESTINAL: Abdomen appears unremarkable.  Nontender.  No hepatomegaly.  No splenomegaly.  LYMPHATIC:  No cervical, supraclavicular, axillary lymphadenopathy.  SKIN:  Warm.  No rashes.  PSYCHIATRIC:  Normal judgment and insight.  Normal mood and affect.  I have reexamined the patient and the results are consistent with the previously documented exam. Ashkan Belcher MD       RECENT LABS:  Hematology WBC   Date Value Ref Range Status   08/02/2024 4.24 3.40 - 10.80 10*3/mm3 Final   06/10/2024 4.43 3.40 - 10.80 10*3/mm3 Final     RBC   Date Value Ref Range Status   08/02/2024 3.59 (L) 3.77 - 5.28 10*6/mm3 Final   06/10/2024 4.10 3.77 - 5.28 10*6/mm3 Final     Hemoglobin   Date Value Ref Range Status   08/02/2024 11.0 (L) 12.0 - 15.9 g/dL Final     Hematocrit   Date Value Ref Range Status   08/02/2024 33.2 (L) 34.0 - 46.6 % Final     Platelets   Date Value Ref Range Status   08/02/2024 255 140 - 450 10*3/mm3 Final          ynoptic Checklist   DCIS OF THE BREAST: Biopsy   Protocol posted: 9/20/2023DCIS OF THE BREAST: BIOPSY - All Specimens  SPECIMEN   Procedure  Needle biopsy   Specimen Laterality  Right   TUMOR   Tumor Site  Clock position     12 o'clock   Histologic Type  Ductal carcinoma in situ (DCIS)   Architectural Patterns  Solid   Nuclear Grade  Grade III (high)   Necrosis  Present, central (expansive \"comedo\" " necrosis)   Microcalcifications  Present in DCIS   .   Breast Biomarker Reporting Template   Protocol posted: 12/13/2023BREAST BIOMARKER REPORTING TEMPLATE - All Specimens  Test(s) Performed     Estrogen Receptor (ER) Status  Negative (less than 1%)     Internal control cells present and stain as expected   Test Type  Food and Drug Administration (FDA) cleared (test / vendor): Ventena   Primary Antibody  SP1   Scoring System  Harrison   Proportion Score  0   Intensity Score  0   Total Harrison Score  0   Test(s) Performed     Progesterone Receptor (PgR) Status  Negative (less than 1%)     Internal control cells present and stain as expected   Test Type  Food and Drug Administration (FDA) cleared (test / vendor): Ventena   Primary Antibody  1E2   Scoring System  Harrison   Proportion Score  1   Intensity Score  2   Total Harrison Score  3   Test(s) Performed  Ki-67   Ki-67 Percentage of Positive Nuclei  25 %   Primary Antibody  30-9   Cold Ischemia and Fixation Times  Meet requirements specified in latest version of the ASCO / CAP Guidelines   Cold Ischemia Time (minutes)  12 min   Fixation Time (hours)  8 hours   Testing Performed on Block Number(s)  1B   METHODS   Fixative  Formalin   Image Analysis  Not performed   .   INVASIVE CARCINOMA OF THE BREAST: Biopsy   Protocol posted: 3/22/2023INVASIVE CARCINOMA OF THE BREAST: BIOPSY - All Specimens  SPECIMEN   Procedure  Needle biopsy   Specimen Laterality  Right   TUMOR   Tumor Site  Clock position     11 o'clock   Histologic Type  Invasive carcinoma of no special type (ductal)   Histologic Type Comment  The tumor has apocrine features   Histologic Grade (Migel Histologic Score)     Glandular (Acinar) / Tubular Differentiation  Score 3   Nuclear Pleomorphism  Score 3   Mitotic Rate  Score 2   Overall Grade  Grade 3 (scores of 8 or 9)   Largest Invasive Focus in this Limited Biopsy Sample  4.8 mm   Ductal Carcinoma In Situ (DCIS)  Not identified   Lymphatic and / or  Vascular Invasion  Not identified   Microcalcifications  Not identified   .   Breast Biomarker Reporting Template   Protocol posted: 12/13/2023BREAST BIOMARKER REPORTING TEMPLATE - All Specimens  Test(s) Performed     Estrogen Receptor (ER) Status  Negative (less than 1%)     Internal control cells present and stain as expected   Test Type  Food and Drug Administration (FDA) cleared (test / vendor): Ventena   Primary Antibody  SP1   Scoring System  Harrison   Proportion Score  0   Intensity Score  0   Total Harrison Score  0   Test(s) Performed     Progesterone Receptor (PgR) Status  Negative (less than 1%)     Internal control cells present and stain as expected   Test Type  Food and Drug Administration (FDA) cleared (test / vendor): Ventena   Primary Antibody  1E2   Scoring System  Harrison   Proportion Score  0   Intensity Score  0   Total Harrison Score  0   Test(s) Performed     HER2 by Immunohistochemistry  Positive (Score 3+)   Percentage of Cells with Uniform Intense Complete Membrane Staining  100 %   Test Type  Food and Drug Administration (FDA) cleared (test / vendor): Ventena   Primary Antibody  4B5   Test(s) Performed  Ki-67   Ki-67 Percentage of Positive Nuclei  60 %          ynoptic Checklist  7/3/24   INVASIVE CARCINOMA OF THE BREAST: Resection   8th Edition - Protocol posted: 12/13/2023INVASIVE CARCINOMA OF THE BREAST: RESECTION - All Specimens  SPECIMEN   Procedure  Total mastectomy   Specimen Laterality  Right   TUMOR   Tumor Site  Upper outer quadrant   Histologic Type  Invasive carcinoma of no special type (ductal)   Histologic Grade ([place] Histologic Score)     Glandular (Acinar) / Tubular Differentiation  Score 3   Nuclear Pleomorphism  Score 3   Mitotic Rate  Score 2   Overall Grade  Grade 3 (scores of 8 or 9)   Tumor Size  Greatest dimension of largest invasive focus (Millimeters): 10 mm   Tumor Focality  Single focus of invasive carcinoma   Ductal Carcinoma In Situ (DCIS)  Present     Negative  "for extensive intraductal component (EIC)   Size (Extent) of DCIS  Estimated size (extent) of DCIS is at least (Millimeters): 50 mm   Architectural Patterns  [place]     Cribriform     Solid   Nuclear Grade  Grade III (high)   Necrosis  Present, central (expansive \"comedo\" necrosis)   Lobular Carcinoma In Situ (LCIS)  Not identified   Lymphatic and / or Vascular Invasion  Not identified   Dermal Lymphatic and / or Vascular Invasion  Not identified   Microcalcifications  Present in DCIS     Present in non-neoplastic tissue   Treatment Effect in the Breast  No known presurgical therapy   MARGINS   Margin Status for Invasive Carcinoma  All margins negative for invasive carcinoma   Distance from Invasive Carcinoma to Closest Margin  6 mm   Closest Margin(s) to Invasive Carcinoma  Anterior   Distance from Invasive Carcinoma to Posterior Margin  17 mm   Distance from Invasive Carcinoma to Superior Margin  85 mm   Distance from Invasive Carcinoma to Inferior Margin  100 mm   Distance from Invasive Carcinoma to Medial Margin  100 mm   Distance from Invasive Carcinoma to Lateral Margin  50 mm   Margin Status for DCIS  All margins negative for DCIS   Distance from DCIS to Closest Margin  11 mm   Closest Margin(s) to DCIS  Anterior   Distance from DCIS to Posterior Margin  15 mm   Distance from DCIS to Superior Margin  60 mm   Distance from DCIS to Inferior Margin  75 mm   Distance from DCIS to Medial Margin  80 mm   Distance from DCIS to Lateral Margin  40 mm   REGIONAL LYMPH NODES   Regional Lymph Node Status  All regional lymph nodes negative for tumor   Total Number of Lymph Nodes Examined (sentinel and non-sentinel)  3   Number of [place] Nodes Examined  3   pTNM CLASSIFICATION (AJCC 8th Edition)   Reporting of pT, pN, and (when applicable) pM categories is based on information available to the pathologist at the time the report is issued. As per the AJCC (Chapter 1, 8th Ed.) it is the managing physician’s responsibility " to establish the final pathologic stage based upon all pertinent information, including but potentially not limited to this pathology report.   pT Category  pT1b   pN Category  pN0   N Suffix  (sn)   SPECIAL STUDIES        Estrogen Receptor (ER) Status  Negative        Progesterone Receptor (PgR) Status  Negative        HER2 (by immunohistochemistry)  Positive (Score 3+)   Percentage of Cells with Uniform Intense Complete Membrane Staining  100 %        Ki-67 Percentage of Positive Nuclei  60 %        Assessment & Plan     cT1cN0 grade 3 ER/NV- her2 +RIGHT BREAST CANCER post biopsy with associated high-grade DCIS ER/NV negative and MRI evidence of non-mass enhancement which would preclude lumpectomy  pT1bN0 ER/NV negative HER2 positive right breast cancer postmastectomy and sentinel node biopsy  Weekly Taxol Herceptin for 12 weeks followed by every 3 to weeks for the rest of the year planned  No radiation-EF adequate      2. Rheumatoid arthritis on Simponi for 3 and half years    3.  Negative family history of any malignancy    4.  Anemia: Hgb 10.8 today 7/26/2024- was normal prior to surgery (12.1 on 6/25/2024).  Denies bleeding issues.  Will check ferritin, iron profile, B12, folate.     5.  Hashimoto's thyroiditis    Plan  Proceed with cycle 1 day 8 adjuvant weekly Taxol/Herceptin today.  Chemo only next week and see me in 2 weeks.  call/ return sooner should the patient develop any new concerns or problems.        Patient is on a high risk medication requiring close monitoring for toxicity.  I spent 40 total minutes, face-to-face, caring for Pretty today. Greater than 50% of this time involved counseling and/or coordination of care as documented within this note.

## 2024-08-06 ENCOUNTER — PATIENT OUTREACH (OUTPATIENT)
Dept: OTHER | Facility: HOSPITAL | Age: 69
End: 2024-08-06
Payer: MEDICARE

## 2024-08-06 NOTE — PROGRESS NOTES
Called Ms. Tovar to see how she was doing. Left a message with my contact information and asked her to call back at her convenience.

## 2024-08-09 ENCOUNTER — INFUSION (OUTPATIENT)
Dept: ONCOLOGY | Facility: HOSPITAL | Age: 69
End: 2024-08-09
Payer: MEDICARE

## 2024-08-09 VITALS
DIASTOLIC BLOOD PRESSURE: 66 MMHG | HEART RATE: 73 BPM | WEIGHT: 155 LBS | SYSTOLIC BLOOD PRESSURE: 121 MMHG | RESPIRATION RATE: 16 BRPM | TEMPERATURE: 98.2 F | BODY MASS INDEX: 30.27 KG/M2 | OXYGEN SATURATION: 98 %

## 2024-08-09 DIAGNOSIS — Z17.1 MALIGNANT NEOPLASM OF OVERLAPPING SITES OF RIGHT BREAST IN FEMALE, ESTROGEN RECEPTOR NEGATIVE: Primary | ICD-10-CM

## 2024-08-09 DIAGNOSIS — C50.811 MALIGNANT NEOPLASM OF OVERLAPPING SITES OF RIGHT BREAST IN FEMALE, ESTROGEN RECEPTOR NEGATIVE: Primary | ICD-10-CM

## 2024-08-09 LAB
ALBUMIN SERPL-MCNC: 3.9 G/DL (ref 3.5–5.2)
ALBUMIN/GLOB SERPL: 1.3 G/DL
ALP SERPL-CCNC: 54 U/L (ref 39–117)
ALT SERPL W P-5'-P-CCNC: 33 U/L (ref 1–33)
ANION GAP SERPL CALCULATED.3IONS-SCNC: 10.6 MMOL/L (ref 5–15)
AST SERPL-CCNC: 28 U/L (ref 1–32)
BASOPHILS # BLD AUTO: 0.04 10*3/MM3 (ref 0–0.2)
BASOPHILS NFR BLD AUTO: 1.1 % (ref 0–1.5)
BILIRUB SERPL-MCNC: 0.2 MG/DL (ref 0–1.2)
BUN SERPL-MCNC: 15 MG/DL (ref 8–23)
BUN/CREAT SERPL: 29.4 (ref 7–25)
CALCIUM SPEC-SCNC: 9 MG/DL (ref 8.6–10.5)
CHLORIDE SERPL-SCNC: 100 MMOL/L (ref 98–107)
CO2 SERPL-SCNC: 27.4 MMOL/L (ref 22–29)
CREAT SERPL-MCNC: 0.51 MG/DL (ref 0.57–1)
DEPRECATED RDW RBC AUTO: 41.5 FL (ref 37–54)
EGFRCR SERPLBLD CKD-EPI 2021: 101.8 ML/MIN/1.73
EOSINOPHIL # BLD AUTO: 0.28 10*3/MM3 (ref 0–0.4)
EOSINOPHIL NFR BLD AUTO: 7.7 % (ref 0.3–6.2)
ERYTHROCYTE [DISTWIDTH] IN BLOOD BY AUTOMATED COUNT: 12.6 % (ref 12.3–15.4)
GLOBULIN UR ELPH-MCNC: 3 GM/DL
GLUCOSE SERPL-MCNC: 145 MG/DL (ref 65–99)
HCT VFR BLD AUTO: 31.5 % (ref 34–46.6)
HGB BLD-MCNC: 10.3 G/DL (ref 12–15.9)
IMM GRANULOCYTES # BLD AUTO: 0.02 10*3/MM3 (ref 0–0.05)
IMM GRANULOCYTES NFR BLD AUTO: 0.5 % (ref 0–0.5)
LYMPHOCYTES # BLD AUTO: 1.4 10*3/MM3 (ref 0.7–3.1)
LYMPHOCYTES NFR BLD AUTO: 38.5 % (ref 19.6–45.3)
MCH RBC QN AUTO: 30.2 PG (ref 26.6–33)
MCHC RBC AUTO-ENTMCNC: 32.7 G/DL (ref 31.5–35.7)
MCV RBC AUTO: 92.4 FL (ref 79–97)
MONOCYTES # BLD AUTO: 0.21 10*3/MM3 (ref 0.1–0.9)
MONOCYTES NFR BLD AUTO: 5.8 % (ref 5–12)
NEUTROPHILS NFR BLD AUTO: 1.69 10*3/MM3 (ref 1.7–7)
NEUTROPHILS NFR BLD AUTO: 46.4 % (ref 42.7–76)
NRBC BLD AUTO-RTO: 0 /100 WBC (ref 0–0.2)
PLATELET # BLD AUTO: 255 10*3/MM3 (ref 140–450)
PMV BLD AUTO: 10 FL (ref 6–12)
POTASSIUM SERPL-SCNC: 4 MMOL/L (ref 3.5–5.2)
PROT SERPL-MCNC: 6.9 G/DL (ref 6–8.5)
RBC # BLD AUTO: 3.41 10*6/MM3 (ref 3.77–5.28)
SODIUM SERPL-SCNC: 138 MMOL/L (ref 136–145)
WBC NRBC COR # BLD AUTO: 3.64 10*3/MM3 (ref 3.4–10.8)

## 2024-08-09 PROCEDURE — 85025 COMPLETE CBC W/AUTO DIFF WBC: CPT

## 2024-08-09 PROCEDURE — 80053 COMPREHEN METABOLIC PANEL: CPT

## 2024-08-09 PROCEDURE — 96375 TX/PRO/DX INJ NEW DRUG ADDON: CPT

## 2024-08-09 PROCEDURE — 25010000002 PACLITAXEL PER 1 MG: Performed by: NURSE PRACTITIONER

## 2024-08-09 PROCEDURE — 25010000002 DEXAMETHASONE SODIUM PHOSPHATE 100 MG/10ML SOLUTION: Performed by: INTERNAL MEDICINE

## 2024-08-09 PROCEDURE — 25010000002 DIPHENHYDRAMINE PER 50 MG: Performed by: INTERNAL MEDICINE

## 2024-08-09 PROCEDURE — 96417 CHEMO IV INFUS EACH ADDL SEQ: CPT

## 2024-08-09 PROCEDURE — 96413 CHEMO IV INFUSION 1 HR: CPT

## 2024-08-09 PROCEDURE — 25010000002 TRASTUZUMAB-DKST 420 MG RECONSTITUTED SOLUTION 1 EACH VIAL: Performed by: NURSE PRACTITIONER

## 2024-08-09 PROCEDURE — 25810000003 SODIUM CHLORIDE 0.9 % SOLUTION 250 ML FLEX CONT: Performed by: NURSE PRACTITIONER

## 2024-08-09 PROCEDURE — 25810000003 SODIUM CHLORIDE 0.9 % SOLUTION: Performed by: INTERNAL MEDICINE

## 2024-08-09 RX ORDER — FAMOTIDINE 10 MG/ML
20 INJECTION, SOLUTION INTRAVENOUS ONCE
Status: COMPLETED | OUTPATIENT
Start: 2024-08-09 | End: 2024-08-09

## 2024-08-09 RX ORDER — SODIUM CHLORIDE 9 MG/ML
20 INJECTION, SOLUTION INTRAVENOUS ONCE
Status: COMPLETED | OUTPATIENT
Start: 2024-08-09 | End: 2024-08-09

## 2024-08-09 RX ORDER — MONTELUKAST SODIUM 10 MG/1
10 TABLET ORAL ONCE
Status: COMPLETED | OUTPATIENT
Start: 2024-08-09 | End: 2024-08-09

## 2024-08-09 RX ORDER — FAMOTIDINE 10 MG/ML
20 INJECTION, SOLUTION INTRAVENOUS AS NEEDED
Status: CANCELLED | OUTPATIENT
Start: 2024-08-09

## 2024-08-09 RX ORDER — DIPHENHYDRAMINE HYDROCHLORIDE 50 MG/ML
50 INJECTION INTRAMUSCULAR; INTRAVENOUS AS NEEDED
Status: CANCELLED | OUTPATIENT
Start: 2024-08-09

## 2024-08-09 RX ADMIN — SODIUM CHLORIDE 20 ML/HR: 9 INJECTION, SOLUTION INTRAVENOUS at 13:55

## 2024-08-09 RX ADMIN — MONTELUKAST 10 MG: 10 TABLET, FILM COATED ORAL at 13:56

## 2024-08-09 RX ADMIN — TRASTUZUMAB-DKST 140 MG: 420 INJECTION, POWDER, LYOPHILIZED, FOR SOLUTION INTRAVENOUS at 14:38

## 2024-08-09 RX ADMIN — PACLITAXEL 135 MG: 6 INJECTION, SOLUTION INTRAVENOUS at 15:17

## 2024-08-09 RX ADMIN — DIPHENHYDRAMINE HYDROCHLORIDE 25 MG: 50 INJECTION, SOLUTION INTRAMUSCULAR; INTRAVENOUS at 14:13

## 2024-08-09 RX ADMIN — FAMOTIDINE 20 MG: 10 INJECTION INTRAVENOUS at 13:56

## 2024-08-09 RX ADMIN — DEXAMETHASONE SODIUM PHOSPHATE 12 MG: 10 INJECTION, SOLUTION INTRAMUSCULAR; INTRAVENOUS at 13:57

## 2024-08-13 ENCOUNTER — HOSPITAL ENCOUNTER (OUTPATIENT)
Dept: OCCUPATIONAL THERAPY | Facility: HOSPITAL | Age: 69
Setting detail: THERAPIES SERIES
Discharge: HOME | End: 2024-08-13
Payer: MEDICARE

## 2024-08-13 DIAGNOSIS — Z91.89 AT RISK FOR LYMPHEDEMA: ICD-10-CM

## 2024-08-13 DIAGNOSIS — Z17.1 MALIGNANT NEOPLASM OF OVERLAPPING SITES OF RIGHT BREAST IN FEMALE, ESTROGEN RECEPTOR NEGATIVE: Primary | ICD-10-CM

## 2024-08-13 DIAGNOSIS — C50.811 MALIGNANT NEOPLASM OF OVERLAPPING SITES OF RIGHT BREAST IN FEMALE, ESTROGEN RECEPTOR NEGATIVE: Primary | ICD-10-CM

## 2024-08-13 PROCEDURE — 93702 BIS XTRACELL FLUID ANALYSIS: CPT

## 2024-08-13 PROCEDURE — 97168 OT RE-EVAL EST PLAN CARE: CPT

## 2024-08-13 NOTE — THERAPY RE-EVALUATION
"Outpatient Occupational Therapy Lymphedema Re-Evaluation  [facility]     Patient Name: Pretty Tovar  : 1955  MRN: [id number]  Today's Date: 2024      Visit Date: 2024    Patient Active Problem List   Diagnosis    Benign essential hypertension    Type 2 diabetes mellitus, without long-term current use of insulin    Pure hypercholesterolemia    Overweight (BMI 25.0-29.9)    Hashimoto's thyroiditis    Hyperplastic rectal polyp    Female stress incontinence    Feeling of incomplete bladder emptying    Primary osteoarthritis of left knee    S/P TKR (total knee replacement), left    Iron deficiency    Abnormality of right breast on screening mammogram    Malignant neoplasm of overlapping sites of right breast in female, estrogen receptor negative    Fitting and adjustment of vascular catheter        Past Medical History:   Diagnosis Date    Abnormal liver function tests 2017    Description: , \"-\"hepatitis profile    Anemia     Anesthesia complication     SLOW TO WAKE UP WITH HYSTERECTOMY    Arthritis     Cancer 24    Right Breast    Colon polyps     Cystocele, lateral 10/15/2020    Cystocele, midline 10/15/2020    Diabetes mellitus     Frequent UTI     H/O bone density study 10/2010    normal    H/O cardiovascular stress test     H/O echocardiogram 2001    2-D    Hashimoto's thyroiditis     Heart murmur     Hyperkalemia 2019    Hyperlipidemia     Hypertension     Hypothyroidism 2018    Incompetence of pubocervical tissue 10/15/2020    Incompetence of rectovaginal tissue 10/15/2020    Limited joint range of motion     LEFT KNEE    Loss of perineal body, female 10/15/2020    Osteoarthritis of knee 2005    left    Pelvic relaxation due to vaginal prolapse 2018    Plantar warts     foot    Postmenopausal bleeding 2019    Pregnancy      twins x1    Psoriasis     RA (rheumatoid arthritis)     Rectocele 10/15/2020    Urinary tract infection 2020    Urine " incontinence     BETTER WITH SLING    Uterovaginal prolapse     Uterovaginal prolapse, incomplete 09/14/2020    Vaginal enterocele 10/15/2020        Past Surgical History:   Procedure Laterality Date    BREAST BIOPSY Right 2006    benign    COLONOSCOPY      COLONOSCOPY W/ POLYPECTOMY  02/21/2018    Dr Resendiz, 6 mm rectal polyp - hyperplastic    JOINT MANIPULATION Left 11/02/2021    Procedure: KNEE MANIPULATION with steroid injection;  Surgeon: Francis Tavarez MD;  Location: [facility] OR;  Service: Orthopedics;  Laterality: Left;    JOINT REPLACEMENT  08/10/2021    LYMPH NODE BIOPSY  07/03/2024    MASTECTOMY W/ SENTINEL NODE BIOPSY Right 07/03/2024    Procedure: RIGHT MASTECTOMY WITH SENTINEL NODE BIOPSY;  Surgeon: Aracely Frye MD;  Location: [facility] OR;  Service: General;  Laterality: Right;    PUBOVAGINAL SLING N/A 10/16/2020    Procedure: Pubovaginal sling Posterior colporrhaphy fixation Insertion of vaginal grafts Cystourethroscopy;  Surgeon: Karen Allen MD;  Location: [facility] OR;  Service: Gynecology;  Laterality: N/A;    TOTAL KNEE ARTHROPLASTY Left 08/10/2021    Procedure: TOTAL KNEE ARTHROPLASTY;  Surgeon: Francis Tavarez MD;  Location: [facility] OR;  Service: Orthopedics;  Laterality: Left;    TOTAL LAPAROSCOPIC HYSTERECTOMY, SACROCOLPOPEXY N/A 10/16/2020    Procedure: Laparoscopic uterosacral ligament colpopexy sacral colpopexy  Laparoscopic paravaginal repair Laparoscopic hysterectomy with bilateral salpingectomy  Laparoscopic abdominal enterocele repair;  Surgeon: Karen Allen MD;  Location: [facility] OR;  Service: Gynecology;  Laterality: N/A;    TUBAL ABDOMINAL LIGATION      VENOUS ACCESS DEVICE (PORT) INSERTION N/A 07/03/2024    Procedure: INSERTION OF PORTACATH;  Surgeon: Aracely Frye MD;  Location: [facility] OR;  Service: General;  Laterality: N/A;         Visit Dx:     ICD-10-CM ICD-9-CM   1. Malignant neoplasm of overlapping sites of right breast in female, estrogen  receptor negative  C50.811 174.8    Z17.1 V86.1   2. At risk for lymphedema  Z91.89 V49.89            Lymphedema       Row Name 08/13/24 0900             Subjective Pain    Able to rate subjective pain? yes  -RE      Pre-Treatment Pain Level 0  -RE      Post-Treatment Pain Level 0  -RE         Subjective    Subjective Comments Patient has returned to work.  -RE         Lymphedema Assessment    Lymphedema Classification RUE:;at risk/stage 0  -RE      Lymphedema Cancer Related Sx right;simple mastectomy;sentinel node biopsy  -RE      Lymph Nodes Removed # 3  -RE      Positive Lymph Nodes # 0  -RE      Chemo Received yes  -RE      Radiation Therapy Received no  -RE         General ROM    GENERAL ROM COMMENTS B UE AROM is WFL and symmetrical  -RE         Lymphedema Sensation    Lymphedema Sensation Comments some altered sensation in axilla  -RE         L-Dex Bioimpedence Screening    L-Dex Measurement Extremity RUE  -RE      L-Dex Patient Position Standing  -RE      L-Dex UE Dominate Side Left  -RE      L-Dex UE At Risk Side Right  -RE      L-Dex UE Pre Surgical Value Yes  -RE      L-Dex UE Score -0.3  -RE      L-Dex UE Baseline Score -0.5  -RE      L-Dex UE Value Change 0.2  -RE      L-Dex UE Comment WNL  -RE                User Key  (r) = Recorded By, (t) = Taken By, (c) = Cosigned By      Initials Name Provider Type    Yasemin Bradford OTR Occupational Therapist                  The patient had a postop  SOZO measurement which I reviewed today. The score is WNL  see scanned to EMR. Bioimpedance spectroscopy helps identify the   onset of lymphedema in an arm or leg before patients experience noticeable swelling. Research has   shown that 92% of patients with early detection of lymphedema using L-Dex combined with   intervention do not progress to chronic lymphedema through three years. Additionally, as of March 2023, the NCCN Guidelines® for Survivorship recommend proactive screening for lymphedema using    bioimpedance spectroscopy. Whenever possible, patients are tested for baseline L-Dex score before   cancer treatment begins and then are reassessed during regular follow-up visits using the SOZO device.   Otherwise, this can be started postoperatively and continued during regular follow-up visits. If the   patient’s L-Dex score increases above normal levels, that is a sign that lymphedema is developing and a   referral is made to occupational therapy for further evaluation and early compression treatment.   Lymphedema assessment with the SOZO L-Dex score is recommended to be done every 3 months for   the first 3 years and then every 6 months for years 4 and 5 followed by annually afterwards          Therapy Education  Education Details: We discussed patient's L-Dex value today which is within normal limits.  We also discussed further risk factors for lymphedema in her right upper extremity and trunk quadrant.  Advised patient that if she has any new pain, tingling weakness or tiredness in her arms she should return for bioimpedance.  Bioimpedance is recommended every 3 months for 3 years.  Given: Other (comment)  Program: New  How Provided: Verbal  Provided to: Patient  Level of Understanding: Teach back education performed, Verbalized         OT Goals       Row Name 08/13/24 0900          OT Short Term Goals    STG Date to Achieve 09/13/24  -RE     STG 1 Patient will demonstrate proper awareness of “What is Lymphedema?” and “Healthy Habits” for improved prevention, management, care of symptoms and ease of transition to self-care of condition.  -RE     STG 1 Progress Met  -RE        Long Term Goals    LTG Date to Achieve 11/13/24  -RE     LTG 1 Patient will participate in bioimpedance scans every 3 months as a method of early detection of lymphedema to allow for early intervention.  -RE     LTG 1 Progress Met;Ongoing  -RE     LTG 2 Patient's bioimpedance score to remain below 6.0 for decreased risk of stage II  lymphedema.  -RE     LTG 2 Progress Met;Ongoing  -RE        Time Calculation    OT Goal Re-Cert Due Date 11/13/24  -RE               User Key  (r) = Recorded By, (t) = Taken By, (c) = Cosigned By      Initials Name Provider Type    Yasemin Bradford OTR Occupational Therapist                     OT Assessment/Plan       Row Name 08/13/24 9181          OT Assessment    Impairments Impaired lymphatic circulation  -RE     Assessment Comments Patient is a 68-year-old female who was recently diagnosed with right breast cancer.  She presents to therapy in stable condition.  She is status post right mastectomy with a sentinel lymph node biopsy and no reconstruction.  She is at increased risk of postmastectomy lymphedema in her right upper extremity due to lymph node removal.  She presents postoperatively with normal range of motion and flexibility.  Currently she has no signs or symptoms of lymphedema or axillary cording.  We did complete a baseline postoperative bioimpedance assessment with the current L-Dex score of -0.3 which is consistent with her preoperative baseline.  Patient will benefit from skilled, formal breast care Occupational Therapy at this time to follow for lymphedema prevention and surveillance.  -RE     OT Diagnosis At risk for lymphedema right upper extremity  -RE     OT Rehab Potential Good  -RE     Patient/caregiver participated in establishment of treatment plan and goals Yes  -RE     Patient would benefit from skilled therapy intervention Yes  -RE        OT Plan    OT Frequency Other (comment)  -RE     Predicted Duration of Therapy Intervention (OT) Bioimpedance every 3 months  -RE     Planned CPT's? OT EVAL LOW COMPLEXITY: 37988;OT BIS XTRACELL FLUID ANALYSIS: 10395;OT SELF CARE/MGMT/TRAIN 15 MIN: 63364  -RE     OT Plan Comments Follow-up in 3 months  -RE               User Key  (r) = Recorded By, (t) = Taken By, (c) = Cosigned By      Initials Name Provider Type    Yasemin Bradford OTR  Occupational Therapist                    Outcome Measure Options: Quick DASH  Quick DASH  Open a tight or new jar.: No Difficulty  Do heavy household chores (e.g., wash walls, wash floors): No Difficulty  Carry a shopping bag or briefcase: No Difficulty  Wash your back: No Difficulty  Use a knife to cut food: No Difficulty  Recreational activities in which you take some force or impact through your arm, should or hand (e.g. golf, hammering, tennis, etc.): No Difficulty  During the past week, to what extent has your arm, shoulder, or hand problem interfered with your normal social activites with family, friends, neighbors or groups?: Not at all  During the past week, were you limited in your work or other regular daily activities as a result of your arm, shoulder or hand problem?: Not limited at all  Arm, Shoulder, or hand pain: None  Tingling (pins and needles) in your arm, shoulder, or hand: None  During the past week, how much difficulty have you had sleeping because of the pain in your arm, shoulder or hand?: No difficulty  Number of Questions Answered: 11  Quick DASH Score: 0         Time Calculation:   OT Start Time: 0945  OT Stop Time: 1010  OT Time Calculation (min): 25 min  Untimed Charges  OT Eval/Re-eval Minutes: 20  32716 - OT Bioimpedence Rx Minutes: 5  Total Minutes  Untimed Charges Total Minutes: 25   Total Minutes: 25     Therapy Charges for Today       Code Description Service Date Service Provider Modifiers Qty    62118624598 HC OT RE-EVAL 2 8/13/2024 Yasemin Elizabeth OTR GO 1    02913623715 HC OT BIS XTRACELL FLUID ANALYSIS 8/13/2024 Yasemin Elizabeth OTR GO 1          Dictated utilizing Dragon dictation:  Much of this encounter note is an electronic transcription/translation of spoken language to printed text. The electronic translation of spoken language may permit erroneous, or at times, nonsensical words or phrases to be inadvertently transcribed; Although I have reviewed the note for such errors,  some may still exist.            Yasemin Elizabeth, OTR  8/13/2024

## 2024-08-15 ENCOUNTER — TELEPHONE (OUTPATIENT)
Dept: CARDIOLOGY | Facility: CLINIC | Age: 69
End: 2024-08-15
Payer: MEDICARE

## 2024-08-15 ENCOUNTER — HOSPITAL ENCOUNTER (OUTPATIENT)
Dept: CARDIOLOGY | Facility: HOSPITAL | Age: 69
Discharge: HOME | End: 2024-08-15
Admitting: INTERNAL MEDICINE
Payer: MEDICARE

## 2024-08-15 DIAGNOSIS — R09.89 BILATERAL CAROTID BRUITS: ICD-10-CM

## 2024-08-15 LAB
BH CV XLRA MEAS LEFT DIST CCA EDV: 27.3 CM/SEC
BH CV XLRA MEAS LEFT DIST CCA PSV: 117.7 CM/SEC
BH CV XLRA MEAS LEFT DIST ICA EDV: -41.6 CM/SEC
BH CV XLRA MEAS LEFT DIST ICA PSV: -104 CM/SEC
BH CV XLRA MEAS LEFT ICA/CCA RATIO: 0.93
BH CV XLRA MEAS LEFT MID ICA EDV: -44.2 CM/SEC
BH CV XLRA MEAS LEFT MID ICA PSV: -109.2 CM/SEC
BH CV XLRA MEAS LEFT PROX CCA EDV: 30.8 CM/SEC
BH CV XLRA MEAS LEFT PROX CCA PSV: 110 CM/SEC
BH CV XLRA MEAS LEFT PROX ECA EDV: -8.4 CM/SEC
BH CV XLRA MEAS LEFT PROX ECA PSV: -92.5 CM/SEC
BH CV XLRA MEAS LEFT PROX ICA EDV: -32.9 CM/SEC
BH CV XLRA MEAS LEFT PROX ICA PSV: -93.9 CM/SEC
BH CV XLRA MEAS LEFT PROX SCLA PSV: 130.7 CM/SEC
BH CV XLRA MEAS LEFT VERTEBRAL A EDV: 19.8 CM/SEC
BH CV XLRA MEAS LEFT VERTEBRAL A PSV: 59.8 CM/SEC
BH CV XLRA MEAS RIGHT DIST CCA EDV: 26.1 CM/SEC
BH CV XLRA MEAS RIGHT DIST CCA PSV: 106.2 CM/SEC
BH CV XLRA MEAS RIGHT DIST ICA EDV: -41.3 CM/SEC
BH CV XLRA MEAS RIGHT DIST ICA PSV: -115.6 CM/SEC
BH CV XLRA MEAS RIGHT ICA/CCA RATIO: 1.09
BH CV XLRA MEAS RIGHT MID ICA EDV: -46.2 CM/SEC
BH CV XLRA MEAS RIGHT MID ICA PSV: -110.7 CM/SEC
BH CV XLRA MEAS RIGHT PROX CCA EDV: 23.6 CM/SEC
BH CV XLRA MEAS RIGHT PROX CCA PSV: 105.6 CM/SEC
BH CV XLRA MEAS RIGHT PROX ECA EDV: -14.5 CM/SEC
BH CV XLRA MEAS RIGHT PROX ECA PSV: -164.6 CM/SEC
BH CV XLRA MEAS RIGHT PROX ICA EDV: -40.7 CM/SEC
BH CV XLRA MEAS RIGHT PROX ICA PSV: -114.3 CM/SEC
BH CV XLRA MEAS RIGHT PROX SCLA PSV: 183.3 CM/SEC
BH CV XLRA MEAS RIGHT VERTEBRAL A EDV: -7 CM/SEC
BH CV XLRA MEAS RIGHT VERTEBRAL A PSV: -38.6 CM/SEC
LEFT ARM BP: NORMAL MMHG

## 2024-08-15 PROCEDURE — 93880 EXTRACRANIAL BILAT STUDY: CPT

## 2024-08-15 NOTE — TELEPHONE ENCOUNTER
Reviewed results and recommendations with Pretty Tovar.  Patient verbalized understanding of results and recommendations.    Thank you,  Renee SUH RN  [place] [facility] [place]   08:44 EDT

## 2024-08-15 NOTE — TELEPHONE ENCOUNTER
Mild carotid artery stenosis bilaterally, no further workup needed and no other testing needed, no medication changes.  Please call

## 2024-08-16 ENCOUNTER — OFFICE VISIT (OUTPATIENT)
Dept: ONCOLOGY | Facility: CLINIC | Age: 69
End: 2024-08-16
Payer: MEDICARE

## 2024-08-16 ENCOUNTER — INFUSION (OUTPATIENT)
Dept: ONCOLOGY | Facility: HOSPITAL | Age: 69
End: 2024-08-16
Payer: MEDICARE

## 2024-08-16 VITALS
RESPIRATION RATE: 16 BRPM | SYSTOLIC BLOOD PRESSURE: 173 MMHG | TEMPERATURE: 97.6 F | DIASTOLIC BLOOD PRESSURE: 68 MMHG | HEIGHT: 60 IN | HEART RATE: 62 BPM | WEIGHT: 155.9 LBS | OXYGEN SATURATION: 95 % | BODY MASS INDEX: 30.61 KG/M2

## 2024-08-16 DIAGNOSIS — C50.811 MALIGNANT NEOPLASM OF OVERLAPPING SITES OF RIGHT BREAST IN FEMALE, ESTROGEN RECEPTOR NEGATIVE: ICD-10-CM

## 2024-08-16 DIAGNOSIS — Z45.2 FITTING AND ADJUSTMENT OF VASCULAR CATHETER: ICD-10-CM

## 2024-08-16 DIAGNOSIS — Z17.1 MALIGNANT NEOPLASM OF OVERLAPPING SITES OF RIGHT BREAST IN FEMALE, ESTROGEN RECEPTOR NEGATIVE: Primary | ICD-10-CM

## 2024-08-16 DIAGNOSIS — Z17.1 MALIGNANT NEOPLASM OF OVERLAPPING SITES OF RIGHT BREAST IN FEMALE, ESTROGEN RECEPTOR NEGATIVE: ICD-10-CM

## 2024-08-16 DIAGNOSIS — C50.811 MALIGNANT NEOPLASM OF OVERLAPPING SITES OF RIGHT BREAST IN FEMALE, ESTROGEN RECEPTOR NEGATIVE: Primary | ICD-10-CM

## 2024-08-16 LAB
ALBUMIN SERPL-MCNC: 4.1 G/DL (ref 3.5–5.2)
ALBUMIN/GLOB SERPL: 1.5 G/DL
ALP SERPL-CCNC: 54 U/L (ref 39–117)
ALT SERPL W P-5'-P-CCNC: 31 U/L (ref 1–33)
ANION GAP SERPL CALCULATED.3IONS-SCNC: 8.4 MMOL/L (ref 5–15)
AST SERPL-CCNC: 27 U/L (ref 1–32)
BASOPHILS # BLD AUTO: 0.04 10*3/MM3 (ref 0–0.2)
BASOPHILS NFR BLD AUTO: 1 % (ref 0–1.5)
BILIRUB SERPL-MCNC: 0.2 MG/DL (ref 0–1.2)
BUN SERPL-MCNC: 14 MG/DL (ref 8–23)
BUN/CREAT SERPL: 26.9 (ref 7–25)
CALCIUM SPEC-SCNC: 9.4 MG/DL (ref 8.6–10.5)
CHLORIDE SERPL-SCNC: 103 MMOL/L (ref 98–107)
CO2 SERPL-SCNC: 28.6 MMOL/L (ref 22–29)
CREAT SERPL-MCNC: 0.52 MG/DL (ref 0.57–1)
DEPRECATED RDW RBC AUTO: 43 FL (ref 37–54)
EGFRCR SERPLBLD CKD-EPI 2021: 101.3 ML/MIN/1.73
EOSINOPHIL # BLD AUTO: 0.2 10*3/MM3 (ref 0–0.4)
EOSINOPHIL NFR BLD AUTO: 5.2 % (ref 0.3–6.2)
ERYTHROCYTE [DISTWIDTH] IN BLOOD BY AUTOMATED COUNT: 13 % (ref 12.3–15.4)
GLOBULIN UR ELPH-MCNC: 2.7 GM/DL
GLUCOSE SERPL-MCNC: 103 MG/DL (ref 65–99)
HCT VFR BLD AUTO: 30.8 % (ref 34–46.6)
HGB BLD-MCNC: 10.1 G/DL (ref 12–15.9)
IMM GRANULOCYTES # BLD AUTO: 0.03 10*3/MM3 (ref 0–0.05)
IMM GRANULOCYTES NFR BLD AUTO: 0.8 % (ref 0–0.5)
LYMPHOCYTES # BLD AUTO: 1.15 10*3/MM3 (ref 0.7–3.1)
LYMPHOCYTES NFR BLD AUTO: 30.2 % (ref 19.6–45.3)
MCH RBC QN AUTO: 30.3 PG (ref 26.6–33)
MCHC RBC AUTO-ENTMCNC: 32.8 G/DL (ref 31.5–35.7)
MCV RBC AUTO: 92.5 FL (ref 79–97)
MONOCYTES # BLD AUTO: 0.3 10*3/MM3 (ref 0.1–0.9)
MONOCYTES NFR BLD AUTO: 7.9 % (ref 5–12)
NEUTROPHILS NFR BLD AUTO: 2.09 10*3/MM3 (ref 1.7–7)
NEUTROPHILS NFR BLD AUTO: 54.9 % (ref 42.7–76)
NRBC BLD AUTO-RTO: 0 /100 WBC (ref 0–0.2)
PLATELET # BLD AUTO: 279 10*3/MM3 (ref 140–450)
PMV BLD AUTO: 9.6 FL (ref 6–12)
POTASSIUM SERPL-SCNC: 4.2 MMOL/L (ref 3.5–5.2)
PROT SERPL-MCNC: 6.8 G/DL (ref 6–8.5)
RBC # BLD AUTO: 3.33 10*6/MM3 (ref 3.77–5.28)
SODIUM SERPL-SCNC: 140 MMOL/L (ref 136–145)
WBC NRBC COR # BLD AUTO: 3.81 10*3/MM3 (ref 3.4–10.8)

## 2024-08-16 PROCEDURE — 85025 COMPLETE CBC W/AUTO DIFF WBC: CPT

## 2024-08-16 PROCEDURE — 25810000003 SODIUM CHLORIDE 0.9 % SOLUTION: Performed by: INTERNAL MEDICINE

## 2024-08-16 PROCEDURE — 25010000002 PACLITAXEL PER 1 MG: Performed by: INTERNAL MEDICINE

## 2024-08-16 PROCEDURE — 96417 CHEMO IV INFUS EACH ADDL SEQ: CPT

## 2024-08-16 PROCEDURE — 25010000002 DIPHENHYDRAMINE PER 50 MG: Performed by: INTERNAL MEDICINE

## 2024-08-16 PROCEDURE — 25010000002 DEXAMETHASONE SODIUM PHOSPHATE 100 MG/10ML SOLUTION: Performed by: INTERNAL MEDICINE

## 2024-08-16 PROCEDURE — 96375 TX/PRO/DX INJ NEW DRUG ADDON: CPT

## 2024-08-16 PROCEDURE — 80053 COMPREHEN METABOLIC PANEL: CPT

## 2024-08-16 PROCEDURE — 25810000003 SODIUM CHLORIDE 0.9 % SOLUTION 250 ML FLEX CONT: Performed by: INTERNAL MEDICINE

## 2024-08-16 PROCEDURE — 25010000002 TRASTUZUMAB-DKST 420 MG RECONSTITUTED SOLUTION 1 EACH VIAL: Performed by: INTERNAL MEDICINE

## 2024-08-16 PROCEDURE — 96413 CHEMO IV INFUSION 1 HR: CPT

## 2024-08-16 RX ORDER — FAMOTIDINE 10 MG/ML
20 INJECTION, SOLUTION INTRAVENOUS AS NEEDED
Status: CANCELLED | OUTPATIENT
Start: 2024-08-16

## 2024-08-16 RX ORDER — SODIUM CHLORIDE 0.9 % (FLUSH) 0.9 %
10 SYRINGE (ML) INJECTION AS NEEDED
OUTPATIENT
Start: 2024-08-16

## 2024-08-16 RX ORDER — MONTELUKAST SODIUM 10 MG/1
10 TABLET ORAL ONCE
Status: COMPLETED | OUTPATIENT
Start: 2024-08-16 | End: 2024-08-16

## 2024-08-16 RX ORDER — SODIUM CHLORIDE 0.9 % (FLUSH) 0.9 %
10 SYRINGE (ML) INJECTION AS NEEDED
Status: DISCONTINUED | OUTPATIENT
Start: 2024-08-16 | End: 2024-08-16 | Stop reason: HOSPADM

## 2024-08-16 RX ORDER — FAMOTIDINE 10 MG/ML
20 INJECTION, SOLUTION INTRAVENOUS ONCE
Status: CANCELLED | OUTPATIENT
Start: 2024-08-16

## 2024-08-16 RX ORDER — HEPARIN SODIUM (PORCINE) LOCK FLUSH IV SOLN 100 UNIT/ML 100 UNIT/ML
500 SOLUTION INTRAVENOUS AS NEEDED
OUTPATIENT
Start: 2024-08-16

## 2024-08-16 RX ORDER — MONTELUKAST SODIUM 10 MG/1
10 TABLET ORAL ONCE
Status: CANCELLED
Start: 2024-08-16 | End: 2024-08-16

## 2024-08-16 RX ORDER — SODIUM CHLORIDE 9 MG/ML
20 INJECTION, SOLUTION INTRAVENOUS ONCE
Status: CANCELLED | OUTPATIENT
Start: 2024-08-16

## 2024-08-16 RX ORDER — DIPHENHYDRAMINE HYDROCHLORIDE 50 MG/ML
50 INJECTION INTRAMUSCULAR; INTRAVENOUS AS NEEDED
Status: CANCELLED | OUTPATIENT
Start: 2024-08-16

## 2024-08-16 RX ORDER — FAMOTIDINE 10 MG/ML
20 INJECTION, SOLUTION INTRAVENOUS ONCE
Status: COMPLETED | OUTPATIENT
Start: 2024-08-16 | End: 2024-08-16

## 2024-08-16 RX ORDER — HEPARIN SODIUM (PORCINE) LOCK FLUSH IV SOLN 100 UNIT/ML 100 UNIT/ML
500 SOLUTION INTRAVENOUS AS NEEDED
Status: DISCONTINUED | OUTPATIENT
Start: 2024-08-16 | End: 2024-08-16 | Stop reason: HOSPADM

## 2024-08-16 RX ORDER — SODIUM CHLORIDE 9 MG/ML
20 INJECTION, SOLUTION INTRAVENOUS ONCE
Status: COMPLETED | OUTPATIENT
Start: 2024-08-16 | End: 2024-08-16

## 2024-08-16 RX ADMIN — MONTELUKAST 10 MG: 10 TABLET, FILM COATED ORAL at 13:23

## 2024-08-16 RX ADMIN — DIPHENHYDRAMINE HYDROCHLORIDE 25 MG: 50 INJECTION, SOLUTION INTRAMUSCULAR; INTRAVENOUS at 13:39

## 2024-08-16 RX ADMIN — SODIUM CHLORIDE 20 ML/HR: 9 INJECTION, SOLUTION INTRAVENOUS at 13:23

## 2024-08-16 RX ADMIN — TRASTUZUMAB-DKST 140 MG: 420 INJECTION, POWDER, LYOPHILIZED, FOR SOLUTION INTRAVENOUS at 14:07

## 2024-08-16 RX ADMIN — PACLITAXEL 135 MG: 6 INJECTION, SOLUTION INTRAVENOUS at 14:46

## 2024-08-16 RX ADMIN — FAMOTIDINE 20 MG: 10 INJECTION INTRAVENOUS at 13:23

## 2024-08-16 RX ADMIN — DEXAMETHASONE SODIUM PHOSPHATE 12 MG: 10 INJECTION, SOLUTION INTRAMUSCULAR; INTRAVENOUS at 13:23

## 2024-08-16 NOTE — PROGRESS NOTES
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    She has had no significant side effects and is doing well and ready to proceed with treatment today    I recommended B complex and close watch for neuropathy or cough or shortness of breath    Echocardiogram was done 6/7/2024 showing ejection fraction of 60.9%.      She is opted not to use the Paxman system and knows to expect a fair degree of hair loss by the end of her treatment but is okay with this  Past Medical History:   Diagnosis Date    Abnormal liver function tests 02/09/2017    Description: 2000, \"-\"hepatitis profile    Anemia     Anesthesia complication     SLOW TO WAKE UP WITH HYSTERECTOMY    Arthritis     Cancer 5/16/24    Right Breast    Colon polyps     Cystocele, lateral 10/15/2020    Cystocele, midline 10/15/2020    Diabetes mellitus     Frequent UTI     H/O bone density study 10/2010    normal    H/O cardiovascular stress test 2001    H/O echocardiogram 03/2001    2-D    Hashimoto's thyroiditis     Heart murmur     Hyperkalemia 05/16/2019    Hyperlipidemia     Hypertension     Hypothyroidism 2018    Incompetence of pubocervical tissue 10/15/2020    Incompetence of rectovaginal tissue 10/15/2020    Limited joint range of motion     LEFT KNEE    Loss of perineal body, female 10/15/2020    Osteoarthritis of knee 2005    left    Pelvic relaxation due to vaginal prolapse 02/08/2018    Plantar warts     foot    Postmenopausal bleeding 03/18/2019    Pregnancy  "     twins x1    Psoriasis     RA (rheumatoid arthritis)     Rectocele 10/15/2020    Urinary tract infection     Urine incontinence     BETTER WITH SLING    Uterovaginal prolapse     Uterovaginal prolapse, incomplete 2020    Vaginal enterocele 10/15/2020        Past Surgical History:   Procedure Laterality Date    BREAST BIOPSY Right     benign    COLONOSCOPY      COLONOSCOPY W/ POLYPECTOMY  2018    Dr Resendiz, 6 mm rectal polyp - hyperplastic    JOINT MANIPULATION Left 2021    Procedure: KNEE MANIPULATION with steroid injection;  Surgeon: Francis Tavarez MD;  Location: [facility];  Service: Orthopedics;  Laterality: Left;    JOINT REPLACEMENT  08/10/2021    LYMPH NODE BIOPSY  2024    MASTECTOMY W/ SENTINEL NODE BIOPSY Right 2024    Procedure: RIGHT MASTECTOMY WITH SENTINEL NODE BIOPSY;  Surgeon: Aracely Frye MD;  Location: [facility];  Service: General;  Laterality: Right;    PUBOVAGINAL SLING N/A 10/16/2020    Procedure: Pubovaginal sling Posterior colporrhaphy fixation Insertion of vaginal grafts Cystourethroscopy;  Surgeon: Karen Allen MD;  Location: [facility];  Service: Gynecology;  Laterality: N/A;    TOTAL KNEE ARTHROPLASTY Left 08/10/2021    Procedure: TOTAL KNEE ARTHROPLASTY;  Surgeon: Francis Tavarez MD;  Location: [facility];  Service: Orthopedics;  Laterality: Left;    TOTAL LAPAROSCOPIC HYSTERECTOMY, SACROCOLPOPEXY N/A 10/16/2020    Procedure: Laparoscopic uterosacral ligament colpopexy sacral colpopexy  Laparoscopic paravaginal repair Laparoscopic hysterectomy with bilateral salpingectomy  Laparoscopic abdominal enterocele repair;  Surgeon: Karen Allen MD;  Location: [facility];  Service: Gynecology;  Laterality: N/A;    TUBAL ABDOMINAL LIGATION      VENOUS ACCESS DEVICE (PORT) INSERTION N/A 2024    Procedure: INSERTION OF PORTACATH;  Surgeon: Aracely Frye MD;  Location: [facility];  Service: General;  Laterality:  N/A;   Oncologic history  patient is a 68-year-old female with 4-year history of rheumatoid arthritis on Simponi who was found on her most recent screening mammogram have an abnormality in the right breast with a 10 mm oval mass at the 11:00 and an 11 mm mass at 12:00.  His abnormalities persisted and diagnostic imaging and the patient underwent biopsy of both these areas.  The area at 12:00 was DCIS high-grade ER/TX negative and the lesion at 11:00 was grade 3 invasive ductal carcinoma measuring 4.8 mm ER/TX negative HER2 3+    Patient was referred to Dr. Aracely Frye and underwent bilateral screening mammograms which showed large hematomas in both biopsy sites with a residual 1.1 x 1.1 cm mass at 11:00 and 12:00 was large hematoma with a biopsy cavity and at 9:00 there was another 1.2 x 1.4 cm mass which was suspicious and then extending from 90 to o'clock there was non-mass enhancement measuring 4.7 x 4.7 centimeters encompassing the 2 biopsy sites and the 9:00 mass    Patient has been referred to discuss treatment but the plan is to proceed with surgery first as masses were nonpalpable and small and initial imaging although now with a large postbiopsy hematoma was palpation is unreliable    Patient is  2 para 3 first childbirth was at age 22 she did not breast-feed menarche was at age 11 menopause 50.  She has been on no post menopausal hormone replacement    Family history is completely negative for any malignancy and no genetic testing was done    She is not a smoker or drinker  She has not had a DVT MI or stroke    She has been on Simponi for her rheumatoid arthritis her last dose was in April and she is going to skip the  dose because surgery is scheduled    Her last bone density in 2018 was normal    No history of unusual bleeding with her hysterectomy or knee replacement    We discussed the natural history of HER2 positive breast cancer I told her that unless the tumor was bigger than we had  anticipated on imaging we will plan for surgery first followed by Taxol Herceptin for 12 weeks and then Herceptin for the rest of the year.  If we have unexpected findings at surgery we would tailor the treatment to be most appropriate for her but she knows she will have some chemo at any rate.  I have asked her to have an echocardiogram done and we will see her back 2 to 3 weeks after surgery to review the final path and make a treatment decision    7/3/2024 right breast skin sparing mastectomy by Dr. Frye-pathology showing invasive mammary carcinoma (invasive ductal carcinoma) Prichard histological score 3.  Tumor in the upper outer quadrant at 11:00 measuring 10 x 10 x 10 mm.  Associated high-grade ductal carcinoma in situ, solid, cribriform and comedo types involving adenosis and fibroadenomatoid change with central comedonecrosis and associated microcalcifications.  DCIS spans 15 mm extending from the upper outer quadrant and to the lower outer quadrant.  All margins are free of invasive carcinoma and DCIS.  No LVSI, posterior skeletal muscle free of tumor.  ER/OH negative, HER2 3+ positive, Ki-67 60%.  Pathologic grade pT1b, N (SN) 0.  M[place]croft lymph nodes 3 lymph nodes removed were negative for metastatic carcinoma.      Treatment recommended with weekly Taxol/ Herceptin X 12 followed by Herceptin every 3 weeks x 1 year.     Current Outpatient Medications on File Prior to Visit   Medication Sig Dispense Refill    amoxicillin (AMOXIL) 500 MG capsule Take 1 capsule by mouth Take As Directed. For Dental Procedures      BD PEN NEEDLE SEUN U/F 32G X 4 MM misc USE 1 PEN NEEDLE WITH EACH  VICTOZA INJECTION 100 each 1    Biotin 37140 MCG tablet dispersible Place 10,000 mg on the tongue Daily. PT HOLDING FOR SURGERY      Calcium Carbonate-Vit D-Min (CALCIUM 1200 PO) Take 1 tablet by mouth Daily. PT HOLDING FOR SURGERY      carvedilol (COREG) 6.25 MG tablet Take 1 tablet by mouth 2 (Two) Times a Day. 180 tablet 3     Cinnamon 500 MG tablet Take 1,000 mg by mouth 2 (two) times a day. PT HOLDING FOR SURGERY      Coenzyme Q10 (CO Q 10 PO) Take 1 capsule by mouth Daily. PT HOLDING FOR SURGERY      CRANBERRY-VITAMIN C-D MANNOSE PO Take 2 capsules by mouth Daily. PT HOLDING FOR SURGERY      levothyroxine (Synthroid) 88 MCG tablet Take 1 tablet by mouth Daily. 90 tablet 3    lidocaine-prilocaine (EMLA) 2.5-2.5 % cream Apply 1 Application topically to the appropriate area as directed As Needed for Mild Pain.      Liraglutide (Victoza) 18 MG/3ML solution pen-injector injection Inject 1.2 mg under the skin into the appropriate area as directed Daily. (Patient taking differently: Inject 1.2 mg under the skin into the appropriate area as directed Every Night. HOLD 1 DAY BEFORE SURGERY) 6 mL 5    lisinopril (PRINIVIL,ZESTRIL) 20 MG tablet TAKE 1 TABLET BY MOUTH DAILY 90 tablet 3    lisinopril-hydrochlorothiazide (PRINZIDE,ZESTORETIC) 20-25 MG per tablet TAKE 1 TABLET BY MOUTH DAILY 90 tablet 3    loratadine (Claritin) 10 MG tablet Take 1 tablet by mouth Every Night.      meloxicam (MOBIC) 15 MG tablet Take 1 tablet by mouth Daily. PT HOLDING FOR SURGERY      metFORMIN (GLUCOPHAGE) 500 MG tablet TAKE THREE TABLETS BY MOUTH EVERY MORNING AND THEN TAKE TWO TABLETS BY MOUTH IN THE EVENING (Patient taking differently: Take 3 tablets by mouth Daily With Breakfast. TAKE THREE TABLETS BY MOUTH EVERY MORNING AND THEN TAKE TWO TABLETS BY MOUTH IN THE EVENING) 450 tablet 3    multivitamin with minerals tablet tablet Take 1 tablet by mouth Daily. PT HOLDING FOR SURGERY      Omega-3 Fatty Acids (fish oil) 1200 MG capsule capsule Take 1 capsule by mouth Daily. PT HOLDING FOR SURGERY      ondansetron (ZOFRAN) 8 MG tablet Take 1 tablet by mouth 3 (Three) Times a Day As Needed for Nausea or Vomiting. 30 tablet 5    rosuvastatin (Crestor) 20 MG tablet Take 1 tablet by mouth Daily. (Patient taking differently: Take 1 tablet by mouth Every Night.) 90 tablet 1  "   TURMERIC PO Take 1,400 mg by mouth Daily. PT HOLDING FOR SURGERY      vitamin C (ASCORBIC ACID) 250 MG tablet Take 4 tablets by mouth Daily.      vitamin D3 125 MCG (5000 UT) capsule capsule Take 1 capsule by mouth Daily. PT HOLDING FOR SURGERY      VITAMIN E PO Take 1 capsule by mouth Daily. HOLD PRIOR TO SURG      Zinc 50 MG tablet Take 1 tablet by mouth Daily. PT HOLDING FOR SURGERY       No current facility-administered medications on file prior to visit.        ALLERGIES:  No Known Allergies     Social History     Socioeconomic History    Marital status:      Spouse name: SUZANNE     Number of children: 3   Tobacco Use    Smoking status: Never     Passive exposure: Never    Smokeless tobacco: Never    Tobacco comments:     Caffeine: 1 drink daily   Vaping Use    Vaping status: Never Used   Substance and Sexual Activity    Alcohol use: Never    Drug use: Never    Sexual activity: Yes     Partners: Male     Birth control/protection: Post-menopausal, Tubal ligation, Essure, Hysterectomy, Partner of same sex, Surgical     Comment: SPOUSE = SUZANNE         Family History   Problem Relation Age of Onset    COPD Mother         Passed away 1988    Heart disease Father     Hypertension Father     Diabetes Brother         Passed away 2012    Diabetes Brother     COPD Brother     Hypertension Brother         Copd    Heart attack Brother     Diabetes Brother     Heart disease Brother     Hypertension Brother     Heart disease Maternal Grandmother     Thyroid disease Daughter     BRCA 1/2 Neg Hx     Breast cancer Neg Hx     Colon cancer Neg Hx     Endometrial cancer Neg Hx     Ovarian cancer Neg Hx     Malig Hyperthermia Neg Hx         Review of Systems   Musculoskeletal:  Positive for arthralgias.        Objective     Vitals:    08/16/24 1230   BP: 173/68   Pulse: 62   Resp: 16   Temp: 97.6 °F (36.4 °C)   TempSrc: Oral   SpO2: 95%   Weight: 70.7 kg (155 lb 14.4 oz)   Height: 152.4 cm (60\")   PainSc: 0-No pain           " "8/16/2024    12:31 PM   Current Status   ECOG score 0       Physical Exam  Chest:          CONSTITUTIONAL:  Vital signs reviewed.  No distress, looks comfortable.  EYES:  Conjunctivae and lids unremarkable.  PERRLA  EARS,NOSE,MOUTH,THROAT:  Ears and nose appear unremarkable.  Lips, teeth, gums appear unremarkable.  RESPIRATORY:  Normal respiratory effort.  Lungs clear to auscultation bilaterally.  BREASTS: Left breast is benign the right breast no definite palpable masses or axillary adenopathy  CARDIOVASCULAR:  Normal S1, S2.  No murmurs rubs or gallops.  No significant lower extremity edema.  GASTROINTESTINAL: Abdomen appears unremarkable.  Nontender.  No hepatomegaly.  No splenomegaly.  LYMPHATIC:  No cervical, supraclavicular, axillary lymphadenopathy.  SKIN:  Warm.  No rashes.  PSYCHIATRIC:  Normal judgment and insight.  Normal mood and affect.  I have reexamined the patient and the results are consistent with the previously documented exam. Ashkan Belcher MD       RECENT LABS:  Hematology WBC   Date Value Ref Range Status   08/16/2024 3.81 3.40 - 10.80 10*3/mm3 Final   06/10/2024 4.43 3.40 - 10.80 10*3/mm3 Final     RBC   Date Value Ref Range Status   08/16/2024 3.33 (L) 3.77 - 5.28 10*6/mm3 Final   06/10/2024 4.10 3.77 - 5.28 10*6/mm3 Final     Hemoglobin   Date Value Ref Range Status   08/16/2024 10.1 (L) 12.0 - 15.9 g/dL Final     Hematocrit   Date Value Ref Range Status   08/16/2024 30.8 (L) 34.0 - 46.6 % Final     Platelets   Date Value Ref Range Status   08/16/2024 279 140 - 450 10*3/mm3 Final          ynoptic Checklist   DCIS OF THE BREAST: Biopsy   Protocol posted: 9/20/2023DCIS OF THE BREAST: BIOPSY - All Specimens  SPECIMEN   Procedure  Needle biopsy   Specimen Laterality  Right   TUMOR   Tumor Site  Clock position     12 o'clock   Histologic Type  Ductal carcinoma in situ (DCIS)   Architectural Patterns  Solid   Nuclear Grade  Grade III (high)   Necrosis  Present, central (expansive \"comedo\" " necrosis)   Microcalcifications  Present in DCIS   .   Breast Biomarker Reporting Template   Protocol posted: 12/13/2023BREAST BIOMARKER REPORTING TEMPLATE - All Specimens  Test(s) Performed     Estrogen Receptor (ER) Status  Negative (less than 1%)     Internal control cells present and stain as expected   Test Type  Food and Drug Administration (FDA) cleared (test / vendor): Ventena   Primary Antibody  SP1   Scoring System  Harrison   Proportion Score  0   Intensity Score  0   Total Harrison Score  0   Test(s) Performed     Progesterone Receptor (PgR) Status  Negative (less than 1%)     Internal control cells present and stain as expected   Test Type  Food and Drug Administration (FDA) cleared (test / vendor): Ventena   Primary Antibody  1E2   Scoring System  Harrison   Proportion Score  1   Intensity Score  2   Total Harrison Score  3   Test(s) Performed  Ki-67   Ki-67 Percentage of Positive Nuclei  25 %   Primary Antibody  30-9   Cold Ischemia and Fixation Times  Meet requirements specified in latest version of the ASCO / CAP Guidelines   Cold Ischemia Time (minutes)  12 min   Fixation Time (hours)  8 hours   Testing Performed on Block Number(s)  1B   METHODS   Fixative  Formalin   Image Analysis  Not performed   .   INVASIVE CARCINOMA OF THE BREAST: Biopsy   Protocol posted: 3/22/2023INVASIVE CARCINOMA OF THE BREAST: BIOPSY - All Specimens  SPECIMEN   Procedure  Needle biopsy   Specimen Laterality  Right   TUMOR   Tumor Site  Clock position     11 o'clock   Histologic Type  Invasive carcinoma of no special type (ductal)   Histologic Type Comment  The tumor has apocrine features   Histologic Grade (Migel Histologic Score)     Glandular (Acinar) / Tubular Differentiation  Score 3   Nuclear Pleomorphism  Score 3   Mitotic Rate  Score 2   Overall Grade  Grade 3 (scores of 8 or 9)   Largest Invasive Focus in this Limited Biopsy Sample  4.8 mm   Ductal Carcinoma In Situ (DCIS)  Not identified   Lymphatic and / or  Vascular Invasion  Not identified   Microcalcifications  Not identified   .   Breast Biomarker Reporting Template   Protocol posted: 12/13/2023BREAST BIOMARKER REPORTING TEMPLATE - All Specimens  Test(s) Performed     Estrogen Receptor (ER) Status  Negative (less than 1%)     Internal control cells present and stain as expected   Test Type  Food and Drug Administration (FDA) cleared (test / vendor): Ventena   Primary Antibody  SP1   Scoring System  Harrison   Proportion Score  0   Intensity Score  0   Total Harrison Score  0   Test(s) Performed     Progesterone Receptor (PgR) Status  Negative (less than 1%)     Internal control cells present and stain as expected   Test Type  Food and Drug Administration (FDA) cleared (test / vendor): Ventena   Primary Antibody  1E2   Scoring System  Harrison   Proportion Score  0   Intensity Score  0   Total Harrison Score  0   Test(s) Performed     HER2 by Immunohistochemistry  Positive (Score 3+)   Percentage of Cells with Uniform Intense Complete Membrane Staining  100 %   Test Type  Food and Drug Administration (FDA) cleared (test / vendor): Ventena   Primary Antibody  4B5   Test(s) Performed  Ki-67   Ki-67 Percentage of Positive Nuclei  60 %          ynoptic Checklist  7/3/24   INVASIVE CARCINOMA OF THE BREAST: Resection   8th Edition - Protocol posted: 12/13/2023INVASIVE CARCINOMA OF THE BREAST: RESECTION - All Specimens  SPECIMEN   Procedure  Total mastectomy   Specimen Laterality  Right   TUMOR   Tumor Site  Upper outer quadrant   Histologic Type  Invasive carcinoma of no special type (ductal)   Histologic Grade (Baldwin Histologic Score)     Glandular (Acinar) / Tubular Differentiation  Score 3   Nuclear Pleomorphism  Score 3   Mitotic Rate  Score 2   Overall Grade  Grade 3 (scores of 8 or 9)   Tumor Size  Greatest dimension of largest invasive focus (Millimeters): 10 mm   Tumor Focality  Single focus of invasive carcinoma   Ductal Carcinoma In Situ (DCIS)  Present     Negative  "for extensive intraductal component (EIC)   Size (Extent) of DCIS  Estimated size (extent) of DCIS is at least (Millimeters): 50 mm   Architectural Patterns  [place]edo     Cribriform     Solid   Nuclear Grade  Grade III (high)   Necrosis  Present, central (expansive \"comedo\" necrosis)   Lobular Carcinoma In Situ (LCIS)  Not identified   Lymphatic and / or Vascular Invasion  Not identified   Dermal Lymphatic and / or Vascular Invasion  Not identified   Microcalcifications  Present in DCIS     Present in non-neoplastic tissue   Treatment Effect in the Breast  No known presurgical therapy   MARGINS   Margin Status for Invasive Carcinoma  All margins negative for invasive carcinoma   Distance from Invasive Carcinoma to Closest Margin  6 mm   Closest Margin(s) to Invasive Carcinoma  Anterior   Distance from Invasive Carcinoma to Posterior Margin  17 mm   Distance from Invasive Carcinoma to Superior Margin  85 mm   Distance from Invasive Carcinoma to Inferior Margin  100 mm   Distance from Invasive Carcinoma to Medial Margin  100 mm   Distance from Invasive Carcinoma to Lateral Margin  50 mm   Margin Status for DCIS  All margins negative for DCIS   Distance from DCIS to Closest Margin  11 mm   Closest Margin(s) to DCIS  Anterior   Distance from DCIS to Posterior Margin  15 mm   Distance from DCIS to Superior Margin  60 mm   Distance from DCIS to Inferior Margin  75 mm   Distance from DCIS to Medial Margin  80 mm   Distance from DCIS to Lateral Margin  40 mm   REGIONAL LYMPH NODES   Regional Lymph Node Status  All regional lymph nodes negative for tumor   Total Number of Lymph Nodes Examined (sentinel and non-sentinel)  3   Number of Ralph Nodes Examined  3   pTNM CLASSIFICATION (AJCC 8th Edition)   Reporting of pT, pN, and (when applicable) pM categories is based on information available to the pathologist at the time the report is issued. As per the AJCC (Chapter 1, 8th Ed.) it is the managing physician’s responsibility " to establish the final pathologic stage based upon all pertinent information, including but potentially not limited to this pathology report.   pT Category  pT1b   pN Category  pN0   N Suffix  (sn)   SPECIAL STUDIES        Estrogen Receptor (ER) Status  Negative        Progesterone Receptor (PgR) Status  Negative        HER2 (by immunohistochemistry)  Positive (Score 3+)   Percentage of Cells with Uniform Intense Complete Membrane Staining  100 %        Ki-67 Percentage of Positive Nuclei  60 %        Assessment & Plan     cT1cN0 grade 3 ER/VT- her2 +RIGHT BREAST CANCER post biopsy with associated high-grade DCIS ER/VT negative and MRI evidence of non-mass enhancement which would preclude lumpectomy  pT1bN0 ER/VT negative HER2 positive right breast cancer postmastectomy and sentinel node biopsy  Weekly Taxol Herceptin for 12 weeks followed by every 3 to weeks for the rest of the year planned  No radiation-EF adequate  Tolerating Taxol Herceptin well as of 8/16/2024      2. Rheumatoid arthritis on Simponi for 3 and half years    3.  Negative family history of any malignancy    4.  Anemia: Hgb 10.8 today 7/26/2024- was normal prior to surgery (12.1 on 6/25/2024).  Denies bleeding issues.  Will check ferritin, iron profile, B12, folate.     5.  Hashimoto's thyroiditis    Plan  Proceed with 4/12 adjuvant weekly Taxol/Herceptin today.  Chemo only 1/2 week and see me in 3 weeks.  call/ return sooner should the patient develop any new concerns or problems.        Patient is on a high risk medication requiring close monitoring for toxicity.  I

## 2024-08-23 ENCOUNTER — INFUSION (OUTPATIENT)
Dept: ONCOLOGY | Facility: HOSPITAL | Age: 69
End: 2024-08-23
Payer: MEDICARE

## 2024-08-23 VITALS
TEMPERATURE: 98.1 F | RESPIRATION RATE: 16 BRPM | DIASTOLIC BLOOD PRESSURE: 68 MMHG | BODY MASS INDEX: 30.82 KG/M2 | SYSTOLIC BLOOD PRESSURE: 147 MMHG | WEIGHT: 157.8 LBS | HEART RATE: 68 BPM | OXYGEN SATURATION: 95 %

## 2024-08-23 DIAGNOSIS — C50.811 MALIGNANT NEOPLASM OF OVERLAPPING SITES OF RIGHT BREAST IN FEMALE, ESTROGEN RECEPTOR NEGATIVE: Primary | ICD-10-CM

## 2024-08-23 DIAGNOSIS — Z17.1 MALIGNANT NEOPLASM OF OVERLAPPING SITES OF RIGHT BREAST IN FEMALE, ESTROGEN RECEPTOR NEGATIVE: Primary | ICD-10-CM

## 2024-08-23 DIAGNOSIS — Z45.2 FITTING AND ADJUSTMENT OF VASCULAR CATHETER: ICD-10-CM

## 2024-08-23 LAB
ALBUMIN SERPL-MCNC: 3.8 G/DL (ref 3.5–5.2)
ALBUMIN/GLOB SERPL: 1.4 G/DL
ALP SERPL-CCNC: 56 U/L (ref 39–117)
ALT SERPL W P-5'-P-CCNC: 30 U/L (ref 1–33)
ANION GAP SERPL CALCULATED.3IONS-SCNC: 12.1 MMOL/L (ref 5–15)
AST SERPL-CCNC: 27 U/L (ref 1–32)
BASOPHILS # BLD AUTO: 0.04 10*3/MM3 (ref 0–0.2)
BASOPHILS NFR BLD AUTO: 1.1 % (ref 0–1.5)
BILIRUB SERPL-MCNC: 0.2 MG/DL (ref 0–1.2)
BUN SERPL-MCNC: 15 MG/DL (ref 8–23)
BUN/CREAT SERPL: 25.9 (ref 7–25)
CALCIUM SPEC-SCNC: 9 MG/DL (ref 8.6–10.5)
CHLORIDE SERPL-SCNC: 102 MMOL/L (ref 98–107)
CO2 SERPL-SCNC: 24.9 MMOL/L (ref 22–29)
CREAT SERPL-MCNC: 0.58 MG/DL (ref 0.57–1)
DEPRECATED RDW RBC AUTO: 44.7 FL (ref 37–54)
EGFRCR SERPLBLD CKD-EPI 2021: 98.7 ML/MIN/1.73
EOSINOPHIL # BLD AUTO: 0.16 10*3/MM3 (ref 0–0.4)
EOSINOPHIL NFR BLD AUTO: 4.4 % (ref 0.3–6.2)
ERYTHROCYTE [DISTWIDTH] IN BLOOD BY AUTOMATED COUNT: 13.5 % (ref 12.3–15.4)
GLOBULIN UR ELPH-MCNC: 2.8 GM/DL
GLUCOSE SERPL-MCNC: 172 MG/DL (ref 65–99)
HCT VFR BLD AUTO: 29 % (ref 34–46.6)
HGB BLD-MCNC: 9.7 G/DL (ref 12–15.9)
IMM GRANULOCYTES # BLD AUTO: 0.06 10*3/MM3 (ref 0–0.05)
IMM GRANULOCYTES NFR BLD AUTO: 1.6 % (ref 0–0.5)
LYMPHOCYTES # BLD AUTO: 1.24 10*3/MM3 (ref 0.7–3.1)
LYMPHOCYTES NFR BLD AUTO: 34 % (ref 19.6–45.3)
MCH RBC QN AUTO: 31.2 PG (ref 26.6–33)
MCHC RBC AUTO-ENTMCNC: 33.4 G/DL (ref 31.5–35.7)
MCV RBC AUTO: 93.2 FL (ref 79–97)
MONOCYTES # BLD AUTO: 0.28 10*3/MM3 (ref 0.1–0.9)
MONOCYTES NFR BLD AUTO: 7.7 % (ref 5–12)
NEUTROPHILS NFR BLD AUTO: 1.87 10*3/MM3 (ref 1.7–7)
NEUTROPHILS NFR BLD AUTO: 51.2 % (ref 42.7–76)
NRBC BLD AUTO-RTO: 0 /100 WBC (ref 0–0.2)
PLATELET # BLD AUTO: 261 10*3/MM3 (ref 140–450)
PMV BLD AUTO: 9.5 FL (ref 6–12)
POTASSIUM SERPL-SCNC: 4 MMOL/L (ref 3.5–5.2)
PROT SERPL-MCNC: 6.6 G/DL (ref 6–8.5)
RBC # BLD AUTO: 3.11 10*6/MM3 (ref 3.77–5.28)
SODIUM SERPL-SCNC: 139 MMOL/L (ref 136–145)
WBC NRBC COR # BLD AUTO: 3.65 10*3/MM3 (ref 3.4–10.8)

## 2024-08-23 PROCEDURE — 25010000002 DEXAMETHASONE SODIUM PHOSPHATE 100 MG/10ML SOLUTION: Performed by: NURSE PRACTITIONER

## 2024-08-23 PROCEDURE — 96375 TX/PRO/DX INJ NEW DRUG ADDON: CPT

## 2024-08-23 PROCEDURE — 96413 CHEMO IV INFUSION 1 HR: CPT

## 2024-08-23 PROCEDURE — 25010000002 PACLITAXEL PER 1 MG: Performed by: NURSE PRACTITIONER

## 2024-08-23 PROCEDURE — 25010000002 HEPARIN LOCK FLUSH PER 10 UNITS: Performed by: INTERNAL MEDICINE

## 2024-08-23 PROCEDURE — 25810000003 SODIUM CHLORIDE 0.9 % SOLUTION 250 ML FLEX CONT: Performed by: NURSE PRACTITIONER

## 2024-08-23 PROCEDURE — 85025 COMPLETE CBC W/AUTO DIFF WBC: CPT

## 2024-08-23 PROCEDURE — 25010000002 TRASTUZUMAB-DKST 420 MG RECONSTITUTED SOLUTION 1 EACH VIAL: Performed by: NURSE PRACTITIONER

## 2024-08-23 PROCEDURE — 96417 CHEMO IV INFUS EACH ADDL SEQ: CPT

## 2024-08-23 PROCEDURE — 25810000003 SODIUM CHLORIDE 0.9 % SOLUTION: Performed by: NURSE PRACTITIONER

## 2024-08-23 PROCEDURE — 25010000002 DIPHENHYDRAMINE PER 50 MG: Performed by: NURSE PRACTITIONER

## 2024-08-23 PROCEDURE — 96367 TX/PROPH/DG ADDL SEQ IV INF: CPT

## 2024-08-23 PROCEDURE — 80053 COMPREHEN METABOLIC PANEL: CPT

## 2024-08-23 RX ORDER — FAMOTIDINE 10 MG/ML
20 INJECTION, SOLUTION INTRAVENOUS AS NEEDED
Status: CANCELLED | OUTPATIENT
Start: 2024-08-23

## 2024-08-23 RX ORDER — DIPHENHYDRAMINE HYDROCHLORIDE AND LIDOCAINE HYDROCHLORIDE AND ALUMINUM HYDROXIDE AND MAGNESIUM HYDRO
5 KIT
Qty: 410 ML | Refills: 1 | OUTPATIENT
Start: 2024-08-23

## 2024-08-23 RX ORDER — MONTELUKAST SODIUM 10 MG/1
10 TABLET ORAL ONCE
Status: COMPLETED | OUTPATIENT
Start: 2024-08-23 | End: 2024-08-23

## 2024-08-23 RX ORDER — SODIUM CHLORIDE 0.9 % (FLUSH) 0.9 %
10 SYRINGE (ML) INJECTION AS NEEDED
OUTPATIENT
Start: 2024-08-23

## 2024-08-23 RX ORDER — HEPARIN SODIUM (PORCINE) LOCK FLUSH IV SOLN 100 UNIT/ML 100 UNIT/ML
500 SOLUTION INTRAVENOUS AS NEEDED
OUTPATIENT
Start: 2024-08-23

## 2024-08-23 RX ORDER — SODIUM CHLORIDE 0.9 % (FLUSH) 0.9 %
10 SYRINGE (ML) INJECTION AS NEEDED
Status: DISCONTINUED | OUTPATIENT
Start: 2024-08-23 | End: 2024-08-23 | Stop reason: HOSPADM

## 2024-08-23 RX ORDER — HEPARIN SODIUM (PORCINE) LOCK FLUSH IV SOLN 100 UNIT/ML 100 UNIT/ML
500 SOLUTION INTRAVENOUS AS NEEDED
Status: DISCONTINUED | OUTPATIENT
Start: 2024-08-23 | End: 2024-08-23 | Stop reason: HOSPADM

## 2024-08-23 RX ORDER — DIPHENHYDRAMINE HYDROCHLORIDE 50 MG/ML
50 INJECTION INTRAMUSCULAR; INTRAVENOUS AS NEEDED
Status: CANCELLED | OUTPATIENT
Start: 2024-08-23

## 2024-08-23 RX ORDER — SODIUM CHLORIDE 9 MG/ML
20 INJECTION, SOLUTION INTRAVENOUS ONCE
Status: COMPLETED | OUTPATIENT
Start: 2024-08-23 | End: 2024-08-23

## 2024-08-23 RX ORDER — FAMOTIDINE 10 MG/ML
20 INJECTION, SOLUTION INTRAVENOUS ONCE
Status: COMPLETED | OUTPATIENT
Start: 2024-08-23 | End: 2024-08-23

## 2024-08-23 RX ADMIN — FAMOTIDINE 20 MG: 10 INJECTION INTRAVENOUS at 14:06

## 2024-08-23 RX ADMIN — DEXAMETHASONE SODIUM PHOSPHATE 12 MG: 10 INJECTION, SOLUTION INTRAMUSCULAR; INTRAVENOUS at 14:26

## 2024-08-23 RX ADMIN — TRASTUZUMAB-DKST 140 MG: 420 INJECTION, POWDER, LYOPHILIZED, FOR SOLUTION INTRAVENOUS at 14:44

## 2024-08-23 RX ADMIN — Medication 500 UNITS: at 16:23

## 2024-08-23 RX ADMIN — Medication 10 ML: at 16:23

## 2024-08-23 RX ADMIN — PACLITAXEL 135 MG: 6 INJECTION, SOLUTION INTRAVENOUS at 15:19

## 2024-08-23 RX ADMIN — SODIUM CHLORIDE 20 ML/HR: 9 INJECTION, SOLUTION INTRAVENOUS at 14:06

## 2024-08-23 RX ADMIN — DIPHENHYDRAMINE HYDROCHLORIDE 25 MG: 50 INJECTION INTRAMUSCULAR; INTRAVENOUS at 14:09

## 2024-08-23 RX ADMIN — MONTELUKAST 10 MG: 10 TABLET, FILM COATED ORAL at 14:06

## 2024-08-23 NOTE — NURSING NOTE
Pt presents for cycle 5 taxol/herceptin. Pt c/o mouth sores on inside of lips for the past few days. States she has been trying salt water rinses. Denies sores inside mouth or on tongue. Pt also c/o several, small red bumps that are on forearms and lower legs. States areas are itchy. Reviewed with Nurys GUTHRIE. Per Nurys, hydrocortisone 2.5% ointment escribed to pharmacy for use on bumps. Pt instructed to not put ointment on her face. Pt v/u. Also, per Nurys Georgina's mouthrinse called to [facility] and instructed pt to swish/spit every 2 hours as needed for sores in mouth. Pt given pharmacy address and phone number. Pt instructed to call if these symptoms worsen. Pt v/u.

## 2024-08-28 ENCOUNTER — OFFICE VISIT (OUTPATIENT)
Dept: CARDIOLOGY | Facility: CLINIC | Age: 69
End: 2024-08-28
Payer: MEDICARE

## 2024-08-28 VITALS
BODY MASS INDEX: 30.59 KG/M2 | HEIGHT: 60 IN | OXYGEN SATURATION: 98 % | SYSTOLIC BLOOD PRESSURE: 122 MMHG | WEIGHT: 155.8 LBS | DIASTOLIC BLOOD PRESSURE: 86 MMHG | HEART RATE: 72 BPM

## 2024-08-28 DIAGNOSIS — Z17.1 MALIGNANT NEOPLASM OF OVERLAPPING SITES OF RIGHT BREAST IN FEMALE, ESTROGEN RECEPTOR NEGATIVE: ICD-10-CM

## 2024-08-28 DIAGNOSIS — I45.2 RIGHT BUNDLE BRANCH BLOCK (RBBB) WITH LEFT ANTERIOR FASCICULAR BLOCK (LAFB): ICD-10-CM

## 2024-08-28 DIAGNOSIS — I10 BENIGN ESSENTIAL HYPERTENSION: Primary | Chronic | ICD-10-CM

## 2024-08-28 DIAGNOSIS — C50.811 MALIGNANT NEOPLASM OF OVERLAPPING SITES OF RIGHT BREAST IN FEMALE, ESTROGEN RECEPTOR NEGATIVE: ICD-10-CM

## 2024-08-28 DIAGNOSIS — E78.00 PURE HYPERCHOLESTEROLEMIA: Chronic | ICD-10-CM

## 2024-08-28 NOTE — PROGRESS NOTES
Date of Office Visit: 2024  Encounter Provider: CLEVELAND Oconnor  Place of Service: [facility] CARDIOLOGY  Established cardiologist: María Ramirez MD  Patient Name: Pretty Tovar  :1955      Patient ID:  Pretty Tovar is a 68 y.o. 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   She is , has 3 children, owns the [employer], has never smoked, uses no drugs, uses no caffeine. Her brother myocardial infarction, her father myocardial infarction. Her dad had hypertension to her brothers have had hypertension. 3 of her brothers have diabetes.     History of Present Illness  She was diagnosed with invasive mammary carcinoma on 7/3/2024 of the right breast with treatment recommended for Taxol (paclitaxel) and Herceptin (trastuzumab) x 12 followed by Herceptin every 3 weeks for a year.     Echocardiogram done 2024 showed ejection fraction of 60.9% with normal diastolic function, global longitudinal strain of -21.4%, normal RVSP, mild aortic valve stenosis.    Today Ms. Tovar is with her . She is feeling well. She has no acute complaints. She continues to work as a  and enjoys cooking. There has not been any chest pain or pressure. No soa, VALDES, pnd, lightheadedness or dizziness, no palpitations, heart racing, presyncope/syncope, or leg swelling.       Current Outpatient Medications on File Prior to Visit   Medication Sig Dispense Refill    amoxicillin (AMOXIL) 500 MG capsule Take 1 capsule by mouth Take As Directed. For Dental Procedures      BD PEN NEEDLE SEUN U/F 32G X 4 MM misc USE 1 PEN NEEDLE WITH EACH  VICTOZA INJECTION 100 each 1    Biotin 88375 MCG tablet dispersible Place 10,000  mg on the tongue Daily. PT HOLDING FOR SURGERY      Calcium Carbonate-Vit D-Min (CALCIUM 1200 PO) Take 1 tablet by mouth Daily. PT HOLDING FOR SURGERY      carvedilol (COREG) 6.25 MG tablet Take 1 tablet by mouth 2 (Two) Times a Day. 180 tablet 3    Cinnamon 500 MG tablet Take 1,000 mg by mouth 2 (two) times a day. PT HOLDING FOR SURGERY      Coenzyme Q10 (CO Q 10 PO) Take 1 capsule by mouth Daily. PT HOLDING FOR SURGERY      CRANBERRY-VITAMIN C-D MANNOSE PO Take 2 capsules by mouth Daily. PT HOLDING FOR SURGERY      DPH-Lido-AlHydr-MgHydr-Simeth (First Mouthwash, Magic Mouthwash,) suspension Swish and spit 5 mL Every 2 (Two) Hours As Needed (mouth sores). 410 mL 1    hydrocortisone 2.5 % cream Apply 1 Application topically to the appropriate area as directed 2 (Two) Times a Day. 3.5 g 0    levothyroxine (Synthroid) 88 MCG tablet Take 1 tablet by mouth Daily. 90 tablet 3    lidocaine-prilocaine (EMLA) 2.5-2.5 % cream Apply 1 Application topically to the appropriate area as directed As Needed for Mild Pain.      Liraglutide (Victoza) 18 MG/3ML solution pen-injector injection Inject 1.2 mg under the skin into the appropriate area as directed Daily. (Patient taking differently: Inject 1.2 mg under the skin into the appropriate area as directed Every Night. HOLD 1 DAY BEFORE SURGERY) 6 mL 5    lisinopril (PRINIVIL,ZESTRIL) 20 MG tablet TAKE 1 TABLET BY MOUTH DAILY 90 tablet 3    lisinopril-hydrochlorothiazide (PRINZIDE,ZESTORETIC) 20-25 MG per tablet TAKE 1 TABLET BY MOUTH DAILY 90 tablet 3    loratadine (Claritin) 10 MG tablet Take 1 tablet by mouth Every Night.      meloxicam (MOBIC) 15 MG tablet Take 1 tablet by mouth Daily. PT HOLDING FOR SURGERY      metFORMIN (GLUCOPHAGE) 500 MG tablet TAKE THREE TABLETS BY MOUTH EVERY MORNING AND THEN TAKE TWO TABLETS BY MOUTH IN THE EVENING (Patient taking differently: Take 3 tablets by mouth Daily With Breakfast. TAKE THREE TABLETS BY MOUTH EVERY MORNING AND THEN TAKE TWO  "TABLETS BY MOUTH IN THE EVENING) 450 tablet 3    multivitamin with minerals tablet tablet Take 1 tablet by mouth Daily. PT HOLDING FOR SURGERY      Omega-3 Fatty Acids (fish oil) 1200 MG capsule capsule Take 1 capsule by mouth Daily. PT HOLDING FOR SURGERY      ondansetron (ZOFRAN) 8 MG tablet Take 1 tablet by mouth 3 (Three) Times a Day As Needed for Nausea or Vomiting. 30 tablet 5    rosuvastatin (Crestor) 20 MG tablet Take 1 tablet by mouth Daily. (Patient taking differently: Take 1 tablet by mouth Every Night.) 90 tablet 1    TURMERIC PO Take 1,400 mg by mouth Daily. PT HOLDING FOR SURGERY      vitamin C (ASCORBIC ACID) 250 MG tablet Take 4 tablets by mouth Daily.      vitamin D3 125 MCG (5000 UT) capsule capsule Take 1 capsule by mouth Daily. PT HOLDING FOR SURGERY      VITAMIN E PO Take 1 capsule by mouth Daily. HOLD PRIOR TO SURG      Zinc 50 MG tablet Take 1 tablet by mouth Daily. PT HOLDING FOR SURGERY       No current facility-administered medications on file prior to visit.         Procedures    ECG 12 Lead    Date/Time: 8/28/2024 3:33 PM  Performed by: Deisy Reyes APRN    Authorized by: Deisy Reyes APRN  Comparison: compared with previous ECG from 7/31/2024  Similar to previous ECG  Rhythm: sinus rhythm  BPM: 70  Conduction: right bundle branch block and left anterior fascicular block    Clinical impression: abnormal EKG  Comments: Reviewed with RM - similar to previous ECG w no acute changes             Objective:      Vitals:    08/28/24 1414   BP: 122/86   BP Location: Left arm   Patient Position: Sitting   Pulse: 72   SpO2: 98%   Weight: 70.7 kg (155 lb 12.8 oz)   Height: 152.4 cm (60\")     Body mass index is 30.43 kg/m².  Wt Readings from Last 3 Encounters:   08/28/24 70.7 kg (155 lb 12.8 oz)   08/23/24 71.6 kg (157 lb 12.8 oz)   08/16/24 70.7 kg (155 lb 14.4 oz)         Constitutional:       Appearance: Healthy appearance. Not in distress.   Pulmonary:      Effort: Pulmonary effort " is normal.      Breath sounds: Normal breath sounds.   Cardiovascular:      Normal rate. Regular rhythm.      Murmurs: There is a grade 1/6 systolic murmur.   Pulses:     Intact distal pulses.   Edema:     Peripheral edema absent.   Skin:     General: Skin is warm and dry.   Neurological:      Mental Status: Alert and oriented to person, place and time.       Lab Review:      Lab Results   Component Value Date    TSH 0.955 06/10/2024       Lab Results   Component Value Date    CHOL 131 06/02/2020    CHOL 182 10/11/2017    CHOL 175 03/16/2017    CHLPL 171 06/10/2024    CHLPL 157 06/23/2023    CHLPL 191 09/23/2022     Lab Results   Component Value Date    TRIG 137 06/10/2024    TRIG 143 06/23/2023    TRIG 126 09/23/2022     Lab Results   Component Value Date    HDL 55 06/10/2024    HDL 56 06/23/2023    HDL 77 (H) 09/23/2022     Lab Results   Component Value Date    LDL 92 06/10/2024    LDL 76 06/23/2023    LDL 92 09/23/2022       Lab Results   Component Value Date    WBC 3.65 08/23/2024    HGB 9.7 (L) 08/23/2024    HCT 29.0 (L) 08/23/2024    MCV 93.2 08/23/2024     08/23/2024       Lab Results   Component Value Date    GLUCOSE 172 (H) 08/23/2024    BUN 15 08/23/2024    CREATININE 0.58 08/23/2024    EGFRRESULT 97.1 06/10/2024    EGFR 98.7 08/23/2024    BCR 25.9 (H) 08/23/2024    K 4.0 08/23/2024    CO2 24.9 08/23/2024    CALCIUM 9.0 08/23/2024    PROTENTOTREF 7.0 06/10/2024    ALBUMIN 3.8 08/23/2024    BILITOT 0.2 08/23/2024    AST 27 08/23/2024    ALT 30 08/23/2024       Lab Results   Component Value Date    HGBA1C 6.40 (H) 06/10/2024       Assessment:     Hypertension is controlled on carvedilol, lisinopril  Hyperlipidemia on rosuvastatin  Chronically abnormal EKG with RBBB, LAFB  Right breast cancer on paclitaxel and trastuzumab.  Last received 8/23/24. Baseline trop and bnp for trastuzumab completed 7/31/24 and were not elevated. Baseline tte done 6/7/24.   Mild aortic stenosis  Bilateral carotid bruits,  "carotid Doppler that shows some plaque without significant stenosis  Rheumatoid arthritis, on Simponi.   Hypothyroidism  Chronic normocytic anemia    Plan:   No medication changes were made  Current recommendations are for baseline echocardiogram and limited echo every 3 months for 12 months of therapy then every 6 months for 2 years after therapy is discontinued.    Next tte will be done 10/2024.   Return for RM in Nov 2024.        Thank you for allowing me to participate in this patient's care. Please call with any questions or concerns.        STOP-Bang Score  Have you been diagnosed with Sleep Apnea?: no  Snoring?: yes  Tired?: yes  Observed?: no  Pressure?: yes  Stop Score: 3  Body Mass Index more than 35 kg/m2?: no  Age older than 50 year old?: yes  Neck large? \">17\"/43cm-M, >16\"/41cm-F: no  Gender=Male?: no  Total Stop-Bang Score: 4  Dragon dictation device was utilized in this note.   "

## 2024-08-30 ENCOUNTER — INFUSION (OUTPATIENT)
Dept: ONCOLOGY | Facility: HOSPITAL | Age: 69
End: 2024-08-30
Payer: MEDICARE

## 2024-08-30 ENCOUNTER — OFFICE VISIT (OUTPATIENT)
Dept: ONCOLOGY | Facility: CLINIC | Age: 69
End: 2024-08-30
Payer: MEDICARE

## 2024-08-30 VITALS
DIASTOLIC BLOOD PRESSURE: 73 MMHG | OXYGEN SATURATION: 98 % | BODY MASS INDEX: 30.7 KG/M2 | SYSTOLIC BLOOD PRESSURE: 151 MMHG | HEART RATE: 66 BPM | TEMPERATURE: 97.5 F | WEIGHT: 156.4 LBS | HEIGHT: 60 IN | RESPIRATION RATE: 16 BRPM

## 2024-08-30 DIAGNOSIS — C50.811 MALIGNANT NEOPLASM OF OVERLAPPING SITES OF RIGHT BREAST IN FEMALE, ESTROGEN RECEPTOR NEGATIVE: Primary | ICD-10-CM

## 2024-08-30 DIAGNOSIS — Z17.1 MALIGNANT NEOPLASM OF OVERLAPPING SITES OF RIGHT BREAST IN FEMALE, ESTROGEN RECEPTOR NEGATIVE: ICD-10-CM

## 2024-08-30 DIAGNOSIS — C50.811 MALIGNANT NEOPLASM OF OVERLAPPING SITES OF RIGHT BREAST IN FEMALE, ESTROGEN RECEPTOR NEGATIVE: ICD-10-CM

## 2024-08-30 DIAGNOSIS — Z17.1 MALIGNANT NEOPLASM OF OVERLAPPING SITES OF RIGHT BREAST IN FEMALE, ESTROGEN RECEPTOR NEGATIVE: Primary | ICD-10-CM

## 2024-08-30 DIAGNOSIS — Z45.2 FITTING AND ADJUSTMENT OF VASCULAR CATHETER: ICD-10-CM

## 2024-08-30 LAB
ALBUMIN SERPL-MCNC: 4.1 G/DL (ref 3.5–5.2)
ALBUMIN/GLOB SERPL: 1.5 G/DL
ALP SERPL-CCNC: 50 U/L (ref 39–117)
ALT SERPL W P-5'-P-CCNC: 27 U/L (ref 1–33)
ANION GAP SERPL CALCULATED.3IONS-SCNC: 9.7 MMOL/L (ref 5–15)
AST SERPL-CCNC: 27 U/L (ref 1–32)
BASOPHILS # BLD AUTO: 0.05 10*3/MM3 (ref 0–0.2)
BASOPHILS NFR BLD AUTO: 1.3 % (ref 0–1.5)
BILIRUB SERPL-MCNC: 0.3 MG/DL (ref 0–1.2)
BUN SERPL-MCNC: 11 MG/DL (ref 8–23)
BUN/CREAT SERPL: 20.4 (ref 7–25)
CALCIUM SPEC-SCNC: 9.2 MG/DL (ref 8.6–10.5)
CHLORIDE SERPL-SCNC: 102 MMOL/L (ref 98–107)
CO2 SERPL-SCNC: 28.3 MMOL/L (ref 22–29)
CREAT SERPL-MCNC: 0.54 MG/DL (ref 0.57–1)
DEPRECATED RDW RBC AUTO: 46.3 FL (ref 37–54)
EGFRCR SERPLBLD CKD-EPI 2021: 100.4 ML/MIN/1.73
EOSINOPHIL # BLD AUTO: 0.19 10*3/MM3 (ref 0–0.4)
EOSINOPHIL NFR BLD AUTO: 4.9 % (ref 0.3–6.2)
ERYTHROCYTE [DISTWIDTH] IN BLOOD BY AUTOMATED COUNT: 13.7 % (ref 12.3–15.4)
GLOBULIN UR ELPH-MCNC: 2.8 GM/DL
GLUCOSE SERPL-MCNC: 131 MG/DL (ref 65–99)
HCT VFR BLD AUTO: 31.6 % (ref 34–46.6)
HGB BLD-MCNC: 10.2 G/DL (ref 12–15.9)
IMM GRANULOCYTES # BLD AUTO: 0.07 10*3/MM3 (ref 0–0.05)
IMM GRANULOCYTES NFR BLD AUTO: 1.8 % (ref 0–0.5)
LYMPHOCYTES # BLD AUTO: 1.17 10*3/MM3 (ref 0.7–3.1)
LYMPHOCYTES NFR BLD AUTO: 30.1 % (ref 19.6–45.3)
MCH RBC QN AUTO: 30.4 PG (ref 26.6–33)
MCHC RBC AUTO-ENTMCNC: 32.3 G/DL (ref 31.5–35.7)
MCV RBC AUTO: 94 FL (ref 79–97)
MONOCYTES # BLD AUTO: 0.35 10*3/MM3 (ref 0.1–0.9)
MONOCYTES NFR BLD AUTO: 9 % (ref 5–12)
NEUTROPHILS NFR BLD AUTO: 2.06 10*3/MM3 (ref 1.7–7)
NEUTROPHILS NFR BLD AUTO: 52.9 % (ref 42.7–76)
NRBC BLD AUTO-RTO: 0 /100 WBC (ref 0–0.2)
PLATELET # BLD AUTO: 246 10*3/MM3 (ref 140–450)
PMV BLD AUTO: 9.8 FL (ref 6–12)
POTASSIUM SERPL-SCNC: 3.9 MMOL/L (ref 3.5–5.2)
PROT SERPL-MCNC: 6.9 G/DL (ref 6–8.5)
RBC # BLD AUTO: 3.36 10*6/MM3 (ref 3.77–5.28)
SODIUM SERPL-SCNC: 140 MMOL/L (ref 136–145)
WBC NRBC COR # BLD AUTO: 3.89 10*3/MM3 (ref 3.4–10.8)

## 2024-08-30 PROCEDURE — 96413 CHEMO IV INFUSION 1 HR: CPT

## 2024-08-30 PROCEDURE — 25810000003 SODIUM CHLORIDE 0.9 % SOLUTION 250 ML FLEX CONT: Performed by: INTERNAL MEDICINE

## 2024-08-30 PROCEDURE — 25010000002 TRASTUZUMAB PER 10 MG: Performed by: INTERNAL MEDICINE

## 2024-08-30 PROCEDURE — 25010000002 DIPHENHYDRAMINE PER 50 MG: Performed by: INTERNAL MEDICINE

## 2024-08-30 PROCEDURE — 85025 COMPLETE CBC W/AUTO DIFF WBC: CPT

## 2024-08-30 PROCEDURE — 80053 COMPREHEN METABOLIC PANEL: CPT

## 2024-08-30 PROCEDURE — 25810000003 SODIUM CHLORIDE 0.9 % SOLUTION: Performed by: INTERNAL MEDICINE

## 2024-08-30 PROCEDURE — 96417 CHEMO IV INFUS EACH ADDL SEQ: CPT

## 2024-08-30 PROCEDURE — 25010000002 HEPARIN LOCK FLUSH PER 10 UNITS: Performed by: INTERNAL MEDICINE

## 2024-08-30 PROCEDURE — 96375 TX/PRO/DX INJ NEW DRUG ADDON: CPT

## 2024-08-30 PROCEDURE — 25010000002 PACLITAXEL PROTEIN-BOUND PART PER 1 MG: Performed by: INTERNAL MEDICINE

## 2024-08-30 PROCEDURE — 25010000002 DEXAMETHASONE SODIUM PHOSPHATE 100 MG/10ML SOLUTION: Performed by: INTERNAL MEDICINE

## 2024-08-30 RX ORDER — PACLITAXEL 100 MG/20ML
100 INJECTION, POWDER, LYOPHILIZED, FOR SUSPENSION INTRAVENOUS ONCE
Status: CANCELLED | OUTPATIENT
Start: 2024-08-30

## 2024-08-30 RX ORDER — FAMOTIDINE 10 MG/ML
20 INJECTION, SOLUTION INTRAVENOUS AS NEEDED
Status: CANCELLED | OUTPATIENT
Start: 2024-08-30

## 2024-08-30 RX ORDER — MONTELUKAST SODIUM 10 MG/1
10 TABLET ORAL ONCE
Status: COMPLETED | OUTPATIENT
Start: 2024-08-30 | End: 2024-08-30

## 2024-08-30 RX ORDER — HEPARIN SODIUM (PORCINE) LOCK FLUSH IV SOLN 100 UNIT/ML 100 UNIT/ML
500 SOLUTION INTRAVENOUS AS NEEDED
OUTPATIENT
Start: 2024-08-30

## 2024-08-30 RX ORDER — SODIUM CHLORIDE 0.9 % (FLUSH) 0.9 %
10 SYRINGE (ML) INJECTION AS NEEDED
OUTPATIENT
Start: 2024-08-30

## 2024-08-30 RX ORDER — FAMOTIDINE 10 MG/ML
20 INJECTION, SOLUTION INTRAVENOUS ONCE
Status: CANCELLED | OUTPATIENT
Start: 2024-08-30

## 2024-08-30 RX ORDER — PACLITAXEL 100 MG/20ML
100 INJECTION, POWDER, LYOPHILIZED, FOR SUSPENSION INTRAVENOUS ONCE
Status: COMPLETED | OUTPATIENT
Start: 2024-08-30 | End: 2024-08-30

## 2024-08-30 RX ORDER — HEPARIN SODIUM (PORCINE) LOCK FLUSH IV SOLN 100 UNIT/ML 100 UNIT/ML
500 SOLUTION INTRAVENOUS AS NEEDED
Status: DISCONTINUED | OUTPATIENT
Start: 2024-08-30 | End: 2024-08-30 | Stop reason: HOSPADM

## 2024-08-30 RX ORDER — FAMOTIDINE 10 MG/ML
20 INJECTION, SOLUTION INTRAVENOUS ONCE
Status: COMPLETED | OUTPATIENT
Start: 2024-08-30 | End: 2024-08-30

## 2024-08-30 RX ORDER — MONTELUKAST SODIUM 10 MG/1
10 TABLET ORAL ONCE
Status: CANCELLED
Start: 2024-08-30 | End: 2024-08-30

## 2024-08-30 RX ORDER — PACLITAXEL 100 MG/20ML
260 INJECTION, POWDER, LYOPHILIZED, FOR SUSPENSION INTRAVENOUS ONCE
Status: CANCELLED | OUTPATIENT
Start: 2024-08-30

## 2024-08-30 RX ORDER — SODIUM CHLORIDE 9 MG/ML
20 INJECTION, SOLUTION INTRAVENOUS ONCE
Status: COMPLETED | OUTPATIENT
Start: 2024-08-30 | End: 2024-08-30

## 2024-08-30 RX ORDER — SODIUM CHLORIDE 0.9 % (FLUSH) 0.9 %
10 SYRINGE (ML) INJECTION AS NEEDED
Status: DISCONTINUED | OUTPATIENT
Start: 2024-08-30 | End: 2024-08-30 | Stop reason: HOSPADM

## 2024-08-30 RX ORDER — SODIUM CHLORIDE 9 MG/ML
20 INJECTION, SOLUTION INTRAVENOUS ONCE
Status: CANCELLED | OUTPATIENT
Start: 2024-08-30

## 2024-08-30 RX ORDER — DIPHENHYDRAMINE HYDROCHLORIDE 50 MG/ML
50 INJECTION INTRAMUSCULAR; INTRAVENOUS AS NEEDED
Status: CANCELLED | OUTPATIENT
Start: 2024-08-30

## 2024-08-30 RX ADMIN — Medication 10 ML: at 12:24

## 2024-08-30 RX ADMIN — FAMOTIDINE 20 MG: 10 INJECTION INTRAVENOUS at 09:42

## 2024-08-30 RX ADMIN — SODIUM CHLORIDE 20 ML/HR: 9 INJECTION, SOLUTION INTRAVENOUS at 09:41

## 2024-08-30 RX ADMIN — DIPHENHYDRAMINE HYDROCHLORIDE 25 MG: 50 INJECTION, SOLUTION INTRAMUSCULAR; INTRAVENOUS at 09:57

## 2024-08-30 RX ADMIN — Medication 500 UNITS: at 12:24

## 2024-08-30 RX ADMIN — MONTELUKAST 10 MG: 10 TABLET, FILM COATED ORAL at 09:42

## 2024-08-30 RX ADMIN — DEXAMETHASONE SODIUM PHOSPHATE 12 MG: 10 INJECTION, SOLUTION INTRAMUSCULAR; INTRAVENOUS at 09:42

## 2024-08-30 RX ADMIN — TRASTUZUMAB 140 MG: 150 INJECTION, POWDER, LYOPHILIZED, FOR SOLUTION INTRAVENOUS at 11:52

## 2024-08-30 RX ADMIN — PACLITAXEL 165 MG: 100 INJECTION, POWDER, LYOPHILIZED, FOR SUSPENSION INTRAVENOUS at 11:21

## 2024-08-30 NOTE — PROGRESS NOTES
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    She has had no significant side effects except a generalized rash which is nonpruritic but progressing on her arms legs and trunk.    She has no neuropathy or cough or shortness of breath but is very unhappy with the rash and is worried that it would breakout on her face also    She has had significant alopecia and is wearing a week  I recommended B complex and close watch for neuropathy or cough or shortness of breath    Echocardiogram was done 6/7/2024 showing ejection fraction of 60.9%.        Past Medical History:   Diagnosis Date    Abnormal liver function tests 02/09/2017    Description: 2000, \"-\"hepatitis profile    Anemia     Anesthesia complication     SLOW TO WAKE UP WITH HYSTERECTOMY    Arthritis     Cancer 5/16/24    Right Breast    Colon polyps     Cystocele, lateral 10/15/2020    Cystocele, midline 10/15/2020    Diabetes mellitus     Frequent UTI     H/O bone density study 10/2010    normal    H/O cardiovascular stress test 2001    H/O echocardiogram 03/2001    2-D    Hashimoto's thyroiditis     Heart murmur     Hyperkalemia 05/16/2019    Hyperlipidemia     Hypertension     Hypothyroidism 2018    Incompetence of pubocervical tissue 10/15/2020    Incompetence of rectovaginal tissue 10/15/2020    Limited joint range of motion     LEFT KNEE    Loss of perineal body, female 10/15/2020    Osteoarthritis of knee 2005    left    Pelvic relaxation due to vaginal " prolapse 2018    Plantar warts     foot    Postmenopausal bleeding 2019    Pregnancy      twins x1    Psoriasis     RA (rheumatoid arthritis)     Rectocele 10/15/2020    Urinary tract infection     Urine incontinence     BETTER WITH SLING    Uterovaginal prolapse     Uterovaginal prolapse, incomplete 2020    Vaginal enterocele 10/15/2020        Past Surgical History:   Procedure Laterality Date    BREAST BIOPSY Right     benign    COLONOSCOPY      COLONOSCOPY W/ POLYPECTOMY  2018    Dr Resendiz, 6 mm rectal polyp - hyperplastic    JOINT MANIPULATION Left 2021    Procedure: KNEE MANIPULATION with steroid injection;  Surgeon: Francis Tavarez MD;  Location: [facility];  Service: Orthopedics;  Laterality: Left;    JOINT REPLACEMENT  08/10/2021    LYMPH NODE BIOPSY  2024    MASTECTOMY W/ SENTINEL NODE BIOPSY Right 2024    Procedure: RIGHT MASTECTOMY WITH SENTINEL NODE BIOPSY;  Surgeon: Aracely Frye MD;  Location: [facility] OR;  Service: General;  Laterality: Right;    PUBOVAGINAL SLING N/A 10/16/2020    Procedure: Pubovaginal sling Posterior colporrhaphy fixation Insertion of vaginal grafts Cystourethroscopy;  Surgeon: Karen Allen MD;  Location: [facility] OR;  Service: Gynecology;  Laterality: N/A;    TOTAL KNEE ARTHROPLASTY Left 08/10/2021    Procedure: TOTAL KNEE ARTHROPLASTY;  Surgeon: Francis Tavarez MD;  Location: [facility] OR;  Service: Orthopedics;  Laterality: Left;    TOTAL LAPAROSCOPIC HYSTERECTOMY, SACROCOLPOPEXY N/A 10/16/2020    Procedure: Laparoscopic uterosacral ligament colpopexy sacral colpopexy  Laparoscopic paravaginal repair Laparoscopic hysterectomy with bilateral salpingectomy  Laparoscopic abdominal enterocele repair;  Surgeon: Karen Allen MD;  Location: [facility] OR;  Service: Gynecology;  Laterality: N/A;    TUBAL ABDOMINAL LIGATION      VENOUS ACCESS DEVICE (PORT) INSERTION N/A 2024    Procedure: INSERTION OF  SEBASTIAN;  Surgeon: Aracely Frye MD;  Location: [facility] OR;  Service: General;  Laterality: N/A;   Oncologic history  patient is a 68-year-old female with 4-year history of rheumatoid arthritis on Simponi who was found on her most recent screening mammogram have an abnormality in the right breast with a 10 mm oval mass at the 11:00 and an 11 mm mass at 12:00.  His abnormalities persisted and diagnostic imaging and the patient underwent biopsy of both these areas.  The area at 12:00 was DCIS high-grade ER/AK negative and the lesion at 11:00 was grade 3 invasive ductal carcinoma measuring 4.8 mm ER/AK negative HER2 3+    Patient was referred to Dr. Aracely Frye and underwent bilateral screening mammograms which showed large hematomas in both biopsy sites with a residual 1.1 x 1.1 cm mass at 11:00 and 12:00 was large hematoma with a biopsy cavity and at 9:00 there was another 1.2 x 1.4 cm mass which was suspicious and then extending from 90 to o'clock there was non-mass enhancement measuring 4.7 x 4.7 centimeters encompassing the 2 biopsy sites and the 9:00 mass    Patient has been referred to discuss treatment but the plan is to proceed with surgery first as masses were nonpalpable and small and initial imaging although now with a large postbiopsy hematoma was palpation is unreliable    Patient is  2 para 3 first childbirth was at age 22 she did not breast-feed menarche was at age 11 menopause 50.  She has been on no post menopausal hormone replacement    Family history is completely negative for any malignancy and no genetic testing was done    She is not a smoker or drinker  She has not had a DVT MI or stroke    She has been on Simponi for her rheumatoid arthritis her last dose was in April and she is going to skip the  dose because surgery is scheduled    Her last bone density in 2018 was normal    No history of unusual bleeding with her hysterectomy or knee replacement    We discussed the natural  history of HER2 positive breast cancer I told her that unless the tumor was bigger than we had anticipated on imaging we will plan for surgery first followed by Taxol Herceptin for 12 weeks and then Herceptin for the rest of the year.  If we have unexpected findings at surgery we would tailor the treatment to be most appropriate for her but she knows she will have some chemo at any rate.  I have asked her to have an echocardiogram done and we will see her back 2 to 3 weeks after surgery to review the final path and make a treatment decision    7/3/2024 right breast skin sparing mastectomy by Dr. Frye-pathology showing invasive mammary carcinoma (invasive ductal carcinoma) [place] histological score 3.  Tumor in the upper outer quadrant at 11:00 measuring 10 x 10 x 10 mm.  Associated high-grade ductal carcinoma in situ, solid, cribriform and comedo types involving adenosis and fibroadenomatoid change with central comedonecrosis and associated microcalcifications.  DCIS spans 15 mm extending from the upper outer quadrant and to the lower outer quadrant.  All margins are free of invasive carcinoma and DCIS.  No LVSI, posterior skeletal muscle free of tumor.  ER/WI negative, HER2 3+ positive, Ki-67 60%.  Pathologic grade pT1b, N (SN) 0.  [place] lymph nodes 3 lymph nodes removed were negative for metastatic carcinoma.      Treatment recommended with weekly Taxol/ Herceptin X 12 followed by Herceptin every 3 weeks x 1 year.     Current Outpatient Medications on File Prior to Visit   Medication Sig Dispense Refill    amoxicillin (AMOXIL) 500 MG capsule Take 1 capsule by mouth Take As Directed. For Dental Procedures      BD PEN NEEDLE SEUN U/F 32G X 4 MM misc USE 1 PEN NEEDLE WITH EACH  VICTOZA INJECTION 100 each 1    Biotin 44858 MCG tablet dispersible Place 10,000 mg on the tongue Daily. PT HOLDING FOR SURGERY      Calcium Carbonate-Vit D-Min (CALCIUM 1200 PO) Take 1 tablet by mouth Daily. PT HOLDING FOR SURGERY       carvedilol (COREG) 6.25 MG tablet Take 1 tablet by mouth 2 (Two) Times a Day. 180 tablet 3    Cinnamon 500 MG tablet Take 1,000 mg by mouth 2 (two) times a day. PT HOLDING FOR SURGERY      Coenzyme Q10 (CO Q 10 PO) Take 1 capsule by mouth Daily. PT HOLDING FOR SURGERY      CRANBERRY-VITAMIN C-D MANNOSE PO Take 2 capsules by mouth Daily. PT HOLDING FOR SURGERY      DPH-Lido-AlHydr-MgHydr-Simeth (First Mouthwash, Magic Mouthwash,) suspension Swish and spit 5 mL Every 2 (Two) Hours As Needed (mouth sores). 410 mL 1    hydrocortisone 2.5 % cream Apply 1 Application topically to the appropriate area as directed 2 (Two) Times a Day. 3.5 g 0    levothyroxine (Synthroid) 88 MCG tablet Take 1 tablet by mouth Daily. 90 tablet 3    lidocaine-prilocaine (EMLA) 2.5-2.5 % cream Apply 1 Application topically to the appropriate area as directed As Needed for Mild Pain.      Liraglutide (Victoza) 18 MG/3ML solution pen-injector injection Inject 1.2 mg under the skin into the appropriate area as directed Daily. (Patient taking differently: Inject 1.2 mg under the skin into the appropriate area as directed Every Night. HOLD 1 DAY BEFORE SURGERY) 6 mL 5    lisinopril (PRINIVIL,ZESTRIL) 20 MG tablet TAKE 1 TABLET BY MOUTH DAILY 90 tablet 3    lisinopril-hydrochlorothiazide (PRINZIDE,ZESTORETIC) 20-25 MG per tablet TAKE 1 TABLET BY MOUTH DAILY 90 tablet 3    loratadine (Claritin) 10 MG tablet Take 1 tablet by mouth Every Night.      meloxicam (MOBIC) 15 MG tablet Take 1 tablet by mouth Daily. PT HOLDING FOR SURGERY      metFORMIN (GLUCOPHAGE) 500 MG tablet TAKE THREE TABLETS BY MOUTH EVERY MORNING AND THEN TAKE TWO TABLETS BY MOUTH IN THE EVENING (Patient taking differently: Take 3 tablets by mouth Daily With Breakfast. TAKE THREE TABLETS BY MOUTH EVERY MORNING AND THEN TAKE TWO TABLETS BY MOUTH IN THE EVENING) 450 tablet 3    multivitamin with minerals tablet tablet Take 1 tablet by mouth Daily. PT HOLDING FOR SURGERY      Cobb-3  Fatty Acids (fish oil) 1200 MG capsule capsule Take 1 capsule by mouth Daily. PT HOLDING FOR SURGERY      ondansetron (ZOFRAN) 8 MG tablet Take 1 tablet by mouth 3 (Three) Times a Day As Needed for Nausea or Vomiting. 30 tablet 5    rosuvastatin (Crestor) 20 MG tablet Take 1 tablet by mouth Daily. (Patient taking differently: Take 1 tablet by mouth Every Night.) 90 tablet 1    TURMERIC PO Take 1,400 mg by mouth Daily. PT HOLDING FOR SURGERY      vitamin C (ASCORBIC ACID) 250 MG tablet Take 4 tablets by mouth Daily.      vitamin D3 125 MCG (5000 UT) capsule capsule Take 1 capsule by mouth Daily. PT HOLDING FOR SURGERY      VITAMIN E PO Take 1 capsule by mouth Daily. HOLD PRIOR TO SURG      Zinc 50 MG tablet Take 1 tablet by mouth Daily. PT HOLDING FOR SURGERY       No current facility-administered medications on file prior to visit.        ALLERGIES:  No Known Allergies     Social History     Socioeconomic History    Marital status:      Spouse name: JACK     Number of children: 3   Tobacco Use    Smoking status: Never     Passive exposure: Never    Smokeless tobacco: Never    Tobacco comments:     Caffeine: 1 drink daily   Vaping Use    Vaping status: Never Used   Substance and Sexual Activity    Alcohol use: Never    Drug use: Never    Sexual activity: Yes     Partners: Male     Birth control/protection: Post-menopausal, Tubal ligation, Essure, Hysterectomy, Partner of same sex, Surgical     Comment: SPOUSE = JACK         Family History   Problem Relation Age of Onset    COPD Mother         Passed away 1988    Heart disease Father     Hypertension Father     Diabetes Brother         Passed away 2012    Diabetes Brother     COPD Brother     Hypertension Brother         Copd    Heart attack Brother     Diabetes Brother     Heart disease Brother     Hypertension Brother     Heart disease Maternal Grandmother     Thyroid disease Daughter     BRCA 1/2 Neg Hx     Breast cancer Neg Hx     Colon cancer Neg Hx      "Endometrial cancer Neg Hx     Ovarian cancer Neg Hx     Malig Hyperthermia Neg Hx         Review of Systems   Musculoskeletal:  Positive for arthralgias.        Objective     Vitals:    08/30/24 0853   BP: 151/73   Pulse: 66   Resp: 16   Temp: 97.5 °F (36.4 °C)   TempSrc: Oral   SpO2: 98%   Weight: 70.9 kg (156 lb 6.4 oz)   Height: 152.4 cm (60\")   PainSc: 0-No pain           8/30/2024     8:54 AM   Current Status   ECOG score 0       Physical Exam   CONSTITUTIONAL:  Vital signs reviewed.  No distress, looks comfortable.  EYES:  Conjunctivae and lids unremarkable.  PERRLA  EARS,NOSE,MOUTH,THROAT:  Ears and nose appear unremarkable.  Lips, teeth, gums appear unremarkable.  RESPIRATORY:  Normal respiratory effort.  Lungs clear to auscultation bilaterally.  BREASTS: Left breast is benign the right breast no definite palpable masses or axillary adenopathy  CARDIOVASCULAR:  Normal S1, S2.  No murmurs rubs or gallops.  No significant lower extremity edema.  GASTROINTESTINAL: Abdomen appears unremarkable.  Nontender.  No hepatomegaly.  No splenomegaly.  LYMPHATIC:  No cervical, supraclavicular, axillary lymphadenopathy.  SKIN:  Warm.  No rashes.  PSYCHIATRIC:  Normal judgment and insight.  Normal mood and affect.  I have reexamined the patient and the results are consistent with the previously documented exam. Ashkan Belcher MD       RECENT LABS:  Hematology WBC   Date Value Ref Range Status   08/30/2024 3.89 3.40 - 10.80 10*3/mm3 Final   06/10/2024 4.43 3.40 - 10.80 10*3/mm3 Final     RBC   Date Value Ref Range Status   08/30/2024 3.36 (L) 3.77 - 5.28 10*6/mm3 Final   06/10/2024 4.10 3.77 - 5.28 10*6/mm3 Final     Hemoglobin   Date Value Ref Range Status   08/30/2024 10.2 (L) 12.0 - 15.9 g/dL Final     Hematocrit   Date Value Ref Range Status   08/30/2024 31.6 (L) 34.0 - 46.6 % Final     Platelets   Date Value Ref Range Status   08/30/2024 246 140 - 450 10*3/mm3 Final          ynoptic Checklist   DCIS OF THE BREAST: " "Biopsy   Protocol posted: 9/20/2023DCIS OF THE BREAST: BIOPSY - All Specimens  SPECIMEN   Procedure  Needle biopsy   Specimen Laterality  Right   TUMOR   Tumor Site  Clock position     12 o'clock   Histologic Type  Ductal carcinoma in situ (DCIS)   Architectural Patterns  Solid   Nuclear Grade  Grade III (high)   Necrosis  Present, central (expansive \"comedo\" necrosis)   Microcalcifications  Present in DCIS   .   Breast Biomarker Reporting Template   Protocol posted: 12/13/2023BREAST BIOMARKER REPORTING TEMPLATE - All Specimens  Test(s) Performed     Estrogen Receptor (ER) Status  Negative (less than 1%)     Internal control cells present and stain as expected   Test Type  Food and Drug Administration (FDA) cleared (test / vendor): Ventena   Primary Antibody  SP1   Scoring System  Harrison   Proportion Score  0   Intensity Score  0   Total Harrison Score  0   Test(s) Performed     Progesterone Receptor (PgR) Status  Negative (less than 1%)     Internal control cells present and stain as expected   Test Type  Food and Drug Administration (FDA) cleared (test / vendor): Ventena   Primary Antibody  1E2   Scoring System  Harrison   Proportion Score  1   Intensity Score  2   Total Harrison Score  3   Test(s) Performed  Ki-67   Ki-67 Percentage of Positive Nuclei  25 %   Primary Antibody  30-9   Cold Ischemia and Fixation Times  Meet requirements specified in latest version of the ASCO / CAP Guidelines   Cold Ischemia Time (minutes)  12 min   Fixation Time (hours)  8 hours   Testing Performed on Block Number(s)  1B   METHODS   Fixative  Formalin   Image Analysis  Not performed   .   INVASIVE CARCINOMA OF THE BREAST: Biopsy   Protocol posted: 3/22/2023INVASIVE CARCINOMA OF THE BREAST: BIOPSY - All Specimens  SPECIMEN   Procedure  Needle biopsy   Specimen Laterality  Right   TUMOR   Tumor Site  Clock position     11 o'clock   Histologic Type  Invasive carcinoma of no special type (ductal)   Histologic Type Comment  The tumor has " apocrine features   Histologic Grade ([place] Histologic Score)     Glandular (Acinar) / Tubular Differentiation  Score 3   Nuclear Pleomorphism  Score 3   Mitotic Rate  Score 2   Overall Grade  Grade 3 (scores of 8 or 9)   Largest Invasive Focus in this Limited Biopsy Sample  4.8 mm   Ductal Carcinoma In Situ (DCIS)  Not identified   Lymphatic and / or Vascular Invasion  Not identified   Microcalcifications  Not identified   .   Breast Biomarker Reporting Template   Protocol posted: 12/13/2023BREAST BIOMARKER REPORTING TEMPLATE - All Specimens  Test(s) Performed     Estrogen Receptor (ER) Status  Negative (less than 1%)     Internal control cells present and stain as expected   Test Type  Food and Drug Administration (FDA) cleared (test / vendor): Ventena   Primary Antibody  SP1   Scoring System  Harrison   Proportion Score  0   Intensity Score  0   Total Harrison Score  0   Test(s) Performed     Progesterone Receptor (PgR) Status  Negative (less than 1%)     Internal control cells present and stain as expected   Test Type  Food and Drug Administration (FDA) cleared (test / vendor): Ventena   Primary Antibody  1E2   Scoring System  Harrison   Proportion Score  0   Intensity Score  0   Total Harrison Score  0   Test(s) Performed     HER2 by Immunohistochemistry  Positive (Score 3+)   Percentage of Cells with Uniform Intense Complete Membrane Staining  100 %   Test Type  Food and Drug Administration (FDA) cleared (test / vendor): Ventena   Primary Antibody  4B5   Test(s) Performed  Ki-67   Ki-67 Percentage of Positive Nuclei  60 %          ynoptic Checklist  7/3/24   INVASIVE CARCINOMA OF THE BREAST: Resection   8th Edition - Protocol posted: 12/13/2023INVASIVE CARCINOMA OF THE BREAST: RESECTION - All Specimens  SPECIMEN   Procedure  Total mastectomy   Specimen Laterality  Right   TUMOR   Tumor Site  Upper outer quadrant   Histologic Type  Invasive carcinoma of no special type (ductal)   Histologic Grade ([place]  "Histologic Score)     Glandular (Acinar) / Tubular Differentiation  Score 3   Nuclear Pleomorphism  Score 3   Mitotic Rate  Score 2   Overall Grade  Grade 3 (scores of 8 or 9)   Tumor Size  Greatest dimension of largest invasive focus (Millimeters): 10 mm   Tumor Focality  Single focus of invasive carcinoma   Ductal Carcinoma In Situ (DCIS)  Present     Negative for extensive intraductal component (EIC)   Size (Extent) of DCIS  Estimated size (extent) of DCIS is at least (Millimeters): 50 mm   Architectural Patterns  [place]     Cribriform     Solid   Nuclear Grade  Grade III (high)   Necrosis  Present, central (expansive \"comedo\" necrosis)   Lobular Carcinoma In Situ (LCIS)  Not identified   Lymphatic and / or Vascular Invasion  Not identified   Dermal Lymphatic and / or Vascular Invasion  Not identified   Microcalcifications  Present in DCIS     Present in non-neoplastic tissue   Treatment Effect in the Breast  No known presurgical therapy   MARGINS   Margin Status for Invasive Carcinoma  All margins negative for invasive carcinoma   Distance from Invasive Carcinoma to Closest Margin  6 mm   Closest Margin(s) to Invasive Carcinoma  Anterior   Distance from Invasive Carcinoma to Posterior Margin  17 mm   Distance from Invasive Carcinoma to Superior Margin  85 mm   Distance from Invasive Carcinoma to Inferior Margin  100 mm   Distance from Invasive Carcinoma to Medial Margin  100 mm   Distance from Invasive Carcinoma to Lateral Margin  50 mm   Margin Status for DCIS  All margins negative for DCIS   Distance from DCIS to Closest Margin  11 mm   Closest Margin(s) to DCIS  Anterior   Distance from DCIS to Posterior Margin  15 mm   Distance from DCIS to Superior Margin  60 mm   Distance from DCIS to Inferior Margin  75 mm   Distance from DCIS to Medial Margin  80 mm   Distance from DCIS to Lateral Margin  40 mm   REGIONAL LYMPH NODES   Regional Lymph Node Status  All regional lymph nodes negative for tumor   Total Number " of Lymph Nodes Examined (sentinel and non-sentinel)  3   Number of [place] Nodes Examined  3   pTNM CLASSIFICATION (AJCC 8th Edition)   Reporting of pT, pN, and (when applicable) pM categories is based on information available to the pathologist at the time the report is issued. As per the AJCC (Chapter 1, 8th Ed.) it is the managing physician’s responsibility to establish the final pathologic stage based upon all pertinent information, including but potentially not limited to this pathology report.   pT Category  pT1b   pN Category  pN0   N Suffix  (sn)   SPECIAL STUDIES        Estrogen Receptor (ER) Status  Negative        Progesterone Receptor (PgR) Status  Negative        HER2 (by immunohistochemistry)  Positive (Score 3+)   Percentage of Cells with Uniform Intense Complete Membrane Staining  100 %        Ki-67 Percentage of Positive Nuclei  60 %        Assessment & Plan     cT1cN0 grade 3 ER/PA- her2 +RIGHT BREAST CANCER post biopsy with associated high-grade DCIS ER/PA negative and MRI evidence of non-mass enhancement which would preclude lumpectomy  pT1bN0 ER/PA negative HER2 positive right breast cancer postmastectomy and sentinel node biopsy  Weekly Taxol Herceptin for 12 weeks followed by every 3 to weeks for the rest of the year planned  No radiation-EF adequate  Tolerating Taxol Herceptin well as of 8/16/2024  Worsening maculopapular rash on her arms legs and trunk on 8/30/2024 changed to Abraxane and nonbiosimilar Herceptin      2. Rheumatoid arthritis on Simponi for 3 and half years    3.  Negative family history of any malignancy    4.  Anemia: Hgb 10.8 today 7/26/2024- was normal prior to surgery (12.1 on 6/25/2024).  Denies bleeding issues.  Will check ferritin, iron profile, B12, folate.     5.  Hashimoto's thyroiditis    Plan  Proceed with 6/12 adjuvant weekly Abraxane/Herceptin today.  Chemo only 1/2 week   see NP in 2 weeks and see me in 3 weeks.  call/ return sooner should the patient develop  any new concerns or problems.  Or if her rash becomes worse in which case a Medrol Dosepak will be given        Patient is on a high risk medication requiring close monitoring for toxicity.

## 2024-08-30 NOTE — NURSING NOTE
Per Dr. Belcher changing treatment due to patient complaints of a rash. Consent signed and placed in scan bin for Abraxane and Herceptin

## 2024-09-06 ENCOUNTER — INFUSION (OUTPATIENT)
Dept: ONCOLOGY | Facility: HOSPITAL | Age: 69
End: 2024-09-06
Payer: MEDICARE

## 2024-09-06 VITALS
RESPIRATION RATE: 16 BRPM | DIASTOLIC BLOOD PRESSURE: 88 MMHG | WEIGHT: 157.8 LBS | BODY MASS INDEX: 30.82 KG/M2 | OXYGEN SATURATION: 97 % | SYSTOLIC BLOOD PRESSURE: 164 MMHG | HEART RATE: 93 BPM | TEMPERATURE: 97.7 F

## 2024-09-06 DIAGNOSIS — Z45.2 FITTING AND ADJUSTMENT OF VASCULAR CATHETER: ICD-10-CM

## 2024-09-06 DIAGNOSIS — C50.811 MALIGNANT NEOPLASM OF OVERLAPPING SITES OF RIGHT BREAST IN FEMALE, ESTROGEN RECEPTOR NEGATIVE: Primary | ICD-10-CM

## 2024-09-06 DIAGNOSIS — Z17.1 MALIGNANT NEOPLASM OF OVERLAPPING SITES OF RIGHT BREAST IN FEMALE, ESTROGEN RECEPTOR NEGATIVE: Primary | ICD-10-CM

## 2024-09-06 LAB
ALBUMIN SERPL-MCNC: 4.1 G/DL (ref 3.5–5.2)
ALBUMIN/GLOB SERPL: 1.5 G/DL
ALP SERPL-CCNC: 54 U/L (ref 39–117)
ALT SERPL W P-5'-P-CCNC: 25 U/L (ref 1–33)
ANION GAP SERPL CALCULATED.3IONS-SCNC: 10.4 MMOL/L (ref 5–15)
AST SERPL-CCNC: 25 U/L (ref 1–32)
BASOPHILS # BLD AUTO: 0.04 10*3/MM3 (ref 0–0.2)
BASOPHILS NFR BLD AUTO: 0.9 % (ref 0–1.5)
BILIRUB SERPL-MCNC: 0.2 MG/DL (ref 0–1.2)
BUN SERPL-MCNC: 13 MG/DL (ref 8–23)
BUN/CREAT SERPL: 22.8 (ref 7–25)
CALCIUM SPEC-SCNC: 9.7 MG/DL (ref 8.6–10.5)
CHLORIDE SERPL-SCNC: 100 MMOL/L (ref 98–107)
CO2 SERPL-SCNC: 27.6 MMOL/L (ref 22–29)
CREAT SERPL-MCNC: 0.57 MG/DL (ref 0.57–1)
DEPRECATED RDW RBC AUTO: 47.8 FL (ref 37–54)
EGFRCR SERPLBLD CKD-EPI 2021: 99.1 ML/MIN/1.73
EOSINOPHIL # BLD AUTO: 0.16 10*3/MM3 (ref 0–0.4)
EOSINOPHIL NFR BLD AUTO: 3.5 % (ref 0.3–6.2)
ERYTHROCYTE [DISTWIDTH] IN BLOOD BY AUTOMATED COUNT: 14.2 % (ref 12.3–15.4)
GLOBULIN UR ELPH-MCNC: 2.8 GM/DL
GLUCOSE SERPL-MCNC: 102 MG/DL (ref 65–99)
HCT VFR BLD AUTO: 30.3 % (ref 34–46.6)
HGB BLD-MCNC: 10.1 G/DL (ref 12–15.9)
IMM GRANULOCYTES # BLD AUTO: 0.04 10*3/MM3 (ref 0–0.05)
IMM GRANULOCYTES NFR BLD AUTO: 0.9 % (ref 0–0.5)
LYMPHOCYTES # BLD AUTO: 1.16 10*3/MM3 (ref 0.7–3.1)
LYMPHOCYTES NFR BLD AUTO: 25.3 % (ref 19.6–45.3)
MCH RBC QN AUTO: 31.2 PG (ref 26.6–33)
MCHC RBC AUTO-ENTMCNC: 33.3 G/DL (ref 31.5–35.7)
MCV RBC AUTO: 93.5 FL (ref 79–97)
MONOCYTES # BLD AUTO: 0.36 10*3/MM3 (ref 0.1–0.9)
MONOCYTES NFR BLD AUTO: 7.9 % (ref 5–12)
NEUTROPHILS NFR BLD AUTO: 2.82 10*3/MM3 (ref 1.7–7)
NEUTROPHILS NFR BLD AUTO: 61.5 % (ref 42.7–76)
NRBC BLD AUTO-RTO: 0 /100 WBC (ref 0–0.2)
PLATELET # BLD AUTO: 291 10*3/MM3 (ref 140–450)
PMV BLD AUTO: 9.9 FL (ref 6–12)
POTASSIUM SERPL-SCNC: 3.9 MMOL/L (ref 3.5–5.2)
PROT SERPL-MCNC: 6.9 G/DL (ref 6–8.5)
RBC # BLD AUTO: 3.24 10*6/MM3 (ref 3.77–5.28)
SODIUM SERPL-SCNC: 138 MMOL/L (ref 136–145)
WBC NRBC COR # BLD AUTO: 4.58 10*3/MM3 (ref 3.4–10.8)

## 2024-09-06 PROCEDURE — 96413 CHEMO IV INFUSION 1 HR: CPT

## 2024-09-06 PROCEDURE — 25010000002 PACLITAXEL PROTEIN-BOUND PART PER 1 MG: Performed by: NURSE PRACTITIONER

## 2024-09-06 PROCEDURE — 25810000003 SODIUM CHLORIDE 0.9 % SOLUTION 250 ML FLEX CONT: Performed by: NURSE PRACTITIONER

## 2024-09-06 PROCEDURE — 96417 CHEMO IV INFUS EACH ADDL SEQ: CPT

## 2024-09-06 PROCEDURE — 80053 COMPREHEN METABOLIC PANEL: CPT | Performed by: INTERNAL MEDICINE

## 2024-09-06 PROCEDURE — 96375 TX/PRO/DX INJ NEW DRUG ADDON: CPT

## 2024-09-06 PROCEDURE — 25810000003 SODIUM CHLORIDE 0.9 % SOLUTION: Performed by: NURSE PRACTITIONER

## 2024-09-06 PROCEDURE — 96367 TX/PROPH/DG ADDL SEQ IV INF: CPT

## 2024-09-06 PROCEDURE — 25010000002 HEPARIN LOCK FLUSH PER 10 UNITS: Performed by: INTERNAL MEDICINE

## 2024-09-06 PROCEDURE — 85025 COMPLETE CBC W/AUTO DIFF WBC: CPT

## 2024-09-06 PROCEDURE — 25010000002 DEXAMETHASONE SODIUM PHOSPHATE 100 MG/10ML SOLUTION: Performed by: NURSE PRACTITIONER

## 2024-09-06 PROCEDURE — 25010000002 DIPHENHYDRAMINE PER 50 MG: Performed by: NURSE PRACTITIONER

## 2024-09-06 PROCEDURE — 25010000002 TRASTUZUMAB PER 10 MG: Performed by: NURSE PRACTITIONER

## 2024-09-06 RX ORDER — HEPARIN SODIUM (PORCINE) LOCK FLUSH IV SOLN 100 UNIT/ML 100 UNIT/ML
500 SOLUTION INTRAVENOUS AS NEEDED
OUTPATIENT
Start: 2024-09-06

## 2024-09-06 RX ORDER — SODIUM CHLORIDE 0.9 % (FLUSH) 0.9 %
10 SYRINGE (ML) INJECTION AS NEEDED
OUTPATIENT
Start: 2024-09-06

## 2024-09-06 RX ORDER — PACLITAXEL 100 MG/20ML
100 INJECTION, POWDER, LYOPHILIZED, FOR SUSPENSION INTRAVENOUS ONCE
Status: COMPLETED | OUTPATIENT
Start: 2024-09-06 | End: 2024-09-06

## 2024-09-06 RX ORDER — SODIUM CHLORIDE 9 MG/ML
20 INJECTION, SOLUTION INTRAVENOUS ONCE
Status: COMPLETED | OUTPATIENT
Start: 2024-09-06 | End: 2024-09-06

## 2024-09-06 RX ORDER — HEPARIN SODIUM (PORCINE) LOCK FLUSH IV SOLN 100 UNIT/ML 100 UNIT/ML
500 SOLUTION INTRAVENOUS AS NEEDED
Status: DISCONTINUED | OUTPATIENT
Start: 2024-09-06 | End: 2024-09-06 | Stop reason: HOSPADM

## 2024-09-06 RX ORDER — PACLITAXEL 100 MG/20ML
100 INJECTION, POWDER, LYOPHILIZED, FOR SUSPENSION INTRAVENOUS ONCE
Status: CANCELLED | OUTPATIENT
Start: 2024-09-06

## 2024-09-06 RX ORDER — MONTELUKAST SODIUM 10 MG/1
10 TABLET ORAL ONCE
Status: COMPLETED | OUTPATIENT
Start: 2024-09-06 | End: 2024-09-06

## 2024-09-06 RX ORDER — FAMOTIDINE 10 MG/ML
20 INJECTION, SOLUTION INTRAVENOUS AS NEEDED
Status: CANCELLED | OUTPATIENT
Start: 2024-09-06

## 2024-09-06 RX ORDER — SODIUM CHLORIDE 9 MG/ML
20 INJECTION, SOLUTION INTRAVENOUS ONCE
Status: CANCELLED | OUTPATIENT
Start: 2024-09-06

## 2024-09-06 RX ORDER — MONTELUKAST SODIUM 10 MG/1
10 TABLET ORAL ONCE
Status: CANCELLED
Start: 2024-09-06 | End: 2024-09-06

## 2024-09-06 RX ORDER — SODIUM CHLORIDE 0.9 % (FLUSH) 0.9 %
10 SYRINGE (ML) INJECTION AS NEEDED
Status: DISCONTINUED | OUTPATIENT
Start: 2024-09-06 | End: 2024-09-06 | Stop reason: HOSPADM

## 2024-09-06 RX ORDER — FAMOTIDINE 10 MG/ML
20 INJECTION, SOLUTION INTRAVENOUS ONCE
Status: CANCELLED | OUTPATIENT
Start: 2024-09-06

## 2024-09-06 RX ORDER — FAMOTIDINE 10 MG/ML
20 INJECTION, SOLUTION INTRAVENOUS ONCE
Status: COMPLETED | OUTPATIENT
Start: 2024-09-06 | End: 2024-09-06

## 2024-09-06 RX ORDER — DIPHENHYDRAMINE HYDROCHLORIDE 50 MG/ML
50 INJECTION INTRAMUSCULAR; INTRAVENOUS AS NEEDED
Status: CANCELLED | OUTPATIENT
Start: 2024-09-06

## 2024-09-06 RX ADMIN — Medication 10 ML: at 16:04

## 2024-09-06 RX ADMIN — MONTELUKAST 10 MG: 10 TABLET, FILM COATED ORAL at 13:51

## 2024-09-06 RX ADMIN — DEXAMETHASONE SODIUM PHOSPHATE 12 MG: 10 INJECTION, SOLUTION INTRAMUSCULAR; INTRAVENOUS at 14:09

## 2024-09-06 RX ADMIN — FAMOTIDINE 20 MG: 10 INJECTION INTRAVENOUS at 13:51

## 2024-09-06 RX ADMIN — TRASTUZUMAB 140 MG: 150 INJECTION, POWDER, LYOPHILIZED, FOR SOLUTION INTRAVENOUS at 15:33

## 2024-09-06 RX ADMIN — DIPHENHYDRAMINE HYDROCHLORIDE 25 MG: 50 INJECTION, SOLUTION INTRAMUSCULAR; INTRAVENOUS at 13:52

## 2024-09-06 RX ADMIN — SODIUM CHLORIDE 20 ML/HR: 9 INJECTION, SOLUTION INTRAVENOUS at 13:51

## 2024-09-06 RX ADMIN — PACLITAXEL 165 MG: 100 INJECTION, POWDER, LYOPHILIZED, FOR SUSPENSION INTRAVENOUS at 14:50

## 2024-09-06 RX ADMIN — Medication 500 UNITS: at 16:04

## 2024-09-13 ENCOUNTER — INFUSION (OUTPATIENT)
Dept: ONCOLOGY | Facility: HOSPITAL | Age: 69
End: 2024-09-13
Payer: MEDICARE

## 2024-09-13 ENCOUNTER — OFFICE VISIT (OUTPATIENT)
Dept: ONCOLOGY | Facility: CLINIC | Age: 69
End: 2024-09-13
Payer: MEDICARE

## 2024-09-13 VITALS
WEIGHT: 160.2 LBS | OXYGEN SATURATION: 99 % | SYSTOLIC BLOOD PRESSURE: 148 MMHG | TEMPERATURE: 97.8 F | DIASTOLIC BLOOD PRESSURE: 70 MMHG | HEART RATE: 75 BPM | BODY MASS INDEX: 31.29 KG/M2

## 2024-09-13 DIAGNOSIS — D64.9 ANEMIA, UNSPECIFIED TYPE: ICD-10-CM

## 2024-09-13 DIAGNOSIS — C50.811 MALIGNANT NEOPLASM OF OVERLAPPING SITES OF RIGHT BREAST IN FEMALE, ESTROGEN RECEPTOR NEGATIVE: ICD-10-CM

## 2024-09-13 DIAGNOSIS — Z17.1 MALIGNANT NEOPLASM OF OVERLAPPING SITES OF RIGHT BREAST IN FEMALE, ESTROGEN RECEPTOR NEGATIVE: Primary | ICD-10-CM

## 2024-09-13 DIAGNOSIS — Z79.899 HIGH RISK MEDICATION USE: ICD-10-CM

## 2024-09-13 DIAGNOSIS — C50.811 MALIGNANT NEOPLASM OF OVERLAPPING SITES OF RIGHT BREAST IN FEMALE, ESTROGEN RECEPTOR NEGATIVE: Primary | ICD-10-CM

## 2024-09-13 DIAGNOSIS — Z17.1 MALIGNANT NEOPLASM OF OVERLAPPING SITES OF RIGHT BREAST IN FEMALE, ESTROGEN RECEPTOR NEGATIVE: ICD-10-CM

## 2024-09-13 LAB
ALBUMIN SERPL-MCNC: 4.1 G/DL (ref 3.5–5.2)
ALBUMIN/GLOB SERPL: 1.5 G/DL
ALP SERPL-CCNC: 52 U/L (ref 39–117)
ALT SERPL W P-5'-P-CCNC: 24 U/L (ref 1–33)
ANION GAP SERPL CALCULATED.3IONS-SCNC: 10.6 MMOL/L (ref 5–15)
AST SERPL-CCNC: 26 U/L (ref 1–32)
BASOPHILS # BLD AUTO: 0.05 10*3/MM3 (ref 0–0.2)
BASOPHILS NFR BLD AUTO: 1.2 % (ref 0–1.5)
BILIRUB SERPL-MCNC: 0.2 MG/DL (ref 0–1.2)
BUN SERPL-MCNC: 12 MG/DL (ref 8–23)
BUN/CREAT SERPL: 22.6 (ref 7–25)
CALCIUM SPEC-SCNC: 9.2 MG/DL (ref 8.6–10.5)
CHLORIDE SERPL-SCNC: 101 MMOL/L (ref 98–107)
CO2 SERPL-SCNC: 28.4 MMOL/L (ref 22–29)
CREAT SERPL-MCNC: 0.53 MG/DL (ref 0.57–1)
DEPRECATED RDW RBC AUTO: 49.3 FL (ref 37–54)
EGFRCR SERPLBLD CKD-EPI 2021: 100.9 ML/MIN/1.73
EOSINOPHIL # BLD AUTO: 0.18 10*3/MM3 (ref 0–0.4)
EOSINOPHIL NFR BLD AUTO: 4.4 % (ref 0.3–6.2)
ERYTHROCYTE [DISTWIDTH] IN BLOOD BY AUTOMATED COUNT: 14.5 % (ref 12.3–15.4)
GLOBULIN UR ELPH-MCNC: 2.8 GM/DL
GLUCOSE SERPL-MCNC: 154 MG/DL (ref 65–99)
HCT VFR BLD AUTO: 30.6 % (ref 34–46.6)
HGB BLD-MCNC: 9.9 G/DL (ref 12–15.9)
IMM GRANULOCYTES # BLD AUTO: 0.08 10*3/MM3 (ref 0–0.05)
IMM GRANULOCYTES NFR BLD AUTO: 1.9 % (ref 0–0.5)
LYMPHOCYTES # BLD AUTO: 1.09 10*3/MM3 (ref 0.7–3.1)
LYMPHOCYTES NFR BLD AUTO: 26.5 % (ref 19.6–45.3)
MCH RBC QN AUTO: 30.7 PG (ref 26.6–33)
MCHC RBC AUTO-ENTMCNC: 32.4 G/DL (ref 31.5–35.7)
MCV RBC AUTO: 94.7 FL (ref 79–97)
MONOCYTES # BLD AUTO: 0.34 10*3/MM3 (ref 0.1–0.9)
MONOCYTES NFR BLD AUTO: 8.3 % (ref 5–12)
NEUTROPHILS NFR BLD AUTO: 2.37 10*3/MM3 (ref 1.7–7)
NEUTROPHILS NFR BLD AUTO: 57.7 % (ref 42.7–76)
NRBC BLD AUTO-RTO: 0 /100 WBC (ref 0–0.2)
PLATELET # BLD AUTO: 268 10*3/MM3 (ref 140–450)
PMV BLD AUTO: 9.9 FL (ref 6–12)
POTASSIUM SERPL-SCNC: 3.8 MMOL/L (ref 3.5–5.2)
PROT SERPL-MCNC: 6.9 G/DL (ref 6–8.5)
RBC # BLD AUTO: 3.23 10*6/MM3 (ref 3.77–5.28)
SODIUM SERPL-SCNC: 140 MMOL/L (ref 136–145)
WBC NRBC COR # BLD AUTO: 4.11 10*3/MM3 (ref 3.4–10.8)

## 2024-09-13 PROCEDURE — 96417 CHEMO IV INFUS EACH ADDL SEQ: CPT

## 2024-09-13 PROCEDURE — 25010000002 PACLITAXEL PROTEIN-BOUND PART PER 1 MG: Performed by: NURSE PRACTITIONER

## 2024-09-13 PROCEDURE — 96375 TX/PRO/DX INJ NEW DRUG ADDON: CPT

## 2024-09-13 PROCEDURE — 1126F AMNT PAIN NOTED NONE PRSNT: CPT | Performed by: NURSE PRACTITIONER

## 2024-09-13 PROCEDURE — 25010000002 DIPHENHYDRAMINE PER 50 MG: Performed by: NURSE PRACTITIONER

## 2024-09-13 PROCEDURE — 99214 OFFICE O/P EST MOD 30 MIN: CPT | Performed by: NURSE PRACTITIONER

## 2024-09-13 PROCEDURE — 80053 COMPREHEN METABOLIC PANEL: CPT

## 2024-09-13 PROCEDURE — 25010000002 DEXAMETHASONE SODIUM PHOSPHATE 100 MG/10ML SOLUTION: Performed by: NURSE PRACTITIONER

## 2024-09-13 PROCEDURE — 96413 CHEMO IV INFUSION 1 HR: CPT

## 2024-09-13 PROCEDURE — 25010000002 TRASTUZUMAB PER 10 MG: Performed by: NURSE PRACTITIONER

## 2024-09-13 PROCEDURE — 3078F DIAST BP <80 MM HG: CPT | Performed by: NURSE PRACTITIONER

## 2024-09-13 PROCEDURE — 25810000003 SODIUM CHLORIDE 0.9 % SOLUTION 250 ML FLEX CONT: Performed by: NURSE PRACTITIONER

## 2024-09-13 PROCEDURE — 3077F SYST BP >= 140 MM HG: CPT | Performed by: NURSE PRACTITIONER

## 2024-09-13 PROCEDURE — 25810000003 SODIUM CHLORIDE 0.9 % SOLUTION: Performed by: NURSE PRACTITIONER

## 2024-09-13 PROCEDURE — 85025 COMPLETE CBC W/AUTO DIFF WBC: CPT

## 2024-09-13 RX ORDER — SODIUM CHLORIDE 9 MG/ML
20 INJECTION, SOLUTION INTRAVENOUS ONCE
Status: COMPLETED | OUTPATIENT
Start: 2024-09-13 | End: 2024-09-13

## 2024-09-13 RX ORDER — FAMOTIDINE 10 MG/ML
20 INJECTION, SOLUTION INTRAVENOUS ONCE
Status: COMPLETED | OUTPATIENT
Start: 2024-09-13 | End: 2024-09-13

## 2024-09-13 RX ORDER — PACLITAXEL 100 MG/20ML
100 INJECTION, POWDER, LYOPHILIZED, FOR SUSPENSION INTRAVENOUS ONCE
Status: CANCELLED | OUTPATIENT
Start: 2024-09-13

## 2024-09-13 RX ORDER — DIPHENHYDRAMINE HYDROCHLORIDE 50 MG/ML
50 INJECTION INTRAMUSCULAR; INTRAVENOUS AS NEEDED
Status: CANCELLED | OUTPATIENT
Start: 2024-09-13

## 2024-09-13 RX ORDER — PACLITAXEL 100 MG/20ML
100 INJECTION, POWDER, LYOPHILIZED, FOR SUSPENSION INTRAVENOUS ONCE
Status: COMPLETED | OUTPATIENT
Start: 2024-09-13 | End: 2024-09-13

## 2024-09-13 RX ORDER — HYDROCORTISONE 2.5 %
1 CREAM (GRAM) TOPICAL 2 TIMES DAILY
Qty: 3.5 G | Refills: 0 | Status: SHIPPED | OUTPATIENT
Start: 2024-09-13

## 2024-09-13 RX ORDER — FAMOTIDINE 10 MG/ML
20 INJECTION, SOLUTION INTRAVENOUS AS NEEDED
Status: CANCELLED | OUTPATIENT
Start: 2024-09-13

## 2024-09-13 RX ORDER — MONTELUKAST SODIUM 10 MG/1
10 TABLET ORAL ONCE
Status: CANCELLED
Start: 2024-09-13 | End: 2024-09-13

## 2024-09-13 RX ORDER — METHYLPREDNISOLONE 4 MG
TABLET, DOSE PACK ORAL
Qty: 21 TABLET | Refills: 0 | Status: SHIPPED | OUTPATIENT
Start: 2024-09-13

## 2024-09-13 RX ORDER — FAMOTIDINE 10 MG/ML
20 INJECTION, SOLUTION INTRAVENOUS ONCE
Status: CANCELLED | OUTPATIENT
Start: 2024-09-13

## 2024-09-13 RX ORDER — MONTELUKAST SODIUM 10 MG/1
10 TABLET ORAL ONCE
Status: COMPLETED | OUTPATIENT
Start: 2024-09-13 | End: 2024-09-13

## 2024-09-13 RX ORDER — SODIUM CHLORIDE 9 MG/ML
20 INJECTION, SOLUTION INTRAVENOUS ONCE
Status: CANCELLED | OUTPATIENT
Start: 2024-09-13

## 2024-09-13 RX ADMIN — DIPHENHYDRAMINE HYDROCHLORIDE 25 MG: 50 INJECTION, SOLUTION INTRAMUSCULAR; INTRAVENOUS at 14:19

## 2024-09-13 RX ADMIN — DEXAMETHASONE SODIUM PHOSPHATE 12 MG: 10 INJECTION, SOLUTION INTRAMUSCULAR; INTRAVENOUS at 14:02

## 2024-09-13 RX ADMIN — MONTELUKAST 10 MG: 10 TABLET, FILM COATED ORAL at 14:01

## 2024-09-13 RX ADMIN — TRASTUZUMAB 140 MG: 150 INJECTION, POWDER, LYOPHILIZED, FOR SOLUTION INTRAVENOUS at 15:38

## 2024-09-13 RX ADMIN — PACLITAXEL 165 MG: 100 INJECTION, POWDER, LYOPHILIZED, FOR SUSPENSION INTRAVENOUS at 14:58

## 2024-09-13 RX ADMIN — FAMOTIDINE 20 MG: 10 INJECTION INTRAVENOUS at 14:02

## 2024-09-13 RX ADMIN — SODIUM CHLORIDE 20 ML/HR: 9 INJECTION, SOLUTION INTRAVENOUS at 14:01

## 2024-09-18 ENCOUNTER — TELEPHONE (OUTPATIENT)
Dept: ONCOLOGY | Facility: CLINIC | Age: 69
End: 2024-09-18
Payer: MEDICARE

## 2024-09-20 ENCOUNTER — INFUSION (OUTPATIENT)
Dept: ONCOLOGY | Facility: HOSPITAL | Age: 69
End: 2024-09-20
Payer: MEDICARE

## 2024-09-20 ENCOUNTER — OFFICE VISIT (OUTPATIENT)
Dept: ONCOLOGY | Facility: CLINIC | Age: 69
End: 2024-09-20
Payer: MEDICARE

## 2024-09-20 VITALS
HEIGHT: 60 IN | DIASTOLIC BLOOD PRESSURE: 60 MMHG | TEMPERATURE: 97.9 F | BODY MASS INDEX: 30.08 KG/M2 | OXYGEN SATURATION: 97 % | HEART RATE: 76 BPM | RESPIRATION RATE: 18 BRPM | SYSTOLIC BLOOD PRESSURE: 107 MMHG | WEIGHT: 153.2 LBS

## 2024-09-20 DIAGNOSIS — Z17.1 MALIGNANT NEOPLASM OF OVERLAPPING SITES OF RIGHT BREAST IN FEMALE, ESTROGEN RECEPTOR NEGATIVE: Primary | ICD-10-CM

## 2024-09-20 DIAGNOSIS — C50.811 MALIGNANT NEOPLASM OF OVERLAPPING SITES OF RIGHT BREAST IN FEMALE, ESTROGEN RECEPTOR NEGATIVE: ICD-10-CM

## 2024-09-20 DIAGNOSIS — Z17.1 MALIGNANT NEOPLASM OF OVERLAPPING SITES OF RIGHT BREAST IN FEMALE, ESTROGEN RECEPTOR NEGATIVE: ICD-10-CM

## 2024-09-20 DIAGNOSIS — C50.811 MALIGNANT NEOPLASM OF OVERLAPPING SITES OF RIGHT BREAST IN FEMALE, ESTROGEN RECEPTOR NEGATIVE: Primary | ICD-10-CM

## 2024-09-20 LAB
ALBUMIN SERPL-MCNC: 4.3 G/DL (ref 3.5–5.2)
ALBUMIN/GLOB SERPL: 1.3 G/DL
ALP SERPL-CCNC: 56 U/L (ref 39–117)
ALT SERPL W P-5'-P-CCNC: 26 U/L (ref 1–33)
ANION GAP SERPL CALCULATED.3IONS-SCNC: 13.1 MMOL/L (ref 5–15)
AST SERPL-CCNC: 28 U/L (ref 1–32)
BASOPHILS # BLD AUTO: 0.07 10*3/MM3 (ref 0–0.2)
BASOPHILS NFR BLD AUTO: 1.2 % (ref 0–1.5)
BILIRUB SERPL-MCNC: 0.3 MG/DL (ref 0–1.2)
BUN SERPL-MCNC: 16 MG/DL (ref 8–23)
BUN/CREAT SERPL: 30.2 (ref 7–25)
CALCIUM SPEC-SCNC: 10 MG/DL (ref 8.6–10.5)
CHLORIDE SERPL-SCNC: 96 MMOL/L (ref 98–107)
CO2 SERPL-SCNC: 25.9 MMOL/L (ref 22–29)
CREAT SERPL-MCNC: 0.53 MG/DL (ref 0.57–1)
DEPRECATED RDW RBC AUTO: 50.3 FL (ref 37–54)
EGFRCR SERPLBLD CKD-EPI 2021: 100.9 ML/MIN/1.73
EOSINOPHIL # BLD AUTO: 0.18 10*3/MM3 (ref 0–0.4)
EOSINOPHIL NFR BLD AUTO: 3.1 % (ref 0.3–6.2)
ERYTHROCYTE [DISTWIDTH] IN BLOOD BY AUTOMATED COUNT: 14.7 % (ref 12.3–15.4)
GLOBULIN UR ELPH-MCNC: 3.2 GM/DL
GLUCOSE SERPL-MCNC: 127 MG/DL (ref 65–99)
HCT VFR BLD AUTO: 34.7 % (ref 34–46.6)
HGB BLD-MCNC: 11.3 G/DL (ref 12–15.9)
IMM GRANULOCYTES # BLD AUTO: 0.07 10*3/MM3 (ref 0–0.05)
IMM GRANULOCYTES NFR BLD AUTO: 1.2 % (ref 0–0.5)
LYMPHOCYTES # BLD AUTO: 1.33 10*3/MM3 (ref 0.7–3.1)
LYMPHOCYTES NFR BLD AUTO: 22.7 % (ref 19.6–45.3)
MCH RBC QN AUTO: 30.7 PG (ref 26.6–33)
MCHC RBC AUTO-ENTMCNC: 32.6 G/DL (ref 31.5–35.7)
MCV RBC AUTO: 94.3 FL (ref 79–97)
MONOCYTES # BLD AUTO: 0.4 10*3/MM3 (ref 0.1–0.9)
MONOCYTES NFR BLD AUTO: 6.8 % (ref 5–12)
NEUTROPHILS NFR BLD AUTO: 3.82 10*3/MM3 (ref 1.7–7)
NEUTROPHILS NFR BLD AUTO: 65 % (ref 42.7–76)
NRBC BLD AUTO-RTO: 0 /100 WBC (ref 0–0.2)
PLATELET # BLD AUTO: 335 10*3/MM3 (ref 140–450)
PMV BLD AUTO: 9.6 FL (ref 6–12)
POTASSIUM SERPL-SCNC: 4 MMOL/L (ref 3.5–5.2)
PROT SERPL-MCNC: 7.5 G/DL (ref 6–8.5)
RBC # BLD AUTO: 3.68 10*6/MM3 (ref 3.77–5.28)
SODIUM SERPL-SCNC: 135 MMOL/L (ref 136–145)
WBC NRBC COR # BLD AUTO: 5.87 10*3/MM3 (ref 3.4–10.8)

## 2024-09-20 PROCEDURE — 25010000002 DEXAMETHASONE SODIUM PHOSPHATE 100 MG/10ML SOLUTION: Performed by: INTERNAL MEDICINE

## 2024-09-20 PROCEDURE — 96413 CHEMO IV INFUSION 1 HR: CPT

## 2024-09-20 PROCEDURE — 96375 TX/PRO/DX INJ NEW DRUG ADDON: CPT

## 2024-09-20 PROCEDURE — 25010000002 DIPHENHYDRAMINE PER 50 MG: Performed by: INTERNAL MEDICINE

## 2024-09-20 PROCEDURE — 25010000002 PACLITAXEL PROTEIN-BOUND PART PER 1 MG: Performed by: INTERNAL MEDICINE

## 2024-09-20 PROCEDURE — 85025 COMPLETE CBC W/AUTO DIFF WBC: CPT

## 2024-09-20 PROCEDURE — 25810000003 SODIUM CHLORIDE 0.9 % SOLUTION: Performed by: INTERNAL MEDICINE

## 2024-09-20 PROCEDURE — 80053 COMPREHEN METABOLIC PANEL: CPT

## 2024-09-20 RX ORDER — SODIUM CHLORIDE 9 MG/ML
20 INJECTION, SOLUTION INTRAVENOUS ONCE
Status: COMPLETED | OUTPATIENT
Start: 2024-09-20 | End: 2024-09-20

## 2024-09-20 RX ORDER — FAMOTIDINE 10 MG/ML
20 INJECTION, SOLUTION INTRAVENOUS ONCE
Status: COMPLETED | OUTPATIENT
Start: 2024-09-20 | End: 2024-09-20

## 2024-09-20 RX ORDER — FAMOTIDINE 10 MG/ML
20 INJECTION, SOLUTION INTRAVENOUS ONCE
Status: CANCELLED | OUTPATIENT
Start: 2024-09-20

## 2024-09-20 RX ORDER — FAMOTIDINE 10 MG/ML
20 INJECTION, SOLUTION INTRAVENOUS AS NEEDED
Status: CANCELLED | OUTPATIENT
Start: 2024-09-20

## 2024-09-20 RX ORDER — MONTELUKAST SODIUM 10 MG/1
10 TABLET ORAL ONCE
Status: CANCELLED
Start: 2024-09-20 | End: 2024-09-20

## 2024-09-20 RX ORDER — DIPHENHYDRAMINE HYDROCHLORIDE 50 MG/ML
50 INJECTION INTRAMUSCULAR; INTRAVENOUS AS NEEDED
Status: CANCELLED | OUTPATIENT
Start: 2024-09-20

## 2024-09-20 RX ORDER — MONTELUKAST SODIUM 10 MG/1
10 TABLET ORAL ONCE
Status: COMPLETED | OUTPATIENT
Start: 2024-09-20 | End: 2024-09-20

## 2024-09-20 RX ORDER — PACLITAXEL 100 MG/20ML
100 INJECTION, POWDER, LYOPHILIZED, FOR SUSPENSION INTRAVENOUS ONCE
Status: COMPLETED | OUTPATIENT
Start: 2024-09-20 | End: 2024-09-20

## 2024-09-20 RX ORDER — PACLITAXEL 100 MG/20ML
100 INJECTION, POWDER, LYOPHILIZED, FOR SUSPENSION INTRAVENOUS ONCE
Status: CANCELLED | OUTPATIENT
Start: 2024-09-20

## 2024-09-20 RX ORDER — SODIUM CHLORIDE 9 MG/ML
20 INJECTION, SOLUTION INTRAVENOUS ONCE
Status: CANCELLED | OUTPATIENT
Start: 2024-09-20

## 2024-09-20 RX ADMIN — SODIUM CHLORIDE 20 ML/HR: 9 INJECTION, SOLUTION INTRAVENOUS at 10:20

## 2024-09-20 RX ADMIN — DEXAMETHASONE SODIUM PHOSPHATE 12 MG: 10 INJECTION, SOLUTION INTRAMUSCULAR; INTRAVENOUS at 10:35

## 2024-09-20 RX ADMIN — FAMOTIDINE 20 MG: 10 INJECTION INTRAVENOUS at 10:20

## 2024-09-20 RX ADMIN — MONTELUKAST 10 MG: 10 TABLET, FILM COATED ORAL at 10:20

## 2024-09-20 RX ADMIN — PACLITAXEL 165 MG: 100 INJECTION, POWDER, LYOPHILIZED, FOR SUSPENSION INTRAVENOUS at 11:19

## 2024-09-20 RX ADMIN — DIPHENHYDRAMINE HYDROCHLORIDE 25 MG: 50 INJECTION, SOLUTION INTRAMUSCULAR; INTRAVENOUS at 10:20

## 2024-09-27 ENCOUNTER — INFUSION (OUTPATIENT)
Dept: ONCOLOGY | Facility: HOSPITAL | Age: 69
End: 2024-09-27
Payer: MEDICARE

## 2024-09-27 ENCOUNTER — PATIENT OUTREACH (OUTPATIENT)
Dept: OTHER | Facility: HOSPITAL | Age: 69
End: 2024-09-27
Payer: MEDICARE

## 2024-09-27 VITALS
RESPIRATION RATE: 16 BRPM | HEART RATE: 87 BPM | BODY MASS INDEX: 31.09 KG/M2 | OXYGEN SATURATION: 98 % | SYSTOLIC BLOOD PRESSURE: 134 MMHG | DIASTOLIC BLOOD PRESSURE: 66 MMHG | TEMPERATURE: 98 F | WEIGHT: 159.2 LBS

## 2024-09-27 DIAGNOSIS — Z17.1 MALIGNANT NEOPLASM OF OVERLAPPING SITES OF RIGHT BREAST IN FEMALE, ESTROGEN RECEPTOR NEGATIVE: Primary | ICD-10-CM

## 2024-09-27 DIAGNOSIS — Z45.2 FITTING AND ADJUSTMENT OF VASCULAR CATHETER: ICD-10-CM

## 2024-09-27 DIAGNOSIS — C50.811 MALIGNANT NEOPLASM OF OVERLAPPING SITES OF RIGHT BREAST IN FEMALE, ESTROGEN RECEPTOR NEGATIVE: Primary | ICD-10-CM

## 2024-09-27 LAB
ALBUMIN SERPL-MCNC: 3.9 G/DL (ref 3.5–5.2)
ALBUMIN/GLOB SERPL: 1.6 G/DL
ALP SERPL-CCNC: 46 U/L (ref 39–117)
ALT SERPL W P-5'-P-CCNC: 26 U/L (ref 1–33)
ANION GAP SERPL CALCULATED.3IONS-SCNC: 10.5 MMOL/L (ref 5–15)
AST SERPL-CCNC: 25 U/L (ref 1–32)
BASOPHILS # BLD AUTO: 0.05 10*3/MM3 (ref 0–0.2)
BASOPHILS NFR BLD AUTO: 1.1 % (ref 0–1.5)
BILIRUB SERPL-MCNC: 0.2 MG/DL (ref 0–1.2)
BUN SERPL-MCNC: 16 MG/DL (ref 8–23)
BUN/CREAT SERPL: 29.1 (ref 7–25)
CALCIUM SPEC-SCNC: 9.1 MG/DL (ref 8.6–10.5)
CHLORIDE SERPL-SCNC: 100 MMOL/L (ref 98–107)
CO2 SERPL-SCNC: 27.5 MMOL/L (ref 22–29)
CREAT SERPL-MCNC: 0.55 MG/DL (ref 0.57–1)
DEPRECATED RDW RBC AUTO: 52.2 FL (ref 37–54)
EGFRCR SERPLBLD CKD-EPI 2021: 100 ML/MIN/1.73
EOSINOPHIL # BLD AUTO: 0.14 10*3/MM3 (ref 0–0.4)
EOSINOPHIL NFR BLD AUTO: 3.2 % (ref 0.3–6.2)
ERYTHROCYTE [DISTWIDTH] IN BLOOD BY AUTOMATED COUNT: 15 % (ref 12.3–15.4)
GLOBULIN UR ELPH-MCNC: 2.5 GM/DL
GLUCOSE SERPL-MCNC: 139 MG/DL (ref 65–99)
HCT VFR BLD AUTO: 29.7 % (ref 34–46.6)
HGB BLD-MCNC: 9.6 G/DL (ref 12–15.9)
IMM GRANULOCYTES # BLD AUTO: 0.06 10*3/MM3 (ref 0–0.05)
IMM GRANULOCYTES NFR BLD AUTO: 1.4 % (ref 0–0.5)
LYMPHOCYTES # BLD AUTO: 1.03 10*3/MM3 (ref 0.7–3.1)
LYMPHOCYTES NFR BLD AUTO: 23.3 % (ref 19.6–45.3)
MCH RBC QN AUTO: 30.7 PG (ref 26.6–33)
MCHC RBC AUTO-ENTMCNC: 32.3 G/DL (ref 31.5–35.7)
MCV RBC AUTO: 94.9 FL (ref 79–97)
MONOCYTES # BLD AUTO: 0.44 10*3/MM3 (ref 0.1–0.9)
MONOCYTES NFR BLD AUTO: 9.9 % (ref 5–12)
NEUTROPHILS NFR BLD AUTO: 2.71 10*3/MM3 (ref 1.7–7)
NEUTROPHILS NFR BLD AUTO: 61.1 % (ref 42.7–76)
NRBC BLD AUTO-RTO: 0 /100 WBC (ref 0–0.2)
PLATELET # BLD AUTO: 244 10*3/MM3 (ref 140–450)
PMV BLD AUTO: 9.7 FL (ref 6–12)
POTASSIUM SERPL-SCNC: 4.2 MMOL/L (ref 3.5–5.2)
PROT SERPL-MCNC: 6.4 G/DL (ref 6–8.5)
RBC # BLD AUTO: 3.13 10*6/MM3 (ref 3.77–5.28)
SODIUM SERPL-SCNC: 138 MMOL/L (ref 136–145)
WBC NRBC COR # BLD AUTO: 4.43 10*3/MM3 (ref 3.4–10.8)

## 2024-09-27 PROCEDURE — 25810000003 SODIUM CHLORIDE 0.9 % SOLUTION 250 ML FLEX CONT: Performed by: INTERNAL MEDICINE

## 2024-09-27 PROCEDURE — 25010000002 DEXAMETHASONE SODIUM PHOSPHATE 100 MG/10ML SOLUTION: Performed by: INTERNAL MEDICINE

## 2024-09-27 PROCEDURE — 25010000002 DIPHENHYDRAMINE PER 50 MG: Performed by: INTERNAL MEDICINE

## 2024-09-27 PROCEDURE — 25010000002 PACLITAXEL PROTEIN-BOUND PART PER 1 MG: Performed by: INTERNAL MEDICINE

## 2024-09-27 PROCEDURE — 25010000002 TRASTUZUMAB PER 10 MG: Performed by: INTERNAL MEDICINE

## 2024-09-27 PROCEDURE — 96375 TX/PRO/DX INJ NEW DRUG ADDON: CPT

## 2024-09-27 PROCEDURE — 25810000003 SODIUM CHLORIDE 0.9 % SOLUTION: Performed by: INTERNAL MEDICINE

## 2024-09-27 PROCEDURE — 85025 COMPLETE CBC W/AUTO DIFF WBC: CPT

## 2024-09-27 PROCEDURE — 96417 CHEMO IV INFUS EACH ADDL SEQ: CPT

## 2024-09-27 PROCEDURE — 80053 COMPREHEN METABOLIC PANEL: CPT

## 2024-09-27 PROCEDURE — 96413 CHEMO IV INFUSION 1 HR: CPT

## 2024-09-27 RX ORDER — SODIUM CHLORIDE 9 MG/ML
20 INJECTION, SOLUTION INTRAVENOUS ONCE
Status: CANCELLED | OUTPATIENT
Start: 2024-09-27

## 2024-09-27 RX ORDER — SODIUM CHLORIDE 9 MG/ML
20 INJECTION, SOLUTION INTRAVENOUS ONCE
Status: COMPLETED | OUTPATIENT
Start: 2024-09-27 | End: 2024-09-27

## 2024-09-27 RX ORDER — DIPHENHYDRAMINE HYDROCHLORIDE 50 MG/ML
50 INJECTION INTRAMUSCULAR; INTRAVENOUS AS NEEDED
Status: CANCELLED | OUTPATIENT
Start: 2024-09-27

## 2024-09-27 RX ORDER — FAMOTIDINE 10 MG/ML
20 INJECTION, SOLUTION INTRAVENOUS ONCE
Status: CANCELLED | OUTPATIENT
Start: 2024-09-27

## 2024-09-27 RX ORDER — MONTELUKAST SODIUM 10 MG/1
10 TABLET ORAL ONCE
Status: COMPLETED | OUTPATIENT
Start: 2024-09-27 | End: 2024-09-27

## 2024-09-27 RX ORDER — HEPARIN SODIUM (PORCINE) LOCK FLUSH IV SOLN 100 UNIT/ML 100 UNIT/ML
500 SOLUTION INTRAVENOUS AS NEEDED
Status: DISCONTINUED | OUTPATIENT
Start: 2024-09-27 | End: 2024-09-27 | Stop reason: HOSPADM

## 2024-09-27 RX ORDER — HEPARIN SODIUM (PORCINE) LOCK FLUSH IV SOLN 100 UNIT/ML 100 UNIT/ML
500 SOLUTION INTRAVENOUS AS NEEDED
OUTPATIENT
Start: 2024-09-27

## 2024-09-27 RX ORDER — SODIUM CHLORIDE 0.9 % (FLUSH) 0.9 %
10 SYRINGE (ML) INJECTION AS NEEDED
OUTPATIENT
Start: 2024-09-27

## 2024-09-27 RX ORDER — SODIUM CHLORIDE 0.9 % (FLUSH) 0.9 %
10 SYRINGE (ML) INJECTION AS NEEDED
Status: DISCONTINUED | OUTPATIENT
Start: 2024-09-27 | End: 2024-09-27 | Stop reason: HOSPADM

## 2024-09-27 RX ORDER — PACLITAXEL 100 MG/20ML
100 INJECTION, POWDER, LYOPHILIZED, FOR SUSPENSION INTRAVENOUS ONCE
Status: COMPLETED | OUTPATIENT
Start: 2024-09-27 | End: 2024-09-27

## 2024-09-27 RX ORDER — PACLITAXEL 100 MG/20ML
100 INJECTION, POWDER, LYOPHILIZED, FOR SUSPENSION INTRAVENOUS ONCE
Status: CANCELLED | OUTPATIENT
Start: 2024-09-27

## 2024-09-27 RX ORDER — MONTELUKAST SODIUM 10 MG/1
10 TABLET ORAL ONCE
Status: CANCELLED
Start: 2024-09-27 | End: 2024-09-27

## 2024-09-27 RX ORDER — FAMOTIDINE 10 MG/ML
20 INJECTION, SOLUTION INTRAVENOUS ONCE
Status: COMPLETED | OUTPATIENT
Start: 2024-09-27 | End: 2024-09-27

## 2024-09-27 RX ORDER — FAMOTIDINE 10 MG/ML
20 INJECTION, SOLUTION INTRAVENOUS AS NEEDED
Status: CANCELLED | OUTPATIENT
Start: 2024-09-27

## 2024-09-27 RX ADMIN — TRASTUZUMAB 140 MG: 150 INJECTION, POWDER, LYOPHILIZED, FOR SOLUTION INTRAVENOUS at 15:52

## 2024-09-27 RX ADMIN — DIPHENHYDRAMINE HYDROCHLORIDE 25 MG: 50 INJECTION, SOLUTION INTRAMUSCULAR; INTRAVENOUS at 14:11

## 2024-09-27 RX ADMIN — FAMOTIDINE 20 MG: 10 INJECTION INTRAVENOUS at 14:11

## 2024-09-27 RX ADMIN — MONTELUKAST 10 MG: 10 TABLET, FILM COATED ORAL at 14:11

## 2024-09-27 RX ADMIN — PACLITAXEL 165 MG: 100 INJECTION, POWDER, LYOPHILIZED, FOR SUSPENSION INTRAVENOUS at 15:18

## 2024-09-27 RX ADMIN — DEXAMETHASONE SODIUM PHOSPHATE 12 MG: 10 INJECTION, SOLUTION INTRAMUSCULAR; INTRAVENOUS at 14:29

## 2024-09-27 RX ADMIN — SODIUM CHLORIDE 20 ML/HR: 9 INJECTION, SOLUTION INTRAVENOUS at 14:13

## 2024-10-04 ENCOUNTER — INFUSION (OUTPATIENT)
Dept: ONCOLOGY | Facility: HOSPITAL | Age: 69
End: 2024-10-04
Payer: MEDICARE

## 2024-10-04 ENCOUNTER — OFFICE VISIT (OUTPATIENT)
Dept: ONCOLOGY | Facility: CLINIC | Age: 69
End: 2024-10-04
Payer: MEDICARE

## 2024-10-04 VITALS
BODY MASS INDEX: 31.35 KG/M2 | SYSTOLIC BLOOD PRESSURE: 148 MMHG | OXYGEN SATURATION: 100 % | HEART RATE: 71 BPM | WEIGHT: 159.7 LBS | HEIGHT: 60 IN | TEMPERATURE: 97.7 F | DIASTOLIC BLOOD PRESSURE: 68 MMHG

## 2024-10-04 DIAGNOSIS — C50.811 MALIGNANT NEOPLASM OF OVERLAPPING SITES OF RIGHT BREAST IN FEMALE, ESTROGEN RECEPTOR NEGATIVE: Primary | ICD-10-CM

## 2024-10-04 DIAGNOSIS — Z17.1 MALIGNANT NEOPLASM OF OVERLAPPING SITES OF RIGHT BREAST IN FEMALE, ESTROGEN RECEPTOR NEGATIVE: Primary | ICD-10-CM

## 2024-10-04 DIAGNOSIS — Z17.1 MALIGNANT NEOPLASM OF OVERLAPPING SITES OF RIGHT BREAST IN FEMALE, ESTROGEN RECEPTOR NEGATIVE: ICD-10-CM

## 2024-10-04 DIAGNOSIS — Z45.2 FITTING AND ADJUSTMENT OF VASCULAR CATHETER: ICD-10-CM

## 2024-10-04 DIAGNOSIS — C50.811 MALIGNANT NEOPLASM OF OVERLAPPING SITES OF RIGHT BREAST IN FEMALE, ESTROGEN RECEPTOR NEGATIVE: ICD-10-CM

## 2024-10-04 LAB
ALBUMIN SERPL-MCNC: 4 G/DL (ref 3.5–5.2)
ALBUMIN/GLOB SERPL: 1.5 G/DL
ALP SERPL-CCNC: 49 U/L (ref 39–117)
ALT SERPL W P-5'-P-CCNC: 23 U/L (ref 1–33)
ANION GAP SERPL CALCULATED.3IONS-SCNC: 9 MMOL/L (ref 5–15)
AST SERPL-CCNC: 26 U/L (ref 1–32)
BASOPHILS # BLD AUTO: 0.05 10*3/MM3 (ref 0–0.2)
BASOPHILS NFR BLD AUTO: 1.2 % (ref 0–1.5)
BILIRUB SERPL-MCNC: 0.2 MG/DL (ref 0–1.2)
BUN SERPL-MCNC: 16 MG/DL (ref 8–23)
BUN/CREAT SERPL: 28.6 (ref 7–25)
CALCIUM SPEC-SCNC: 9.2 MG/DL (ref 8.6–10.5)
CHLORIDE SERPL-SCNC: 102 MMOL/L (ref 98–107)
CO2 SERPL-SCNC: 28 MMOL/L (ref 22–29)
CREAT SERPL-MCNC: 0.56 MG/DL (ref 0.57–1)
DEPRECATED RDW RBC AUTO: 52.3 FL (ref 37–54)
EGFRCR SERPLBLD CKD-EPI 2021: 99.6 ML/MIN/1.73
EOSINOPHIL # BLD AUTO: 0.23 10*3/MM3 (ref 0–0.4)
EOSINOPHIL NFR BLD AUTO: 5.7 % (ref 0.3–6.2)
ERYTHROCYTE [DISTWIDTH] IN BLOOD BY AUTOMATED COUNT: 15.2 % (ref 12.3–15.4)
GLOBULIN UR ELPH-MCNC: 2.7 GM/DL
GLUCOSE SERPL-MCNC: 114 MG/DL (ref 65–99)
HCT VFR BLD AUTO: 29.7 % (ref 34–46.6)
HGB BLD-MCNC: 9.6 G/DL (ref 12–15.9)
IMM GRANULOCYTES # BLD AUTO: 0.08 10*3/MM3 (ref 0–0.05)
IMM GRANULOCYTES NFR BLD AUTO: 2 % (ref 0–0.5)
LYMPHOCYTES # BLD AUTO: 0.87 10*3/MM3 (ref 0.7–3.1)
LYMPHOCYTES NFR BLD AUTO: 21.4 % (ref 19.6–45.3)
MCH RBC QN AUTO: 30.7 PG (ref 26.6–33)
MCHC RBC AUTO-ENTMCNC: 32.3 G/DL (ref 31.5–35.7)
MCV RBC AUTO: 94.9 FL (ref 79–97)
MONOCYTES # BLD AUTO: 0.32 10*3/MM3 (ref 0.1–0.9)
MONOCYTES NFR BLD AUTO: 7.9 % (ref 5–12)
NEUTROPHILS NFR BLD AUTO: 2.52 10*3/MM3 (ref 1.7–7)
NEUTROPHILS NFR BLD AUTO: 61.8 % (ref 42.7–76)
NRBC BLD AUTO-RTO: 0 /100 WBC (ref 0–0.2)
PLATELET # BLD AUTO: 252 10*3/MM3 (ref 140–450)
PMV BLD AUTO: 9.3 FL (ref 6–12)
POTASSIUM SERPL-SCNC: 4 MMOL/L (ref 3.5–5.2)
PROT SERPL-MCNC: 6.7 G/DL (ref 6–8.5)
RBC # BLD AUTO: 3.13 10*6/MM3 (ref 3.77–5.28)
SODIUM SERPL-SCNC: 139 MMOL/L (ref 136–145)
WBC NRBC COR # BLD AUTO: 4.07 10*3/MM3 (ref 3.4–10.8)

## 2024-10-04 PROCEDURE — 96375 TX/PRO/DX INJ NEW DRUG ADDON: CPT

## 2024-10-04 PROCEDURE — 25810000003 SODIUM CHLORIDE 0.9 % SOLUTION 250 ML FLEX CONT: Performed by: INTERNAL MEDICINE

## 2024-10-04 PROCEDURE — 80053 COMPREHEN METABOLIC PANEL: CPT

## 2024-10-04 PROCEDURE — 25010000002 DIPHENHYDRAMINE PER 50 MG: Performed by: INTERNAL MEDICINE

## 2024-10-04 PROCEDURE — 96413 CHEMO IV INFUSION 1 HR: CPT

## 2024-10-04 PROCEDURE — 96417 CHEMO IV INFUS EACH ADDL SEQ: CPT

## 2024-10-04 PROCEDURE — 25010000002 PACLITAXEL PROTEIN-BOUND PART PER 1 MG: Performed by: INTERNAL MEDICINE

## 2024-10-04 PROCEDURE — 25010000002 TRASTUZUMAB PER 10 MG: Performed by: INTERNAL MEDICINE

## 2024-10-04 PROCEDURE — 25010000002 DEXAMETHASONE SODIUM PHOSPHATE 100 MG/10ML SOLUTION: Performed by: INTERNAL MEDICINE

## 2024-10-04 PROCEDURE — 25010000002 HEPARIN LOCK FLUSH PER 10 UNITS: Performed by: INTERNAL MEDICINE

## 2024-10-04 PROCEDURE — 25810000003 SODIUM CHLORIDE 0.9 % SOLUTION: Performed by: INTERNAL MEDICINE

## 2024-10-04 PROCEDURE — 85025 COMPLETE CBC W/AUTO DIFF WBC: CPT

## 2024-10-04 RX ORDER — MONTELUKAST SODIUM 10 MG/1
10 TABLET ORAL ONCE
Status: COMPLETED | OUTPATIENT
Start: 2024-10-04 | End: 2024-10-04

## 2024-10-04 RX ORDER — DIPHENHYDRAMINE HYDROCHLORIDE 50 MG/ML
25 INJECTION INTRAMUSCULAR; INTRAVENOUS ONCE
Status: COMPLETED | OUTPATIENT
Start: 2024-10-04 | End: 2024-10-04

## 2024-10-04 RX ORDER — DIPHENHYDRAMINE HYDROCHLORIDE 50 MG/ML
25 INJECTION INTRAMUSCULAR; INTRAVENOUS ONCE
Status: CANCELLED | OUTPATIENT
Start: 2024-10-04

## 2024-10-04 RX ORDER — PACLITAXEL 100 MG/20ML
100 INJECTION, POWDER, LYOPHILIZED, FOR SUSPENSION INTRAVENOUS ONCE
Status: CANCELLED | OUTPATIENT
Start: 2024-10-04

## 2024-10-04 RX ORDER — MONTELUKAST SODIUM 10 MG/1
10 TABLET ORAL ONCE
Status: CANCELLED
Start: 2024-10-04 | End: 2024-10-04

## 2024-10-04 RX ORDER — PACLITAXEL 100 MG/20ML
100 INJECTION, POWDER, LYOPHILIZED, FOR SUSPENSION INTRAVENOUS ONCE
Status: COMPLETED | OUTPATIENT
Start: 2024-10-04 | End: 2024-10-04

## 2024-10-04 RX ORDER — DIPHENHYDRAMINE HYDROCHLORIDE 50 MG/ML
50 INJECTION INTRAMUSCULAR; INTRAVENOUS AS NEEDED
Status: CANCELLED | OUTPATIENT
Start: 2024-10-04

## 2024-10-04 RX ORDER — HEPARIN SODIUM (PORCINE) LOCK FLUSH IV SOLN 100 UNIT/ML 100 UNIT/ML
500 SOLUTION INTRAVENOUS AS NEEDED
OUTPATIENT
Start: 2024-10-04

## 2024-10-04 RX ORDER — SODIUM CHLORIDE 9 MG/ML
20 INJECTION, SOLUTION INTRAVENOUS ONCE
Status: COMPLETED | OUTPATIENT
Start: 2024-10-04 | End: 2024-10-04

## 2024-10-04 RX ORDER — FAMOTIDINE 10 MG/ML
20 INJECTION, SOLUTION INTRAVENOUS ONCE
Status: CANCELLED | OUTPATIENT
Start: 2024-10-04

## 2024-10-04 RX ORDER — SODIUM CHLORIDE 9 MG/ML
20 INJECTION, SOLUTION INTRAVENOUS ONCE
Status: CANCELLED | OUTPATIENT
Start: 2024-10-04

## 2024-10-04 RX ORDER — HEPARIN SODIUM (PORCINE) LOCK FLUSH IV SOLN 100 UNIT/ML 100 UNIT/ML
500 SOLUTION INTRAVENOUS AS NEEDED
Status: DISCONTINUED | OUTPATIENT
Start: 2024-10-04 | End: 2024-10-04 | Stop reason: HOSPADM

## 2024-10-04 RX ORDER — FAMOTIDINE 10 MG/ML
20 INJECTION, SOLUTION INTRAVENOUS ONCE
Status: COMPLETED | OUTPATIENT
Start: 2024-10-04 | End: 2024-10-04

## 2024-10-04 RX ORDER — SODIUM CHLORIDE 0.9 % (FLUSH) 0.9 %
10 SYRINGE (ML) INJECTION AS NEEDED
OUTPATIENT
Start: 2024-10-04

## 2024-10-04 RX ORDER — FAMOTIDINE 10 MG/ML
20 INJECTION, SOLUTION INTRAVENOUS AS NEEDED
Status: CANCELLED | OUTPATIENT
Start: 2024-10-04

## 2024-10-04 RX ADMIN — SODIUM CHLORIDE 20 ML/HR: 9 INJECTION, SOLUTION INTRAVENOUS at 10:33

## 2024-10-04 RX ADMIN — DIPHENHYDRAMINE HYDROCHLORIDE 25 MG: 50 INJECTION INTRAMUSCULAR; INTRAVENOUS at 10:27

## 2024-10-04 RX ADMIN — TRASTUZUMAB 140 MG: 150 INJECTION, POWDER, LYOPHILIZED, FOR SOLUTION INTRAVENOUS at 12:01

## 2024-10-04 RX ADMIN — DEXAMETHASONE SODIUM PHOSPHATE 12 MG: 10 INJECTION, SOLUTION INTRAMUSCULAR; INTRAVENOUS at 10:27

## 2024-10-04 RX ADMIN — MONTELUKAST 10 MG: 10 TABLET, FILM COATED ORAL at 10:27

## 2024-10-04 RX ADMIN — Medication 500 UNITS: at 12:35

## 2024-10-04 RX ADMIN — PACLITAXEL 165 MG: 100 INJECTION, POWDER, LYOPHILIZED, FOR SUSPENSION INTRAVENOUS at 11:21

## 2024-10-04 RX ADMIN — FAMOTIDINE 20 MG: 10 INJECTION INTRAVENOUS at 10:27

## 2024-10-04 NOTE — PROGRESS NOTES
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    She has had no significant side effects except a generalized rash which is nonpruritic but progressing on her arms legs and trunk.  Looks like capillaritis and she tells me she was diagnosed with this a couple of years ago and that prior to this she was actually treated for psoriasis in the past    I gave her Medrol Dosepak last visit and this helped very little with the rash but it is still present but not bothering her at all.  She has no cough shortness of breath or other complaints    I still think this is most likely from the taxane but we held Herceptin to see if it makes a difference and it did not seem to.  She has no neuropathy or cough or shortness of breath     She has had significant alopecia and is wearing a wig  I recommended B complex and close watch for neuropathy or cough or shortness of breath    Echocardiogram was done 6/7/2024 showing ejection fraction of 60.9%.  Echo due on 10/30/2024    Past Medical History:   Diagnosis Date    Abnormal liver function tests 02/09/2017    Description: 2000, \"-\"hepatitis profile    Anemia     Anesthesia complication     SLOW TO WAKE UP WITH HYSTERECTOMY    Arthritis     Cancer 5/16/24    Right Breast    Colon polyps     Cystocele, lateral 10/15/2020    Cystocele, midline 10/15/2020    Diabetes mellitus     Frequent UTI     H/O bone density study 10/2010    normal    H/O cardiovascular stress test 2001    H/O echocardiogram " 2001    2-D    Hashimoto's thyroiditis     Heart murmur     Hyperkalemia 2019    Hyperlipidemia     Hypertension     Hypothyroidism 2018    Incompetence of pubocervical tissue 10/15/2020    Incompetence of rectovaginal tissue 10/15/2020    Limited joint range of motion     LEFT KNEE    Loss of perineal body, female 10/15/2020    Osteoarthritis of knee 2005    left    Pelvic relaxation due to vaginal prolapse 2018    Plantar warts     foot    Postmenopausal bleeding 2019    Pregnancy      twins x1    Psoriasis     RA (rheumatoid arthritis)     Rectocele 10/15/2020    Urinary tract infection     Urine incontinence     BETTER WITH SLING    Uterovaginal prolapse     Uterovaginal prolapse, incomplete 2020    Vaginal enterocele 10/15/2020        Past Surgical History:   Procedure Laterality Date    BREAST BIOPSY Right     benign    COLONOSCOPY      COLONOSCOPY W/ POLYPECTOMY  2018    Dr Resendiz, 6 mm rectal polyp - hyperplastic    JOINT MANIPULATION Left 2021    Procedure: KNEE MANIPULATION with steroid injection;  Surgeon: Francis Tavarez MD;  Location: [facility];  Service: Orthopedics;  Laterality: Left;    JOINT REPLACEMENT  08/10/2021    LYMPH NODE BIOPSY  2024    MASTECTOMY W/ SENTINEL NODE BIOPSY Right 2024    Procedure: RIGHT MASTECTOMY WITH SENTINEL NODE BIOPSY;  Surgeon: Aracely Frye MD;  Location: [facility];  Service: General;  Laterality: Right;    PUBOVAGINAL SLING N/A 10/16/2020    Procedure: Pubovaginal sling Posterior colporrhaphy fixation Insertion of vaginal grafts Cystourethroscopy;  Surgeon: Karen Allen MD;  Location: [facility];  Service: Gynecology;  Laterality: N/A;    TOTAL KNEE ARTHROPLASTY Left 08/10/2021    Procedure: TOTAL KNEE ARTHROPLASTY;  Surgeon: Francis Tavarez MD;  Location: [facility];  Service: Orthopedics;  Laterality: Left;    TOTAL LAPAROSCOPIC HYSTERECTOMY, SACROCOLPOPEXY N/A 10/16/2020     Procedure: Laparoscopic uterosacral ligament colpopexy sacral colpopexy  Laparoscopic paravaginal repair Laparoscopic hysterectomy with bilateral salpingectomy  Laparoscopic abdominal enterocele repair;  Surgeon: Karen Allen MD;  Location: [facility];  Service: Gynecology;  Laterality: N/A;    TUBAL ABDOMINAL LIGATION      VENOUS ACCESS DEVICE (PORT) INSERTION N/A 2024    Procedure: INSERTION OF PORTACATH;  Surgeon: Aracely Frye MD;  Location: [facility];  Service: General;  Laterality: N/A;   Oncologic history  patient is a 68-year-old female with 4-year history of rheumatoid arthritis on Simponi who was found on her most recent screening mammogram have an abnormality in the right breast with a 10 mm oval mass at the 11:00 and an 11 mm mass at 12:00.  His abnormalities persisted and diagnostic imaging and the patient underwent biopsy of both these areas.  The area at 12:00 was DCIS high-grade ER/AK negative and the lesion at 11:00 was grade 3 invasive ductal carcinoma measuring 4.8 mm ER/AK negative HER2 3+    Patient was referred to Dr. Aracely Frye and underwent bilateral screening mammograms which showed large hematomas in both biopsy sites with a residual 1.1 x 1.1 cm mass at 11:00 and 12:00 was large hematoma with a biopsy cavity and at 9:00 there was another 1.2 x 1.4 cm mass which was suspicious and then extending from 90 to o'clock there was non-mass enhancement measuring 4.7 x 4.7 centimeters encompassing the 2 biopsy sites and the 9:00 mass    Patient has been referred to discuss treatment but the plan is to proceed with surgery first as masses were nonpalpable and small and initial imaging although now with a large postbiopsy hematoma was palpation is unreliable    Patient is  2 para 3 first childbirth was at age 22 she did not breast-feed menarche was at age 11 menopause 50.  She has been on no post menopausal hormone replacement    Family history is completely negative for any  malignancy and no genetic testing was done    She is not a smoker or drinker  She has not had a DVT MI or stroke    She has been on Simponi for her rheumatoid arthritis her last dose was in April and she is going to skip the June dose because surgery is scheduled    Her last bone density in 2018 was normal    No history of unusual bleeding with her hysterectomy or knee replacement    We discussed the natural history of HER2 positive breast cancer I told her that unless the tumor was bigger than we had anticipated on imaging we will plan for surgery first followed by Taxol Herceptin for 12 weeks and then Herceptin for the rest of the year.  If we have unexpected findings at surgery we would tailor the treatment to be most appropriate for her but she knows she will have some chemo at any rate.  I have asked her to have an echocardiogram done and we will see her back 2 to 3 weeks after surgery to review the final path and make a treatment decision    7/3/2024 right breast skin sparing mastectomy by Dr. Frye-pathology showing invasive mammary carcinoma (invasive ductal carcinoma) [place] histological score 3.  Tumor in the upper outer quadrant at 11:00 measuring 10 x 10 x 10 mm.  Associated high-grade ductal carcinoma in situ, solid, cribriform and comedo types involving adenosis and fibroadenomatoid change with central comedonecrosis and associated microcalcifications.  DCIS spans 15 mm extending from the upper outer quadrant and to the lower outer quadrant.  All margins are free of invasive carcinoma and DCIS.  No LVSI, posterior skeletal muscle free of tumor.  ER/LA negative, HER2 3+ positive, Ki-67 60%.  Pathologic grade pT1b, N (SN) 0.  [place] lymph nodes 3 lymph nodes removed were negative for metastatic carcinoma.      Treatment recommended with weekly Taxol/ Herceptin X 12 followed by Herceptin every 3 weeks x 1 year.     Current Outpatient Medications on File Prior to Visit   Medication Sig Dispense  Refill    BD PEN NEEDLE SEUN U/F 32G X 4 MM misc USE 1 PEN NEEDLE WITH EACH  VICTOZA INJECTION 100 each 1    Calcium Carbonate-Vit D-Min (CALCIUM 1200 PO) Take 1 tablet by mouth Daily. PT HOLDING FOR SURGERY      carvedilol (COREG) 6.25 MG tablet Take 1 tablet by mouth 2 (Two) Times a Day. 180 tablet 3    Cinnamon 500 MG tablet Take 1,000 mg by mouth 2 (two) times a day. PT HOLDING FOR SURGERY      Coenzyme Q10 (CO Q 10 PO) Take 1 capsule by mouth Daily. PT HOLDING FOR SURGERY      CRANBERRY-VITAMIN C-D MANNOSE PO Take 2 capsules by mouth Daily. PT HOLDING FOR SURGERY      DPH-Lido-AlHydr-MgHydr-Simeth (First Mouthwash, Magic Mouthwash,) suspension Swish and spit 5 mL Every 2 (Two) Hours As Needed (mouth sores). 410 mL 1    hydrocortisone 2.5 % cream Apply 1 Application topically to the appropriate area as directed 2 (Two) Times a Day. 3.5 g 0    levothyroxine (Synthroid) 88 MCG tablet Take 1 tablet by mouth Daily. 90 tablet 3    lidocaine-prilocaine (EMLA) 2.5-2.5 % cream Apply 1 Application topically to the appropriate area as directed As Needed for Mild Pain.      Liraglutide (Victoza) 18 MG/3ML solution pen-injector injection Inject 1.2 mg under the skin into the appropriate area as directed Daily. (Patient taking differently: Inject 1.2 mg under the skin into the appropriate area as directed Every Night. HOLD 1 DAY BEFORE SURGERY) 6 mL 5    lisinopril (PRINIVIL,ZESTRIL) 20 MG tablet TAKE 1 TABLET BY MOUTH DAILY 90 tablet 3    lisinopril-hydrochlorothiazide (PRINZIDE,ZESTORETIC) 20-25 MG per tablet TAKE 1 TABLET BY MOUTH DAILY 90 tablet 3    loratadine (Claritin) 10 MG tablet Take 1 tablet by mouth Every Night.      meloxicam (MOBIC) 15 MG tablet Take 1 tablet by mouth Daily. PT HOLDING FOR SURGERY      metFORMIN (GLUCOPHAGE) 500 MG tablet TAKE THREE TABLETS BY MOUTH EVERY MORNING AND THEN TAKE TWO TABLETS BY MOUTH IN THE EVENING (Patient taking differently: Take 3 tablets by mouth Daily With Breakfast. TAKE  THREE TABLETS BY MOUTH EVERY MORNING AND THEN TAKE TWO TABLETS BY MOUTH IN THE EVENING) 450 tablet 3    methylPREDNISolone (MEDROL) 4 MG dose pack Take as directed on package instructions. 21 tablet 0    multivitamin with minerals tablet tablet Take 1 tablet by mouth Daily. PT HOLDING FOR SURGERY      Omega-3 Fatty Acids (fish oil) 1200 MG capsule capsule Take 1 capsule by mouth Daily. PT HOLDING FOR SURGERY      ondansetron (ZOFRAN) 8 MG tablet Take 1 tablet by mouth 3 (Three) Times a Day As Needed for Nausea or Vomiting. 30 tablet 5    rosuvastatin (Crestor) 20 MG tablet Take 1 tablet by mouth Daily. (Patient taking differently: Take 1 tablet by mouth Every Night.) 90 tablet 1    TURMERIC PO Take 1,400 mg by mouth Daily. PT HOLDING FOR SURGERY      vitamin C (ASCORBIC ACID) 250 MG tablet Take 4 tablets by mouth Daily.      vitamin D3 125 MCG (5000 UT) capsule capsule Take 1 capsule by mouth Daily. PT HOLDING FOR SURGERY      VITAMIN E PO Take 1 capsule by mouth Daily. HOLD PRIOR TO SURG      Zinc 50 MG tablet Take 1 tablet by mouth Daily. PT HOLDING FOR SURGERY       No current facility-administered medications on file prior to visit.        ALLERGIES:  No Known Allergies     Social History     Socioeconomic History    Marital status:      Spouse name: JACK     Number of children: 3   Tobacco Use    Smoking status: Never     Passive exposure: Never    Smokeless tobacco: Never    Tobacco comments:     Caffeine: 1 drink daily   Vaping Use    Vaping status: Never Used   Substance and Sexual Activity    Alcohol use: Never    Drug use: Never    Sexual activity: Yes     Partners: Male     Birth control/protection: Post-menopausal, Tubal ligation, Essure, Hysterectomy, Partner of same sex, Surgical     Comment: SPOUSE = JACK         Family History   Problem Relation Age of Onset    COPD Mother         Passed away 1988    Heart disease Father     Hypertension Father     Diabetes Brother         Passed away 2012     "Diabetes Brother     COPD Brother     Hypertension Brother         Copd    Heart attack Brother     Diabetes Brother     Heart disease Brother     Hypertension Brother     Heart disease Maternal Grandmother     Thyroid disease Daughter     BRCA 1/2 Neg Hx     Breast cancer Neg Hx     Colon cancer Neg Hx     Endometrial cancer Neg Hx     Ovarian cancer Neg Hx     Malig Hyperthermia Neg Hx         Review of Systems   Musculoskeletal:  Positive for arthralgias.   Skin:  Positive for rash.        Objective     Vitals:    10/04/24 0932   BP: 148/68   Pulse: 71   Temp: 97.7 °F (36.5 °C)   TempSrc: Oral   SpO2: 100%   Weight: 72.4 kg (159 lb 11.2 oz)   Height: 152.4 cm (60\")   PainSc: 0-No pain             9/27/2024     1:11 PM   Current Status   ECOG score 0       Physical Exam   CONSTITUTIONAL:  Vital signs reviewed.  No distress, looks comfortable.  EYES:  Conjunctivae and lids unremarkable.  PERRLA  EARS,NOSE,MOUTH,THROAT:  Ears and nose appear unremarkable.  Lips, teeth, gums appear unremarkable.  RESPIRATORY:  Normal respiratory effort.  Lungs clear to auscultation bilaterally.  BREASTS: Left breast is benign the right breast no definite palpable masses or axillary adenopathy  CARDIOVASCULAR:  Normal S1, S2.  No murmurs rubs or gallops.  No significant lower extremity edema.  GASTROINTESTINAL: Abdomen appears unremarkable.  Nontender.  No hepatomegaly.  No splenomegaly.  LYMPHATIC:  No cervical, supraclavicular, axillary lymphadenopathy.  SKIN:  Warm.  Erythematous macules on her arms and legs which darian and are nonpalpable?  Capillaritis  PSYCHIATRIC:  Normal judgment and insight.  Normal mood and affect.  I have reexamined the patient and the results are consistent with the previously documented exam. Ashkan Belcher MD       RECENT LABS:  Hematology WBC   Date Value Ref Range Status   10/04/2024 4.07 3.40 - 10.80 10*3/mm3 Final   06/10/2024 4.43 3.40 - 10.80 10*3/mm3 Final     RBC   Date Value Ref Range " "Status   10/04/2024 3.13 (L) 3.77 - 5.28 10*6/mm3 Final   06/10/2024 4.10 3.77 - 5.28 10*6/mm3 Final     Hemoglobin   Date Value Ref Range Status   10/04/2024 9.6 (L) 12.0 - 15.9 g/dL Final     Hematocrit   Date Value Ref Range Status   10/04/2024 29.7 (L) 34.0 - 46.6 % Final     Platelets   Date Value Ref Range Status   10/04/2024 252 140 - 450 10*3/mm3 Final          ynoptic Checklist   DCIS OF THE BREAST: Biopsy   Protocol posted: 9/20/2023DCIS OF THE BREAST: BIOPSY - All Specimens  SPECIMEN   Procedure  Needle biopsy   Specimen Laterality  Right   TUMOR   Tumor Site  Clock position     12 o'clock   Histologic Type  Ductal carcinoma in situ (DCIS)   Architectural Patterns  Solid   Nuclear Grade  Grade III (high)   Necrosis  Present, central (expansive \"comedo\" necrosis)   Microcalcifications  Present in DCIS   .   Breast Biomarker Reporting Template   Protocol posted: 12/13/2023BREAST BIOMARKER REPORTING TEMPLATE - All Specimens  Test(s) Performed     Estrogen Receptor (ER) Status  Negative (less than 1%)     Internal control cells present and stain as expected   Test Type  Food and Drug Administration (FDA) cleared (test / vendor): Ventena   Primary Antibody  SP1   Scoring System  Harrison   Proportion Score  0   Intensity Score  0   Total Harrison Score  0   Test(s) Performed     Progesterone Receptor (PgR) Status  Negative (less than 1%)     Internal control cells present and stain as expected   Test Type  Food and Drug Administration (FDA) cleared (test / vendor): Ventena   Primary Antibody  1E2   Scoring System  Harrison   Proportion Score  1   Intensity Score  2   Total Harrison Score  3   Test(s) Performed  Ki-67   Ki-67 Percentage of Positive Nuclei  25 %   Primary Antibody  30-9   Cold Ischemia and Fixation Times  Meet requirements specified in latest version of the ASCO / CAP Guidelines   Cold Ischemia Time (minutes)  12 min   Fixation Time (hours)  8 hours   Testing Performed on Block Number(s)  1B   METHODS "   Fixative  Formalin   Image Analysis  Not performed   .   INVASIVE CARCINOMA OF THE BREAST: Biopsy   Protocol posted: 3/22/2023INVASIVE CARCINOMA OF THE BREAST: BIOPSY - All Specimens  SPECIMEN   Procedure  Needle biopsy   Specimen Laterality  Right   TUMOR   Tumor Site  Clock position     11 o'clock   Histologic Type  Invasive carcinoma of no special type (ductal)   Histologic Type Comment  The tumor has apocrine features   Histologic Grade ([place] Histologic Score)     Glandular (Acinar) / Tubular Differentiation  Score 3   Nuclear Pleomorphism  Score 3   Mitotic Rate  Score 2   Overall Grade  Grade 3 (scores of 8 or 9)   Largest Invasive Focus in this Limited Biopsy Sample  4.8 mm   Ductal Carcinoma In Situ (DCIS)  Not identified   Lymphatic and / or Vascular Invasion  Not identified   Microcalcifications  Not identified   .   Breast Biomarker Reporting Template   Protocol posted: 12/13/2023BREAST BIOMARKER REPORTING TEMPLATE - All Specimens  Test(s) Performed     Estrogen Receptor (ER) Status  Negative (less than 1%)     Internal control cells present and stain as expected   Test Type  Food and Drug Administration (FDA) cleared (test / vendor): Ventena   Primary Antibody  SP1   Scoring System  Harrison   Proportion Score  0   Intensity Score  0   Total Harrison Score  0   Test(s) Performed     Progesterone Receptor (PgR) Status  Negative (less than 1%)     Internal control cells present and stain as expected   Test Type  Food and Drug Administration (FDA) cleared (test / vendor): Ventena   Primary Antibody  1E2   Scoring System  Harrison   Proportion Score  0   Intensity Score  0   Total Harrison Score  0   Test(s) Performed     HER2 by Immunohistochemistry  Positive (Score 3+)   Percentage of Cells with Uniform Intense Complete Membrane Staining  100 %   Test Type  Food and Drug Administration (FDA) cleared (test / vendor): Ventena   Primary Antibody  4B5   Test(s) Performed  Ki-67   Ki-67 Percentage of Positive  "Nuclei  60 %          ynoptic Checklist  7/3/24   INVASIVE CARCINOMA OF THE BREAST: Resection   8th Edition - Protocol posted: 12/13/2023INVASIVE CARCINOMA OF THE BREAST: RESECTION - All Specimens  SPECIMEN   Procedure  Total mastectomy   Specimen Laterality  Right   TUMOR   Tumor Site  Upper outer quadrant   Histologic Type  Invasive carcinoma of no special type (ductal)   Histologic Grade ([place] Histologic Score)     Glandular (Acinar) / Tubular Differentiation  Score 3   Nuclear Pleomorphism  Score 3   Mitotic Rate  Score 2   Overall Grade  Grade 3 (scores of 8 or 9)   Tumor Size  Greatest dimension of largest invasive focus (Millimeters): 10 mm   Tumor Focality  Single focus of invasive carcinoma   Ductal Carcinoma In Situ (DCIS)  Present     Negative for extensive intraductal component (EIC)   Size (Extent) of DCIS  Estimated size (extent) of DCIS is at least (Millimeters): 50 mm   Architectural Patterns  [place]     Cribriform     Solid   Nuclear Grade  Grade III (high)   Necrosis  Present, central (expansive \"comedo\" necrosis)   Lobular Carcinoma In Situ (LCIS)  Not identified   Lymphatic and / or Vascular Invasion  Not identified   Dermal Lymphatic and / or Vascular Invasion  Not identified   Microcalcifications  Present in DCIS     Present in non-neoplastic tissue   Treatment Effect in the Breast  No known presurgical therapy   MARGINS   Margin Status for Invasive Carcinoma  All margins negative for invasive carcinoma   Distance from Invasive Carcinoma to Closest Margin  6 mm   Closest Margin(s) to Invasive Carcinoma  Anterior   Distance from Invasive Carcinoma to Posterior Margin  17 mm   Distance from Invasive Carcinoma to Superior Margin  85 mm   Distance from Invasive Carcinoma to Inferior Margin  100 mm   Distance from Invasive Carcinoma to Medial Margin  100 mm   Distance from Invasive Carcinoma to Lateral Margin  50 mm   Margin Status for DCIS  All margins negative for DCIS   Distance from DCIS " to Closest Margin  11 mm   Closest Margin(s) to DCIS  Anterior   Distance from DCIS to Posterior Margin  15 mm   Distance from DCIS to Superior Margin  60 mm   Distance from DCIS to Inferior Margin  75 mm   Distance from DCIS to Medial Margin  80 mm   Distance from DCIS to Lateral Margin  40 mm   REGIONAL LYMPH NODES   Regional Lymph Node Status  All regional lymph nodes negative for tumor   Total Number of Lymph Nodes Examined (sentinel and non-sentinel)  3   Number of [place] Nodes Examined  3   pTNM CLASSIFICATION (AJCC 8th Edition)   Reporting of pT, pN, and (when applicable) pM categories is based on information available to the pathologist at the time the report is issued. As per the AJCC (Chapter 1, 8th Ed.) it is the managing physician’s responsibility to establish the final pathologic stage based upon all pertinent information, including but potentially not limited to this pathology report.   pT Category  pT1b   pN Category  pN0   N Suffix  (sn)   SPECIAL STUDIES        Estrogen Receptor (ER) Status  Negative        Progesterone Receptor (PgR) Status  Negative        HER2 (by immunohistochemistry)  Positive (Score 3+)   Percentage of Cells with Uniform Intense Complete Membrane Staining  100 %        Ki-67 Percentage of Positive Nuclei  60 %        Assessment & Plan     cT1cN0 grade 3 ER/AR- her2 +RIGHT BREAST CANCER post biopsy with associated high-grade DCIS ER/AR negative and MRI evidence of non-mass enhancement which would preclude lumpectomy  pT1bN0 ER/AR negative HER2 positive right breast cancer postmastectomy and sentinel node biopsy  Weekly Taxol Herceptin for 12 weeks followed by every 3 to weeks for the rest of the year planned  No radiation-EF adequate  Tolerating Taxol Herceptin well as of 8/16/2024  Worsening maculopapular rash on her arms legs and trunk on 8/30/2024 changed to Abraxane and nonbiosimilar Herceptin      2. Rheumatoid arthritis on Simponi for 3 and half years    3.  Negative  family history of any malignancy    4.  Anemia: Hgb 10.8 today 7/26/2024- was normal prior to surgery (12.1 on 6/25/2024).  Denies bleeding issues.  Will check ferritin, iron profile, B12, folate.     5.  Hashimoto's thyroiditis    Plan  Proceed with 11/12 adjuvant weekly Abraxane/Herceptin today.  Abraxane Herceptin 1/ week single agent Herceptin weekly for 2 weeks   see me in 4 weeks.  call/ return sooner should the patient develop any new concerns or problems.  Or if her rash becomes worse in which case a Medrol Dosepak will be given  Follow-up with dermatology soon  Echocardiogram scheduled        Patient is on a high risk medication requiring close monitoring for toxicity.

## 2024-10-07 LAB — REF LAB TEST RESULTS: NORMAL

## 2024-10-09 ENCOUNTER — HOSPITAL ENCOUNTER (OUTPATIENT)
Dept: CARDIOLOGY | Facility: HOSPITAL | Age: 69
Discharge: HOME | End: 2024-10-09
Admitting: INTERNAL MEDICINE
Payer: MEDICARE

## 2024-10-09 ENCOUNTER — TELEPHONE (OUTPATIENT)
Dept: CARDIOLOGY | Facility: CLINIC | Age: 69
End: 2024-10-09

## 2024-10-09 VITALS
SYSTOLIC BLOOD PRESSURE: 150 MMHG | WEIGHT: 159 LBS | OXYGEN SATURATION: 99 % | DIASTOLIC BLOOD PRESSURE: 60 MMHG | HEART RATE: 77 BPM | BODY MASS INDEX: 31.22 KG/M2 | HEIGHT: 60 IN

## 2024-10-09 DIAGNOSIS — C50.811 MALIGNANT NEOPLASM OF OVERLAPPING SITES OF RIGHT BREAST IN FEMALE, ESTROGEN RECEPTOR NEGATIVE: ICD-10-CM

## 2024-10-09 DIAGNOSIS — R94.31 ABNORMAL ELECTROCARDIOGRAM (ECG) (EKG): ICD-10-CM

## 2024-10-09 DIAGNOSIS — Z17.1 MALIGNANT NEOPLASM OF OVERLAPPING SITES OF RIGHT BREAST IN FEMALE, ESTROGEN RECEPTOR NEGATIVE: ICD-10-CM

## 2024-10-09 LAB
AORTIC ARCH: 2.5 CM
AORTIC DIMENSIONLESS INDEX: 0.5 (DI)
ASCENDING AORTA: 2.6 CM
BH CV ECHO LEFT VENTRICLE GLOBAL LONGITUDINAL STRAIN: -21.3 %
BH CV ECHO MEAS - ACS: 1.42 CM
BH CV ECHO MEAS - AO MAX PG: 17.5 MMHG
BH CV ECHO MEAS - AO MEAN PG: 9.6 MMHG
BH CV ECHO MEAS - AO ROOT DIAM: 2.9 CM
BH CV ECHO MEAS - AO V2 MAX: 209.3 CM/SEC
BH CV ECHO MEAS - AO V2 VTI: 52.1 CM
BH CV ECHO MEAS - AVA(I,D): 1.39 CM2
BH CV ECHO MEAS - EDV(CUBED): 59 ML
BH CV ECHO MEAS - EDV(MOD-SP2): 57 ML
BH CV ECHO MEAS - EDV(MOD-SP4): 52 ML
BH CV ECHO MEAS - EF(MOD-BP): 58.9 %
BH CV ECHO MEAS - EF(MOD-SP2): 59.6 %
BH CV ECHO MEAS - EF(MOD-SP4): 57.7 %
BH CV ECHO MEAS - EF_3D-VOL: 67 %
BH CV ECHO MEAS - ESV(CUBED): 13.5 ML
BH CV ECHO MEAS - ESV(MOD-SP2): 23 ML
BH CV ECHO MEAS - ESV(MOD-SP4): 22 ML
BH CV ECHO MEAS - FS: 38.8 %
BH CV ECHO MEAS - IVS/LVPW: 0.97 CM
BH CV ECHO MEAS - IVSD: 0.91 CM
BH CV ECHO MEAS - LA 3D VOL INDEX: 19
BH CV ECHO MEAS - LAT PEAK E' VEL: 11.7 CM/SEC
BH CV ECHO MEAS - LV DIASTOLIC VOL/BSA (35-75): 30.9 CM2
BH CV ECHO MEAS - LV MASS(C)D: 109.1 GRAMS
BH CV ECHO MEAS - LV MAX PG: 3.4 MMHG
BH CV ECHO MEAS - LV MEAN PG: 2 MMHG
BH CV ECHO MEAS - LV SYSTOLIC VOL/BSA (12-30): 13.1 CM2
BH CV ECHO MEAS - LV V1 MAX: 92.8 CM/SEC
BH CV ECHO MEAS - LV V1 VTI: 21.3 CM
BH CV ECHO MEAS - LVIDD: 3.9 CM
BH CV ECHO MEAS - LVIDS: 2.38 CM
BH CV ECHO MEAS - LVOT AREA: 3.4 CM2
BH CV ECHO MEAS - LVOT DIAM: 2.08 CM
BH CV ECHO MEAS - LVPWD: 0.94 CM
BH CV ECHO MEAS - MED PEAK E' VEL: 7.5 CM/SEC
BH CV ECHO MEAS - MV A DUR: 0.11 SEC
BH CV ECHO MEAS - MV A MAX VEL: 98.5 CM/SEC
BH CV ECHO MEAS - MV DEC SLOPE: 651.1 CM/SEC2
BH CV ECHO MEAS - MV DEC TIME: 0.2 SEC
BH CV ECHO MEAS - MV E MAX VEL: 117 CM/SEC
BH CV ECHO MEAS - MV E/A: 1.19
BH CV ECHO MEAS - MV MAX PG: 5.9 MMHG
BH CV ECHO MEAS - MV MEAN PG: 2.8 MMHG
BH CV ECHO MEAS - MV P1/2T: 53.9 MSEC
BH CV ECHO MEAS - MV V2 VTI: 43.9 CM
BH CV ECHO MEAS - MVA(P1/2T): 4.1 CM2
BH CV ECHO MEAS - MVA(VTI): 1.65 CM2
BH CV ECHO MEAS - PA ACC TIME: 0.11 SEC
BH CV ECHO MEAS - PA V2 MAX: 94.1 CM/SEC
BH CV ECHO MEAS - PULM A REVS DUR: 0.09 SEC
BH CV ECHO MEAS - PULM A REVS VEL: 36 CM/SEC
BH CV ECHO MEAS - PULM DIAS VEL: 51.6 CM/SEC
BH CV ECHO MEAS - PULM S/D: 0.94
BH CV ECHO MEAS - PULM SYS VEL: 48.8 CM/SEC
BH CV ECHO MEAS - QP/QS: 0.99
BH CV ECHO MEAS - RAP SYSTOLE: 3 MMHG
BH CV ECHO MEAS - RV MAX PG: 2.18 MMHG
BH CV ECHO MEAS - RV V1 MAX: 73.9 CM/SEC
BH CV ECHO MEAS - RV V1 VTI: 19.4 CM
BH CV ECHO MEAS - RVOT DIAM: 2.17 CM
BH CV ECHO MEAS - RVSP: 14.5 MMHG
BH CV ECHO MEAS - SUP REN AO DIAM: 1.6 CM
BH CV ECHO MEAS - SV(LVOT): 72.4 ML
BH CV ECHO MEAS - SV(MOD-SP2): 34 ML
BH CV ECHO MEAS - SV(MOD-SP4): 30 ML
BH CV ECHO MEAS - SV(RVOT): 71.9 ML
BH CV ECHO MEAS - SVI(LVOT): 43 ML/M2
BH CV ECHO MEAS - SVI(MOD-SP2): 20.2 ML/M2
BH CV ECHO MEAS - SVI(MOD-SP4): 17.8 ML/M2
BH CV ECHO MEAS - TAPSE (>1.6): 1.92 CM
BH CV ECHO MEAS - TR MAX PG: 11.5 MMHG
BH CV ECHO MEAS - TR MAX VEL: 169.7 CM/SEC
BH CV ECHO MEASUREMENTS AVERAGE E/E' RATIO: 12.19
BH CV VAS BP LEFT ARM: NORMAL MMHG
BH CV XLRA - RV BASE: 2.5 CM
BH CV XLRA - RV LENGTH: 7.2 CM
BH CV XLRA - RV MID: 2.7 CM
BH CV XLRA - TDI S': 10.1 CM/SEC
LEFT ATRIUM VOLUME INDEX: 25.2 ML/M2
SINUS: 2.7 CM
STJ: 2.32 CM

## 2024-10-09 PROCEDURE — 25510000001 PERFLUTREN 6.52 MG/ML SUSPENSION 2 ML VIAL: Performed by: INTERNAL MEDICINE

## 2024-10-09 PROCEDURE — 93306 TTE W/DOPPLER COMPLETE: CPT

## 2024-10-09 PROCEDURE — 93356 MYOCRD STRAIN IMG SPCKL TRCK: CPT

## 2024-10-09 RX ADMIN — PERFLUTREN 2 ML: 6.52 INJECTION, SUSPENSION INTRAVENOUS at 12:37

## 2024-10-10 NOTE — TELEPHONE ENCOUNTER
Results called to pt.  Instructed to call with any further questions or concerns.  Verbalized understanding.    Winsome Baldwin RN  Triage Nurse, [facility] [place]  10/10/24 08:46 EDT

## 2024-10-11 ENCOUNTER — INFUSION (OUTPATIENT)
Dept: ONCOLOGY | Facility: HOSPITAL | Age: 69
End: 2024-10-11
Payer: MEDICARE

## 2024-10-11 VITALS
HEART RATE: 78 BPM | OXYGEN SATURATION: 99 % | SYSTOLIC BLOOD PRESSURE: 145 MMHG | DIASTOLIC BLOOD PRESSURE: 71 MMHG | WEIGHT: 159.4 LBS | RESPIRATION RATE: 16 BRPM | TEMPERATURE: 98.2 F | BODY MASS INDEX: 31.13 KG/M2

## 2024-10-11 DIAGNOSIS — Z45.2 FITTING AND ADJUSTMENT OF VASCULAR CATHETER: ICD-10-CM

## 2024-10-11 DIAGNOSIS — C50.811 MALIGNANT NEOPLASM OF OVERLAPPING SITES OF RIGHT BREAST IN FEMALE, ESTROGEN RECEPTOR NEGATIVE: Primary | ICD-10-CM

## 2024-10-11 DIAGNOSIS — Z17.1 MALIGNANT NEOPLASM OF OVERLAPPING SITES OF RIGHT BREAST IN FEMALE, ESTROGEN RECEPTOR NEGATIVE: Primary | ICD-10-CM

## 2024-10-11 LAB
ALBUMIN SERPL-MCNC: 4 G/DL (ref 3.5–5.2)
ALBUMIN/GLOB SERPL: 1.4 G/DL
ALP SERPL-CCNC: 50 U/L (ref 39–117)
ALT SERPL W P-5'-P-CCNC: 21 U/L (ref 1–33)
ANION GAP SERPL CALCULATED.3IONS-SCNC: 12.1 MMOL/L (ref 5–15)
AST SERPL-CCNC: 28 U/L (ref 1–32)
BASOPHILS # BLD AUTO: 0.05 10*3/MM3 (ref 0–0.2)
BASOPHILS NFR BLD AUTO: 1.3 % (ref 0–1.5)
BILIRUB SERPL-MCNC: 0.2 MG/DL (ref 0–1.2)
BUN SERPL-MCNC: 11 MG/DL (ref 8–23)
BUN/CREAT SERPL: 19.6 (ref 7–25)
CALCIUM SPEC-SCNC: 9.5 MG/DL (ref 8.6–10.5)
CHLORIDE SERPL-SCNC: 101 MMOL/L (ref 98–107)
CO2 SERPL-SCNC: 26.9 MMOL/L (ref 22–29)
CREAT SERPL-MCNC: 0.56 MG/DL (ref 0.57–1)
DEPRECATED RDW RBC AUTO: 53.3 FL (ref 37–54)
EGFRCR SERPLBLD CKD-EPI 2021: 99.6 ML/MIN/1.73
EOSINOPHIL # BLD AUTO: 0.23 10*3/MM3 (ref 0–0.4)
EOSINOPHIL NFR BLD AUTO: 6.2 % (ref 0.3–6.2)
ERYTHROCYTE [DISTWIDTH] IN BLOOD BY AUTOMATED COUNT: 15.5 % (ref 12.3–15.4)
GLOBULIN UR ELPH-MCNC: 2.8 GM/DL
GLUCOSE SERPL-MCNC: 127 MG/DL (ref 65–99)
HCT VFR BLD AUTO: 30.2 % (ref 34–46.6)
HGB BLD-MCNC: 9.8 G/DL (ref 12–15.9)
IMM GRANULOCYTES # BLD AUTO: 0.07 10*3/MM3 (ref 0–0.05)
IMM GRANULOCYTES NFR BLD AUTO: 1.9 % (ref 0–0.5)
LYMPHOCYTES # BLD AUTO: 0.83 10*3/MM3 (ref 0.7–3.1)
LYMPHOCYTES NFR BLD AUTO: 22.3 % (ref 19.6–45.3)
MCH RBC QN AUTO: 31.1 PG (ref 26.6–33)
MCHC RBC AUTO-ENTMCNC: 32.5 G/DL (ref 31.5–35.7)
MCV RBC AUTO: 95.9 FL (ref 79–97)
MONOCYTES # BLD AUTO: 0.33 10*3/MM3 (ref 0.1–0.9)
MONOCYTES NFR BLD AUTO: 8.9 % (ref 5–12)
NEUTROPHILS NFR BLD AUTO: 2.21 10*3/MM3 (ref 1.7–7)
NEUTROPHILS NFR BLD AUTO: 59.4 % (ref 42.7–76)
NRBC BLD AUTO-RTO: 0.5 /100 WBC (ref 0–0.2)
PLATELET # BLD AUTO: 275 10*3/MM3 (ref 140–450)
PMV BLD AUTO: 9.7 FL (ref 6–12)
POTASSIUM SERPL-SCNC: 4.2 MMOL/L (ref 3.5–5.2)
PROT SERPL-MCNC: 6.8 G/DL (ref 6–8.5)
RBC # BLD AUTO: 3.15 10*6/MM3 (ref 3.77–5.28)
SODIUM SERPL-SCNC: 140 MMOL/L (ref 136–145)
WBC NRBC COR # BLD AUTO: 3.72 10*3/MM3 (ref 3.4–10.8)

## 2024-10-11 PROCEDURE — 25010000002 PACLITAXEL PROTEIN-BOUND PART PER 1 MG: Performed by: INTERNAL MEDICINE

## 2024-10-11 PROCEDURE — 96417 CHEMO IV INFUS EACH ADDL SEQ: CPT

## 2024-10-11 PROCEDURE — 96413 CHEMO IV INFUSION 1 HR: CPT

## 2024-10-11 PROCEDURE — 25010000002 DIPHENHYDRAMINE PER 50 MG: Performed by: INTERNAL MEDICINE

## 2024-10-11 PROCEDURE — 85025 COMPLETE CBC W/AUTO DIFF WBC: CPT

## 2024-10-11 PROCEDURE — 25010000002 DEXAMETHASONE SODIUM PHOSPHATE 100 MG/10ML SOLUTION: Performed by: INTERNAL MEDICINE

## 2024-10-11 PROCEDURE — 80053 COMPREHEN METABOLIC PANEL: CPT

## 2024-10-11 PROCEDURE — 25010000002 HEPARIN LOCK FLUSH PER 10 UNITS: Performed by: INTERNAL MEDICINE

## 2024-10-11 PROCEDURE — 25010000002 TRASTUZUMAB PER 10 MG: Performed by: INTERNAL MEDICINE

## 2024-10-11 PROCEDURE — 25810000003 SODIUM CHLORIDE 0.9 % SOLUTION 250 ML FLEX CONT: Performed by: INTERNAL MEDICINE

## 2024-10-11 PROCEDURE — 96375 TX/PRO/DX INJ NEW DRUG ADDON: CPT

## 2024-10-11 PROCEDURE — 25810000003 SODIUM CHLORIDE 0.9 % SOLUTION: Performed by: INTERNAL MEDICINE

## 2024-10-11 RX ORDER — PACLITAXEL 100 MG/20ML
100 INJECTION, POWDER, LYOPHILIZED, FOR SUSPENSION INTRAVENOUS ONCE
Status: COMPLETED | OUTPATIENT
Start: 2024-10-11 | End: 2024-10-11

## 2024-10-11 RX ORDER — SODIUM CHLORIDE 0.9 % (FLUSH) 0.9 %
10 SYRINGE (ML) INJECTION AS NEEDED
Status: DISCONTINUED | OUTPATIENT
Start: 2024-10-11 | End: 2024-10-11 | Stop reason: HOSPADM

## 2024-10-11 RX ORDER — MONTELUKAST SODIUM 10 MG/1
10 TABLET ORAL ONCE
Status: COMPLETED | OUTPATIENT
Start: 2024-10-11 | End: 2024-10-11

## 2024-10-11 RX ORDER — HEPARIN SODIUM (PORCINE) LOCK FLUSH IV SOLN 100 UNIT/ML 100 UNIT/ML
500 SOLUTION INTRAVENOUS AS NEEDED
OUTPATIENT
Start: 2024-10-11

## 2024-10-11 RX ORDER — DIPHENHYDRAMINE HYDROCHLORIDE 50 MG/ML
25 INJECTION INTRAMUSCULAR; INTRAVENOUS ONCE
Status: COMPLETED | OUTPATIENT
Start: 2024-10-11 | End: 2024-10-11

## 2024-10-11 RX ORDER — DIPHENHYDRAMINE HYDROCHLORIDE 50 MG/ML
50 INJECTION INTRAMUSCULAR; INTRAVENOUS AS NEEDED
Status: CANCELLED | OUTPATIENT
Start: 2024-10-11

## 2024-10-11 RX ORDER — SODIUM CHLORIDE 0.9 % (FLUSH) 0.9 %
10 SYRINGE (ML) INJECTION AS NEEDED
OUTPATIENT
Start: 2024-10-11

## 2024-10-11 RX ORDER — FAMOTIDINE 10 MG/ML
20 INJECTION, SOLUTION INTRAVENOUS ONCE
Status: COMPLETED | OUTPATIENT
Start: 2024-10-11 | End: 2024-10-11

## 2024-10-11 RX ORDER — SODIUM CHLORIDE 9 MG/ML
20 INJECTION, SOLUTION INTRAVENOUS ONCE
Status: COMPLETED | OUTPATIENT
Start: 2024-10-11 | End: 2024-10-11

## 2024-10-11 RX ORDER — HEPARIN SODIUM (PORCINE) LOCK FLUSH IV SOLN 100 UNIT/ML 100 UNIT/ML
500 SOLUTION INTRAVENOUS AS NEEDED
Status: DISCONTINUED | OUTPATIENT
Start: 2024-10-11 | End: 2024-10-11 | Stop reason: HOSPADM

## 2024-10-11 RX ORDER — FAMOTIDINE 10 MG/ML
20 INJECTION, SOLUTION INTRAVENOUS AS NEEDED
Status: CANCELLED | OUTPATIENT
Start: 2024-10-11

## 2024-10-11 RX ADMIN — Medication 500 UNITS: at 11:56

## 2024-10-11 RX ADMIN — Medication 10 ML: at 11:56

## 2024-10-11 RX ADMIN — TRASTUZUMAB 140 MG: 150 INJECTION, POWDER, LYOPHILIZED, FOR SOLUTION INTRAVENOUS at 11:20

## 2024-10-11 RX ADMIN — FAMOTIDINE 20 MG: 10 INJECTION INTRAVENOUS at 09:44

## 2024-10-11 RX ADMIN — PACLITAXEL 165 MG: 100 INJECTION, POWDER, LYOPHILIZED, FOR SUSPENSION INTRAVENOUS at 10:46

## 2024-10-11 RX ADMIN — DIPHENHYDRAMINE HYDROCHLORIDE 25 MG: 50 INJECTION, SOLUTION INTRAMUSCULAR; INTRAVENOUS at 09:45

## 2024-10-11 RX ADMIN — DEXAMETHASONE SODIUM PHOSPHATE 12 MG: 10 INJECTION, SOLUTION INTRAMUSCULAR; INTRAVENOUS at 09:50

## 2024-10-11 RX ADMIN — SODIUM CHLORIDE 20 ML/HR: 9 INJECTION, SOLUTION INTRAVENOUS at 09:44

## 2024-10-11 RX ADMIN — MONTELUKAST 10 MG: 10 TABLET, FILM COATED ORAL at 09:43

## 2024-10-11 NOTE — NURSING NOTE
Rash assessed.  Pt reports that she did visit dermatology and a biopsy of rash was taken from arm. Also reports that the rash seems lighter, but this was day to day. Derm told pt that they feel that this is drug related.  Dr. Belcher did switch pt to name brand herceptin for today's and future infusions.

## 2024-10-18 ENCOUNTER — INFUSION (OUTPATIENT)
Dept: ONCOLOGY | Facility: HOSPITAL | Age: 69
End: 2024-10-18
Payer: MEDICARE

## 2024-10-18 VITALS
WEIGHT: 158.4 LBS | SYSTOLIC BLOOD PRESSURE: 138 MMHG | DIASTOLIC BLOOD PRESSURE: 60 MMHG | TEMPERATURE: 97.8 F | RESPIRATION RATE: 16 BRPM | BODY MASS INDEX: 30.94 KG/M2 | OXYGEN SATURATION: 98 % | HEART RATE: 92 BPM

## 2024-10-18 DIAGNOSIS — C50.811 MALIGNANT NEOPLASM OF OVERLAPPING SITES OF RIGHT BREAST IN FEMALE, ESTROGEN RECEPTOR NEGATIVE: Primary | ICD-10-CM

## 2024-10-18 DIAGNOSIS — Z17.1 MALIGNANT NEOPLASM OF OVERLAPPING SITES OF RIGHT BREAST IN FEMALE, ESTROGEN RECEPTOR NEGATIVE: Primary | ICD-10-CM

## 2024-10-18 LAB
BASOPHILS # BLD AUTO: 0.03 10*3/MM3 (ref 0–0.2)
BASOPHILS NFR BLD AUTO: 0.6 % (ref 0–1.5)
DEPRECATED RDW RBC AUTO: 54.8 FL (ref 37–54)
EOSINOPHIL # BLD AUTO: 0.13 10*3/MM3 (ref 0–0.4)
EOSINOPHIL NFR BLD AUTO: 2.5 % (ref 0.3–6.2)
ERYTHROCYTE [DISTWIDTH] IN BLOOD BY AUTOMATED COUNT: 15.7 % (ref 12.3–15.4)
HCT VFR BLD AUTO: 28.8 % (ref 34–46.6)
HGB BLD-MCNC: 9.4 G/DL (ref 12–15.9)
IMM GRANULOCYTES # BLD AUTO: 0.12 10*3/MM3 (ref 0–0.05)
IMM GRANULOCYTES NFR BLD AUTO: 2.3 % (ref 0–0.5)
LYMPHOCYTES # BLD AUTO: 0.97 10*3/MM3 (ref 0.7–3.1)
LYMPHOCYTES NFR BLD AUTO: 18.8 % (ref 19.6–45.3)
MCH RBC QN AUTO: 31.3 PG (ref 26.6–33)
MCHC RBC AUTO-ENTMCNC: 32.6 G/DL (ref 31.5–35.7)
MCV RBC AUTO: 96 FL (ref 79–97)
MONOCYTES # BLD AUTO: 0.48 10*3/MM3 (ref 0.1–0.9)
MONOCYTES NFR BLD AUTO: 9.3 % (ref 5–12)
NEUTROPHILS NFR BLD AUTO: 3.43 10*3/MM3 (ref 1.7–7)
NEUTROPHILS NFR BLD AUTO: 66.5 % (ref 42.7–76)
NRBC BLD AUTO-RTO: 0 /100 WBC (ref 0–0.2)
PLATELET # BLD AUTO: 281 10*3/MM3 (ref 140–450)
PMV BLD AUTO: 9.3 FL (ref 6–12)
RBC # BLD AUTO: 3 10*6/MM3 (ref 3.77–5.28)
WBC NRBC COR # BLD AUTO: 5.16 10*3/MM3 (ref 3.4–10.8)

## 2024-10-18 PROCEDURE — 25810000003 SODIUM CHLORIDE 0.9 % SOLUTION 250 ML FLEX CONT: Performed by: NURSE PRACTITIONER

## 2024-10-18 PROCEDURE — 63710000001 DIPHENHYDRAMINE PER 50 MG: Performed by: NURSE PRACTITIONER

## 2024-10-18 PROCEDURE — 96413 CHEMO IV INFUSION 1 HR: CPT

## 2024-10-18 PROCEDURE — 25010000002 TRASTUZUMAB PER 10 MG: Performed by: NURSE PRACTITIONER

## 2024-10-18 PROCEDURE — 85025 COMPLETE CBC W/AUTO DIFF WBC: CPT

## 2024-10-18 RX ORDER — SODIUM CHLORIDE 9 MG/ML
20 INJECTION, SOLUTION INTRAVENOUS ONCE
Status: DISCONTINUED | OUTPATIENT
Start: 2024-10-18 | End: 2024-10-18 | Stop reason: HOSPADM

## 2024-10-18 RX ORDER — DIPHENHYDRAMINE HCL 25 MG
25 CAPSULE ORAL ONCE
Status: COMPLETED | OUTPATIENT
Start: 2024-10-18 | End: 2024-10-18

## 2024-10-18 RX ADMIN — DIPHENHYDRAMINE HYDROCHLORIDE 25 MG: 25 CAPSULE ORAL at 09:38

## 2024-10-18 RX ADMIN — TRASTUZUMAB 140 MG: 150 INJECTION, POWDER, LYOPHILIZED, FOR SOLUTION INTRAVENOUS at 09:53

## 2024-10-21 RX ORDER — ROSUVASTATIN CALCIUM 20 MG/1
20 TABLET, COATED ORAL DAILY
Qty: 90 TABLET | Refills: 1 | Status: SHIPPED | OUTPATIENT
Start: 2024-10-21

## 2024-10-24 ENCOUNTER — TELEPHONE (OUTPATIENT)
Dept: ONCOLOGY | Facility: CLINIC | Age: 69
End: 2024-10-24
Payer: MEDICARE

## 2024-10-24 NOTE — TELEPHONE ENCOUNTER
Called patient, said that she is having a cough and wants to know if she should still come in for her infusion. I told her she should be fine as long as she is not having any other symptoms, she declines other symptoms and states no fever.

## 2024-10-25 ENCOUNTER — INFUSION (OUTPATIENT)
Dept: ONCOLOGY | Facility: HOSPITAL | Age: 69
End: 2024-10-25
Payer: MEDICARE

## 2024-10-25 VITALS
WEIGHT: 161.8 LBS | DIASTOLIC BLOOD PRESSURE: 80 MMHG | TEMPERATURE: 97.1 F | OXYGEN SATURATION: 96 % | RESPIRATION RATE: 16 BRPM | SYSTOLIC BLOOD PRESSURE: 182 MMHG | BODY MASS INDEX: 31.6 KG/M2 | HEART RATE: 89 BPM

## 2024-10-25 DIAGNOSIS — C50.811 MALIGNANT NEOPLASM OF OVERLAPPING SITES OF RIGHT BREAST IN FEMALE, ESTROGEN RECEPTOR NEGATIVE: Primary | ICD-10-CM

## 2024-10-25 DIAGNOSIS — Z17.1 MALIGNANT NEOPLASM OF OVERLAPPING SITES OF RIGHT BREAST IN FEMALE, ESTROGEN RECEPTOR NEGATIVE: Primary | ICD-10-CM

## 2024-10-25 LAB
ALBUMIN SERPL-MCNC: 3.9 G/DL (ref 3.5–5.2)
ALBUMIN/GLOB SERPL: 1.3 G/DL
ALP SERPL-CCNC: 58 U/L (ref 39–117)
ALT SERPL W P-5'-P-CCNC: 23 U/L (ref 1–33)
ANION GAP SERPL CALCULATED.3IONS-SCNC: 12 MMOL/L (ref 5–15)
AST SERPL-CCNC: 31 U/L (ref 1–32)
BASOPHILS # BLD AUTO: 0.05 10*3/MM3 (ref 0–0.2)
BASOPHILS NFR BLD AUTO: 0.6 % (ref 0–1.5)
BILIRUB SERPL-MCNC: 0.3 MG/DL (ref 0–1.2)
BUN SERPL-MCNC: 10 MG/DL (ref 8–23)
BUN/CREAT SERPL: 18.2 (ref 7–25)
CALCIUM SPEC-SCNC: 9.4 MG/DL (ref 8.6–10.5)
CHLORIDE SERPL-SCNC: 100 MMOL/L (ref 98–107)
CO2 SERPL-SCNC: 29 MMOL/L (ref 22–29)
CREAT SERPL-MCNC: 0.55 MG/DL (ref 0.57–1)
DEPRECATED RDW RBC AUTO: 53.9 FL (ref 37–54)
EGFRCR SERPLBLD CKD-EPI 2021: 100 ML/MIN/1.73
EOSINOPHIL # BLD AUTO: 0.19 10*3/MM3 (ref 0–0.4)
EOSINOPHIL NFR BLD AUTO: 2.5 % (ref 0.3–6.2)
ERYTHROCYTE [DISTWIDTH] IN BLOOD BY AUTOMATED COUNT: 15.6 % (ref 12.3–15.4)
GLOBULIN UR ELPH-MCNC: 3 GM/DL
GLUCOSE SERPL-MCNC: 122 MG/DL (ref 65–99)
HCT VFR BLD AUTO: 30.4 % (ref 34–46.6)
HGB BLD-MCNC: 9.8 G/DL (ref 12–15.9)
IMM GRANULOCYTES # BLD AUTO: 0.1 10*3/MM3 (ref 0–0.05)
IMM GRANULOCYTES NFR BLD AUTO: 1.3 % (ref 0–0.5)
LYMPHOCYTES # BLD AUTO: 1.2 10*3/MM3 (ref 0.7–3.1)
LYMPHOCYTES NFR BLD AUTO: 15.5 % (ref 19.6–45.3)
MCH RBC QN AUTO: 30.4 PG (ref 26.6–33)
MCHC RBC AUTO-ENTMCNC: 32.2 G/DL (ref 31.5–35.7)
MCV RBC AUTO: 94.4 FL (ref 79–97)
MONOCYTES # BLD AUTO: 0.79 10*3/MM3 (ref 0.1–0.9)
MONOCYTES NFR BLD AUTO: 10.2 % (ref 5–12)
NEUTROPHILS NFR BLD AUTO: 5.42 10*3/MM3 (ref 1.7–7)
NEUTROPHILS NFR BLD AUTO: 69.9 % (ref 42.7–76)
NRBC BLD AUTO-RTO: 0 /100 WBC (ref 0–0.2)
PLATELET # BLD AUTO: 277 10*3/MM3 (ref 140–450)
PMV BLD AUTO: 9.6 FL (ref 6–12)
POTASSIUM SERPL-SCNC: 3.8 MMOL/L (ref 3.5–5.2)
PROT SERPL-MCNC: 6.9 G/DL (ref 6–8.5)
RBC # BLD AUTO: 3.22 10*6/MM3 (ref 3.77–5.28)
SODIUM SERPL-SCNC: 141 MMOL/L (ref 136–145)
WBC NRBC COR # BLD AUTO: 7.75 10*3/MM3 (ref 3.4–10.8)

## 2024-10-25 PROCEDURE — 96413 CHEMO IV INFUSION 1 HR: CPT

## 2024-10-25 PROCEDURE — 25810000003 SODIUM CHLORIDE 0.9 % SOLUTION 250 ML FLEX CONT: Performed by: NURSE PRACTITIONER

## 2024-10-25 PROCEDURE — 63710000001 DIPHENHYDRAMINE PER 50 MG: Performed by: NURSE PRACTITIONER

## 2024-10-25 PROCEDURE — 80053 COMPREHEN METABOLIC PANEL: CPT

## 2024-10-25 PROCEDURE — 85025 COMPLETE CBC W/AUTO DIFF WBC: CPT

## 2024-10-25 PROCEDURE — 25010000002 TRASTUZUMAB PER 10 MG: Performed by: NURSE PRACTITIONER

## 2024-10-25 RX ORDER — DIPHENHYDRAMINE HCL 25 MG
25 CAPSULE ORAL ONCE
Status: COMPLETED | OUTPATIENT
Start: 2024-10-25 | End: 2024-10-25

## 2024-10-25 RX ORDER — SODIUM CHLORIDE 9 MG/ML
20 INJECTION, SOLUTION INTRAVENOUS ONCE
Status: DISCONTINUED | OUTPATIENT
Start: 2024-10-25 | End: 2024-10-25 | Stop reason: HOSPADM

## 2024-10-25 RX ADMIN — TRASTUZUMAB 140 MG: 150 INJECTION, POWDER, LYOPHILIZED, FOR SOLUTION INTRAVENOUS at 09:35

## 2024-10-25 RX ADMIN — DIPHENHYDRAMINE HYDROCHLORIDE 25 MG: 25 CAPSULE ORAL at 09:23

## 2024-11-01 ENCOUNTER — INFUSION (OUTPATIENT)
Dept: ONCOLOGY | Facility: HOSPITAL | Age: 69
End: 2024-11-01
Payer: MEDICARE

## 2024-11-01 ENCOUNTER — OFFICE VISIT (OUTPATIENT)
Dept: ONCOLOGY | Facility: CLINIC | Age: 69
End: 2024-11-01
Payer: MEDICARE

## 2024-11-01 VITALS
WEIGHT: 159.6 LBS | TEMPERATURE: 97.8 F | SYSTOLIC BLOOD PRESSURE: 168 MMHG | DIASTOLIC BLOOD PRESSURE: 71 MMHG | OXYGEN SATURATION: 99 % | HEART RATE: 70 BPM | HEIGHT: 60 IN | BODY MASS INDEX: 31.33 KG/M2 | RESPIRATION RATE: 16 BRPM

## 2024-11-01 DIAGNOSIS — Z17.1 MALIGNANT NEOPLASM OF OVERLAPPING SITES OF RIGHT BREAST IN FEMALE, ESTROGEN RECEPTOR NEGATIVE: Primary | ICD-10-CM

## 2024-11-01 DIAGNOSIS — Z45.2 FITTING AND ADJUSTMENT OF VASCULAR CATHETER: ICD-10-CM

## 2024-11-01 DIAGNOSIS — C50.811 MALIGNANT NEOPLASM OF OVERLAPPING SITES OF RIGHT BREAST IN FEMALE, ESTROGEN RECEPTOR NEGATIVE: ICD-10-CM

## 2024-11-01 DIAGNOSIS — C50.811 MALIGNANT NEOPLASM OF OVERLAPPING SITES OF RIGHT BREAST IN FEMALE, ESTROGEN RECEPTOR NEGATIVE: Primary | ICD-10-CM

## 2024-11-01 DIAGNOSIS — Z17.1 MALIGNANT NEOPLASM OF OVERLAPPING SITES OF RIGHT BREAST IN FEMALE, ESTROGEN RECEPTOR NEGATIVE: ICD-10-CM

## 2024-11-01 LAB
ALBUMIN SERPL-MCNC: 4 G/DL (ref 3.5–5.2)
ALBUMIN/GLOB SERPL: 1.4 G/DL
ALP SERPL-CCNC: 55 U/L (ref 39–117)
ALT SERPL W P-5'-P-CCNC: 21 U/L (ref 1–33)
ANION GAP SERPL CALCULATED.3IONS-SCNC: 14.9 MMOL/L (ref 5–15)
AST SERPL-CCNC: 29 U/L (ref 1–32)
BASOPHILS # BLD AUTO: 0.05 10*3/MM3 (ref 0–0.2)
BASOPHILS NFR BLD AUTO: 0.8 % (ref 0–1.5)
BILIRUB SERPL-MCNC: 0.3 MG/DL (ref 0–1.2)
BUN SERPL-MCNC: 15 MG/DL (ref 8–23)
BUN/CREAT SERPL: 28.8 (ref 7–25)
CALCIUM SPEC-SCNC: 9.4 MG/DL (ref 8.6–10.5)
CHLORIDE SERPL-SCNC: 102 MMOL/L (ref 98–107)
CO2 SERPL-SCNC: 25.1 MMOL/L (ref 22–29)
CREAT SERPL-MCNC: 0.52 MG/DL (ref 0.57–1)
DEPRECATED RDW RBC AUTO: 53.5 FL (ref 37–54)
EGFRCR SERPLBLD CKD-EPI 2021: 101.3 ML/MIN/1.73
EOSINOPHIL # BLD AUTO: 0.32 10*3/MM3 (ref 0–0.4)
EOSINOPHIL NFR BLD AUTO: 5.2 % (ref 0.3–6.2)
ERYTHROCYTE [DISTWIDTH] IN BLOOD BY AUTOMATED COUNT: 15.3 % (ref 12.3–15.4)
GLOBULIN UR ELPH-MCNC: 2.9 GM/DL
GLUCOSE SERPL-MCNC: 161 MG/DL (ref 65–99)
HCT VFR BLD AUTO: 30.8 % (ref 34–46.6)
HGB BLD-MCNC: 9.7 G/DL (ref 12–15.9)
IMM GRANULOCYTES # BLD AUTO: 0.03 10*3/MM3 (ref 0–0.05)
IMM GRANULOCYTES NFR BLD AUTO: 0.5 % (ref 0–0.5)
LYMPHOCYTES # BLD AUTO: 1.06 10*3/MM3 (ref 0.7–3.1)
LYMPHOCYTES NFR BLD AUTO: 17.3 % (ref 19.6–45.3)
MCH RBC QN AUTO: 30.2 PG (ref 26.6–33)
MCHC RBC AUTO-ENTMCNC: 31.5 G/DL (ref 31.5–35.7)
MCV RBC AUTO: 96 FL (ref 79–97)
MONOCYTES # BLD AUTO: 0.59 10*3/MM3 (ref 0.1–0.9)
MONOCYTES NFR BLD AUTO: 9.6 % (ref 5–12)
NEUTROPHILS NFR BLD AUTO: 4.08 10*3/MM3 (ref 1.7–7)
NEUTROPHILS NFR BLD AUTO: 66.6 % (ref 42.7–76)
NRBC BLD AUTO-RTO: 0 /100 WBC (ref 0–0.2)
PLATELET # BLD AUTO: 236 10*3/MM3 (ref 140–450)
PMV BLD AUTO: 10.1 FL (ref 6–12)
POTASSIUM SERPL-SCNC: 3.8 MMOL/L (ref 3.5–5.2)
PROT SERPL-MCNC: 6.9 G/DL (ref 6–8.5)
RBC # BLD AUTO: 3.21 10*6/MM3 (ref 3.77–5.28)
SODIUM SERPL-SCNC: 142 MMOL/L (ref 136–145)
WBC NRBC COR # BLD AUTO: 6.13 10*3/MM3 (ref 3.4–10.8)

## 2024-11-01 PROCEDURE — 25010000002 TRASTUZUMAB PER 10 MG: Performed by: INTERNAL MEDICINE

## 2024-11-01 PROCEDURE — 96413 CHEMO IV INFUSION 1 HR: CPT

## 2024-11-01 PROCEDURE — 80053 COMPREHEN METABOLIC PANEL: CPT

## 2024-11-01 PROCEDURE — 25010000002 HEPARIN LOCK FLUSH PER 10 UNITS: Performed by: INTERNAL MEDICINE

## 2024-11-01 PROCEDURE — 85025 COMPLETE CBC W/AUTO DIFF WBC: CPT

## 2024-11-01 PROCEDURE — 63710000001 DIPHENHYDRAMINE PER 50 MG: Performed by: INTERNAL MEDICINE

## 2024-11-01 PROCEDURE — 25810000003 SODIUM CHLORIDE 0.9 % SOLUTION 250 ML FLEX CONT: Performed by: INTERNAL MEDICINE

## 2024-11-01 RX ORDER — SODIUM CHLORIDE 0.9 % (FLUSH) 0.9 %
10 SYRINGE (ML) INJECTION AS NEEDED
OUTPATIENT
Start: 2024-11-01

## 2024-11-01 RX ORDER — SODIUM CHLORIDE 0.9 % (FLUSH) 0.9 %
10 SYRINGE (ML) INJECTION AS NEEDED
Status: DISCONTINUED | OUTPATIENT
Start: 2024-11-01 | End: 2024-11-01 | Stop reason: HOSPADM

## 2024-11-01 RX ORDER — DIPHENHYDRAMINE HCL 25 MG
25 CAPSULE ORAL ONCE
Status: COMPLETED | OUTPATIENT
Start: 2024-11-01 | End: 2024-11-01

## 2024-11-01 RX ORDER — SODIUM CHLORIDE 9 MG/ML
20 INJECTION, SOLUTION INTRAVENOUS ONCE
Status: DISCONTINUED | OUTPATIENT
Start: 2024-11-01 | End: 2024-11-01 | Stop reason: HOSPADM

## 2024-11-01 RX ORDER — CLOBETASOL PROPIONATE 0.5 MG/G
1 CREAM TOPICAL 2 TIMES DAILY
COMMUNITY
Start: 2024-10-28

## 2024-11-01 RX ORDER — HEPARIN SODIUM (PORCINE) LOCK FLUSH IV SOLN 100 UNIT/ML 100 UNIT/ML
500 SOLUTION INTRAVENOUS AS NEEDED
Status: DISCONTINUED | OUTPATIENT
Start: 2024-11-01 | End: 2024-11-01 | Stop reason: HOSPADM

## 2024-11-01 RX ORDER — HEPARIN SODIUM (PORCINE) LOCK FLUSH IV SOLN 100 UNIT/ML 100 UNIT/ML
500 SOLUTION INTRAVENOUS AS NEEDED
OUTPATIENT
Start: 2024-11-01

## 2024-11-01 RX ADMIN — TRASTUZUMAB 420 MG: 150 INJECTION, POWDER, LYOPHILIZED, FOR SOLUTION INTRAVENOUS at 09:53

## 2024-11-01 RX ADMIN — DIPHENHYDRAMINE HYDROCHLORIDE 25 MG: 25 CAPSULE ORAL at 09:25

## 2024-11-01 RX ADMIN — Medication 500 UNITS: at 10:29

## 2024-11-01 RX ADMIN — Medication 10 ML: at 10:29

## 2024-11-01 NOTE — PROGRESS NOTES
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    Since she stopped her Taxol her rash is much better and the dermatologist biopsied it and this is probably a drug rash and I think we can move to every 3-week Herceptin dosing because this does not seem to be the culprit for her rash    We did genetic testing that shows a VUS in the NF1 gene which is not significant    She does not need radiation or hormonal blockade because she had a mastectomy and was ER/GA negative  She has had significant alopecia and is wearing a wig  Echocardiogram was done 10/9/2024 showing ejection fraction of 59%.      Past Medical History:   Diagnosis Date    Abnormal liver function tests 02/09/2017    Description: 2000, \"-\"hepatitis profile    Anemia     Anesthesia complication     SLOW TO WAKE UP WITH HYSTERECTOMY    Arthritis     Cancer 5/16/24    Right Breast    Colon polyps     Cystocele, lateral 10/15/2020    Cystocele, midline 10/15/2020    Diabetes mellitus     Frequent UTI     H/O bone density study 10/2010    normal    H/O cardiovascular stress test 2001    H/O echocardiogram 03/2001    2-D    Hashimoto's thyroiditis     Heart murmur     Hyperkalemia 05/16/2019    Hyperlipidemia     Hypertension     Hypothyroidism 2018    Incompetence of pubocervical tissue 10/15/2020    Incompetence of rectovaginal tissue 10/15/2020    Limited joint range of motion     LEFT KNEE    Loss of perineal body, female 10/15/2020    Osteoarthritis of knee 2005    left    " Pelvic relaxation due to vaginal prolapse 2018    Plantar warts     foot    Postmenopausal bleeding 2019    Pregnancy      twins x1    Psoriasis     RA (rheumatoid arthritis)     Rectocele 10/15/2020    Urinary tract infection     Urine incontinence     BETTER WITH SLING    Uterovaginal prolapse     Uterovaginal prolapse, incomplete 2020    Vaginal enterocele 10/15/2020        Past Surgical History:   Procedure Laterality Date    BREAST BIOPSY Right     benign    COLONOSCOPY      COLONOSCOPY W/ POLYPECTOMY  2018    Dr Resendiz, 6 mm rectal polyp - hyperplastic    JOINT MANIPULATION Left 2021    Procedure: KNEE MANIPULATION with steroid injection;  Surgeon: Francis Tavarez MD;  Location: [facility];  Service: Orthopedics;  Laterality: Left;    JOINT REPLACEMENT  08/10/2021    LYMPH NODE BIOPSY  2024    MASTECTOMY W/ SENTINEL NODE BIOPSY Right 2024    Procedure: RIGHT MASTECTOMY WITH SENTINEL NODE BIOPSY;  Surgeon: Aracely Frye MD;  Location: [facility];  Service: General;  Laterality: Right;    PUBOVAGINAL SLING N/A 10/16/2020    Procedure: Pubovaginal sling Posterior colporrhaphy fixation Insertion of vaginal grafts Cystourethroscopy;  Surgeon: Karen Allen MD;  Location: [facility] OR;  Service: Gynecology;  Laterality: N/A;    TOTAL KNEE ARTHROPLASTY Left 08/10/2021    Procedure: TOTAL KNEE ARTHROPLASTY;  Surgeon: Francis Tavarez MD;  Location: [facility] OR;  Service: Orthopedics;  Laterality: Left;    TOTAL LAPAROSCOPIC HYSTERECTOMY, SACROCOLPOPEXY N/A 10/16/2020    Procedure: Laparoscopic uterosacral ligament colpopexy sacral colpopexy  Laparoscopic paravaginal repair Laparoscopic hysterectomy with bilateral salpingectomy  Laparoscopic abdominal enterocele repair;  Surgeon: Karen Allen MD;  Location: [facility];  Service: Gynecology;  Laterality: N/A;    TUBAL ABDOMINAL LIGATION      VENOUS ACCESS DEVICE (PORT) INSERTION N/A  2024    Procedure: INSERTION OF PORTACATH;  Surgeon: Aracely Frye MD;  Location: [facility] OR;  Service: General;  Laterality: N/A;   Oncologic history  patient is a 68-year-old female with 4-year history of rheumatoid arthritis on Simponi who was found on her most recent screening mammogram have an abnormality in the right breast with a 10 mm oval mass at the 11:00 and an 11 mm mass at 12:00.  His abnormalities persisted and diagnostic imaging and the patient underwent biopsy of both these areas.  The area at 12:00 was DCIS high-grade ER/IN negative and the lesion at 11:00 was grade 3 invasive ductal carcinoma measuring 4.8 mm ER/IN negative HER2 3+    Patient was referred to Dr. Aracely Frye and underwent bilateral screening mammograms which showed large hematomas in both biopsy sites with a residual 1.1 x 1.1 cm mass at 11:00 and 12:00 was large hematoma with a biopsy cavity and at 9:00 there was another 1.2 x 1.4 cm mass which was suspicious and then extending from 90 to o'clock there was non-mass enhancement measuring 4.7 x 4.7 centimeters encompassing the 2 biopsy sites and the 9:00 mass    Patient has been referred to discuss treatment but the plan is to proceed with surgery first as masses were nonpalpable and small and initial imaging although now with a large postbiopsy hematoma was palpation is unreliable    Patient is  2 para 3 first childbirth was at age 22 she did not breast-feed menarche was at age 11 menopause 50.  She has been on no post menopausal hormone replacement    Family history is completely negative for any malignancy and no genetic testing was done    She is not a smoker or drinker  She has not had a DVT MI or stroke    She has been on Simponi for her rheumatoid arthritis her last dose was in April and she is going to skip the  dose because surgery is scheduled    Her last bone density in 2018 was normal    No history of unusual bleeding with her hysterectomy or knee  replacement    We discussed the natural history of HER2 positive breast cancer I told her that unless the tumor was bigger than we had anticipated on imaging we will plan for surgery first followed by Taxol Herceptin for 12 weeks and then Herceptin for the rest of the year.  If we have unexpected findings at surgery we would tailor the treatment to be most appropriate for her but she knows she will have some chemo at any rate.  I have asked her to have an echocardiogram done and we will see her back 2 to 3 weeks after surgery to review the final path and make a treatment decision    7/3/2024 right breast skin sparing mastectomy by Dr. Frye-pathology showing invasive mammary carcinoma (invasive ductal carcinoma) Guston histological score 3.  Tumor in the upper outer quadrant at 11:00 measuring 10 x 10 x 10 mm.  Associated high-grade ductal carcinoma in situ, solid, cribriform and comedo types involving adenosis and fibroadenomatoid change with central comedonecrosis and associated microcalcifications.  DCIS spans 15 mm extending from the upper outer quadrant and to the lower outer quadrant.  All margins are free of invasive carcinoma and DCIS.  No LVSI, posterior skeletal muscle free of tumor.  ER/MO negative, HER2 3+ positive, Ki-67 60%.  Pathologic grade pT1b, N (SN) 0.  [facility] [place] lymph nodes 3 lymph nodes removed were negative for metastatic carcinoma.      Treatment recommended with weekly Taxol/ Herceptin X 12 followed by Herceptin every 3 weeks x 1 year.     Current Outpatient Medications on File Prior to Visit   Medication Sig Dispense Refill    BD PEN NEEDLE SEUN U/F 32G X 4 MM misc USE 1 PEN NEEDLE WITH EACH  VICTOZA INJECTION 100 each 1    Calcium Carbonate-Vit D-Min (CALCIUM 1200 PO) Take 1 tablet by mouth Daily. PT HOLDING FOR SURGERY      carvedilol (COREG) 6.25 MG tablet Take 1 tablet by mouth 2 (Two) Times a Day. 180 tablet 3    Cinnamon 500 MG tablet Take 1,000 mg by mouth 2 (two) times a day. PT  HOLDING FOR SURGERY      clobetasol propionate (TEMOVATE) 0.05 % cream Apply 1 Application topically to the appropriate area as directed 2 (Two) Times a Day.      Coenzyme Q10 (CO Q 10 PO) Take 1 capsule by mouth Daily. PT HOLDING FOR SURGERY      CRANBERRY-VITAMIN C-D MANNOSE PO Take 2 capsules by mouth Daily. PT HOLDING FOR SURGERY      hydrocortisone 2.5 % cream Apply 1 Application topically to the appropriate area as directed 2 (Two) Times a Day. 3.5 g 0    levothyroxine (Synthroid) 88 MCG tablet Take 1 tablet by mouth Daily. 90 tablet 3    lidocaine-prilocaine (EMLA) 2.5-2.5 % cream Apply 1 Application topically to the appropriate area as directed As Needed for Mild Pain.      Liraglutide (Victoza) 18 MG/3ML solution pen-injector injection Inject 1.2 mg under the skin into the appropriate area as directed Daily. (Patient taking differently: Inject 1.2 mg under the skin into the appropriate area as directed Every Night. HOLD 1 DAY BEFORE SURGERY) 6 mL 5    lisinopril (PRINIVIL,ZESTRIL) 20 MG tablet TAKE 1 TABLET BY MOUTH DAILY 90 tablet 3    lisinopril-hydrochlorothiazide (PRINZIDE,ZESTORETIC) 20-25 MG per tablet TAKE 1 TABLET BY MOUTH DAILY 90 tablet 3    loratadine (Claritin) 10 MG tablet Take 1 tablet by mouth Every Night.      meloxicam (MOBIC) 15 MG tablet Take 1 tablet by mouth Daily. PT HOLDING FOR SURGERY      metFORMIN (GLUCOPHAGE) 500 MG tablet TAKE THREE TABLETS BY MOUTH EVERY MORNING AND THEN TAKE TWO TABLETS BY MOUTH IN THE EVENING (Patient taking differently: Take 3 tablets by mouth Daily With Breakfast. TAKE THREE TABLETS BY MOUTH EVERY MORNING AND THEN TAKE TWO TABLETS BY MOUTH IN THE EVENING) 450 tablet 3    methylPREDNISolone (MEDROL) 4 MG dose pack Take as directed on package instructions. 21 tablet 0    multivitamin with minerals tablet tablet Take 1 tablet by mouth Daily. PT HOLDING FOR SURGERY      Omega-3 Fatty Acids (fish oil) 1200 MG capsule capsule Take 1 capsule by mouth Daily. PT  HOLDING FOR SURGERY      ondansetron (ZOFRAN) 8 MG tablet Take 1 tablet by mouth 3 (Three) Times a Day As Needed for Nausea or Vomiting. 30 tablet 5    rosuvastatin (CRESTOR) 20 MG tablet TAKE 1 TABLET BY MOUTH DAILY 90 tablet 1    TURMERIC PO Take 1,400 mg by mouth Daily. PT HOLDING FOR SURGERY      vitamin C (ASCORBIC ACID) 250 MG tablet Take 4 tablets by mouth Daily.      vitamin D3 125 MCG (5000 UT) capsule capsule Take 1 capsule by mouth Daily. PT HOLDING FOR SURGERY      VITAMIN E PO Take 1 capsule by mouth Daily. HOLD PRIOR TO SURG      Zinc 50 MG tablet Take 1 tablet by mouth Daily. PT HOLDING FOR SURGERY      DPH-Lido-AlHydr-MgHydr-Simeth (First Mouthwash, Magic Mouthwash,) suspension Swish and spit 5 mL Every 2 (Two) Hours As Needed (mouth sores). 410 mL 1     No current facility-administered medications on file prior to visit.        ALLERGIES:  No Known Allergies     Social History     Socioeconomic History    Marital status:      Spouse name: JACK     Number of children: 3   Tobacco Use    Smoking status: Never     Passive exposure: Never    Smokeless tobacco: Never    Tobacco comments:     Caffeine: 1 drink daily   Vaping Use    Vaping status: Never Used   Substance and Sexual Activity    Alcohol use: Never    Drug use: Never    Sexual activity: Yes     Partners: Male     Birth control/protection: Post-menopausal, Tubal ligation, Essure, Hysterectomy, Partner of same sex, Surgical     Comment: SPOUSE = JACK         Family History   Problem Relation Age of Onset    COPD Mother         Passed away 1988    Heart disease Father     Hypertension Father     Diabetes Brother         Passed away 2012    Diabetes Brother     COPD Brother     Hypertension Brother         Copd    Heart attack Brother     Diabetes Brother     Heart disease Brother     Hypertension Brother     Heart disease Maternal Grandmother     Thyroid disease Daughter     BRCA 1/2 Neg Hx     Breast cancer Neg Hx     Colon cancer Neg Hx   "   Endometrial cancer Neg Hx     Ovarian cancer Neg Hx     Malig Hyperthermia Neg Hx         Review of Systems   Musculoskeletal:  Positive for arthralgias.   Skin:  Positive for rash.        Objective     Vitals:    11/01/24 0851   BP: 168/71   Pulse: 70   Resp: 16   Temp: 97.8 °F (36.6 °C)   TempSrc: Oral   SpO2: 99%   Weight: 72.4 kg (159 lb 9.6 oz)   Height: 152.4 cm (60\")   PainSc: 0-No pain             11/1/2024     8:55 AM   Current Status   ECOG score 0       Physical Exam   CONSTITUTIONAL:  Vital signs reviewed.  No distress, looks comfortable.  EYES:  Conjunctivae and lids unremarkable.  PERRLA  EARS,NOSE,MOUTH,THROAT:  Ears and nose appear unremarkable.  Lips, teeth, gums appear unremarkable.  RESPIRATORY:  Normal respiratory effort.  Lungs clear to auscultation bilaterally.  BREASTS: Left breast is benign the right breast no definite palpable masses or axillary adenopathy  CARDIOVASCULAR:  Normal S1, S2.  No murmurs rubs or gallops.  No significant lower extremity edema.  GASTROINTESTINAL: Abdomen appears unremarkable.  Nontender.  No hepatomegaly.  No splenomegaly.  LYMPHATIC:  No cervical, supraclavicular, axillary lymphadenopathy.  SKIN:  Warm.  Erythematous macules on her arms and legs much improved   PSYCHIATRIC:  Normal judgment and insight.  Normal mood and affect.  I have reexamined the patient and the results are consistent with the previously documented exam. Ashkan Belcher MD       RECENT LABS:  Hematology WBC   Date Value Ref Range Status   11/01/2024 6.13 3.40 - 10.80 10*3/mm3 Final   06/10/2024 4.43 3.40 - 10.80 10*3/mm3 Final     RBC   Date Value Ref Range Status   11/01/2024 3.21 (L) 3.77 - 5.28 10*6/mm3 Final   06/10/2024 4.10 3.77 - 5.28 10*6/mm3 Final     Hemoglobin   Date Value Ref Range Status   11/01/2024 9.7 (L) 12.0 - 15.9 g/dL Final     Hematocrit   Date Value Ref Range Status   11/01/2024 30.8 (L) 34.0 - 46.6 % Final     Platelets   Date Value Ref Range Status   11/01/2024 " "236 140 - 450 10*3/mm3 Final          ynoptic Checklist   DCIS OF THE BREAST: Biopsy   Protocol posted: 9/20/2023DCIS OF THE BREAST: BIOPSY - All Specimens  SPECIMEN   Procedure  Needle biopsy   Specimen Laterality  Right   TUMOR   Tumor Site  Clock position     12 o'clock   Histologic Type  Ductal carcinoma in situ (DCIS)   Architectural Patterns  Solid   Nuclear Grade  Grade III (high)   Necrosis  Present, central (expansive \"comedo\" necrosis)   Microcalcifications  Present in DCIS   .   Breast Biomarker Reporting Template   Protocol posted: 12/13/2023BREAST BIOMARKER REPORTING TEMPLATE - All Specimens  Test(s) Performed     Estrogen Receptor (ER) Status  Negative (less than 1%)     Internal control cells present and stain as expected   Test Type  Food and Drug Administration (FDA) cleared (test / vendor): Ventena   Primary Antibody  SP1   Scoring System  Harrison   Proportion Score  0   Intensity Score  0   Total Harrison Score  0   Test(s) Performed     Progesterone Receptor (PgR) Status  Negative (less than 1%)     Internal control cells present and stain as expected   Test Type  Food and Drug Administration (FDA) cleared (test / vendor): Ventena   Primary Antibody  1E2   Scoring System  Harrison   Proportion Score  1   Intensity Score  2   Total Harrison Score  3   Test(s) Performed  Ki-67   Ki-67 Percentage of Positive Nuclei  25 %   Primary Antibody  30-9   Cold Ischemia and Fixation Times  Meet requirements specified in latest version of the ASCO / CAP Guidelines   Cold Ischemia Time (minutes)  12 min   Fixation Time (hours)  8 hours   Testing Performed on Block Number(s)  1B   METHODS   Fixative  Formalin   Image Analysis  Not performed   .   INVASIVE CARCINOMA OF THE BREAST: Biopsy   Protocol posted: 3/22/2023INVASIVE CARCINOMA OF THE BREAST: BIOPSY - All Specimens  SPECIMEN   Procedure  Needle biopsy   Specimen Laterality  Right   TUMOR   Tumor Site  Clock position     11 o'clock   Histologic Type  Invasive " carcinoma of no special type (ductal)   Histologic Type Comment  The tumor has apocrine features   Histologic Grade ([place] Histologic Score)     Glandular (Acinar) / Tubular Differentiation  Score 3   Nuclear Pleomorphism  Score 3   Mitotic Rate  Score 2   Overall Grade  Grade 3 (scores of 8 or 9)   Largest Invasive Focus in this Limited Biopsy Sample  4.8 mm   Ductal Carcinoma In Situ (DCIS)  Not identified   Lymphatic and / or Vascular Invasion  Not identified   Microcalcifications  Not identified   .   Breast Biomarker Reporting Template   Protocol posted: 12/13/2023BREAST BIOMARKER REPORTING TEMPLATE - All Specimens  Test(s) Performed     Estrogen Receptor (ER) Status  Negative (less than 1%)     Internal control cells present and stain as expected   Test Type  Food and Drug Administration (FDA) cleared (test / vendor): Ventena   Primary Antibody  SP1   Scoring System  Harrison   Proportion Score  0   Intensity Score  0   Total Harrison Score  0   Test(s) Performed     Progesterone Receptor (PgR) Status  Negative (less than 1%)     Internal control cells present and stain as expected   Test Type  Food and Drug Administration (FDA) cleared (test / vendor): Ventena   Primary Antibody  1E2   Scoring System  Harrison   Proportion Score  0   Intensity Score  0   Total Harrison Score  0   Test(s) Performed     HER2 by Immunohistochemistry  Positive (Score 3+)   Percentage of Cells with Uniform Intense Complete Membrane Staining  100 %   Test Type  Food and Drug Administration (FDA) cleared (test / vendor): Ventena   Primary Antibody  4B5   Test(s) Performed  Ki-67   Ki-67 Percentage of Positive Nuclei  60 %          ynoptic Checklist  7/3/24   INVASIVE CARCINOMA OF THE BREAST: Resection   8th Edition - Protocol posted: 12/13/2023INVASIVE CARCINOMA OF THE BREAST: RESECTION - All Specimens  SPECIMEN   Procedure  Total mastectomy   Specimen Laterality  Right   TUMOR   Tumor Site  Upper outer quadrant   Histologic Type   "Invasive carcinoma of no special type (ductal)   Histologic Grade (Glasgow Histologic Score)     Glandular (Acinar) / Tubular Differentiation  Score 3   Nuclear Pleomorphism  Score 3   Mitotic Rate  Score 2   Overall Grade  Grade 3 (scores of 8 or 9)   Tumor Size  Greatest dimension of largest invasive focus (Millimeters): 10 mm   Tumor Focality  Single focus of invasive carcinoma   Ductal Carcinoma In Situ (DCIS)  Present     Negative for extensive intraductal component (EIC)   Size (Extent) of DCIS  Estimated size (extent) of DCIS is at least (Millimeters): 50 mm   Architectural Patterns  [place]     Cribriform     Solid   Nuclear Grade  Grade III (high)   Necrosis  Present, central (expansive \"comedo\" necrosis)   Lobular Carcinoma In Situ (LCIS)  Not identified   Lymphatic and / or Vascular Invasion  Not identified   Dermal Lymphatic and / or Vascular Invasion  Not identified   Microcalcifications  Present in DCIS     Present in non-neoplastic tissue   Treatment Effect in the Breast  No known presurgical therapy   MARGINS   Margin Status for Invasive Carcinoma  All margins negative for invasive carcinoma   Distance from Invasive Carcinoma to Closest Margin  6 mm   Closest Margin(s) to Invasive Carcinoma  Anterior   Distance from Invasive Carcinoma to Posterior Margin  17 mm   Distance from Invasive Carcinoma to Superior Margin  85 mm   Distance from Invasive Carcinoma to Inferior Margin  100 mm   Distance from Invasive Carcinoma to Medial Margin  100 mm   Distance from Invasive Carcinoma to Lateral Margin  50 mm   Margin Status for DCIS  All margins negative for DCIS   Distance from DCIS to Closest Margin  11 mm   Closest Margin(s) to DCIS  Anterior   Distance from DCIS to Posterior Margin  15 mm   Distance from DCIS to Superior Margin  60 mm   Distance from DCIS to Inferior Margin  75 mm   Distance from DCIS to Medial Margin  80 mm   Distance from DCIS to Lateral Margin  40 mm   REGIONAL LYMPH NODES   Regional " Lymph Node Status  All regional lymph nodes negative for tumor   Total Number of Lymph Nodes Examined (sentinel and non-sentinel)  3   Number of [place] Nodes Examined  3   pTNM CLASSIFICATION (AJCC 8th Edition)   Reporting of pT, pN, and (when applicable) pM categories is based on information available to the pathologist at the time the report is issued. As per the AJCC (Chapter 1, 8th Ed.) it is the managing physician’s responsibility to establish the final pathologic stage based upon all pertinent information, including but potentially not limited to this pathology report.   pT Category  pT1b   pN Category  pN0   N Suffix  (sn)   SPECIAL STUDIES        Estrogen Receptor (ER) Status  Negative        Progesterone Receptor (PgR) Status  Negative        HER2 (by immunohistochemistry)  Positive (Score 3+)   Percentage of Cells with Uniform Intense Complete Membrane Staining  100 %        Ki-67 Percentage of Positive Nuclei  60 %        Assessment & Plan     cT1cN0 grade 3 ER/OK- her2 +RIGHT BREAST CANCER post biopsy with associated high-grade DCIS ER/OK negative and MRI evidence of non-mass enhancement which would preclude lumpectomy  pT1bN0 ER/OK negative HER2 positive right breast cancer postmastectomy and sentinel node biopsy  Weekly Taxol Herceptin for 12 weeks followed by every 3 to weeks for the rest of the year planned  No radiation-EF adequate  Tolerating Taxol Herceptin well as of 8/16/2024  Worsening maculopapular rash on her arms legs and trunk on 8/30/2024 changed to Abraxane and nonbiosimilar Herceptin  Rash improving after Abraxane discontinued we will move to 3 weekly Herceptin      2. Rheumatoid arthritis on Simponi for 3 and half years-off treatment during chemo    3.  Negative family history of any malignancy    4.  Anemia: Hgb 10.8 today 7/26/2024- was normal prior to surgery (12.1 on 6/25/2024).  Denies bleeding issues.  Will check ferritin, iron profile, B12, folate.     5.  Hashimoto's  thyroiditis    Plan  Proceed with 3-week dose of Herceptin today.  Herceptin in 3 and 6 weeks   see me in 9 weeks.        Patient is on a high risk medication requiring close monitoring for toxicity.

## 2024-11-14 ENCOUNTER — OFFICE VISIT (OUTPATIENT)
Dept: CARDIOLOGY | Facility: CLINIC | Age: 69
End: 2024-11-14
Payer: MEDICARE

## 2024-11-14 ENCOUNTER — PATIENT OUTREACH (OUTPATIENT)
Dept: ONCOLOGY | Facility: HOSPITAL | Age: 69
End: 2024-11-14
Payer: MEDICARE

## 2024-11-14 VITALS
SYSTOLIC BLOOD PRESSURE: 128 MMHG | DIASTOLIC BLOOD PRESSURE: 64 MMHG | WEIGHT: 151 LBS | HEIGHT: 60 IN | BODY MASS INDEX: 29.64 KG/M2 | HEART RATE: 71 BPM

## 2024-11-14 DIAGNOSIS — E78.00 PURE HYPERCHOLESTEROLEMIA: ICD-10-CM

## 2024-11-14 DIAGNOSIS — C50.811 MALIGNANT NEOPLASM OF OVERLAPPING SITES OF RIGHT BREAST IN FEMALE, ESTROGEN RECEPTOR NEGATIVE: Primary | ICD-10-CM

## 2024-11-14 DIAGNOSIS — R09.89 BILATERAL CAROTID BRUITS: ICD-10-CM

## 2024-11-14 DIAGNOSIS — E11.3293 TYPE 2 DIABETES MELLITUS WITH BOTH EYES AFFECTED BY MILD NONPROLIFERATIVE RETINOPATHY WITHOUT MACULAR EDEMA, WITHOUT LONG-TERM CURRENT USE OF INSULIN: ICD-10-CM

## 2024-11-14 DIAGNOSIS — Z17.1 MALIGNANT NEOPLASM OF OVERLAPPING SITES OF RIGHT BREAST IN FEMALE, ESTROGEN RECEPTOR NEGATIVE: Primary | ICD-10-CM

## 2024-11-14 DIAGNOSIS — R94.31 ABNORMAL ELECTROCARDIOGRAM (ECG) (EKG): ICD-10-CM

## 2024-11-14 DIAGNOSIS — I10 BENIGN ESSENTIAL HYPERTENSION: ICD-10-CM

## 2024-11-14 DIAGNOSIS — I45.2 RBBB (RIGHT BUNDLE BRANCH BLOCK) WITH LEFT POSTERIOR FASCICULAR BLOCK: ICD-10-CM

## 2024-11-14 PROCEDURE — 93000 ELECTROCARDIOGRAM COMPLETE: CPT | Performed by: INTERNAL MEDICINE

## 2024-11-14 PROCEDURE — 99214 OFFICE O/P EST MOD 30 MIN: CPT | Performed by: INTERNAL MEDICINE

## 2024-11-14 PROCEDURE — 3078F DIAST BP <80 MM HG: CPT | Performed by: INTERNAL MEDICINE

## 2024-11-14 PROCEDURE — 1160F RVW MEDS BY RX/DR IN RCRD: CPT | Performed by: INTERNAL MEDICINE

## 2024-11-14 PROCEDURE — 1159F MED LIST DOCD IN RCRD: CPT | Performed by: INTERNAL MEDICINE

## 2024-11-14 PROCEDURE — 3074F SYST BP LT 130 MM HG: CPT | Performed by: INTERNAL MEDICINE

## 2024-11-14 NOTE — PROGRESS NOTES
Date of Office Visit: 2024  Encounter Provider: María Ramirez MD  Place of Service: [facility] CARDIOLOGY  Patient Name: Pretty Tovar  :1955      Patient ID:  Pretty Tovar is a 68 y.o. 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    She is , has 3 children, owns the [employer], has never smoked, uses no drugs, uses no caffeine.  Her brother myocardial infarction, her father myocardial infarction.  Her dad had hypertension to her brothers have had hypertension.  3 of her brothers have diabetes.    She was diagnosed with invasive mammary carcinoma on 7/3/2024 of the right breast-2024 started Taxol (paclitaxel) and Herceptin (trastuzumab) x 12 followed by Herceptin every 3 weeks for a year.    Echocardiogram done 2024 showed ejection fraction of 60.9% with normal diastolic function, global longitudinal strain of -21.4%, normal RVSP, mild aortic valve stenosis.    Labs on 2024 show normal CMP, hemoglobin 9.7 with otherwise normal CBC.  Echocardiogram done 10/9/2024 shows ejection fraction 59% with global longitudinal strain -21.3%, normal diastolic function, normal valvular structure and function, no change from prior echocardiogram.  She had minimal bilateral carotid artery stenosis on carotid duplex studies done 8/15/2024.    She saw Dr. Belcher 2024.  She has been treated with weekly Taxol and Herceptin.  She developed a rash.  She was changed to Abraxane (paclitaxel) 2024 due to a rash and the rash improved with that change.  She is now taking Herceptin every 3 weeks.High sensitive troponin proBNP were normal 2024.  She finished Abraxane  "10/11/2024.  She will remain on Herceptin for a full year.    2 weeks ago, she had 1 week of lower extremity swelling.  It went away on its own.  She is not sure what happened.  She has had nothing since.  Sometimes she feels slightly short winded.  She has no chest pain or pressure.  She has had no tachycardia, dizziness or syncope.  She is taking her medications as directed without difficulty.    Past Medical History:   Diagnosis Date    Abnormal liver function tests 2017    Description: , \"-\"hepatitis profile    Anemia     Anesthesia complication     SLOW TO WAKE UP WITH HYSTERECTOMY    Arthritis     Cancer 24    Right Breast    Colon polyps     Cystocele, lateral 10/15/2020    Cystocele, midline 10/15/2020    Diabetes mellitus     Frequent UTI     H/O bone density study 10/2010    normal    H/O cardiovascular stress test     H/O echocardiogram 2001    2-D    Hashimoto's thyroiditis     Heart murmur     Hyperkalemia 2019    Hyperlipidemia     Hypertension     Hypothyroidism 2018    Incompetence of pubocervical tissue 10/15/2020    Incompetence of rectovaginal tissue 10/15/2020    Limited joint range of motion     LEFT KNEE    Loss of perineal body, female 10/15/2020    Osteoarthritis of knee 2005    left    Pelvic relaxation due to vaginal prolapse 2018    Plantar warts     foot    Postmenopausal bleeding 2019    Pregnancy      twins x1    Psoriasis     RA (rheumatoid arthritis)     Rectocele 10/15/2020    Urinary tract infection     Urine incontinence     BETTER WITH SLING    Uterovaginal prolapse     Uterovaginal prolapse, incomplete 2020    Vaginal enterocele 10/15/2020         Past Surgical History:   Procedure Laterality Date    BREAST BIOPSY Right 2006    benign    COLONOSCOPY      COLONOSCOPY W/ POLYPECTOMY  2018    Dr Resendiz, 6 mm rectal polyp - hyperplastic    JOINT MANIPULATION Left 2021    Procedure: KNEE MANIPULATION with steroid " injection;  Surgeon: Francis Tavarez MD;  Location: [facility] OR;  Service: Orthopedics;  Laterality: Left;    JOINT REPLACEMENT  08/10/2021    LYMPH NODE BIOPSY  07/03/2024    MASTECTOMY W/ SENTINEL NODE BIOPSY Right 07/03/2024    Procedure: RIGHT MASTECTOMY WITH SENTINEL NODE BIOPSY;  Surgeon: Aracely Frye MD;  Location: [facility] MAIN OR;  Service: General;  Laterality: Right;    PUBOVAGINAL SLING N/A 10/16/2020    Procedure: Pubovaginal sling Posterior colporrhaphy fixation Insertion of vaginal grafts Cystourethroscopy;  Surgeon: Karen Allen MD;  Location: [facility] OR;  Service: Gynecology;  Laterality: N/A;    TOTAL KNEE ARTHROPLASTY Left 08/10/2021    Procedure: TOTAL KNEE ARTHROPLASTY;  Surgeon: Francis Tavarez MD;  Location: [facility] OR;  Service: Orthopedics;  Laterality: Left;    TOTAL LAPAROSCOPIC HYSTERECTOMY, SACROCOLPOPEXY N/A 10/16/2020    Procedure: Laparoscopic uterosacral ligament colpopexy sacral colpopexy  Laparoscopic paravaginal repair Laparoscopic hysterectomy with bilateral salpingectomy  Laparoscopic abdominal enterocele repair;  Surgeon: Karen Allen MD;  Location: [facility] OR;  Service: Gynecology;  Laterality: N/A;    TUBAL ABDOMINAL LIGATION      VENOUS ACCESS DEVICE (PORT) INSERTION N/A 07/03/2024    Procedure: INSERTION OF PORTACATH;  Surgeon: Aracely Frye MD;  Location: [facility] OR;  Service: General;  Laterality: N/A;       Current Outpatient Medications on File Prior to Visit   Medication Sig Dispense Refill    BD PEN NEEDLE SEUN U/F 32G X 4 MM misc USE 1 PEN NEEDLE WITH EACH  VICTOZA INJECTION 100 each 1    Calcium Carbonate-Vit D-Min (CALCIUM 1200 PO) Take 1 tablet by mouth Daily. PT HOLDING FOR SURGERY      carvedilol (COREG) 6.25 MG tablet Take 1 tablet by mouth 2 (Two) Times a Day. 180 tablet 3    Cinnamon 500 MG tablet Take 1,000 mg by mouth 2 (two) times a day. PT HOLDING FOR SURGERY      clobetasol propionate (TEMOVATE) 0.05 % cream Apply 1  Application topically to the appropriate area as directed 2 (Two) Times a Day.      Coenzyme Q10 (CO Q 10 PO) Take 1 capsule by mouth Daily. PT HOLDING FOR SURGERY      CRANBERRY-VITAMIN C-D MANNOSE PO Take 2 capsules by mouth Daily. PT HOLDING FOR SURGERY      hydrocortisone 2.5 % cream Apply 1 Application topically to the appropriate area as directed 2 (Two) Times a Day. 3.5 g 0    levothyroxine (Synthroid) 88 MCG tablet Take 1 tablet by mouth Daily. 90 tablet 3    lidocaine-prilocaine (EMLA) 2.5-2.5 % cream Apply 1 Application topically to the appropriate area as directed As Needed for Mild Pain.      Liraglutide (Victoza) 18 MG/3ML solution pen-injector injection Inject 1.2 mg under the skin into the appropriate area as directed Daily. (Patient taking differently: Inject 1.2 mg under the skin into the appropriate area as directed Every Night. HOLD 1 DAY BEFORE SURGERY) 6 mL 5    lisinopril (PRINIVIL,ZESTRIL) 20 MG tablet TAKE 1 TABLET BY MOUTH DAILY 90 tablet 3    lisinopril-hydrochlorothiazide (PRINZIDE,ZESTORETIC) 20-25 MG per tablet TAKE 1 TABLET BY MOUTH DAILY 90 tablet 3    loratadine (Claritin) 10 MG tablet Take 1 tablet by mouth Every Night.      meloxicam (MOBIC) 15 MG tablet Take 1 tablet by mouth Daily. PT HOLDING FOR SURGERY      metFORMIN (GLUCOPHAGE) 500 MG tablet TAKE THREE TABLETS BY MOUTH EVERY MORNING AND THEN TAKE TWO TABLETS BY MOUTH IN THE EVENING (Patient taking differently: Take 3 tablets by mouth Daily With Breakfast. TAKE THREE TABLETS BY MOUTH EVERY MORNING AND THEN TAKE TWO TABLETS BY MOUTH IN THE EVENING) 450 tablet 3    methylPREDNISolone (MEDROL) 4 MG dose pack Take as directed on package instructions. 21 tablet 0    multivitamin with minerals tablet tablet Take 1 tablet by mouth Daily. PT HOLDING FOR SURGERY      Omega-3 Fatty Acids (fish oil) 1200 MG capsule capsule Take 1 capsule by mouth Daily. PT HOLDING FOR SURGERY      ondansetron (ZOFRAN) 8 MG tablet Take 1 tablet by mouth 3  "(Three) Times a Day As Needed for Nausea or Vomiting. 30 tablet 5    rosuvastatin (CRESTOR) 20 MG tablet TAKE 1 TABLET BY MOUTH DAILY 90 tablet 1    TURMERIC PO Take 1,400 mg by mouth Daily. PT HOLDING FOR SURGERY      vitamin C (ASCORBIC ACID) 250 MG tablet Take 4 tablets by mouth Daily.      vitamin D3 125 MCG (5000 UT) capsule capsule Take 1 capsule by mouth Daily. PT HOLDING FOR SURGERY      VITAMIN E PO Take 1 capsule by mouth Daily. HOLD PRIOR TO SURG      Zinc 50 MG tablet Take 1 tablet by mouth Daily. PT HOLDING FOR SURGERY       No current facility-administered medications on file prior to visit.       Social History     Socioeconomic History    Marital status:      Spouse name: JACK     Number of children: 3   Tobacco Use    Smoking status: Never     Passive exposure: Never    Smokeless tobacco: Never    Tobacco comments:     Caffeine: 1 drink daily   Vaping Use    Vaping status: Never Used   Substance and Sexual Activity    Alcohol use: Never    Drug use: Never    Sexual activity: Yes     Partners: Male     Birth control/protection: Post-menopausal, Tubal ligation, Essure, Hysterectomy, Partner of same sex, Surgical     Comment: SPOUSE = JACK              Procedures    ECG 12 Lead    Date/Time: 11/14/2024 9:22 AM  Performed by: María Ramirez MD    Authorized by: María Ramirez MD  Comparison: compared with previous ECG   Similar to previous ECG  Rhythm: sinus rhythm  Conduction: right bundle branch block and left posterior fascicular block    Clinical impression: abnormal EKG              Objective:      Vitals:    11/14/24 0850   BP: 128/64   Pulse: 71   Weight: 68.5 kg (151 lb)   Height: 152.4 cm (60\")     Body mass index is 29.49 kg/m².    Vitals reviewed.   Constitutional:       General: Not in acute distress.     Appearance: Not diaphoretic.   Neck:      Vascular: No carotid bruit or JVD.   Pulmonary:      Effort: Pulmonary effort is normal.      Breath sounds: Normal breath " sounds.   Cardiovascular:      Normal rate. Regular rhythm.      Murmurs: There is a grade 2/6 midsystolic murmur at the URSB and ULSB.      No gallop.  No rub.   Pulses:     Carotid: 2+ with bruit bilaterally.     Radial: 2+ bilaterally.     Dorsalis pedis: 2+ bilaterally.     Posterior tibial: 2+ bilaterally.  Edema:     Peripheral edema absent.   Neurological:      Cranial Nerves: No cranial nerve deficit.         Lab Review:       Assessment:      Diagnosis Plan   1. Malignant neoplasm of overlapping sites of right breast in female, estrogen receptor negative  Adult Transthoracic Echo Complete W/ Cont if Necessary Per Protocol      2. Benign essential hypertension  Adult Transthoracic Echo Complete W/ Cont if Necessary Per Protocol      3. Pure hypercholesterolemia        4. 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 Type 2 diabetes mellitus with both eyes affected by mild nonproliferative retinopathy without macular edema, without long-term current use of insulin        6. Bilateral carotid bruits        7. Abnormal electrocardiogram (ECG) (EKG)  Adult Transthoracic Echo Complete W/ Cont if Necessary Per Protocol        Right breast cancer, received Paclitaxel (Taxol) stopped 8/30/2024 for rash then Abraxane-finished 10/11/2024.  Now on trastuzumab (Herceptin) since 7/26/2024.  Hypertension, goal <120/80, blood pressure is controlled.  Aortic stenosis, mild  Bilateral carotid bruits  Hyperlipidemia, on rosuvastatin  Rheumatoid arthritis, on Simponi.  Sedentary lifestyle, advised 30 minutes of cardiovascular work daily.  Anemia  Hypothyroidism.     Plan:       Current recommendations for Herceptin are for baseline echocardiogram and limited echo every 3 months for 12 months of therapy then every 6 months for 2 years after therapy is discontinued.  Baseline troponin and proBNP for those with Herceptin treatment is recommended at baseline.    Repeat echocardiogram in January  "and see Deisy in 3 months.  No medication changes, overall stable.  She will call me if she has edema again.    STOP-Bang Score  Have you been diagnosed with Sleep Apnea?: no  Snoring?: no  Tired?: yes  Observed?: no  Pressure?: yes  Stop Score: 2  Body Mass Index more than 35 kg/m2?: no  Age older than 50 year old?: yes  Neck large? \">17\"/43cm-M, >16\"/41cm-F: no  Gender=Male?: no  Total Stop-Bang Score: 3    "

## 2024-11-18 ENCOUNTER — HOSPITAL ENCOUNTER (OUTPATIENT)
Dept: OCCUPATIONAL THERAPY | Facility: HOSPITAL | Age: 69
Discharge: HOME | End: 2024-11-18
Admitting: STUDENT IN AN ORGANIZED HEALTH CARE EDUCATION/TRAINING PROGRAM
Payer: MEDICARE

## 2024-11-18 DIAGNOSIS — C50.811 MALIGNANT NEOPLASM OF OVERLAPPING SITES OF RIGHT BREAST IN FEMALE, ESTROGEN RECEPTOR NEGATIVE: ICD-10-CM

## 2024-11-18 DIAGNOSIS — Z17.1 MALIGNANT NEOPLASM OF OVERLAPPING SITES OF RIGHT BREAST IN FEMALE, ESTROGEN RECEPTOR NEGATIVE: ICD-10-CM

## 2024-11-18 DIAGNOSIS — Z91.89 AT RISK FOR LYMPHEDEMA: Primary | ICD-10-CM

## 2024-11-18 PROCEDURE — 97535 SELF CARE MNGMENT TRAINING: CPT

## 2024-11-18 PROCEDURE — 93702 BIS XTRACELL FLUID ANALYSIS: CPT

## 2024-11-18 NOTE — THERAPY PROGRESS REPORT/RE-CERT
"Outpatient Occupational Therapy Lymphedema Progress Note  [facility]     Patient Name: Pretty Tovar  : 1955  MRN: [id number]  Today's Date: 2024      Visit Date: 2024    Patient Active Problem List   Diagnosis    Benign essential hypertension    Type 2 diabetes mellitus, without long-term current use of insulin    Pure hypercholesterolemia    Overweight (BMI 25.0-29.9)    Hashimoto's thyroiditis    Hyperplastic rectal polyp    Female stress incontinence    Feeling of incomplete bladder emptying    Primary osteoarthritis of left knee    S/P TKR (total knee replacement), left    Iron deficiency    Abnormality of right breast on screening mammogram    Malignant neoplasm of overlapping sites of right breast in female, estrogen receptor negative    Fitting and adjustment of vascular catheter    Right bundle branch block (RBBB) with left anterior fascicular block (LAFB)        Past Medical History:   Diagnosis Date    Abnormal liver function tests 2017    Description: , \"-\"hepatitis profile    Anemia     Anesthesia complication     SLOW TO WAKE UP WITH HYSTERECTOMY    Arthritis     Cancer 24    Right Breast    Colon polyps     Cystocele, lateral 10/15/2020    Cystocele, midline 10/15/2020    Diabetes mellitus     Frequent UTI     H/O bone density study 10/2010    normal    H/O cardiovascular stress test     H/O echocardiogram 2001    2-D    Hashimoto's thyroiditis     Heart murmur     Hyperkalemia 2019    Hyperlipidemia     Hypertension     Hypothyroidism 2018    Incompetence of pubocervical tissue 10/15/2020    Incompetence of rectovaginal tissue 10/15/2020    Limited joint range of motion     LEFT KNEE    Loss of perineal body, female 10/15/2020    Osteoarthritis of knee 2005    left    Pelvic relaxation due to vaginal prolapse 2018    Plantar warts     foot    Postmenopausal bleeding 2019    Pregnancy      twins x1    Psoriasis     RA (rheumatoid " arthritis)     Rectocele 10/15/2020    Urinary tract infection 2020    Urine incontinence     BETTER WITH SLING    Uterovaginal prolapse     Uterovaginal prolapse, incomplete 09/14/2020    Vaginal enterocele 10/15/2020        Past Surgical History:   Procedure Laterality Date    BREAST BIOPSY Right 2006    benign    COLONOSCOPY      COLONOSCOPY W/ POLYPECTOMY  02/21/2018    Dr Resendiz, 6 mm rectal polyp - hyperplastic    JOINT MANIPULATION Left 11/02/2021    Procedure: KNEE MANIPULATION with steroid injection;  Surgeon: Francis Tavarez MD;  Location: [facility];  Service: Orthopedics;  Laterality: Left;    JOINT REPLACEMENT  08/10/2021    LYMPH NODE BIOPSY  07/03/2024    MASTECTOMY W/ SENTINEL NODE BIOPSY Right 07/03/2024    Procedure: RIGHT MASTECTOMY WITH SENTINEL NODE BIOPSY;  Surgeon: Aracely Frye MD;  Location: [facility];  Service: General;  Laterality: Right;    PUBOVAGINAL SLING N/A 10/16/2020    Procedure: Pubovaginal sling Posterior colporrhaphy fixation Insertion of vaginal grafts Cystourethroscopy;  Surgeon: Karen Allen MD;  Location: [facility];  Service: Gynecology;  Laterality: N/A;    TOTAL KNEE ARTHROPLASTY Left 08/10/2021    Procedure: TOTAL KNEE ARTHROPLASTY;  Surgeon: Francis Tavarez MD;  Location: [facility];  Service: Orthopedics;  Laterality: Left;    TOTAL LAPAROSCOPIC HYSTERECTOMY, SACROCOLPOPEXY N/A 10/16/2020    Procedure: Laparoscopic uterosacral ligament colpopexy sacral colpopexy  Laparoscopic paravaginal repair Laparoscopic hysterectomy with bilateral salpingectomy  Laparoscopic abdominal enterocele repair;  Surgeon: Karen Allen MD;  Location: [facility];  Service: Gynecology;  Laterality: N/A;    TUBAL ABDOMINAL LIGATION      VENOUS ACCESS DEVICE (PORT) INSERTION N/A 07/03/2024    Procedure: INSERTION OF PORTACATH;  Surgeon: Aracely Frye MD;  Location: [facility];  Service: General;  Laterality: N/A;         Visit Dx:      ICD-10-CM ICD-9-CM   1.  At risk for lymphedema  Z91.89 V49.89   2. Malignant neoplasm of overlapping sites of right breast in female, estrogen receptor negative  C50.811 174.8    Z17.1 V86.1        Lymphedema       Row Name 11/18/24 1500             Subjective Pain    Able to rate subjective pain? yes  -RE      Pre-Treatment Pain Level 0  -RE      Post-Treatment Pain Level 0  -RE         L-Dex Bioimpedence Screening    L-Dex Measurement Extremity RUE  -RE      L-Dex Patient Position Standing  -RE      L-Dex UE Dominate Side Left  -RE      L-Dex UE At Risk Side Right  -RE      L-Dex UE Pre Surgical Value Yes  -RE      L-Dex UE Score -3.3  -RE      L-Dex UE Baseline Score -0.5  -RE      L-Dex UE Value Change -2.8  -RE      L-Dex UE Comment WNL  -RE                User Key  (r) = Recorded By, (t) = Taken By, (c) = Cosigned By      Initials Name Provider Type    Yasemin Bradford OTR Occupational Therapist                    The patient had a 3 month SOZO measurement which I reviewed today. The score is WNL  see scanned to EMR. Bioimpedance spectroscopy helps identify the   onset of lymphedema in an arm or leg before patients experience noticeable swelling. Research has   shown that 92% of patients with early detection of lymphedema using L-Dex combined with   intervention do not progress to chronic lymphedema through three years. Additionally, as of March 2023, the NCCN Guidelines® for Survivorship recommend proactive screening for lymphedema using   bioimpedance spectroscopy. Whenever possible, patients are tested for baseline L-Dex score before   cancer treatment begins and then are reassessed during regular follow-up visits using the SOZO device.   Otherwise, this can be started postoperatively and continued during regular follow-up visits. If the   patient’s L-Dex score increases above normal levels, that is a sign that lymphedema is developing and a   referral is made to occupational therapy for further evaluation and early compression  treatment.   Lymphedema assessment with the SOZO L-Dex score is recommended to be done every 3 months for   the first 3 years and then every 6 months for years 4 and 5 followed by annually afterwards         OT Assessment/Plan       Row Name 11/18/24 7276          OT Assessment    Impairments Impaired lymphatic circulation  -RE     Assessment Comments Patient returns for bioimpedance reassessment.  Her last measurement was 3 months ago.  L-Dex value today is -3.3 which is within normal limits and consistent with her baseline.  She has no new complaints.  Today we reviewed signs and symptoms of lymphedema.  I recommended follow-up in 3 months.  -RE     OT Diagnosis At risk for lymphedema  -RE     OT Rehab Potential Good  -RE     Patient/caregiver participated in establishment of treatment plan and goals Yes  -RE     Patient would benefit from skilled therapy intervention Yes  -RE        OT Plan    OT Frequency Other (comment)  -RE     Predicted Duration of Therapy Intervention (OT) Bioimpedance every 3 months  -RE     Planned CPT's? OT SELF CARE/MGMT/TRAIN 15 MIN: 31199;OT BIS XTRACELL FLUID ANALYSIS: 82989  -RE     OT Plan Comments Follow-up in 3 months  -RE               User Key  (r) = Recorded By, (t) = Taken By, (c) = Cosigned By      Initials Name Provider Type    RE Yasemin Elizabeth, OTR Occupational Therapist                           OT Goals       Row Name 11/18/24 1500          OT Short Term Goals    STG 1 Patient will demonstrate proper awareness of “What is Lymphedema?” and “Healthy Habits” for improved prevention, management, care of symptoms and ease of transition to self-care of condition.  -RE     STG 1 Progress Met  -RE        Long Term Goals    LTG Date to Achieve 02/18/25  -RE     LTG 1 Patient will participate in bioimpedance scans every 3 months as a method of early detection of lymphedema to allow for early intervention.  -RE     LTG 1 Progress Met;Ongoing  -RE     LTG 2 Patient's bioimpedance  score to remain below 6.0 for decreased risk of stage II lymphedema.  -RE     LTG 2 Progress Met;Ongoing  -RE        Time Calculation    OT Goal Re-Cert Due Date 02/18/25  -RE               User Key  (r) = Recorded By, (t) = Taken By, (c) = Cosigned By      Initials Name Provider Type    RE Yasemin Elizabeth OTR Occupational Therapist                    Therapy Education  Education Details: Discussed patient's current L-Dex value of -3.3.  I reviewed the signs and symptoms that would indicate the need to return for medical assessment and then to return to therapy including swelling, heaviness, or unusual muscle fatigue.  Given: Symptoms/condition management  Program: Reinforced  How Provided: Verbal  Provided to: Patient  Level of Understanding: Teach back education performed, Verbalized  27387 - OT Self Care/Mgmt Minutes: 10                Time Calculation:   OT Start Time: 1550  OT Stop Time: 1610  OT Time Calculation (min): 20 min  Total Timed Code Minutes- OT: 10 minute(s)  Timed Charges  62985 - OT Self Care/Mgmt Minutes: 10  Untimed Charges  05027 - OT Bioimpedence Rx Minutes: 10  Total Minutes  Timed Charges Total Minutes: 10  Untimed Charges Total Minutes: 10   Total Minutes: 20     Therapy Charges for Today       Code Description Service Date Service Provider Modifiers Qty    24393796919 HC OT SELF CARE/MGMT/TRAIN EA 15 MIN 11/18/2024 Yasemin Elizabeth OTR GO 1    [id number]  OT BIS XTRACELL FLUID ANALYSIS 11/18/2024 Yasemin Elizabeth OTR GO 1          Dictated utilizing Dragon dictation:  Much of this encounter note is an electronic transcription/translation of spoken language to printed text. The electronic translation of spoken language may permit erroneous, or at times, nonsensical words or phrases to be inadvertently transcribed; Although I have reviewed the note for such errors, some may still exist.              RIKY Chacon  11/18/2024

## 2024-11-21 ENCOUNTER — PATIENT OUTREACH (OUTPATIENT)
Dept: ONCOLOGY | Facility: HOSPITAL | Age: 69
End: 2024-11-21
Payer: MEDICARE

## 2024-11-21 NOTE — PROGRESS NOTES
MARIA GUADALUPE with appt information of Herceptin scheduled tomorrow 11/22 @ 0900. Appt note stated they had mailed her appt information. Gave her my direct call back number to return my call if needed.

## 2024-11-22 ENCOUNTER — INFUSION (OUTPATIENT)
Dept: ONCOLOGY | Facility: HOSPITAL | Age: 69
End: 2024-11-22
Payer: MEDICARE

## 2024-11-22 VITALS
BODY MASS INDEX: 30.12 KG/M2 | HEART RATE: 80 BPM | WEIGHT: 154.2 LBS | TEMPERATURE: 98.2 F | SYSTOLIC BLOOD PRESSURE: 149 MMHG | OXYGEN SATURATION: 100 % | DIASTOLIC BLOOD PRESSURE: 66 MMHG | RESPIRATION RATE: 16 BRPM

## 2024-11-22 DIAGNOSIS — C50.811 MALIGNANT NEOPLASM OF OVERLAPPING SITES OF RIGHT BREAST IN FEMALE, ESTROGEN RECEPTOR NEGATIVE: Primary | ICD-10-CM

## 2024-11-22 DIAGNOSIS — Z17.1 MALIGNANT NEOPLASM OF OVERLAPPING SITES OF RIGHT BREAST IN FEMALE, ESTROGEN RECEPTOR NEGATIVE: Primary | ICD-10-CM

## 2024-11-22 DIAGNOSIS — Z45.2 FITTING AND ADJUSTMENT OF VASCULAR CATHETER: ICD-10-CM

## 2024-11-22 LAB
ALBUMIN SERPL-MCNC: 4 G/DL (ref 3.5–5.2)
ALBUMIN/GLOB SERPL: 1.3 G/DL
ALP SERPL-CCNC: 55 U/L (ref 39–117)
ALT SERPL W P-5'-P-CCNC: 18 U/L (ref 1–33)
ANION GAP SERPL CALCULATED.3IONS-SCNC: 11.7 MMOL/L (ref 5–15)
AST SERPL-CCNC: 24 U/L (ref 1–32)
BASOPHILS # BLD AUTO: 0.04 10*3/MM3 (ref 0–0.2)
BASOPHILS NFR BLD AUTO: 0.7 % (ref 0–1.5)
BILIRUB SERPL-MCNC: 0.2 MG/DL (ref 0–1.2)
BUN SERPL-MCNC: 21 MG/DL (ref 8–23)
BUN/CREAT SERPL: 38.2 (ref 7–25)
CALCIUM SPEC-SCNC: 9.8 MG/DL (ref 8.6–10.5)
CHLORIDE SERPL-SCNC: 101 MMOL/L (ref 98–107)
CO2 SERPL-SCNC: 29.3 MMOL/L (ref 22–29)
CREAT SERPL-MCNC: 0.55 MG/DL (ref 0.57–1)
DEPRECATED RDW RBC AUTO: 50.1 FL (ref 37–54)
EGFRCR SERPLBLD CKD-EPI 2021: 100 ML/MIN/1.73
EOSINOPHIL # BLD AUTO: 0.63 10*3/MM3 (ref 0–0.4)
EOSINOPHIL NFR BLD AUTO: 11.2 % (ref 0.3–6.2)
ERYTHROCYTE [DISTWIDTH] IN BLOOD BY AUTOMATED COUNT: 14.5 % (ref 12.3–15.4)
GLOBULIN UR ELPH-MCNC: 3.1 GM/DL
GLUCOSE SERPL-MCNC: 117 MG/DL (ref 65–99)
HCT VFR BLD AUTO: 31.2 % (ref 34–46.6)
HGB BLD-MCNC: 9.9 G/DL (ref 12–15.9)
IMM GRANULOCYTES # BLD AUTO: 0.01 10*3/MM3 (ref 0–0.05)
IMM GRANULOCYTES NFR BLD AUTO: 0.2 % (ref 0–0.5)
LYMPHOCYTES # BLD AUTO: 1.26 10*3/MM3 (ref 0.7–3.1)
LYMPHOCYTES NFR BLD AUTO: 22.3 % (ref 19.6–45.3)
MCH RBC QN AUTO: 29.8 PG (ref 26.6–33)
MCHC RBC AUTO-ENTMCNC: 31.7 G/DL (ref 31.5–35.7)
MCV RBC AUTO: 94 FL (ref 79–97)
MONOCYTES # BLD AUTO: 0.38 10*3/MM3 (ref 0.1–0.9)
MONOCYTES NFR BLD AUTO: 6.7 % (ref 5–12)
NEUTROPHILS NFR BLD AUTO: 3.32 10*3/MM3 (ref 1.7–7)
NEUTROPHILS NFR BLD AUTO: 58.9 % (ref 42.7–76)
NRBC BLD AUTO-RTO: 0 /100 WBC (ref 0–0.2)
PLATELET # BLD AUTO: 295 10*3/MM3 (ref 140–450)
PMV BLD AUTO: 9.4 FL (ref 6–12)
POTASSIUM SERPL-SCNC: 4.3 MMOL/L (ref 3.5–5.2)
PROT SERPL-MCNC: 7.1 G/DL (ref 6–8.5)
RBC # BLD AUTO: 3.32 10*6/MM3 (ref 3.77–5.28)
SODIUM SERPL-SCNC: 142 MMOL/L (ref 136–145)
WBC NRBC COR # BLD AUTO: 5.64 10*3/MM3 (ref 3.4–10.8)

## 2024-11-22 PROCEDURE — 80053 COMPREHEN METABOLIC PANEL: CPT

## 2024-11-22 PROCEDURE — 63710000001 DIPHENHYDRAMINE PER 50 MG

## 2024-11-22 PROCEDURE — 25010000002 TRASTUZUMAB PER 10 MG

## 2024-11-22 PROCEDURE — 25010000002 HEPARIN LOCK FLUSH PER 10 UNITS: Performed by: INTERNAL MEDICINE

## 2024-11-22 PROCEDURE — 96413 CHEMO IV INFUSION 1 HR: CPT

## 2024-11-22 PROCEDURE — 85025 COMPLETE CBC W/AUTO DIFF WBC: CPT

## 2024-11-22 PROCEDURE — 25810000003 SODIUM CHLORIDE 0.9 % SOLUTION 250 ML FLEX CONT

## 2024-11-22 RX ORDER — SODIUM CHLORIDE 0.9 % (FLUSH) 0.9 %
10 SYRINGE (ML) INJECTION AS NEEDED
OUTPATIENT
Start: 2024-11-22

## 2024-11-22 RX ORDER — SODIUM CHLORIDE 9 MG/ML
20 INJECTION, SOLUTION INTRAVENOUS ONCE
Status: CANCELLED | OUTPATIENT
Start: 2024-11-22

## 2024-11-22 RX ORDER — HEPARIN SODIUM (PORCINE) LOCK FLUSH IV SOLN 100 UNIT/ML 100 UNIT/ML
500 SOLUTION INTRAVENOUS AS NEEDED
Status: DISCONTINUED | OUTPATIENT
Start: 2024-11-22 | End: 2024-11-22 | Stop reason: HOSPADM

## 2024-11-22 RX ORDER — DIPHENHYDRAMINE HCL 25 MG
25 CAPSULE ORAL ONCE
Status: COMPLETED | OUTPATIENT
Start: 2024-11-22 | End: 2024-11-22

## 2024-11-22 RX ORDER — SODIUM CHLORIDE 0.9 % (FLUSH) 0.9 %
10 SYRINGE (ML) INJECTION AS NEEDED
Status: DISCONTINUED | OUTPATIENT
Start: 2024-11-22 | End: 2024-11-22 | Stop reason: HOSPADM

## 2024-11-22 RX ORDER — HEPARIN SODIUM (PORCINE) LOCK FLUSH IV SOLN 100 UNIT/ML 100 UNIT/ML
500 SOLUTION INTRAVENOUS AS NEEDED
OUTPATIENT
Start: 2024-11-22

## 2024-11-22 RX ORDER — DIPHENHYDRAMINE HCL 25 MG
25 CAPSULE ORAL ONCE
Status: CANCELLED | OUTPATIENT
Start: 2024-11-22

## 2024-11-22 RX ADMIN — TRASTUZUMAB 420 MG: 150 INJECTION, POWDER, LYOPHILIZED, FOR SOLUTION INTRAVENOUS at 09:49

## 2024-11-22 RX ADMIN — Medication 500 UNITS: at 10:22

## 2024-11-22 RX ADMIN — Medication 10 ML: at 10:21

## 2024-11-22 RX ADMIN — DIPHENHYDRAMINE HYDROCHLORIDE 25 MG: 25 CAPSULE ORAL at 09:18

## 2024-12-02 DIAGNOSIS — E11.3293 TYPE 2 DIABETES MELLITUS WITH BOTH EYES AFFECTED BY MILD NONPROLIFERATIVE RETINOPATHY WITHOUT MACULAR EDEMA, WITHOUT LONG-TERM CURRENT USE OF INSULIN: Primary | ICD-10-CM

## 2024-12-04 DIAGNOSIS — E11.3299 MILD NONPROLIFERATIVE DIABETIC RETINOPATHY WITHOUT MACULAR EDEMA ASSOCIATED WITH TYPE 2 DIABETES MELLITUS: ICD-10-CM

## 2024-12-04 RX ORDER — LIRAGLUTIDE 6 MG/ML
INJECTION SUBCUTANEOUS
Qty: 6 ML | Refills: 5 | Status: SHIPPED | OUTPATIENT
Start: 2024-12-04

## 2024-12-13 ENCOUNTER — INFUSION (OUTPATIENT)
Dept: ONCOLOGY | Facility: HOSPITAL | Age: 69
End: 2024-12-13
Payer: MEDICARE

## 2024-12-13 VITALS
TEMPERATURE: 97.2 F | SYSTOLIC BLOOD PRESSURE: 189 MMHG | DIASTOLIC BLOOD PRESSURE: 77 MMHG | BODY MASS INDEX: 29.72 KG/M2 | OXYGEN SATURATION: 98 % | WEIGHT: 152.2 LBS | HEART RATE: 72 BPM | RESPIRATION RATE: 16 BRPM

## 2024-12-13 DIAGNOSIS — C50.811 MALIGNANT NEOPLASM OF OVERLAPPING SITES OF RIGHT BREAST IN FEMALE, ESTROGEN RECEPTOR NEGATIVE: Primary | ICD-10-CM

## 2024-12-13 DIAGNOSIS — Z17.1 MALIGNANT NEOPLASM OF OVERLAPPING SITES OF RIGHT BREAST IN FEMALE, ESTROGEN RECEPTOR NEGATIVE: Primary | ICD-10-CM

## 2024-12-13 LAB
ALBUMIN SERPL-MCNC: 4.3 G/DL (ref 3.5–5.2)
ALBUMIN/GLOB SERPL: 1.1 G/DL
ALP SERPL-CCNC: 67 U/L (ref 39–117)
ALT SERPL W P-5'-P-CCNC: 23 U/L (ref 1–33)
ANION GAP SERPL CALCULATED.3IONS-SCNC: 12 MMOL/L (ref 5–15)
AST SERPL-CCNC: 25 U/L (ref 1–32)
BASOPHILS # BLD AUTO: 0.04 10*3/MM3 (ref 0–0.2)
BASOPHILS NFR BLD AUTO: 0.7 % (ref 0–1.5)
BILIRUB SERPL-MCNC: 0.3 MG/DL (ref 0–1.2)
BUN SERPL-MCNC: 16 MG/DL (ref 8–23)
BUN/CREAT SERPL: 32 (ref 7–25)
CALCIUM SPEC-SCNC: 10.3 MG/DL (ref 8.6–10.5)
CHLORIDE SERPL-SCNC: 99 MMOL/L (ref 98–107)
CO2 SERPL-SCNC: 28 MMOL/L (ref 22–29)
CREAT SERPL-MCNC: 0.5 MG/DL (ref 0.57–1)
DEPRECATED RDW RBC AUTO: 47.7 FL (ref 37–54)
EGFRCR SERPLBLD CKD-EPI 2021: 102.3 ML/MIN/1.73
EOSINOPHIL # BLD AUTO: 0.12 10*3/MM3 (ref 0–0.4)
EOSINOPHIL NFR BLD AUTO: 2.1 % (ref 0.3–6.2)
ERYTHROCYTE [DISTWIDTH] IN BLOOD BY AUTOMATED COUNT: 14.1 % (ref 12.3–15.4)
GLOBULIN UR ELPH-MCNC: 3.8 GM/DL
GLUCOSE SERPL-MCNC: 114 MG/DL (ref 65–99)
HCT VFR BLD AUTO: 36.2 % (ref 34–46.6)
HGB BLD-MCNC: 11.5 G/DL (ref 12–15.9)
IMM GRANULOCYTES # BLD AUTO: 0.02 10*3/MM3 (ref 0–0.05)
IMM GRANULOCYTES NFR BLD AUTO: 0.4 % (ref 0–0.5)
LYMPHOCYTES # BLD AUTO: 1.39 10*3/MM3 (ref 0.7–3.1)
LYMPHOCYTES NFR BLD AUTO: 24.5 % (ref 19.6–45.3)
MCH RBC QN AUTO: 29 PG (ref 26.6–33)
MCHC RBC AUTO-ENTMCNC: 31.8 G/DL (ref 31.5–35.7)
MCV RBC AUTO: 91.2 FL (ref 79–97)
MONOCYTES # BLD AUTO: 0.43 10*3/MM3 (ref 0.1–0.9)
MONOCYTES NFR BLD AUTO: 7.6 % (ref 5–12)
NEUTROPHILS NFR BLD AUTO: 3.67 10*3/MM3 (ref 1.7–7)
NEUTROPHILS NFR BLD AUTO: 64.7 % (ref 42.7–76)
NRBC BLD AUTO-RTO: 0 /100 WBC (ref 0–0.2)
PLATELET # BLD AUTO: 257 10*3/MM3 (ref 140–450)
PMV BLD AUTO: 9.8 FL (ref 6–12)
POTASSIUM SERPL-SCNC: 4.3 MMOL/L (ref 3.5–5.2)
PROT SERPL-MCNC: 8.1 G/DL (ref 6–8.5)
RBC # BLD AUTO: 3.97 10*6/MM3 (ref 3.77–5.28)
SODIUM SERPL-SCNC: 139 MMOL/L (ref 136–145)
WBC NRBC COR # BLD AUTO: 5.67 10*3/MM3 (ref 3.4–10.8)

## 2024-12-13 PROCEDURE — 85025 COMPLETE CBC W/AUTO DIFF WBC: CPT

## 2024-12-13 PROCEDURE — 96413 CHEMO IV INFUSION 1 HR: CPT

## 2024-12-13 PROCEDURE — 80053 COMPREHEN METABOLIC PANEL: CPT

## 2024-12-13 PROCEDURE — 63710000001 DIPHENHYDRAMINE PER 50 MG

## 2024-12-13 PROCEDURE — 25810000003 SODIUM CHLORIDE 0.9 % SOLUTION 250 ML FLEX CONT

## 2024-12-13 PROCEDURE — 25010000002 TRASTUZUMAB PER 10 MG

## 2024-12-13 RX ORDER — DIPHENHYDRAMINE HCL 25 MG
25 CAPSULE ORAL ONCE
Status: COMPLETED | OUTPATIENT
Start: 2024-12-13 | End: 2024-12-13

## 2024-12-13 RX ORDER — DIPHENHYDRAMINE HCL 25 MG
25 CAPSULE ORAL ONCE
Status: CANCELLED | OUTPATIENT
Start: 2024-12-13

## 2024-12-13 RX ORDER — SODIUM CHLORIDE 9 MG/ML
20 INJECTION, SOLUTION INTRAVENOUS ONCE
Status: CANCELLED | OUTPATIENT
Start: 2024-12-13

## 2024-12-13 RX ADMIN — DIPHENHYDRAMINE HYDROCHLORIDE 25 MG: 25 CAPSULE ORAL at 09:29

## 2024-12-13 RX ADMIN — TRASTUZUMAB 420 MG: 150 INJECTION, POWDER, LYOPHILIZED, FOR SOLUTION INTRAVENOUS at 09:54

## 2024-12-14 LAB — HBA1C MFR BLD: 6.4 % (ref 4.8–5.6)

## 2024-12-16 ENCOUNTER — OFFICE VISIT (OUTPATIENT)
Dept: INTERNAL MEDICINE | Facility: CLINIC | Age: 69
End: 2024-12-16
Payer: MEDICARE

## 2024-12-16 VITALS
SYSTOLIC BLOOD PRESSURE: 140 MMHG | HEIGHT: 60 IN | HEART RATE: 62 BPM | BODY MASS INDEX: 29.06 KG/M2 | RESPIRATION RATE: 16 BRPM | DIASTOLIC BLOOD PRESSURE: 62 MMHG | OXYGEN SATURATION: 97 % | WEIGHT: 148 LBS

## 2024-12-16 DIAGNOSIS — I10 BENIGN ESSENTIAL HYPERTENSION: Primary | Chronic | ICD-10-CM

## 2024-12-16 DIAGNOSIS — E11.3293 TYPE 2 DIABETES MELLITUS WITH BOTH EYES AFFECTED BY MILD NONPROLIFERATIVE RETINOPATHY WITHOUT MACULAR EDEMA, WITHOUT LONG-TERM CURRENT USE OF INSULIN: Chronic | ICD-10-CM

## 2024-12-16 DIAGNOSIS — E78.00 PURE HYPERCHOLESTEROLEMIA: Chronic | ICD-10-CM

## 2024-12-16 PROCEDURE — G2211 COMPLEX E/M VISIT ADD ON: HCPCS | Performed by: NURSE PRACTITIONER

## 2024-12-16 PROCEDURE — 3078F DIAST BP <80 MM HG: CPT | Performed by: NURSE PRACTITIONER

## 2024-12-16 PROCEDURE — 3077F SYST BP >= 140 MM HG: CPT | Performed by: NURSE PRACTITIONER

## 2024-12-16 PROCEDURE — 1126F AMNT PAIN NOTED NONE PRSNT: CPT | Performed by: NURSE PRACTITIONER

## 2024-12-16 PROCEDURE — 1160F RVW MEDS BY RX/DR IN RCRD: CPT | Performed by: NURSE PRACTITIONER

## 2024-12-16 PROCEDURE — 1159F MED LIST DOCD IN RCRD: CPT | Performed by: NURSE PRACTITIONER

## 2024-12-16 PROCEDURE — 99214 OFFICE O/P EST MOD 30 MIN: CPT | Performed by: NURSE PRACTITIONER

## 2024-12-16 PROCEDURE — 3044F HG A1C LEVEL LT 7.0%: CPT | Performed by: NURSE PRACTITIONER

## 2024-12-16 NOTE — PROGRESS NOTES
"Subjective   Pretty Tovar is a 68 y.o. female. Patient is here today for   Chief Complaint   Patient presents with    Diabetes   Answers submitted by the patient for this visit:  Primary Reason for Visit (Submitted on 12/9/2024)  What is the primary reason for your visit?: Problem Not Listed  Problem not listed (Submitted on 12/9/2024)  Chief Complaint: Other medical problem  anorexia: No  joint pain: Yes  change in stool: No  joint swelling: Yes  swollen glands: No  vertigo: No  Onset: 1 to 5 years  Chronicity: recurrent  Frequency: intermittently  Medications tried: meloxicam  Additional information: This visit is a 6 month checkup           Vitals:    12/16/24 0946   BP: 140/62   Pulse: 62   Resp: 16   SpO2: 97%     Body mass index is 28.9 kg/m².  The following portions of the patient's history were reviewed and updated as appropriate: allergies, current medications, past family history, past medical history, past social history, past surgical history and problem list.    History of Present Illness  The patient presents for a 6-month follow-up for diabetes, blood pressure management,     She reports maintaining a healthy diet with a normal appetite. She has not yet scheduled her diabetic  eye examination. She is currently on Victoza but has received a notification indicating that her insurance will no longer cover this medication. She is also on metformin,  She has completed a 12-week course of chemotherapy and is now on Herceptin, which she will continue every 3 weeks for a year. She reports feeling well overall.     However, during a recent oncology appointment,  her blood pressure  was measured at 187 on the right arm and 203 on the left arm. She has not been monitoring her blood pressure at home recently but plans to resume this practice.     I reviewed labs with her in detail     Past Medical History:   Diagnosis Date    Abnormal liver function tests 02/09/2017    Description: 2000, \"-\"hepatitis " profile    Anemia     Anesthesia complication     SLOW TO WAKE UP WITH HYSTERECTOMY    Arthritis     Cancer 24    Right Breast    Colon polyps     Cystocele, lateral 10/15/2020    Cystocele, midline 10/15/2020    Diabetes mellitus     Frequent UTI     H/O bone density study 10/2010    normal    H/O cardiovascular stress test     H/O echocardiogram 2001    2-D    Hashimoto's thyroiditis     Heart murmur     Hyperkalemia 2019    Hyperlipidemia     Hypertension     Hypothyroidism 2018    Incompetence of pubocervical tissue 10/15/2020    Incompetence of rectovaginal tissue 10/15/2020    Limited joint range of motion     LEFT KNEE    Loss of perineal body, female 10/15/2020    Osteoarthritis of knee 2005    left    Pelvic relaxation due to vaginal prolapse 2018    Plantar warts     foot    Postmenopausal bleeding 2019    Pregnancy      twins x1    Psoriasis     RA (rheumatoid arthritis)     Rectocele 10/15/2020    Urinary tract infection     Urine incontinence     BETTER WITH SLING    Uterovaginal prolapse     Uterovaginal prolapse, incomplete 2020    Vaginal enterocele 10/15/2020      No Known Allergies   Social History     Socioeconomic History    Marital status:      Spouse name: SUZANNE     Number of children: 3   Tobacco Use    Smoking status: Never     Passive exposure: Never    Smokeless tobacco: Never    Tobacco comments:     Caffeine: 1 drink daily   Vaping Use    Vaping status: Never Used   Substance and Sexual Activity    Alcohol use: Never    Drug use: Never    Sexual activity: Yes     Partners: Male     Birth control/protection: Post-menopausal, Tubal ligation, Essure, Hysterectomy, Partner of same sex, Surgical     Comment: SPOUSE = SUZANNE         Current Outpatient Medications:     BD PEN NEEDLE SEUN U/F 32G X 4 MM misc, USE 1 PEN NEEDLE WITH EACH  VICTOZA INJECTION, Disp: 100 each, Rfl: 1    Calcium Carbonate-Vit D-Min (CALCIUM 1200 PO), Take 1 tablet by mouth  Daily. PT HOLDING FOR SURGERY, Disp: , Rfl:     carvedilol (COREG) 6.25 MG tablet, Take 1 tablet by mouth 2 (Two) Times a Day., Disp: 180 tablet, Rfl: 3    Cinnamon 500 MG tablet, Take 1,000 mg by mouth 2 (two) times a day. PT HOLDING FOR SURGERY, Disp: , Rfl:     clobetasol propionate (TEMOVATE) 0.05 % cream, Apply 1 Application topically to the appropriate area as directed 2 (Two) Times a Day., Disp: , Rfl:     Coenzyme Q10 (CO Q 10 PO), Take 1 capsule by mouth Daily. PT HOLDING FOR SURGERY, Disp: , Rfl:     CRANBERRY-VITAMIN C-D MANNOSE PO, Take 2 capsules by mouth Daily. PT HOLDING FOR SURGERY, Disp: , Rfl:     hydrocortisone 2.5 % cream, Apply 1 Application topically to the appropriate area as directed 2 (Two) Times a Day., Disp: 3.5 g, Rfl: 0    levothyroxine (Synthroid) 88 MCG tablet, Take 1 tablet by mouth Daily., Disp: 90 tablet, Rfl: 3    lidocaine-prilocaine (EMLA) 2.5-2.5 % cream, Apply 1 Application topically to the appropriate area as directed As Needed for Mild Pain., Disp: , Rfl:     Liraglutide (VICTOZA) 18 MG/3ML solution pen-injector injection, DIAL AND INJECT UNDER THE SKIN 1.2 MG DAILY, Disp: 6 mL, Rfl: 5    lisinopril (PRINIVIL,ZESTRIL) 20 MG tablet, TAKE 1 TABLET BY MOUTH DAILY, Disp: 90 tablet, Rfl: 3    lisinopril-hydrochlorothiazide (PRINZIDE,ZESTORETIC) 20-25 MG per tablet, TAKE 1 TABLET BY MOUTH DAILY, Disp: 90 tablet, Rfl: 3    loratadine (Claritin) 10 MG tablet, Take 1 tablet by mouth Every Night., Disp: , Rfl:     meloxicam (MOBIC) 15 MG tablet, Take 1 tablet by mouth Daily. PT HOLDING FOR SURGERY, Disp: , Rfl:     metFORMIN (GLUCOPHAGE) 500 MG tablet, TAKE THREE TABLETS BY MOUTH EVERY MORNING AND THEN TAKE TWO TABLETS BY MOUTH IN THE EVENING (Patient taking differently: Take 3 tablets by mouth Daily With Breakfast. TAKE THREE TABLETS BY MOUTH EVERY MORNING AND THEN TAKE TWO TABLETS BY MOUTH IN THE EVENING), Disp: 450 tablet, Rfl: 3    methylPREDNISolone (MEDROL) 4 MG dose pack, Take  as directed on package instructions., Disp: 21 tablet, Rfl: 0    multivitamin with minerals tablet tablet, Take 1 tablet by mouth Daily. PT HOLDING FOR SURGERY, Disp: , Rfl:     Omega-3 Fatty Acids (fish oil) 1200 MG capsule capsule, Take 1 capsule by mouth Daily. PT HOLDING FOR SURGERY, Disp: , Rfl:     ondansetron (ZOFRAN) 8 MG tablet, Take 1 tablet by mouth 3 (Three) Times a Day As Needed for Nausea or Vomiting., Disp: 30 tablet, Rfl: 5    rosuvastatin (CRESTOR) 20 MG tablet, TAKE 1 TABLET BY MOUTH DAILY, Disp: 90 tablet, Rfl: 1    TURMERIC PO, Take 1,400 mg by mouth Daily. PT HOLDING FOR SURGERY, Disp: , Rfl:     vitamin C (ASCORBIC ACID) 250 MG tablet, Take 4 tablets by mouth Daily., Disp: , Rfl:     vitamin D3 125 MCG (5000 UT) capsule capsule, Take 1 capsule by mouth Daily. PT HOLDING FOR SURGERY, Disp: , Rfl:     VITAMIN E PO, Take 1 capsule by mouth Daily. HOLD PRIOR TO SURG, Disp: , Rfl:     Zinc 50 MG tablet, Take 1 tablet by mouth Daily. PT HOLDING FOR SURGERY, Disp: , Rfl:    Review of Systems   Constitutional:  Positive for fatigue. Negative for chills, diaphoresis and fever.   HENT:  Negative for congestion and sore throat.    Respiratory:  Negative for cough.    Cardiovascular:  Negative for chest pain.   Gastrointestinal:  Negative for abdominal pain, nausea and vomiting.   Genitourinary:  Negative for dysuria.   Musculoskeletal:  Negative for myalgias and neck pain.   Skin:  Positive for rash.   Neurological:  Negative for weakness, numbness and headaches.       Objective   Physical Exam  Vitals and nursing note reviewed.   Constitutional:       General: She is not in acute distress.  Cardiovascular:      Rate and Rhythm: Normal rate and regular rhythm.      Heart sounds: Normal heart sounds.      Comments: BP recheck 142/62   Pulmonary:      Effort: Pulmonary effort is normal.      Breath sounds: Normal breath sounds.   Skin:     General: Skin is warm and dry.   Neurological:      Mental Status:  She is alert and oriented to person, place, and time.   Psychiatric:         Mood and Affect: Mood normal.       The following data was reviewed by: CLEVELAND Rodriguez on 12/16/2024:  CMP          11/1/2024    08:36 11/22/2024    08:49 12/13/2024    08:53   CMP   Glucose 161  117  114    BUN 15  21  16    Creatinine 0.52  0.55  0.50    EGFR 101.3  100.0  102.3    Sodium 142  142  139    Potassium 3.8  4.3  4.3    Chloride 102  101  99    Calcium 9.4  9.8  10.3    Total Protein 6.9  7.1  8.1    Albumin 4.0  4.0  4.3    Globulin 2.9  3.1  3.8    Total Bilirubin 0.3  0.2  0.3    Alkaline Phosphatase 55  55  67    AST (SGOT) 29  24  25    ALT (SGPT) 21  18  23    Albumin/Globulin Ratio 1.4  1.3  1.1    BUN/Creatinine Ratio 28.8  38.2  32.0    Anion Gap 14.9  11.7  12.0      Most Recent A1C          12/13/2024    08:53   HGBA1C Most Recent   Hemoglobin A1C 6.4            Assessment    Diagnoses and all orders for this visit:    1. Benign essential hypertension (Primary)    2. Type 2 diabetes mellitus with both eyes affected by mild nonproliferative retinopathy without macular edema, without long-term current use of insulin    3. Pure hypercholesterolemia        Assessment & Plan            1. Diabetes Mellitus.  Her A1c level is well-controlled at 6.4, indicating effective management of her diabetes. She is advised to consider transitioning to Ozempic or Trulicity due to insurance coverage changes. If starting Ozempic, the dosage will be 0.25 mg weekly for 4 weeks, then increased to 0.5 mg weekly. She will continue her current metformin regimen of 3 tablets in the morning and 2 tablets in the evening. Labs will be drawn at the next visit.    2. HTN   Her blood pressure readings were elevated at oncology visit, with recent readings of 187 and 203 systolic. BP readings today are slightly elevated at 140/60  She is currently on lisinopril, hctz  and carvedilol She is instructed to monitor her blood pressure at home  twice daily for the next 7 days and send the readings via [employer]. If necessary, medication adjustments will be made based on these readings.    3. HLD- continue statin     Follow-up  The patient will follow up in 6 months as scheduled or sooner if needed         Return in about 6 months (around 6/16/2025) for medicare wellness, fasting labs.    Patient or patient representative verbalized consent for the use of Ambient Listening during the visit with  CLEVELAND Rodriguez for chart documentation. 12/16/2024  09:31 EST

## 2024-12-18 DIAGNOSIS — E11.3293 TYPE 2 DIABETES MELLITUS WITH BOTH EYES AFFECTED BY MILD NONPROLIFERATIVE RETINOPATHY WITHOUT MACULAR EDEMA, WITHOUT LONG-TERM CURRENT USE OF INSULIN: Chronic | ICD-10-CM

## 2024-12-19 RX ORDER — BLOOD SUGAR DIAGNOSTIC
STRIP MISCELLANEOUS
Qty: 100 EACH | Refills: 3 | Status: SHIPPED | OUTPATIENT
Start: 2024-12-19

## 2025-01-03 ENCOUNTER — INFUSION (OUTPATIENT)
Dept: ONCOLOGY | Facility: HOSPITAL | Age: 70
End: 2025-01-03
Payer: MEDICARE

## 2025-01-03 ENCOUNTER — OFFICE VISIT (OUTPATIENT)
Dept: ONCOLOGY | Facility: CLINIC | Age: 70
End: 2025-01-03
Payer: MEDICARE

## 2025-01-03 VITALS
HEIGHT: 60 IN | SYSTOLIC BLOOD PRESSURE: 164 MMHG | DIASTOLIC BLOOD PRESSURE: 67 MMHG | BODY MASS INDEX: 30.04 KG/M2 | HEART RATE: 66 BPM | WEIGHT: 153 LBS | TEMPERATURE: 97.8 F | OXYGEN SATURATION: 96 %

## 2025-01-03 DIAGNOSIS — Z17.1 MALIGNANT NEOPLASM OF OVERLAPPING SITES OF RIGHT BREAST IN FEMALE, ESTROGEN RECEPTOR NEGATIVE: ICD-10-CM

## 2025-01-03 DIAGNOSIS — Z17.1 MALIGNANT NEOPLASM OF OVERLAPPING SITES OF RIGHT BREAST IN FEMALE, ESTROGEN RECEPTOR NEGATIVE: Primary | ICD-10-CM

## 2025-01-03 DIAGNOSIS — C50.811 MALIGNANT NEOPLASM OF OVERLAPPING SITES OF RIGHT BREAST IN FEMALE, ESTROGEN RECEPTOR NEGATIVE: ICD-10-CM

## 2025-01-03 DIAGNOSIS — C50.811 MALIGNANT NEOPLASM OF OVERLAPPING SITES OF RIGHT BREAST IN FEMALE, ESTROGEN RECEPTOR NEGATIVE: Primary | ICD-10-CM

## 2025-01-03 LAB
ALBUMIN SERPL-MCNC: 4.1 G/DL (ref 3.5–5.2)
ALBUMIN/GLOB SERPL: 1.3 G/DL
ALP SERPL-CCNC: 64 U/L (ref 39–117)
ALT SERPL W P-5'-P-CCNC: 29 U/L (ref 1–33)
ANION GAP SERPL CALCULATED.3IONS-SCNC: 10.6 MMOL/L (ref 5–15)
AST SERPL-CCNC: 30 U/L (ref 1–32)
BASOPHILS # BLD AUTO: 0.03 10*3/MM3 (ref 0–0.2)
BASOPHILS NFR BLD AUTO: 0.5 % (ref 0–1.5)
BILIRUB SERPL-MCNC: 0.2 MG/DL (ref 0–1.2)
BUN SERPL-MCNC: 17 MG/DL (ref 8–23)
BUN/CREAT SERPL: 34.7 (ref 7–25)
CALCIUM SPEC-SCNC: 10.2 MG/DL (ref 8.6–10.5)
CHLORIDE SERPL-SCNC: 102 MMOL/L (ref 98–107)
CO2 SERPL-SCNC: 27.4 MMOL/L (ref 22–29)
CREAT SERPL-MCNC: 0.49 MG/DL (ref 0.57–1)
DEPRECATED RDW RBC AUTO: 47.6 FL (ref 37–54)
EGFRCR SERPLBLD CKD-EPI 2021: 102.2 ML/MIN/1.73
EOSINOPHIL # BLD AUTO: 0.35 10*3/MM3 (ref 0–0.4)
EOSINOPHIL NFR BLD AUTO: 5.9 % (ref 0.3–6.2)
ERYTHROCYTE [DISTWIDTH] IN BLOOD BY AUTOMATED COUNT: 14.3 % (ref 12.3–15.4)
GLOBULIN UR ELPH-MCNC: 3.1 GM/DL
GLUCOSE SERPL-MCNC: 124 MG/DL (ref 65–99)
HCT VFR BLD AUTO: 36.4 % (ref 34–46.6)
HGB BLD-MCNC: 11.3 G/DL (ref 12–15.9)
IMM GRANULOCYTES # BLD AUTO: 0.02 10*3/MM3 (ref 0–0.05)
IMM GRANULOCYTES NFR BLD AUTO: 0.3 % (ref 0–0.5)
LYMPHOCYTES # BLD AUTO: 1.24 10*3/MM3 (ref 0.7–3.1)
LYMPHOCYTES NFR BLD AUTO: 20.8 % (ref 19.6–45.3)
MCH RBC QN AUTO: 28.2 PG (ref 26.6–33)
MCHC RBC AUTO-ENTMCNC: 31 G/DL (ref 31.5–35.7)
MCV RBC AUTO: 90.8 FL (ref 79–97)
MONOCYTES # BLD AUTO: 0.43 10*3/MM3 (ref 0.1–0.9)
MONOCYTES NFR BLD AUTO: 7.2 % (ref 5–12)
NEUTROPHILS NFR BLD AUTO: 3.9 10*3/MM3 (ref 1.7–7)
NEUTROPHILS NFR BLD AUTO: 65.3 % (ref 42.7–76)
NRBC BLD AUTO-RTO: 0 /100 WBC (ref 0–0.2)
PLATELET # BLD AUTO: 226 10*3/MM3 (ref 140–450)
PMV BLD AUTO: 10.2 FL (ref 6–12)
POTASSIUM SERPL-SCNC: 4.3 MMOL/L (ref 3.5–5.2)
PROT SERPL-MCNC: 7.2 G/DL (ref 6–8.5)
RBC # BLD AUTO: 4.01 10*6/MM3 (ref 3.77–5.28)
SODIUM SERPL-SCNC: 140 MMOL/L (ref 136–145)
WBC NRBC COR # BLD AUTO: 5.97 10*3/MM3 (ref 3.4–10.8)

## 2025-01-03 PROCEDURE — 96413 CHEMO IV INFUSION 1 HR: CPT

## 2025-01-03 PROCEDURE — 25010000002 TRASTUZUMAB PER 10 MG: Performed by: INTERNAL MEDICINE

## 2025-01-03 PROCEDURE — 63710000001 DIPHENHYDRAMINE PER 50 MG: Performed by: INTERNAL MEDICINE

## 2025-01-03 PROCEDURE — 80053 COMPREHEN METABOLIC PANEL: CPT

## 2025-01-03 PROCEDURE — 85025 COMPLETE CBC W/AUTO DIFF WBC: CPT

## 2025-01-03 PROCEDURE — 25810000003 SODIUM CHLORIDE 0.9 % SOLUTION 250 ML FLEX CONT: Performed by: INTERNAL MEDICINE

## 2025-01-03 RX ORDER — SODIUM CHLORIDE 9 MG/ML
20 INJECTION, SOLUTION INTRAVENOUS ONCE
Status: CANCELLED | OUTPATIENT
Start: 2025-01-03

## 2025-01-03 RX ORDER — DIPHENHYDRAMINE HCL 25 MG
25 CAPSULE ORAL ONCE
Status: COMPLETED | OUTPATIENT
Start: 2025-01-03 | End: 2025-01-03

## 2025-01-03 RX ORDER — DIPHENHYDRAMINE HCL 25 MG
25 CAPSULE ORAL ONCE
Status: CANCELLED | OUTPATIENT
Start: 2025-01-03

## 2025-01-03 RX ADMIN — DIPHENHYDRAMINE HYDROCHLORIDE 25 MG: 25 CAPSULE ORAL at 10:22

## 2025-01-03 RX ADMIN — TRASTUZUMAB 420 MG: 150 INJECTION, POWDER, LYOPHILIZED, FOR SOLUTION INTRAVENOUS at 10:56

## 2025-01-03 NOTE — PROGRESS NOTES
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    Since she stopped her Taxol her rash is much better and the dermatologist biopsied it and this is probably a drug rash and I think we can move to every 3-week Herceptin dosing because this does not seem to be the culprit for her rash    We did genetic testing that shows a VUS in the NF1 gene which is not significant    She does not need radiation or hormonal blockade because she had a mastectomy and was ER/ND negative  She has had significant alopecia and is wearing a wig  Echocardiogram was done 10/9/2024 showing ejection fraction of 59% and new echo is due next week    Past Medical History:   Diagnosis Date    Abnormal liver function tests 02/09/2017    Description: 2000, \"-\"hepatitis profile    Anemia     Anesthesia complication     SLOW TO WAKE UP WITH HYSTERECTOMY    Arthritis     Cancer 5/16/24    Right Breast    Colon polyps     Cystocele, lateral 10/15/2020    Cystocele, midline 10/15/2020    Diabetes mellitus     Frequent UTI     H/O bone density study 10/2010    normal    H/O cardiovascular stress test 2001    H/O echocardiogram 03/2001    2-D    Hashimoto's thyroiditis     Heart murmur     Hyperkalemia 05/16/2019    Hyperlipidemia     Hypertension     Hypothyroidism 2018    Incompetence of pubocervical tissue 10/15/2020    Incompetence of rectovaginal tissue 10/15/2020    Limited joint range of motion     LEFT KNEE    Loss of perineal body, female 10/15/2020    " Osteoarthritis of knee 2005    left    Pelvic relaxation due to vaginal prolapse 2018    Plantar warts     foot    Postmenopausal bleeding 2019    Pregnancy      twins x1    Psoriasis     RA (rheumatoid arthritis)     Rectocele 10/15/2020    Urinary tract infection     Urine incontinence     BETTER WITH SLING    Uterovaginal prolapse     Uterovaginal prolapse, incomplete 2020    Vaginal enterocele 10/15/2020        Past Surgical History:   Procedure Laterality Date    BREAST BIOPSY Right 2006    benign    COLONOSCOPY      COLONOSCOPY W/ POLYPECTOMY  2018    Dr Resendiz, 6 mm rectal polyp - hyperplastic    JOINT MANIPULATION Left 2021    Procedure: KNEE MANIPULATION with steroid injection;  Surgeon: Francis Tavarez MD;  Location: [facility];  Service: Orthopedics;  Laterality: Left;    JOINT REPLACEMENT  08/10/2021    LYMPH NODE BIOPSY  2024    MASTECTOMY W/ SENTINEL NODE BIOPSY Right 2024    Procedure: RIGHT MASTECTOMY WITH SENTINEL NODE BIOPSY;  Surgeon: Aracely Frye MD;  Location: [facility];  Service: General;  Laterality: Right;    PUBOVAGINAL SLING N/A 10/16/2020    Procedure: Pubovaginal sling Posterior colporrhaphy fixation Insertion of vaginal grafts Cystourethroscopy;  Surgeon: Karen Allen MD;  Location: [facility] OR;  Service: Gynecology;  Laterality: N/A;    TOTAL KNEE ARTHROPLASTY Left 08/10/2021    Procedure: TOTAL KNEE ARTHROPLASTY;  Surgeon: Francis Tavarez MD;  Location: [facility] OR;  Service: Orthopedics;  Laterality: Left;    TOTAL LAPAROSCOPIC HYSTERECTOMY, SACROCOLPOPEXY N/A 10/16/2020    Procedure: Laparoscopic uterosacral ligament colpopexy sacral colpopexy  Laparoscopic paravaginal repair Laparoscopic hysterectomy with bilateral salpingectomy  Laparoscopic abdominal enterocele repair;  Surgeon: Karen Allen MD;  Location: [facility] OR;  Service: Gynecology;  Laterality: N/A;    TUBAL ABDOMINAL LIGATION      VENOUS  ACCESS DEVICE (PORT) INSERTION N/A 2024    Procedure: INSERTION OF PORTACATH;  Surgeon: Aracely Frye MD;  Location: [facility];  Service: General;  Laterality: N/A;   Oncologic history  patient is a 68-year-old female with 4-year history of rheumatoid arthritis on Simponi who was found on her most recent screening mammogram have an abnormality in the right breast with a 10 mm oval mass at the 11:00 and an 11 mm mass at 12:00.  His abnormalities persisted and diagnostic imaging and the patient underwent biopsy of both these areas.  The area at 12:00 was DCIS high-grade ER/WY negative and the lesion at 11:00 was grade 3 invasive ductal carcinoma measuring 4.8 mm ER/WY negative HER2 3+    Patient was referred to Dr. Aracely Frye and underwent bilateral screening mammograms which showed large hematomas in both biopsy sites with a residual 1.1 x 1.1 cm mass at 11:00 and 12:00 was large hematoma with a biopsy cavity and at 9:00 there was another 1.2 x 1.4 cm mass which was suspicious and then extending from 90 to o'clock there was non-mass enhancement measuring 4.7 x 4.7 centimeters encompassing the 2 biopsy sites and the 9:00 mass    Patient has been referred to discuss treatment but the plan is to proceed with surgery first as masses were nonpalpable and small and initial imaging although now with a large postbiopsy hematoma was palpation is unreliable    Patient is  2 para 3 first childbirth was at age 22 she did not breast-feed menarche was at age 11 menopause 50.  She has been on no post menopausal hormone replacement    Family history is completely negative for any malignancy and no genetic testing was done    She is not a smoker or drinker  She has not had a DVT MI or stroke    She has been on Simponi for her rheumatoid arthritis her last dose was in April and she is going to skip the  dose because surgery is scheduled    Her last bone density in 2018 was normal    No history of unusual  bleeding with her hysterectomy or knee replacement    We discussed the natural history of HER2 positive breast cancer I told her that unless the tumor was bigger than we had anticipated on imaging we will plan for surgery first followed by Taxol Herceptin for 12 weeks and then Herceptin for the rest of the year.  If we have unexpected findings at surgery we would tailor the treatment to be most appropriate for her but she knows she will have some chemo at any rate.  I have asked her to have an echocardiogram done and we will see her back 2 to 3 weeks after surgery to review the final path and make a treatment decision    7/3/2024 right breast skin sparing mastectomy by Dr. Frye-pathology showing invasive mammary carcinoma (invasive ductal carcinoma) [place] histological score 3.  Tumor in the upper outer quadrant at 11:00 measuring 10 x 10 x 10 mm.  Associated high-grade ductal carcinoma in situ, solid, cribriform and comedo types involving adenosis and fibroadenomatoid change with central comedonecrosis and associated microcalcifications.  DCIS spans 15 mm extending from the upper outer quadrant and to the lower outer quadrant.  All margins are free of invasive carcinoma and DCIS.  No LVSI, posterior skeletal muscle free of tumor.  ER/MO negative, HER2 3+ positive, Ki-67 60%.  Pathologic grade pT1b, N (SN) 0.  [place] lymph nodes 3 lymph nodes removed were negative for metastatic carcinoma.      Treatment recommended with weekly Taxol/ Herceptin X 12 followed by Herceptin every 3 weeks x 1 year.     Current Outpatient Medications on File Prior to Visit   Medication Sig Dispense Refill    BD PEN NEEDLE SEUN U/F 32G X 4 MM misc USE 1 PEN NEEDLE WITH EACH  VICTOZA INJECTION 100 each 1    Calcium Carbonate-Vit D-Min (CALCIUM 1200 PO) Take 1 tablet by mouth Daily. PT HOLDING FOR SURGERY      carvedilol (COREG) 6.25 MG tablet Take 1 tablet by mouth 2 (Two) Times a Day. 180 tablet 3    Cinnamon 500 MG tablet Take  1,000 mg by mouth 2 (two) times a day. PT HOLDING FOR SURGERY      clobetasol propionate (TEMOVATE) 0.05 % cream Apply 1 Application topically to the appropriate area as directed 2 (Two) Times a Day.      Coenzyme Q10 (CO Q 10 PO) Take 1 capsule by mouth Daily. PT HOLDING FOR SURGERY      CRANBERRY-VITAMIN C-D MANNOSE PO Take 2 capsules by mouth Daily. PT HOLDING FOR SURGERY      glucose blood (OneTouch Verio) test strip Use as directed to CHECK BLOOD GLUCOSE ONCE DAILY. 100 each 3    hydrocortisone 2.5 % cream Apply 1 Application topically to the appropriate area as directed 2 (Two) Times a Day. 3.5 g 0    levothyroxine (Synthroid) 88 MCG tablet Take 1 tablet by mouth Daily. 90 tablet 3    lidocaine-prilocaine (EMLA) 2.5-2.5 % cream Apply 1 Application topically to the appropriate area as directed As Needed for Mild Pain.      Liraglutide (VICTOZA) 18 MG/3ML solution pen-injector injection DIAL AND INJECT UNDER THE SKIN 1.2 MG DAILY 6 mL 5    lisinopril (PRINIVIL,ZESTRIL) 20 MG tablet TAKE 1 TABLET BY MOUTH DAILY 90 tablet 3    lisinopril-hydrochlorothiazide (PRINZIDE,ZESTORETIC) 20-25 MG per tablet TAKE 1 TABLET BY MOUTH DAILY 90 tablet 3    loratadine (Claritin) 10 MG tablet Take 1 tablet by mouth Every Night.      meloxicam (MOBIC) 15 MG tablet Take 1 tablet by mouth Daily. PT HOLDING FOR SURGERY      metFORMIN (GLUCOPHAGE) 500 MG tablet TAKE THREE TABLETS BY MOUTH EVERY MORNING AND THEN TAKE TWO TABLETS BY MOUTH IN THE EVENING (Patient taking differently: Take 3 tablets by mouth Daily With Breakfast. TAKE THREE TABLETS BY MOUTH EVERY MORNING AND THEN TAKE TWO TABLETS BY MOUTH IN THE EVENING) 450 tablet 3    multivitamin with minerals tablet tablet Take 1 tablet by mouth Daily. PT HOLDING FOR SURGERY      Omega-3 Fatty Acids (fish oil) 1200 MG capsule capsule Take 1 capsule by mouth Daily. PT HOLDING FOR SURGERY      ondansetron (ZOFRAN) 8 MG tablet Take 1 tablet by mouth 3 (Three) Times a Day As Needed for  Nausea or Vomiting. 30 tablet 5    rosuvastatin (CRESTOR) 20 MG tablet TAKE 1 TABLET BY MOUTH DAILY 90 tablet 1    TURMERIC PO Take 1,400 mg by mouth Daily. PT HOLDING FOR SURGERY      vitamin C (ASCORBIC ACID) 250 MG tablet Take 4 tablets by mouth Daily.      vitamin D3 125 MCG (5000 UT) capsule capsule Take 1 capsule by mouth Daily. PT HOLDING FOR SURGERY      VITAMIN E PO Take 1 capsule by mouth Daily. HOLD PRIOR TO SURG      Zinc 50 MG tablet Take 1 tablet by mouth Daily. PT HOLDING FOR SURGERY      methylPREDNISolone (MEDROL) 4 MG dose pack Take as directed on package instructions. (Patient not taking: Reported on 1/3/2025) 21 tablet 0     No current facility-administered medications on file prior to visit.        ALLERGIES:  No Known Allergies     Social History     Socioeconomic History    Marital status:      Spouse name: SUZANNE     Number of children: 3   Tobacco Use    Smoking status: Never     Passive exposure: Never    Smokeless tobacco: Never    Tobacco comments:     Caffeine: 1 drink daily   Vaping Use    Vaping status: Never Used   Substance and Sexual Activity    Alcohol use: Never    Drug use: Never    Sexual activity: Yes     Partners: Male     Birth control/protection: Post-menopausal, Tubal ligation, Essure, Hysterectomy, Partner of same sex, Surgical     Comment: SPOUSE = SUZANNE         Family History   Problem Relation Age of Onset    COPD Mother         Passed away 1988    Heart disease Father     Hypertension Father     Diabetes Brother         Passed away 2012    Diabetes Brother     COPD Brother     Hypertension Brother         Copd    Heart attack Brother     Diabetes Brother     Heart disease Brother     Hypertension Brother     Heart disease Maternal Grandmother     Thyroid disease Daughter     BRCA 1/2 Neg Hx     Breast cancer Neg Hx     Colon cancer Neg Hx     Endometrial cancer Neg Hx     Ovarian cancer Neg Hx     Malig Hyperthermia Neg Hx         Review of Systems  "  Musculoskeletal:  Positive for arthralgias.   Skin:  Positive for rash.        Objective     Vitals:    01/03/25 0942   BP: 164/67   Pulse: 66   Temp: 97.8 °F (36.6 °C)   TempSrc: Oral   SpO2: 96%   Weight: 69.4 kg (153 lb)   Height: 152.4 cm (60\")   PainSc: 0-No pain             1/3/2025     9:42 AM   Current Status   ECOG score 0       Physical Exam   CONSTITUTIONAL:  Vital signs reviewed.  No distress, looks comfortable.  EYES:  Conjunctivae and lids unremarkable.  PERRLA  EARS,NOSE,MOUTH,THROAT:  Ears and nose appear unremarkable.  Lips, teeth, gums appear unremarkable.  RESPIRATORY:  Normal respiratory effort.  Lungs clear to auscultation bilaterally.  BREASTS: Left breast is benign the right breast no definite palpable masses or axillary adenopathy  CARDIOVASCULAR:  Normal S1, S2.  No murmurs rubs or gallops.  No significant lower extremity edema.  GASTROINTESTINAL: Abdomen appears unremarkable.  Nontender.  No hepatomegaly.  No splenomegaly.  LYMPHATIC:  No cervical, supraclavicular, axillary lymphadenopathy.  SKIN:  Warm.  Erythematous macules on her arms and legs much improved   PSYCHIATRIC:  Normal judgment and insight.  Normal mood and affect.  I have reexamined the patient and the results are consistent with the previously documented exam. Ashkan Belcher MD       RECENT LABS:  Hematology WBC   Date Value Ref Range Status   01/03/2025 5.97 3.40 - 10.80 10*3/mm3 Final   06/10/2024 4.43 3.40 - 10.80 10*3/mm3 Final     RBC   Date Value Ref Range Status   01/03/2025 4.01 3.77 - 5.28 10*6/mm3 Final   06/10/2024 4.10 3.77 - 5.28 10*6/mm3 Final     Hemoglobin   Date Value Ref Range Status   01/03/2025 11.3 (L) 12.0 - 15.9 g/dL Final     Hematocrit   Date Value Ref Range Status   01/03/2025 36.4 34.0 - 46.6 % Final     Platelets   Date Value Ref Range Status   01/03/2025 226 140 - 450 10*3/mm3 Final          ynoptic Checklist   DCIS OF THE BREAST: Biopsy   Protocol posted: 9/20/2023DCIS OF THE BREAST: " "BIOPSY - All Specimens  SPECIMEN   Procedure  Needle biopsy   Specimen Laterality  Right   TUMOR   Tumor Site  Clock position     12 o'clock   Histologic Type  Ductal carcinoma in situ (DCIS)   Architectural Patterns  Solid   Nuclear Grade  Grade III (high)   Necrosis  Present, central (expansive \"comedo\" necrosis)   Microcalcifications  Present in DCIS   .   Breast Biomarker Reporting Template   Protocol posted: 12/13/2023BREAST BIOMARKER REPORTING TEMPLATE - All Specimens  Test(s) Performed     Estrogen Receptor (ER) Status  Negative (less than 1%)     Internal control cells present and stain as expected   Test Type  Food and Drug Administration (FDA) cleared (test / vendor): Ventena   Primary Antibody  SP1   Scoring System  Harrison   Proportion Score  0   Intensity Score  0   Total Harrison Score  0   Test(s) Performed     Progesterone Receptor (PgR) Status  Negative (less than 1%)     Internal control cells present and stain as expected   Test Type  Food and Drug Administration (FDA) cleared (test / vendor): Ventena   Primary Antibody  1E2   Scoring System  Harrison   Proportion Score  1   Intensity Score  2   Total Harrison Score  3   Test(s) Performed  Ki-67   Ki-67 Percentage of Positive Nuclei  25 %   Primary Antibody  30-9   Cold Ischemia and Fixation Times  Meet requirements specified in latest version of the ASCO / CAP Guidelines   Cold Ischemia Time (minutes)  12 min   Fixation Time (hours)  8 hours   Testing Performed on Block Number(s)  1B   METHODS   Fixative  Formalin   Image Analysis  Not performed   .   INVASIVE CARCINOMA OF THE BREAST: Biopsy   Protocol posted: 3/22/2023INVASIVE CARCINOMA OF THE BREAST: BIOPSY - All Specimens  SPECIMEN   Procedure  Needle biopsy   Specimen Laterality  Right   TUMOR   Tumor Site  Clock position     11 o'clock   Histologic Type  Invasive carcinoma of no special type (ductal)   Histologic Type Comment  The tumor has apocrine features   Histologic Grade (Migel " Histologic Score)     Glandular (Acinar) / Tubular Differentiation  Score 3   Nuclear Pleomorphism  Score 3   Mitotic Rate  Score 2   Overall Grade  Grade 3 (scores of 8 or 9)   Largest Invasive Focus in this Limited Biopsy Sample  4.8 mm   Ductal Carcinoma In Situ (DCIS)  Not identified   Lymphatic and / or Vascular Invasion  Not identified   Microcalcifications  Not identified   .   Breast Biomarker Reporting Template   Protocol posted: 12/13/2023BREAST BIOMARKER REPORTING TEMPLATE - All Specimens  Test(s) Performed     Estrogen Receptor (ER) Status  Negative (less than 1%)     Internal control cells present and stain as expected   Test Type  Food and Drug Administration (FDA) cleared (test / vendor): Ventena   Primary Antibody  SP1   Scoring System  Harrison   Proportion Score  0   Intensity Score  0   Total Harrison Score  0   Test(s) Performed     Progesterone Receptor (PgR) Status  Negative (less than 1%)     Internal control cells present and stain as expected   Test Type  Food and Drug Administration (FDA) cleared (test / vendor): Ventena   Primary Antibody  1E2   Scoring System  Harrison   Proportion Score  0   Intensity Score  0   Total Harrison Score  0   Test(s) Performed     HER2 by Immunohistochemistry  Positive (Score 3+)   Percentage of Cells with Uniform Intense Complete Membrane Staining  100 %   Test Type  Food and Drug Administration (FDA) cleared (test / vendor): Ventena   Primary Antibody  4B5   Test(s) Performed  Ki-67   Ki-67 Percentage of Positive Nuclei  60 %          ynoptic Checklist  7/3/24   INVASIVE CARCINOMA OF THE BREAST: Resection   8th Edition - Protocol posted: 12/13/2023INVASIVE CARCINOMA OF THE BREAST: RESECTION - All Specimens  SPECIMEN   Procedure  Total mastectomy   Specimen Laterality  Right   TUMOR   Tumor Site  Upper outer quadrant   Histologic Type  Invasive carcinoma of no special type (ductal)   Histologic Grade (Paxton Histologic Score)     Glandular (Acinar) / Tubular  "Differentiation  Score 3   Nuclear Pleomorphism  Score 3   Mitotic Rate  Score 2   Overall Grade  Grade 3 (scores of 8 or 9)   Tumor Size  Greatest dimension of largest invasive focus (Millimeters): 10 mm   Tumor Focality  Single focus of invasive carcinoma   Ductal Carcinoma In Situ (DCIS)  Present     Negative for extensive intraductal component (EIC)   Size (Extent) of DCIS  Estimated size (extent) of DCIS is at least (Millimeters): 50 mm   Architectural Patterns  [place]     Cribriform     Solid   Nuclear Grade  Grade III (high)   Necrosis  Present, central (expansive \"comedo\" necrosis)   Lobular Carcinoma In Situ (LCIS)  Not identified   Lymphatic and / or Vascular Invasion  Not identified   Dermal Lymphatic and / or Vascular Invasion  Not identified   Microcalcifications  Present in DCIS     Present in non-neoplastic tissue   Treatment Effect in the Breast  No known presurgical therapy   MARGINS   Margin Status for Invasive Carcinoma  All margins negative for invasive carcinoma   Distance from Invasive Carcinoma to Closest Margin  6 mm   Closest Margin(s) to Invasive Carcinoma  Anterior   Distance from Invasive Carcinoma to Posterior Margin  17 mm   Distance from Invasive Carcinoma to Superior Margin  85 mm   Distance from Invasive Carcinoma to Inferior Margin  100 mm   Distance from Invasive Carcinoma to Medial Margin  100 mm   Distance from Invasive Carcinoma to Lateral Margin  50 mm   Margin Status for DCIS  All margins negative for DCIS   Distance from DCIS to Closest Margin  11 mm   Closest Margin(s) to DCIS  Anterior   Distance from DCIS to Posterior Margin  15 mm   Distance from DCIS to Superior Margin  60 mm   Distance from DCIS to Inferior Margin  75 mm   Distance from DCIS to Medial Margin  80 mm   Distance from DCIS to Lateral Margin  40 mm   REGIONAL LYMPH NODES   Regional Lymph Node Status  All regional lymph nodes negative for tumor   Total Number of Lymph Nodes Examined (sentinel and " non-sentinel)  3   Number of Raymond Nodes Examined  3   pTNM CLASSIFICATION (AJCC 8th Edition)   Reporting of pT, pN, and (when applicable) pM categories is based on information available to the pathologist at the time the report is issued. As per the AJCC (Chapter 1, 8th Ed.) it is the managing physician’s responsibility to establish the final pathologic stage based upon all pertinent information, including but potentially not limited to this pathology report.   pT Category  pT1b   pN Category  pN0   N Suffix  (sn)   SPECIAL STUDIES        Estrogen Receptor (ER) Status  Negative        Progesterone Receptor (PgR) Status  Negative        HER2 (by immunohistochemistry)  Positive (Score 3+)   Percentage of Cells with Uniform Intense Complete Membrane Staining  100 %        Ki-67 Percentage of Positive Nuclei  60 %        Assessment & Plan     cT1cN0 grade 3 ER/WV- her2 +RIGHT BREAST CANCER post biopsy with associated high-grade DCIS ER/WV negative and MRI evidence of non-mass enhancement which would preclude lumpectomy  pT1bN0 ER/WV negative HER2 positive right breast cancer postmastectomy and sentinel node biopsy  Weekly Taxol Herceptin for 12 weeks followed by every 3 to weeks for the rest of the year planned  No radiation-EF adequate  Tolerating Taxol Herceptin well as of 8/16/2024  Worsening maculopapular rash on her arms legs and trunk on 8/30/2024 changed to Abraxane and nonbiosimilar Herceptin  Rash improving after Abraxane discontinued we will move to 3 weekly Herceptin  Tolerating weekly Herceptin well as of 1/3/2025      2. Rheumatoid arthritis on Simponi for 3 and half years-off treatment during chemo    3.  Negative family history of any malignancy    4.  Anemia: Hgb 10.8 today 7/26/2024- was normal prior to surgery (12.1 on 6/25/2024).  Denies bleeding issues.  Will check ferritin, iron profile, B12, folate.     5.  Hashimoto's thyroiditis    Plan  Proceed with 3-week dose of Herceptin today.  Herceptin  in 3 and 6 weeks   see me in 9 weeks.        Patient is on a high risk medication requiring close monitoring for toxicity.

## 2025-01-16 ENCOUNTER — TELEPHONE (OUTPATIENT)
Dept: SURGERY | Facility: CLINIC | Age: 70
End: 2025-01-16

## 2025-01-16 RX ORDER — LEVOTHYROXINE SODIUM 88 UG/1
88 TABLET ORAL DAILY
Qty: 90 TABLET | Refills: 3 | Status: SHIPPED | OUTPATIENT
Start: 2025-01-16

## 2025-01-16 NOTE — TELEPHONE ENCOUNTER
Hub staff attempted to follow warm transfer process and was unsuccessful     Caller: TAE KING    Relationship to patient: SELF    Best call back number: [phone]    Patient is needing: PT CALLED TO [facility] ON 1-20-25.    PLEASE CALL PT.

## 2025-01-22 ENCOUNTER — PATIENT OUTREACH (OUTPATIENT)
Dept: OTHER | Facility: HOSPITAL | Age: 70
End: 2025-01-22
Payer: MEDICARE

## 2025-01-24 ENCOUNTER — INFUSION (OUTPATIENT)
Dept: ONCOLOGY | Facility: HOSPITAL | Age: 70
End: 2025-01-24
Payer: MEDICARE

## 2025-01-24 VITALS
RESPIRATION RATE: 16 BRPM | WEIGHT: 152.4 LBS | BODY MASS INDEX: 29.76 KG/M2 | TEMPERATURE: 97.3 F | SYSTOLIC BLOOD PRESSURE: 154 MMHG | DIASTOLIC BLOOD PRESSURE: 76 MMHG | HEART RATE: 84 BPM | OXYGEN SATURATION: 97 %

## 2025-01-24 DIAGNOSIS — Z17.1 MALIGNANT NEOPLASM OF OVERLAPPING SITES OF RIGHT BREAST IN FEMALE, ESTROGEN RECEPTOR NEGATIVE: Primary | ICD-10-CM

## 2025-01-24 DIAGNOSIS — C50.811 MALIGNANT NEOPLASM OF OVERLAPPING SITES OF RIGHT BREAST IN FEMALE, ESTROGEN RECEPTOR NEGATIVE: Primary | ICD-10-CM

## 2025-01-24 LAB
ALBUMIN SERPL-MCNC: 3.9 G/DL (ref 3.5–5.2)
ALBUMIN/GLOB SERPL: 1.2 G/DL
ALP SERPL-CCNC: 61 U/L (ref 39–117)
ALT SERPL W P-5'-P-CCNC: 24 U/L (ref 1–33)
ANION GAP SERPL CALCULATED.3IONS-SCNC: 17.3 MMOL/L (ref 5–15)
AST SERPL-CCNC: 26 U/L (ref 1–32)
BASOPHILS # BLD AUTO: 0.03 10*3/MM3 (ref 0–0.2)
BASOPHILS NFR BLD AUTO: 0.6 % (ref 0–1.5)
BILIRUB SERPL-MCNC: 0.2 MG/DL (ref 0–1.2)
BUN SERPL-MCNC: 17 MG/DL (ref 8–23)
BUN/CREAT SERPL: 36.2 (ref 7–25)
CALCIUM SPEC-SCNC: 9.6 MG/DL (ref 8.6–10.5)
CHLORIDE SERPL-SCNC: 97 MMOL/L (ref 98–107)
CO2 SERPL-SCNC: 30.7 MMOL/L (ref 22–29)
CREAT SERPL-MCNC: 0.47 MG/DL (ref 0.57–1)
DEPRECATED RDW RBC AUTO: 45.7 FL (ref 37–54)
EGFRCR SERPLBLD CKD-EPI 2021: 103.2 ML/MIN/1.73
EOSINOPHIL # BLD AUTO: 0.31 10*3/MM3 (ref 0–0.4)
EOSINOPHIL NFR BLD AUTO: 5.7 % (ref 0.3–6.2)
ERYTHROCYTE [DISTWIDTH] IN BLOOD BY AUTOMATED COUNT: 13.9 % (ref 12.3–15.4)
GLOBULIN UR ELPH-MCNC: 3.3 GM/DL
GLUCOSE SERPL-MCNC: 113 MG/DL (ref 65–99)
HCT VFR BLD AUTO: 36.6 % (ref 34–46.6)
HGB BLD-MCNC: 11.6 G/DL (ref 12–15.9)
IMM GRANULOCYTES # BLD AUTO: 0.01 10*3/MM3 (ref 0–0.05)
IMM GRANULOCYTES NFR BLD AUTO: 0.2 % (ref 0–0.5)
LYMPHOCYTES # BLD AUTO: 1.55 10*3/MM3 (ref 0.7–3.1)
LYMPHOCYTES NFR BLD AUTO: 28.4 % (ref 19.6–45.3)
MCH RBC QN AUTO: 28.5 PG (ref 26.6–33)
MCHC RBC AUTO-ENTMCNC: 31.7 G/DL (ref 31.5–35.7)
MCV RBC AUTO: 89.9 FL (ref 79–97)
MONOCYTES # BLD AUTO: 0.5 10*3/MM3 (ref 0.1–0.9)
MONOCYTES NFR BLD AUTO: 9.2 % (ref 5–12)
NEUTROPHILS NFR BLD AUTO: 3.05 10*3/MM3 (ref 1.7–7)
NEUTROPHILS NFR BLD AUTO: 55.9 % (ref 42.7–76)
NRBC BLD AUTO-RTO: 0 /100 WBC (ref 0–0.2)
PLATELET # BLD AUTO: 261 10*3/MM3 (ref 140–450)
PMV BLD AUTO: 10.1 FL (ref 6–12)
POTASSIUM SERPL-SCNC: 4.1 MMOL/L (ref 3.5–5.2)
PROT SERPL-MCNC: 7.2 G/DL (ref 6–8.5)
RBC # BLD AUTO: 4.07 10*6/MM3 (ref 3.77–5.28)
SODIUM SERPL-SCNC: 145 MMOL/L (ref 136–145)
WBC NRBC COR # BLD AUTO: 5.45 10*3/MM3 (ref 3.4–10.8)

## 2025-01-24 PROCEDURE — 80053 COMPREHEN METABOLIC PANEL: CPT

## 2025-01-24 PROCEDURE — 96413 CHEMO IV INFUSION 1 HR: CPT

## 2025-01-24 PROCEDURE — 85025 COMPLETE CBC W/AUTO DIFF WBC: CPT

## 2025-01-24 PROCEDURE — 25010000002 TRASTUZUMAB PER 10 MG: Performed by: NURSE PRACTITIONER

## 2025-01-24 PROCEDURE — 63710000001 DIPHENHYDRAMINE PER 50 MG: Performed by: NURSE PRACTITIONER

## 2025-01-24 PROCEDURE — 25810000003 SODIUM CHLORIDE 0.9 % SOLUTION 250 ML FLEX CONT: Performed by: NURSE PRACTITIONER

## 2025-01-24 RX ORDER — DIPHENHYDRAMINE HCL 25 MG
25 CAPSULE ORAL ONCE
Status: COMPLETED | OUTPATIENT
Start: 2025-01-24 | End: 2025-01-24

## 2025-01-24 RX ORDER — SODIUM CHLORIDE 9 MG/ML
20 INJECTION, SOLUTION INTRAVENOUS ONCE
Status: CANCELLED | OUTPATIENT
Start: 2025-01-24

## 2025-01-24 RX ADMIN — TRASTUZUMAB 420 MG: 150 INJECTION, POWDER, LYOPHILIZED, FOR SOLUTION INTRAVENOUS at 15:30

## 2025-01-24 RX ADMIN — DIPHENHYDRAMINE HYDROCHLORIDE 25 MG: 25 CAPSULE ORAL at 15:13

## 2025-02-03 ENCOUNTER — HOSPITAL ENCOUNTER (OUTPATIENT)
Dept: CARDIOLOGY | Facility: HOSPITAL | Age: 70
Discharge: HOME | End: 2025-02-03
Admitting: INTERNAL MEDICINE
Payer: MEDICARE

## 2025-02-03 VITALS
BODY MASS INDEX: 29.84 KG/M2 | DIASTOLIC BLOOD PRESSURE: 70 MMHG | HEART RATE: 56 BPM | SYSTOLIC BLOOD PRESSURE: 160 MMHG | WEIGHT: 152 LBS | HEIGHT: 60 IN

## 2025-02-03 DIAGNOSIS — R94.31 ABNORMAL ELECTROCARDIOGRAM (ECG) (EKG): ICD-10-CM

## 2025-02-03 DIAGNOSIS — C50.811 MALIGNANT NEOPLASM OF OVERLAPPING SITES OF RIGHT BREAST IN FEMALE, ESTROGEN RECEPTOR NEGATIVE: ICD-10-CM

## 2025-02-03 DIAGNOSIS — I10 BENIGN ESSENTIAL HYPERTENSION: ICD-10-CM

## 2025-02-03 DIAGNOSIS — Z17.1 MALIGNANT NEOPLASM OF OVERLAPPING SITES OF RIGHT BREAST IN FEMALE, ESTROGEN RECEPTOR NEGATIVE: ICD-10-CM

## 2025-02-03 PROCEDURE — 93356 MYOCRD STRAIN IMG SPCKL TRCK: CPT

## 2025-02-03 PROCEDURE — 93306 TTE W/DOPPLER COMPLETE: CPT

## 2025-02-03 PROCEDURE — 25510000001 PERFLUTREN 6.52 MG/ML SUSPENSION 2 ML VIAL: Performed by: INTERNAL MEDICINE

## 2025-02-03 RX ADMIN — PERFLUTREN 2 ML: 6.52 INJECTION, SUSPENSION INTRAVENOUS at 16:41

## 2025-02-04 ENCOUNTER — TELEPHONE (OUTPATIENT)
Dept: CARDIOLOGY | Facility: CLINIC | Age: 70
End: 2025-02-04
Payer: MEDICARE

## 2025-02-04 LAB
AORTIC ARCH: 2.1 CM
AORTIC DIMENSIONLESS INDEX: 0.43 (DI)
ASCENDING AORTA: 2.9 CM
AV MEAN PRESS GRAD SYS DOP V1V2: 10 MMHG
AV VMAX SYS DOP: 207.4 CM/SEC
BH CV ECHO LEFT VENTRICLE GLOBAL LONGITUDINAL STRAIN: -20 %
BH CV ECHO MEAS - ACS: 1.24 CM
BH CV ECHO MEAS - AO MAX PG: 17.2 MMHG
BH CV ECHO MEAS - AO ROOT DIAM: 2.5 CM
BH CV ECHO MEAS - AO V2 VTI: 52 CM
BH CV ECHO MEAS - AVA(I,D): 1.26 CM2
BH CV ECHO MEAS - EDV(CUBED): 87.2 ML
BH CV ECHO MEAS - EDV(MOD-SP2): 130 ML
BH CV ECHO MEAS - EDV(MOD-SP4): 120 ML
BH CV ECHO MEAS - EF(MOD-SP2): 59.2 %
BH CV ECHO MEAS - EF(MOD-SP4): 59.2 %
BH CV ECHO MEAS - ESV(CUBED): 19.6 ML
BH CV ECHO MEAS - ESV(MOD-SP2): 53 ML
BH CV ECHO MEAS - ESV(MOD-SP4): 49 ML
BH CV ECHO MEAS - FS: 39.2 %
BH CV ECHO MEAS - IVS/LVPW: 0.95 CM
BH CV ECHO MEAS - IVSD: 0.81 CM
BH CV ECHO MEAS - LAT PEAK E' VEL: 9.4 CM/SEC
BH CV ECHO MEAS - LV DIASTOLIC VOL/BSA (35-75): 72.2 CM2
BH CV ECHO MEAS - LV MASS(C)D: 116.8 GRAMS
BH CV ECHO MEAS - LV MAX PG: 3.3 MMHG
BH CV ECHO MEAS - LV MEAN PG: 2 MMHG
BH CV ECHO MEAS - LV SYSTOLIC VOL/BSA (12-30): 29.5 CM2
BH CV ECHO MEAS - LV V1 MAX: 90.3 CM/SEC
BH CV ECHO MEAS - LV V1 VTI: 22.6 CM
BH CV ECHO MEAS - LVIDD: 4.4 CM
BH CV ECHO MEAS - LVIDS: 2.7 CM
BH CV ECHO MEAS - LVOT AREA: 2.9 CM2
BH CV ECHO MEAS - LVOT DIAM: 1.92 CM
BH CV ECHO MEAS - LVPWD: 0.85 CM
BH CV ECHO MEAS - MED PEAK E' VEL: 7 CM/SEC
BH CV ECHO MEAS - MV A DUR: 0.12 SEC
BH CV ECHO MEAS - MV A MAX VEL: 94.1 CM/SEC
BH CV ECHO MEAS - MV DEC SLOPE: 955.8 CM/SEC2
BH CV ECHO MEAS - MV DEC TIME: 0.23 SEC
BH CV ECHO MEAS - MV E MAX VEL: 101 CM/SEC
BH CV ECHO MEAS - MV E/A: 1.07
BH CV ECHO MEAS - MV MAX PG: 7 MMHG
BH CV ECHO MEAS - MV MEAN PG: 2.17 MMHG
BH CV ECHO MEAS - MV P1/2T: 41.3 MSEC
BH CV ECHO MEAS - MV V2 VTI: 44.3 CM
BH CV ECHO MEAS - MVA(P1/2T): 5.3 CM2
BH CV ECHO MEAS - MVA(VTI): 1.48 CM2
BH CV ECHO MEAS - PA ACC TIME: 0.09 SEC
BH CV ECHO MEAS - PA V2 MAX: 89.2 CM/SEC
BH CV ECHO MEAS - PI END-D VEL: 110.9 CM/SEC
BH CV ECHO MEAS - PULM A REVS DUR: 0.08 SEC
BH CV ECHO MEAS - PULM A REVS VEL: 23.5 CM/SEC
BH CV ECHO MEAS - PULM DIAS VEL: 50.3 CM/SEC
BH CV ECHO MEAS - PULM S/D: 0.76
BH CV ECHO MEAS - PULM SYS VEL: 38.3 CM/SEC
BH CV ECHO MEAS - QP/QS: 0.63
BH CV ECHO MEAS - RAP SYSTOLE: 3 MMHG
BH CV ECHO MEAS - RV MAX PG: 2 MMHG
BH CV ECHO MEAS - RV V1 MAX: 70.7 CM/SEC
BH CV ECHO MEAS - RV V1 VTI: 17.1 CM
BH CV ECHO MEAS - RVOT DIAM: 1.74 CM
BH CV ECHO MEAS - SUP REN AO DIAM: 1.7 CM
BH CV ECHO MEAS - SV(LVOT): 65.5 ML
BH CV ECHO MEAS - SV(MOD-SP2): 77 ML
BH CV ECHO MEAS - SV(MOD-SP4): 71 ML
BH CV ECHO MEAS - SV(RVOT): 40.9 ML
BH CV ECHO MEAS - SVI(LVOT): 39.4 ML/M2
BH CV ECHO MEAS - SVI(MOD-SP2): 46.4 ML/M2
BH CV ECHO MEAS - SVI(MOD-SP4): 42.7 ML/M2
BH CV ECHO MEAS - TAPSE (>1.6): 1.99 CM
BH CV ECHO MEASUREMENTS AVERAGE E/E' RATIO: 12.32
BH CV XLRA - RV BASE: 3.2 CM
BH CV XLRA - RV LENGTH: 7 CM
BH CV XLRA - RV MID: 2.8 CM
BH CV XLRA - TDI S': 10.4 CM/SEC
LEFT ATRIUM VOLUME INDEX: 21.4 ML/M2
LV EF BIPLANE MOD: 58.6 %
SINUS: 2.9 CM
STJ: 2.4 CM

## 2025-02-04 NOTE — TELEPHONE ENCOUNTER
Notified Pretty Tovar of results, patient verbalizes understanding.    Kiana Gage RN  [facility] T[facility]  02/04/25 08:22 EST

## 2025-02-14 ENCOUNTER — INFUSION (OUTPATIENT)
Dept: ONCOLOGY | Facility: HOSPITAL | Age: 70
End: 2025-02-14
Payer: MEDICARE

## 2025-02-14 VITALS
SYSTOLIC BLOOD PRESSURE: 178 MMHG | RESPIRATION RATE: 16 BRPM | HEART RATE: 73 BPM | OXYGEN SATURATION: 97 % | DIASTOLIC BLOOD PRESSURE: 69 MMHG | WEIGHT: 154.6 LBS | TEMPERATURE: 97.7 F | BODY MASS INDEX: 30.19 KG/M2

## 2025-02-14 DIAGNOSIS — C50.811 MALIGNANT NEOPLASM OF OVERLAPPING SITES OF RIGHT BREAST IN FEMALE, ESTROGEN RECEPTOR NEGATIVE: Primary | ICD-10-CM

## 2025-02-14 DIAGNOSIS — Z17.1 MALIGNANT NEOPLASM OF OVERLAPPING SITES OF RIGHT BREAST IN FEMALE, ESTROGEN RECEPTOR NEGATIVE: Primary | ICD-10-CM

## 2025-02-14 LAB
ALBUMIN SERPL-MCNC: 4 G/DL (ref 3.5–5.2)
ALBUMIN/GLOB SERPL: 1.3 G/DL
ALP SERPL-CCNC: 58 U/L (ref 39–117)
ALT SERPL W P-5'-P-CCNC: 24 U/L (ref 1–33)
ANION GAP SERPL CALCULATED.3IONS-SCNC: 9.5 MMOL/L (ref 5–15)
AST SERPL-CCNC: 28 U/L (ref 1–32)
BASOPHILS # BLD AUTO: 0.04 10*3/MM3 (ref 0–0.2)
BASOPHILS NFR BLD AUTO: 0.8 % (ref 0–1.5)
BILIRUB SERPL-MCNC: 0.2 MG/DL (ref 0–1.2)
BUN SERPL-MCNC: 13 MG/DL (ref 8–23)
BUN/CREAT SERPL: 20.3 (ref 7–25)
CALCIUM SPEC-SCNC: 9.6 MG/DL (ref 8.6–10.5)
CHLORIDE SERPL-SCNC: 100 MMOL/L (ref 98–107)
CO2 SERPL-SCNC: 30.5 MMOL/L (ref 22–29)
CREAT SERPL-MCNC: 0.64 MG/DL (ref 0.57–1)
DEPRECATED RDW RBC AUTO: 44.4 FL (ref 37–54)
EGFRCR SERPLBLD CKD-EPI 2021: 95.8 ML/MIN/1.73
EOSINOPHIL # BLD AUTO: 0.33 10*3/MM3 (ref 0–0.4)
EOSINOPHIL NFR BLD AUTO: 6.7 % (ref 0.3–6.2)
ERYTHROCYTE [DISTWIDTH] IN BLOOD BY AUTOMATED COUNT: 13.7 % (ref 12.3–15.4)
GLOBULIN UR ELPH-MCNC: 3.2 GM/DL
GLUCOSE SERPL-MCNC: 99 MG/DL (ref 65–99)
HCT VFR BLD AUTO: 35.3 % (ref 34–46.6)
HGB BLD-MCNC: 11.6 G/DL (ref 12–15.9)
IMM GRANULOCYTES # BLD AUTO: 0.01 10*3/MM3 (ref 0–0.05)
IMM GRANULOCYTES NFR BLD AUTO: 0.2 % (ref 0–0.5)
LYMPHOCYTES # BLD AUTO: 1.39 10*3/MM3 (ref 0.7–3.1)
LYMPHOCYTES NFR BLD AUTO: 28 % (ref 19.6–45.3)
MCH RBC QN AUTO: 29.1 PG (ref 26.6–33)
MCHC RBC AUTO-ENTMCNC: 32.9 G/DL (ref 31.5–35.7)
MCV RBC AUTO: 88.5 FL (ref 79–97)
MONOCYTES # BLD AUTO: 0.41 10*3/MM3 (ref 0.1–0.9)
MONOCYTES NFR BLD AUTO: 8.3 % (ref 5–12)
NEUTROPHILS NFR BLD AUTO: 2.78 10*3/MM3 (ref 1.7–7)
NEUTROPHILS NFR BLD AUTO: 56 % (ref 42.7–76)
NRBC BLD AUTO-RTO: 0 /100 WBC (ref 0–0.2)
PLATELET # BLD AUTO: 218 10*3/MM3 (ref 140–450)
PMV BLD AUTO: 10 FL (ref 6–12)
POTASSIUM SERPL-SCNC: 3.9 MMOL/L (ref 3.5–5.2)
PROT SERPL-MCNC: 7.2 G/DL (ref 6–8.5)
RBC # BLD AUTO: 3.99 10*6/MM3 (ref 3.77–5.28)
SODIUM SERPL-SCNC: 140 MMOL/L (ref 136–145)
WBC NRBC COR # BLD AUTO: 4.96 10*3/MM3 (ref 3.4–10.8)

## 2025-02-14 PROCEDURE — 80053 COMPREHEN METABOLIC PANEL: CPT

## 2025-02-14 PROCEDURE — 25010000002 TRASTUZUMAB PER 10 MG

## 2025-02-14 PROCEDURE — 85025 COMPLETE CBC W/AUTO DIFF WBC: CPT

## 2025-02-14 PROCEDURE — 96413 CHEMO IV INFUSION 1 HR: CPT

## 2025-02-14 PROCEDURE — 63710000001 DIPHENHYDRAMINE PER 50 MG

## 2025-02-14 PROCEDURE — 25810000003 SODIUM CHLORIDE 0.9 % SOLUTION 250 ML FLEX CONT

## 2025-02-14 RX ORDER — SODIUM CHLORIDE 9 MG/ML
20 INJECTION, SOLUTION INTRAVENOUS ONCE
Status: CANCELLED | OUTPATIENT
Start: 2025-02-14

## 2025-02-14 RX ORDER — DIPHENHYDRAMINE HCL 25 MG
25 CAPSULE ORAL ONCE
Status: COMPLETED | OUTPATIENT
Start: 2025-02-14 | End: 2025-02-14

## 2025-02-14 RX ORDER — DIPHENHYDRAMINE HCL 25 MG
25 CAPSULE ORAL ONCE
Status: CANCELLED | OUTPATIENT
Start: 2025-02-14

## 2025-02-14 RX ADMIN — DIPHENHYDRAMINE HYDROCHLORIDE 25 MG: 25 CAPSULE ORAL at 14:53

## 2025-02-14 RX ADMIN — TRASTUZUMAB 420 MG: 150 INJECTION, POWDER, LYOPHILIZED, FOR SOLUTION INTRAVENOUS at 15:14

## 2025-02-14 NOTE — PROGRESS NOTES
"Breast Surgery Follow Up Note    Oncologic History:  Pretty Tovar is a 69 y.o. female with the following oncologic history:  Oncology/Hematology History   Malignant neoplasm of overlapping sites of right breast in female, estrogen receptor negative   5/14/2024 Cancer Staged    Staging form: Breast, AJCC 8th Edition  - Clinical stage from 5/14/2024: Stage IA (cT1c, cN0, cM0, G3, ER-, ID-, HER2+) - Signed by Aracely Frye MD on 5/31/2024 5/31/2024 Initial Diagnosis    Malignant neoplasm of overlapping sites of right breast in female, estrogen receptor negative     7/3/2024 Cancer Staged    Staging form: Breast, AJCC 8th Edition  - Pathologic stage from 7/3/2024: Stage IA (pT1b, pN0(sn), cM0, G3, ER-, ID-, HER2+) - Signed by Aracely Frye MD on 2/18/2025     7/3/2024 Surgery    Surgery       Procedure:  Right mastectomy and sentinel lymph node biopsy, port placement           7/26/2024 - 10/11/2024 Chemotherapy    OP BREAST PACLitaxel / Trastuzumab (Weekly X 12)     10/18/2024 -  Chemotherapy    OP BREAST Trastuzumab Q21D (without Loading Dose)          Interval History: Feels well overall.  Completed trastuzumab and Taxol was noted to have a rash and transition to Abraxane.  Currently on Herceptin every 3 weeks.  No significant side effects.  Follows with cardiology, recent echo within normal limits.  Follows with primary care provider for hypertension, elevated today but plans ongoing management.  No issues with range of motion or lymphedema.  Denies any new headaches, chest pain, unintentional weight loss, focal bone pain.    Past Medical History:   Diagnosis Date    Abnormal liver function tests 02/09/2017    Description: 2000, \"-\"hepatitis profile    Anemia     Anesthesia complication     SLOW TO WAKE UP WITH HYSTERECTOMY    Arthritis     Breast cancer May 2024    Cancer 5/16/24    Right Breast    Cholesterol blood reduced     Colon polyps     Cystocele, lateral 10/15/2020    Cystocele, midline " 10/15/2020    Diabetes mellitus     Frequent UTI     H/O bone density study 10/2010    normal    H/O cardiovascular stress test     H/O echocardiogram 2001    2-D    Hashimoto's thyroiditis     Heart murmur     Hyperkalemia 2019    Hyperlipidemia     Hypertension     Hypothyroidism 2018    Incompetence of pubocervical tissue 10/15/2020    Incompetence of rectovaginal tissue 10/15/2020    Insulin dependent diabetes mellitus     Limited joint range of motion     LEFT KNEE    Loss of perineal body, female 10/15/2020    Osteoarthritis of knee 2005    left    Pelvic relaxation due to vaginal prolapse 2018    Plantar warts     foot    Postmenopausal bleeding 2019    Pregnancy      twins x1    Psoriasis     RA (rheumatoid arthritis)     Rectocele 10/15/2020    Urinary tract infection     Urine incontinence     BETTER WITH SLING    Uterovaginal prolapse     Uterovaginal prolapse, incomplete 2020    Vaginal enterocele 10/15/2020     Patient Active Problem List   Diagnosis    Benign essential hypertension    Type 2 diabetes mellitus, without long-term current use of insulin    Pure hypercholesterolemia    Overweight (BMI 25.0-29.9)    Hashimoto's thyroiditis    Hyperplastic rectal polyp    Female stress incontinence    Feeling of incomplete bladder emptying    Primary osteoarthritis of left knee    S/P TKR (total knee replacement), left    Iron deficiency    Abnormality of right breast on screening mammogram    Malignant neoplasm of overlapping sites of right breast in female, estrogen receptor negative    Fitting and adjustment of vascular catheter    Right bundle branch block (RBBB) with left anterior fascicular block (LAFB)     Current Outpatient Medications on File Prior to Visit   Medication Sig Dispense Refill    BD PEN NEEDLE SEUN U/F 32G X 4 MM misc USE 1 PEN NEEDLE WITH EACH  VICTOZA INJECTION 100 each 1    Calcium Carbonate-Vit D-Min (CALCIUM 1200 PO) Take 1 tablet by mouth  Daily. PT HOLDING FOR SURGERY      carvedilol (COREG) 6.25 MG tablet Take 1 tablet by mouth 2 (Two) Times a Day. 180 tablet 3    Cinnamon 500 MG tablet Take 1,000 mg by mouth 2 (two) times a day. PT HOLDING FOR SURGERY      clobetasol propionate (TEMOVATE) 0.05 % cream Apply 1 Application topically to the appropriate area as directed 2 (Two) Times a Day.      Coenzyme Q10 (CO Q 10 PO) Take 1 capsule by mouth Daily. PT HOLDING FOR SURGERY      CRANBERRY-VITAMIN C-D MANNOSE PO Take 2 capsules by mouth Daily. PT HOLDING FOR SURGERY      glucose blood (OneTouch Verio) test strip Use as directed to CHECK BLOOD GLUCOSE ONCE DAILY. 100 each 3    hydrocortisone 2.5 % cream Apply 1 Application topically to the appropriate area as directed 2 (Two) Times a Day. 3.5 g 0    levothyroxine (SYNTHROID, LEVOTHROID) 88 MCG tablet TAKE 1 TABLET BY MOUTH DAILY 90 tablet 3    lidocaine-prilocaine (EMLA) 2.5-2.5 % cream Apply 1 Application topically to the appropriate area as directed As Needed for Mild Pain.      Liraglutide (VICTOZA) 18 MG/3ML solution pen-injector injection DIAL AND INJECT UNDER THE SKIN 1.2 MG DAILY 6 mL 5    lisinopril (PRINIVIL,ZESTRIL) 20 MG tablet TAKE 1 TABLET BY MOUTH DAILY 90 tablet 3    lisinopril-hydrochlorothiazide (PRINZIDE,ZESTORETIC) 20-25 MG per tablet TAKE 1 TABLET BY MOUTH DAILY 90 tablet 3    loratadine (Claritin) 10 MG tablet Take 1 tablet by mouth Every Night.      meloxicam (MOBIC) 15 MG tablet Take 1 tablet by mouth Daily. PT HOLDING FOR SURGERY      metFORMIN (GLUCOPHAGE) 500 MG tablet TAKE THREE TABLETS BY MOUTH EVERY MORNING AND THEN TAKE TWO TABLETS BY MOUTH IN THE EVENING (Patient taking differently: Take 3 tablets by mouth Daily With Breakfast. TAKE THREE TABLETS BY MOUTH EVERY MORNING AND THEN TAKE TWO TABLETS BY MOUTH IN THE EVENING) 450 tablet 3    multivitamin with minerals tablet tablet Take 1 tablet by mouth Daily. PT HOLDING FOR SURGERY      Omega-3 Fatty Acids (fish oil) 1200 MG  capsule capsule Take 1 capsule by mouth Daily. PT HOLDING FOR SURGERY      rosuvastatin (CRESTOR) 20 MG tablet TAKE 1 TABLET BY MOUTH DAILY 90 tablet 1    TURMERIC PO Take 1,400 mg by mouth Daily. PT HOLDING FOR SURGERY      vitamin C (ASCORBIC ACID) 250 MG tablet Take 4 tablets by mouth Daily.      vitamin D3 125 MCG (5000 UT) capsule capsule Take 1 capsule by mouth Daily. PT HOLDING FOR SURGERY      VITAMIN E PO Take 1 capsule by mouth Daily. HOLD PRIOR TO SURG      Zinc 50 MG tablet Take 1 tablet by mouth Daily. PT HOLDING FOR SURGERY      methylPREDNISolone (MEDROL) 4 MG dose pack Take as directed on package instructions. (Patient not taking: Reported on 1/3/2025) 21 tablet 0    ondansetron (ZOFRAN) 8 MG tablet Take 1 tablet by mouth 3 (Three) Times a Day As Needed for Nausea or Vomiting. 30 tablet 5     No current facility-administered medications on file prior to visit.     No Known Allergies    Past Surgical History:   Procedure Laterality Date    BREAST BIOPSY Right 2006    benign    COLONOSCOPY      COLONOSCOPY W/ POLYPECTOMY  02/21/2018    Dr Resendiz, 6 mm rectal polyp - hyperplastic    JOINT MANIPULATION Left 11/02/2021    Procedure: KNEE MANIPULATION with steroid injection;  Surgeon: Francis Tavarez MD;  Location: [facility];  Service: Orthopedics;  Laterality: Left;    JOINT REPLACEMENT  08/10/2021    LYMPH NODE BIOPSY  07/03/2024    MASTECTOMY W/ SENTINEL NODE BIOPSY Right 07/03/2024    Procedure: RIGHT MASTECTOMY WITH SENTINEL NODE BIOPSY;  Surgeon: Aracely Frye MD;  Location: [facility];  Service: General;  Laterality: Right;    PUBOVAGINAL SLING N/A 10/16/2020    Procedure: Pubovaginal sling Posterior colporrhaphy fixation Insertion of vaginal grafts Cystourethroscopy;  Surgeon: Karen Allen MD;  Location: [facility] OR;  Service: Gynecology;  Laterality: N/A;    TOTAL KNEE ARTHROPLASTY Left 08/10/2021    Procedure: TOTAL KNEE ARTHROPLASTY;  Surgeon: Francis Tavarez MD;  Location:  [facility] MAIN OR;  Service: Orthopedics;  Laterality: Left;    TOTAL LAPAROSCOPIC HYSTERECTOMY, SACROCOLPOPEXY N/A 10/16/2020    Procedure: Laparoscopic uterosacral ligament colpopexy sacral colpopexy  Laparoscopic paravaginal repair Laparoscopic hysterectomy with bilateral salpingectomy  Laparoscopic abdominal enterocele repair;  Surgeon: Karen Allen MD;  Location: [facility] MAIN OR;  Service: Gynecology;  Laterality: N/A;    TUBAL ABDOMINAL LIGATION      VENOUS ACCESS DEVICE (PORT) INSERTION N/A 07/03/2024    Procedure: INSERTION OF PORTACATH;  Surgeon: Aracely Frye MD;  Location: [facility] MAIN OR;  Service: General;  Laterality: N/A;     Social History     Socioeconomic History    Marital status:      Spouse name: SUZANNE     Number of children: 3   Tobacco Use    Smoking status: Never     Passive exposure: Never    Smokeless tobacco: Never    Tobacco comments:     Caffeine: 1 drink daily   Vaping Use    Vaping status: Never Used   Substance and Sexual Activity    Alcohol use: Never    Drug use: Never    Sexual activity: Yes     Partners: Male     Birth control/protection: Post-menopausal, Tubal ligation, Hysterectomy, Partner of same sex, Surgical     Comment: SPOUSE = SUZANNE      Family History   Problem Relation Age of Onset    COPD Mother         Passed away 1988    Heart disease Father     Hypertension Father     Diabetes Brother         Passed away 2012    Diabetes Brother     COPD Brother     Hypertension Brother         Copd    Heart attack Brother     Diabetes Brother     Heart disease Brother     Hypertension Brother     Heart disease Maternal Grandmother     Thyroid disease Daughter     Hypothyroidism Daughter     BRCA 1/2 Neg Hx     Breast cancer Neg Hx     Colon cancer Neg Hx     Endometrial cancer Neg Hx     Ovarian cancer Neg Hx     Malig Hyperthermia Neg Hx         Review of Systems:  CONSTITUTIONAL: No weight loss, fever, chills, weakness or fatigue.  HEENT: Eyes: No visual loss, blurred vision,  double vision or yellow sclerae. Ears, Nose, Throat: No hearing loss, sneezing, congestion, runny nose or sore throat.  SKIN: No rash or itching.  CARDIOVASCULAR: No chest pain, chest pressure or chest discomfort. No palpitations or edema.  RESPIRATORY: No shortness of breath, cough or sputum.  GASTROINTESTINAL: No anorexia, nausea, vomiting or diarrhea. No abdominal pain or blood.  GENITOURINARY: No burning on urination  NEUROLOGICAL: No headache, dizziness, syncope, paralysis, ataxia, numbness or tingling in the extremities. No change in bowel or bladder control.  MUSCULOSKELETAL: No muscle, back pain, joint pain or stiffness.  HEMATOLOGIC: No anemia, bleeding or bruising.  LYMPHATICS: No enlarged nodes.  PSYCHIATRIC: No history of depression or anxiety.  ENDOCRINOLOGIC: No reports of sweating, cold or heat intolerance. No polyuria or polydipsia.  ALLERGIES: No history of asthma, hives, eczema or rhinitis.    Physical Exam:  Vitals:    02/18/25 0813   BP: 132/70   Pulse: 68   SpO2: 100%         General: Alert, cooperative    HEENT: Atraumatic, normocephalic    Oral/Maxillofacial: Moist mucous membranes    Neck: Supple     Lungs: EWOB, clear to auscultation bilaterally     Heart: RRR    Breast: examined sitting and supine.  RIGHT-surgically absent, no reconstruction LEFT- no masses, skin changes, or nipple discharge    Lymphatics:  Bilateral supraclavicular, cervical, and axillary basins without lymphadneopathy.  Right axilla with some anterior cording    Abdomen: Soft, non-tender, non-distended    Extremities: normal strength and sensation.  No obvious deformities.     Neuro: alert, normal speech, no focal findings or movement disorder noted     Skin: no lesions or abrasions      Recent Imaging:  N/A    Assessment/Plan: Pretty Tovar is a 69 y.o.female with h/o Stage IA, right breast cancer, VASQUEZ. Surgery completed: 7/3/2024, right mastectomy and sentinel lymph node biopsy  - RTC 6 months for clinical breast  exam, will likely be able to remove port at that time.  - Next imaging due 4/2025 -left mammogram ([facility])-previously ordered through her GYN office and scheduled for April.  Will follow-up those results  - Medical Oncology - on Taxol/herceptin started on 8/2/2024.  Message sent regarding Emla refills  - Lymphedema clinic following  - Provided information sheets and referrals to  lady, [facility] for bra prosthesis        Aracely Frye MD  Breast Surgical Oncology  I spent 20 minutes caring for Ms. Tovar on this date of service. This time includes time spent by me in the following activities: preparing for the visit, reviewing tests, obtaining and/or reviewing a separately obtained history, performing a medically appropriate examination and/or evaluation , counseling and educating the patient/family/caregiver, referring and communicating with other health care professionals , documenting information in the medical record, independently interpreting results and communicating that information with the patient/family/caregiver, and care coordination.    Return in about 6 months (around 8/18/2025) for Discussed port removal.

## 2025-02-18 ENCOUNTER — OFFICE VISIT (OUTPATIENT)
Dept: SURGERY | Facility: CLINIC | Age: 70
End: 2025-02-18
Payer: MEDICARE

## 2025-02-18 ENCOUNTER — OFFICE VISIT (OUTPATIENT)
Dept: INTERNAL MEDICINE | Facility: CLINIC | Age: 70
End: 2025-02-18
Payer: MEDICARE

## 2025-02-18 VITALS
HEART RATE: 68 BPM | SYSTOLIC BLOOD PRESSURE: 132 MMHG | HEIGHT: 60 IN | WEIGHT: 154.4 LBS | DIASTOLIC BLOOD PRESSURE: 70 MMHG | BODY MASS INDEX: 30.31 KG/M2 | OXYGEN SATURATION: 100 %

## 2025-02-18 VITALS
HEIGHT: 60 IN | RESPIRATION RATE: 16 BRPM | WEIGHT: 154 LBS | SYSTOLIC BLOOD PRESSURE: 124 MMHG | BODY MASS INDEX: 30.23 KG/M2 | DIASTOLIC BLOOD PRESSURE: 60 MMHG | HEART RATE: 72 BPM

## 2025-02-18 DIAGNOSIS — I10 PRIMARY HYPERTENSION: Primary | ICD-10-CM

## 2025-02-18 DIAGNOSIS — Z12.31 BREAST CANCER SCREENING BY MAMMOGRAM: Primary | ICD-10-CM

## 2025-02-18 PROCEDURE — 1159F MED LIST DOCD IN RCRD: CPT | Performed by: STUDENT IN AN ORGANIZED HEALTH CARE EDUCATION/TRAINING PROGRAM

## 2025-02-18 PROCEDURE — 1160F RVW MEDS BY RX/DR IN RCRD: CPT | Performed by: NURSE PRACTITIONER

## 2025-02-18 PROCEDURE — 1160F RVW MEDS BY RX/DR IN RCRD: CPT | Performed by: STUDENT IN AN ORGANIZED HEALTH CARE EDUCATION/TRAINING PROGRAM

## 2025-02-18 PROCEDURE — 1159F MED LIST DOCD IN RCRD: CPT | Performed by: NURSE PRACTITIONER

## 2025-02-18 PROCEDURE — 1126F AMNT PAIN NOTED NONE PRSNT: CPT | Performed by: NURSE PRACTITIONER

## 2025-02-18 PROCEDURE — 99214 OFFICE O/P EST MOD 30 MIN: CPT | Performed by: NURSE PRACTITIONER

## 2025-02-18 PROCEDURE — 99213 OFFICE O/P EST LOW 20 MIN: CPT | Performed by: STUDENT IN AN ORGANIZED HEALTH CARE EDUCATION/TRAINING PROGRAM

## 2025-02-18 PROCEDURE — 3074F SYST BP LT 130 MM HG: CPT | Performed by: NURSE PRACTITIONER

## 2025-02-18 PROCEDURE — 3078F DIAST BP <80 MM HG: CPT | Performed by: STUDENT IN AN ORGANIZED HEALTH CARE EDUCATION/TRAINING PROGRAM

## 2025-02-18 PROCEDURE — 3078F DIAST BP <80 MM HG: CPT | Performed by: NURSE PRACTITIONER

## 2025-02-18 PROCEDURE — 3075F SYST BP GE 130 - 139MM HG: CPT | Performed by: STUDENT IN AN ORGANIZED HEALTH CARE EDUCATION/TRAINING PROGRAM

## 2025-02-18 RX ORDER — LIDOCAINE/PRILOCAINE 2.5 %-2.5%
CREAM (GRAM) TOPICAL
Qty: 30 G | Refills: 3 | Status: SHIPPED | OUTPATIENT
Start: 2025-02-18

## 2025-02-18 RX ORDER — CARVEDILOL 12.5 MG/1
12.5 TABLET ORAL 2 TIMES DAILY
Start: 2025-02-18

## 2025-02-18 NOTE — PROGRESS NOTES
"Checo Tovar is a 69 y.o. female. Patient is here today for   Chief Complaint   Patient presents with    Hypertension          Vitals:    25 0901   BP: 124/60   Pulse: 72   Resp: 16     Body mass index is 30.08 kg/m².  The following portions of the patient's history were reviewed and updated as appropriate: allergies, current medications, past family history, past medical history, past social history, past surgical history and problem list.    Past Medical History:   Diagnosis Date    Abnormal liver function tests 2017    Description: , \"-\"hepatitis profile    Anemia     Anesthesia complication     SLOW TO WAKE UP WITH HYSTERECTOMY    Arthritis     Breast cancer May 2024    Cancer 24    Right Breast    Cholesterol blood reduced     Colon polyps     Cystocele, lateral 10/15/2020    Cystocele, midline 10/15/2020    Diabetes mellitus     Frequent UTI     H/O bone density study 10/2010    normal    H/O cardiovascular stress test     H/O echocardiogram 2001    2-D    Hashimoto's thyroiditis     Heart murmur     Hyperkalemia 2019    Hyperlipidemia     Hypertension     Hypothyroidism 2018    Incompetence of pubocervical tissue 10/15/2020    Incompetence of rectovaginal tissue 10/15/2020    Insulin dependent diabetes mellitus     Limited joint range of motion     LEFT KNEE    Loss of perineal body, female 10/15/2020    Osteoarthritis of knee 2005    left    Pelvic relaxation due to vaginal prolapse 2018    Plantar warts     foot    Postmenopausal bleeding 2019    Pregnancy      twins x1    Psoriasis     RA (rheumatoid arthritis)     Rectocele 10/15/2020    Urinary tract infection     Urine incontinence     BETTER WITH SLING    Uterovaginal prolapse     Uterovaginal prolapse, incomplete 2020    Vaginal enterocele 10/15/2020      No Known Allergies   Social History     Socioeconomic History    Marital status:      Spouse name: SUZANNE" Number of children: 3   Tobacco Use    Smoking status: Never     Passive exposure: Never    Smokeless tobacco: Never    Tobacco comments:     Caffeine: 1 drink daily   Vaping Use    Vaping status: Never Used   Substance and Sexual Activity    Alcohol use: Never    Drug use: Never    Sexual activity: Yes     Partners: Male     Birth control/protection: Post-menopausal, Tubal ligation, Hysterectomy, Partner of same sex, Surgical     Comment: SPOUSE = JACK         Current Outpatient Medications:     BD PEN NEEDLE SEUN U/F 32G X 4 MM misc, USE 1 PEN NEEDLE WITH EACH  VICTOZA INJECTION, Disp: 100 each, Rfl: 1    Calcium Carbonate-Vit D-Min (CALCIUM 1200 PO), Take 1 tablet by mouth Daily. PT HOLDING FOR SURGERY, Disp: , Rfl:     carvedilol (COREG) 12.5 MG tablet, Take 1 tablet by mouth 2 (Two) Times a Day., Disp: , Rfl:     Cinnamon 500 MG tablet, Take 1,000 mg by mouth 2 (two) times a day. PT HOLDING FOR SURGERY, Disp: , Rfl:     clobetasol propionate (TEMOVATE) 0.05 % cream, Apply 1 Application topically to the appropriate area as directed 2 (Two) Times a Day., Disp: , Rfl:     Coenzyme Q10 (CO Q 10 PO), Take 1 capsule by mouth Daily. PT HOLDING FOR SURGERY, Disp: , Rfl:     CRANBERRY-VITAMIN C-D MANNOSE PO, Take 2 capsules by mouth Daily. PT HOLDING FOR SURGERY, Disp: , Rfl:     glucose blood (OneTouch Verio) test strip, Use as directed to CHECK BLOOD GLUCOSE ONCE DAILY., Disp: 100 each, Rfl: 3    hydrocortisone 2.5 % cream, Apply 1 Application topically to the appropriate area as directed 2 (Two) Times a Day., Disp: 3.5 g, Rfl: 0    levothyroxine (SYNTHROID, LEVOTHROID) 88 MCG tablet, TAKE 1 TABLET BY MOUTH DAILY, Disp: 90 tablet, Rfl: 3    lidocaine-prilocaine (EMLA) 2.5-2.5 % cream, Apply 1 Application topically to the appropriate area as directed As Needed for Mild Pain., Disp: , Rfl:     lidocaine-prilocaine (EMLA) 2.5-2.5 % cream, Apply nickel-sized amount over Mediport site 30 min prior to access. Do not rub  in., Disp: 30 g, Rfl: 3    Liraglutide (VICTOZA) 18 MG/3ML solution pen-injector injection, DIAL AND INJECT UNDER THE SKIN 1.2 MG DAILY, Disp: 6 mL, Rfl: 5    lisinopril (PRINIVIL,ZESTRIL) 20 MG tablet, TAKE 1 TABLET BY MOUTH DAILY, Disp: 90 tablet, Rfl: 3    lisinopril-hydrochlorothiazide (PRINZIDE,ZESTORETIC) 20-25 MG per tablet, TAKE 1 TABLET BY MOUTH DAILY, Disp: 90 tablet, Rfl: 3    loratadine (Claritin) 10 MG tablet, Take 1 tablet by mouth Every Night., Disp: , Rfl:     meloxicam (MOBIC) 15 MG tablet, Take 1 tablet by mouth Daily. PT HOLDING FOR SURGERY, Disp: , Rfl:     metFORMIN (GLUCOPHAGE) 500 MG tablet, TAKE THREE TABLETS BY MOUTH EVERY MORNING AND THEN TAKE TWO TABLETS BY MOUTH IN THE EVENING (Patient taking differently: Take 3 tablets by mouth Daily With Breakfast. TAKE THREE TABLETS BY MOUTH EVERY MORNING AND THEN TAKE TWO TABLETS BY MOUTH IN THE EVENING), Disp: 450 tablet, Rfl: 3    multivitamin with minerals tablet tablet, Take 1 tablet by mouth Daily. PT HOLDING FOR SURGERY, Disp: , Rfl:     Omega-3 Fatty Acids (fish oil) 1200 MG capsule capsule, Take 1 capsule by mouth Daily. PT HOLDING FOR SURGERY, Disp: , Rfl:     rosuvastatin (CRESTOR) 20 MG tablet, TAKE 1 TABLET BY MOUTH DAILY, Disp: 90 tablet, Rfl: 1    TURMERIC PO, Take 1,400 mg by mouth Daily. PT HOLDING FOR SURGERY, Disp: , Rfl:     vitamin C (ASCORBIC ACID) 250 MG tablet, Take 4 tablets by mouth Daily., Disp: , Rfl:     vitamin D3 125 MCG (5000 UT) capsule capsule, Take 1 capsule by mouth Daily. PT HOLDING FOR SURGERY, Disp: , Rfl:     VITAMIN E PO, Take 1 capsule by mouth Daily. HOLD PRIOR TO SURG, Disp: , Rfl:     Zinc 50 MG tablet, Take 1 tablet by mouth Daily. PT HOLDING FOR SURGERY, Disp: , Rfl:      History of Present Illness  Pretty is here for a follow up for HTN. She has noticed that her blood pressure readings have been high at home. She has had some headaches. She went to her breast surgery and it was also elevated.    Hypertension  This is a chronic problem. The current episode started more than 1 year ago. The problem is uncontrolled. Associated symptoms include headaches and malaise/fatigue. Pertinent negatives include no anxiety, blurred vision, chest pain, orthopnea, palpitations, peripheral edema or shortness of breath. Agents associated with hypertension include NSAIDs and thyroid hormones. Risk factors for coronary artery disease include diabetes mellitus, dyslipidemia, obesity and sedentary lifestyle. Current antihypertension treatment includes ACE inhibitors, lifestyle changes, diuretics and beta blockers. There are no compliance problems.    Additional comments: Blood pressures have been elevated for a while.  Experiencing headaches recently.       Review of Systems   Constitutional:  Positive for fatigue and malaise/fatigue.   Eyes:  Negative for blurred vision and visual disturbance.   Respiratory:  Negative for chest tightness and shortness of breath.    Cardiovascular:  Negative for chest pain, palpitations and orthopnea.   Neurological:  Positive for headaches. Negative for dizziness and syncope.         Objective     Physical Exam  Vitals and nursing note reviewed.   Constitutional:       General: She is not in acute distress.  HENT:      Head: Normocephalic.   Cardiovascular:      Rate and Rhythm: Normal rate and regular rhythm.      Heart sounds: Normal heart sounds.   Pulmonary:      Effort: Pulmonary effort is normal.      Breath sounds: Normal breath sounds.   Musculoskeletal:      Right lower leg: No edema.      Left lower leg: No edema.   Skin:     General: Skin is warm and dry.   Neurological:      Mental Status: She is alert and oriented to person, place, and time.   Psychiatric:         Mood and Affect: Mood normal.         Assessment    ASSESSMENT    Diagnoses and all orders for this visit:    1. Primary hypertension (Primary)    Other orders  -     carvedilol (COREG) 12.5 MG tablet; Take 1 tablet by  mouth 2 (Two) Times a Day.          PLAN    Continue lisinopril and hctz. , will increase coreg to 12.5mg twice daily. She will continue to monitor her blood pressure and HR at home . If she continues to have elevated blood pressure, discussed adding amlodipine. She has a follow up with cardiology next week  She can send me some BP readings via [employer],   Discussed 1 month follow up for BP if BP continues to stay elevated

## 2025-02-19 ENCOUNTER — PATIENT OUTREACH (OUTPATIENT)
Dept: OTHER | Facility: HOSPITAL | Age: 70
End: 2025-02-19
Payer: MEDICARE

## 2025-02-20 ENCOUNTER — HOSPITAL ENCOUNTER (OUTPATIENT)
Dept: OCCUPATIONAL THERAPY | Facility: HOSPITAL | Age: 70
Discharge: HOME | End: 2025-02-20
Payer: MEDICARE

## 2025-02-20 DIAGNOSIS — C50.811 MALIGNANT NEOPLASM OF OVERLAPPING SITES OF RIGHT BREAST IN FEMALE, ESTROGEN RECEPTOR NEGATIVE: Primary | ICD-10-CM

## 2025-02-20 DIAGNOSIS — I97.2 POST-MASTECTOMY LYMPHEDEMA SYNDROME: ICD-10-CM

## 2025-02-20 DIAGNOSIS — L90.5 SCAR ADHERENT: ICD-10-CM

## 2025-02-20 DIAGNOSIS — R22.2 SOFT TISSUE SWELLING OF CHEST WALL: ICD-10-CM

## 2025-02-20 DIAGNOSIS — Z17.1 MALIGNANT NEOPLASM OF OVERLAPPING SITES OF RIGHT BREAST IN FEMALE, ESTROGEN RECEPTOR NEGATIVE: Primary | ICD-10-CM

## 2025-02-20 PROCEDURE — 97535 SELF CARE MNGMENT TRAINING: CPT

## 2025-02-20 PROCEDURE — 93702 BIS XTRACELL FLUID ANALYSIS: CPT

## 2025-02-25 ENCOUNTER — OFFICE VISIT (OUTPATIENT)
Dept: CARDIOLOGY | Facility: CLINIC | Age: 70
End: 2025-02-25
Payer: MEDICARE

## 2025-02-25 VITALS
OXYGEN SATURATION: 95 % | SYSTOLIC BLOOD PRESSURE: 140 MMHG | HEART RATE: 67 BPM | HEIGHT: 60 IN | BODY MASS INDEX: 30.23 KG/M2 | DIASTOLIC BLOOD PRESSURE: 80 MMHG | WEIGHT: 154 LBS

## 2025-02-25 DIAGNOSIS — R94.31 ABNORMAL ELECTROCARDIOGRAM (ECG) (EKG): ICD-10-CM

## 2025-02-25 DIAGNOSIS — I10 BENIGN ESSENTIAL HYPERTENSION: Chronic | ICD-10-CM

## 2025-02-25 DIAGNOSIS — I45.2 RIGHT BUNDLE BRANCH BLOCK (RBBB) WITH LEFT ANTERIOR FASCICULAR BLOCK (LAFB): ICD-10-CM

## 2025-02-25 DIAGNOSIS — C50.811 MALIGNANT NEOPLASM OF OVERLAPPING SITES OF RIGHT BREAST IN FEMALE, ESTROGEN RECEPTOR NEGATIVE: Primary | ICD-10-CM

## 2025-02-25 DIAGNOSIS — E78.00 PURE HYPERCHOLESTEROLEMIA: Chronic | ICD-10-CM

## 2025-02-25 DIAGNOSIS — Z17.1 MALIGNANT NEOPLASM OF OVERLAPPING SITES OF RIGHT BREAST IN FEMALE, ESTROGEN RECEPTOR NEGATIVE: Primary | ICD-10-CM

## 2025-02-25 PROBLEM — I35.0 AORTIC STENOSIS, MILD: Status: ACTIVE | Noted: 2025-02-25

## 2025-02-25 PROCEDURE — 99214 OFFICE O/P EST MOD 30 MIN: CPT | Performed by: FAMILY MEDICINE

## 2025-02-25 PROCEDURE — 3079F DIAST BP 80-89 MM HG: CPT | Performed by: FAMILY MEDICINE

## 2025-02-25 PROCEDURE — 93000 ELECTROCARDIOGRAM COMPLETE: CPT | Performed by: FAMILY MEDICINE

## 2025-02-25 PROCEDURE — 3077F SYST BP >= 140 MM HG: CPT | Performed by: FAMILY MEDICINE

## 2025-02-25 RX ORDER — CARVEDILOL 12.5 MG/1
12.5 TABLET ORAL EVERY MORNING
Start: 2025-02-25

## 2025-02-25 RX ORDER — CARVEDILOL 25 MG/1
25 TABLET ORAL NIGHTLY
Qty: 90 TABLET | Refills: 0 | Status: SHIPPED | OUTPATIENT
Start: 2025-02-25 | End: 2025-02-25

## 2025-02-25 RX ORDER — CARVEDILOL 25 MG/1
25 TABLET ORAL NIGHTLY
Qty: 90 TABLET | Refills: 0 | Status: SHIPPED | OUTPATIENT
Start: 2025-02-25

## 2025-02-25 NOTE — PROGRESS NOTES
Date of Office Visit: 2025  Encounter Provider: CLEVELAND Oconnor  Place of Service: [facility] CARDIOLOGY  Established cardiologist: María Ramirez MD  Patient Name: Pretty Tovar  :1955      Patient ID:  Pretty Tovar is a 69 y.o. 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     She is , has 3 children, owns the [employer], has never smoked, uses no drugs, uses no caffeine. Her brother myocardial infarction, her father myocardial infarction. Her dad had hypertension to her brothers have had hypertension. 3 of her brothers have diabetes.     History of Present Illness     She was diagnosed with invasive mammary carcinoma to the right breast and started on paclitaxel and trastuzumab for 12 cycles followed by trastuzumab every 3 weeks for a year.     Echocardiogram 2024 with an ejection fraction of 60.9% and normal diastolic function.  Normal RVSP.  Mild aortic stenosis.    Echocardiogram 10/9/2024 with an ejection fraction of 59%, normal diastolic function, normal valvular structure and function and this was unchanged from prior study.     Carotid duplex study 8/15/2024 showed minimal bilateral carotid artery stenosis.     She developed a rash on weekly Taxol and Herceptin and was changed to Abraxane 2024 and rash improved.  She has now been on Herceptin every 3 weeks.  She saw Dr. Belcher 2024 with plans to be on Herceptin for 1 year.     She was last seen by Dr. Ramirez in the office 2024.  She had recently experienced 1 week with increased lower extremity edema that spontaneously resolved and there was mild dyspnea.  An echocardiogram was planned for January  2025.    Echocardiogram 2/3/2025 showed an ejection fraction of 58.6%, normal LV diastolic function, mild aortic stenosis.  GLS = -20.0% stable from prior study.     Pretty is feeling well today. She has had occasional fatigue, but overall no acute complaints. She has brought in home blood pressure log and her BP has been elevated. It was running 160-180 systolic and then 2/18/24 her carvedilol was increased, now home BP since then has been 130-150 systolic. Sometimes when BP is high she has a slight headache, that has been improving.  There is no chest pain or pressure, shortness of breath, VALDES, PND, lightheadedness, dizziness, presyncope/syncope, heart racing or palpitations or edema.   Current Outpatient Medications on File Prior to Visit   Medication Sig Dispense Refill    BD PEN NEEDLE SEUN U/F 32G X 4 MM misc USE 1 PEN NEEDLE WITH EACH  VICTOZA INJECTION 100 each 1    Calcium Carbonate-Vit D-Min (CALCIUM 1200 PO) Take 1 tablet by mouth Daily. PT HOLDING FOR SURGERY      Cinnamon 500 MG tablet Take 1,000 mg by mouth 2 (two) times a day. PT HOLDING FOR SURGERY      Coenzyme Q10 (CO Q 10 PO) Take 1 capsule by mouth Daily. PT HOLDING FOR SURGERY      CRANBERRY-VITAMIN C-D MANNOSE PO Take 2 capsules by mouth Daily. PT HOLDING FOR SURGERY      glucose blood (OneTouch Verio) test strip Use as directed to CHECK BLOOD GLUCOSE ONCE DAILY. 100 each 3    hydrocortisone 2.5 % cream Apply 1 Application topically to the appropriate area as directed 2 (Two) Times a Day. 3.5 g 0    levothyroxine (SYNTHROID, LEVOTHROID) 88 MCG tablet TAKE 1 TABLET BY MOUTH DAILY 90 tablet 3    lidocaine-prilocaine (EMLA) 2.5-2.5 % cream Apply 1 Application topically to the appropriate area as directed As Needed for Mild Pain.      lidocaine-prilocaine (EMLA) 2.5-2.5 % cream Apply nickel-sized amount over Mediport site 30 min prior to access. Do not rub in. 30 g 3    Liraglutide (VICTOZA) 18 MG/3ML solution pen-injector injection DIAL AND  INJECT UNDER THE SKIN 1.2 MG DAILY 6 mL 5    lisinopril (PRINIVIL,ZESTRIL) 20 MG tablet TAKE 1 TABLET BY MOUTH DAILY 90 tablet 3    lisinopril-hydrochlorothiazide (PRINZIDE,ZESTORETIC) 20-25 MG per tablet TAKE 1 TABLET BY MOUTH DAILY 90 tablet 3    loratadine (Claritin) 10 MG tablet Take 1 tablet by mouth Every Night.      metFORMIN (GLUCOPHAGE) 500 MG tablet TAKE THREE TABLETS BY MOUTH EVERY MORNING AND THEN TAKE TWO TABLETS BY MOUTH IN THE EVENING (Patient taking differently: Take 3 tablets by mouth Daily With Breakfast. TAKE THREE TABLETS BY MOUTH EVERY MORNING AND THEN TAKE TWO TABLETS BY MOUTH IN THE EVENING) 450 tablet 3    multivitamin with minerals tablet tablet Take 1 tablet by mouth Daily. PT HOLDING FOR SURGERY      Omega-3 Fatty Acids (fish oil) 1200 MG capsule capsule Take 1 capsule by mouth Daily. PT HOLDING FOR SURGERY      rosuvastatin (CRESTOR) 20 MG tablet TAKE 1 TABLET BY MOUTH DAILY 90 tablet 1    TURMERIC PO Take 1,400 mg by mouth Daily. PT HOLDING FOR SURGERY      vitamin C (ASCORBIC ACID) 250 MG tablet Take 4 tablets by mouth Daily.      vitamin D3 125 MCG (5000 UT) capsule capsule Take 1 capsule by mouth Daily. PT HOLDING FOR SURGERY      VITAMIN E PO Take 1 capsule by mouth Daily. HOLD PRIOR TO SURG      Zinc 50 MG tablet Take 1 tablet by mouth Daily. PT HOLDING FOR SURGERY      [DISCONTINUED] carvedilol (COREG) 12.5 MG tablet Take 1 tablet by mouth 2 (Two) Times a Day.      [DISCONTINUED] clobetasol propionate (TEMOVATE) 0.05 % cream Apply 1 Application topically to the appropriate area as directed 2 (Two) Times a Day.      [DISCONTINUED] meloxicam (MOBIC) 15 MG tablet Take 1 tablet by mouth Daily. PT HOLDING FOR SURGERY       No current facility-administered medications on file prior to visit.         Procedures    ECG 12 Lead    Date/Time: 2/25/2025 10:07 AM  Performed by: Deisy Reyes APRN    Authorized by: Deisy Reyes APRN  Comparison: compared with previous ECG from  "8/28/2024  Similar to previous ECG  Rhythm: sinus rhythm  BPM: 67  Conduction: right bundle branch block and left posterior fascicular block              Objective:      Vitals:    02/25/25 0919   BP: 140/80   Pulse: 67   SpO2: 95%   Weight: 69.9 kg (154 lb)   Height: 152.4 cm (60\")     Body mass index is 30.08 kg/m².  Wt Readings from Last 3 Encounters:   02/25/25 69.9 kg (154 lb)   02/18/25 69.9 kg (154 lb)   02/18/25 70 kg (154 lb 6.4 oz)     Constitutional:       Appearance: Healthy appearance. Not in distress.   Pulmonary:      Effort: Pulmonary effort is normal.      Breath sounds: Normal breath sounds.   Cardiovascular:      Normal rate. Regular rhythm.      Murmurs: There is a grade 1/6 systolic murmur.   Pulses:     Intact distal pulses.   Edema:     Peripheral edema absent.   Skin:     General: Skin is warm and dry.   Neurological:      Mental Status: Alert and oriented to person, place and time.     Lab Review:   CMP 2/14/2025 unremarkable, CBC hemoglobin 11.6 otherwise unremarkable  Hemoglobin A1c 12/13/2024 6.4  Lipid panel 6/10/2024 total cholesterol 171, , HDL 55, LDL 92    Assessment:     1. Malignant neoplasm of overlapping sites of right breast in female, estrogen receptor negative    2. Abnormal electrocardiogram (ECG) (EKG)    3. Benign essential hypertension    4. Pure hypercholesterolemia    5. Right bundle branch block (RBBB) with left anterior fascicular block (LAFB)      Right breast cancer, received Paclitaxel (Taxol) stopped 8/30/2024 for rash then Abraxane-finished 10/11/2024.  Now on trastuzumab (Herceptin) since 7/26/2024.  Hypertension, goal <120/80, blood pressure is controlled on my recheck, has been running high at home,   Aortic stenosis, mild  Bilateral carotid bruits  Hyperlipidemia, on rosuvastatin  Rheumatoid arthritis, on Simponi.  Anemia  Hypothyroidism.    Plan:   On my recheck today blood pressure was 110/68.  Her home blood pressure readings have remained elevated " "with a systolic in the 150s.  She is going to see oncology next week and tells me they will check her blood pressure at this appointment I have asked her to bring in her home blood pressure cuff so it can be compared for accuracy.  I will call her in 2 weeks to see what her home blood pressures have run and if her cuff was found to be accurate.  If blood pressures are still not quite at goal we will increase her nighttime carvedilol.     Current Herceptin monitoring recommendations are for baseline echocardiogram and limited echo every 3 months for 12 months of therapy then every 6 months for 2 years after therapy is discontinued.      Echocardiogram in May 2025  Follow-up with Dr. Ramirez or myself in June 2025    Thank you for allowing me to participate in this patient's care. Please call with any questions or concerns.           STOP-Bang Score  Have you been diagnosed with Sleep Apnea?: no  Snoring?: no  Tired?: yes  Observed?: no  Pressure?: yes  Stop Score: 2  Body Mass Index more than 35 kg/m2?: no  Age older than 50 year old?: yes  Neck large? \">17\"/43cm-M, >16\"/41cm-F: no  Gender=Male?: no  Total Stop-Bang Score: 3         Dragon RateItAllation device was utilized in this note.   "

## 2025-03-07 ENCOUNTER — INFUSION (OUTPATIENT)
Dept: ONCOLOGY | Facility: HOSPITAL | Age: 70
End: 2025-03-07
Payer: MEDICARE

## 2025-03-07 ENCOUNTER — OFFICE VISIT (OUTPATIENT)
Dept: ONCOLOGY | Facility: CLINIC | Age: 70
End: 2025-03-07
Payer: MEDICARE

## 2025-03-07 VITALS
WEIGHT: 154.5 LBS | SYSTOLIC BLOOD PRESSURE: 121 MMHG | RESPIRATION RATE: 16 BRPM | BODY MASS INDEX: 30.33 KG/M2 | DIASTOLIC BLOOD PRESSURE: 62 MMHG | HEIGHT: 60 IN | OXYGEN SATURATION: 98 % | HEART RATE: 74 BPM | TEMPERATURE: 97.5 F

## 2025-03-07 DIAGNOSIS — Z17.1 MALIGNANT NEOPLASM OF OVERLAPPING SITES OF RIGHT BREAST IN FEMALE, ESTROGEN RECEPTOR NEGATIVE: ICD-10-CM

## 2025-03-07 DIAGNOSIS — Z17.1 MALIGNANT NEOPLASM OF OVERLAPPING SITES OF RIGHT BREAST IN FEMALE, ESTROGEN RECEPTOR NEGATIVE: Primary | ICD-10-CM

## 2025-03-07 DIAGNOSIS — Z45.2 FITTING AND ADJUSTMENT OF VASCULAR CATHETER: ICD-10-CM

## 2025-03-07 DIAGNOSIS — C50.811 MALIGNANT NEOPLASM OF OVERLAPPING SITES OF RIGHT BREAST IN FEMALE, ESTROGEN RECEPTOR NEGATIVE: Primary | ICD-10-CM

## 2025-03-07 DIAGNOSIS — C50.811 MALIGNANT NEOPLASM OF OVERLAPPING SITES OF RIGHT BREAST IN FEMALE, ESTROGEN RECEPTOR NEGATIVE: ICD-10-CM

## 2025-03-07 LAB
ALBUMIN SERPL-MCNC: 4.3 G/DL (ref 3.5–5.2)
ALBUMIN/GLOB SERPL: 1.3 G/DL
ALP SERPL-CCNC: 55 U/L (ref 39–117)
ALT SERPL W P-5'-P-CCNC: 22 U/L (ref 1–33)
ANION GAP SERPL CALCULATED.3IONS-SCNC: 15.8 MMOL/L (ref 5–15)
AST SERPL-CCNC: 27 U/L (ref 1–32)
BASOPHILS # BLD AUTO: 0.03 10*3/MM3 (ref 0–0.2)
BASOPHILS NFR BLD AUTO: 0.5 % (ref 0–1.5)
BILIRUB SERPL-MCNC: 0.4 MG/DL (ref 0–1.2)
BUN SERPL-MCNC: 14 MG/DL (ref 8–23)
BUN/CREAT SERPL: 32.6 (ref 7–25)
CALCIUM SPEC-SCNC: 9.8 MG/DL (ref 8.6–10.5)
CHLORIDE SERPL-SCNC: 95 MMOL/L (ref 98–107)
CO2 SERPL-SCNC: 26.2 MMOL/L (ref 22–29)
CREAT SERPL-MCNC: 0.43 MG/DL (ref 0.57–1)
DEPRECATED RDW RBC AUTO: 44.7 FL (ref 37–54)
EGFRCR SERPLBLD CKD-EPI 2021: 105.4 ML/MIN/1.73
EOSINOPHIL # BLD AUTO: 0.31 10*3/MM3 (ref 0–0.4)
EOSINOPHIL NFR BLD AUTO: 5 % (ref 0.3–6.2)
ERYTHROCYTE [DISTWIDTH] IN BLOOD BY AUTOMATED COUNT: 13.8 % (ref 12.3–15.4)
GLOBULIN UR ELPH-MCNC: 3.4 GM/DL
GLUCOSE SERPL-MCNC: 142 MG/DL (ref 65–99)
HCT VFR BLD AUTO: 37.6 % (ref 34–46.6)
HGB BLD-MCNC: 12 G/DL (ref 12–15.9)
IMM GRANULOCYTES # BLD AUTO: 0.01 10*3/MM3 (ref 0–0.05)
IMM GRANULOCYTES NFR BLD AUTO: 0.2 % (ref 0–0.5)
LYMPHOCYTES # BLD AUTO: 1.48 10*3/MM3 (ref 0.7–3.1)
LYMPHOCYTES NFR BLD AUTO: 23.9 % (ref 19.6–45.3)
MCH RBC QN AUTO: 28.5 PG (ref 26.6–33)
MCHC RBC AUTO-ENTMCNC: 31.9 G/DL (ref 31.5–35.7)
MCV RBC AUTO: 89.3 FL (ref 79–97)
MONOCYTES # BLD AUTO: 0.45 10*3/MM3 (ref 0.1–0.9)
MONOCYTES NFR BLD AUTO: 7.3 % (ref 5–12)
NEUTROPHILS NFR BLD AUTO: 3.9 10*3/MM3 (ref 1.7–7)
NEUTROPHILS NFR BLD AUTO: 63.1 % (ref 42.7–76)
NRBC BLD AUTO-RTO: 0 /100 WBC (ref 0–0.2)
PLATELET # BLD AUTO: 243 10*3/MM3 (ref 140–450)
PMV BLD AUTO: 10.2 FL (ref 6–12)
POTASSIUM SERPL-SCNC: 3.7 MMOL/L (ref 3.5–5.2)
PROT SERPL-MCNC: 7.7 G/DL (ref 6–8.5)
RBC # BLD AUTO: 4.21 10*6/MM3 (ref 3.77–5.28)
SODIUM SERPL-SCNC: 137 MMOL/L (ref 136–145)
WBC NRBC COR # BLD AUTO: 6.18 10*3/MM3 (ref 3.4–10.8)

## 2025-03-07 PROCEDURE — 85025 COMPLETE CBC W/AUTO DIFF WBC: CPT

## 2025-03-07 PROCEDURE — 96413 CHEMO IV INFUSION 1 HR: CPT

## 2025-03-07 PROCEDURE — 63710000001 DIPHENHYDRAMINE PER 50 MG: Performed by: INTERNAL MEDICINE

## 2025-03-07 PROCEDURE — 25010000002 HEPARIN LOCK FLUSH PER 10 UNITS: Performed by: INTERNAL MEDICINE

## 2025-03-07 PROCEDURE — 25010000002 TRASTUZUMAB PER 10 MG: Performed by: INTERNAL MEDICINE

## 2025-03-07 PROCEDURE — 25810000003 SODIUM CHLORIDE 0.9 % SOLUTION 250 ML FLEX CONT: Performed by: INTERNAL MEDICINE

## 2025-03-07 PROCEDURE — 80053 COMPREHEN METABOLIC PANEL: CPT

## 2025-03-07 RX ORDER — SODIUM CHLORIDE 0.9 % (FLUSH) 0.9 %
10 SYRINGE (ML) INJECTION AS NEEDED
Status: DISCONTINUED | OUTPATIENT
Start: 2025-03-07 | End: 2025-03-07 | Stop reason: HOSPADM

## 2025-03-07 RX ORDER — HEPARIN SODIUM (PORCINE) LOCK FLUSH IV SOLN 100 UNIT/ML 100 UNIT/ML
500 SOLUTION INTRAVENOUS AS NEEDED
Status: DISCONTINUED | OUTPATIENT
Start: 2025-03-07 | End: 2025-03-07 | Stop reason: HOSPADM

## 2025-03-07 RX ORDER — SODIUM CHLORIDE 0.9 % (FLUSH) 0.9 %
10 SYRINGE (ML) INJECTION AS NEEDED
OUTPATIENT
Start: 2025-03-07

## 2025-03-07 RX ORDER — SODIUM CHLORIDE 9 MG/ML
20 INJECTION, SOLUTION INTRAVENOUS ONCE
Status: CANCELLED | OUTPATIENT
Start: 2025-03-07

## 2025-03-07 RX ORDER — HEPARIN SODIUM (PORCINE) LOCK FLUSH IV SOLN 100 UNIT/ML 100 UNIT/ML
500 SOLUTION INTRAVENOUS AS NEEDED
OUTPATIENT
Start: 2025-03-07

## 2025-03-07 RX ORDER — DIPHENHYDRAMINE HCL 25 MG
25 CAPSULE ORAL ONCE
Status: COMPLETED | OUTPATIENT
Start: 2025-03-07 | End: 2025-03-07

## 2025-03-07 RX ORDER — DIPHENHYDRAMINE HCL 25 MG
25 CAPSULE ORAL ONCE
Status: CANCELLED | OUTPATIENT
Start: 2025-03-07

## 2025-03-07 RX ADMIN — DIPHENHYDRAMINE HYDROCHLORIDE 25 MG: 25 CAPSULE ORAL at 13:58

## 2025-03-07 RX ADMIN — TRASTUZUMAB 420 MG: 150 INJECTION, POWDER, LYOPHILIZED, FOR SOLUTION INTRAVENOUS at 14:27

## 2025-03-07 RX ADMIN — Medication 500 UNITS: at 15:14

## 2025-03-07 NOTE — PROGRESS NOTES
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    Since she stopped her Taxol her rash is much better and the dermatologist biopsied it and this is probably a drug rash and I think we can move to every 3-week Herceptin dosing because this does not seem to be the culprit for her rash    We did genetic testing that shows a VUS in the NF1 gene which is not significant    She does not need radiation or hormonal blockade because she had a mastectomy and was ER/DC negative    Echocardiogram was done 12/25 showing ejection fraction of 59%     Her rheumatoid arthritis symptoms are slowly resuming after stopping the Taxol and I suspect she will be back on the Simponi by the summer      Past Medical History:   Diagnosis Date    Abnormal liver function tests 02/09/2017    Description: 2000, \"-\"hepatitis profile    Anemia     Anesthesia complication     SLOW TO WAKE UP WITH HYSTERECTOMY    Arthritis     Breast cancer May 2024    Cancer 5/16/24    Right Breast    Cholesterol blood reduced     Colon polyps     Cystocele, lateral 10/15/2020    Cystocele, midline 10/15/2020    Diabetes mellitus     Frequent UTI     H/O bone density study 10/2010    normal    H/O cardiovascular stress test 2001    H/O echocardiogram 03/2001    2-D    Hashimoto's thyroiditis     Heart murmur     Hyperkalemia 05/16/2019    Hyperlipidemia     Hypertension     Hypothyroidism 2018    Incompetence of pubocervical tissue 10/15/2020    Incompetence of rectovaginal tissue 10/15/2020    " Insulin dependent diabetes mellitus     Limited joint range of motion     LEFT KNEE    Loss of perineal body, female 10/15/2020    Osteoarthritis of knee 2005    left    Pelvic relaxation due to vaginal prolapse 2018    Plantar warts     foot    Postmenopausal bleeding 2019    Pregnancy      twins x1    Psoriasis     RA (rheumatoid arthritis)     Rectocele 10/15/2020    Urinary tract infection     Urine incontinence     BETTER WITH SLING    Uterovaginal prolapse     Uterovaginal prolapse, incomplete 2020    Vaginal enterocele 10/15/2020        Past Surgical History:   Procedure Laterality Date    BREAST BIOPSY Right     benign    COLONOSCOPY      COLONOSCOPY W/ POLYPECTOMY  2018    Dr Resendiz, 6 mm rectal polyp - hyperplastic    JOINT MANIPULATION Left 2021    Procedure: KNEE MANIPULATION with steroid injection;  Surgeon: Francis Tavarez MD;  Location: [facility];  Service: Orthopedics;  Laterality: Left;    JOINT REPLACEMENT  08/10/2021    LYMPH NODE BIOPSY  2024    MASTECTOMY W/ SENTINEL NODE BIOPSY Right 2024    Procedure: RIGHT MASTECTOMY WITH SENTINEL NODE BIOPSY;  Surgeon: Aracely Frye MD;  Location: [facility];  Service: General;  Laterality: Right;    PUBOVAGINAL SLING N/A 10/16/2020    Procedure: Pubovaginal sling Posterior colporrhaphy fixation Insertion of vaginal grafts Cystourethroscopy;  Surgeon: Karen Allen MD;  Location: [facility];  Service: Gynecology;  Laterality: N/A;    TOTAL KNEE ARTHROPLASTY Left 08/10/2021    Procedure: TOTAL KNEE ARTHROPLASTY;  Surgeon: Francis Tavarez MD;  Location: [facility];  Service: Orthopedics;  Laterality: Left;    TOTAL LAPAROSCOPIC HYSTERECTOMY, SACROCOLPOPEXY N/A 10/16/2020    Procedure: Laparoscopic uterosacral ligament colpopexy sacral colpopexy  Laparoscopic paravaginal repair Laparoscopic hysterectomy with bilateral salpingectomy  Laparoscopic abdominal enterocele repair;  Surgeon:  Karen Allen MD;  Location: [facility] OR;  Service: Gynecology;  Laterality: N/A;    TUBAL ABDOMINAL LIGATION      VENOUS ACCESS DEVICE (PORT) INSERTION N/A 2024    Procedure: INSERTION OF PORTACATH;  Surgeon: Aracely Frye MD;  Location: [facility] MAIN OR;  Service: General;  Laterality: N/A;   Oncologic history  patient is a 68-year-old female with 4-year history of rheumatoid arthritis on Simponi who was found on her most recent screening mammogram have an abnormality in the right breast with a 10 mm oval mass at the 11:00 and an 11 mm mass at 12:00.  His abnormalities persisted and diagnostic imaging and the patient underwent biopsy of both these areas.  The area at 12:00 was DCIS high-grade ER/MT negative and the lesion at 11:00 was grade 3 invasive ductal carcinoma measuring 4.8 mm ER/MT negative HER2 3+    Patient was referred to Dr. Aracely Frye and underwent bilateral screening mammograms which showed large hematomas in both biopsy sites with a residual 1.1 x 1.1 cm mass at 11:00 and 12:00 was large hematoma with a biopsy cavity and at 9:00 there was another 1.2 x 1.4 cm mass which was suspicious and then extending from 90 to o'clock there was non-mass enhancement measuring 4.7 x 4.7 centimeters encompassing the 2 biopsy sites and the 9:00 mass    Patient has been referred to discuss treatment but the plan is to proceed with surgery first as masses were nonpalpable and small and initial imaging although now with a large postbiopsy hematoma was palpation is unreliable    Patient is  2 para 3 first childbirth was at age 22 she did not breast-feed menarche was at age 11 menopause 50.  She has been on no post menopausal hormone replacement    Family history is completely negative for any malignancy and no genetic testing was done    She is not a smoker or drinker  She has not had a DVT MI or stroke    She has been on Simponi for her rheumatoid arthritis her last dose was in April and she is going  to skip the June dose because surgery is scheduled    Her last bone density in 2018 was normal    No history of unusual bleeding with her hysterectomy or knee replacement    We discussed the natural history of HER2 positive breast cancer I told her that unless the tumor was bigger than we had anticipated on imaging we will plan for surgery first followed by Taxol Herceptin for 12 weeks and then Herceptin for the rest of the year.  If we have unexpected findings at surgery we would tailor the treatment to be most appropriate for her but she knows she will have some chemo at any rate.  I have asked her to have an echocardiogram done and we will see her back 2 to 3 weeks after surgery to review the final path and make a treatment decision    7/3/2024 right breast skin sparing mastectomy by Dr. Frye-pathology showing invasive mammary carcinoma (invasive ductal carcinoma) [place] histological score 3.  Tumor in the upper outer quadrant at 11:00 measuring 10 x 10 x 10 mm.  Associated high-grade ductal carcinoma in situ, solid, cribriform and comedo types involving adenosis and fibroadenomatoid change with central comedonecrosis and associated microcalcifications.  DCIS spans 15 mm extending from the upper outer quadrant and to the lower outer quadrant.  All margins are free of invasive carcinoma and DCIS.  No LVSI, posterior skeletal muscle free of tumor.  ER/ME negative, HER2 3+ positive, Ki-67 60%.  Pathologic grade pT1b, N (SN) 0.  [place] lymph nodes 3 lymph nodes removed were negative for metastatic carcinoma.      Treatment recommended with weekly Taxol/ Herceptin X 12 followed by Herceptin every 3 weeks x 1 year.     Current Outpatient Medications on File Prior to Visit   Medication Sig Dispense Refill    BD PEN NEEDLE SEUN U/F 32G X 4 MM misc USE 1 PEN NEEDLE WITH EACH  VICTOZA INJECTION 100 each 1    Calcium Carbonate-Vit D-Min (CALCIUM 1200 PO) Take 1 tablet by mouth Daily. PT HOLDING FOR SURGERY       carvedilol (COREG) 12.5 MG tablet Take 1 tablet by mouth Every Morning. (Patient taking differently: Take 1 tablet by mouth Every Morning. Two tablets twice a day)      Cinnamon 500 MG tablet Take 1,000 mg by mouth 2 (two) times a day. PT HOLDING FOR SURGERY      Coenzyme Q10 (CO Q 10 PO) Take 1 capsule by mouth Daily. PT HOLDING FOR SURGERY      CRANBERRY-VITAMIN C-D MANNOSE PO Take 2 capsules by mouth Daily. PT HOLDING FOR SURGERY      glucose blood (OneTouch Verio) test strip Use as directed to CHECK BLOOD GLUCOSE ONCE DAILY. 100 each 3    hydrocortisone 2.5 % cream Apply 1 Application topically to the appropriate area as directed 2 (Two) Times a Day. 3.5 g 0    levothyroxine (SYNTHROID, LEVOTHROID) 88 MCG tablet TAKE 1 TABLET BY MOUTH DAILY 90 tablet 3    lidocaine-prilocaine (EMLA) 2.5-2.5 % cream Apply 1 Application topically to the appropriate area as directed As Needed for Mild Pain.      lidocaine-prilocaine (EMLA) 2.5-2.5 % cream Apply nickel-sized amount over Mediport site 30 min prior to access. Do not rub in. 30 g 3    Liraglutide (VICTOZA) 18 MG/3ML solution pen-injector injection DIAL AND INJECT UNDER THE SKIN 1.2 MG DAILY 6 mL 5    lisinopril (PRINIVIL,ZESTRIL) 20 MG tablet TAKE 1 TABLET BY MOUTH DAILY 90 tablet 3    lisinopril-hydrochlorothiazide (PRINZIDE,ZESTORETIC) 20-25 MG per tablet TAKE 1 TABLET BY MOUTH DAILY 90 tablet 3    loratadine (Claritin) 10 MG tablet Take 1 tablet by mouth Every Night.      metFORMIN (GLUCOPHAGE) 500 MG tablet TAKE THREE TABLETS BY MOUTH EVERY MORNING AND THEN TAKE TWO TABLETS BY MOUTH IN THE EVENING (Patient taking differently: Take 3 tablets by mouth Daily With Breakfast. TAKE THREE TABLETS BY MOUTH EVERY MORNING AND THEN TAKE TWO TABLETS BY MOUTH IN THE EVENING) 450 tablet 3    multivitamin with minerals tablet tablet Take 1 tablet by mouth Daily. PT HOLDING FOR SURGERY      Omega-3 Fatty Acids (fish oil) 1200 MG capsule capsule Take 1 capsule by mouth Daily. PT  HOLDING FOR SURGERY      rosuvastatin (CRESTOR) 20 MG tablet TAKE 1 TABLET BY MOUTH DAILY 90 tablet 1    TURMERIC PO Take 1,400 mg by mouth Daily. PT HOLDING FOR SURGERY      vitamin C (ASCORBIC ACID) 250 MG tablet Take 4 tablets by mouth Daily.      vitamin D3 125 MCG (5000 UT) capsule capsule Take 1 capsule by mouth Daily. PT HOLDING FOR SURGERY      VITAMIN E PO Take 1 capsule by mouth Daily. HOLD PRIOR TO SURG      Zinc 50 MG tablet Take 1 tablet by mouth Daily. PT HOLDING FOR SURGERY      carvedilol (COREG) 25 MG tablet Take 1 tablet by mouth Every Night. (Patient not taking: Reported on 3/7/2025) 90 tablet 0     No current facility-administered medications on file prior to visit.        ALLERGIES:  No Known Allergies     Social History     Socioeconomic History    Marital status:      Spouse name: SUZANNE     Number of children: 3   Tobacco Use    Smoking status: Never     Passive exposure: Never    Smokeless tobacco: Never    Tobacco comments:     Caffeine: 1 drink daily   Vaping Use    Vaping status: Never Used   Substance and Sexual Activity    Alcohol use: Never    Drug use: Never    Sexual activity: Yes     Partners: Male     Birth control/protection: Post-menopausal, Tubal ligation, Hysterectomy, Partner of same sex, Surgical     Comment: SPOUSE = SUZANNE         Family History   Problem Relation Age of Onset    COPD Mother         Passed away 1988    Heart disease Father     Hypertension Father     Diabetes Brother         Passed away 2012    Diabetes Brother     COPD Brother     Hypertension Brother         Copd    Heart attack Brother     Diabetes Brother     Heart disease Brother     Hypertension Brother     Heart disease Maternal Grandmother     Thyroid disease Daughter     Hypothyroidism Daughter     BRCA 1/2 Neg Hx     Breast cancer Neg Hx     Colon cancer Neg Hx     Endometrial cancer Neg Hx     Ovarian cancer Neg Hx     Malig Hyperthermia Neg Hx         Review of Systems   Musculoskeletal:   "Positive for arthralgias.   Skin:  Positive for rash.        Objective     Vitals:    03/07/25 1328   BP: 121/62   Pulse: 74   Resp: 16   Temp: 97.5 °F (36.4 °C)   TempSrc: Oral   SpO2: 98%   Weight: 70.1 kg (154 lb 8 oz)   Height: 152.4 cm (60\")   PainSc: 0-No pain             3/7/2025     1:26 PM   Current Status   ECOG score 0       Physical Exam   CONSTITUTIONAL:  Vital signs reviewed.  No distress, looks comfortable.  EYES:  Conjunctivae and lids unremarkable.  PERRLA  EARS,NOSE,MOUTH,THROAT:  Ears and nose appear unremarkable.  Lips, teeth, gums appear unremarkable.  RESPIRATORY:  Normal respiratory effort.  Lungs clear to auscultation bilaterally.  BREASTS: Left breast is benign the right breast no definite palpable masses or axillary adenopathy  CARDIOVASCULAR:  Normal S1, S2.  No murmurs rubs or gallops.  No significant lower extremity edema.  GASTROINTESTINAL: Abdomen appears unremarkable.  Nontender.  No hepatomegaly.  No splenomegaly.  LYMPHATIC:  No cervical, supraclavicular, axillary lymphadenopathy.  SKIN:  Warm.  Erythematous macules on her arms and legs much improved   PSYCHIATRIC:  Normal judgment and insight.  Normal mood and affect.  I have reexamined the patient and the results are consistent with the previously documented exam. Ashkan Belcher MD       RECENT LABS:  Hematology WBC   Date Value Ref Range Status   03/07/2025 6.18 3.40 - 10.80 10*3/mm3 Final   06/10/2024 4.43 3.40 - 10.80 10*3/mm3 Final     RBC   Date Value Ref Range Status   03/07/2025 4.21 3.77 - 5.28 10*6/mm3 Final   06/10/2024 4.10 3.77 - 5.28 10*6/mm3 Final     Hemoglobin   Date Value Ref Range Status   03/07/2025 12.0 12.0 - 15.9 g/dL Final     Hematocrit   Date Value Ref Range Status   03/07/2025 37.6 34.0 - 46.6 % Final     Platelets   Date Value Ref Range Status   03/07/2025 243 140 - 450 10*3/mm3 Final          ynoptic Checklist   DCIS OF THE BREAST: Biopsy   Protocol posted: 9/20/2023DCIS OF THE BREAST: BIOPSY - " "All Specimens  SPECIMEN   Procedure  Needle biopsy   Specimen Laterality  Right   TUMOR   Tumor Site  Clock position     12 o'clock   Histologic Type  Ductal carcinoma in situ (DCIS)   Architectural Patterns  Solid   Nuclear Grade  Grade III (high)   Necrosis  Present, central (expansive \"comedo\" necrosis)   Microcalcifications  Present in DCIS   .   Breast Biomarker Reporting Template   Protocol posted: 12/13/2023BREAST BIOMARKER REPORTING TEMPLATE - All Specimens  Test(s) Performed     Estrogen Receptor (ER) Status  Negative (less than 1%)     Internal control cells present and stain as expected   Test Type  Food and Drug Administration (FDA) cleared (test / vendor): Ventena   Primary Antibody  SP1   Scoring System  Harrison   Proportion Score  0   Intensity Score  0   Total Harrison Score  0   Test(s) Performed     Progesterone Receptor (PgR) Status  Negative (less than 1%)     Internal control cells present and stain as expected   Test Type  Food and Drug Administration (FDA) cleared (test / vendor): Ventena   Primary Antibody  1E2   Scoring System  Harrison   Proportion Score  1   Intensity Score  2   Total Harrison Score  3   Test(s) Performed  Ki-67   Ki-67 Percentage of Positive Nuclei  25 %   Primary Antibody  30-9   Cold Ischemia and Fixation Times  Meet requirements specified in latest version of the ASCO / CAP Guidelines   Cold Ischemia Time (minutes)  12 min   Fixation Time (hours)  8 hours   Testing Performed on Block Number(s)  1B   METHODS   Fixative  Formalin   Image Analysis  Not performed   .   INVASIVE CARCINOMA OF THE BREAST: Biopsy   Protocol posted: 3/22/2023INVASIVE CARCINOMA OF THE BREAST: BIOPSY - All Specimens  SPECIMEN   Procedure  Needle biopsy   Specimen Laterality  Right   TUMOR   Tumor Site  Clock position     11 o'clock   Histologic Type  Invasive carcinoma of no special type (ductal)   Histologic Type Comment  The tumor has apocrine features   Histologic Grade ([place] Histologic Score)  "    Glandular (Acinar) / Tubular Differentiation  Score 3   Nuclear Pleomorphism  Score 3   Mitotic Rate  Score 2   Overall Grade  Grade 3 (scores of 8 or 9)   Largest Invasive Focus in this Limited Biopsy Sample  4.8 mm   Ductal Carcinoma In Situ (DCIS)  Not identified   Lymphatic and / or Vascular Invasion  Not identified   Microcalcifications  Not identified   .   Breast Biomarker Reporting Template   Protocol posted: 12/13/2023BREAST BIOMARKER REPORTING TEMPLATE - All Specimens  Test(s) Performed     Estrogen Receptor (ER) Status  Negative (less than 1%)     Internal control cells present and stain as expected   Test Type  Food and Drug Administration (FDA) cleared (test / vendor): Ventena   Primary Antibody  SP1   Scoring System  Harrison   Proportion Score  0   Intensity Score  0   Total Harrison Score  0   Test(s) Performed     Progesterone Receptor (PgR) Status  Negative (less than 1%)     Internal control cells present and stain as expected   Test Type  Food and Drug Administration (FDA) cleared (test / vendor): Ventena   Primary Antibody  1E2   Scoring System  Harrison   Proportion Score  0   Intensity Score  0   Total Harrison Score  0   Test(s) Performed     HER2 by Immunohistochemistry  Positive (Score 3+)   Percentage of Cells with Uniform Intense Complete Membrane Staining  100 %   Test Type  Food and Drug Administration (FDA) cleared (test / vendor): Ventena   Primary Antibody  4B5   Test(s) Performed  Ki-67   Ki-67 Percentage of Positive Nuclei  60 %          ynoptic Checklist  7/3/24   INVASIVE CARCINOMA OF THE BREAST: Resection   8th Edition - Protocol posted: 12/13/2023INVASIVE CARCINOMA OF THE BREAST: RESECTION - All Specimens  SPECIMEN   Procedure  Total mastectomy   Specimen Laterality  Right   TUMOR   Tumor Site  Upper outer quadrant   Histologic Type  Invasive carcinoma of no special type (ductal)   Histologic Grade ([place] Histologic Score)     Glandular (Acinar) / Tubular Differentiation  Score  "3   Nuclear Pleomorphism  Score 3   Mitotic Rate  Score 2   Overall Grade  Grade 3 (scores of 8 or 9)   Tumor Size  Greatest dimension of largest invasive focus (Millimeters): 10 mm   Tumor Focality  Single focus of invasive carcinoma   Ductal Carcinoma In Situ (DCIS)  Present     Negative for extensive intraductal component (EIC)   Size (Extent) of DCIS  Estimated size (extent) of DCIS is at least (Millimeters): 50 mm   Architectural Patterns  [place]     Cribriform     Solid   Nuclear Grade  Grade III (high)   Necrosis  Present, central (expansive \"comedo\" necrosis)   Lobular Carcinoma In Situ (LCIS)  Not identified   Lymphatic and / or Vascular Invasion  Not identified   Dermal Lymphatic and / or Vascular Invasion  Not identified   Microcalcifications  Present in DCIS     Present in non-neoplastic tissue   Treatment Effect in the Breast  No known presurgical therapy   MARGINS   Margin Status for Invasive Carcinoma  All margins negative for invasive carcinoma   Distance from Invasive Carcinoma to Closest Margin  6 mm   Closest Margin(s) to Invasive Carcinoma  Anterior   Distance from Invasive Carcinoma to Posterior Margin  17 mm   Distance from Invasive Carcinoma to Superior Margin  85 mm   Distance from Invasive Carcinoma to Inferior Margin  100 mm   Distance from Invasive Carcinoma to Medial Margin  100 mm   Distance from Invasive Carcinoma to Lateral Margin  50 mm   Margin Status for DCIS  All margins negative for DCIS   Distance from DCIS to Closest Margin  11 mm   Closest Margin(s) to DCIS  Anterior   Distance from DCIS to Posterior Margin  15 mm   Distance from DCIS to Superior Margin  60 mm   Distance from DCIS to Inferior Margin  75 mm   Distance from DCIS to Medial Margin  80 mm   Distance from DCIS to Lateral Margin  40 mm   REGIONAL LYMPH NODES   Regional Lymph Node Status  All regional lymph nodes negative for tumor   Total Number of Lymph Nodes Examined (sentinel and non-sentinel)  3   Number of " [place] Nodes Examined  3   pTNM CLASSIFICATION (AJCC 8th Edition)   Reporting of pT, pN, and (when applicable) pM categories is based on information available to the pathologist at the time the report is issued. As per the AJCC (Chapter 1, 8th Ed.) it is the managing physician’s responsibility to establish the final pathologic stage based upon all pertinent information, including but potentially not limited to this pathology report.   pT Category  pT1b   pN Category  pN0   N Suffix  (sn)   SPECIAL STUDIES        Estrogen Receptor (ER) Status  Negative        Progesterone Receptor (PgR) Status  Negative        HER2 (by immunohistochemistry)  Positive (Score 3+)   Percentage of Cells with Uniform Intense Complete Membrane Staining  100 %        Ki-67 Percentage of Positive Nuclei  60 %        Assessment & Plan     cT1cN0 grade 3 ER/NC- her2 +RIGHT BREAST CANCER post biopsy with associated high-grade DCIS ER/NC negative and MRI evidence of non-mass enhancement which would preclude lumpectomy  pT1bN0 ER/NC negative HER2 positive right breast cancer postmastectomy and sentinel node biopsy  Weekly Taxol Herceptin for 12 weeks followed by every 3 to weeks for the rest of the year planned  No radiation-EF adequate  Tolerating Taxol Herceptin well as of 8/16/2024  Worsening maculopapular rash on her arms legs and trunk on 8/30/2024 changed to Abraxane and nonbiosimilar Herceptin  Rash improving after Abraxane discontinued we will move to 3 weekly Herceptin  Tolerating weekly Herceptin well as of 1/3/2025  Tolerating Herceptin well every 3 weeks as of 3/7/2025      2. Rheumatoid arthritis on Simponi for 3 and half years-off treatment during chemo    3.  Negative family history of any malignancy    4.  Anemia: Hgb 10.8 today 7/26/2024- was normal prior to surgery (12.1 on 6/25/2024).  Denies bleeding issues.  Will check ferritin, iron profile, B12, folate.     5.  Hashimoto's thyroiditis    Plan  Proceed with 3-week dose of  Herceptin today.  Herceptin in 3 and 6 weeks   see me in 9 weeks.        Patient is on a high risk medication requiring close monitoring for toxicity.

## 2025-03-11 ENCOUNTER — TELEPHONE (OUTPATIENT)
Dept: CARDIOLOGY | Facility: CLINIC | Age: 70
End: 2025-03-11
Payer: MEDICARE

## 2025-03-11 NOTE — TELEPHONE ENCOUNTER
Please call and see what home blood pressures have been running  Was her home BP cuff found to be accurate at oncology appointment recently?    Thanks

## 2025-03-12 NOTE — TELEPHONE ENCOUNTER
Called and spoke to pt. She said she forgot to take her cuff at the appointment. But she said at the appointment it was 121/62, and when she got home she rechecked it and it was the same.    She is going to put her BP readings in My Chart. Once received I will send them to you. Thanks

## 2025-03-28 ENCOUNTER — INFUSION (OUTPATIENT)
Dept: ONCOLOGY | Facility: HOSPITAL | Age: 70
End: 2025-03-28
Payer: MEDICARE

## 2025-03-28 VITALS
OXYGEN SATURATION: 98 % | SYSTOLIC BLOOD PRESSURE: 168 MMHG | RESPIRATION RATE: 16 BRPM | WEIGHT: 158.6 LBS | TEMPERATURE: 97.8 F | DIASTOLIC BLOOD PRESSURE: 65 MMHG | HEART RATE: 75 BPM | BODY MASS INDEX: 30.97 KG/M2

## 2025-03-28 DIAGNOSIS — Z17.1 MALIGNANT NEOPLASM OF OVERLAPPING SITES OF RIGHT BREAST IN FEMALE, ESTROGEN RECEPTOR NEGATIVE: Primary | ICD-10-CM

## 2025-03-28 DIAGNOSIS — C50.811 MALIGNANT NEOPLASM OF OVERLAPPING SITES OF RIGHT BREAST IN FEMALE, ESTROGEN RECEPTOR NEGATIVE: Primary | ICD-10-CM

## 2025-03-28 LAB
ALBUMIN SERPL-MCNC: 4 G/DL (ref 3.5–5.2)
ALBUMIN/GLOB SERPL: 1.3 G/DL
ALP SERPL-CCNC: 60 U/L (ref 39–117)
ALT SERPL W P-5'-P-CCNC: 22 U/L (ref 1–33)
ANION GAP SERPL CALCULATED.3IONS-SCNC: 12 MMOL/L (ref 5–15)
AST SERPL-CCNC: 26 U/L (ref 1–32)
BASOPHILS # BLD AUTO: 0.03 10*3/MM3 (ref 0–0.2)
BASOPHILS NFR BLD AUTO: 0.6 % (ref 0–1.5)
BILIRUB SERPL-MCNC: 0.2 MG/DL (ref 0–1.2)
BUN SERPL-MCNC: 11 MG/DL (ref 8–23)
BUN/CREAT SERPL: 23.9 (ref 7–25)
CALCIUM SPEC-SCNC: 9.3 MG/DL (ref 8.6–10.5)
CHLORIDE SERPL-SCNC: 103 MMOL/L (ref 98–107)
CO2 SERPL-SCNC: 28 MMOL/L (ref 22–29)
CREAT SERPL-MCNC: 0.46 MG/DL (ref 0.57–1)
DEPRECATED RDW RBC AUTO: 44.7 FL (ref 37–54)
EGFRCR SERPLBLD CKD-EPI 2021: 103.7 ML/MIN/1.73
EOSINOPHIL # BLD AUTO: 0.41 10*3/MM3 (ref 0–0.4)
EOSINOPHIL NFR BLD AUTO: 7.9 % (ref 0.3–6.2)
ERYTHROCYTE [DISTWIDTH] IN BLOOD BY AUTOMATED COUNT: 13.5 % (ref 12.3–15.4)
GLOBULIN UR ELPH-MCNC: 3.1 GM/DL
GLUCOSE SERPL-MCNC: 130 MG/DL (ref 65–99)
HCT VFR BLD AUTO: 34.7 % (ref 34–46.6)
HGB BLD-MCNC: 11.1 G/DL (ref 12–15.9)
IMM GRANULOCYTES # BLD AUTO: 0.01 10*3/MM3 (ref 0–0.05)
IMM GRANULOCYTES NFR BLD AUTO: 0.2 % (ref 0–0.5)
LYMPHOCYTES # BLD AUTO: 1.33 10*3/MM3 (ref 0.7–3.1)
LYMPHOCYTES NFR BLD AUTO: 25.7 % (ref 19.6–45.3)
MCH RBC QN AUTO: 29.2 PG (ref 26.6–33)
MCHC RBC AUTO-ENTMCNC: 32 G/DL (ref 31.5–35.7)
MCV RBC AUTO: 91.3 FL (ref 79–97)
MONOCYTES # BLD AUTO: 0.51 10*3/MM3 (ref 0.1–0.9)
MONOCYTES NFR BLD AUTO: 9.9 % (ref 5–12)
NEUTROPHILS NFR BLD AUTO: 2.88 10*3/MM3 (ref 1.7–7)
NEUTROPHILS NFR BLD AUTO: 55.7 % (ref 42.7–76)
NRBC BLD AUTO-RTO: 0 /100 WBC (ref 0–0.2)
PLATELET # BLD AUTO: 211 10*3/MM3 (ref 140–450)
PMV BLD AUTO: 9.7 FL (ref 6–12)
POTASSIUM SERPL-SCNC: 3.9 MMOL/L (ref 3.5–5.2)
PROT SERPL-MCNC: 7.1 G/DL (ref 6–8.5)
RBC # BLD AUTO: 3.8 10*6/MM3 (ref 3.77–5.28)
SODIUM SERPL-SCNC: 143 MMOL/L (ref 136–145)
WBC NRBC COR # BLD AUTO: 5.17 10*3/MM3 (ref 3.4–10.8)

## 2025-03-28 PROCEDURE — 25810000003 SODIUM CHLORIDE 0.9 % SOLUTION 250 ML FLEX CONT: Performed by: NURSE PRACTITIONER

## 2025-03-28 PROCEDURE — 80053 COMPREHEN METABOLIC PANEL: CPT

## 2025-03-28 PROCEDURE — 63710000001 DIPHENHYDRAMINE PER 50 MG: Performed by: NURSE PRACTITIONER

## 2025-03-28 PROCEDURE — 25010000002 TRASTUZUMAB PER 10 MG: Performed by: NURSE PRACTITIONER

## 2025-03-28 PROCEDURE — 85025 COMPLETE CBC W/AUTO DIFF WBC: CPT

## 2025-03-28 PROCEDURE — 96413 CHEMO IV INFUSION 1 HR: CPT

## 2025-03-28 RX ORDER — DIPHENHYDRAMINE HCL 25 MG
25 CAPSULE ORAL ONCE
Status: CANCELLED | OUTPATIENT
Start: 2025-03-28

## 2025-03-28 RX ORDER — SODIUM CHLORIDE 9 MG/ML
20 INJECTION, SOLUTION INTRAVENOUS ONCE
Status: CANCELLED | OUTPATIENT
Start: 2025-03-28

## 2025-03-28 RX ORDER — DIPHENHYDRAMINE HCL 25 MG
25 CAPSULE ORAL ONCE
Status: COMPLETED | OUTPATIENT
Start: 2025-03-28 | End: 2025-03-28

## 2025-03-28 RX ADMIN — TRASTUZUMAB 420 MG: 150 INJECTION, POWDER, LYOPHILIZED, FOR SOLUTION INTRAVENOUS at 15:58

## 2025-03-28 RX ADMIN — DIPHENHYDRAMINE HYDROCHLORIDE 25 MG: 25 CAPSULE ORAL at 15:45

## 2025-04-10 ENCOUNTER — TELEPHONE (OUTPATIENT)
Dept: INTERNAL MEDICINE | Facility: CLINIC | Age: 70
End: 2025-04-10
Payer: MEDICARE

## 2025-04-10 NOTE — TELEPHONE ENCOUNTER
Received a fax from [facility].  Requesting patient and Patient signature for Immunodeficiency .    Patient does not want this.    I will fax back.

## 2025-04-14 ENCOUNTER — HOSPITAL ENCOUNTER (OUTPATIENT)
Facility: HOSPITAL | Age: 70
Discharge: HOME | End: 2025-04-14
Admitting: OBSTETRICS & GYNECOLOGY
Payer: MEDICARE

## 2025-04-14 DIAGNOSIS — Z12.31 SCREENING MAMMOGRAM FOR BREAST CANCER: ICD-10-CM

## 2025-04-14 PROCEDURE — 77067 SCR MAMMO BI INCL CAD: CPT

## 2025-04-14 PROCEDURE — 77063 BREAST TOMOSYNTHESIS BI: CPT

## 2025-04-18 ENCOUNTER — INFUSION (OUTPATIENT)
Dept: ONCOLOGY | Facility: HOSPITAL | Age: 70
End: 2025-04-18
Payer: MEDICARE

## 2025-04-18 VITALS
WEIGHT: 157.4 LBS | TEMPERATURE: 97.5 F | BODY MASS INDEX: 30.74 KG/M2 | OXYGEN SATURATION: 97 % | HEART RATE: 72 BPM | SYSTOLIC BLOOD PRESSURE: 159 MMHG | RESPIRATION RATE: 16 BRPM | DIASTOLIC BLOOD PRESSURE: 64 MMHG

## 2025-04-18 DIAGNOSIS — Z17.1 MALIGNANT NEOPLASM OF OVERLAPPING SITES OF RIGHT BREAST IN FEMALE, ESTROGEN RECEPTOR NEGATIVE: Primary | ICD-10-CM

## 2025-04-18 DIAGNOSIS — C50.811 MALIGNANT NEOPLASM OF OVERLAPPING SITES OF RIGHT BREAST IN FEMALE, ESTROGEN RECEPTOR NEGATIVE: Primary | ICD-10-CM

## 2025-04-18 LAB
ALBUMIN SERPL-MCNC: 4 G/DL (ref 3.5–5.2)
ALBUMIN/GLOB SERPL: 1.3 G/DL
ALP SERPL-CCNC: 62 U/L (ref 39–117)
ALT SERPL W P-5'-P-CCNC: 25 U/L (ref 1–33)
ANION GAP SERPL CALCULATED.3IONS-SCNC: 10.9 MMOL/L (ref 5–15)
AST SERPL-CCNC: 27 U/L (ref 1–32)
BASOPHILS # BLD AUTO: 0.04 10*3/MM3 (ref 0–0.2)
BASOPHILS NFR BLD AUTO: 0.8 % (ref 0–1.5)
BILIRUB SERPL-MCNC: 0.3 MG/DL (ref 0–1.2)
BUN SERPL-MCNC: 10 MG/DL (ref 8–23)
BUN/CREAT SERPL: 21.3 (ref 7–25)
CALCIUM SPEC-SCNC: 9.4 MG/DL (ref 8.6–10.5)
CHLORIDE SERPL-SCNC: 102 MMOL/L (ref 98–107)
CO2 SERPL-SCNC: 27.1 MMOL/L (ref 22–29)
CREAT SERPL-MCNC: 0.47 MG/DL (ref 0.57–1)
DEPRECATED RDW RBC AUTO: 44.1 FL (ref 37–54)
EGFRCR SERPLBLD CKD-EPI 2021: 103.2 ML/MIN/1.73
EOSINOPHIL # BLD AUTO: 0.44 10*3/MM3 (ref 0–0.4)
EOSINOPHIL NFR BLD AUTO: 9.3 % (ref 0.3–6.2)
ERYTHROCYTE [DISTWIDTH] IN BLOOD BY AUTOMATED COUNT: 13.2 % (ref 12.3–15.4)
GLOBULIN UR ELPH-MCNC: 3 GM/DL
GLUCOSE SERPL-MCNC: 84 MG/DL (ref 65–99)
HCT VFR BLD AUTO: 35.3 % (ref 34–46.6)
HGB BLD-MCNC: 11.5 G/DL (ref 12–15.9)
IMM GRANULOCYTES # BLD AUTO: 0.02 10*3/MM3 (ref 0–0.05)
IMM GRANULOCYTES NFR BLD AUTO: 0.4 % (ref 0–0.5)
LYMPHOCYTES # BLD AUTO: 1.4 10*3/MM3 (ref 0.7–3.1)
LYMPHOCYTES NFR BLD AUTO: 29.7 % (ref 19.6–45.3)
MCH RBC QN AUTO: 30 PG (ref 26.6–33)
MCHC RBC AUTO-ENTMCNC: 32.6 G/DL (ref 31.5–35.7)
MCV RBC AUTO: 92.2 FL (ref 79–97)
MONOCYTES # BLD AUTO: 0.45 10*3/MM3 (ref 0.1–0.9)
MONOCYTES NFR BLD AUTO: 9.6 % (ref 5–12)
NEUTROPHILS NFR BLD AUTO: 2.36 10*3/MM3 (ref 1.7–7)
NEUTROPHILS NFR BLD AUTO: 50.2 % (ref 42.7–76)
NRBC BLD AUTO-RTO: 0 /100 WBC (ref 0–0.2)
PLATELET # BLD AUTO: 238 10*3/MM3 (ref 140–450)
PMV BLD AUTO: 10.2 FL (ref 6–12)
POTASSIUM SERPL-SCNC: 3.7 MMOL/L (ref 3.5–5.2)
PROT SERPL-MCNC: 7 G/DL (ref 6–8.5)
RBC # BLD AUTO: 3.83 10*6/MM3 (ref 3.77–5.28)
SODIUM SERPL-SCNC: 140 MMOL/L (ref 136–145)
WBC NRBC COR # BLD AUTO: 4.71 10*3/MM3 (ref 3.4–10.8)

## 2025-04-18 PROCEDURE — 96413 CHEMO IV INFUSION 1 HR: CPT

## 2025-04-18 PROCEDURE — 80053 COMPREHEN METABOLIC PANEL: CPT

## 2025-04-18 PROCEDURE — 85025 COMPLETE CBC W/AUTO DIFF WBC: CPT

## 2025-04-18 PROCEDURE — 25010000002 TRASTUZUMAB PER 10 MG: Performed by: NURSE PRACTITIONER

## 2025-04-18 PROCEDURE — 25810000003 SODIUM CHLORIDE 0.9 % SOLUTION 250 ML FLEX CONT: Performed by: NURSE PRACTITIONER

## 2025-04-18 PROCEDURE — 63710000001 DIPHENHYDRAMINE PER 50 MG: Performed by: NURSE PRACTITIONER

## 2025-04-18 RX ORDER — DIPHENHYDRAMINE HCL 25 MG
25 CAPSULE ORAL ONCE
Status: COMPLETED | OUTPATIENT
Start: 2025-04-18 | End: 2025-04-18

## 2025-04-18 RX ORDER — SODIUM CHLORIDE 9 MG/ML
20 INJECTION, SOLUTION INTRAVENOUS ONCE
Status: DISCONTINUED | OUTPATIENT
Start: 2025-04-18 | End: 2025-04-18 | Stop reason: HOSPADM

## 2025-04-18 RX ADMIN — TRASTUZUMAB 420 MG: 150 INJECTION, POWDER, LYOPHILIZED, FOR SOLUTION INTRAVENOUS at 15:50

## 2025-04-18 RX ADMIN — DIPHENHYDRAMINE HYDROCHLORIDE 25 MG: 25 CAPSULE ORAL at 15:34

## 2025-05-07 ENCOUNTER — HOSPITAL ENCOUNTER (OUTPATIENT)
Dept: CARDIOLOGY | Facility: HOSPITAL | Age: 70
Discharge: HOME | End: 2025-05-07
Admitting: FAMILY MEDICINE
Payer: MEDICARE

## 2025-05-07 VITALS
HEART RATE: 70 BPM | BODY MASS INDEX: 30.82 KG/M2 | HEIGHT: 60 IN | SYSTOLIC BLOOD PRESSURE: 173 MMHG | DIASTOLIC BLOOD PRESSURE: 71 MMHG | WEIGHT: 157 LBS

## 2025-05-07 DIAGNOSIS — Z17.1 MALIGNANT NEOPLASM OF OVERLAPPING SITES OF RIGHT BREAST IN FEMALE, ESTROGEN RECEPTOR NEGATIVE: ICD-10-CM

## 2025-05-07 DIAGNOSIS — R94.31 ABNORMAL ELECTROCARDIOGRAM (ECG) (EKG): ICD-10-CM

## 2025-05-07 DIAGNOSIS — C50.811 MALIGNANT NEOPLASM OF OVERLAPPING SITES OF RIGHT BREAST IN FEMALE, ESTROGEN RECEPTOR NEGATIVE: ICD-10-CM

## 2025-05-07 LAB
AORTIC DIMENSIONLESS INDEX: 0.41 (DI)
ASCENDING AORTA: 2.9 CM
AV MEAN PRESS GRAD SYS DOP V1V2: 11 MMHG
AV VMAX SYS DOP: 223 CM/SEC
BH CV ECHO LEFT VENTRICLE GLOBAL LONGITUDINAL STRAIN: -23.2 %
BH CV ECHO MEAS - ACS: 1.18 CM
BH CV ECHO MEAS - AO MAX PG: 19.9 MMHG
BH CV ECHO MEAS - AO ROOT AREA (BSA CORRECTED): 1.7 CM2
BH CV ECHO MEAS - AO ROOT DIAM: 2.6 CM
BH CV ECHO MEAS - AO V2 VTI: 54.5 CM
BH CV ECHO MEAS - AVA(I,D): 1.24 CM2
BH CV ECHO MEAS - EDV(CUBED): 85.2 ML
BH CV ECHO MEAS - EDV(MOD-SP2): 91 ML
BH CV ECHO MEAS - EDV(MOD-SP4): 98 ML
BH CV ECHO MEAS - EF(MOD-SP2): 63.7 %
BH CV ECHO MEAS - EF(MOD-SP4): 63.3 %
BH CV ECHO MEAS - ESV(MOD-SP2): 33 ML
BH CV ECHO MEAS - ESV(MOD-SP4): 36 ML
BH CV ECHO MEAS - FS: 36.2 %
BH CV ECHO MEAS - IVS/LVPW: 1 CM
BH CV ECHO MEAS - IVSD: 0.8 CM
BH CV ECHO MEAS - LAT PEAK E' VEL: 12.5 CM/SEC
BH CV ECHO MEAS - LV DIASTOLIC VOL/BSA (35-75): 58.2 CM2
BH CV ECHO MEAS - LV MASS(C)D: 109.4 GRAMS
BH CV ECHO MEAS - LV MAX PG: 3.2 MMHG
BH CV ECHO MEAS - LV MEAN PG: 2 MMHG
BH CV ECHO MEAS - LV SYSTOLIC VOL/BSA (12-30): 21.4 CM2
BH CV ECHO MEAS - LV V1 MAX: 89.1 CM/SEC
BH CV ECHO MEAS - LV V1 VTI: 22.4 CM
BH CV ECHO MEAS - LVIDD: 4.4 CM
BH CV ECHO MEAS - LVIDS: 2.8 CM
BH CV ECHO MEAS - LVOT AREA: 3 CM2
BH CV ECHO MEAS - LVOT DIAM: 1.96 CM
BH CV ECHO MEAS - LVPWD: 0.8 CM
BH CV ECHO MEAS - MED PEAK E' VEL: 6.4 CM/SEC
BH CV ECHO MEAS - MV A DUR: 0.16 SEC
BH CV ECHO MEAS - MV A MAX VEL: 99.6 CM/SEC
BH CV ECHO MEAS - MV DEC TIME: 0.22 SEC
BH CV ECHO MEAS - MV E MAX VEL: 97.5 CM/SEC
BH CV ECHO MEAS - MV E/A: 0.98
BH CV ECHO MEAS - PA V2 MAX: 102.7 CM/SEC
BH CV ECHO MEAS - PULM A REVS DUR: 0.13 SEC
BH CV ECHO MEAS - PULM A REVS VEL: 26.6 CM/SEC
BH CV ECHO MEAS - PULM DIAS VEL: 51.2 CM/SEC
BH CV ECHO MEAS - PULM S/D: 0.92
BH CV ECHO MEAS - PULM SYS VEL: 47.3 CM/SEC
BH CV ECHO MEAS - QP/QS: 0.78
BH CV ECHO MEAS - RAP SYSTOLE: 3 MMHG
BH CV ECHO MEAS - RV MAX PG: 2.18 MMHG
BH CV ECHO MEAS - RV V1 MAX: 73.9 CM/SEC
BH CV ECHO MEAS - RV V1 VTI: 17.1 CM
BH CV ECHO MEAS - RVOT DIAM: 1.97 CM
BH CV ECHO MEAS - SV(LVOT): 67.5 ML
BH CV ECHO MEAS - SV(MOD-SP2): 58 ML
BH CV ECHO MEAS - SV(MOD-SP4): 62 ML
BH CV ECHO MEAS - SV(RVOT): 52.4 ML
BH CV ECHO MEAS - SVI(LVOT): 40.1 ML/M2
BH CV ECHO MEAS - SVI(MOD-SP2): 34.4 ML/M2
BH CV ECHO MEAS - SVI(MOD-SP4): 36.8 ML/M2
BH CV ECHO MEAS - TAPSE (>1.6): 1.97 CM
BH CV ECHO MEASUREMENTS AVERAGE E/E' RATIO: 10.32
BH CV XLRA - RV BASE: 3.7 CM
BH CV XLRA - RV LENGTH: 6.9 CM
BH CV XLRA - RV MID: 2.6 CM
BH CV XLRA - TDI S': 11.6 CM/SEC
LEFT ATRIUM VOLUME INDEX: 25.4 ML/M2
LV EF 3D SEGMENTATION: 64 %
LV EF BIPLANE MOD: 63 %
SINUS: 2.9 CM
STJ: 2.16 CM

## 2025-05-07 PROCEDURE — 93306 TTE W/DOPPLER COMPLETE: CPT

## 2025-05-07 PROCEDURE — 25510000001 PERFLUTREN 6.52 MG/ML SUSPENSION 2 ML VIAL: Performed by: FAMILY MEDICINE

## 2025-05-07 PROCEDURE — 93356 MYOCRD STRAIN IMG SPCKL TRCK: CPT

## 2025-05-07 RX ADMIN — PERFLUTREN 1 ML: 6.52 INJECTION, SUSPENSION INTRAVENOUS at 07:52

## 2025-05-09 ENCOUNTER — INFUSION (OUTPATIENT)
Dept: ONCOLOGY | Facility: HOSPITAL | Age: 70
End: 2025-05-09
Payer: MEDICARE

## 2025-05-09 ENCOUNTER — OFFICE VISIT (OUTPATIENT)
Dept: ONCOLOGY | Facility: CLINIC | Age: 70
End: 2025-05-09
Payer: MEDICARE

## 2025-05-09 VITALS
HEIGHT: 60 IN | RESPIRATION RATE: 17 BRPM | OXYGEN SATURATION: 99 % | HEART RATE: 72 BPM | BODY MASS INDEX: 31.33 KG/M2 | WEIGHT: 159.6 LBS | TEMPERATURE: 97.4 F | DIASTOLIC BLOOD PRESSURE: 73 MMHG | SYSTOLIC BLOOD PRESSURE: 136 MMHG

## 2025-05-09 DIAGNOSIS — C50.811 MALIGNANT NEOPLASM OF OVERLAPPING SITES OF RIGHT BREAST IN FEMALE, ESTROGEN RECEPTOR NEGATIVE: ICD-10-CM

## 2025-05-09 DIAGNOSIS — Z17.1 MALIGNANT NEOPLASM OF OVERLAPPING SITES OF RIGHT BREAST IN FEMALE, ESTROGEN RECEPTOR NEGATIVE: ICD-10-CM

## 2025-05-09 DIAGNOSIS — Z45.2 FITTING AND ADJUSTMENT OF VASCULAR CATHETER: Primary | ICD-10-CM

## 2025-05-09 DIAGNOSIS — C50.811 MALIGNANT NEOPLASM OF OVERLAPPING SITES OF RIGHT BREAST IN FEMALE, ESTROGEN RECEPTOR NEGATIVE: Primary | ICD-10-CM

## 2025-05-09 DIAGNOSIS — Z17.1 MALIGNANT NEOPLASM OF OVERLAPPING SITES OF RIGHT BREAST IN FEMALE, ESTROGEN RECEPTOR NEGATIVE: Primary | ICD-10-CM

## 2025-05-09 LAB
ALBUMIN SERPL-MCNC: 4.4 G/DL (ref 3.5–5.2)
ALBUMIN/GLOB SERPL: 1.5 G/DL
ALP SERPL-CCNC: 68 U/L (ref 39–117)
ALT SERPL W P-5'-P-CCNC: 29 U/L (ref 1–33)
ANION GAP SERPL CALCULATED.3IONS-SCNC: 13.8 MMOL/L (ref 5–15)
AST SERPL-CCNC: 29 U/L (ref 1–32)
BASOPHILS # BLD AUTO: 0.02 10*3/MM3 (ref 0–0.2)
BASOPHILS NFR BLD AUTO: 0.5 % (ref 0–1.5)
BILIRUB SERPL-MCNC: 0.2 MG/DL (ref 0–1.2)
BUN SERPL-MCNC: 12 MG/DL (ref 8–23)
BUN/CREAT SERPL: 24 (ref 7–25)
CALCIUM SPEC-SCNC: 9.8 MG/DL (ref 8.6–10.5)
CHLORIDE SERPL-SCNC: 100 MMOL/L (ref 98–107)
CO2 SERPL-SCNC: 26.2 MMOL/L (ref 22–29)
CREAT SERPL-MCNC: 0.5 MG/DL (ref 0.57–1)
DEPRECATED RDW RBC AUTO: 43.8 FL (ref 37–54)
EGFRCR SERPLBLD CKD-EPI 2021: 101.7 ML/MIN/1.73
EOSINOPHIL # BLD AUTO: 0.44 10*3/MM3 (ref 0–0.4)
EOSINOPHIL NFR BLD AUTO: 10.1 % (ref 0.3–6.2)
ERYTHROCYTE [DISTWIDTH] IN BLOOD BY AUTOMATED COUNT: 12.9 % (ref 12.3–15.4)
GLOBULIN UR ELPH-MCNC: 3 GM/DL
GLUCOSE SERPL-MCNC: 188 MG/DL (ref 65–99)
HCT VFR BLD AUTO: 36.2 % (ref 34–46.6)
HGB BLD-MCNC: 11.3 G/DL (ref 12–15.9)
IMM GRANULOCYTES # BLD AUTO: 0.01 10*3/MM3 (ref 0–0.05)
IMM GRANULOCYTES NFR BLD AUTO: 0.2 % (ref 0–0.5)
LYMPHOCYTES # BLD AUTO: 1.16 10*3/MM3 (ref 0.7–3.1)
LYMPHOCYTES NFR BLD AUTO: 26.6 % (ref 19.6–45.3)
MCH RBC QN AUTO: 29 PG (ref 26.6–33)
MCHC RBC AUTO-ENTMCNC: 31.2 G/DL (ref 31.5–35.7)
MCV RBC AUTO: 92.8 FL (ref 79–97)
MONOCYTES # BLD AUTO: 0.34 10*3/MM3 (ref 0.1–0.9)
MONOCYTES NFR BLD AUTO: 7.8 % (ref 5–12)
NEUTROPHILS NFR BLD AUTO: 2.39 10*3/MM3 (ref 1.7–7)
NEUTROPHILS NFR BLD AUTO: 54.8 % (ref 42.7–76)
NRBC BLD AUTO-RTO: 0 /100 WBC (ref 0–0.2)
PLATELET # BLD AUTO: 208 10*3/MM3 (ref 140–450)
PMV BLD AUTO: 10 FL (ref 6–12)
POTASSIUM SERPL-SCNC: 3.7 MMOL/L (ref 3.5–5.2)
PROT SERPL-MCNC: 7.4 G/DL (ref 6–8.5)
RBC # BLD AUTO: 3.9 10*6/MM3 (ref 3.77–5.28)
SODIUM SERPL-SCNC: 140 MMOL/L (ref 136–145)
WBC NRBC COR # BLD AUTO: 4.36 10*3/MM3 (ref 3.4–10.8)

## 2025-05-09 PROCEDURE — 63710000001 DIPHENHYDRAMINE PER 50 MG: Performed by: INTERNAL MEDICINE

## 2025-05-09 PROCEDURE — 96413 CHEMO IV INFUSION 1 HR: CPT

## 2025-05-09 PROCEDURE — 80053 COMPREHEN METABOLIC PANEL: CPT

## 2025-05-09 PROCEDURE — 25010000002 TRASTUZUMAB PER 10 MG: Performed by: INTERNAL MEDICINE

## 2025-05-09 PROCEDURE — 85025 COMPLETE CBC W/AUTO DIFF WBC: CPT

## 2025-05-09 PROCEDURE — 25010000002 HEPARIN LOCK FLUSH PER 10 UNITS: Performed by: INTERNAL MEDICINE

## 2025-05-09 PROCEDURE — 25810000003 SODIUM CHLORIDE 0.9 % SOLUTION 250 ML FLEX CONT: Performed by: INTERNAL MEDICINE

## 2025-05-09 RX ORDER — DIPHENHYDRAMINE HCL 25 MG
25 CAPSULE ORAL ONCE
OUTPATIENT
Start: 2025-07-11

## 2025-05-09 RX ORDER — DIPHENHYDRAMINE HCL 25 MG
25 CAPSULE ORAL ONCE
Status: CANCELLED | OUTPATIENT
Start: 2025-05-09

## 2025-05-09 RX ORDER — SODIUM CHLORIDE 9 MG/ML
20 INJECTION, SOLUTION INTRAVENOUS ONCE
Status: CANCELLED | OUTPATIENT
Start: 2025-05-09

## 2025-05-09 RX ORDER — DIPHENHYDRAMINE HCL 25 MG
25 CAPSULE ORAL ONCE
Status: COMPLETED | OUTPATIENT
Start: 2025-05-09 | End: 2025-05-09

## 2025-05-09 RX ORDER — HEPARIN SODIUM (PORCINE) LOCK FLUSH IV SOLN 100 UNIT/ML 100 UNIT/ML
500 SOLUTION INTRAVENOUS AS NEEDED
Status: DISCONTINUED | OUTPATIENT
Start: 2025-05-09 | End: 2025-05-09 | Stop reason: HOSPADM

## 2025-05-09 RX ORDER — SODIUM CHLORIDE 9 MG/ML
20 INJECTION, SOLUTION INTRAVENOUS ONCE
OUTPATIENT
Start: 2025-07-11

## 2025-05-09 RX ORDER — DIPHENHYDRAMINE HCL 25 MG
25 CAPSULE ORAL ONCE
OUTPATIENT
Start: 2025-05-30

## 2025-05-09 RX ORDER — DIPHENHYDRAMINE HCL 25 MG
25 CAPSULE ORAL ONCE
OUTPATIENT
Start: 2025-06-20

## 2025-05-09 RX ORDER — SODIUM CHLORIDE 9 MG/ML
20 INJECTION, SOLUTION INTRAVENOUS ONCE
OUTPATIENT
Start: 2025-05-30

## 2025-05-09 RX ORDER — SODIUM CHLORIDE 0.9 % (FLUSH) 0.9 %
10 SYRINGE (ML) INJECTION AS NEEDED
Status: DISCONTINUED | OUTPATIENT
Start: 2025-05-09 | End: 2025-05-09 | Stop reason: HOSPADM

## 2025-05-09 RX ORDER — SODIUM CHLORIDE 9 MG/ML
20 INJECTION, SOLUTION INTRAVENOUS ONCE
Status: DISCONTINUED | OUTPATIENT
Start: 2025-05-09 | End: 2025-05-09 | Stop reason: HOSPADM

## 2025-05-09 RX ORDER — SODIUM CHLORIDE 9 MG/ML
20 INJECTION, SOLUTION INTRAVENOUS ONCE
OUTPATIENT
Start: 2025-06-20

## 2025-05-09 RX ORDER — HEPARIN SODIUM (PORCINE) LOCK FLUSH IV SOLN 100 UNIT/ML 100 UNIT/ML
500 SOLUTION INTRAVENOUS AS NEEDED
OUTPATIENT
Start: 2025-05-09

## 2025-05-09 RX ORDER — SODIUM CHLORIDE 0.9 % (FLUSH) 0.9 %
10 SYRINGE (ML) INJECTION AS NEEDED
OUTPATIENT
Start: 2025-05-09

## 2025-05-09 RX ADMIN — Medication 500 UNITS: at 10:17

## 2025-05-09 RX ADMIN — TRASTUZUMAB 420 MG: 150 INJECTION, POWDER, LYOPHILIZED, FOR SOLUTION INTRAVENOUS at 09:42

## 2025-05-09 RX ADMIN — DIPHENHYDRAMINE HYDROCHLORIDE 25 MG: 25 CAPSULE ORAL at 09:23

## 2025-05-09 RX ADMIN — Medication 10 ML: at 10:16

## 2025-05-09 NOTE — PROGRESS NOTES
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    Since she stopped her Taxol her rash is much better and the dermatologist biopsied it and this is probably a drug rash and I think we can move to every 3-week Herceptin dosing because this does not seem to be the culprit for her rash    We did genetic testing that shows a VUS in the NF1 gene which is not significant    She does not need radiation or hormonal blockade because she had a mastectomy and was ER/SC negative    Echocardiogram was done 4525 showing ejection fraction of 63%     Her rheumatoid arthritis symptoms are slowly resuming after stopping the Taxol and I suspect she will be back on the Simponi by the summer      Past Medical History:   Diagnosis Date    Abnormal liver function tests 02/09/2017    Description: 2000, \"-\"hepatitis profile    Anemia     Anesthesia complication     SLOW TO WAKE UP WITH HYSTERECTOMY    Arthritis     Breast cancer May 2024    Cancer 5/16/24    Right Breast    Cholesterol blood reduced     Colon polyps     Cystocele, lateral 10/15/2020    Cystocele, midline 10/15/2020    Diabetes mellitus     Frequent UTI     H/O bone density study 10/2010    normal    H/O cardiovascular stress test 2001    H/O echocardiogram 03/2001    2-D    Hashimoto's thyroiditis     Heart murmur     Hyperkalemia 05/16/2019    Hyperlipidemia     Hypertension     Hypothyroidism 2018    Incompetence of pubocervical tissue 10/15/2020    Incompetence of rectovaginal tissue 10/15/2020    " Insulin dependent diabetes mellitus     Limited joint range of motion     LEFT KNEE    Loss of perineal body, female 10/15/2020    Osteoarthritis of knee 2005    left    Pelvic relaxation due to vaginal prolapse 2018    Plantar warts     foot    Postmenopausal bleeding 2019    Pregnancy      twins x1    Psoriasis     RA (rheumatoid arthritis)     Rectocele 10/15/2020    Urinary tract infection     Urine incontinence     BETTER WITH SLING    Uterovaginal prolapse     Uterovaginal prolapse, incomplete 2020    Vaginal enterocele 10/15/2020        Past Surgical History:   Procedure Laterality Date    BREAST BIOPSY Right     benign    COLONOSCOPY      COLONOSCOPY W/ POLYPECTOMY  2018    Dr Resendiz, 6 mm rectal polyp - hyperplastic    JOINT MANIPULATION Left 2021    Procedure: KNEE MANIPULATION with steroid injection;  Surgeon: Francis Tavarez MD;  Location: [facility];  Service: Orthopedics;  Laterality: Left;    JOINT REPLACEMENT  08/10/2021    LYMPH NODE BIOPSY  2024    MASTECTOMY W/ SENTINEL NODE BIOPSY Right 2024    Procedure: RIGHT MASTECTOMY WITH SENTINEL NODE BIOPSY;  Surgeon: Aracely Frye MD;  Location: [facility];  Service: General;  Laterality: Right;    PUBOVAGINAL SLING N/A 10/16/2020    Procedure: Pubovaginal sling Posterior colporrhaphy fixation Insertion of vaginal grafts Cystourethroscopy;  Surgeon: Karen Allen MD;  Location: [facility];  Service: Gynecology;  Laterality: N/A;    TOTAL KNEE ARTHROPLASTY Left 08/10/2021    Procedure: TOTAL KNEE ARTHROPLASTY;  Surgeon: Francis Tavarez MD;  Location: [facility];  Service: Orthopedics;  Laterality: Left;    TOTAL LAPAROSCOPIC HYSTERECTOMY, SACROCOLPOPEXY N/A 10/16/2020    Procedure: Laparoscopic uterosacral ligament colpopexy sacral colpopexy  Laparoscopic paravaginal repair Laparoscopic hysterectomy with bilateral salpingectomy  Laparoscopic abdominal enterocele repair;  Surgeon:  Karen Allen MD;  Location: [facility];  Service: Gynecology;  Laterality: N/A;    TUBAL ABDOMINAL LIGATION      VENOUS ACCESS DEVICE (PORT) INSERTION N/A 2024    Procedure: INSERTION OF PORTACATH;  Surgeon: Aracely Frye MD;  Location: [facility] MAIN OR;  Service: General;  Laterality: N/A;   Oncologic history  patient is a 68-year-old female with 4-year history of rheumatoid arthritis on Simponi who was found on her most recent screening mammogram have an abnormality in the right breast with a 10 mm oval mass at the 11:00 and an 11 mm mass at 12:00.  His abnormalities persisted and diagnostic imaging and the patient underwent biopsy of both these areas.  The area at 12:00 was DCIS high-grade ER/CA negative and the lesion at 11:00 was grade 3 invasive ductal carcinoma measuring 4.8 mm ER/CA negative HER2 3+    Patient was referred to Dr. Aracely Frye and underwent bilateral screening mammograms which showed large hematomas in both biopsy sites with a residual 1.1 x 1.1 cm mass at 11:00 and 12:00 was large hematoma with a biopsy cavity and at 9:00 there was another 1.2 x 1.4 cm mass which was suspicious and then extending from 90 to o'clock there was non-mass enhancement measuring 4.7 x 4.7 centimeters encompassing the 2 biopsy sites and the 9:00 mass    Patient has been referred to discuss treatment but the plan is to proceed with surgery first as masses were nonpalpable and small and initial imaging although now with a large postbiopsy hematoma was palpation is unreliable    Patient is  2 para 3 first childbirth was at age 22 she did not breast-feed menarche was at age 11 menopause 50.  She has been on no post menopausal hormone replacement    Family history is completely negative for any malignancy and no genetic testing was done    She is not a smoker or drinker  She has not had a DVT MI or stroke    She has been on Simponi for her rheumatoid arthritis her last dose was in April and she is going  to skip the June dose because surgery is scheduled    Her last bone density in 2018 was normal    No history of unusual bleeding with her hysterectomy or knee replacement    We discussed the natural history of HER2 positive breast cancer I told her that unless the tumor was bigger than we had anticipated on imaging we will plan for surgery first followed by Taxol Herceptin for 12 weeks and then Herceptin for the rest of the year.  If we have unexpected findings at surgery we would tailor the treatment to be most appropriate for her but she knows she will have some chemo at any rate.  I have asked her to have an echocardiogram done and we will see her back 2 to 3 weeks after surgery to review the final path and make a treatment decision    7/3/2024 right breast skin sparing mastectomy by Dr. Frye-pathology showing invasive mammary carcinoma (invasive ductal carcinoma) [place] histological score 3.  Tumor in the upper outer quadrant at 11:00 measuring 10 x 10 x 10 mm.  Associated high-grade ductal carcinoma in situ, solid, cribriform and comedo types involving adenosis and fibroadenomatoid change with central comedonecrosis and associated microcalcifications.  DCIS spans 15 mm extending from the upper outer quadrant and to the lower outer quadrant.  All margins are free of invasive carcinoma and DCIS.  No LVSI, posterior skeletal muscle free of tumor.  ER/KS negative, HER2 3+ positive, Ki-67 60%.  Pathologic grade pT1b, N (SN) 0.  [place] lymph nodes 3 lymph nodes removed were negative for metastatic carcinoma.      Treatment recommended with weekly Taxol/ Herceptin X 12 followed by Herceptin every 3 weeks x 1 year.     Current Outpatient Medications on File Prior to Visit   Medication Sig Dispense Refill    BD PEN NEEDLE SEUN U/F 32G X 4 MM misc USE 1 PEN NEEDLE WITH EACH  VICTOZA INJECTION 100 each 1    Calcium Carbonate-Vit D-Min (CALCIUM 1200 PO) Take 1 tablet by mouth Daily. PT HOLDING FOR SURGERY       carvedilol (COREG) 12.5 MG tablet Take 1 tablet by mouth Every Morning. (Patient taking differently: Take 1 tablet by mouth Every Morning. Two tablets twice a day)      Cinnamon 500 MG tablet Take 1,000 mg by mouth 2 (two) times a day. PT HOLDING FOR SURGERY      Coenzyme Q10 (CO Q 10 PO) Take 1 capsule by mouth Daily. PT HOLDING FOR SURGERY      CRANBERRY-VITAMIN C-D MANNOSE PO Take 2 capsules by mouth Daily. PT HOLDING FOR SURGERY      glucose blood (OneTouch Verio) test strip Use as directed to CHECK BLOOD GLUCOSE ONCE DAILY. 100 each 3    hydrocortisone 2.5 % cream Apply 1 Application topically to the appropriate area as directed 2 (Two) Times a Day. 3.5 g 0    levothyroxine (SYNTHROID, LEVOTHROID) 88 MCG tablet TAKE 1 TABLET BY MOUTH DAILY 90 tablet 3    lidocaine-prilocaine (EMLA) 2.5-2.5 % cream Apply 1 Application topically to the appropriate area as directed As Needed for Mild Pain.      lidocaine-prilocaine (EMLA) 2.5-2.5 % cream Apply nickel-sized amount over Mediport site 30 min prior to access. Do not rub in. 30 g 3    Liraglutide (VICTOZA) 18 MG/3ML solution pen-injector injection DIAL AND INJECT UNDER THE SKIN 1.2 MG DAILY 6 mL 5    lisinopril (PRINIVIL,ZESTRIL) 20 MG tablet TAKE 1 TABLET BY MOUTH DAILY 90 tablet 3    lisinopril-hydrochlorothiazide (PRINZIDE,ZESTORETIC) 20-25 MG per tablet TAKE 1 TABLET BY MOUTH DAILY 90 tablet 3    loratadine (Claritin) 10 MG tablet Take 1 tablet by mouth Every Night.      metFORMIN (GLUCOPHAGE) 500 MG tablet TAKE THREE TABLETS BY MOUTH EVERY MORNING AND THEN TAKE TWO TABLETS BY MOUTH IN THE EVENING (Patient taking differently: Take 3 tablets by mouth Daily With Breakfast. TAKE THREE TABLETS BY MOUTH EVERY MORNING AND THEN TAKE TWO TABLETS BY MOUTH IN THE EVENING) 450 tablet 3    multivitamin with minerals tablet tablet Take 1 tablet by mouth Daily. PT HOLDING FOR SURGERY      Omega-3 Fatty Acids (fish oil) 1200 MG capsule capsule Take 1 capsule by mouth Daily. PT  HOLDING FOR SURGERY      rosuvastatin (CRESTOR) 20 MG tablet TAKE 1 TABLET BY MOUTH DAILY 90 tablet 1    TURMERIC PO Take 1,400 mg by mouth Daily. PT HOLDING FOR SURGERY      vitamin C (ASCORBIC ACID) 250 MG tablet Take 4 tablets by mouth Daily.      vitamin D3 125 MCG (5000 UT) capsule capsule Take 1 capsule by mouth Daily. PT HOLDING FOR SURGERY      VITAMIN E PO Take 1 capsule by mouth Daily. HOLD PRIOR TO SURG      Zinc 50 MG tablet Take 1 tablet by mouth Daily. PT HOLDING FOR SURGERY      carvedilol (COREG) 25 MG tablet Take 1 tablet by mouth Every Night. (Patient not taking: Reported on 5/9/2025) 90 tablet 0     No current facility-administered medications on file prior to visit.        ALLERGIES:  No Known Allergies     Social History     Socioeconomic History    Marital status:      Spouse name: SUZANNE     Number of children: 3   Tobacco Use    Smoking status: Never     Passive exposure: Never    Smokeless tobacco: Never    Tobacco comments:     Caffeine: 1 drink daily   Vaping Use    Vaping status: Never Used   Substance and Sexual Activity    Alcohol use: Never    Drug use: Never    Sexual activity: Yes     Partners: Male     Birth control/protection: Post-menopausal, Tubal ligation, Hysterectomy, Partner of same sex, Surgical     Comment: SPOUSE = SUZANNE         Family History   Problem Relation Age of Onset    COPD Mother         Passed away 1988    Heart disease Father     Hypertension Father     Diabetes Brother         Passed away 2012    Diabetes Brother     COPD Brother     Hypertension Brother         Copd    Heart attack Brother     Diabetes Brother     Heart disease Brother     Hypertension Brother     Heart disease Maternal Grandmother     Thyroid disease Daughter     Hypothyroidism Daughter     BRCA 1/2 Neg Hx     Breast cancer Neg Hx     Colon cancer Neg Hx     Endometrial cancer Neg Hx     Ovarian cancer Neg Hx     Malig Hyperthermia Neg Hx         Review of Systems   Musculoskeletal:   "Positive for arthralgias.   Skin:  Positive for rash.        Objective     Vitals:    05/09/25 0838   BP: 136/73   Pulse: 72   Resp: 17   Temp: 97.4 °F (36.3 °C)   TempSrc: Oral   SpO2: 99%   Weight: 72.4 kg (159 lb 9.6 oz)   Height: 152.1 cm (59.88\")   PainSc: 0-No pain             5/9/2025     8:36 AM   Current Status   ECOG score 0       Physical Exam   CONSTITUTIONAL:  Vital signs reviewed.  No distress, looks comfortable.  EYES:  Conjunctivae and lids unremarkable.  PERRLA  EARS,NOSE,MOUTH,THROAT:  Ears and nose appear unremarkable.  Lips, teeth, gums appear unremarkable.  RESPIRATORY:  Normal respiratory effort.  Lungs clear to auscultation bilaterally.  BREASTS: Left breast is benign the right breast no definite palpable masses or axillary adenopathy  CARDIOVASCULAR:  Normal S1, S2.  3/6 ESM\ no  rubs or gallops.  No significant lower extremity edema.  GASTROINTESTINAL: Abdomen appears unremarkable.  Nontender.  No hepatomegaly.  No splenomegaly.  LYMPHATIC:  No cervical, supraclavicular, axillary lymphadenopathy.  SKIN:  Warm.  Erythematous macules on her arms and legs much improved   PSYCHIATRIC:  Normal judgment and insight.  Normal mood and affect.  I have reexamined the patient and the results are consistent with the previously documented exam. Ashkan Belcher MD       RECENT LABS:  Hematology WBC   Date Value Ref Range Status   05/09/2025 4.36 3.40 - 10.80 10*3/mm3 Final   06/10/2024 4.43 3.40 - 10.80 10*3/mm3 Final     RBC   Date Value Ref Range Status   05/09/2025 3.90 3.77 - 5.28 10*6/mm3 Final   06/10/2024 4.10 3.77 - 5.28 10*6/mm3 Final     Hemoglobin   Date Value Ref Range Status   05/09/2025 11.3 (L) 12.0 - 15.9 g/dL Final     Hematocrit   Date Value Ref Range Status   05/09/2025 36.2 34.0 - 46.6 % Final     Platelets   Date Value Ref Range Status   05/09/2025 208 140 - 450 10*3/mm3 Final          ynoptic Checklist   DCIS OF THE BREAST: Biopsy   Protocol posted: 9/20/2023DCIS OF THE BREAST: " "BIOPSY - All Specimens  SPECIMEN   Procedure  Needle biopsy   Specimen Laterality  Right   TUMOR   Tumor Site  Clock position     12 o'clock   Histologic Type  Ductal carcinoma in situ (DCIS)   Architectural Patterns  Solid   Nuclear Grade  Grade III (high)   Necrosis  Present, central (expansive \"comedo\" necrosis)   Microcalcifications  Present in DCIS   .   Breast Biomarker Reporting Template   Protocol posted: 12/13/2023BREAST BIOMARKER REPORTING TEMPLATE - All Specimens  Test(s) Performed     Estrogen Receptor (ER) Status  Negative (less than 1%)     Internal control cells present and stain as expected   Test Type  Food and Drug Administration (FDA) cleared (test / vendor): Ventena   Primary Antibody  SP1   Scoring System  Harrison   Proportion Score  0   Intensity Score  0   Total Harrison Score  0   Test(s) Performed     Progesterone Receptor (PgR) Status  Negative (less than 1%)     Internal control cells present and stain as expected   Test Type  Food and Drug Administration (FDA) cleared (test / vendor): Ventena   Primary Antibody  1E2   Scoring System  Harrison   Proportion Score  1   Intensity Score  2   Total Harrison Score  3   Test(s) Performed  Ki-67   Ki-67 Percentage of Positive Nuclei  25 %   Primary Antibody  30-9   Cold Ischemia and Fixation Times  Meet requirements specified in latest version of the ASCO / CAP Guidelines   Cold Ischemia Time (minutes)  12 min   Fixation Time (hours)  8 hours   Testing Performed on Block Number(s)  1B   METHODS   Fixative  Formalin   Image Analysis  Not performed   .   INVASIVE CARCINOMA OF THE BREAST: Biopsy   Protocol posted: 3/22/2023INVASIVE CARCINOMA OF THE BREAST: BIOPSY - All Specimens  SPECIMEN   Procedure  Needle biopsy   Specimen Laterality  Right   TUMOR   Tumor Site  Clock position     11 o'clock   Histologic Type  Invasive carcinoma of no special type (ductal)   Histologic Type Comment  The tumor has apocrine features   Histologic Grade (Migel " Histologic Score)     Glandular (Acinar) / Tubular Differentiation  Score 3   Nuclear Pleomorphism  Score 3   Mitotic Rate  Score 2   Overall Grade  Grade 3 (scores of 8 or 9)   Largest Invasive Focus in this Limited Biopsy Sample  4.8 mm   Ductal Carcinoma In Situ (DCIS)  Not identified   Lymphatic and / or Vascular Invasion  Not identified   Microcalcifications  Not identified   .   Breast Biomarker Reporting Template   Protocol posted: 12/13/2023BREAST BIOMARKER REPORTING TEMPLATE - All Specimens  Test(s) Performed     Estrogen Receptor (ER) Status  Negative (less than 1%)     Internal control cells present and stain as expected   Test Type  Food and Drug Administration (FDA) cleared (test / vendor): Ventena   Primary Antibody  SP1   Scoring System  Harrison   Proportion Score  0   Intensity Score  0   Total Harrison Score  0   Test(s) Performed     Progesterone Receptor (PgR) Status  Negative (less than 1%)     Internal control cells present and stain as expected   Test Type  Food and Drug Administration (FDA) cleared (test / vendor): Ventena   Primary Antibody  1E2   Scoring System  Harrison   Proportion Score  0   Intensity Score  0   Total Harrison Score  0   Test(s) Performed     HER2 by Immunohistochemistry  Positive (Score 3+)   Percentage of Cells with Uniform Intense Complete Membrane Staining  100 %   Test Type  Food and Drug Administration (FDA) cleared (test / vendor): Ventena   Primary Antibody  4B5   Test(s) Performed  Ki-67   Ki-67 Percentage of Positive Nuclei  60 %          ynoptic Checklist  7/3/24   INVASIVE CARCINOMA OF THE BREAST: Resection   8th Edition - Protocol posted: 12/13/2023INVASIVE CARCINOMA OF THE BREAST: RESECTION - All Specimens  SPECIMEN   Procedure  Total mastectomy   Specimen Laterality  Right   TUMOR   Tumor Site  Upper outer quadrant   Histologic Type  Invasive carcinoma of no special type (ductal)   Histologic Grade ([place] Histologic Score)     Glandular (Acinar) / Tubular  "Differentiation  Score 3   Nuclear Pleomorphism  Score 3   Mitotic Rate  Score 2   Overall Grade  Grade 3 (scores of 8 or 9)   Tumor Size  Greatest dimension of largest invasive focus (Millimeters): 10 mm   Tumor Focality  Single focus of invasive carcinoma   Ductal Carcinoma In Situ (DCIS)  Present     Negative for extensive intraductal component (EIC)   Size (Extent) of DCIS  Estimated size (extent) of DCIS is at least (Millimeters): 50 mm   Architectural Patterns  [place]     Cribriform     Solid   Nuclear Grade  Grade III (high)   Necrosis  Present, central (expansive \"comedo\" necrosis)   Lobular Carcinoma In Situ (LCIS)  Not identified   Lymphatic and / or Vascular Invasion  Not identified   Dermal Lymphatic and / or Vascular Invasion  Not identified   Microcalcifications  Present in DCIS     Present in non-neoplastic tissue   Treatment Effect in the Breast  No known presurgical therapy   MARGINS   Margin Status for Invasive Carcinoma  All margins negative for invasive carcinoma   Distance from Invasive Carcinoma to Closest Margin  6 mm   Closest Margin(s) to Invasive Carcinoma  Anterior   Distance from Invasive Carcinoma to Posterior Margin  17 mm   Distance from Invasive Carcinoma to Superior Margin  85 mm   Distance from Invasive Carcinoma to Inferior Margin  100 mm   Distance from Invasive Carcinoma to Medial Margin  100 mm   Distance from Invasive Carcinoma to Lateral Margin  50 mm   Margin Status for DCIS  All margins negative for DCIS   Distance from DCIS to Closest Margin  11 mm   Closest Margin(s) to DCIS  Anterior   Distance from DCIS to Posterior Margin  15 mm   Distance from DCIS to Superior Margin  60 mm   Distance from DCIS to Inferior Margin  75 mm   Distance from DCIS to Medial Margin  80 mm   Distance from DCIS to Lateral Margin  40 mm   REGIONAL LYMPH NODES   Regional Lymph Node Status  All regional lymph nodes negative for tumor   Total Number of Lymph Nodes Examined (sentinel and " non-sentinel)  3   Number of [place] Nodes Examined  3   pTNM CLASSIFICATION (AJCC 8th Edition)   Reporting of pT, pN, and (when applicable) pM categories is based on information available to the pathologist at the time the report is issued. As per the AJCC (Chapter 1, 8th Ed.) it is the managing physician’s responsibility to establish the final pathologic stage based upon all pertinent information, including but potentially not limited to this pathology report.   pT Category  pT1b   pN Category  pN0   N Suffix  (sn)   SPECIAL STUDIES        Estrogen Receptor (ER) Status  Negative        Progesterone Receptor (PgR) Status  Negative        HER2 (by immunohistochemistry)  Positive (Score 3+)   Percentage of Cells with Uniform Intense Complete Membrane Staining  100 %        Ki-67 Percentage of Positive Nuclei  60 %        Assessment & Plan     cT1cN0 grade 3 ER/NM- her2 +RIGHT BREAST CANCER post biopsy with associated high-grade DCIS ER/NM negative and MRI evidence of non-mass enhancement which would preclude lumpectomy  pT1bN0 ER/NM negative HER2 positive right breast cancer postmastectomy and sentinel node biopsy  Weekly Taxol Herceptin for 12 weeks followed by every 3 to weeks for the rest of the year planned  No radiation-EF adequate  Tolerating Taxol Herceptin well as of 8/16/2024  Worsening maculopapular rash on her arms legs and trunk on 8/30/2024 changed to Abraxane and nonbiosimilar Herceptin  Rash improving after Abraxane discontinued we will move to 3 weekly Herceptin  Tolerating weekly Herceptin well as of 1/3/2025  Tolerating Herceptin well every 3 weeks as of 3/7/2025  Tolerating Herceptin well as of 5/9/2025 with 3 more treatments to go      2. Rheumatoid arthritis on Simponi for 3 and half years-off treatment during chemo    3.  Negative family history of any malignancy    4.  Anemia: Hgb 10.8 today 7/26/2024- was normal prior to surgery (12.1 on 6/25/2024).  Denies bleeding issues.  Will check  ferritin, iron profile, B12, folate.     5.  Hashimoto's thyroiditis    Plan  Proceed with 3-week dose of Herceptin today.  Herceptin in 3 and 6 weeks   see me in 9 weeks.for l;ast RX and port can be removed after that        Patient is on a high risk medication requiring close monitoring for toxicity.

## 2025-05-19 ENCOUNTER — PATIENT OUTREACH (OUTPATIENT)
Dept: RADIATION ONCOLOGY | Facility: HOSPITAL | Age: 70
End: 2025-05-19
Payer: MEDICARE

## 2025-05-19 NOTE — PROGRESS NOTES
Patient has completed navigation services. All identified barriers to care have been addressed and patient is engaged with appropriate care team and support services, including survivorship. No further navigation needs identified at this time.    Patient is aware of how to re-engage navigation services if future needs arise. Chart closed to oncology nurse navigation. No further follow- up planned.

## 2025-05-30 ENCOUNTER — INFUSION (OUTPATIENT)
Dept: ONCOLOGY | Facility: HOSPITAL | Age: 70
End: 2025-05-30
Payer: MEDICARE

## 2025-05-30 VITALS
TEMPERATURE: 97.8 F | WEIGHT: 160 LBS | OXYGEN SATURATION: 98 % | DIASTOLIC BLOOD PRESSURE: 63 MMHG | RESPIRATION RATE: 16 BRPM | HEART RATE: 70 BPM | SYSTOLIC BLOOD PRESSURE: 157 MMHG | BODY MASS INDEX: 31.37 KG/M2

## 2025-05-30 DIAGNOSIS — C50.811 MALIGNANT NEOPLASM OF OVERLAPPING SITES OF RIGHT BREAST IN FEMALE, ESTROGEN RECEPTOR NEGATIVE: Primary | ICD-10-CM

## 2025-05-30 DIAGNOSIS — Z45.2 FITTING AND ADJUSTMENT OF VASCULAR CATHETER: ICD-10-CM

## 2025-05-30 DIAGNOSIS — Z17.1 MALIGNANT NEOPLASM OF OVERLAPPING SITES OF RIGHT BREAST IN FEMALE, ESTROGEN RECEPTOR NEGATIVE: Primary | ICD-10-CM

## 2025-05-30 LAB
ALBUMIN SERPL-MCNC: 4.6 G/DL (ref 3.5–5.2)
ALBUMIN/GLOB SERPL: 1.4 G/DL
ALP SERPL-CCNC: 72 U/L (ref 39–117)
ALT SERPL W P-5'-P-CCNC: 36 U/L (ref 1–33)
ANION GAP SERPL CALCULATED.3IONS-SCNC: 12.1 MMOL/L (ref 5–15)
AST SERPL-CCNC: 31 U/L (ref 1–32)
BASOPHILS # BLD AUTO: 0.03 10*3/MM3 (ref 0–0.2)
BASOPHILS NFR BLD AUTO: 0.6 % (ref 0–1.5)
BILIRUB SERPL-MCNC: 0.3 MG/DL (ref 0–1.2)
BUN SERPL-MCNC: 11.9 MG/DL (ref 8–23)
BUN/CREAT SERPL: 22 (ref 7–25)
CALCIUM SPEC-SCNC: 10 MG/DL (ref 8.6–10.5)
CHLORIDE SERPL-SCNC: 99 MMOL/L (ref 98–107)
CO2 SERPL-SCNC: 29.9 MMOL/L (ref 22–29)
CREAT SERPL-MCNC: 0.54 MG/DL (ref 0.57–1)
DEPRECATED RDW RBC AUTO: 42.3 FL (ref 37–54)
EGFRCR SERPLBLD CKD-EPI 2021: 99.8 ML/MIN/1.73
EOSINOPHIL # BLD AUTO: 0.5 10*3/MM3 (ref 0–0.4)
EOSINOPHIL NFR BLD AUTO: 9.5 % (ref 0.3–6.2)
ERYTHROCYTE [DISTWIDTH] IN BLOOD BY AUTOMATED COUNT: 12.5 % (ref 12.3–15.4)
GLOBULIN UR ELPH-MCNC: 3.2 GM/DL
GLUCOSE SERPL-MCNC: 109 MG/DL (ref 65–99)
HCT VFR BLD AUTO: 38.1 % (ref 34–46.6)
HGB BLD-MCNC: 12.1 G/DL (ref 12–15.9)
IMM GRANULOCYTES # BLD AUTO: 0.01 10*3/MM3 (ref 0–0.05)
IMM GRANULOCYTES NFR BLD AUTO: 0.2 % (ref 0–0.5)
LYMPHOCYTES # BLD AUTO: 1.56 10*3/MM3 (ref 0.7–3.1)
LYMPHOCYTES NFR BLD AUTO: 29.7 % (ref 19.6–45.3)
MCH RBC QN AUTO: 29.2 PG (ref 26.6–33)
MCHC RBC AUTO-ENTMCNC: 31.8 G/DL (ref 31.5–35.7)
MCV RBC AUTO: 92 FL (ref 79–97)
MONOCYTES # BLD AUTO: 0.43 10*3/MM3 (ref 0.1–0.9)
MONOCYTES NFR BLD AUTO: 8.2 % (ref 5–12)
NEUTROPHILS NFR BLD AUTO: 2.73 10*3/MM3 (ref 1.7–7)
NEUTROPHILS NFR BLD AUTO: 51.8 % (ref 42.7–76)
NRBC BLD AUTO-RTO: 0 /100 WBC (ref 0–0.2)
PLATELET # BLD AUTO: 253 10*3/MM3 (ref 140–450)
PMV BLD AUTO: 10.2 FL (ref 6–12)
POTASSIUM SERPL-SCNC: 3.9 MMOL/L (ref 3.5–5.2)
PROT SERPL-MCNC: 7.8 G/DL (ref 6–8.5)
RBC # BLD AUTO: 4.14 10*6/MM3 (ref 3.77–5.28)
SODIUM SERPL-SCNC: 141 MMOL/L (ref 136–145)
WBC NRBC COR # BLD AUTO: 5.26 10*3/MM3 (ref 3.4–10.8)

## 2025-05-30 PROCEDURE — 25010000002 TRASTUZUMAB PER 10 MG: Performed by: INTERNAL MEDICINE

## 2025-05-30 PROCEDURE — 25810000003 SODIUM CHLORIDE 0.9 % SOLUTION 250 ML FLEX CONT: Performed by: INTERNAL MEDICINE

## 2025-05-30 PROCEDURE — 80053 COMPREHEN METABOLIC PANEL: CPT

## 2025-05-30 PROCEDURE — 63710000001 DIPHENHYDRAMINE PER 50 MG: Performed by: INTERNAL MEDICINE

## 2025-05-30 PROCEDURE — 96413 CHEMO IV INFUSION 1 HR: CPT

## 2025-05-30 PROCEDURE — 25010000002 HEPARIN LOCK FLUSH PER 10 UNITS: Performed by: INTERNAL MEDICINE

## 2025-05-30 PROCEDURE — 85025 COMPLETE CBC W/AUTO DIFF WBC: CPT

## 2025-05-30 RX ORDER — HEPARIN SODIUM (PORCINE) LOCK FLUSH IV SOLN 100 UNIT/ML 100 UNIT/ML
500 SOLUTION INTRAVENOUS AS NEEDED
OUTPATIENT
Start: 2025-05-30

## 2025-05-30 RX ORDER — SODIUM CHLORIDE 0.9 % (FLUSH) 0.9 %
10 SYRINGE (ML) INJECTION AS NEEDED
Status: DISCONTINUED | OUTPATIENT
Start: 2025-05-30 | End: 2025-05-30 | Stop reason: HOSPADM

## 2025-05-30 RX ORDER — DIPHENHYDRAMINE HCL 25 MG
25 CAPSULE ORAL ONCE
Status: COMPLETED | OUTPATIENT
Start: 2025-05-30 | End: 2025-05-30

## 2025-05-30 RX ORDER — HEPARIN SODIUM (PORCINE) LOCK FLUSH IV SOLN 100 UNIT/ML 100 UNIT/ML
500 SOLUTION INTRAVENOUS AS NEEDED
Status: DISCONTINUED | OUTPATIENT
Start: 2025-05-30 | End: 2025-05-30 | Stop reason: HOSPADM

## 2025-05-30 RX ORDER — SODIUM CHLORIDE 0.9 % (FLUSH) 0.9 %
10 SYRINGE (ML) INJECTION AS NEEDED
OUTPATIENT
Start: 2025-05-30

## 2025-05-30 RX ADMIN — Medication 500 UNITS: at 15:44

## 2025-05-30 RX ADMIN — TRASTUZUMAB 420 MG: 150 INJECTION, POWDER, LYOPHILIZED, FOR SOLUTION INTRAVENOUS at 15:12

## 2025-05-30 RX ADMIN — Medication 10 ML: at 15:44

## 2025-05-30 RX ADMIN — DIPHENHYDRAMINE HYDROCHLORIDE 25 MG: 25 CAPSULE ORAL at 14:52

## 2025-06-06 DIAGNOSIS — E11.3293 TYPE 2 DIABETES MELLITUS WITH BOTH EYES AFFECTED BY MILD NONPROLIFERATIVE RETINOPATHY WITHOUT MACULAR EDEMA, WITHOUT LONG-TERM CURRENT USE OF INSULIN: ICD-10-CM

## 2025-06-06 DIAGNOSIS — E78.00 PURE HYPERCHOLESTEROLEMIA: ICD-10-CM

## 2025-06-06 DIAGNOSIS — I10 PRIMARY HYPERTENSION: Primary | ICD-10-CM

## 2025-06-13 DIAGNOSIS — E11.3293 TYPE 2 DIABETES MELLITUS WITH BOTH EYES AFFECTED BY MILD NONPROLIFERATIVE RETINOPATHY WITHOUT MACULAR EDEMA, WITHOUT LONG-TERM CURRENT USE OF INSULIN: Chronic | ICD-10-CM

## 2025-06-16 ENCOUNTER — HOSPITAL ENCOUNTER (OUTPATIENT)
Dept: OCCUPATIONAL THERAPY | Facility: HOSPITAL | Age: 70
Setting detail: THERAPIES SERIES
Discharge: HOME | End: 2025-06-16
Payer: MEDICARE

## 2025-06-16 DIAGNOSIS — L90.5 SCAR ADHERENT: ICD-10-CM

## 2025-06-16 DIAGNOSIS — I97.2 POST-MASTECTOMY LYMPHEDEMA SYNDROME: ICD-10-CM

## 2025-06-16 DIAGNOSIS — Z17.1 MALIGNANT NEOPLASM OF OVERLAPPING SITES OF RIGHT BREAST IN FEMALE, ESTROGEN RECEPTOR NEGATIVE: Primary | ICD-10-CM

## 2025-06-16 DIAGNOSIS — Z91.89 AT RISK FOR LYMPHEDEMA: ICD-10-CM

## 2025-06-16 DIAGNOSIS — C50.811 MALIGNANT NEOPLASM OF OVERLAPPING SITES OF RIGHT BREAST IN FEMALE, ESTROGEN RECEPTOR NEGATIVE: Primary | ICD-10-CM

## 2025-06-16 PROCEDURE — 97535 SELF CARE MNGMENT TRAINING: CPT

## 2025-06-16 PROCEDURE — 93702 BIS XTRACELL FLUID ANALYSIS: CPT

## 2025-06-16 NOTE — THERAPY PROGRESS REPORT/RE-CERT
"Outpatient Occupational Therapy Lymphedema Progress Note  [facility]     Patient Name: Pretty Tovar  : 1955  MRN: [id number]  Today's Date: 2025      Visit Date: 2025    Patient Active Problem List   Diagnosis    Benign essential hypertension    Type 2 diabetes mellitus, without long-term current use of insulin    Pure hypercholesterolemia    Overweight (BMI 25.0-29.9)    Hashimoto's thyroiditis    Hyperplastic rectal polyp    Female stress incontinence    Feeling of incomplete bladder emptying    Primary osteoarthritis of left knee    S/P TKR (total knee replacement), left    Iron deficiency    Abnormality of right breast on screening mammogram    Malignant neoplasm of overlapping sites of right breast in female, estrogen receptor negative    Fitting and adjustment of vascular catheter    Right bundle branch block (RBBB) with left anterior fascicular block (LAFB)    Aortic stenosis, mild        Past Medical History:   Diagnosis Date    Abnormal liver function tests 2017    Description: , \"-\"hepatitis profile    Anemia     Anesthesia complication     SLOW TO WAKE UP WITH HYSTERECTOMY    Arthritis     Breast cancer May 2024    Cancer 24    Right Breast    Cholesterol blood reduced     Colon polyps     Cystocele, lateral 10/15/2020    Cystocele, midline 10/15/2020    Diabetes mellitus     Frequent UTI     H/O bone density study 10/2010    normal    H/O cardiovascular stress test     H/O echocardiogram 2001    2-D    Hashimoto's thyroiditis     Heart murmur     Hyperkalemia 2019    Hyperlipidemia     Hypertension     Hypothyroidism 2018    Incompetence of pubocervical tissue 10/15/2020    Incompetence of rectovaginal tissue 10/15/2020    Insulin dependent diabetes mellitus     Limited joint range of motion     LEFT KNEE    Loss of perineal body, female 10/15/2020    Osteoarthritis of knee 2005    left    Pelvic relaxation due to vaginal prolapse 2018    " Plantar warts     foot    Postmenopausal bleeding 2019    Pregnancy      twins x1    Psoriasis     RA (rheumatoid arthritis)     Rectocele 10/15/2020    Urinary tract infection     Urine incontinence     BETTER WITH SLING    Uterovaginal prolapse     Uterovaginal prolapse, incomplete 2020    Vaginal enterocele 10/15/2020        Past Surgical History:   Procedure Laterality Date    BREAST BIOPSY Right 2006    benign    COLONOSCOPY      COLONOSCOPY W/ POLYPECTOMY  2018    Dr Resendiz, 6 mm rectal polyp - hyperplastic    JOINT MANIPULATION Left 2021    Procedure: KNEE MANIPULATION with steroid injection;  Surgeon: Francis Tavarez MD;  Location: [facility];  Service: Orthopedics;  Laterality: Left;    JOINT REPLACEMENT  08/10/2021    LYMPH NODE BIOPSY  2024    MASTECTOMY W/ SENTINEL NODE BIOPSY Right 2024    Procedure: RIGHT MASTECTOMY WITH SENTINEL NODE BIOPSY;  Surgeon: Aracely Frye MD;  Location: [facility];  Service: General;  Laterality: Right;    PUBOVAGINAL SLING N/A 10/16/2020    Procedure: Pubovaginal sling Posterior colporrhaphy fixation Insertion of vaginal grafts Cystourethroscopy;  Surgeon: Karen Allen MD;  Location: [facility];  Service: Gynecology;  Laterality: N/A;    TOTAL KNEE ARTHROPLASTY Left 08/10/2021    Procedure: TOTAL KNEE ARTHROPLASTY;  Surgeon: Francis Tavarez MD;  Location: [facility] OR;  Service: Orthopedics;  Laterality: Left;    TOTAL LAPAROSCOPIC HYSTERECTOMY, SACROCOLPOPEXY N/A 10/16/2020    Procedure: Laparoscopic uterosacral ligament colpopexy sacral colpopexy  Laparoscopic paravaginal repair Laparoscopic hysterectomy with bilateral salpingectomy  Laparoscopic abdominal enterocele repair;  Surgeon: Karen Allen MD;  Location: [facility];  Service: Gynecology;  Laterality: N/A;    TUBAL ABDOMINAL LIGATION      VENOUS ACCESS DEVICE (PORT) INSERTION N/A 2024    Procedure: INSERTION OF PORTACATH;  Surgeon:  Aracely Frye MD;  Location: [facility] OR;  Service: General;  Laterality: N/A;         Visit Dx:      ICD-10-CM ICD-9-CM   1. Malignant neoplasm of overlapping sites of right breast in female, estrogen receptor negative  C50.811 174.8    Z17.1 V86.1   2. At risk for lymphedema  Z91.89 V49.89   3. Scar adherent  L90.5 709.2   4. Post-mastectomy lymphedema syndrome  I97.2 457.0        Lymphedema       Row Name 06/16/25 1300             Subjective Pain    Able to rate subjective pain? yes  -RE      Pre-Treatment Pain Level 0  -RE      Post-Treatment Pain Level 0  -RE         L-Dex Bioimpedence Screening    L-Dex Measurement Extremity RUE  -RE      L-Dex Patient Position Standing  -RE      L-Dex UE Dominate Side Left  -RE      L-Dex UE At Risk Side Right  -RE      L-Dex UE Pre Surgical Value Yes  -RE      L-Dex UE Score 2.5  -RE      L-Dex UE Baseline Score -0.5  -RE      L-Dex UE Value Change 3  -RE      L-Dex UE Comment WNL  -RE                User Key  (r) = Recorded By, (t) = Taken By, (c) = Cosigned By      Initials Name Provider Type    RE Yasemin Elizabeth OTR Occupational Therapist                             OT Assessment/Plan       Row Name 06/16/25 1707          OT Assessment    Impairments Impaired lymphatic circulation  -RE     Assessment Comments Patient returns for bioimpedance reassessment.  Her last measurement was 3 months ago.  She was previously scheduled for treatment of some mild chest wall lymphedema and an adherent scar.  Patient had to cancel her appointments due to a family illness.  She returns today for bioimpedance reassessment and to discuss treatment for the above.  L-Dex value today is 2.5 which is within normal limit.   She reports that her chest wall swelling and swelling around the axillary area have not really changed.  The scar continues to be tight.  I recommended that we treat her chest wall lymphedema and the adherent scar and continue with bioimpedance for lymphedema prevention  and surveillance for the upper extremity.  -RE     OT Diagnosis At risk for lymphedema  -RE     OT Rehab Potential Good  -RE     Patient/caregiver participated in establishment of treatment plan and goals Yes  -RE     Patient would benefit from skilled therapy intervention Yes  -RE        OT Plan    OT Frequency 1x/week  -RE     Predicted Duration of Therapy Intervention (OT) 1Xweek for 4 weeks for lymphedema and continue with bioimpedance every 3 months  -RE     Planned CPT's? OT SELF CARE/MGMT/TRAIN 15 MIN: 17523;OT BIS XTRACELL FLUID ANALYSIS: 54158;OT MANUAL THERAPY EA 15 MIN: 87878  -RE     Planned Therapy Interventions (Optional Details) patient/family education;manual therapy techniques;other (see comments)  Bioimpedance  -RE               User Key  (r) = Recorded By, (t) = Taken By, (c) = Cosigned By      Initials Name Provider Type    Yasemin Bradford OTR Occupational Therapist                           OT Goals       Row Name 06/16/25 1700          OT Short Term Goals    STG Date to Achieve 07/16/25  -RE     STG 1 Patient will demonstrate proper awareness of “What is Lymphedema?” and “Healthy Habits” for improved prevention, management, care of symptoms and ease of transition to self-care of condition.  -RE     STG 1 Progress Met  -RE     STG 2 Patient will report decreased swelling and increased tissue mobility of the right chest wall per her perception.  -RE     STG 2 Progress Ongoing  -RE        Long Term Goals    LTG Date to Achieve 09/16/25  -RE     LTG 1 Patient will participate in bioimpedance scans every 3 months as a method of early detection of lymphedema to allow for early intervention.  -RE     LTG 1 Progress Met;Ongoing  -RE     LTG 2 Patient's bioimpedance score to remain below 6.0 for decreased risk of stage II lymphedema.  -RE     LTG 2 Progress Met;Ongoing  -RE        Time Calculation    OT Goal Re-Cert Due Date 09/16/25  -RE               User Key  (r) = Recorded By, (t) = Taken By, (c)  = Cosigned By      Initials Name Provider Type    Yasemin Bradford OTR Occupational Therapist                    Therapy Education  Education Details: Discussed patient's current L-Dex value of 2.5 and her chest wall lymphedema and adherent scar.  I explained treatment for both conditions.  Given: Symptoms/condition management, Edema management, Other (comment)  Program: Reinforced  How Provided: Verbal  Provided to: Patient  Level of Understanding: Teach back education performed, Verbalized  79894 - OT Self Care/Mgmt Minutes: 20                Time Calculation:   OT Start Time: 1330  OT Stop Time: 1400  OT Time Calculation (min): 30 min  Total Timed Code Minutes- OT: 20 minute(s)  Timed Charges  24232 - OT Self Care/Mgmt Minutes: 20  Untimed Charges  75764 - OT Bioimpedence Rx Minutes: 10  Total Minutes  Timed Charges Total Minutes: 20  Untimed Charges Total Minutes: 10   Total Minutes: 30     Therapy Charges for Today       Code Description Service Date Service Provider Modifiers Qty    63437052590 HC OT SELF CARE/MGMT/TRAIN EA 15 MIN 6/16/2025 Yasemin Elizabeth OTR GO 1    41037315207 HC OT BIS XTRACELL FLUID ANALYSIS 6/16/2025 Yasemin Elizabeth OTR GO 1            Dictated utilizing Dragon dictation:  Much of this encounter note is an electronic transcription/translation of spoken language to printed text. The electronic translation of spoken language may permit erroneous, or at times, nonsensical words or phrases to be inadvertently transcribed; Although I have reviewed the note for such errors, some may still exist.            RIKY Chacon  6/16/2025

## 2025-06-17 LAB
ALBUMIN SERPL-MCNC: 4.4 G/DL (ref 3.5–5.2)
ALBUMIN/CREAT UR: 16 MG/G CREAT (ref 0–29)
ALBUMIN/GLOB SERPL: 1.3 G/DL
ALP SERPL-CCNC: 70 U/L (ref 39–117)
ALT SERPL-CCNC: 25 U/L (ref 1–33)
APPEARANCE UR: CLEAR
AST SERPL-CCNC: 34 U/L (ref 1–32)
BACTERIA #/AREA URNS HPF: ABNORMAL /HPF
BASOPHILS # BLD AUTO: 0.05 10*3/MM3 (ref 0–0.2)
BASOPHILS NFR BLD AUTO: 1.2 % (ref 0–1.5)
BILIRUB SERPL-MCNC: 0.5 MG/DL (ref 0–1.2)
BILIRUB UR QL STRIP: NEGATIVE
BUN SERPL-MCNC: 18 MG/DL (ref 8–23)
BUN/CREAT SERPL: 25 (ref 7–25)
CALCIUM SERPL-MCNC: 9.8 MG/DL (ref 8.6–10.5)
CASTS URNS MICRO: ABNORMAL
CHLORIDE SERPL-SCNC: 100 MMOL/L (ref 98–107)
CHOLEST SERPL-MCNC: 201 MG/DL (ref 0–200)
CO2 SERPL-SCNC: 27.7 MMOL/L (ref 22–29)
COLOR UR: YELLOW
CREAT SERPL-MCNC: 0.72 MG/DL (ref 0.57–1)
CREAT UR-MCNC: 21.3 MG/DL
EGFRCR SERPLBLD CKD-EPI 2021: 90.6 ML/MIN/1.73
EOSINOPHIL # BLD AUTO: 0.42 10*3/MM3 (ref 0–0.4)
EOSINOPHIL NFR BLD AUTO: 9.8 % (ref 0.3–6.2)
EPI CELLS #/AREA URNS HPF: ABNORMAL /HPF
ERYTHROCYTE [DISTWIDTH] IN BLOOD BY AUTOMATED COUNT: 12.7 % (ref 12.3–15.4)
GLOBULIN SER CALC-MCNC: 3.4 GM/DL
GLUCOSE SERPL-MCNC: 132 MG/DL (ref 65–99)
GLUCOSE UR QL STRIP: NEGATIVE
HBA1C MFR BLD: 6.8 % (ref 4.8–5.6)
HCT VFR BLD AUTO: 37.4 % (ref 34–46.6)
HDLC SERPL-MCNC: 58 MG/DL (ref 40–60)
HGB BLD-MCNC: 12.4 G/DL (ref 12–15.9)
HGB UR QL STRIP: NEGATIVE
IMM GRANULOCYTES # BLD AUTO: 0.01 10*3/MM3 (ref 0–0.05)
IMM GRANULOCYTES NFR BLD AUTO: 0.2 % (ref 0–0.5)
KETONES UR QL STRIP: NEGATIVE
LDLC SERPL CALC-MCNC: 123 MG/DL (ref 0–100)
LDLC/HDLC SERPL: 2.08 {RATIO}
LEUKOCYTE ESTERASE UR QL STRIP: ABNORMAL
LYMPHOCYTES # BLD AUTO: 1.3 10*3/MM3 (ref 0.7–3.1)
LYMPHOCYTES NFR BLD AUTO: 30.3 % (ref 19.6–45.3)
MCH RBC QN AUTO: 30.5 PG (ref 26.6–33)
MCHC RBC AUTO-ENTMCNC: 33.2 G/DL (ref 31.5–35.7)
MCV RBC AUTO: 92.1 FL (ref 79–97)
MICROALBUMIN UR-MCNC: 3.4 UG/ML
MONOCYTES # BLD AUTO: 0.53 10*3/MM3 (ref 0.1–0.9)
MONOCYTES NFR BLD AUTO: 12.4 % (ref 5–12)
NEUTROPHILS # BLD AUTO: 1.98 10*3/MM3 (ref 1.7–7)
NEUTROPHILS NFR BLD AUTO: 46.1 % (ref 42.7–76)
NITRITE UR QL STRIP: NEGATIVE
NRBC BLD AUTO-RTO: 0 /100 WBC (ref 0–0.2)
PH UR STRIP: 7 [PH] (ref 5–8)
PLATELET # BLD AUTO: 231 10*3/MM3 (ref 140–450)
POTASSIUM SERPL-SCNC: 5.1 MMOL/L (ref 3.5–5.2)
PROT SERPL-MCNC: 7.8 G/DL (ref 6–8.5)
PROT UR QL STRIP: NEGATIVE
RBC # BLD AUTO: 4.06 10*6/MM3 (ref 3.77–5.28)
RBC #/AREA URNS HPF: ABNORMAL /HPF
SODIUM SERPL-SCNC: 139 MMOL/L (ref 136–145)
SP GR UR STRIP: 1.01 (ref 1–1.03)
TRIGL SERPL-MCNC: 113 MG/DL (ref 0–150)
TSH SERPL DL<=0.005 MIU/L-ACNC: 1.53 UIU/ML (ref 0.27–4.2)
UROBILINOGEN UR STRIP-MCNC: ABNORMAL MG/DL
VLDLC SERPL CALC-MCNC: 20 MG/DL (ref 5–40)
WBC # BLD AUTO: 4.29 10*3/MM3 (ref 3.4–10.8)
WBC #/AREA URNS HPF: ABNORMAL /HPF

## 2025-06-20 ENCOUNTER — INFUSION (OUTPATIENT)
Dept: ONCOLOGY | Facility: HOSPITAL | Age: 70
End: 2025-06-20
Payer: MEDICARE

## 2025-06-20 VITALS
DIASTOLIC BLOOD PRESSURE: 76 MMHG | WEIGHT: 158.4 LBS | HEART RATE: 71 BPM | TEMPERATURE: 97.7 F | OXYGEN SATURATION: 100 % | BODY MASS INDEX: 31.06 KG/M2 | SYSTOLIC BLOOD PRESSURE: 173 MMHG | RESPIRATION RATE: 16 BRPM

## 2025-06-20 DIAGNOSIS — Z45.2 FITTING AND ADJUSTMENT OF VASCULAR CATHETER: ICD-10-CM

## 2025-06-20 DIAGNOSIS — Z17.1 MALIGNANT NEOPLASM OF OVERLAPPING SITES OF RIGHT BREAST IN FEMALE, ESTROGEN RECEPTOR NEGATIVE: Primary | ICD-10-CM

## 2025-06-20 DIAGNOSIS — C50.811 MALIGNANT NEOPLASM OF OVERLAPPING SITES OF RIGHT BREAST IN FEMALE, ESTROGEN RECEPTOR NEGATIVE: Primary | ICD-10-CM

## 2025-06-20 LAB
ALBUMIN SERPL-MCNC: 4.5 G/DL (ref 3.5–5.2)
ALBUMIN/GLOB SERPL: 1.6 G/DL
ALP SERPL-CCNC: 66 U/L (ref 39–117)
ALT SERPL W P-5'-P-CCNC: 27 U/L (ref 1–33)
ANION GAP SERPL CALCULATED.3IONS-SCNC: 11.5 MMOL/L (ref 5–15)
AST SERPL-CCNC: 27 U/L (ref 1–32)
BASOPHILS # BLD AUTO: 0.04 10*3/MM3 (ref 0–0.2)
BASOPHILS NFR BLD AUTO: 0.8 % (ref 0–1.5)
BILIRUB SERPL-MCNC: 0.2 MG/DL (ref 0–1.2)
BUN SERPL-MCNC: 10.6 MG/DL (ref 8–23)
BUN/CREAT SERPL: 24.1 (ref 7–25)
CALCIUM SPEC-SCNC: 9.8 MG/DL (ref 8.6–10.5)
CHLORIDE SERPL-SCNC: 101 MMOL/L (ref 98–107)
CO2 SERPL-SCNC: 27.5 MMOL/L (ref 22–29)
CREAT SERPL-MCNC: 0.44 MG/DL (ref 0.57–1)
DEPRECATED RDW RBC AUTO: 40.5 FL (ref 37–54)
EGFRCR SERPLBLD CKD-EPI 2021: 104.9 ML/MIN/1.73
EOSINOPHIL # BLD AUTO: 0.51 10*3/MM3 (ref 0–0.4)
EOSINOPHIL NFR BLD AUTO: 10.2 % (ref 0.3–6.2)
ERYTHROCYTE [DISTWIDTH] IN BLOOD BY AUTOMATED COUNT: 12.2 % (ref 12.3–15.4)
GLOBULIN UR ELPH-MCNC: 2.9 GM/DL
GLUCOSE SERPL-MCNC: 96 MG/DL (ref 65–99)
HCT VFR BLD AUTO: 35.6 % (ref 34–46.6)
HGB BLD-MCNC: 11.5 G/DL (ref 12–15.9)
IMM GRANULOCYTES # BLD AUTO: 0.01 10*3/MM3 (ref 0–0.05)
IMM GRANULOCYTES NFR BLD AUTO: 0.2 % (ref 0–0.5)
LYMPHOCYTES # BLD AUTO: 1.43 10*3/MM3 (ref 0.7–3.1)
LYMPHOCYTES NFR BLD AUTO: 28.5 % (ref 19.6–45.3)
MCH RBC QN AUTO: 29.6 PG (ref 26.6–33)
MCHC RBC AUTO-ENTMCNC: 32.3 G/DL (ref 31.5–35.7)
MCV RBC AUTO: 91.5 FL (ref 79–97)
MONOCYTES # BLD AUTO: 0.44 10*3/MM3 (ref 0.1–0.9)
MONOCYTES NFR BLD AUTO: 8.8 % (ref 5–12)
NEUTROPHILS NFR BLD AUTO: 2.59 10*3/MM3 (ref 1.7–7)
NEUTROPHILS NFR BLD AUTO: 51.5 % (ref 42.7–76)
NRBC BLD AUTO-RTO: 0 /100 WBC (ref 0–0.2)
PLATELET # BLD AUTO: 220 10*3/MM3 (ref 140–450)
PMV BLD AUTO: 10.1 FL (ref 6–12)
POTASSIUM SERPL-SCNC: 3.7 MMOL/L (ref 3.5–5.2)
PROT SERPL-MCNC: 7.4 G/DL (ref 6–8.5)
RBC # BLD AUTO: 3.89 10*6/MM3 (ref 3.77–5.28)
SODIUM SERPL-SCNC: 140 MMOL/L (ref 136–145)
WBC NRBC COR # BLD AUTO: 5.02 10*3/MM3 (ref 3.4–10.8)

## 2025-06-20 PROCEDURE — 25810000003 SODIUM CHLORIDE 0.9 % SOLUTION 250 ML FLEX CONT: Performed by: INTERNAL MEDICINE

## 2025-06-20 PROCEDURE — 85025 COMPLETE CBC W/AUTO DIFF WBC: CPT

## 2025-06-20 PROCEDURE — 25010000002 HEPARIN LOCK FLUSH PER 10 UNITS: Performed by: INTERNAL MEDICINE

## 2025-06-20 PROCEDURE — 80053 COMPREHEN METABOLIC PANEL: CPT

## 2025-06-20 PROCEDURE — 25010000002 TRASTUZUMAB PER 10 MG: Performed by: INTERNAL MEDICINE

## 2025-06-20 PROCEDURE — 63710000001 DIPHENHYDRAMINE PER 50 MG: Performed by: INTERNAL MEDICINE

## 2025-06-20 PROCEDURE — 96413 CHEMO IV INFUSION 1 HR: CPT

## 2025-06-20 RX ORDER — DIPHENHYDRAMINE HCL 25 MG
25 CAPSULE ORAL ONCE
Status: COMPLETED | OUTPATIENT
Start: 2025-06-20 | End: 2025-06-20

## 2025-06-20 RX ORDER — HEPARIN SODIUM (PORCINE) LOCK FLUSH IV SOLN 100 UNIT/ML 100 UNIT/ML
500 SOLUTION INTRAVENOUS AS NEEDED
OUTPATIENT
Start: 2025-06-20

## 2025-06-20 RX ORDER — SODIUM CHLORIDE 0.9 % (FLUSH) 0.9 %
10 SYRINGE (ML) INJECTION AS NEEDED
Status: DISCONTINUED | OUTPATIENT
Start: 2025-06-20 | End: 2025-06-20 | Stop reason: HOSPADM

## 2025-06-20 RX ORDER — HEPARIN SODIUM (PORCINE) LOCK FLUSH IV SOLN 100 UNIT/ML 100 UNIT/ML
500 SOLUTION INTRAVENOUS AS NEEDED
Status: DISCONTINUED | OUTPATIENT
Start: 2025-06-20 | End: 2025-06-20 | Stop reason: HOSPADM

## 2025-06-20 RX ORDER — SODIUM CHLORIDE 0.9 % (FLUSH) 0.9 %
10 SYRINGE (ML) INJECTION AS NEEDED
OUTPATIENT
Start: 2025-06-20

## 2025-06-20 RX ORDER — SODIUM CHLORIDE 9 MG/ML
20 INJECTION, SOLUTION INTRAVENOUS ONCE
Status: DISCONTINUED | OUTPATIENT
Start: 2025-06-20 | End: 2025-06-20 | Stop reason: HOSPADM

## 2025-06-20 RX ADMIN — DIPHENHYDRAMINE HYDROCHLORIDE 25 MG: 25 CAPSULE ORAL at 15:43

## 2025-06-20 RX ADMIN — Medication 500 UNITS: at 16:38

## 2025-06-20 RX ADMIN — Medication 10 ML: at 16:37

## 2025-06-20 RX ADMIN — TRASTUZUMAB 420 MG: 150 INJECTION, POWDER, LYOPHILIZED, FOR SOLUTION INTRAVENOUS at 16:02

## 2025-06-23 ENCOUNTER — HOSPITAL ENCOUNTER (OUTPATIENT)
Dept: OCCUPATIONAL THERAPY | Facility: HOSPITAL | Age: 70
Setting detail: THERAPIES SERIES
Discharge: HOME | End: 2025-06-23
Payer: MEDICARE

## 2025-06-23 ENCOUNTER — OFFICE VISIT (OUTPATIENT)
Dept: INTERNAL MEDICINE | Facility: CLINIC | Age: 70
End: 2025-06-23
Payer: MEDICARE

## 2025-06-23 VITALS
DIASTOLIC BLOOD PRESSURE: 60 MMHG | BODY MASS INDEX: 31.02 KG/M2 | WEIGHT: 158 LBS | HEART RATE: 67 BPM | HEIGHT: 60 IN | SYSTOLIC BLOOD PRESSURE: 124 MMHG | OXYGEN SATURATION: 98 % | TEMPERATURE: 96.9 F | RESPIRATION RATE: 16 BRPM

## 2025-06-23 DIAGNOSIS — E11.3293 TYPE 2 DIABETES MELLITUS WITH BOTH EYES AFFECTED BY MILD NONPROLIFERATIVE RETINOPATHY WITHOUT MACULAR EDEMA, WITHOUT LONG-TERM CURRENT USE OF INSULIN: Chronic | ICD-10-CM

## 2025-06-23 DIAGNOSIS — I97.2 POST-MASTECTOMY LYMPHEDEMA SYNDROME: ICD-10-CM

## 2025-06-23 DIAGNOSIS — L90.5 SCAR ADHERENT: ICD-10-CM

## 2025-06-23 DIAGNOSIS — I10 BENIGN ESSENTIAL HYPERTENSION: Primary | Chronic | ICD-10-CM

## 2025-06-23 DIAGNOSIS — R22.2 SOFT TISSUE SWELLING OF CHEST WALL: ICD-10-CM

## 2025-06-23 DIAGNOSIS — C50.811 MALIGNANT NEOPLASM OF OVERLAPPING SITES OF RIGHT BREAST IN FEMALE, ESTROGEN RECEPTOR NEGATIVE: Primary | ICD-10-CM

## 2025-06-23 DIAGNOSIS — E06.3 HASHIMOTO'S THYROIDITIS: Chronic | ICD-10-CM

## 2025-06-23 DIAGNOSIS — Z91.89 AT RISK FOR LYMPHEDEMA: ICD-10-CM

## 2025-06-23 DIAGNOSIS — E78.00 PURE HYPERCHOLESTEROLEMIA: Chronic | ICD-10-CM

## 2025-06-23 DIAGNOSIS — Z17.1 MALIGNANT NEOPLASM OF OVERLAPPING SITES OF RIGHT BREAST IN FEMALE, ESTROGEN RECEPTOR NEGATIVE: Primary | ICD-10-CM

## 2025-06-23 PROCEDURE — 1160F RVW MEDS BY RX/DR IN RCRD: CPT | Performed by: NURSE PRACTITIONER

## 2025-06-23 PROCEDURE — 1126F AMNT PAIN NOTED NONE PRSNT: CPT | Performed by: NURSE PRACTITIONER

## 2025-06-23 PROCEDURE — 99214 OFFICE O/P EST MOD 30 MIN: CPT | Performed by: NURSE PRACTITIONER

## 2025-06-23 PROCEDURE — 3044F HG A1C LEVEL LT 7.0%: CPT | Performed by: NURSE PRACTITIONER

## 2025-06-23 PROCEDURE — 97140 MANUAL THERAPY 1/> REGIONS: CPT

## 2025-06-23 PROCEDURE — 3074F SYST BP LT 130 MM HG: CPT | Performed by: NURSE PRACTITIONER

## 2025-06-23 PROCEDURE — G0439 PPPS, SUBSEQ VISIT: HCPCS | Performed by: NURSE PRACTITIONER

## 2025-06-23 PROCEDURE — 3078F DIAST BP <80 MM HG: CPT | Performed by: NURSE PRACTITIONER

## 2025-06-23 PROCEDURE — 1159F MED LIST DOCD IN RCRD: CPT | Performed by: NURSE PRACTITIONER

## 2025-06-23 RX ORDER — CARVEDILOL 12.5 MG/1
12.5 TABLET ORAL 2 TIMES DAILY WITH MEALS
Qty: 60 TABLET | Refills: 1
Start: 2025-06-23 | End: 2025-06-26 | Stop reason: SDUPTHER

## 2025-06-23 NOTE — PROGRESS NOTES
Subjective   The ABCs of the Annual Wellness Visit  Medicare Wellness Visit      Pretty Tovar is a 69 y.o. patient who presents for a Medicare Wellness Visit.    The following portions of the patient's history were reviewed and   updated as appropriate: allergies, current medications, past family history, past medical history, past social history, past surgical history, and problem list.    Compared to one year ago, the patient's physical   health is the same.  Compared to one year ago, the patient's mental   health is the same.    Recent Hospitalizations:  This patient has had a [facility] admission record on file within the last 365 days.  Current Medical Providers:  Patient Care Team:  Renee Alcocer APRN as PCP - General (Family Medicine)  Karen Allen MD as Consulting Physician (Urogynecology)  Alba Anne MD as Consulting Physician (Cardiology)  Kristina Wylie MD as Consulting Physician (Rheumatology)  Zachary Sibley MD as Referring Physician (Obstetrics and Gynecology)  Ashkan Belcher MD as Consulting Physician (Hematology and Oncology)  María Ramirez MD as Consulting Physician (Cardiology)    Outpatient Medications Prior to Visit   Medication Sig Dispense Refill    BD PEN NEEDLE SEUN U/F 32G X 4 MM misc USE 1 PEN NEEDLE WITH EACH  VICTOZA INJECTION 100 each 1    Calcium Carbonate-Vit D-Min (CALCIUM 1200 PO) Take 1 tablet by mouth Daily. PT HOLDING FOR SURGERY      carvedilol (COREG) 25 MG tablet Take 1 tablet by mouth Every Night. (Patient not taking: Reported on 5/9/2025) 90 tablet 0    Cinnamon 500 MG tablet Take 1,000 mg by mouth 2 (two) times a day. PT HOLDING FOR SURGERY      Coenzyme Q10 (CO Q 10 PO) Take 1 capsule by mouth Daily. PT HOLDING FOR SURGERY      CRANBERRY-VITAMIN C-D MANNOSE PO Take 2 capsules by mouth Daily. PT HOLDING FOR SURGERY      glucose blood (OneTouch Verio) test strip Use as directed to CHECK BLOOD GLUCOSE ONCE DAILY. 100 each 3     hydrocortisone 2.5 % cream Apply 1 Application topically to the appropriate area as directed 2 (Two) Times a Day. 3.5 g 0    levothyroxine (SYNTHROID, LEVOTHROID) 88 MCG tablet TAKE 1 TABLET BY MOUTH DAILY 90 tablet 3    lidocaine-prilocaine (EMLA) 2.5-2.5 % cream Apply 1 Application topically to the appropriate area as directed As Needed for Mild Pain.      lidocaine-prilocaine (EMLA) 2.5-2.5 % cream Apply nickel-sized amount over Mediport site 30 min prior to access. Do not rub in. 30 g 3    Liraglutide (VICTOZA) 18 MG/3ML solution pen-injector injection DIAL AND INJECT UNDER THE SKIN 1.2 MG DAILY 6 mL 5    lisinopril (PRINIVIL,ZESTRIL) 20 MG tablet TAKE 1 TABLET BY MOUTH DAILY 90 tablet 3    lisinopril-hydrochlorothiazide (PRINZIDE,ZESTORETIC) 20-25 MG per tablet TAKE 1 TABLET BY MOUTH DAILY 90 tablet 3    loratadine (Claritin) 10 MG tablet Take 1 tablet by mouth Every Night.      metFORMIN (GLUCOPHAGE) 500 MG tablet TAKE THREE TABLETS BY MOUTH EVERY MORNING AND TAKE TWO TABLETS BY MOUTH EVERY EVENING 450 tablet 3    multivitamin with minerals tablet tablet Take 1 tablet by mouth Daily. PT HOLDING FOR SURGERY      Omega-3 Fatty Acids (fish oil) 1200 MG capsule capsule Take 1 capsule by mouth Daily. PT HOLDING FOR SURGERY      rosuvastatin (CRESTOR) 20 MG tablet TAKE 1 TABLET BY MOUTH DAILY 90 tablet 1    TURMERIC PO Take 1,400 mg by mouth Daily. PT HOLDING FOR SURGERY      vitamin C (ASCORBIC ACID) 250 MG tablet Take 4 tablets by mouth Daily.      vitamin D3 125 MCG (5000 UT) capsule capsule Take 1 capsule by mouth Daily. PT HOLDING FOR SURGERY      VITAMIN E PO Take 1 capsule by mouth Daily. HOLD PRIOR TO SURG      Zinc 50 MG tablet Take 1 tablet by mouth Daily. PT HOLDING FOR SURGERY      carvedilol (COREG) 12.5 MG tablet Take 1 tablet by mouth Every Morning. (Patient taking differently: Take 1 tablet by mouth Every Morning. Two tablets twice a day)       No facility-administered medications prior to visit.  "    No opioid medication identified on active medication list. I have reviewed chart for other potential  high risk medication/s and harmful drug interactions in the elderly.      Aspirin is not on active medication list.  Aspirin use is not indicated based on review of current medical condition/s. Risk of harm outweighs potential benefits.  .    Patient Active Problem List   Diagnosis    Benign essential hypertension    Type 2 diabetes mellitus, without long-term current use of insulin    Pure hypercholesterolemia    Overweight (BMI 25.0-29.9)    Hashimoto's thyroiditis    Hyperplastic rectal polyp    Female stress incontinence    Feeling of incomplete bladder emptying    Primary osteoarthritis of left knee    S/P TKR (total knee replacement), left    Iron deficiency    Abnormality of right breast on screening mammogram    Malignant neoplasm of overlapping sites of right breast in female, estrogen receptor negative    Fitting and adjustment of vascular catheter    Right bundle branch block (RBBB) with left anterior fascicular block (LAFB)    Aortic stenosis, mild     Advance Care Planning Advance Directive is on file.  ACP discussion was held with the patient during this visit. Patient has an advance directive in EMR which is still valid.             Objective   Vitals:    06/23/25 0852   BP: 124/60   Pulse: 67   Resp: 16   Temp: 96.9 °F (36.1 °C)   TempSrc: Infrared   SpO2: 98%   Weight: 71.7 kg (158 lb)   Height: 152.1 cm (59.88\")   PainSc: 0-No pain       Estimated body mass index is 30.98 kg/m² as calculated from the following:    Height as of this encounter: 152.1 cm (59.88\").    Weight as of this encounter: 71.7 kg (158 lb).                Does the patient have evidence of cognitive impairment? No  Lab Results   Component Value Date    CHLPL 201 (H) 06/16/2025    TRIG 113 06/16/2025    HDL 58 06/16/2025     (H) 06/16/2025    VLDL 20 06/16/2025    HGBA1C 6.80 (H) 06/16/2025                               "                                                                  Health  Risk Assessment    Smoking Status:  Social History     Tobacco Use   Smoking Status Never    Passive exposure: Never   Smokeless Tobacco Never   Tobacco Comments    Caffeine: 1 drink daily     Alcohol Consumption:  Social History     Substance and Sexual Activity   Alcohol Use Never       Fall Risk Screen  STEADI Fall Risk Assessment was completed, and patient is at LOW risk for falls.Assessment completed on:2025    Depression Screening   Little interest or pleasure in doing things? Not at all   Feeling down, depressed, or hopeless? Not at all   PHQ-2 Total Score 0      Health Habits and Functional and Cognitive Screenin/16/2025     8:36 AM   Functional & Cognitive Status   Do you have difficulty preparing food and eating? No   Do you have difficulty bathing yourself, getting dressed or grooming yourself? No   Do you have difficulty using the toilet? No   Do you have difficulty moving around from place to place? No   Do you have trouble with steps or getting out of a bed or a chair? No   Current Diet Other   Dental Exam Up to date   Eye Exam Up to date   Exercise (times per week) Other   Current Exercises Include No Regular Exercise;Stationary Bicycling/Spin Class   Do you need help using the phone?  No   Are you deaf or do you have serious difficulty hearing?  No   Do you need help to go to places out of walking distance? No   Do you need help shopping? No   Do you need help preparing meals?  No   Do you need help with housework?  No   Do you need help with laundry? No   Do you need help taking your medications? No   Do you need help managing money? No   Do you ever drive or ride in a car without wearing a seat belt? No   Have you felt unusual stress, anger or loneliness in the last month? No   Who do you live with? Spouse   If you need help, do you have trouble finding someone available to you? No   Have you been bothered in the  last four weeks by sexual problems? No   Do you have difficulty concentrating, remembering or making decisions? No           Age-appropriate Screening Schedule:  Refer to the list below for future screening recommendations based on patient's age, sex and/or medical conditions. Orders for these recommended tests are listed in the plan section. The patient has been provided with a written plan.    Health Maintenance List  Health Maintenance   Topic Date Due    Pneumococcal Vaccine 50+ (2 of 2 - PCV) 12/16/2025 (Originally 3/21/2018)    COVID-19 Vaccine (1 - 2024-25 season) 12/23/2025 (Originally 9/1/2024)    INFLUENZA VACCINE  07/01/2025    HEMOGLOBIN A1C  12/16/2025    DIABETIC EYE EXAM  03/06/2026    MAMMOGRAM  04/14/2026    LIPID PANEL  06/16/2026    URINE MICROALBUMIN-CREATININE RATIO (uACR)  06/16/2026    ANNUAL WELLNESS VISIT  06/23/2026    DIABETIC FOOT EXAM  06/23/2026    COLORECTAL CANCER SCREENING  02/21/2028    TDAP/TD VACCINES (4 - Td or Tdap) 04/12/2028    DXA SCAN  06/30/2029    HEPATITIS C SCREENING  Completed    ZOSTER VACCINE  Completed                                                                                                                                                CMS Preventative Services Quick Reference  Risk Factors Identified During Encounter  Immunizations Discussed/Encouraged: Prevnar 20 (Pneumococcal 20-valent conjugate) and COVID19    The above risks/problems have been discussed with the patient.  Pertinent information has been shared with the patient in the After Visit Summary.  An After Visit Summary and PPPS were made available to the patient.    Follow Up:   Next Medicare Wellness visit to be scheduled in 1 year.         Additional E&M Note during same encounter follows:  Patient has additional, significant, and separately identifiable condition(s)/problem(s) that require work above and beyond the Medicare Wellness Visit     Chief Complaint  Medicare  "Wellness-subsequent    Subjective   HPI  Pretty is here for a 6 month follow up for HTN, HLD, and type 2 DM. She has been doing well with no interval events.     Review of Systems   Constitutional:  Negative for fatigue.   Respiratory: Negative.     Cardiovascular: Negative.    Endocrine: Negative for polydipsia, polyphagia and polyuria.   Genitourinary: Negative.    Musculoskeletal: Negative.    Neurological:  Negative for dizziness, light-headedness and numbness.   Hematological: Negative.    Psychiatric/Behavioral: Negative.                Objective   Vital Signs:  /60   Pulse 67   Temp 96.9 °F (36.1 °C) (Infrared)   Resp 16   Ht 152.1 cm (59.88\")   Wt 71.7 kg (158 lb)   SpO2 98%   BMI 30.98 kg/m²   Physical Exam  Vitals and nursing note reviewed.   Constitutional:       General: She is not in acute distress.     Appearance: Normal appearance. She is well-developed and well-groomed.   HENT:      Head: Normocephalic.      Right Ear: Tympanic membrane and ear canal normal.      Left Ear: Tympanic membrane and ear canal normal.      Nose: Nose normal.      Mouth/Throat:      Pharynx: Oropharynx is clear.   Cardiovascular:      Rate and Rhythm: Normal rate and regular rhythm.      Pulses:           Dorsalis pedis pulses are 2+ on the right side and 2+ on the left side.      Heart sounds: Normal heart sounds.   Pulmonary:      Effort: Pulmonary effort is normal.      Breath sounds: Normal breath sounds.   Feet:      Right foot:      Skin integrity: Skin integrity normal.      Left foot:      Skin integrity: Skin integrity normal.      Comments: Diabetic foot exam: performed   Left: Filament test present   Pulses Dorsalis Pedis:  present    Vibratory sensation normal   Proprioception normal        Right: Filament test present   Pulses Dorsalis Pedis:  present   Vibratory sensation normal   Proprioception normal       Skin:     General: Skin is warm and dry.   Neurological:      Mental Status: She is alert " and oriented to person, place, and time.   Psychiatric:         Attention and Perception: Attention normal.         The following data was reviewed by: CLEVELAND Rodriguez on 06/23/2025:    Infusion on 06/20/2025   Component Date Value Ref Range Status    Glucose 06/20/2025 96  65 - 99 mg/dL Final    BUN 06/20/2025 10.6  8.0 - 23.0 mg/dL Final    Creatinine 06/20/2025 0.44 (L)  0.57 - 1.00 mg/dL Final    Sodium 06/20/2025 140  136 - 145 mmol/L Final    Potassium 06/20/2025 3.7  3.5 - 5.2 mmol/L Final    Chloride 06/20/2025 101  98 - 107 mmol/L Final    CO2 06/20/2025 27.5  22.0 - 29.0 mmol/L Final    Calcium 06/20/2025 9.8  8.6 - 10.5 mg/dL Final    Total Protein 06/20/2025 7.4  6.0 - 8.5 g/dL Final    Albumin 06/20/2025 4.5  3.5 - 5.2 g/dL Final    ALT (SGPT) 06/20/2025 27  1 - 33 U/L Final    AST (SGOT) 06/20/2025 27  1 - 32 U/L Final    Alkaline Phosphatase 06/20/2025 66  39 - 117 U/L Final    Total Bilirubin 06/20/2025 0.2  0.0 - 1.2 mg/dL Final    Globulin 06/20/2025 2.9  gm/dL Final    A/G Ratio 06/20/2025 1.6  g/dL Final    BUN/Creatinine Ratio 06/20/2025 24.1  7.0 - 25.0 Final    Anion Gap 06/20/2025 11.5  5.0 - 15.0 mmol/L Final    eGFR 06/20/2025 104.9  >60.0 mL/min/1.73 Final    WBC 06/20/2025 5.02  3.40 - 10.80 10*3/mm3 Final    RBC 06/20/2025 3.89  3.77 - 5.28 10*6/mm3 Final    Hemoglobin 06/20/2025 11.5 (L)  12.0 - 15.9 g/dL Final    Hematocrit 06/20/2025 35.6  34.0 - 46.6 % Final    MCV 06/20/2025 91.5  79.0 - 97.0 fL Final    MCH 06/20/2025 29.6  26.6 - 33.0 pg Final    MCHC 06/20/2025 32.3  31.5 - 35.7 g/dL Final    RDW 06/20/2025 12.2 (L)  12.3 - 15.4 % Final    RDW-SD 06/20/2025 40.5  37.0 - 54.0 fl Final    MPV 06/20/2025 10.1  6.0 - 12.0 fL Final    Platelets 06/20/2025 220  140 - 450 10*3/mm3 Final    Neutrophil % 06/20/2025 51.5  42.7 - 76.0 % Final    Lymphocyte % 06/20/2025 28.5  19.6 - 45.3 % Final    Monocyte % 06/20/2025 8.8  5.0 - 12.0 % Final    Eosinophil % 06/20/2025 10.2 (H)  0.3  - 6.2 % Final    Basophil % 06/20/2025 0.8  0.0 - 1.5 % Final    Immature Grans % 06/20/2025 0.2  0.0 - 0.5 % Final    Neutrophils, Absolute 06/20/2025 2.59  1.70 - 7.00 10*3/mm3 Final    Lymphocytes, Absolute 06/20/2025 1.43  0.70 - 3.10 10*3/mm3 Final    Monocytes, Absolute 06/20/2025 0.44  0.10 - 0.90 10*3/mm3 Final    Eosinophils, Absolute 06/20/2025 0.51 (H)  0.00 - 0.40 10*3/mm3 Final    Basophils, Absolute 06/20/2025 0.04  0.00 - 0.20 10*3/mm3 Final    Immature Grans, Absolute 06/20/2025 0.01  0.00 - 0.05 10*3/mm3 Final    nRBC 06/20/2025 0.0  0.0 - 0.2 /100 WBC Final   Orders Only on 06/06/2025   Component Date Value Ref Range Status    WBC 06/16/2025 4.29  3.40 - 10.80 10*3/mm3 Final    RBC 06/16/2025 4.06  3.77 - 5.28 10*6/mm3 Final    Hemoglobin 06/16/2025 12.4  12.0 - 15.9 g/dL Final    Hematocrit 06/16/2025 37.4  34.0 - 46.6 % Final    MCV 06/16/2025 92.1  79.0 - 97.0 fL Final    MCH 06/16/2025 30.5  26.6 - 33.0 pg Final    MCHC 06/16/2025 33.2  31.5 - 35.7 g/dL Final    RDW 06/16/2025 12.7  12.3 - 15.4 % Final    Platelets 06/16/2025 231  140 - 450 10*3/mm3 Final    Neutrophil Rel % 06/16/2025 46.1  42.7 - 76.0 % Final    Lymphocyte Rel % 06/16/2025 30.3  19.6 - 45.3 % Final    Monocyte Rel % 06/16/2025 12.4 (H)  5.0 - 12.0 % Final    Eosinophil Rel % 06/16/2025 9.8 (H)  0.3 - 6.2 % Final    Basophil Rel % 06/16/2025 1.2  0.0 - 1.5 % Final    Neutrophils Absolute 06/16/2025 1.98  1.70 - 7.00 10*3/mm3 Final    Lymphocytes Absolute 06/16/2025 1.30  0.70 - 3.10 10*3/mm3 Final    Monocytes Absolute 06/16/2025 0.53  0.10 - 0.90 10*3/mm3 Final    Eosinophils Absolute 06/16/2025 0.42 (H)  0.00 - 0.40 10*3/mm3 Final    Basophils Absolute 06/16/2025 0.05  0.00 - 0.20 10*3/mm3 Final    Immature Granulocyte Rel % 06/16/2025 0.2  0.0 - 0.5 % Final    Immature Grans Absolute 06/16/2025 0.01  0.00 - 0.05 10*3/mm3 Final    nRBC 06/16/2025 0.0  0.0 - 0.2 /100 WBC Final    Glucose 06/16/2025 132 (H)  65 - 99 mg/dL  Final    BUN 06/16/2025 18.0  8.0 - 23.0 mg/dL Final    Creatinine 06/16/2025 0.72  0.57 - 1.00 mg/dL Final    EGFR Result 06/16/2025 90.6  >60.0 mL/min/1.73 Final    Comment: GFR Categories in Chronic Kidney Disease (CKD)  GFR Category          GFR (mL/min/1.73)    Interpretation  G1                    90 or greater        Normal or high  (1)  G2                    60-89                Mild decrease  (1)  G3a                   45-59                Mild to moderate  decrease  G3b                   30-44                Moderate to  severe decrease  G4                    15-29                Severe decrease  G5                    14 or less           Kidney failure  (1)In the absence of evidence of kidney disease, neither  GFR category G1 or G2 fulfill the criteria for CKD.  eGFR calculation 2021 CKD-EPI creatinine equation, which  does not include race as a factor      BUN/Creatinine Ratio 06/16/2025 25.0  7.0 - 25.0 Final    Sodium 06/16/2025 139  136 - 145 mmol/L Final    Potassium 06/16/2025 5.1  3.5 - 5.2 mmol/L Final    Chloride 06/16/2025 100  98 - 107 mmol/L Final    Total CO2 06/16/2025 27.7  22.0 - 29.0 mmol/L Final    Calcium 06/16/2025 9.8  8.6 - 10.5 mg/dL Final    Total Protein 06/16/2025 7.8  6.0 - 8.5 g/dL Final    Albumin 06/16/2025 4.4  3.5 - 5.2 g/dL Final    Globulin 06/16/2025 3.4  gm/dL Final    A/G Ratio 06/16/2025 1.3  g/dL Final    Total Bilirubin 06/16/2025 0.5  0.0 - 1.2 mg/dL Final    Alkaline Phosphatase 06/16/2025 70  39 - 117 U/L Final    AST (SGOT) 06/16/2025 34 (H)  1 - 32 U/L Final    ALT (SGPT) 06/16/2025 25  1 - 33 U/L Final    Total Cholesterol 06/16/2025 201 (H)  0 - 200 mg/dL Final    Comment: Cholesterol Reference Ranges  (U.S. Department of Health and Human Services ATP III  Classifications)  Desirable          <200 mg/dL  Borderline High    200-239 mg/dL  High Risk          >240 mg/dL  Triglyceride Reference Ranges  (U.S. Department of Health and Human Services ATP  III  Classifications)  Normal           <150 mg/dL  Borderline High  150-199 mg/dL  High             200-499 mg/dL  Very High        >500 mg/dL  HDL Reference Ranges  (U.S. Department of Health and Human Services ATP III  Classifications)  Low     <40 mg/dl (major risk factor for CHD)  High    >60 mg/dl ('negative' risk factor for CHD)  LDL Reference Ranges  (U.S. Department of Health and Human Services ATP III  Classifications)  Optimal          <100 mg/dL  Near Optimal     100-129 mg/dL  Borderline High  130-159 mg/dL  High             160-189 mg/dL  Very High        >189 mg/dL  LDL is calculated using the [facility] LDL-C calculation.      Triglycerides 06/16/2025 113  0 - 150 mg/dL Final    HDL Cholesterol 06/16/2025 58  40 - 60 mg/dL Final    VLDL Cholesterol Kristian 06/16/2025 20  5 - 40 mg/dL Final    LDL Chol Calc ([facility]) 06/16/2025 123 (H)  0 - 100 mg/dL Final    LDL/HDL RATIO 06/16/2025 2.08   Final    TSH 06/16/2025 1.530  0.270 - 4.200 uIU/mL Final    Specific Gravity, UA 06/16/2025 1.008  1.005 - 1.030 Final    pH, UA 06/16/2025 7.0  5.0 - 8.0 Final    Color, UA 06/16/2025 Yellow   Final    REFERENCE RANGE: Yellow, Straw    Appearance, UA 06/16/2025 Clear  Clear Final    Leukocytes, UA 06/16/2025 See below: (A)  Negative Final    Moderate (2+)    Protein 06/16/2025 Negative  Negative Final    Glucose, UA 06/16/2025 Negative  Negative Final    Ketones 06/16/2025 Negative  Negative Final    Blood, UA 06/16/2025 Negative  Negative Final    Bilirubin, UA 06/16/2025 Negative  Negative Final    Urobilinogen, UA 06/16/2025 Comment   Final    Comment: 0.2 E.U./dL  REFERENCE RANGE: 0.2 - 1.0 E.U./dL      Nitrite, UA 06/16/2025 Negative  Negative Final    Hemoglobin A1C 06/16/2025 6.80 (H)  4.80 - 5.60 % Final    Comment: Hemoglobin A1C Ranges:  Increased Risk for Diabetes  5.7% to 6.4%  Diabetes                     >= 6.5%  Diabetic Goal                < 7.0%      Creatinine, Urine 06/16/2025 21.3  Not Estab. mg/dL Final     Microalbumin, Urine 06/16/2025 3.4  Not Estab. ug/mL Final    Microalbumin/Creatinine Ratio 06/16/2025 16  0 - 29 mg/g creat Final    Comment:                        Normal:                0 -  29                         Moderately increased: 30 - 300                         Severely increased:       >300      WBC, UA 06/16/2025 11-20 (A)  /HPF Final    REFERENCE RANGE: None Seen, 0-2    RBC, UA 06/16/2025 0-2  /HPF Final    REFERENCE RANGE: None Seen, 0-2    Epithelial Cells (non renal) 06/16/2025 0-2  /HPF Final    REFERENCE RANGE: None Seen, 0-2    Cast Type 06/16/2025 Comment   Final    HYALINE CASTS, UA            None Seen        /LPF       None Seen  N    Bacteria, UA 06/16/2025 Comment  None Seen /HPF Final    None Seen   Infusion on 05/30/2025   Component Date Value Ref Range Status    Glucose 05/30/2025 109 (H)  65 - 99 mg/dL Final    BUN 05/30/2025 11.9  8.0 - 23.0 mg/dL Final    Creatinine 05/30/2025 0.54 (L)  0.57 - 1.00 mg/dL Final    Sodium 05/30/2025 141  136 - 145 mmol/L Final    Potassium 05/30/2025 3.9  3.5 - 5.2 mmol/L Final    Chloride 05/30/2025 99  98 - 107 mmol/L Final    CO2 05/30/2025 29.9 (H)  22.0 - 29.0 mmol/L Final    Calcium 05/30/2025 10.0  8.6 - 10.5 mg/dL Final    Total Protein 05/30/2025 7.8  6.0 - 8.5 g/dL Final    Albumin 05/30/2025 4.6  3.5 - 5.2 g/dL Final    ALT (SGPT) 05/30/2025 36 (H)  1 - 33 U/L Final    AST (SGOT) 05/30/2025 31  1 - 32 U/L Final    Alkaline Phosphatase 05/30/2025 72  39 - 117 U/L Final    Total Bilirubin 05/30/2025 0.3  0.0 - 1.2 mg/dL Final    Globulin 05/30/2025 3.2  gm/dL Final    A/G Ratio 05/30/2025 1.4  g/dL Final    BUN/Creatinine Ratio 05/30/2025 22.0  7.0 - 25.0 Final    Anion Gap 05/30/2025 12.1  5.0 - 15.0 mmol/L Final    eGFR 05/30/2025 99.8  >60.0 mL/min/1.73 Final    WBC 05/30/2025 5.26  3.40 - 10.80 10*3/mm3 Final    RBC 05/30/2025 4.14  3.77 - 5.28 10*6/mm3 Final    Hemoglobin 05/30/2025 12.1  12.0 - 15.9 g/dL Final    Hematocrit 05/30/2025  38.1  34.0 - 46.6 % Final    MCV 05/30/2025 92.0  79.0 - 97.0 fL Final    MCH 05/30/2025 29.2  26.6 - 33.0 pg Final    MCHC 05/30/2025 31.8  31.5 - 35.7 g/dL Final    RDW 05/30/2025 12.5  12.3 - 15.4 % Final    RDW-SD 05/30/2025 42.3  37.0 - 54.0 fl Final    MPV 05/30/2025 10.2  6.0 - 12.0 fL Final    Platelets 05/30/2025 253  140 - 450 10*3/mm3 Final    Neutrophil % 05/30/2025 51.8  42.7 - 76.0 % Final    Lymphocyte % 05/30/2025 29.7  19.6 - 45.3 % Final    Monocyte % 05/30/2025 8.2  5.0 - 12.0 % Final    Eosinophil % 05/30/2025 9.5 (H)  0.3 - 6.2 % Final    Basophil % 05/30/2025 0.6  0.0 - 1.5 % Final    Immature Grans % 05/30/2025 0.2  0.0 - 0.5 % Final    Neutrophils, Absolute 05/30/2025 2.73  1.70 - 7.00 10*3/mm3 Final    Lymphocytes, Absolute 05/30/2025 1.56  0.70 - 3.10 10*3/mm3 Final    Monocytes, Absolute 05/30/2025 0.43  0.10 - 0.90 10*3/mm3 Final    Eosinophils, Absolute 05/30/2025 0.50 (H)  0.00 - 0.40 10*3/mm3 Final    Basophils, Absolute 05/30/2025 0.03  0.00 - 0.20 10*3/mm3 Final    Immature Grans, Absolute 05/30/2025 0.01  0.00 - 0.05 10*3/mm3 Final    nRBC 05/30/2025 0.0  0.0 - 0.2 /100 WBC Final            Assessment and Plan      Benign essential hypertension  Hypertension is stable and controlled  Continue current treatment regimen.  Blood pressure will be reassessed in 6 months.         Pure hypercholesterolemia   Continue atorvastatin          Hashimoto's thyroiditis  Continue levothyroxine        Type 2 diabetes mellitus with both eyes affected by mild nonproliferative retinopathy without macular edema, without long-term current use of insulin  Diabetes is stable.   Continue current treatment regimen.  Diabetes will be reassessed in 6 months                 Follow Up   Return in about 6 months (around 12/23/2025) for fasting labs.  Patient was given instructions and counseling regarding her condition or for health maintenance advice. Please see specific information pulled into the AVS if  appropriate.

## 2025-06-23 NOTE — THERAPY TREATMENT NOTE
"Outpatient Occupational Therapy Lymphedema Treatment Note  [facility]     Patient Name: Pretty Tovar  : 1955  MRN: [id number]  Today's Date: 2025      Visit Date: 2025    Patient Active Problem List   Diagnosis    Benign essential hypertension    Type 2 diabetes mellitus, without long-term current use of insulin    Pure hypercholesterolemia    Overweight (BMI 25.0-29.9)    Hashimoto's thyroiditis    Hyperplastic rectal polyp    Female stress incontinence    Feeling of incomplete bladder emptying    Primary osteoarthritis of left knee    S/P TKR (total knee replacement), left    Iron deficiency    Abnormality of right breast on screening mammogram    Malignant neoplasm of overlapping sites of right breast in female, estrogen receptor negative    Fitting and adjustment of vascular catheter    Right bundle branch block (RBBB) with left anterior fascicular block (LAFB)    Aortic stenosis, mild        Past Medical History:   Diagnosis Date    Abnormal liver function tests 2017    Description: , \"-\"hepatitis profile    Anemia     Anesthesia complication     SLOW TO WAKE UP WITH HYSTERECTOMY    Arthritis     Breast cancer May 2024    Cancer 24    Right Breast    Cholesterol blood reduced     Colon polyps     Cystocele, lateral 10/15/2020    Cystocele, midline 10/15/2020    Diabetes mellitus     Frequent UTI     H/O bone density study 10/2010    normal    H/O cardiovascular stress test     H/O echocardiogram 2001    2-D    Hashimoto's thyroiditis     Heart murmur     History of high cholesterol 1988    Hyperkalemia 2019    Hyperlipidemia     Hypertension     Hypothyroid 2018    Hypothyroidism 2018    Incompetence of pubocervical tissue 10/15/2020    Incompetence of rectovaginal tissue 10/15/2020    Insulin dependent diabetes mellitus     Limited joint range of motion     LEFT KNEE    Loss of perineal body, female 10/15/2020    Multiple gestation Born [birth year]    " Osteoarthritis of knee 2005    left    Pelvic relaxation due to vaginal prolapse 2018    Plantar warts     foot    Postmenopausal bleeding 2019    Pregnancy      twins x1    Psoriasis     RA (rheumatoid arthritis)     Rectocele 10/15/2020    Urinary tract infection     Urine incontinence     BETTER WITH SLING    Uterovaginal prolapse     Uterovaginal prolapse, incomplete 2020    Vaginal enterocele 10/15/2020    Varicella Approx 8 yrs old        Past Surgical History:   Procedure Laterality Date    BREAST BIOPSY Right 2006    benign    COLONOSCOPY      COLONOSCOPY W/ POLYPECTOMY  2018    Dr Resendiz, 6 mm rectal polyp - hyperplastic    JOINT MANIPULATION Left 2021    Procedure: KNEE MANIPULATION with steroid injection;  Surgeon: Francis Tavarez MD;  Location: [facility];  Service: Orthopedics;  Laterality: Left;    JOINT REPLACEMENT  08/10/2021    LAPAROSCOPIC ASSISTED VAGINAL HYSTERECTOMY SALPINGO OOPHORECTOMY  2020    LYMPH NODE BIOPSY  2024    MASTECTOMY W/ SENTINEL NODE BIOPSY Right 2024    Procedure: RIGHT MASTECTOMY WITH SENTINEL NODE BIOPSY;  Surgeon: Aracely Frye MD;  Location: [facility];  Service: General;  Laterality: Right;    PUBOVAGINAL SLING N/A 10/16/2020    Procedure: Pubovaginal sling Posterior colporrhaphy fixation Insertion of vaginal grafts Cystourethroscopy;  Surgeon: Karen Allen MD;  Location: [facility];  Service: Gynecology;  Laterality: N/A;    TOTAL KNEE ARTHROPLASTY Left 08/10/2021    Procedure: TOTAL KNEE ARTHROPLASTY;  Surgeon: Francis Tavarez MD;  Location: [facility];  Service: Orthopedics;  Laterality: Left;    TOTAL LAPAROSCOPIC HYSTERECTOMY, SACROCOLPOPEXY N/A 10/16/2020    Procedure: Laparoscopic uterosacral ligament colpopexy sacral colpopexy  Laparoscopic paravaginal repair Laparoscopic hysterectomy with bilateral salpingectomy  Laparoscopic abdominal enterocele repair;  Surgeon: Karen Allen  MD;  Location: [facility] OR;  Service: Gynecology;  Laterality: N/A;    TUBAL ABDOMINAL LIGATION      VAGINAL PROLAPSE REPAIR  October 2020    VENOUS ACCESS DEVICE (PORT) INSERTION N/A 07/03/2024    Procedure: INSERTION OF PORTACATH;  Surgeon: Aracely Frye MD;  Location: [facility] MAIN OR;  Service: General;  Laterality: N/A;         Visit Dx:      ICD-10-CM ICD-9-CM   1. Malignant neoplasm of overlapping sites of right breast in female, estrogen receptor negative  C50.811 174.8    Z17.1 V86.1   2. At risk for lymphedema  Z91.89 V49.89   3. Scar adherent  L90.5 709.2   4. Post-mastectomy lymphedema syndrome  I97.2 457.0   5. Soft tissue swelling of chest wall  R22.2 786.6        Lymphedema       Row Name 06/23/25 1000             Subjective Pain    Able to rate subjective pain? yes  -RE      Pre-Treatment Pain Level 0  -RE      Post-Treatment Pain Level 0  -RE         Manual Lymphatic Drainage    Manual Lymphatic Drainage initial sequence;opened regional lymph nodes;opened anastamoses;extremity treatment  -RE      Initial Sequence short neck  -RE      Opened Regional Lymph Nodes axillary  -RE      Axillary left  -RE      Opened Anastamoses anterior axillo-axillary  -RE      Extremity Treatment extremity treatment focus on  R chest wall  -RE      Manual Therapy scar massage-R mast scar  -RE                User Key  (r) = Recorded By, (t) = Taken By, (c) = Cosigned By      Initials Name Provider Type    RE Yasemin Elizabeth OTR Occupational Therapist                             OT Assessment/Plan       Row Name 06/23/25 0186          OT Assessment    Assessment Comments Edema in the right chest wall slightly worse today than on patient's last visit.  Following manual lymph drainage the areas of lymphedema are softer indicating improved lymphatic flow and decreased edema.  Following scar massage patient had slightly increased soft tissue mobility and scar mobility.  Patient tolerated scar massage very well.  -RE        OT  Plan    OT Plan Comments Continue OT for chest wall lymphedema and adherent scar  -RE               User Key  (r) = Recorded By, (t) = Taken By, (c) = Cosigned By      Initials Name Provider Type    Yasemin Bradford OTR Occupational Therapist                        Manual Rx (Last 36 Hours)       Manual Treatments       Row Name 06/23/25 1124             Total Minutes    95680 - OT Manual Therapy Minutes 35  -RE                User Key  (r) = Recorded By, (t) = Taken By, (c) = Cosigned By      Initials Name Provider Type    Yasemin Bradford OTR Occupational Therapist                      Therapy Education  Given: Symptoms/condition management  Program: Reinforced  How Provided: Verbal  Provided to: Patient  Level of Understanding: Teach back education performed, Verbalized  80065 - OT Self Care/Mgmt Minutes: 5                Time Calculation:   OT Start Time: 1035  OT Stop Time: 1115  OT Time Calculation (min): 40 min  Total Timed Code Minutes- OT: 40 minute(s)  Timed Charges  55089 - OT Manual Therapy Minutes: 35  72644 - OT Self Care/Mgmt Minutes: 5  Total Minutes  Timed Charges Total Minutes: 40   Total Minutes: 40     Therapy Charges for Today       Code Description Service Date Service Provider Modifiers Qty    [id number]  OT MANUAL THERAPY EA 15 MIN 6/23/2025 Yasemin Elizabeth OTR GO 3            Dictated utilizing Dragon dictation:  Much of this encounter note is an electronic transcription/translation of spoken language to printed text. The electronic translation of spoken language may permit erroneous, or at times, nonsensical words or phrases to be inadvertently transcribed; Although I have reviewed the note for such errors, some may still exist.            RIKY Chacon  6/23/2025

## 2025-06-26 ENCOUNTER — OFFICE VISIT (OUTPATIENT)
Dept: CARDIOLOGY | Age: 70
End: 2025-06-26
Payer: MEDICARE

## 2025-06-26 VITALS
WEIGHT: 158 LBS | BODY MASS INDEX: 31.02 KG/M2 | HEIGHT: 60 IN | SYSTOLIC BLOOD PRESSURE: 140 MMHG | HEART RATE: 63 BPM | DIASTOLIC BLOOD PRESSURE: 76 MMHG

## 2025-06-26 DIAGNOSIS — E78.00 PURE HYPERCHOLESTEROLEMIA: ICD-10-CM

## 2025-06-26 DIAGNOSIS — E11.3293 TYPE 2 DIABETES MELLITUS WITH BOTH EYES AFFECTED BY MILD NONPROLIFERATIVE RETINOPATHY WITHOUT MACULAR EDEMA, WITHOUT LONG-TERM CURRENT USE OF INSULIN: ICD-10-CM

## 2025-06-26 DIAGNOSIS — I10 BENIGN ESSENTIAL HYPERTENSION: ICD-10-CM

## 2025-06-26 DIAGNOSIS — I45.2 RIGHT BUNDLE BRANCH BLOCK (RBBB) WITH LEFT ANTERIOR FASCICULAR BLOCK (LAFB): ICD-10-CM

## 2025-06-26 DIAGNOSIS — C50.811 MALIGNANT NEOPLASM OF OVERLAPPING SITES OF RIGHT BREAST IN FEMALE, ESTROGEN RECEPTOR NEGATIVE: Primary | ICD-10-CM

## 2025-06-26 DIAGNOSIS — Z17.1 MALIGNANT NEOPLASM OF OVERLAPPING SITES OF RIGHT BREAST IN FEMALE, ESTROGEN RECEPTOR NEGATIVE: Primary | ICD-10-CM

## 2025-06-26 PROCEDURE — 3078F DIAST BP <80 MM HG: CPT | Performed by: INTERNAL MEDICINE

## 2025-06-26 PROCEDURE — 3077F SYST BP >= 140 MM HG: CPT | Performed by: INTERNAL MEDICINE

## 2025-06-26 PROCEDURE — 93000 ELECTROCARDIOGRAM COMPLETE: CPT | Performed by: INTERNAL MEDICINE

## 2025-06-26 PROCEDURE — 99214 OFFICE O/P EST MOD 30 MIN: CPT | Performed by: INTERNAL MEDICINE

## 2025-06-26 RX ORDER — CARVEDILOL 12.5 MG/1
12.5 TABLET ORAL 2 TIMES DAILY WITH MEALS
Qty: 180 TABLET | Refills: 3
Start: 2025-06-26

## 2025-06-26 RX ORDER — SPIRONOLACTONE 25 MG/1
25 TABLET ORAL DAILY
Qty: 90 TABLET | Refills: 3 | Status: SHIPPED | OUTPATIENT
Start: 2025-06-26

## 2025-06-26 NOTE — PROGRESS NOTES
Date of Office Visit: 2025  Encounter Provider: María Ramirez MD  Place of Service: [facility] CARDIOLOGY  Patient Name: Pretty Tovar  :1955      Patient ID:  Pretty Tovar is a 69 y.o. 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    She is , has 3 children, owns the [employer], has never smoked, uses no drugs, uses no caffeine.  Her brother myocardial infarction, her father myocardial infarction.  Her dad had hypertension to her brothers have had hypertension.  3 of her brothers have diabetes.    She was diagnosed with invasive mammary carcinoma on 7/3/2024 of the right breast-2024 started Taxol (paclitaxel) and Herceptin (trastuzumab) x 12 followed by Herceptin every 3 weeks for a year.    Echocardiogram done 2024 showed ejection fraction of 60.9% with normal diastolic function, global longitudinal strain of -21.4%, normal RVSP, mild aortic valve stenosis.    Echocardiogram done 10/9/2024 shows ejection fraction 59% with global longitudinal strain -21.3%, normal diastolic function, normal valvular structure and function, no change from prior echocardiogram.  She had minimal bilateral carotid artery stenosis on carotid duplex studies done 8/15/2024.    She saw Dr. Belcher 2024.  She has been treated with weekly Taxol and Herceptin.  She developed a rash.  She was changed to Abraxane (paclitaxel) 2024 due to a rash and the rash improved with that change.  She is now taking Herceptin every 3 weeks.High sensitive troponin proBNP were normal 2024.  She finished Abraxane 10/11/2024.  She will remain on Herceptin for a full year.    Labs done 2025 show  "normal CMP normal CBC.  Lipids on 2025 show total cholesterol 201, HDL 50, , triglycerides 113, normal TSH, hemoglobin A1c 6.8%.  Echocardiogram done 2025 showed ejection action 63% with normal diastolic function, GLS -23.2%, mild aortic stenosis.  This was unchanged from prior study.    Last Herceptin dose will be 2025.  She has no chest pain or pressure.  She has no orthopnea or PND.  She takes her medications as directed.  She has no lower extremity edema.  She does have some mild fatigue.  She denies exertional chest heaviness or pressure.  She and her  plan to retire this year.  She has chronic venous insufficiency.    Past Medical History:   Diagnosis Date    Abnormal liver function tests 2017    Description: , \"-\"hepatitis profile    Anemia     Anesthesia complication     SLOW TO WAKE UP WITH HYSTERECTOMY    Arthritis     Breast cancer May 2024    Cancer 24    Right Breast    Cholesterol blood reduced     Colon polyps     Cystocele, lateral 10/15/2020    Cystocele, midline 10/15/2020    Diabetes mellitus     Frequent UTI     H/O bone density study 10/2010    normal    H/O cardiovascular stress test     H/O echocardiogram 2001    2-D    Hashimoto's thyroiditis     Heart murmur     History of high cholesterol 1988    Hyperkalemia 2019    Hyperlipidemia     Hypertension     Hypothyroid 2018    Hypothyroidism 2018    Incompetence of pubocervical tissue 10/15/2020    Incompetence of rectovaginal tissue 10/15/2020    Insulin dependent diabetes mellitus     Limited joint range of motion     LEFT KNEE    Loss of perineal body, female 10/15/2020    Multiple gestation Born [birth year]    Osteoarthritis of knee 2005    left    Pelvic relaxation due to vaginal prolapse 2018    Plantar warts     foot    Postmenopausal bleeding 2019    Pregnancy      twins x1    Psoriasis     RA (rheumatoid arthritis)     Rectocele 10/15/2020    Urinary tract infection 2020 "    Urine incontinence     BETTER WITH SLING    Uterovaginal prolapse     Uterovaginal prolapse, incomplete 09/14/2020    Vaginal enterocele 10/15/2020    Varicella Approx 8 yrs old         Past Surgical History:   Procedure Laterality Date    BREAST BIOPSY Right 2006    benign    COLONOSCOPY      COLONOSCOPY W/ POLYPECTOMY  02/21/2018    Dr Resendiz, 6 mm rectal polyp - hyperplastic    JOINT MANIPULATION Left 11/02/2021    Procedure: KNEE MANIPULATION with steroid injection;  Surgeon: Francis Tavarez MD;  Location: [facility];  Service: Orthopedics;  Laterality: Left;    JOINT REPLACEMENT  08/10/2021    LAPAROSCOPIC ASSISTED VAGINAL HYSTERECTOMY SALPINGO OOPHORECTOMY  October 2020    LYMPH NODE BIOPSY  07/03/2024    MASTECTOMY W/ SENTINEL NODE BIOPSY Right 07/03/2024    Procedure: RIGHT MASTECTOMY WITH SENTINEL NODE BIOPSY;  Surgeon: Aracely Frye MD;  Location: [facility];  Service: General;  Laterality: Right;    PUBOVAGINAL SLING N/A 10/16/2020    Procedure: Pubovaginal sling Posterior colporrhaphy fixation Insertion of vaginal grafts Cystourethroscopy;  Surgeon: Karen Allen MD;  Location: [facility] OR;  Service: Gynecology;  Laterality: N/A;    TOTAL KNEE ARTHROPLASTY Left 08/10/2021    Procedure: TOTAL KNEE ARTHROPLASTY;  Surgeon: Francis Tavarez MD;  Location: [facility];  Service: Orthopedics;  Laterality: Left;    TOTAL LAPAROSCOPIC HYSTERECTOMY, SACROCOLPOPEXY N/A 10/16/2020    Procedure: Laparoscopic uterosacral ligament colpopexy sacral colpopexy  Laparoscopic paravaginal repair Laparoscopic hysterectomy with bilateral salpingectomy  Laparoscopic abdominal enterocele repair;  Surgeon: Karen Allen MD;  Location: [facility];  Service: Gynecology;  Laterality: N/A;    TUBAL ABDOMINAL LIGATION      VAGINAL PROLAPSE REPAIR  October 2020    VENOUS ACCESS DEVICE (PORT) INSERTION N/A 07/03/2024    Procedure: INSERTION OF PORTACATH;  Surgeon: Aracely Frye MD;  Location: [facility]  OR;  Service: General;  Laterality: N/A;       Current Outpatient Medications on File Prior to Visit   Medication Sig Dispense Refill    BD PEN NEEDLE SEUN U/F 32G X 4 MM misc USE 1 PEN NEEDLE WITH EACH  VICTOZA INJECTION 100 each 1    Calcium Carbonate-Vit D-Min (CALCIUM 1200 PO) Take 1 tablet by mouth Daily. PT HOLDING FOR SURGERY      Cinnamon 500 MG tablet Take 1,000 mg by mouth 2 (two) times a day. PT HOLDING FOR SURGERY      Coenzyme Q10 (CO Q 10 PO) Take 1 capsule by mouth Daily. PT HOLDING FOR SURGERY      CRANBERRY-VITAMIN C-D MANNOSE PO Take 2 capsules by mouth Daily. PT HOLDING FOR SURGERY      glucose blood (OneTouch Verio) test strip Use as directed to CHECK BLOOD GLUCOSE ONCE DAILY. 100 each 3    hydrocortisone 2.5 % cream Apply 1 Application topically to the appropriate area as directed 2 (Two) Times a Day. 3.5 g 0    levothyroxine (SYNTHROID, LEVOTHROID) 88 MCG tablet TAKE 1 TABLET BY MOUTH DAILY 90 tablet 3    lidocaine-prilocaine (EMLA) 2.5-2.5 % cream Apply 1 Application topically to the appropriate area as directed As Needed for Mild Pain.      lidocaine-prilocaine (EMLA) 2.5-2.5 % cream Apply nickel-sized amount over Mediport site 30 min prior to access. Do not rub in. 30 g 3    Liraglutide (VICTOZA) 18 MG/3ML solution pen-injector injection DIAL AND INJECT UNDER THE SKIN 1.2 MG DAILY 6 mL 5    lisinopril (PRINIVIL,ZESTRIL) 20 MG tablet TAKE 1 TABLET BY MOUTH DAILY 90 tablet 3    lisinopril-hydrochlorothiazide (PRINZIDE,ZESTORETIC) 20-25 MG per tablet TAKE 1 TABLET BY MOUTH DAILY 90 tablet 3    loratadine (Claritin) 10 MG tablet Take 1 tablet by mouth Every Night.      metFORMIN (GLUCOPHAGE) 500 MG tablet TAKE THREE TABLETS BY MOUTH EVERY MORNING AND TAKE TWO TABLETS BY MOUTH EVERY EVENING 450 tablet 3    multivitamin with minerals tablet tablet Take 1 tablet by mouth Daily. PT HOLDING FOR SURGERY      Omega-3 Fatty Acids (fish oil) 1200 MG capsule capsule Take 1 capsule by mouth Daily. PT  "HOLDING FOR SURGERY      rosuvastatin (CRESTOR) 20 MG tablet TAKE 1 TABLET BY MOUTH DAILY 90 tablet 1    TURMERIC PO Take 1,400 mg by mouth Daily. PT HOLDING FOR SURGERY      vitamin C (ASCORBIC ACID) 250 MG tablet Take 4 tablets by mouth Daily.      vitamin D3 125 MCG (5000 UT) capsule capsule Take 1 capsule by mouth Daily. PT HOLDING FOR SURGERY      VITAMIN E PO Take 1 capsule by mouth Daily. HOLD PRIOR TO SURG      Zinc 50 MG tablet Take 1 tablet by mouth Daily. PT HOLDING FOR SURGERY      [DISCONTINUED] carvedilol (COREG) 12.5 MG tablet Take 1 tablet by mouth 2 (Two) Times a Day With Meals. 60 tablet 1    [DISCONTINUED] carvedilol (COREG) 25 MG tablet Take 1 tablet by mouth Every Night. 90 tablet 0     No current facility-administered medications on file prior to visit.       Social History     Socioeconomic History    Marital status:      Spouse name: SUZANNE     Number of children: 3   Tobacco Use    Smoking status: Never     Passive exposure: Never    Smokeless tobacco: Never    Tobacco comments:     Caffeine: 1 drink daily   Vaping Use    Vaping status: Never Used   Substance and Sexual Activity    Alcohol use: Never    Drug use: Never    Sexual activity: Yes     Partners: Male     Birth control/protection: Post-menopausal, Tubal ligation, Hysterectomy, Partner of same sex, Surgical     Comment: SPOUSE = SUZANNE              Procedures    ECG 12 Lead    Date/Time: 6/26/2025 8:02 AM  Performed by: María Ramirez MD    Authorized by: María Ramirez MD  Comparison: compared with previous ECG   Similar to previous ECG  Rhythm: sinus rhythm  Conduction: right bundle branch block and left posterior fascicular block    Clinical impression: abnormal EKG              Objective:      Vitals:    06/26/25 0756   BP: 140/76   Pulse: 63   Weight: 71.7 kg (158 lb)   Height: 152.4 cm (60\")       Body mass index is 30.86 kg/m².    Vitals reviewed.   Constitutional:       General: Not in acute distress.     " Appearance: Not diaphoretic.   Neck:      Vascular: No carotid bruit or JVD.   Pulmonary:      Effort: Pulmonary effort is normal.      Breath sounds: Normal breath sounds.   Cardiovascular:      Normal rate. Regular rhythm.      Murmurs: There is a grade 2/6 midsystolic murmur at the URSB and ULSB.      No gallop.  No rub.   Pulses:     Carotid: 2+ with bruit bilaterally.     Radial: 2+ bilaterally.     Dorsalis pedis: 2+ bilaterally.     Posterior tibial: 2+ bilaterally.  Edema:     Peripheral edema absent.   Neurological:      Cranial Nerves: No cranial nerve deficit.         Lab Review:       Assessment:      Diagnosis Plan   1. Malignant neoplasm of overlapping sites of right breast in female, estrogen receptor negative  Adult Transthoracic Echo Complete W/ Cont if Necessary Per Protocol      2. Right bundle branch block (RBBB) with left anterior fascicular block (LAFB)  Adult Transthoracic Echo Complete W/ Cont if Necessary Per Protocol      3. Benign essential hypertension  Adult Transthoracic Echo Complete W/ Cont if Necessary Per Protocol      4. Pure hypercholesterolemia  Adult Transthoracic Echo Complete W/ Cont if Necessary Per Protocol      5. Type 2 diabetes mellitus with both eyes affected by mild nonproliferative retinopathy without macular edema, without long-term current use of insulin  Adult Transthoracic Echo Complete W/ Cont if Necessary Per Protocol          Right breast cancer, received Paclitaxel (Taxol) stopped 8/30/2024 for rash then Abraxane-finished 10/11/2024.  Now on trastuzumab (Herceptin) since 7/26/2024.  She will finish Herceptin 7/11/2025.  Hypertension, goal <120/80, blood pressure is, remain on lisinopril HCTZ 40/25 daily, carvedilol 12.5 mg twice daily, add spironolactone 25 mg in the morning.  Aortic stenosis, mild  Bilateral carotid bruits  Hyperlipidemia, on rosuvastatin  Rheumatoid arthritis, on Simponi.  Sedentary lifestyle, advised 30 minutes of cardiovascular work  "daily.  Anemia  Hypothyroidism.     Plan:       Current recommendations for Herceptin are for baseline echocardiogram and limited echo every 3 months for 12 months of therapy then every 6 months for 2 years after therapy is discontinued.      See Deisy in 6 months with an echocardiogram the same day.    STOP-Bang Score  Have you been diagnosed with Sleep Apnea?: no  Snoring?: yes  Tired?: yes  Observed?: no  Pressure?: yes  Stop Score: 3  Body Mass Index more than 35 kg/m2?: no  Age older than 50 year old?: yes  Neck large? \">17\"/43cm-M, >16\"/41cm-F: no  Gender=Male?: no  Total Stop-Bang Score: 4      "

## 2025-07-10 ENCOUNTER — HOSPITAL ENCOUNTER (OUTPATIENT)
Dept: OCCUPATIONAL THERAPY | Facility: HOSPITAL | Age: 70
Setting detail: THERAPIES SERIES
Discharge: HOME | End: 2025-07-10
Payer: MEDICARE

## 2025-07-10 DIAGNOSIS — I97.2 POST-MASTECTOMY LYMPHEDEMA SYNDROME: ICD-10-CM

## 2025-07-10 DIAGNOSIS — Z17.1 MALIGNANT NEOPLASM OF OVERLAPPING SITES OF RIGHT BREAST IN FEMALE, ESTROGEN RECEPTOR NEGATIVE: Primary | ICD-10-CM

## 2025-07-10 DIAGNOSIS — I10 BENIGN ESSENTIAL HYPERTENSION: ICD-10-CM

## 2025-07-10 DIAGNOSIS — C50.811 MALIGNANT NEOPLASM OF OVERLAPPING SITES OF RIGHT BREAST IN FEMALE, ESTROGEN RECEPTOR NEGATIVE: Primary | ICD-10-CM

## 2025-07-10 PROCEDURE — 97140 MANUAL THERAPY 1/> REGIONS: CPT

## 2025-07-10 RX ORDER — LISINOPRIL AND HYDROCHLOROTHIAZIDE 20; 25 MG/1; MG/1
1 TABLET ORAL DAILY
Qty: 90 TABLET | Refills: 3 | Status: SHIPPED | OUTPATIENT
Start: 2025-07-10 | End: 2025-07-11

## 2025-07-10 RX ORDER — LISINOPRIL 20 MG/1
20 TABLET ORAL DAILY
Qty: 90 TABLET | Refills: 3 | Status: SHIPPED | OUTPATIENT
Start: 2025-07-10

## 2025-07-10 NOTE — THERAPY TREATMENT NOTE
"Outpatient Occupational Therapy Lymphedema Treatment Note  [facility]     Patient Name: Pretty Tovar  : 1955  MRN: [id number]  Today's Date: 7/10/2025      Visit Date: 07/10/2025    Patient Active Problem List   Diagnosis    Benign essential hypertension    Type 2 diabetes mellitus, without long-term current use of insulin    Pure hypercholesterolemia    Overweight (BMI 25.0-29.9)    Hashimoto's thyroiditis    Hyperplastic rectal polyp    Female stress incontinence    Feeling of incomplete bladder emptying    Primary osteoarthritis of left knee    S/P TKR (total knee replacement), left    Iron deficiency    Abnormality of right breast on screening mammogram    Malignant neoplasm of overlapping sites of right breast in female, estrogen receptor negative    Fitting and adjustment of vascular catheter    Right bundle branch block (RBBB) with left anterior fascicular block (LAFB)    Aortic stenosis, mild        Past Medical History:   Diagnosis Date    Abnormal liver function tests 2017    Description: , \"-\"hepatitis profile    Anemia     Anesthesia complication     SLOW TO WAKE UP WITH HYSTERECTOMY    Arthritis     Breast cancer May 2024    Cancer 24    Right Breast    Cholesterol blood reduced     Colon polyps     Cystocele, lateral 10/15/2020    Cystocele, midline 10/15/2020    Diabetes mellitus     Frequent UTI     H/O bone density study 10/2010    normal    H/O cardiovascular stress test     H/O echocardiogram 2001    2-D    Hashimoto's thyroiditis     Heart murmur     History of high cholesterol 1988    Hyperkalemia 2019    Hyperlipidemia     Hypertension     Hypothyroid 2018    Hypothyroidism 2018    Incompetence of pubocervical tissue 10/15/2020    Incompetence of rectovaginal tissue 10/15/2020    Insulin dependent diabetes mellitus     Limited joint range of motion     LEFT KNEE    Loss of perineal body, female 10/15/2020    Multiple gestation Born [birth year]    " Osteoarthritis of knee 2005    left    Pelvic relaxation due to vaginal prolapse 2018    Plantar warts     foot    Postmenopausal bleeding 2019    Pregnancy      twins x1    Psoriasis     RA (rheumatoid arthritis)     Rectocele 10/15/2020    Urinary tract infection     Urine incontinence     BETTER WITH SLING    Uterovaginal prolapse     Uterovaginal prolapse, incomplete 2020    Vaginal enterocele 10/15/2020    Varicella Approx 8 yrs old        Past Surgical History:   Procedure Laterality Date    BREAST BIOPSY Right 2006    benign    COLONOSCOPY      COLONOSCOPY W/ POLYPECTOMY  2018    Dr Resendiz, 6 mm rectal polyp - hyperplastic    JOINT MANIPULATION Left 2021    Procedure: KNEE MANIPULATION with steroid injection;  Surgeon: Francis Tavarez MD;  Location: [facility];  Service: Orthopedics;  Laterality: Left;    JOINT REPLACEMENT  08/10/2021    LAPAROSCOPIC ASSISTED VAGINAL HYSTERECTOMY SALPINGO OOPHORECTOMY  2020    LYMPH NODE BIOPSY  2024    MASTECTOMY W/ SENTINEL NODE BIOPSY Right 2024    Procedure: RIGHT MASTECTOMY WITH SENTINEL NODE BIOPSY;  Surgeon: Aracely Frye MD;  Location: [facility];  Service: General;  Laterality: Right;    PUBOVAGINAL SLING N/A 10/16/2020    Procedure: Pubovaginal sling Posterior colporrhaphy fixation Insertion of vaginal grafts Cystourethroscopy;  Surgeon: Karen Allen MD;  Location: [facility];  Service: Gynecology;  Laterality: N/A;    TOTAL KNEE ARTHROPLASTY Left 08/10/2021    Procedure: TOTAL KNEE ARTHROPLASTY;  Surgeon: Francis Tavarez MD;  Location: [facility];  Service: Orthopedics;  Laterality: Left;    TOTAL LAPAROSCOPIC HYSTERECTOMY, SACROCOLPOPEXY N/A 10/16/2020    Procedure: Laparoscopic uterosacral ligament colpopexy sacral colpopexy  Laparoscopic paravaginal repair Laparoscopic hysterectomy with bilateral salpingectomy  Laparoscopic abdominal enterocele repair;  Surgeon: Karen Allen  MD;  Location: [facility] OR;  Service: Gynecology;  Laterality: N/A;    TUBAL ABDOMINAL LIGATION      VAGINAL PROLAPSE REPAIR  October 2020    VENOUS ACCESS DEVICE (PORT) INSERTION N/A 07/03/2024    Procedure: INSERTION OF PORTACATH;  Surgeon: Aracely Frye MD;  Location: [facility] MAIN OR;  Service: General;  Laterality: N/A;         Visit Dx:      ICD-10-CM ICD-9-CM   1. Malignant neoplasm of overlapping sites of right breast in female, estrogen receptor negative  C50.811 174.8    Z17.1 V86.1   2. Post-mastectomy lymphedema syndrome  I97.2 457.0        Lymphedema       Row Name 07/10/25 0900             Subjective Pain    Able to rate subjective pain? yes  -RE      Pre-Treatment Pain Level 0  -RE      Post-Treatment Pain Level 0  -RE         Subjective    Subjective Comments Did not note any significant change following last treatment, but she does feel that her symptoms have improved with daily bras and prosthesis use.  -RE         Manual Lymphatic Drainage    Manual Lymphatic Drainage initial sequence;opened regional lymph nodes;opened anastamoses;extremity treatment  -RE      Initial Sequence short neck  -RE      Opened Regional Lymph Nodes axillary  -RE      Axillary left  -RE      Opened Anastamoses anterior axillo-axillary  -RE      Extremity Treatment extremity treatment focus on  R chest wall  -RE      Manual Therapy scar massage-R mast scar  -RE                User Key  (r) = Recorded By, (t) = Taken By, (c) = Cosigned By      Initials Name Provider Type    RE Yasemin Elizabeth OTR Occupational Therapist                             OT Assessment/Plan       Row Name 07/10/25 1411          OT Assessment    Assessment Comments Today I noted decreased tissue mobility across the left chest.  This appears to be scar tissue versus lymphedema.  Dimpling of the skin around patient's scar is minimally improved.  No pitting edema noted today in the chest or in the axilla or upper back.  Lymphedema symptoms appear  improved and patient also noted this.  She believes that symptoms improved as she increased use of her bra with her breast prosthesis.  -RE        OT Plan    OT Plan Comments Reassess symptoms next visit.  If no significant improvement will discontinue scar massage and manual lymph drainage and follow-up only for bioimpedance.  -RE               User Key  (r) = Recorded By, (t) = Taken By, (c) = Cosigned By      Initials Name Provider Type    Yasemin Bradford OTR Occupational Therapist                        Manual Rx (Last 36 Hours)       Manual Treatments       Row Name 07/10/25 1714             Total Minutes    73288 - OT Manual Therapy Minutes 40  -RE                User Key  (r) = Recorded By, (t) = Taken By, (c) = Cosigned By      Initials Name Provider Type    Yasemin Bradford OTR Occupational Therapist                      Therapy Education  Education Details: Continue with daily bra wear with prosthesis.  Given: Edema management, Symptoms/condition management  Program: Reinforced  How Provided: Verbal  Provided to: Patient  Level of Understanding: Teach back education performed, Verbalized  42656 - OT Self Care/Mgmt Minutes: 5                Time Calculation:   OT Start Time: 0945  OT Stop Time: 1030  OT Time Calculation (min): 45 min  Total Timed Code Minutes- OT: 45 minute(s)  Timed Charges  62350 - OT Manual Therapy Minutes: 40  28838 - OT Self Care/Mgmt Minutes: 5  Total Minutes  Timed Charges Total Minutes: 45   Total Minutes: 45     Therapy Charges for Today       Code Description Service Date Service Provider Modifiers Qty    [id number]  OT MANUAL THERAPY EA 15 MIN 7/10/2025 Yasemin Elizabeth OTR GO 3          Dictated utilizing Dragon dictation:  Much of this encounter note is an electronic transcription/translation of spoken language to printed text. The electronic translation of spoken language may permit erroneous, or at times, nonsensical words or phrases to be inadvertently transcribed;  Although I have reviewed the note for such errors, some may still exist.              Yasemin Elizabeth, OTR  7/10/2025

## 2025-07-11 ENCOUNTER — OFFICE VISIT (OUTPATIENT)
Dept: ONCOLOGY | Facility: CLINIC | Age: 70
End: 2025-07-11
Payer: MEDICARE

## 2025-07-11 ENCOUNTER — LAB (OUTPATIENT)
Dept: ONCOLOGY | Facility: HOSPITAL | Age: 70
End: 2025-07-11
Payer: MEDICARE

## 2025-07-11 ENCOUNTER — INFUSION (OUTPATIENT)
Dept: ONCOLOGY | Facility: HOSPITAL | Age: 70
End: 2025-07-11
Payer: MEDICARE

## 2025-07-11 VITALS
HEIGHT: 60 IN | HEART RATE: 72 BPM | RESPIRATION RATE: 17 BRPM | TEMPERATURE: 98 F | DIASTOLIC BLOOD PRESSURE: 66 MMHG | WEIGHT: 156.8 LBS | SYSTOLIC BLOOD PRESSURE: 109 MMHG | BODY MASS INDEX: 30.78 KG/M2 | OXYGEN SATURATION: 100 %

## 2025-07-11 DIAGNOSIS — C50.811 MALIGNANT NEOPLASM OF OVERLAPPING SITES OF RIGHT BREAST IN FEMALE, ESTROGEN RECEPTOR NEGATIVE: Primary | ICD-10-CM

## 2025-07-11 DIAGNOSIS — C50.811 MALIGNANT NEOPLASM OF OVERLAPPING SITES OF RIGHT BREAST IN FEMALE, ESTROGEN RECEPTOR NEGATIVE: ICD-10-CM

## 2025-07-11 DIAGNOSIS — Z17.1 MALIGNANT NEOPLASM OF OVERLAPPING SITES OF RIGHT BREAST IN FEMALE, ESTROGEN RECEPTOR NEGATIVE: ICD-10-CM

## 2025-07-11 DIAGNOSIS — Z17.1 MALIGNANT NEOPLASM OF OVERLAPPING SITES OF RIGHT BREAST IN FEMALE, ESTROGEN RECEPTOR NEGATIVE: Primary | ICD-10-CM

## 2025-07-11 LAB
ALBUMIN SERPL-MCNC: 4.6 G/DL (ref 3.5–5.2)
ALBUMIN/GLOB SERPL: 1.5 G/DL
ALP SERPL-CCNC: 67 U/L (ref 39–117)
ALT SERPL W P-5'-P-CCNC: 30 U/L (ref 1–33)
ANION GAP SERPL CALCULATED.3IONS-SCNC: 11.2 MMOL/L (ref 5–15)
AST SERPL-CCNC: 31 U/L (ref 1–32)
BASOPHILS # BLD AUTO: 0.04 10*3/MM3 (ref 0–0.2)
BASOPHILS NFR BLD AUTO: 0.9 % (ref 0–1.5)
BILIRUB SERPL-MCNC: 0.3 MG/DL (ref 0–1.2)
BUN SERPL-MCNC: 11.4 MG/DL (ref 8–23)
BUN/CREAT SERPL: 24.8 (ref 7–25)
CALCIUM SPEC-SCNC: 10 MG/DL (ref 8.6–10.5)
CHLORIDE SERPL-SCNC: 100 MMOL/L (ref 98–107)
CO2 SERPL-SCNC: 26.8 MMOL/L (ref 22–29)
CREAT SERPL-MCNC: 0.46 MG/DL (ref 0.57–1)
DEPRECATED RDW RBC AUTO: 41.2 FL (ref 37–54)
EGFRCR SERPLBLD CKD-EPI 2021: 103.7 ML/MIN/1.73
EOSINOPHIL # BLD AUTO: 0.43 10*3/MM3 (ref 0–0.4)
EOSINOPHIL NFR BLD AUTO: 9.8 % (ref 0.3–6.2)
ERYTHROCYTE [DISTWIDTH] IN BLOOD BY AUTOMATED COUNT: 12.6 % (ref 12.3–15.4)
GLOBULIN UR ELPH-MCNC: 3.1 GM/DL
GLUCOSE SERPL-MCNC: 113 MG/DL (ref 65–99)
HCT VFR BLD AUTO: 36.1 % (ref 34–46.6)
HGB BLD-MCNC: 11.9 G/DL (ref 12–15.9)
IMM GRANULOCYTES # BLD AUTO: 0.01 10*3/MM3 (ref 0–0.05)
IMM GRANULOCYTES NFR BLD AUTO: 0.2 % (ref 0–0.5)
LYMPHOCYTES # BLD AUTO: 1.27 10*3/MM3 (ref 0.7–3.1)
LYMPHOCYTES NFR BLD AUTO: 29.1 % (ref 19.6–45.3)
MCH RBC QN AUTO: 29.9 PG (ref 26.6–33)
MCHC RBC AUTO-ENTMCNC: 33 G/DL (ref 31.5–35.7)
MCV RBC AUTO: 90.7 FL (ref 79–97)
MONOCYTES # BLD AUTO: 0.42 10*3/MM3 (ref 0.1–0.9)
MONOCYTES NFR BLD AUTO: 9.6 % (ref 5–12)
NEUTROPHILS NFR BLD AUTO: 2.2 10*3/MM3 (ref 1.7–7)
NEUTROPHILS NFR BLD AUTO: 50.4 % (ref 42.7–76)
NRBC BLD AUTO-RTO: 0 /100 WBC (ref 0–0.2)
PLATELET # BLD AUTO: 219 10*3/MM3 (ref 140–450)
PMV BLD AUTO: 10.1 FL (ref 6–12)
POTASSIUM SERPL-SCNC: 4.1 MMOL/L (ref 3.5–5.2)
PROT SERPL-MCNC: 7.7 G/DL (ref 6–8.5)
RBC # BLD AUTO: 3.98 10*6/MM3 (ref 3.77–5.28)
SODIUM SERPL-SCNC: 138 MMOL/L (ref 136–145)
WBC NRBC COR # BLD AUTO: 4.37 10*3/MM3 (ref 3.4–10.8)

## 2025-07-11 PROCEDURE — 96413 CHEMO IV INFUSION 1 HR: CPT

## 2025-07-11 PROCEDURE — 80053 COMPREHEN METABOLIC PANEL: CPT

## 2025-07-11 PROCEDURE — 25810000003 SODIUM CHLORIDE 0.9 % SOLUTION 250 ML FLEX CONT: Performed by: INTERNAL MEDICINE

## 2025-07-11 PROCEDURE — 25010000002 TRASTUZUMAB PER 10 MG: Performed by: INTERNAL MEDICINE

## 2025-07-11 PROCEDURE — 63710000001 DIPHENHYDRAMINE PER 50 MG: Performed by: INTERNAL MEDICINE

## 2025-07-11 PROCEDURE — 85025 COMPLETE CBC W/AUTO DIFF WBC: CPT

## 2025-07-11 RX ORDER — DIPHENHYDRAMINE HCL 25 MG
25 CAPSULE ORAL ONCE
Status: COMPLETED | OUTPATIENT
Start: 2025-07-11 | End: 2025-07-11

## 2025-07-11 RX ADMIN — DIPHENHYDRAMINE HYDROCHLORIDE 25 MG: 25 CAPSULE ORAL at 11:23

## 2025-07-11 RX ADMIN — SODIUM CHLORIDE 420 MG: 9 INJECTION, SOLUTION INTRAVENOUS at 11:52

## 2025-07-11 NOTE — PROGRESS NOTES
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    Since she stopped her Taxol her rash is much better and the dermatologist biopsied it and this is probably a drug rash and I think we can move to every 3-week Herceptin dosing because this does not seem to be the culprit for her rash.  Rash persists but not very severe and she is comfortable finishing up her last dose of Herceptin today and will see if the rash resolves of the Herceptin    We did genetic testing that shows a VUS in the NF1 gene which is not significant    She does not need radiation or hormonal blockade because she had a mastectomy and was ER/CA negative    Echocardiogram was done 5/25 showing ejection fraction of 63%     Her rheumatoid arthritis symptoms are slowly resuming after stopping the Taxol and I suspect she will be back on the Simponi by the summer      Past Medical History:   Diagnosis Date    Abnormal liver function tests 02/09/2017    Description: 2000, \"-\"hepatitis profile    Anemia     Anesthesia complication     SLOW TO WAKE UP WITH HYSTERECTOMY    Arthritis     Breast cancer May 2024    Cancer 5/16/24    Right Breast    Cholesterol blood reduced     Colon polyps     Cystocele, lateral 10/15/2020    Cystocele, midline 10/15/2020    Diabetes mellitus     Frequent UTI     H/O bone density study 10/2010    normal    H/O cardiovascular stress test 2001    H/O echocardiogram 03/2001    2-D    Hashimoto's thyroiditis     Heart murmur     History of high cholesterol 1988    " Hyperkalemia 2019    Hyperlipidemia     Hypertension     Hypothyroid 2018    Hypothyroidism     Incompetence of pubocervical tissue 10/15/2020    Incompetence of rectovaginal tissue 10/15/2020    Insulin dependent diabetes mellitus     Limited joint range of motion     LEFT KNEE    Loss of perineal body, female 10/15/2020    Multiple gestation Born [birth year]    Osteoarthritis of knee 2005    left    Pelvic relaxation due to vaginal prolapse 2018    Plantar warts     foot    Postmenopausal bleeding 2019    Pregnancy      twins x1    Psoriasis     RA (rheumatoid arthritis)     Rectocele 10/15/2020    Urinary tract infection     Urine incontinence     BETTER WITH SLING    Uterovaginal prolapse     Uterovaginal prolapse, incomplete 2020    Vaginal enterocele 10/15/2020    Varicella Approx 8 yrs old        Past Surgical History:   Procedure Laterality Date    BREAST BIOPSY Right     benign    COLONOSCOPY      COLONOSCOPY W/ POLYPECTOMY  2018    Dr Resendiz, 6 mm rectal polyp - hyperplastic    JOINT MANIPULATION Left 2021    Procedure: KNEE MANIPULATION with steroid injection;  Surgeon: Francis Tavarez MD;  Location: [facility];  Service: Orthopedics;  Laterality: Left;    JOINT REPLACEMENT  08/10/2021    LAPAROSCOPIC ASSISTED VAGINAL HYSTERECTOMY SALPINGO OOPHORECTOMY  2020    LYMPH NODE BIOPSY  2024    MASTECTOMY W/ SENTINEL NODE BIOPSY Right 2024    Procedure: RIGHT MASTECTOMY WITH SENTINEL NODE BIOPSY;  Surgeon: Aracely Frye MD;  Location: [facility];  Service: General;  Laterality: Right;    PUBOVAGINAL SLING N/A 10/16/2020    Procedure: Pubovaginal sling Posterior colporrhaphy fixation Insertion of vaginal grafts Cystourethroscopy;  Surgeon: Karen lAlen MD;  Location: [facility];  Service: Gynecology;  Laterality: N/A;    TOTAL KNEE ARTHROPLASTY Left 08/10/2021    Procedure: TOTAL KNEE ARTHROPLASTY;  Surgeon: Francis Tavarez  MD;  Location: [facility] OR;  Service: Orthopedics;  Laterality: Left;    TOTAL LAPAROSCOPIC HYSTERECTOMY, SACROCOLPOPEXY N/A 10/16/2020    Procedure: Laparoscopic uterosacral ligament colpopexy sacral colpopexy  Laparoscopic paravaginal repair Laparoscopic hysterectomy with bilateral salpingectomy  Laparoscopic abdominal enterocele repair;  Surgeon: Karen Allen MD;  Location: [facility] OR;  Service: Gynecology;  Laterality: N/A;    TUBAL ABDOMINAL LIGATION      VAGINAL PROLAPSE REPAIR  2020    VENOUS ACCESS DEVICE (PORT) INSERTION N/A 2024    Procedure: INSERTION OF PORTACATH;  Surgeon: Aracely Frye MD;  Location: [facility];  Service: General;  Laterality: N/A;   Oncologic history  patient is a 68-year-old female with 4-year history of rheumatoid arthritis on Simponi who was found on her most recent screening mammogram have an abnormality in the right breast with a 10 mm oval mass at the 11:00 and an 11 mm mass at 12:00.  His abnormalities persisted and diagnostic imaging and the patient underwent biopsy of both these areas.  The area at 12:00 was DCIS high-grade ER/VA negative and the lesion at 11:00 was grade 3 invasive ductal carcinoma measuring 4.8 mm ER/VA negative HER2 3+    Patient was referred to Dr. Aracely Frye and underwent bilateral screening mammograms which showed large hematomas in both biopsy sites with a residual 1.1 x 1.1 cm mass at 11:00 and 12:00 was large hematoma with a biopsy cavity and at 9:00 there was another 1.2 x 1.4 cm mass which was suspicious and then extending from 90 to o'clock there was non-mass enhancement measuring 4.7 x 4.7 centimeters encompassing the 2 biopsy sites and the 9:00 mass    Patient has been referred to discuss treatment but the plan is to proceed with surgery first as masses were nonpalpable and small and initial imaging although now with a large postbiopsy hematoma was palpation is unreliable    Patient is  2 para 3 first  childbirth was at age 22 she did not breast-feed menarche was at age 11 menopause 50.  She has been on no post menopausal hormone replacement    Family history is completely negative for any malignancy and no genetic testing was done    She is not a smoker or drinker  She has not had a DVT MI or stroke    She has been on Simponi for her rheumatoid arthritis her last dose was in April and she is going to skip the June dose because surgery is scheduled    Her last bone density in 2018 was normal    No history of unusual bleeding with her hysterectomy or knee replacement    We discussed the natural history of HER2 positive breast cancer I told her that unless the tumor was bigger than we had anticipated on imaging we will plan for surgery first followed by Taxol Herceptin for 12 weeks and then Herceptin for the rest of the year.  If we have unexpected findings at surgery we would tailor the treatment to be most appropriate for her but she knows she will have some chemo at any rate.  I have asked her to have an echocardiogram done and we will see her back 2 to 3 weeks after surgery to review the final path and make a treatment decision    7/3/2024 right breast skin sparing mastectomy by Dr. Frye-pathology showing invasive mammary carcinoma (invasive ductal carcinoma) [place] histological score 3.  Tumor in the upper outer quadrant at 11:00 measuring 10 x 10 x 10 mm.  Associated high-grade ductal carcinoma in situ, solid, cribriform and comedo types involving adenosis and fibroadenomatoid change with central comedonecrosis and associated microcalcifications.  DCIS spans 15 mm extending from the upper outer quadrant and to the lower outer quadrant.  All margins are free of invasive carcinoma and DCIS.  No LVSI, posterior skeletal muscle free of tumor.  ER/VA negative, HER2 3+ positive, Ki-67 60%.  Pathologic grade pT1b, N (SN) 0.  [place] lymph nodes 3 lymph nodes removed were negative for metastatic carcinoma.       Treatment recommended with weekly Taxol/ Herceptin X 12 followed by Herceptin every 3 weeks x 1 year.     Current Outpatient Medications on File Prior to Visit   Medication Sig Dispense Refill    BD PEN NEEDLE SEUN U/F 32G X 4 MM misc USE 1 PEN NEEDLE WITH EACH  VICTOZA INJECTION 100 each 1    Calcium Carbonate-Vit D-Min (CALCIUM 1200 PO) Take 1 tablet by mouth Daily. PT HOLDING FOR SURGERY      carvedilol (COREG) 12.5 MG tablet Take 1 tablet by mouth 2 (Two) Times a Day With Meals. 180 tablet 3    Cinnamon 500 MG tablet Take 1,000 mg by mouth 2 (two) times a day. PT HOLDING FOR SURGERY      Coenzyme Q10 (CO Q 10 PO) Take 1 capsule by mouth Daily. PT HOLDING FOR SURGERY      CRANBERRY-VITAMIN C-D MANNOSE PO Take 2 capsules by mouth Daily. PT HOLDING FOR SURGERY      glucose blood (OneTouch Verio) test strip Use as directed to CHECK BLOOD GLUCOSE ONCE DAILY. 100 each 3    hydrocortisone 2.5 % cream Apply 1 Application topically to the appropriate area as directed 2 (Two) Times a Day. 3.5 g 0    levothyroxine (SYNTHROID, LEVOTHROID) 88 MCG tablet TAKE 1 TABLET BY MOUTH DAILY 90 tablet 3    lidocaine-prilocaine (EMLA) 2.5-2.5 % cream Apply 1 Application topically to the appropriate area as directed As Needed for Mild Pain.      Liraglutide (VICTOZA) 18 MG/3ML solution pen-injector injection DIAL AND INJECT UNDER THE SKIN 1.2 MG DAILY 6 mL 5    lisinopril (PRINIVIL,ZESTRIL) 20 MG tablet TAKE 1 TABLET BY MOUTH DAILY 90 tablet 3    loratadine (Claritin) 10 MG tablet Take 1 tablet by mouth Every Night.      metFORMIN (GLUCOPHAGE) 500 MG tablet TAKE THREE TABLETS BY MOUTH EVERY MORNING AND TAKE TWO TABLETS BY MOUTH EVERY EVENING 450 tablet 3    multivitamin with minerals tablet tablet Take 1 tablet by mouth Daily. PT HOLDING FOR SURGERY      Omega-3 Fatty Acids (fish oil) 1200 MG capsule capsule Take 1 capsule by mouth Daily. PT HOLDING FOR SURGERY      rosuvastatin (CRESTOR) 20 MG tablet TAKE 1 TABLET BY MOUTH DAILY 90  tablet 1    spironolactone (ALDACTONE) 25 MG tablet Take 1 tablet by mouth Daily. 90 tablet 3    TURMERIC PO Take 1,400 mg by mouth Daily. PT HOLDING FOR SURGERY      vitamin C (ASCORBIC ACID) 250 MG tablet Take 4 tablets by mouth Daily.      vitamin D3 125 MCG (5000 UT) capsule capsule Take 1 capsule by mouth Daily. PT HOLDING FOR SURGERY      VITAMIN E PO Take 1 capsule by mouth Daily. HOLD PRIOR TO SURG      Zinc 50 MG tablet Take 1 tablet by mouth Daily. PT HOLDING FOR SURGERY      lidocaine-prilocaine (EMLA) 2.5-2.5 % cream Apply nickel-sized amount over Mediport site 30 min prior to access. Do not rub in. (Patient not taking: Reported on 7/11/2025) 30 g 3    [DISCONTINUED] lisinopril-hydrochlorothiazide (PRINZIDE,ZESTORETIC) 20-25 MG per tablet TAKE 1 TABLET BY MOUTH DAILY (Patient not taking: Reported on 7/11/2025) 90 tablet 3     No current facility-administered medications on file prior to visit.        ALLERGIES:  No Known Allergies     Social History     Socioeconomic History    Marital status:      Spouse name: SUZANNE     Number of children: 3   Tobacco Use    Smoking status: Never     Passive exposure: Never    Smokeless tobacco: Never    Tobacco comments:     Caffeine: 1 drink daily   Vaping Use    Vaping status: Never Used   Substance and Sexual Activity    Alcohol use: Never    Drug use: Never    Sexual activity: Yes     Partners: Male     Birth control/protection: Post-menopausal, Tubal ligation, Hysterectomy, Partner of same sex, Surgical     Comment: SPOUSE = SUZANNE         Family History   Problem Relation Age of Onset    COPD Mother         Passed away 1988    Heart disease Father     Hypertension Father     Diabetes Brother         Passed away 2012    Diabetes Brother     COPD Brother     Hypertension Brother         Copd    Heart attack Brother     Diabetes Brother     Heart disease Brother     Hypertension Brother     Heart disease Maternal Grandmother     Thyroid disease Daughter      "Hypothyroidism Daughter     Other Daughter     Other Son     Other Son     COPD Brother     Diabetes Brother     Hypertension Brother     Diabetes Brother         Passed away 2012    Hypertension Brother     Thyroid disease Daughter         Hypothyroidism    Diabetes Brother     Heart disease Brother     Hypertension Brother     Heart attack Brother     BRCA 1/2 Neg Hx     Breast cancer Neg Hx     Colon cancer Neg Hx     Endometrial cancer Neg Hx     Ovarian cancer Neg Hx     Malig Hyperthermia Neg Hx         Review of Systems   Musculoskeletal:  Positive for arthralgias.   Skin:  Positive for rash.        Objective     Vitals:    07/11/25 1047   BP: 109/66   Pulse: 72   Resp: 17   Temp: 98 °F (36.7 °C)   TempSrc: Oral   SpO2: 100%   Weight: 71.1 kg (156 lb 12.8 oz)   Height: 152.4 cm (60\")   PainSc: 0-No pain             7/11/2025    10:47 AM   Current Status   ECOG score 0       Physical Exam   CONSTITUTIONAL:  Vital signs reviewed.  No distress, looks comfortable.  EYES:  Conjunctivae and lids unremarkable.  PERRLA  EARS,NOSE,MOUTH,THROAT:  Ears and nose appear unremarkable.  Lips, teeth, gums appear unremarkable.  RESPIRATORY:  Normal respiratory effort.  Lungs clear to auscultation bilaterally.  BREASTS: Left breast is benign the right breast no definite palpable masses or axillary adenopathy  CARDIOVASCULAR:  Normal S1, S2.  3/6 ESM\ no  rubs or gallops.  No significant lower extremity edema.  GASTROINTESTINAL: Abdomen appears unremarkable.  Nontender.  No hepatomegaly.  No splenomegaly.  LYMPHATIC:  No cervical, supraclavicular, axillary lymphadenopathy.  SKIN:  Warm.  Erythematous macules on her arms and legs much improved   PSYCHIATRIC:  Normal judgment and insight.  Normal mood and affect.  I have reexamined the patient and the results are consistent with the previously documented exam. Ashkan Belcher MD       RECENT LABS:  Hematology WBC   Date Value Ref Range Status   07/11/2025 4.37 3.40 - 10.80 " "10*3/mm3 Final   06/16/2025 4.29 3.40 - 10.80 10*3/mm3 Final     RBC   Date Value Ref Range Status   07/11/2025 3.98 3.77 - 5.28 10*6/mm3 Final   06/16/2025 4.06 3.77 - 5.28 10*6/mm3 Final     Hemoglobin   Date Value Ref Range Status   07/11/2025 11.9 (L) 12.0 - 15.9 g/dL Final     Hematocrit   Date Value Ref Range Status   07/11/2025 36.1 34.0 - 46.6 % Final     Platelets   Date Value Ref Range Status   07/11/2025 219 140 - 450 10*3/mm3 Final          ynoptic Checklist   DCIS OF THE BREAST: Biopsy   Protocol posted: 9/20/2023DCIS OF THE BREAST: BIOPSY - All Specimens  SPECIMEN   Procedure  Needle biopsy   Specimen Laterality  Right   TUMOR   Tumor Site  Clock position     12 o'clock   Histologic Type  Ductal carcinoma in situ (DCIS)   Architectural Patterns  Solid   Nuclear Grade  Grade III (high)   Necrosis  Present, central (expansive \"comedo\" necrosis)   Microcalcifications  Present in DCIS   .   Breast Biomarker Reporting Template   Protocol posted: 12/13/2023BREAST BIOMARKER REPORTING TEMPLATE - All Specimens  Test(s) Performed     Estrogen Receptor (ER) Status  Negative (less than 1%)     Internal control cells present and stain as expected   Test Type  Food and Drug Administration (FDA) cleared (test / vendor): Ventena   Primary Antibody  SP1   Scoring System  Harrison   Proportion Score  0   Intensity Score  0   Total Harrison Score  0   Test(s) Performed     Progesterone Receptor (PgR) Status  Negative (less than 1%)     Internal control cells present and stain as expected   Test Type  Food and Drug Administration (FDA) cleared (test / vendor): Ventena   Primary Antibody  1E2   Scoring System  Harrison   Proportion Score  1   Intensity Score  2   Total Harrison Score  3   Test(s) Performed  Ki-67   Ki-67 Percentage of Positive Nuclei  25 %   Primary Antibody  30-9   Cold Ischemia and Fixation Times  Meet requirements specified in latest version of the ASCO / CAP Guidelines   Cold Ischemia Time (minutes)  12 min "   Fixation Time (hours)  8 hours   Testing Performed on Block Number(s)  1B   METHODS   Fixative  Formalin   Image Analysis  Not performed   .   INVASIVE CARCINOMA OF THE BREAST: Biopsy   Protocol posted: 3/22/2023INVASIVE CARCINOMA OF THE BREAST: BIOPSY - All Specimens  SPECIMEN   Procedure  Needle biopsy   Specimen Laterality  Right   TUMOR   Tumor Site  Clock position     11 o'clock   Histologic Type  Invasive carcinoma of no special type (ductal)   Histologic Type Comment  The tumor has apocrine features   Histologic Grade ([place] Histologic Score)     Glandular (Acinar) / Tubular Differentiation  Score 3   Nuclear Pleomorphism  Score 3   Mitotic Rate  Score 2   Overall Grade  Grade 3 (scores of 8 or 9)   Largest Invasive Focus in this Limited Biopsy Sample  4.8 mm   Ductal Carcinoma In Situ (DCIS)  Not identified   Lymphatic and / or Vascular Invasion  Not identified   Microcalcifications  Not identified   .   Breast Biomarker Reporting Template   Protocol posted: 12/13/2023BREAST BIOMARKER REPORTING TEMPLATE - All Specimens  Test(s) Performed     Estrogen Receptor (ER) Status  Negative (less than 1%)     Internal control cells present and stain as expected   Test Type  Food and Drug Administration (FDA) cleared (test / vendor): Ventena   Primary Antibody  SP1   Scoring System  Harrison   Proportion Score  0   Intensity Score  0   Total Harrison Score  0   Test(s) Performed     Progesterone Receptor (PgR) Status  Negative (less than 1%)     Internal control cells present and stain as expected   Test Type  Food and Drug Administration (FDA) cleared (test / vendor): Ventena   Primary Antibody  1E2   Scoring System  Harrison   Proportion Score  0   Intensity Score  0   Total Harrison Score  0   Test(s) Performed     HER2 by Immunohistochemistry  Positive (Score 3+)   Percentage of Cells with Uniform Intense Complete Membrane Staining  100 %   Test Type  Food and Drug Administration (FDA) cleared (test / vendor):  "Ventena   Primary Antibody  4B5   Test(s) Performed  Ki-67   Ki-67 Percentage of Positive Nuclei  60 %          ynoptic Checklist  7/3/24   INVASIVE CARCINOMA OF THE BREAST: Resection   8th Edition - Protocol posted: 12/13/2023INVASIVE CARCINOMA OF THE BREAST: RESECTION - All Specimens  SPECIMEN   Procedure  Total mastectomy   Specimen Laterality  Right   TUMOR   Tumor Site  Upper outer quadrant   Histologic Type  Invasive carcinoma of no special type (ductal)   Histologic Grade ([place] Histologic Score)     Glandular (Acinar) / Tubular Differentiation  Score 3   Nuclear Pleomorphism  Score 3   Mitotic Rate  Score 2   Overall Grade  Grade 3 (scores of 8 or 9)   Tumor Size  Greatest dimension of largest invasive focus (Millimeters): 10 mm   Tumor Focality  Single focus of invasive carcinoma   Ductal Carcinoma In Situ (DCIS)  Present     Negative for extensive intraductal component (EIC)   Size (Extent) of DCIS  Estimated size (extent) of DCIS is at least (Millimeters): 50 mm   Architectural Patterns  [place]     Cribriform     Solid   Nuclear Grade  Grade III (high)   Necrosis  Present, central (expansive \"comedo\" necrosis)   Lobular Carcinoma In Situ (LCIS)  Not identified   Lymphatic and / or Vascular Invasion  Not identified   Dermal Lymphatic and / or Vascular Invasion  Not identified   Microcalcifications  Present in DCIS     Present in non-neoplastic tissue   Treatment Effect in the Breast  No known presurgical therapy   MARGINS   Margin Status for Invasive Carcinoma  All margins negative for invasive carcinoma   Distance from Invasive Carcinoma to Closest Margin  6 mm   Closest Margin(s) to Invasive Carcinoma  Anterior   Distance from Invasive Carcinoma to Posterior Margin  17 mm   Distance from Invasive Carcinoma to Superior Margin  85 mm   Distance from Invasive Carcinoma to Inferior Margin  100 mm   Distance from Invasive Carcinoma to Medial Margin  100 mm   Distance from Invasive Carcinoma to Lateral " Margin  50 mm   Margin Status for DCIS  All margins negative for DCIS   Distance from DCIS to Closest Margin  11 mm   Closest Margin(s) to DCIS  Anterior   Distance from DCIS to Posterior Margin  15 mm   Distance from DCIS to Superior Margin  60 mm   Distance from DCIS to Inferior Margin  75 mm   Distance from DCIS to Medial Margin  80 mm   Distance from DCIS to Lateral Margin  40 mm   REGIONAL LYMPH NODES   Regional Lymph Node Status  All regional lymph nodes negative for tumor   Total Number of Lymph Nodes Examined (sentinel and non-sentinel)  3   Number of [place] Nodes Examined  3   pTNM CLASSIFICATION (AJCC 8th Edition)   Reporting of pT, pN, and (when applicable) pM categories is based on information available to the pathologist at the time the report is issued. As per the AJCC (Chapter 1, 8th Ed.) it is the managing physician’s responsibility to establish the final pathologic stage based upon all pertinent information, including but potentially not limited to this pathology report.   pT Category  pT1b   pN Category  pN0   N Suffix  (sn)   SPECIAL STUDIES        Estrogen Receptor (ER) Status  Negative        Progesterone Receptor (PgR) Status  Negative        HER2 (by immunohistochemistry)  Positive (Score 3+)   Percentage of Cells with Uniform Intense Complete Membrane Staining  100 %        Ki-67 Percentage of Positive Nuclei  60 %        Assessment & Plan     cT1cN0 grade 3 ER/WV- her2 +RIGHT BREAST CANCER post biopsy with associated high-grade DCIS ER/WV negative and MRI evidence of non-mass enhancement which would preclude lumpectomy  pT1bN0 ER/WV negative HER2 positive right breast cancer postmastectomy and sentinel node biopsy  Weekly Taxol Herceptin for 12 weeks followed by every 3 to weeks for the rest of the year planned  No radiation-EF adequate  Tolerating Taxol Herceptin well as of 8/16/2024  Worsening maculopapular rash on her arms legs and trunk on 8/30/2024 changed to Abraxane and  nonbiosimilar Herceptin  Rash improving after Abraxane discontinued we will move to 3 weekly Herceptin  Tolerating weekly Herceptin well as of 1/3/2025  Tolerating Herceptin well every 3 weeks as of 3/7/2025  Tolerating Herceptin well as of 5/9/2025 with 3 more treatments to go      2. Rheumatoid arthritis on Simponi for 3 and half years-off treatment during chemo    3.  Negative family history of any malignancy    4.  Anemia: Hgb 10.8 today 7/26/2024- was normal prior to surgery (12.1 on 6/25/2024).  Denies bleeding issues.  Will check ferritin, iron profile, B12, folate.     5.  Hashimoto's thyroiditis    Plan  Proceed with 3-week dose of Herceptin last dose today.  2, port removal and then see me in 6 months for follow-up    Patient is on a high risk medication requiring close monitoring for toxicity.

## 2025-07-14 ENCOUNTER — HOSPITAL ENCOUNTER (OUTPATIENT)
Dept: OCCUPATIONAL THERAPY | Facility: HOSPITAL | Age: 70
Setting detail: THERAPIES SERIES
Discharge: HOME | End: 2025-07-14
Payer: MEDICARE

## 2025-07-14 DIAGNOSIS — I97.2 POST-MASTECTOMY LYMPHEDEMA SYNDROME: ICD-10-CM

## 2025-07-14 DIAGNOSIS — R22.2 SOFT TISSUE SWELLING OF CHEST WALL: ICD-10-CM

## 2025-07-14 DIAGNOSIS — C50.811 MALIGNANT NEOPLASM OF OVERLAPPING SITES OF RIGHT BREAST IN FEMALE, ESTROGEN RECEPTOR NEGATIVE: Primary | ICD-10-CM

## 2025-07-14 DIAGNOSIS — Z17.1 MALIGNANT NEOPLASM OF OVERLAPPING SITES OF RIGHT BREAST IN FEMALE, ESTROGEN RECEPTOR NEGATIVE: Primary | ICD-10-CM

## 2025-07-14 DIAGNOSIS — L90.5 SCAR ADHERENT: ICD-10-CM

## 2025-07-14 PROCEDURE — 97140 MANUAL THERAPY 1/> REGIONS: CPT

## 2025-07-14 NOTE — THERAPY TREATMENT NOTE
"Outpatient Occupational Therapy Lymphedema Treatment Note  [facility]     Patient Name: Pretty Tovar  : 1955  MRN: [id number]  Today's Date: 2025      Visit Date: 2025    Patient Active Problem List   Diagnosis    Benign essential hypertension    Type 2 diabetes mellitus, without long-term current use of insulin    Pure hypercholesterolemia    Overweight (BMI 25.0-29.9)    Hashimoto's thyroiditis    Hyperplastic rectal polyp    Female stress incontinence    Feeling of incomplete bladder emptying    Primary osteoarthritis of left knee    S/P TKR (total knee replacement), left    Iron deficiency    Abnormality of right breast on screening mammogram    Malignant neoplasm of overlapping sites of right breast in female, estrogen receptor negative    Fitting and adjustment of vascular catheter    Right bundle branch block (RBBB) with left anterior fascicular block (LAFB)    Aortic stenosis, mild        Past Medical History:   Diagnosis Date    Abnormal liver function tests 2017    Description: , \"-\"hepatitis profile    Anemia     Anesthesia complication     SLOW TO WAKE UP WITH HYSTERECTOMY    Arthritis     Breast cancer May 2024    Cancer 24    Right Breast    Cholesterol blood reduced     Colon polyps     Cystocele, lateral 10/15/2020    Cystocele, midline 10/15/2020    Diabetes mellitus     Frequent UTI     H/O bone density study 10/2010    normal    H/O cardiovascular stress test     H/O echocardiogram 2001    2-D    Hashimoto's thyroiditis     Heart murmur     History of high cholesterol 1988    Hyperkalemia 2019    Hyperlipidemia     Hypertension     Hypothyroid 2018    Hypothyroidism 2018    Incompetence of pubocervical tissue 10/15/2020    Incompetence of rectovaginal tissue 10/15/2020    Insulin dependent diabetes mellitus     Limited joint range of motion     LEFT KNEE    Loss of perineal body, female 10/15/2020    Multiple gestation Born [birth year]    " Osteoarthritis of knee 2005    left    Pelvic relaxation due to vaginal prolapse 2018    Plantar warts     foot    Postmenopausal bleeding 2019    Pregnancy      twins x1    Psoriasis     RA (rheumatoid arthritis)     Rectocele 10/15/2020    Urinary tract infection     Urine incontinence     BETTER WITH SLING    Uterovaginal prolapse     Uterovaginal prolapse, incomplete 2020    Vaginal enterocele 10/15/2020    Varicella Approx 8 yrs old        Past Surgical History:   Procedure Laterality Date    BREAST BIOPSY Right 2006    benign    COLONOSCOPY      COLONOSCOPY W/ POLYPECTOMY  2018    Dr Resendiz, 6 mm rectal polyp - hyperplastic    JOINT MANIPULATION Left 2021    Procedure: KNEE MANIPULATION with steroid injection;  Surgeon: Francis Tavarez MD;  Location: [facility];  Service: Orthopedics;  Laterality: Left;    JOINT REPLACEMENT  08/10/2021    LAPAROSCOPIC ASSISTED VAGINAL HYSTERECTOMY SALPINGO OOPHORECTOMY  2020    LYMPH NODE BIOPSY  2024    MASTECTOMY W/ SENTINEL NODE BIOPSY Right 2024    Procedure: RIGHT MASTECTOMY WITH SENTINEL NODE BIOPSY;  Surgeon: Aracely Frye MD;  Location: [facility];  Service: General;  Laterality: Right;    PUBOVAGINAL SLING N/A 10/16/2020    Procedure: Pubovaginal sling Posterior colporrhaphy fixation Insertion of vaginal grafts Cystourethroscopy;  Surgeon: Karen Allen MD;  Location: [facility];  Service: Gynecology;  Laterality: N/A;    TOTAL KNEE ARTHROPLASTY Left 08/10/2021    Procedure: TOTAL KNEE ARTHROPLASTY;  Surgeon: Francis Tavarez MD;  Location: [facility];  Service: Orthopedics;  Laterality: Left;    TOTAL LAPAROSCOPIC HYSTERECTOMY, SACROCOLPOPEXY N/A 10/16/2020    Procedure: Laparoscopic uterosacral ligament colpopexy sacral colpopexy  Laparoscopic paravaginal repair Laparoscopic hysterectomy with bilateral salpingectomy  Laparoscopic abdominal enterocele repair;  Surgeon: Karen Allen  MD;  Location: [facility] OR;  Service: Gynecology;  Laterality: N/A;    TUBAL ABDOMINAL LIGATION      VAGINAL PROLAPSE REPAIR  October 2020    VENOUS ACCESS DEVICE (PORT) INSERTION N/A 07/03/2024    Procedure: INSERTION OF PORTACATH;  Surgeon: Aracely Frye MD;  Location: [facility] MAIN OR;  Service: General;  Laterality: N/A;         Visit Dx:      ICD-10-CM ICD-9-CM   1. Malignant neoplasm of overlapping sites of right breast in female, estrogen receptor negative  C50.811 174.8    Z17.1 V86.1   2. Post-mastectomy lymphedema syndrome  I97.2 457.0   3. Scar adherent  L90.5 709.2   4. Soft tissue swelling of chest wall  R22.2 786.6        Lymphedema       Row Name 07/14/25 0900             Subjective Pain    Able to rate subjective pain? yes  -RE      Pre-Treatment Pain Level 0  -RE      Post-Treatment Pain Level 0  -RE         Subjective    Subjective Comments Patient reports that she has not noticed a significant change in her scar or in tissue mobility or edema since her last visit.  -RE         Manual Lymphatic Drainage    Manual Lymphatic Drainage initial sequence;opened regional lymph nodes;opened anastamoses;extremity treatment  -RE      Initial Sequence short neck  -RE      Opened Regional Lymph Nodes axillary  -RE      Axillary left  -RE      Opened Anastamoses anterior axillo-axillary  -RE      Extremity Treatment extremity treatment focus on  R chest wall  -RE      Manual Therapy scar massage-R mast scar  -RE                User Key  (r) = Recorded By, (t) = Taken By, (c) = Cosigned By      Initials Name Provider Type    RE Yasemin Elizabeth OTR Occupational Therapist                             OT Assessment/Plan       Row Name 07/14/25 1032          OT Assessment    Assessment Comments Patient continues with decreased tissue mobility across the right chest area.  Patient is not concerned about how her scar looks.  I recommended scar massage twice a day at home to try to soften the area.  I did not note any  signs or symptoms of lymphedema today.  I will continue to follow for lymphedema prevention and surveillance with bioimpedance but will discontinue treatment for her scar because she is not noting a significant improvement and I believe her lymphedema symptoms have resolved.  -RE        OT Plan    Predicted Duration of Therapy Intervention (OT) Follow-up in mid September for bioimpedance  -RE               User Key  (r) = Recorded By, (t) = Taken By, (c) = Cosigned By      Initials Name Provider Type    Yasemin Bradford OTR Occupational Therapist                        Manual Rx (Last 36 Hours)       Manual Treatments       Row Name 07/14/25 1034             Total Minutes    14141 - OT Manual Therapy Minutes 35  -RE                User Key  (r) = Recorded By, (t) = Taken By, (c) = Cosigned By      Initials Name Provider Type    Yasemin Bradford OTR Occupational Therapist                      Therapy Education  Education Details: Instructed patient in scar massage.  I recommended massage twice a day with focus on moving and stretching the skin across the entire chest including slightly across midline.  Given: Symptoms/condition management  Program: New  How Provided: Verbal, Demonstration  Provided to: Patient  Level of Understanding: Teach back education performed, Verbalized  52427 - OT Self Care/Mgmt Minutes: 5                Time Calculation:   OT Start Time: 0945  OT Stop Time: 1025  OT Time Calculation (min): 40 min  Total Timed Code Minutes- OT: 40 minute(s)  Timed Charges  44666 - OT Manual Therapy Minutes: 35  10355 - OT Self Care/Mgmt Minutes: 5  Total Minutes  Timed Charges Total Minutes: 40   Total Minutes: 40     Therapy Charges for Today       Code Description Service Date Service Provider Modifiers Qty    [id number]  OT MANUAL THERAPY EA 15 MIN 7/14/2025 Yasemin Elizabeth OTR GO 3          Dictated utilizing Dragon dictation:  Much of this encounter note is an electronic transcription/translation of  spoken language to printed text. The electronic translation of spoken language may permit erroneous, or at times, nonsensical words or phrases to be inadvertently transcribed; Although I have reviewed the note for such errors, some may still exist.              Yasemin Elizabeth, OTR  7/14/2025

## 2025-07-22 ENCOUNTER — APPOINTMENT (OUTPATIENT)
Dept: OCCUPATIONAL THERAPY | Facility: HOSPITAL | Age: 70
End: 2025-07-22
Payer: MEDICARE

## 2025-08-19 ENCOUNTER — OFFICE VISIT (OUTPATIENT)
Dept: SURGERY | Facility: CLINIC | Age: 70
End: 2025-08-19
Payer: MEDICARE

## 2025-08-19 VITALS
RESPIRATION RATE: 17 BRPM | HEART RATE: 61 BPM | SYSTOLIC BLOOD PRESSURE: 134 MMHG | DIASTOLIC BLOOD PRESSURE: 72 MMHG | WEIGHT: 157 LBS | BODY MASS INDEX: 30.82 KG/M2 | OXYGEN SATURATION: 98 % | HEIGHT: 60 IN

## 2025-08-19 DIAGNOSIS — Z95.828 PORT-A-CATH IN PLACE: ICD-10-CM

## 2025-08-19 DIAGNOSIS — Z17.1 MALIGNANT NEOPLASM OF OVERLAPPING SITES OF RIGHT BREAST IN FEMALE, ESTROGEN RECEPTOR NEGATIVE: Primary | ICD-10-CM

## 2025-08-19 DIAGNOSIS — C50.811 MALIGNANT NEOPLASM OF OVERLAPPING SITES OF RIGHT BREAST IN FEMALE, ESTROGEN RECEPTOR NEGATIVE: Primary | ICD-10-CM

## 2025-08-20 PROBLEM — Z95.828 PORT-A-CATH IN PLACE: Status: ACTIVE | Noted: 2025-08-20

## 2025-08-21 ENCOUNTER — TELEPHONE (OUTPATIENT)
Dept: SURGERY | Facility: CLINIC | Age: 70
End: 2025-08-21
Payer: MEDICARE

## 2025-08-29 ENCOUNTER — PRE-ADMISSION TESTING (OUTPATIENT)
Dept: PREADMISSION TESTING | Facility: HOSPITAL | Age: 70
End: 2025-08-29
Payer: MEDICARE

## 2025-08-29 VITALS
HEIGHT: 61 IN | TEMPERATURE: 97.5 F | DIASTOLIC BLOOD PRESSURE: 61 MMHG | RESPIRATION RATE: 16 BRPM | OXYGEN SATURATION: 100 % | SYSTOLIC BLOOD PRESSURE: 122 MMHG | WEIGHT: 156.3 LBS | BODY MASS INDEX: 29.51 KG/M2 | HEART RATE: 63 BPM

## 2025-08-29 LAB
ANION GAP SERPL CALCULATED.3IONS-SCNC: 13.8 MMOL/L (ref 5–15)
BUN SERPL-MCNC: 20 MG/DL (ref 8–23)
BUN/CREAT SERPL: 25.6 (ref 7–25)
CALCIUM SPEC-SCNC: 9.2 MG/DL (ref 8.6–10.5)
CHLORIDE SERPL-SCNC: 100 MMOL/L (ref 98–107)
CO2 SERPL-SCNC: 24.2 MMOL/L (ref 22–29)
CREAT SERPL-MCNC: 0.78 MG/DL (ref 0.57–1)
DEPRECATED RDW RBC AUTO: 44.9 FL (ref 37–54)
EGFRCR SERPLBLD CKD-EPI 2021: 82.3 ML/MIN/1.73
ERYTHROCYTE [DISTWIDTH] IN BLOOD BY AUTOMATED COUNT: 13.2 % (ref 12.3–15.4)
GLUCOSE SERPL-MCNC: 112 MG/DL (ref 65–99)
HCT VFR BLD AUTO: 36.3 % (ref 34–46.6)
HGB BLD-MCNC: 12 G/DL (ref 12–15.9)
MCH RBC QN AUTO: 30.5 PG (ref 26.6–33)
MCHC RBC AUTO-ENTMCNC: 33.1 G/DL (ref 31.5–35.7)
MCV RBC AUTO: 92.1 FL (ref 79–97)
PLATELET # BLD AUTO: 231 10*3/MM3 (ref 140–450)
PMV BLD AUTO: 10.4 FL (ref 6–12)
POTASSIUM SERPL-SCNC: 4.4 MMOL/L (ref 3.5–5.2)
RBC # BLD AUTO: 3.94 10*6/MM3 (ref 3.77–5.28)
SODIUM SERPL-SCNC: 138 MMOL/L (ref 136–145)
WBC NRBC COR # BLD AUTO: 4.71 10*3/MM3 (ref 3.4–10.8)

## 2025-08-29 PROCEDURE — 80048 BASIC METABOLIC PNL TOTAL CA: CPT

## 2025-08-29 PROCEDURE — 85027 COMPLETE CBC AUTOMATED: CPT

## 2025-08-29 PROCEDURE — 36415 COLL VENOUS BLD VENIPUNCTURE: CPT

## (undated) DEVICE — SYR LL 3CC

## (undated) DEVICE — SYR LUERLOK 30CC

## (undated) DEVICE — IRR EAR 2OZ

## (undated) DEVICE — INTENDED FOR TISSUE SEPARATION, AND OTHER PROCEDURES THAT REQUIRE A SHARP SURGICAL BLADE TO PUNCTURE OR CUT.: Brand: BARD-PARKER ® CARBON RIB-BACK BLADES

## (undated) DEVICE — APPL CHLORAPREP HI/LITE 26ML ORNG

## (undated) DEVICE — STPLR SKIN VISISTAT WD 35CT

## (undated) DEVICE — RETR STAY HK ELAS SHRP 5MM PK/8

## (undated) DEVICE — GLV SURG BIOGEL LTX PF 7

## (undated) DEVICE — ENDOPATH XCEL BLADELESS TROCARS WITH STABILITY SLEEVES: Brand: ENDOPATH XCEL

## (undated) DEVICE — GLV SURG BIOGEL LTX PF 7 1/2

## (undated) DEVICE — ADHS SKIN DERMABOND TOP ADVANCED

## (undated) DEVICE — TRAP FLD MINIVAC MEGADYNE 100ML

## (undated) DEVICE — GLV SURG PREMIERPRO ORTHO LTX PF SZ7.5 BRN

## (undated) DEVICE — PENCL SMOKE/EVAC MEGADYNE TELESCP 10FT

## (undated) DEVICE — SUT VIC 0 TIES 18IN J912G

## (undated) DEVICE — SUT PROLN 1 CT1 30IN 8425H

## (undated) DEVICE — 1010 S-DRAPE TOWEL DRAPE 10/BX: Brand: STERI-DRAPE™

## (undated) DEVICE — SHEARS WITH ROTICULATOR TECHNOLOGY: Brand: ENDO MINI-SHEARS

## (undated) DEVICE — ANTIBACTERIAL UNDYED BRAIDED (POLYGLACTIN 910), SYNTHETIC ABSORBABLE SUTURE: Brand: COATED VICRYL

## (undated) DEVICE — MANIP UTER ADVINCULA DELINEATOR 3.5CM

## (undated) DEVICE — BNDG ADHS PLSTC 1X3IN LF

## (undated) DEVICE — MEDI-VAC YANKAUER SUCTION HANDLE W/BULBOUS TIP: Brand: CARDINAL HEALTH

## (undated) DEVICE — SYR CONTRL LUERLOK 10CC

## (undated) DEVICE — PAD,ABDOMINAL,8"X10",ST,LF: Brand: MEDLINE

## (undated) DEVICE — LOU LAVH: Brand: MEDLINE INDUSTRIES, INC.

## (undated) DEVICE — CONTAINER,SPECIMEN,OR STERILE,4OZ: Brand: MEDLINE

## (undated) DEVICE — NEEDLE, QUINCKE, 20GX3.5": Brand: MEDLINE

## (undated) DEVICE — DECANTER BAG 9": Brand: MEDLINE INDUSTRIES, INC.

## (undated) DEVICE — CATHETER,FOLEY,100%SILICONE,16FR,10ML,LF: Brand: MEDLINE

## (undated) DEVICE — 3M™ STERI-DRAPE™ INSTRUMENT POUCH 1018: Brand: STERI-DRAPE™

## (undated) DEVICE — PK KN TOTL 40

## (undated) DEVICE — STRIP,CLOSURE,WOUND,MEDI-STRIP,1/2X4: Brand: MEDLINE

## (undated) DEVICE — COVER,MAYO STAND,STERILE: Brand: MEDLINE

## (undated) DEVICE — LEGGINGS, PAIR, 31X48, STERILE: Brand: MEDLINE

## (undated) DEVICE — APPL HEMOS FOR DELIVERY FLOSEAL

## (undated) DEVICE — TUBING, SUCTION, 1/4" X 20', STRAIGHT: Brand: MEDLINE INDUSTRIES, INC.

## (undated) DEVICE — SYRINGE, LUER LOCK, 60ML: Brand: MEDLINE

## (undated) DEVICE — ST IRR CYSTO W/SPK 77IN LF

## (undated) DEVICE — TOWEL,OR,DSP,ST,BLUE,STD,4/PK,20PK/CS: Brand: MEDLINE

## (undated) DEVICE — VAGINAL PACKING: Brand: DEROYAL

## (undated) DEVICE — ENDOPATH XCEL UNIVERSAL TROCAR STABLILITY SLEEVES: Brand: ENDOPATH XCEL

## (undated) DEVICE — SUT VIC 3/0 TIES 18IN J110T

## (undated) DEVICE — COVER,C-ARM,41X74: Brand: MEDLINE

## (undated) DEVICE — 2, DISPOSABLE SUCTION/IRRIGATOR WITH DISPOSABLE TIP: Brand: STRYKEFLOW

## (undated) DEVICE — SUT PROLN 2/0 SH 36IN 8523H

## (undated) DEVICE — KT DRN EVAC WND PVC PCH WTROC RND 10F400

## (undated) DEVICE — DRP SLUSH WARMR MACH CIR 44X44IN

## (undated) DEVICE — DRAPE,REIN 53X77,STERILE: Brand: MEDLINE

## (undated) DEVICE — ELECTRD BLD EZ CLN MOD XLNG 2.75IN

## (undated) DEVICE — BANDAGE,GAUZE,BULKEE II,4.5"X4.1YD,STRL: Brand: MEDLINE

## (undated) DEVICE — IRRIGATOR BULB ASEPTO 60CC STRL

## (undated) DEVICE — DUAL CUT SAGITTAL BLADE

## (undated) DEVICE — PK UNIV COMPL 40

## (undated) DEVICE — DRSNG GZ PETROLTM XEROFORM CURAD 1X8IN STRL

## (undated) DEVICE — SPNG LAP 18X18IN LF STRL PK/5

## (undated) DEVICE — TTL1LYR 16FR10ML 100%SIL TMPST TR: Brand: MEDLINE

## (undated) DEVICE — CONTN STRL 32OZ

## (undated) DEVICE — PUNCH BIOP 2MM

## (undated) DEVICE — HYPODERMIC SAFETY NEEDLE: Brand: MONOJECT

## (undated) DEVICE — RUBBERBAND LF STRL PK/2

## (undated) DEVICE — SYR LL TP 10ML STRL

## (undated) DEVICE — GLV SURG BIOGEL LTX PF 6 1/2

## (undated) DEVICE — NDL HYPO PRECISIONGLIDE REG 25G 1 1/2

## (undated) DEVICE — GLV SURG SENSICARE PI LF PF 7.5 GRN STRL

## (undated) DEVICE — DRSNG WND BORDR/ADHS NONADHR/GZ LF 2X2IN STRL

## (undated) DEVICE — SYR LUERLOK 5CC

## (undated) DEVICE — DISPOSABLE MONOPOLAR ENDOSCOPIC CORD 10 FT. (3M): Brand: KIRWAN

## (undated) DEVICE — DRP Z/FRICTION 10X16IN

## (undated) DEVICE — COVER,TABLE,HEAVY DUTY,79"X110",STRL: Brand: MEDLINE

## (undated) DEVICE — BNDG ELAS ELITE V/CLOSE 4IN 5YD LF STRL

## (undated) DEVICE — TOTAL TRAY, 16FR 10ML SIL FOLEY, URN: Brand: MEDLINE

## (undated) DEVICE — PENCL ES MEGADINE EZ/CLEAN BUTN W/HOLSTR 10FT

## (undated) DEVICE — PATIENT RETURN ELECTRODE, SINGLE-USE, CONTACT QUALITY MONITORING, ADULT, WITH 9FT CORD, FOR PATIENTS WEIGING OVER 33LBS. (15KG): Brand: MEGADYNE

## (undated) DEVICE — HARMONIC ACE +7 LAPAROSCOPIC SHEARS ADVANCED HEMOSTASIS 5MM DIAMETER 36CM SHAFT LENGTH  FOR USE WITH GRAY HAND PIECE ONLY: Brand: HARMONIC ACE

## (undated) DEVICE — SYR LUERLOK 50ML

## (undated) DEVICE — Device

## (undated) DEVICE — SINGLE BASIN: Brand: CARDINAL HEALTH

## (undated) DEVICE — 3M™ STERI-DRAPE™ INSTRUMENT POUCH 1018L: Brand: STERI-DRAPE™

## (undated) DEVICE — DRAPE,UNDERBUTTOCKS,PCH,STERILE: Brand: MEDLINE

## (undated) DEVICE — NEEDLE,BLUNT,FILTER, 18GX1.5": Brand: MEDLINE

## (undated) DEVICE — MAT FLR ABS LIQUILOC 28X56IN PNK

## (undated) DEVICE — SUT MNCRYL PLS ANTIB UD 4/0 PS2 18IN

## (undated) DEVICE — TROCAR: Brand: KII OPTICAL ACCESS SYSTEM

## (undated) DEVICE — DECANT BG O JET

## (undated) DEVICE — COUNT NDL MAG FM/BLCK 40COUNT

## (undated) DEVICE — SOL NS 500ML

## (undated) DEVICE — DEV COND GAS LAP INSUFLOW W/LUER CONN

## (undated) DEVICE — SUT VIC 0 CT1 36IN J946H

## (undated) DEVICE — ENDOPATH PNEUMONEEDLE INSUFFLATION NEEDLES WITH LUER LOCK CONNECTORS 120MM: Brand: ENDOPATH

## (undated) DEVICE — LAPAROSCOPIC SMOKE FILTRATION SYSTEM: Brand: PALL LAPAROSHIELD® PLUS LAPAROSCOPIC SMOKE FILTRATION SYSTEM

## (undated) DEVICE — SUT ETHLN 2/0 PS 18IN 585H

## (undated) DEVICE — BLADE SHIELD SCALPEL HOLDER: Brand: DEVON

## (undated) DEVICE — RETR RNG GEN2 31.8X18.3CM

## (undated) DEVICE — 3M™ STERI-STRIP™ REINFORCED ADHESIVE SKIN CLOSURES, R1546, 1/4 IN X 4 IN (6 MM X 100 MM), 10 STRIPS/ENVELOPE: Brand: 3M™ STERI-STRIP™

## (undated) DEVICE — UNDERCAST PADDING: Brand: DEROYAL

## (undated) DEVICE — GAUZE,SPONGE,FLUFF,6"X6.75",STRL,10/TRAY: Brand: MEDLINE

## (undated) DEVICE — NEEDLE, QUINCKE, 18GX3.5": Brand: MEDLINE

## (undated) DEVICE — SUT SILK 2/0 FS BLK 18IN 685G

## (undated) DEVICE — SUT VIC 0 CT 36IN J958H

## (undated) DEVICE — JACKSON-PRATT 100CC BULB RESERVOIR: Brand: CARDINAL HEALTH

## (undated) DEVICE — CARDIOVASCULAR SPLIT DRAPE II, OPTIMA: Brand: CONVERTORS

## (undated) DEVICE — UNDYED BRAIDED (POLYGLACTIN 910), SYNTHETIC ABSORBABLE SUTURE: Brand: COATED VICRYL

## (undated) DEVICE — SMOKE EVACUATION TUBING WITH 7/8 IN TO 1/4 IN REDUCER: Brand: BUFFALO FILTER

## (undated) DEVICE — PENCL E/S ULTRAVAC TELESCP NOSE HOLSTR 10FT

## (undated) DEVICE — DEV SUT GRSPR CLOSUR 15CM 14G